# Patient Record
Sex: FEMALE | Race: WHITE | NOT HISPANIC OR LATINO | Employment: OTHER | URBAN - METROPOLITAN AREA
[De-identification: names, ages, dates, MRNs, and addresses within clinical notes are randomized per-mention and may not be internally consistent; named-entity substitution may affect disease eponyms.]

---

## 2017-04-10 ENCOUNTER — TELEPHONE (OUTPATIENT)
Dept: DERMATOLOGY | Facility: CLINIC | Age: 77
End: 2017-04-10

## 2017-04-10 ENCOUNTER — OFFICE VISIT (OUTPATIENT)
Dept: DERMATOLOGY | Facility: CLINIC | Age: 77
End: 2017-04-10
Payer: COMMERCIAL

## 2017-04-10 VITALS — HEART RATE: 67 BPM | OXYGEN SATURATION: 94 % | SYSTOLIC BLOOD PRESSURE: 141 MMHG | DIASTOLIC BLOOD PRESSURE: 63 MMHG

## 2017-04-10 DIAGNOSIS — L82.1 SK (SEBORRHEIC KERATOSIS): ICD-10-CM

## 2017-04-10 DIAGNOSIS — Z85.828 HISTORY OF SKIN CANCER: ICD-10-CM

## 2017-04-10 DIAGNOSIS — D18.00 ANGIOMA: ICD-10-CM

## 2017-04-10 DIAGNOSIS — T14.8XXA EXCORIATION: Primary | ICD-10-CM

## 2017-04-10 DIAGNOSIS — L81.4 LENTIGO: ICD-10-CM

## 2017-04-10 DIAGNOSIS — C44.329 SQUAMOUS CELL CARCINOMA OF FOREHEAD: ICD-10-CM

## 2017-04-10 PROCEDURE — 99213 OFFICE O/P EST LOW 20 MIN: CPT | Mod: 25 | Performed by: DERMATOLOGY

## 2017-04-10 PROCEDURE — 88331 PATH CONSLTJ SURG 1 BLK 1SPC: CPT | Performed by: DERMATOLOGY

## 2017-04-10 PROCEDURE — 11100 HC BIOPSY SKIN/SUBQ/MUC MEM, SINGLE LESION: CPT | Performed by: DERMATOLOGY

## 2017-04-10 PROCEDURE — 11101 HC BIOPSY SKIN/SUBQ/MUC MEM, EACH ADDTL LESION: CPT | Performed by: DERMATOLOGY

## 2017-04-10 NOTE — PATIENT INSTRUCTIONS
Wound Care Instructions     FOR SUPERFICIAL WOUNDS     [facility] [phone]    [facility] [phone]    Back & forehead                   AFTER 24 HOURS YOU SHOULD REMOVE THE BANDAGE AND BEGIN DAILY DRESSING CHANGES AS FOLLOWS:     1) Remove Dressing.     2) Clean and dry the area with tap water using a Q-tip or sterile gauze pad.     3) Apply Vaseline, Aquaphor, Polysporin ointment or Bacitracin ointment over entire wound.  Do NOT use Neosporin ointment.     4) Cover the wound with a band-aid, or a sterile non-stick gauze pad and micropore paper tape      REPEAT THESE INSTRUCTIONS AT LEAST ONCE A DAY UNTIL THE WOUND HAS COMPLETELY HEALED.    It is an old wives tale that a wound heals better when it is exposed to air and allowed to dry out. The wound will heal faster with a better cosmetic result if it is kept moist with ointment and covered with a bandage.    **Do not let the wound dry out.**      Supplies Needed:      *Cotton tipped applicators (Q-tips)    *Polysporin Ointment or Bacitracin Ointment (NOT NEOSPORIN)    *Band-aids or non-stick gauze pads and micropore paper tape.      PATIENT INFORMATION:    During the healing process you will notice a number of changes. All wounds develop a small halo of redness surrounding the wound.  This means healing is occurring. Severe itching with extensive redness usually indicates sensitivity to the ointment or bandage tape used to dress the wound.  You should call our office if this develops.      Swelling  and/or discoloration around your surgical site is common, particularly when performed around the eye.    All wounds normally drain.  The larger the wound the more drainage there will be.  After 7-10 days, you will notice the wound beginning to shrink and new skin will begin to grow.  The wound is healed when you can see skin has formed over the entire area.  A healed wound has a healthy, shiny look to the surface and is red to dark  pink in color to normalize.  Wounds may take approximately 4-6 weeks to heal.  Larger wounds may take 6-8 weeks.  After the wound is healed you may discontinue dressing changes.    You may experience a sensation of tightness as your wound heals. This is normal and will gradually subside.    Your healed wound may be sensitive to temperature changes. This sensitivity improves with time, but if you re having a lot of discomfort, try to avoid temperature extremes.    Patients frequently experience itching after their wound appears to have healed because of the continue healing under the skin.  Plain Vaseline will help relieve the itching.        POSSIBLE COMPLICATIONS    BLEEDIN. Leave the bandage in place.  2. Use tightly rolled up gauze or a cloth to apply direct pressure over the bandage for 30  minutes.  3. Reapply pressure for an additional 30 minutes if necessary  4. Use additional gauze and tape to maintain pressure once the bleeding has stopped.

## 2017-04-10 NOTE — TELEPHONE ENCOUNTER
Patient notified. Patient verbalized understanding. Scheduled for MOHS Surgery. Mohs Pre-op letter sent.   Ricarda Chu RN

## 2017-04-10 NOTE — PROGRESS NOTES
Precious Paris is a 76 year old year old female patient here today for /fu hx non-melanoma skin cancer.  She notes growth on left forehead treated with cryo but grew back.    Patient states this has been present for months.  Patient reports the following symptoms:  tender.  Patient reports the following previous treatments none.  Patient reports the following modifying factors none.  Associated symptoms: none.  Patient has no other skin complaints today.  Remainder of the HPI, Meds, PMH, Allergies, FH, and SH was reviewed in chart.      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     anosmia     Diverticulosis of colon (without mention of hemorrhage)      Other and unspecified hyperlipidemia      Pain in joint, lower leg      Plantar fascial fibromatosis      PURE HYPERCHOLESTEROLEM 9/9/2007     PURE HYPERCHOLESTEROLEM 9/9/2007     Squamous cell carcinoma (H)        Past Surgical History:   Procedure Laterality Date     HC COLONOSCOPY THRU STOMA, DIAGNOSTIC  04/05    diverticulosis and hemorroids, due 2015     JOINT REPLACEMTN, KNEE RT/LT  2010    right     JOINT REPLACEMTN, KNEE RT/LT  2011    left     LIGATE VEIN VARICOSE, PHLEBECTOMY MULTIPLE STAB, COMBINED  10/17/2013    Procedure: COMBINED LIGATE VEIN VARICOSE, PHLEBECTOMY MULTIPLE STAB;  Phlebectomy of Right Knee Varicose Veins;  Surgeon: Andrea Duffy MD;  Location: [facility] OR     OSTEOTOMY FOOT  8/19/2013    Procedure: OSTEOTOMY FOOT;  Left 2nd metatarsal osteotomy, 2nd Metatarsophalangeal joint & 2nd toe correction;  Surgeon: Jose Phillips DPM;  Location: [facility] OR     SURGICAL HISTORY OF -   1979    Stripping of Veins in Left Leg     SURGICAL HISTORY OF -   1970    Bilateral Tubal Ligation     SURGICAL HISTORY  OF - 2000    Carpal Tunnel Repair, right     SURGICAL HISTORY OF - 9-12-08    Rt let ablation        Family History   Problem Relation Age of Onset     Hypertension Mother      CEREBROVASCULAR DISEASE Mother      Hypertension Father      CEREBROVASCULAR DISEASE Father      Hypertension Brother      CANCER Brother      lymphoma     Hypertension Sister      GASTROINTESTINAL DISEASE Sister      Hypertension Brother      CANCER Brother      melanoma     Eye Disorder Brother      Hypertension Brother      Hypertension Brother        Social History     Social History     Marital status:      Spouse name: N/A     Number of children: N/A     Years of education: N/A     Occupational History     Not on file.     Social History Main Topics     Smoking status: Former Smoker     Smokeless tobacco: Never Used      Comment: quit 20 years ago     Alcohol use Yes      Comment: occ.     Drug use: No     Sexual activity: Yes     Partners: Male     Other Topics Concern     Parent/Sibling W/ Cabg, Mi Or Angioplasty Before 65f 55m? No     Social History Narrative       Outpatient Encounter Prescriptions as of 4/10/2017   Medication Sig Dispense Refill     simvastatin (ZOCOR) 20 MG tablet Take 1 tablet (20 mg) by mouth At Bedtime 90 tablet 3     losartan-hydrochlorothiazide (HYZAAR) 50-12.5 MG per tablet Take 1 tablet by mouth daily 90 tablet 3     triamcinolone (KENALOG) 0.1 % cream Apply to AA BID x 1-2 weeks then PRN only 80 g 1     fluocinonide (LIDEX) 0.05 % external solution Apply sparingly to affected area twice daily as needed.  Do not apply to face.(Due for recheck Itching appt [phone] to schedule) 60 mL 0     fish oil-omega-3 fatty acids (OMEGA 3) 1000 MG capsule Take 1 capsule (1 g) by mouth daily 30 capsule 1     ASPIRIN 81 MG OR TABS 1 TABLET EVERY OTHER DAY       IBUPROFEN 200 MG OR TABS AS NEEDED       CENTRUM SILVER OR 1 TABLET DAILY       CALCIUM OR 1 DAILY       VITAMIN C OR 1 DAILY       No  facility-administered encounter medications on file as of 4/10/2017.              Review Of Systems  Skin: As above  Eyes: negative  Ears/Nose/Throat: negative  Respiratory: No shortness of breath, dyspnea on exertion, cough, or hemoptysis  Cardiovascular: negative  Gastrointestinal: negative  Genitourinary: negative  Musculoskeletal: negative  Neurologic: negative  Psychiatric: negative  Hematologic/Lymphatic/Immunologic: negative  Endocrine: negative      O:   NAD, WDWN, Alert & Oriented, Mood & Affect wnl, Vitals stable   Here today alone   /63 (BP Location: Left arm, Patient Position: Chair, Cuff Size: Adult Large)  Pulse 67  SpO2 94%   General appearance normal   Vitals stable   Alert, oriented and in no acute distress      Following lymph nodes palpated: Occipital, Cervical, Supraclavicular no lad     Stuck on papules and brown macules on trunk and ext    Red papules on trunk    R back bleeding 9mm scaly papule    L medial forehead 9mm keratotic scaly papule         The remainder of the full exam was unremarkable; the following areas were examined:  conjunctiva/lids, oral mucosa, neck, peripheral vascular system, abdomen, lymph nodes, digits/nails, eccrine and apocrine glands, scalp/hair, face, neck, chest, abdomen, buttocks, back, RUE, LUE, RLE, LLE       Eyes: Conjunctivae/lids:Normal     ENT: Lips, buccal mucosa, tongue: normal    MSK:Normal    Cardiovascular: peripheral edema none    Pulm: Breathing Normal    Lymph Nodes: No Head and Neck Lymphadenopathy     Neuro/Psych: Orientation:Normal; Mood/Affect:Normal      MICRO:     R back:There is hyperkeratosis and focal parakeratosis and ulceration of the epidermis, overlying banal keratinocytes, .  The dermis shows a variable superficial perivascular inflammatory infiltrate.   L medial fh:There is a proliferation of irregular nests of abnormal squamous cells arising from the epidermis and invading the dermis. These are well differentiated. The dermis  shows a variable superficial perivascular inflammatory infiltrate.   A/P:  1. Hx of non-melanoma skin cancer, seborrheic keratosis, lentigo angioma  2. R/o basal cell carcinoma   TANGENTIAL BIOPSY IN HOUSE:  After consent, anesthesia with LEC and prep, tangential excision performed and dx above confirmed with frozen section histology.  No complications and routine wound care.  Patient told result   L medial FH: squamous cell carcinoma schedule excision  r back excoriated keratosis no treatment      BENIGN LESIONS DISCUSSED WITH PATIENT:  I discussed the specifics of tumor, prognosis, and genetics of benign lesions.  I explained that treatment of these lesions would be purely cosmetic and not medically neccessary.  I discussed with patient different removal options including excision, cautery and /or laser.      Nature and genetics of benign skin lesions dicussed with patient.  Signs and Symptoms of skin cancer discussed with patient.  Patient encouraged to perform monthly skin exams.  UV precautions reviewed with patient.  Skin care regimen reviewed with patient: Eliminate harsh soaps, i.e. Dial, zest, irsih spring; Mild soaps such as Cetaphil or Dove sensitive skin, avoid hot or cold showers, aggressive use of emollients including vanicream, cetaphil or cerave discussed with patient.    Risks of non-melanoma skin cancer discussed with patient   Return to clinic 6 months

## 2017-04-10 NOTE — TELEPHONE ENCOUNTER
----- Message from Danielito Anthony MD sent at 4/10/2017 12:07 PM CDT -----  L medial FH: squamous cell carcinoma schedule excision  r back excoriated keratosis no treatment

## 2017-04-10 NOTE — LETTER
[facility]  [address]  [facility] [address]  Phone: [phone]    April 10, 2017    Precious Paris  [address] [place] [address]              Dear Ms. Paris,    You are scheduled for Mohs Surgery on Tuesday April 25 th at 8:00 am.    Please check in at Dermatology Clinic.   (2nd Floor, last  Clinic on right up staircase or elevator -past OB/GYN clinic)    You don't need to arrive more than 5-10 minutes prior to your appointment time.     Be sure to eat a good breakfast and bathe and wash your hair prior to Surgery.    If you are taking any anti-coagulants that are prescribed by your Doctor (such as Coumadin/warfarin, Plavix, Aspirin, Ibuprofen), please continue taking them.     However, If you are taking anti-coagulants over the counter without  a Doctor's order for a Medical condition, please discontinue them 10 days prior to Surgery.      Please wear loose comfortable clothing as it could possibly be 4-6 hours until your surgery is completed depending upon how many layers of tissue need to be removed.     Wi-fi access is available.     Sincerely,      Danielito Anthony MD/ Ricarda Chu RN

## 2017-04-10 NOTE — MR AVS SNAPSHOT
After Visit Summary   4/10/2017    Precious Paris    MRN: [id number]           Patient Information     Date Of Birth          1940        Visit Information        Provider Department      4/10/2017 9:30 AM Danielito Anthony MD [facility]        Care Instructions          Wound Care Instructions     FOR SUPERFICIAL WOUNDS     [facility] [phone]    [facility] [phone]    Back & forehead                   AFTER 24 HOURS YOU SHOULD REMOVE THE BANDAGE AND BEGIN DAILY DRESSING CHANGES AS FOLLOWS:     1) Remove Dressing.     2) Clean and dry the area with tap water using a Q-tip or sterile gauze pad.     3) Apply Vaseline, Aquaphor, Polysporin ointment or Bacitracin ointment over entire wound.  Do NOT use Neosporin ointment.     4) Cover the wound with a band-aid, or a sterile non-stick gauze pad and micropore paper tape      REPEAT THESE INSTRUCTIONS AT LEAST ONCE A DAY UNTIL THE WOUND HAS COMPLETELY HEALED.    It is an old wives tale that a wound heals better when it is exposed to air and allowed to dry out. The wound will heal faster with a better cosmetic result if it is kept moist with ointment and covered with a bandage.    **Do not let the wound dry out.**      Supplies Needed:      *Cotton tipped applicators (Q-tips)    *Polysporin Ointment or Bacitracin Ointment (NOT NEOSPORIN)    *Band-aids or non-stick gauze pads and micropore paper tape.      PATIENT INFORMATION:    During the healing process you will notice a number of changes. All wounds develop a small halo of redness surrounding the wound.  This means healing is occurring. Severe itching with extensive redness usually indicates sensitivity to the ointment or bandage tape used to dress the wound.  You should call our office if this develops.      Swelling  and/or discoloration around your surgical site is common, particularly when performed around the eye.    All wounds normally  drain.  The larger the wound the more drainage there will be.  After 7-10 days, you will notice the wound beginning to shrink and new skin will begin to grow.  The wound is healed when you can see skin has formed over the entire area.  A healed wound has a healthy, shiny look to the surface and is red to dark pink in color to normalize.  Wounds may take approximately 4-6 weeks to heal.  Larger wounds may take 6-8 weeks.  After the wound is healed you may discontinue dressing changes.    You may experience a sensation of tightness as your wound heals. This is normal and will gradually subside.    Your healed wound may be sensitive to temperature changes. This sensitivity improves with time, but if you re having a lot of discomfort, try to avoid temperature extremes.    Patients frequently experience itching after their wound appears to have healed because of the continue healing under the skin.  Plain Vaseline will help relieve the itching.        POSSIBLE COMPLICATIONS    BLEEDIN. Leave the bandage in place.  2. Use tightly rolled up gauze or a cloth to apply direct pressure over the bandage for 30  minutes.  3. Reapply pressure for an additional 30 minutes if necessary  4. Use additional gauze and tape to maintain pressure once the bleeding has stopped.          Follow-ups after your visit        Who to contact     If you have questions or need follow up information about today's clinic visit or your schedule please contact [facility] directly at [phone].  Normal or non-critical lab and imaging results will be communicated to you by [employer], letter or phone within 4 business days after the clinic has received the results. If you do not hear from us within 7 days, please contact the clinic through VidaPakhart or phone. If you have a critical or abnormal lab result, we will notify you by phone as soon as possible.  Submit refill requests through [employer] or call your pharmacy and they will  "forward the refill request to us. Please allow 3 business days for your refill to be completed.          Additional Information About Your Visit        [employer] Information     [email or web address] lets you send messages to your doctor, view your test results, renew your prescriptions, schedule appointments and more. To sign up, go to [email or web address] . Click on \"Log in\" on the left side of the screen, which will take you to the Welcome page. Then click on \"Sign up Now\" on the right side of the page.     You will be asked to enter the access code listed below, as well as some personal information. Please follow the directions to create your username and password.     Your access code is: [email or web address]  Expires: 2017 10:05 AM     Your access code will  in 90 days. If you need help or a new code, please call your [facility] clinic or [phone].        Care EveryWhere ID     This is your Care EveryWhere ID. This could be used by other organizations to access your [facility] medical records  [id number]        Your Vitals Were     Pulse Pulse Oximetry                67 94%           Blood Pressure from Last 3 Encounters:   04/10/17 141/63   16 132/66   10/19/16 130/68    Weight from Last 3 Encounters:   16 88.9 kg (196 lb)   06/10/16 88.9 kg (196 lb)   16 88 kg (194 lb)              Today, you had the following     No orders found for display       Primary Care Provider Office Phone # Fax #    Ester Jorge [phone]       [facility] [address] [facility] [address]        Thank you!     Thank you for choosing [facility]  for your care. Our goal is always to provide you with excellent care. Hearing back from our patients is one way we can continue to improve our services. Please take a few minutes to complete the written survey that you may receive in the mail after your visit with us. Thank you!             Your Updated Medication List - Protect " others around you: Learn how to safely use, store and throw away your medicines at www.disposemymeds.org.          This list is accurate as of: 4/10/17 10:05 AM.  Always use your most recent med list.                   Brand Name Dispense Instructions for use    aspirin 81 MG tablet      1 TABLET EVERY OTHER DAY       CALCIUM PO      1 DAILY       CENTRUM SILVER PO      1 TABLET DAILY       fish oil-omega-3 fatty acids 1000 MG capsule     30 capsule    Take 1 capsule (1 g) by mouth daily       fluocinonide 0.05 % solution    LIDEX    60 mL    Apply sparingly to affected area twice daily as needed.  Do not apply to face.(Due for recheck Itching appt [phone] to schedule)       ibuprofen 200 MG tablet    ADVIL/MOTRIN     AS NEEDED       losartan-hydrochlorothiazide 50-12.5 MG per tablet    HYZAAR    90 tablet    Take 1 tablet by mouth daily       simvastatin 20 MG tablet    ZOCOR    90 tablet    Take 1 tablet (20 mg) by mouth At Bedtime       triamcinolone 0.1 % cream    KENALOG    80 g    Apply to AA BID x 1-2 weeks then PRN only       VITAMIN C PO      1 DAILY

## 2017-04-10 NOTE — NURSING NOTE
"Initial /63 (BP Location: Left arm, Patient Position: Chair, Cuff Size: Adult Large)  Pulse 67  SpO2 94% Estimated body mass index is 32.62 kg/(m^2) as calculated from the following:    Height as of 6/16/16: 1.651 m (5' 5\").    Weight as of 6/16/16: 88.9 kg (196 lb). .      "

## 2017-04-20 ENCOUNTER — TRANSFERRED RECORDS (OUTPATIENT)
Dept: HEALTH INFORMATION MANAGEMENT | Facility: CLINIC | Age: 77
End: 2017-04-20

## 2017-04-25 ENCOUNTER — OFFICE VISIT (OUTPATIENT)
Dept: DERMATOLOGY | Facility: CLINIC | Age: 77
End: 2017-04-25
Payer: COMMERCIAL

## 2017-04-25 VITALS — OXYGEN SATURATION: 93 % | HEART RATE: 72 BPM | DIASTOLIC BLOOD PRESSURE: 58 MMHG | SYSTOLIC BLOOD PRESSURE: 145 MMHG

## 2017-04-25 DIAGNOSIS — C44.329 SQUAMOUS CELL CARCINOMA OF FOREHEAD: Primary | ICD-10-CM

## 2017-04-25 PROCEDURE — 17311 MOHS 1 STAGE H/N/HF/G: CPT | Performed by: DERMATOLOGY

## 2017-04-25 PROCEDURE — 13132 CMPLX RPR F/C/C/M/N/AX/G/H/F: CPT | Performed by: DERMATOLOGY

## 2017-04-25 NOTE — PATIENT INSTRUCTIONS
Sutured Wound Care  forehead    [facility]: [phone]    [facility]: [phone]          ? No strenuous activity for 48 hours. Resume moderate activity in 48 hours. No heavy exercising until you are seen for follow up in one week.     ? Take Tylenol as needed for discomfort.                         ? Do not drink alcoholic beverages for 48 hours.     ? Keep the pressure bandage in place for 24 hours. If the bandage becomes blood tinged or loose, reinforce it with gauze and tape.        (Refer to the reverse side of this page for management of bleeding).    ? Remove pressure bandage in 24 hours     ? Leave the flat bandage in place until your follow up appointment.    ? Keep the bandage dry. Wash around it carefully.    ? If the tape becomes soiled or starts to come off, reinforce it with additional paper tape.    ? Do not smoke for 3 weeks; smoking is detrimental to wound healing.    ? It is normal to have swelling and bruising around the surgical site. The bruising will fade in approximately 10-14 days. Elevate the area to reduce swelling.    ? Numbness, itchiness and sensitivity to temperature changes can occur after surgery and may take up to 18 months to normalize.      POSSIBLE COMPLICATIONS    BLEEDIN. Leave the bandage in place.  2. Use tightly rolled up gauze or a cloth to apply direct pressure over the bandage for 20   minutes.  3. Reapply pressure for an additional 20 minutes if necessary  4. Call the office or go to the nearest emergency room if pressure fails to stop the bleeding.  5. Use additional gauze and tape to maintain pressure once the bleeding has stopped.        PAIN:    1. Post operative pain should slowly get better, never worse.  2. A severe increase in pain may indicate a problem. Call the office if this occurs.    In case of emergency phone:Dr Anthony [phone]

## 2017-04-25 NOTE — NURSING NOTE
"Initial /58  Pulse 72  SpO2 93% Estimated body mass index is 32.62 kg/(m^2) as calculated from the following:    Height as of 6/16/16: 1.651 m (5' 5\").    Weight as of 6/16/16: 88.9 kg (196 lb). .    Pratibha Wong LPN    "

## 2017-04-25 NOTE — MR AVS SNAPSHOT
After Visit Summary   2017    Precious Paris    MRN: [id number]           Patient Information     Date Of Birth          1940        Visit Information        Provider Department      2017 8:00 AM Danielito Anthony MD [facility]        Today's Diagnoses     Squamous cell carcinoma of forehead    -  1      Care Instructions      Sutured Wound Care  forehead    [facility]: [phone]    [facility]: [phone]          ? No strenuous activity for 48 hours. Resume moderate activity in 48 hours. No heavy exercising until you are seen for follow up in one week.     ? Take Tylenol as needed for discomfort.                         ? Do not drink alcoholic beverages for 48 hours.     ? Keep the pressure bandage in place for 24 hours. If the bandage becomes blood tinged or loose, reinforce it with gauze and tape.        (Refer to the reverse side of this page for management of bleeding).    ? Remove pressure bandage in 24 hours     ? Leave the flat bandage in place until your follow up appointment.    ? Keep the bandage dry. Wash around it carefully.    ? If the tape becomes soiled or starts to come off, reinforce it with additional paper tape.    ? Do not smoke for 3 weeks; smoking is detrimental to wound healing.    ? It is normal to have swelling and bruising around the surgical site. The bruising will fade in approximately 10-14 days. Elevate the area to reduce swelling.    ? Numbness, itchiness and sensitivity to temperature changes can occur after surgery and may take up to 18 months to normalize.      POSSIBLE COMPLICATIONS    BLEEDIN. Leave the bandage in place.  2. Use tightly rolled up gauze or a cloth to apply direct pressure over the bandage for 20   minutes.  3. Reapply pressure for an additional 20 minutes if necessary  4. Call the office or go to the nearest emergency room if pressure fails to stop the bleeding.  5. Use  "additional gauze and tape to maintain pressure once the bleeding has stopped.        PAIN:    1. Post operative pain should slowly get better, never worse.  2. A severe increase in pain may indicate a problem. Call the office if this occurs.    In case of emergency phone:Dr Anthony [phone]          Follow-ups after your visit        Who to contact     If you have questions or need follow up information about today's clinic visit or your schedule please contact [facility] directly at [phone].  Normal or non-critical lab and imaging results will be communicated to you by [employer], letter or phone within 4 business days after the clinic has received the results. If you do not hear from us within 7 days, please contact the clinic through [employer] or phone. If you have a critical or abnormal lab result, we will notify you by phone as soon as possible.  Submit refill requests through [employer] or call your pharmacy and they will forward the refill request to us. Please allow 3 business days for your refill to be completed.          Additional Information About Your Visit        Plyce Information     P[employer]ce lets you send messages to your doctor, view your test results, renew your prescriptions, schedule appointments and more. To sign up, go to [email or web address] . Click on \"Log in\" on the left side of the screen, which will take you to the Welcome page. Then click on \"Sign up Now\" on the right side of the page.     You will be asked to enter the access code listed below, as well as some personal information. Please follow the directions to create your username and password.     Your access code is: [email or web address]  Expires: 2017 10:05 AM     Your access code will  in 90 days. If you need help or a new code, please call your [facility] or [phone].        Care EveryWhere ID     This is your Care EveryWhere ID. This could be used by other organizations to access your [place] " medical records  [id number]        Your Vitals Were     Pulse Pulse Oximetry                72 93%           Blood Pressure from Last 3 Encounters:   04/25/17 145/58   04/10/17 141/63   12/16/16 132/66    Weight from Last 3 Encounters:   06/16/16 88.9 kg (196 lb)   06/10/16 88.9 kg (196 lb)   04/11/16 88 kg (194 lb)              We Performed the Following     MOHS HEAD/NCK/HND/FT/GEN 1ST STAGE UP T0 5 BLOCKS     REPAIR COMPLEX, WOUND HEAD/AXIL/GEN/HND/FT 2.6-7.5 CM        Primary Care Provider Office Phone # Fax #    Ester Jorge [phone]       [facility] [address] [facility] [address]        Thank you!     Thank you for choosing [facility]  for your care. Our goal is always to provide you with excellent care. Hearing back from our patients is one way we can continue to improve our services. Please take a few minutes to complete the written survey that you may receive in the mail after your visit with us. Thank you!             Your Updated Medication List - Protect others around you: Learn how to safely use, store and throw away your medicines at www.disposemymeds.org.          This list is accurate as of: 4/25/17 11:27 AM.  Always use your most recent med list.                   Brand Name Dispense Instructions for use    aspirin 81 MG tablet      1 TABLET EVERY OTHER DAY       CALCIUM PO      1 DAILY       CENTRUM SILVER PO      1 TABLET DAILY       fish oil-omega-3 fatty acids 1000 MG capsule     30 capsule    Take 1 capsule (1 g) by mouth daily       fluocinonide 0.05 % solution    LIDEX    60 mL    Apply sparingly to affected area twice daily as needed.  Do not apply to face.(Due for recheck Itching appt [phone] to schedule)       ibuprofen 200 MG tablet    ADVIL/MOTRIN     AS NEEDED       losartan-hydrochlorothiazide 50-12.5 MG per tablet    HYZAAR    90 tablet    Take 1 tablet by mouth daily       simvastatin 20 MG tablet    ZOCOR    90 tablet     Take 1 tablet (20 mg) by mouth At Bedtime       triamcinolone 0.1 % cream    KENALOG    80 g    Apply to AA BID x 1-2 weeks then PRN only       VITAMIN C PO      1 DAILY

## 2017-04-25 NOTE — PROGRESS NOTES
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  Patient has no other skin complaints today.  Remainder of the HPI, Meds, PMH, Allergies, FH, and SH was reviewed in chart.    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     anosmia     Diverticulosis of colon (without mention of hemorrhage)      Other and unspecified hyperlipidemia      Pain in joint, lower leg      Plantar fascial fibromatosis      PURE HYPERCHOLESTEROLEM 9/9/2007     PURE HYPERCHOLESTEROLEM 9/9/2007     Squamous cell carcinoma (H)        Past Surgical History:   Procedure Laterality Date     HC COLONOSCOPY THRU STOMA, DIAGNOSTIC  04/05    diverticulosis and hemorroids, due 2015     JOINT REPLACEMTN, KNEE RT/LT  2010    right     JOINT REPLACEMTN, KNEE RT/LT  2011    left     LIGATE VEIN VARICOSE, PHLEBECTOMY MULTIPLE STAB, COMBINED  10/17/2013    Procedure: COMBINED LIGATE VEIN VARICOSE, PHLEBECTOMY MULTIPLE STAB;  Phlebectomy of Right Knee Varicose Veins;  Surgeon: Andrea Duffy MD;  Location: [facility] OR     OSTEOTOMY FOOT  8/19/2013    Procedure: OSTEOTOMY FOOT;  Left 2nd metatarsal osteotomy, 2nd Metatarsophalangeal joint & 2nd toe correction;  Surgeon: Jose Phillips DPM;  Location: [facility] OR     SURGICAL HISTORY OF -   1979    Stripping of Veins in Left Leg     SURGICAL HISTORY OF -   1970    Bilateral Tubal Ligation     SURGICAL HISTORY OF -   2000    Carpal Tunnel Repair, right     SURGICAL HISTORY OF -   9-12-08    Rt let ablation        Family History   Problem Relation Age of Onset     Hypertension Mother      CEREBROVASCULAR DISEASE Mother       Hypertension Father      CEREBROVASCULAR DISEASE Father      Hypertension Brother      CANCER Brother      lymphoma     Hypertension Sister      GASTROINTESTINAL DISEASE Sister      Hypertension Brother      CANCER Brother      melanoma     Eye Disorder Brother      Hypertension Brother      Hypertension Brother        Social History     Social History     Marital status:      Spouse name: N/A     Number of children: N/A     Years of education: N/A     Occupational History     Not on file.     Social History Main Topics     Smoking status: Former Smoker     Smokeless tobacco: Never Used      Comment: quit 20 years ago     Alcohol use Yes      Comment: occ.     Drug use: No     Sexual activity: Yes     Partners: Male     Other Topics Concern     Parent/Sibling W/ Cabg, Mi Or Angioplasty Before 65f 55m? No     Social History Narrative       Outpatient Encounter Prescriptions as of 4/25/2017   Medication Sig Dispense Refill     simvastatin (ZOCOR) 20 MG tablet Take 1 tablet (20 mg) by mouth At Bedtime 90 tablet 3     losartan-hydrochlorothiazide (HYZAAR) 50-12.5 MG per tablet Take 1 tablet by mouth daily 90 tablet 3     triamcinolone (KENALOG) 0.1 % cream Apply to AA BID x 1-2 weeks then PRN only 80 g 1     fluocinonide (LIDEX) 0.05 % external solution Apply sparingly to affected area twice daily as needed.  Do not apply to face.(Due for recheck Itching appt [phone] to schedule) 60 mL 0     fish oil-omega-3 fatty acids (OMEGA 3) 1000 MG capsule Take 1 capsule (1 g) by mouth daily 30 capsule 1     ASPIRIN 81 MG OR TABS 1 TABLET EVERY OTHER DAY       IBUPROFEN 200 MG OR TABS AS NEEDED       CENTRUM SILVER OR 1 TABLET DAILY       CALCIUM OR 1 DAILY       VITAMIN C OR 1 DAILY       No facility-administered encounter medications on file as of 4/25/2017.              Review Of Systems  Skin: As above  Eyes: negative  Ears/Nose/Throat: negative  Respiratory: No shortness of breath, dyspnea on exertion, cough, or  hemoptysis  Cardiovascular: negative  Gastrointestinal: negative  Genitourinary: negative  Musculoskeletal: negative  Neurologic: negative  Psychiatric: negative  Hematologic/Lymphatic/Immunologic: negative  Endocrine: negative      O:   NAD, WDWN, Alert & Oriented, Mood & Affect wnl, Vitals stable   Here today alone   /58  Pulse 72  SpO2 93%   General appearance normal   Vitals stable   Alert, oriented and in no acute distress      Following lymph nodes palpated: Occipital, Cervical, Supraclavicular no lad   L mid forehead 9mm red scaly papule       Eyes: Conjunctivae/lids:Normal     ENT: Lips, buccal mucosa, tongue: normal    MSK:Normal    Cardiovascular: peripheral edema none    Pulm: Breathing Normal    Lymph Nodes: No Head and Neck Lymphadenopathy     Neuro/Psych: Orientation:Normal; Mood/Affect:Normal      A/P:  1. L mid forehead squamous cell carcinoma   MOHS:   Location    After PGACAC discussed with patient, decision for Mohs surgery was made. Indication for Mohs was Location. Patient confirmed the site with Dr. Anthony.  After anesthesia with LEC, the tumor was excised using standard Mohs technique in 1 stages(s).  CLEAR MARGINS OBTAINED and Final defect size was 1.4 cm.       REPAIR COMPLEX: Because of the tightness of the surrounding skin and Because of the size and full thickness nature of the defect, a complex closure was planned. After LEC anesthesia and prep, Burow's triangles were excised in the relaxed skin tension lines. The wound edges were widely undermined by dissection in the subcutaneous plane until adequate tissue mobility was obtained. Hemostasis was obtained. The wound edges were closed in a layered fashion using Vicryl and Fast Absorbing Plain Gut sutures. Postoperative length was 4.2 cm.   EBL minimal; complications none; wound care routine.  The patient was discharged in good condition and will return in one week for wound evaluation.    BENIGN LESIONS DISCUSSED WITH PATIENT:  I  discussed the specifics of tumor, prognosis, and genetics of benign lesions.  I explained that treatment of these lesions would be purely cosmetic and not medically neccessary.  I discussed with patient different removal options including excision, cautery and /or laser.      Nature and genetics of benign skin lesions dicussed with patient.  Signs and Symptoms of skin cancer discussed with patient.  Patient encouraged to perform monthly skin exams.  UV precautions reviewed with patient.  Skin care regimen reviewed with patient: Eliminate harsh soaps, i.e. Dial, zest, irsih spring; Mild soaps such as Cetaphil or Dove sensitive skin, avoid hot or cold showers, aggressive use of emollients including vanicream, cetaphil or cerave discussed with patient.    Risks of non-melanoma skin cancer discussed with patient   Return to clinic 6 months

## 2017-05-02 ENCOUNTER — ALLIED HEALTH/NURSE VISIT (OUTPATIENT)
Dept: DERMATOLOGY | Facility: CLINIC | Age: 77
End: 2017-05-02
Payer: COMMERCIAL

## 2017-05-02 DIAGNOSIS — Z48.01 ENCOUNTER FOR CHANGE OR REMOVAL OF SURGICAL WOUND DRESSING: Primary | ICD-10-CM

## 2017-05-02 PROCEDURE — 99207 ZZC NO CHARGE NURSE ONLY: CPT

## 2017-05-02 NOTE — PATIENT INSTRUCTIONS
WOUND CARE INSTRUCTIONS  for  ONE WEEK AFTER SURGERY          1) Leave flat bandage on your skin for one week after today s bandage change.  2) In one week when you remove the bandage, you may resume your regular skin care routine, including washing with mild soap and water, applying moisturizer, make-up and sunscreen.    3) If there are any open or bleeding areas at the incision/graft site you should begin to cover the area with a bandage daily as follows:    1) Clean and dry the area with plain tap water using a Q-tip or sterile gauze pad.  2) Apply Polysporin or Bacitracin ointment to the open area.  3) Cover the wound with a band-aid or a sterile non-stick gauze pad and micropore paper tape.             *Once the bandages are removed, the scar will be red and firm (especially in the lip/chin area). This is normal and will fade in time. It might take 6-12 months for this to happen.     *Massaging the area will help the scar soften and fade quicker. Begin to massage the area one month after the bandages have been removed. To massage apply pressure directly and firmly over the scar with the fingertips and move in a circular motion. Massage the area for a few minutes several times a day. Continue to massage the site for several months.    *Approximately 6-8 weeks after surgery it is not uncommon to see the formation of  tender pimple-like  bump along the scar. This is normal. As the scar continues to mature and the stitches underneath the skin begin to dissolve, this might occur. Do not pick or squeeze, this will resolve on it s own. Should one break open producing a small amount of drainage, apply Polysporin or Bacitracin ointment a few times a day until the wound is completely healed.    *Numbness in the surgical area is expected. It might take 12-18 months for the feeling to return to normal. During this time sensations of itchiness, tingling and occasional sharp pains might be noted. These feelings are normal  and will subside once the nerves have completely healed.         IN CASE OF EMERGENCY: Dr Anthony [phone]     If you were seen in [place] call: [phone]    If you were seen in [place] call: [phone]

## 2017-05-02 NOTE — MR AVS SNAPSHOT
After Visit Summary   5/2/2017    Precious Paris    MRN: [id number]           Patient Information     Date Of Birth          1940        Visit Information        Provider Department      5/2/2017 3:00 PM NurseDeb [facility]        Care Instructions    WOUND CARE INSTRUCTIONS  for  ONE WEEK AFTER SURGERY          1) Leave flat bandage on your skin for one week after today s bandage change.  2) In one week when you remove the bandage, you may resume your regular skin care routine, including washing with mild soap and water, applying moisturizer, make-up and sunscreen.    3) If there are any open or bleeding areas at the incision/graft site you should begin to cover the area with a bandage daily as follows:    1) Clean and dry the area with plain tap water using a Q-tip or sterile gauze pad.  2) Apply Polysporin or Bacitracin ointment to the open area.  3) Cover the wound with a band-aid or a sterile non-stick gauze pad and micropore paper tape.             *Once the bandages are removed, the scar will be red and firm (especially in the lip/chin area). This is normal and will fade in time. It might take 6-12 months for this to happen.     *Massaging the area will help the scar soften and fade quicker. Begin to massage the area one month after the bandages have been removed. To massage apply pressure directly and firmly over the scar with the fingertips and move in a circular motion. Massage the area for a few minutes several times a day. Continue to massage the site for several months.    *Approximately 6-8 weeks after surgery it is not uncommon to see the formation of  tender pimple-like  bump along the scar. This is normal. As the scar continues to mature and the stitches underneath the skin begin to dissolve, this might occur. Do not pick or squeeze, this will resolve on it s own. Should one break open producing a small amount of drainage, apply Polysporin or Bacitracin  "ointment a few times a day until the wound is completely healed.    *Numbness in the surgical area is expected. It might take 12-18 months for the feeling to return to normal. During this time sensations of itchiness, tingling and occasional sharp pains might be noted. These feelings are normal and will subside once the nerves have completely healed.         IN CASE OF EMERGENCY: Dr Anthony [phone]     If you were seen in [place] call: [phone]    If you were seen in [place] call: [phone]            Follow-ups after your visit        Who to contact     If you have questions or need follow up information about today's clinic visit or your schedule please contact [facility] directly at [phone].  Normal or non-critical lab and imaging results will be communicated to you by MyChart, letter or phone within 4 business days after the clinic has received the results. If you do not hear from us within 7 days, please contact the clinic through MyChart or phone. If you have a critical or abnormal lab result, we will notify you by phone as soon as possible.  Submit refill requests through [employer] or call your pharmacy and they will forward the refill request to us. Please allow 3 business days for your refill to be completed.          Additional Information About Your Visit        [employer] Information     [employer] lets you send messages to your doctor, view your test results, renew your prescriptions, schedule appointments and more. To sign up, go to [email or web address] . Click on \"Log in\" on the left side of the screen, which will take you to the Welcome page. Then click on \"Sign up Now\" on the right side of the page.     You will be asked to enter the access code listed below, as well as some personal information. Please follow the directions to create your username and password.     Your access code is: [email or web address]  Expires: 2017 10:05 AM     Your access code will  in 90 " days. If you need help or a new code, please call your [place] clinic or [phone].        Care EveryWhere ID     This is your Care EveryWhere ID. This could be used by other organizations to access your [place] medical records  [id number]         Blood Pressure from Last 3 Encounters:   04/25/17 145/58   04/10/17 141/63   12/16/16 132/66    Weight from Last 3 Encounters:   06/16/16 88.9 kg (196 lb)   06/10/16 88.9 kg (196 lb)   04/11/16 88 kg (194 lb)              Today, you had the following     No orders found for display       Primary Care Provider Office Phone # Fax #    Ester Jorge [phone]       [facility] [address] [facility] [address]        Thank you!     Thank you for choosing [facility]  for your care. Our goal is always to provide you with excellent care. Hearing back from our patients is one way we can continue to improve our services. Please take a few minutes to complete the written survey that you may receive in the mail after your visit with us. Thank you!             Your Updated Medication List - Protect others around you: Learn how to safely use, store and throw away your medicines at www.disposemymeds.org.          This list is accurate as of: 5/2/17  3:06 PM.  Always use your most recent med list.                   Brand Name Dispense Instructions for use    aspirin 81 MG tablet      1 TABLET EVERY OTHER DAY       CALCIUM PO      1 DAILY       CENTRUM SILVER PO      1 TABLET DAILY       fish oil-omega-3 fatty acids 1000 MG capsule     30 capsule    Take 1 capsule (1 g) by mouth daily       fluocinonide 0.05 % solution    LIDEX    60 mL    Apply sparingly to affected area twice daily as needed.  Do not apply to face.(Due for recheck Itching appt [phone] to schedule)       ibuprofen 200 MG tablet    ADVIL/MOTRIN     AS NEEDED       losartan-hydrochlorothiazide 50-12.5 MG per tablet    HYZAAR    90 tablet    Take 1 tablet by mouth  daily       simvastatin 20 MG tablet    ZOCOR    90 tablet    Take 1 tablet (20 mg) by mouth At Bedtime       triamcinolone 0.1 % cream    KENALOG    80 g    Apply to AA BID x 1-2 weeks then PRN only       VITAMIN C PO      1 DAILY

## 2017-05-02 NOTE — PROGRESS NOTES
Pt returned to clinic for post surgery 1 week follow up bandage change. Pt has no complaints, denies pain. Bandage removed forehead, area cleansed with normal saline. Site is healing and wound edges approximating well. Reapplied new steri strips and paper tape.    Advised to watch for signs/sx of infection; spreading redness, drainage, odor, fever. Call or report promptly to clinic. Pt given written instructions and informed to rtc as needed. Patient verbalized understanding.     Kandy Cramer, CMA

## 2017-06-28 ENCOUNTER — OFFICE VISIT (OUTPATIENT)
Dept: FAMILY MEDICINE | Facility: CLINIC | Age: 77
End: 2017-06-28
Payer: COMMERCIAL

## 2017-06-28 VITALS
TEMPERATURE: 98.3 F | DIASTOLIC BLOOD PRESSURE: 74 MMHG | BODY MASS INDEX: 33.15 KG/M2 | SYSTOLIC BLOOD PRESSURE: 129 MMHG | HEIGHT: 65 IN | WEIGHT: 199 LBS | HEART RATE: 75 BPM

## 2017-06-28 DIAGNOSIS — R61 NIGHT SWEATS: ICD-10-CM

## 2017-06-28 DIAGNOSIS — I10 BENIGN ESSENTIAL HYPERTENSION: ICD-10-CM

## 2017-06-28 DIAGNOSIS — Z00.00 MEDICARE ANNUAL WELLNESS VISIT, SUBSEQUENT: Primary | ICD-10-CM

## 2017-06-28 DIAGNOSIS — R73.09 ELEVATED GLUCOSE: ICD-10-CM

## 2017-06-28 DIAGNOSIS — E78.5 HYPERLIPIDEMIA LDL GOAL <130: ICD-10-CM

## 2017-06-28 LAB
ANION GAP SERPL CALCULATED.3IONS-SCNC: 7 MMOL/L (ref 3–14)
BASOPHILS # BLD AUTO: 0 10E9/L (ref 0–0.2)
BASOPHILS NFR BLD AUTO: 0.6 %
BUN SERPL-MCNC: 18 MG/DL (ref 7–30)
CALCIUM SERPL-MCNC: 9.7 MG/DL (ref 8.5–10.1)
CHLORIDE SERPL-SCNC: 106 MMOL/L (ref 94–109)
CHOLEST SERPL-MCNC: 190 MG/DL
CO2 SERPL-SCNC: 27 MMOL/L (ref 20–32)
CREAT SERPL-MCNC: 0.62 MG/DL (ref 0.52–1.04)
DIFFERENTIAL METHOD BLD: NORMAL
EOSINOPHIL # BLD AUTO: 0.2 10E9/L (ref 0–0.7)
EOSINOPHIL NFR BLD AUTO: 3.2 %
ERYTHROCYTE [DISTWIDTH] IN BLOOD BY AUTOMATED COUNT: 12.9 % (ref 10–15)
GFR SERPL CREATININE-BSD FRML MDRD: ABNORMAL ML/MIN/1.7M2
GLUCOSE SERPL-MCNC: 104 MG/DL (ref 70–99)
HBA1C MFR BLD: 5.7 % (ref 4.3–6)
HCT VFR BLD AUTO: 43.2 % (ref 35–47)
HDLC SERPL-MCNC: 75 MG/DL
HGB BLD-MCNC: 13.7 G/DL (ref 11.7–15.7)
LDLC SERPL CALC-MCNC: 102 MG/DL
LYMPHOCYTES # BLD AUTO: 2.1 10E9/L (ref 0.8–5.3)
LYMPHOCYTES NFR BLD AUTO: 28.8 %
MCH RBC QN AUTO: 30.6 PG (ref 26.5–33)
MCHC RBC AUTO-ENTMCNC: 31.7 G/DL (ref 31.5–36.5)
MCV RBC AUTO: 97 FL (ref 78–100)
MONOCYTES # BLD AUTO: 0.7 10E9/L (ref 0–1.3)
MONOCYTES NFR BLD AUTO: 9.6 %
NEUTROPHILS # BLD AUTO: 4.2 10E9/L (ref 1.6–8.3)
NEUTROPHILS NFR BLD AUTO: 57.8 %
NONHDLC SERPL-MCNC: 115 MG/DL
PLATELET # BLD AUTO: 248 10E9/L (ref 150–450)
POTASSIUM SERPL-SCNC: 4.1 MMOL/L (ref 3.4–5.3)
RBC # BLD AUTO: 4.47 10E12/L (ref 3.8–5.2)
SODIUM SERPL-SCNC: 140 MMOL/L (ref 133–144)
TRIGL SERPL-MCNC: 66 MG/DL
TSH SERPL DL<=0.005 MIU/L-ACNC: 1.39 MU/L (ref 0.4–4)
WBC # BLD AUTO: 7.2 10E9/L (ref 4–11)

## 2017-06-28 PROCEDURE — 85025 COMPLETE CBC W/AUTO DIFF WBC: CPT | Performed by: FAMILY MEDICINE

## 2017-06-28 PROCEDURE — 84443 ASSAY THYROID STIM HORMONE: CPT | Performed by: FAMILY MEDICINE

## 2017-06-28 PROCEDURE — 80048 BASIC METABOLIC PNL TOTAL CA: CPT | Performed by: FAMILY MEDICINE

## 2017-06-28 PROCEDURE — 99213 OFFICE O/P EST LOW 20 MIN: CPT | Mod: 25 | Performed by: FAMILY MEDICINE

## 2017-06-28 PROCEDURE — 80061 LIPID PANEL: CPT | Performed by: FAMILY MEDICINE

## 2017-06-28 PROCEDURE — 83036 HEMOGLOBIN GLYCOSYLATED A1C: CPT | Performed by: FAMILY MEDICINE

## 2017-06-28 PROCEDURE — G0438 PPPS, INITIAL VISIT: HCPCS | Performed by: FAMILY MEDICINE

## 2017-06-28 PROCEDURE — 36415 COLL VENOUS BLD VENIPUNCTURE: CPT | Performed by: FAMILY MEDICINE

## 2017-06-28 RX ORDER — SIMVASTATIN 20 MG
20 TABLET ORAL AT BEDTIME
Qty: 90 TABLET | Refills: 3 | Status: SHIPPED | OUTPATIENT
Start: 2017-06-28 | End: 2018-04-18

## 2017-06-28 RX ORDER — LOSARTAN POTASSIUM AND HYDROCHLOROTHIAZIDE 12.5; 5 MG/1; MG/1
1 TABLET ORAL DAILY
Qty: 90 TABLET | Refills: 3 | Status: SHIPPED | OUTPATIENT
Start: 2017-06-28 | End: 2018-04-18

## 2017-06-28 NOTE — PROGRESS NOTES
SUBJECTIVE:   Precious Paris is a 76 year old female who presents for Preventive Visit.      Are you in the first 12 months of your Medicare Part B coverage?  No    Healthy Habits:    Do you get at least three servings of calcium containing foods daily (dairy, green leafy vegetables, etc.)? yes    Amount of exercise or daily activities, outside of work: 2 day(s) per week    Problems taking medications regularly No    Medication side effects: No    Have you had an eye exam in the past two years? yes    Do you see a dentist twice per year? yes    Do you have sleep apnea, excessive snoring or daytime drowsiness?no    Pt is fasting   Excessive sweating at night; x6 months   FYI February skin cancer, removed from forehead;   -steroid injection in February in right hand; has caused a lot of ongoing issues       COGNITIVE SCREEN  1) Repeat 3 items (Banana, Sunrise, Chair)    2) Clock draw: ABNORMAL   3) 3 item recall: Recalls 1 object   Results: ABNORMAL clock, 1-2 items recalled: PROBABLE COGNITIVE IMPAIRMENT, **INFORM PROVIDER**    Mini-CogTM Copyright S Alivia. Licensed by the author for use in [facility] CallYourPrice; reprinted with permission ([email or web address]). All rights reserved.      ]did remember the three items at the end of our visit without prompting, was able to recall all three objects.        Hyperlipidemia Follow-Up      Rate your low fat/cholesterol diet?: good    Taking statin?  Yes, no muscle aches from statin    Other lipid medications/supplements?:  none    Hypertension Follow-up      Outpatient blood pressures are not being checked.    Low Salt Diet: no added salt      Reviewed and updated as needed this visit by clinical staff  Tobacco  Med Hx  Surg Hx  Fam Hx  Soc Hx        Reviewed and updated as needed this visit by Provider        Social History   Substance Use Topics     Smoking status: Former Smoker     Smokeless tobacco: Never Used      Comment: quit 20 years ago     Alcohol use Yes       "Comment: occ.       The patient does not drink >3 drinks per day nor >7 drinks per week.    Today's PHQ-2 Score:   PHQ-2 ( 1999 Pfizer) 6/28/2017 12/16/2016   Q1: Little interest or pleasure in doing things 0 0   Q2: Feeling down, depressed or hopeless 0 0   PHQ-2 Score 0 0       Do you feel safe in your environment - Yes    Do you have a Health Care Directive?: Yes: Patient states has Advance Directive and will bring in a copy to clinic.    Current providers sharing in care for this patient include:   Patient Care Team:  Ester Jorge MD as PCP - General (Family Practice)      Hearing impairment: No    Ability to successfully perform activities of daily living: Yes, no assistance needed     Fall risk:  Fallen 2 or more times in the past year?: No  Any fall with injury in the past year?: No    Home safety:  none identified      The following health maintenance items are reviewed in Epic and correct as of today:  Health Maintenance   Topic Date Due     INFLUENZA VACCINE (SYSTEM ASSIGNED)  09/01/2017     FALL RISK ASSESSMENT  12/16/2017     ADVANCE DIRECTIVE PLANNING Q5 YRS  10/07/2018     TETANUS IMMUNIZATION (SYSTEM ASSIGNED)  05/20/2020     LIPID SCREEN Q5 YR FEMALE (SYSTEM ASSIGNED)  12/16/2021     DEXA SCAN SCREENING (SYSTEM ASSIGNED)  Completed     PNEUMOCOCCAL  Completed     BP Readings from Last 3 Encounters:   06/28/17 129/74   04/25/17 145/58   04/10/17 141/63    Wt Readings from Last 3 Encounters:   06/28/17 199 lb (90.3 kg)   06/16/16 196 lb (88.9 kg)   06/10/16 196 lb (88.9 kg)                ROS:  Constitutional, HEENT, cardiovascular, pulmonary, gi and gu systems are negative, except as otherwise noted.    OBJECTIVE:   /74  Pulse 75  Temp 98.3  F (36.8  C) (Tympanic)  Ht 5' 5\" (1.651 m)  Wt 199 lb (90.3 kg)  BMI 33.12 kg/m2 Estimated body mass index is 33.12 kg/(m^2) as calculated from the following:    Height as of this encounter: 5' 5\" (1.651 m).    Weight as of this encounter: 199 lb " (90.3 kg).  EXAM:   GENERAL: healthy, alert and no distress  EYES: Eyes grossly normal to inspection, PERRL and conjunctivae and sclerae normal  HENT: ear canals and TM's normal, nose and mouth without ulcers or lesions  NECK: no adenopathy, no asymmetry, masses, or scars and thyroid normal to palpation  RESP: lungs clear to auscultation - no rales, rhonchi or wheezes  BREAST: normal without masses, tenderness or nipple discharge and no palpable axillary masses or adenopathy  CV: regular rate and rhythm, normal S1 S2, no S3 or S4, no murmur, click or rub, no peripheral edema and peripheral pulses strong  ABDOMEN: soft, nontender, no hepatosplenomegaly, no masses and bowel sounds normal  MS: no gross musculoskeletal defects noted, no edema  SKIN: no suspicious lesions or rashes  NEURO: Normal strength and tone, mentation intact and speech normal  PSYCH: mentation appears normal, affect normal/bright    ASSESSMENT / PLAN:   1. Medicare annual wellness visit, subsequent       2. Hyperlipidemia LDL goal <130     - simvastatin (ZOCOR) 20 MG tablet; Take 1 tablet (20 mg) by mouth At Bedtime  Dispense: 90 tablet; Refill: 3  - Lipid panel reflex to direct LDL    3. Benign essential hypertension  Well controlled  - losartan-hydrochlorothiazide (HYZAAR) 50-12.5 MG per tablet; Take 1 tablet by mouth daily  Dispense: 90 tablet; Refill: 3  - Basic metabolic panel    4. Elevated glucose     - Hemoglobin A1c    5. Night sweats   no localizing symptoms, may need to consider CT Chest/abd/pelvis  - CBC with platelets differential  - TSH with free T4 reflex    End of Life Planning:  Patient currently has an advanced directive: No.  I have verified the patient's ablity to prepare an advanced directive/make health care decisions.  Literature was provided to assist patient in preparing an advanced directive.    COUNSELING:  Reviewed preventive health counseling, as reflected in patient instructions       Regular exercise       Healthy  "diet/nutrition        Estimated body mass index is 33.12 kg/(m^2) as calculated from the following:    Height as of this encounter: 5' 5\" (1.651 m).    Weight as of this encounter: 199 lb (90.3 kg).  Weight management plan: Discussed healthy diet and exercise guidelines and patient will follow up in 12 months in clinic to re-evaluate.   reports that she has quit smoking. She has never used smokeless tobacco.      Appropriate preventive services were discussed with this patient, including applicable screening as appropriate for cardiovascular disease, diabetes, osteopenia/osteoporosis, and glaucoma.  As appropriate for age/gender, discussed screening for colorectal cancer, prostate cancer, breast cancer, and cervical cancer. Checklist reviewing preventive services available has been given to the patient.    Reviewed patients plan of care and provided an AVS. The Basic Care Plan (routine screening as documented in Health Maintenance) for Precious meets the Care Plan requirement. This Care Plan has been established and reviewed with the Patient.    Counseling Resources:  ATP IV Guidelines  Pooled Cohorts Equation Calculator  Breast Cancer Risk Calculator  FRAX Risk Assessment  ICSI Preventive Guidelines  Dietary Guidelines for Americans, 2010  USDA's MyPlate  ASA Prophylaxis  Lung CA Screening    Ester Jorge MD  [facility]  "

## 2017-06-28 NOTE — NURSING NOTE
"Chief Complaint   Patient presents with     Physical       Initial /74  Pulse 75  Temp 98.3  F (36.8  C) (Tympanic)  Ht 5' 5\" (1.651 m)  Wt 199 lb (90.3 kg)  BMI 33.12 kg/m2 Estimated body mass index is 33.12 kg/(m^2) as calculated from the following:    Height as of this encounter: 5' 5\" (1.651 m).    Weight as of this encounter: 199 lb (90.3 kg).  Medication Reconciliation: complete    "

## 2017-06-28 NOTE — LETTER
[facility]  [address]  [place] [address]  Phone: [phone]    June 29, 2017    Precious Paris  [address] [place] [address]          Dear Precious,    The results of your recent lab tests were within normal/acceptable limits. Enclosed is a copy of these results.  If you have any further questions or problems, please contact our office.  Results for orders placed or performed in visit on 06/28/17   Basic metabolic panel   Result Value Ref Range    Sodium 140 133 - 144 mmol/L    Potassium 4.1 3.4 - 5.3 mmol/L    Chloride 106 94 - 109 mmol/L    Carbon Dioxide 27 20 - 32 mmol/L    Anion Gap 7 3 - 14 mmol/L    Glucose 104 (H) 70 - 99 mg/dL    Urea Nitrogen 18 7 - 30 mg/dL    Creatinine 0.62 0.52 - 1.04 mg/dL    GFR Estimate >90  Non  GFR Calc   >60 mL/min/1.7m2    GFR Estimate If Black >90   GFR Calc   >60 mL/min/1.7m2    Calcium 9.7 8.5 - 10.1 mg/dL   Hemoglobin A1c   Result Value Ref Range    Hemoglobin A1C 5.7 4.3 - 6.0 %   Lipid panel reflex to direct LDL   Result Value Ref Range    Cholesterol 190 <200 mg/dL    Triglycerides 66 <150 mg/dL    HDL Cholesterol 75 >49 mg/dL    LDL Cholesterol Calculated 102 (H) <100 mg/dL    Non HDL Cholesterol 115 <130 mg/dL   CBC with platelets differential   Result Value Ref Range    WBC 7.2 4.0 - 11.0 10e9/L    RBC Count 4.47 3.8 - 5.2 10e12/L    Hemoglobin 13.7 11.7 - 15.7 g/dL    Hematocrit 43.2 35.0 - 47.0 %    MCV 97 78 - 100 fl    MCH 30.6 26.5 - 33.0 pg    MCHC 31.7 31.5 - 36.5 g/dL    RDW 12.9 10.0 - 15.0 %    Platelet Count 248 150 - 450 10e9/L    Diff Method Automated Method     % Neutrophils 57.8 %    % Lymphocytes 28.8 %    % Monocytes 9.6 %    % Eosinophils 3.2 %    % Basophils 0.6 %    Absolute Neutrophil 4.2 1.6 - 8.3 10e9/L    Absolute Lymphocytes 2.1 0.8 - 5.3 10e9/L    Absolute Monocytes 0.7 0.0 - 1.3 10e9/L    Absolute Eosinophils 0.2 0.0 - 0.7 10e9/L    Absolute Basophils 0.0 0.0 -  0.2 10e9/L   TSH with free T4 reflex   Result Value Ref Range    TSH 1.39 0.40 - 4.00 mU/L     Sincerely,      Ester Jorge MD

## 2017-06-28 NOTE — MR AVS SNAPSHOT
After Visit Summary   6/28/2017    Precious Paris    MRN: [id number]           Patient Information     Date Of Birth          1940        Visit Information        Provider Department      6/28/2017 8:40 AM Ester Jorge MD [facility]        Today's Diagnoses     Elevated glucose    -  1    Hyperlipidemia LDL goal <130        Benign essential hypertension        Medicare annual wellness visit, subsequent          Care Instructions      Preventive Health Recommendations    Female Ages 65 +    Yearly exam:     See your health care provider every year in order to  o Review health changes.   o Discuss preventive care.    o Review your medicines if your doctor has prescribed any.      You no longer need a yearly Pap test unless you've had an abnormal Pap test in the past 10 years. If you have vaginal symptoms, such as bleeding or discharge, be sure to talk with your provider about a Pap test.      Every 1 to 2 years, have a mammogram.  If you are over 69, talk with your health care provider about whether or not you want to continue having screening mammograms.      Every 10 years, have a colonoscopy. Or, have a yearly FIT test (stool test). These exams will check for colon cancer.       Have a cholesterol test every 5 years, or more often if your doctor advises it.       Have a diabetes test (fasting glucose) every three years. If you are at risk for diabetes, you should have this test more often.       At age 65, have a bone density scan (DEXA) to check for osteoporosis (brittle bone disease).    Shots:    Get a flu shot each year.    Get a tetanus shot every 10 years.    Talk to your doctor about your pneumonia vaccines. There are now two you should receive - Pneumovax (PPSV 23) and Prevnar (PCV 13).    Talk to your doctor about the shingles vaccine.    Talk to your doctor about the hepatitis B vaccine.    Nutrition:     Eat at least 5 servings of fruits and vegetables each  "day.      Eat whole-grain bread, whole-wheat pasta and brown rice instead of white grains and rice.      Talk to your provider about Calcium and Vitamin D.     Lifestyle    Exercise at least 150 minutes a week (30 minutes a day, 5 days a week). This will help you control your weight and prevent disease.      Limit alcohol to one drink per day.      No smoking.       Wear sunscreen to prevent skin cancer.       See your dentist twice a year for an exam and cleaning.      See your eye doctor every 1 to 2 years to screen for conditions such as glaucoma, macular degeneration and cataracts.          Follow-ups after your visit        Who to contact     If you have questions or need follow up information about today's clinic visit or your schedule please contact [facility] directly at [phone].  Normal or non-critical lab and imaging results will be communicated to you by MyChart, letter or phone within 4 business days after the clinic has received the results. If you do not hear from us within 7 days, please contact the clinic through [employer] or phone. If you have a critical or abnormal lab result, we will notify you by phone as soon as possible.  Submit refill requests through [employer] or call your pharmacy and they will forward the refill request to us. Please allow 3 business days for your refill to be completed.          Additional Information About Your Visit        PayLease Information     PayLease lets you send messages to your doctor, view your test results, renew your prescriptions, schedule appointments and more. To sign up, go to [email or web address] . Click on \"Log in\" on the left side of the screen, which will take you to the Welcome page. Then click on \"Sign up Now\" on the right side of the page.     You will be asked to enter the access code listed below, as well as some personal information. Please follow the directions to create your username and password.     Your access code is: " "[email or web address]  Expires: 2017 10:05 AM     Your access code will  in 90 days. If you need help or a new code, please call your [place] clinic or [phone].        Care EveryWhere ID     This is your Care EveryWhere ID. This could be used by other organizations to access your [place] medical records  [id number]        Your Vitals Were     Pulse Temperature Height BMI (Body Mass Index)          75 98.3  F (36.8  C) (Tympanic) 5' 5\" (1.651 m) 33.12 kg/m2         Blood Pressure from Last 3 Encounters:   17 129/74   17 145/58   04/10/17 141/63    Weight from Last 3 Encounters:   17 199 lb (90.3 kg)   16 196 lb (88.9 kg)   06/10/16 196 lb (88.9 kg)              We Performed the Following     Basic metabolic panel     Hemoglobin A1c     Lipid panel reflex to direct LDL          Where to get your medicines      These medications were sent to [place] [facility] - [address]  [address], [place] [address]    Hours:  [facility] Phone:  [phone]     losartan-hydrochlorothiazide 50-12.5 MG per tablet    simvastatin 20 MG tablet          Primary Care Provider Office Phone # Fax #    Ester Jorge [phone]       [facility] [address] [facility] [address]        Equal Access to Services     [facility]: Hadii aad ku hadasho Soomaali, waaxda luqadaha, qaybta kaalmada adeegyada, waxay peter velasquez. So [facility] [phone].    ATENCIÓN: Si habla español, tiene a brito disposición servicios gratuitos de asistencia lingüística. Llame al [phone].    We comply with applicable federal civil rights laws and [place] laws. We do not discriminate on the basis of race, color, national origin, age, disability sex, sexual orientation or gender identity.            Thank you!     Thank you for choosing [facility]  for your care. Our goal is always to provide you " with excellent care. Hearing back from our patients is one way we can continue to improve our services. Please take a few minutes to complete the written survey that you may receive in the mail after your visit with us. Thank you!             Your Updated Medication List - Protect others around you: Learn how to safely use, store and throw away your medicines at www.disposemymeds.org.          This list is accurate as of: 6/28/17  9:05 AM.  Always use your most recent med list.                   Brand Name Dispense Instructions for use Diagnosis    aspirin 81 MG tablet      1 TABLET EVERY OTHER DAY        CALCIUM PO      1 DAILY        CENTRUM SILVER PO      1 TABLET DAILY        fish oil-omega-3 fatty acids 1000 MG capsule     30 capsule    Take 1 capsule (1 g) by mouth daily        fluocinonide 0.05 % solution    LIDEX    60 mL    Apply sparingly to affected area twice daily as needed.  Do not apply to face.(Due for recheck Itching appt [phone] to schedule)    Itching       ibuprofen 200 MG tablet    ADVIL/MOTRIN     AS NEEDED        losartan-hydrochlorothiazide 50-12.5 MG per tablet    HYZAAR    90 tablet    Take 1 tablet by mouth daily    Benign essential hypertension       simvastatin 20 MG tablet    ZOCOR    90 tablet    Take 1 tablet (20 mg) by mouth At Bedtime    Hyperlipidemia LDL goal <130       triamcinolone 0.1 % cream    KENALOG    80 g    Apply to AA BID x 1-2 weeks then PRN only    Acute dermatitis       VITAMIN C PO      1 DAILY

## 2017-09-22 ENCOUNTER — ALLIED HEALTH/NURSE VISIT (OUTPATIENT)
Dept: FAMILY MEDICINE | Facility: CLINIC | Age: 77
End: 2017-09-22
Payer: COMMERCIAL

## 2017-09-22 DIAGNOSIS — Z23 NEED FOR PROPHYLACTIC VACCINATION AND INOCULATION AGAINST INFLUENZA: Primary | ICD-10-CM

## 2017-09-22 PROCEDURE — G0008 ADMIN INFLUENZA VIRUS VAC: HCPCS

## 2017-09-22 PROCEDURE — 90662 IIV NO PRSV INCREASED AG IM: CPT

## 2017-09-22 PROCEDURE — 99207 ZZC NO CHARGE NURSE ONLY: CPT

## 2017-09-22 NOTE — MR AVS SNAPSHOT
"              After Visit Summary   2017    Precious Paris    MRN: [id number]           Patient Information     Date Of Birth          1940        Visit Information        Provider Department      2017 9:40 AM [facility] FLU SHOT CLINIC [facility]        Today's Diagnoses     Need for prophylactic vaccination and inoculation against influenza    -  1       Follow-ups after your visit        Who to contact     If you have questions or need follow up information about today's clinic visit or your schedule please contact [facility] directly at [phone].  Normal or non-critical lab and imaging results will be communicated to you by [employer], letter or phone within 4 business days after the clinic has received the results. If you do not hear from us within 7 days, please contact the clinic through [employer] or phone. If you have a critical or abnormal lab result, we will notify you by phone as soon as possible.  Submit refill requests through [employer] or call your pharmacy and they will forward the refill request to us. Please allow 3 business days for your refill to be completed.          Additional Information About Your Visit        MyChart Information     [employer] lets you send messages to your doctor, view your test results, renew your prescriptions, schedule appointments and more. To sign up, go to [email or web address] . Click on \"Log in\" on the left side of the screen, which will take you to the Welcome page. Then click on \"Sign up Now\" on the right side of the page.     You will be asked to enter the access code listed below, as well as some personal information. Please follow the directions to create your username and password.     Your access code is: [email or web address]  Expires: 2017  9:59 AM     Your access code will  in 90 days. If you need help or a new code, please call your [facility] or [phone].        Care EveryWhere ID     This is your Care " EveryWhere ID. This could be used by other organizations to access your [place] medical records  [id number]         Blood Pressure from Last 3 Encounters:   06/28/17 129/74   04/25/17 145/58   04/10/17 141/63    Weight from Last 3 Encounters:   06/28/17 199 lb (90.3 kg)   06/16/16 196 lb (88.9 kg)   06/10/16 196 lb (88.9 kg)              We Performed the Following     ADMIN INFLUENZA (For MEDICARE Patients ONLY) []     FLU VACCINE, INCREASED ANTIGEN, PRESV FREE, AGE 65+ [[id number]]        Primary Care Provider Office Phone # Fax #    Ester Jorge [phone] [address] [facility] [address]        Equal Access to Services     [employer] : Hadii carlie roper hadasho Soomaali, waaxda luqadaha, qaybta kaalmada adeegyada, jarvis marrufo . So [facility] [phone].    ATENCIÓN: Si habla español, tiene a brito disposición servicios gratuitos de asistencia lingüística. Llame al [phone].    We comply with applicable federal civil rights laws and [place] laws. We do not discriminate on the basis of race, color, national origin, age, disability sex, sexual orientation or gender identity.            Thank you!     Thank you for choosing [facility]  for your care. Our goal is always to provide you with excellent care. Hearing back from our patients is one way we can continue to improve our services. Please take a few minutes to complete the written survey that you may receive in the mail after your visit with us. Thank you!             Your Updated Medication List - Protect others around you: Learn how to safely use, store and throw away your medicines at www.disposemymeds.org.          This list is accurate as of: 9/22/17  9:59 AM.  Always use your most recent med list.                   Brand Name Dispense Instructions for use Diagnosis    aspirin 81 MG tablet      1 TABLET EVERY OTHER DAY        CALCIUM PO      1 DAILY        CENTRUM SILVER PO      1  TABLET DAILY        fish oil-omega-3 fatty acids 1000 MG capsule     30 capsule    Take 1 capsule (1 g) by mouth daily        fluocinonide 0.05 % solution    LIDEX    60 mL    Apply sparingly to affected area twice daily as needed.  Do not apply to face.(Due for recheck Itching appt [phone] to schedule)    Itching       ibuprofen 200 MG tablet    ADVIL/MOTRIN     AS NEEDED        losartan-hydrochlorothiazide 50-12.5 MG per tablet    HYZAAR    90 tablet    Take 1 tablet by mouth daily    Benign essential hypertension       simvastatin 20 MG tablet    ZOCOR    90 tablet    Take 1 tablet (20 mg) by mouth At Bedtime    Hyperlipidemia LDL goal <130       triamcinolone 0.1 % cream    KENALOG    80 g    Apply to AA BID x 1-2 weeks then PRN only    Acute dermatitis       VITAMIN C PO      1 DAILY

## 2017-09-22 NOTE — PROGRESS NOTES
Injectable Influenza Immunization Documentation    1.  Is the person to be vaccinated sick today?   No    2. Does the person to be vaccinated have an allergy to a component   of the vaccine?   No    3. Has the person to be vaccinated ever had a serious reaction   to influenza vaccine in the past?   No    4. Has the person to be vaccinated ever had Guillain-Barré syndrome?   No    Form completed by ramona

## 2017-10-13 ENCOUNTER — HOSPITAL ENCOUNTER (OUTPATIENT)
Dept: MAMMOGRAPHY | Facility: CLINIC | Age: 77
Discharge: HOME | End: 2017-10-13
Attending: FAMILY MEDICINE | Admitting: FAMILY MEDICINE
Payer: MEDICARE

## 2017-10-13 DIAGNOSIS — Z12.31 VISIT FOR SCREENING MAMMOGRAM: ICD-10-CM

## 2017-10-13 PROCEDURE — G0202 SCR MAMMO BI INCL CAD: HCPCS

## 2017-11-27 ENCOUNTER — OFFICE VISIT (OUTPATIENT)
Dept: FAMILY MEDICINE | Facility: CLINIC | Age: 77
End: 2017-11-27
Payer: COMMERCIAL

## 2017-11-27 VITALS
WEIGHT: 201.4 LBS | BODY MASS INDEX: 33.55 KG/M2 | OXYGEN SATURATION: 92 % | TEMPERATURE: 97.7 F | HEART RATE: 66 BPM | DIASTOLIC BLOOD PRESSURE: 66 MMHG | HEIGHT: 65 IN | SYSTOLIC BLOOD PRESSURE: 125 MMHG | RESPIRATION RATE: 16 BRPM

## 2017-11-27 DIAGNOSIS — M19.042 PRIMARY OSTEOARTHRITIS OF BOTH HANDS: ICD-10-CM

## 2017-11-27 DIAGNOSIS — Z01.818 PREOP GENERAL PHYSICAL EXAM: Primary | ICD-10-CM

## 2017-11-27 DIAGNOSIS — I10 HYPERTENSION, GOAL BELOW 150/90: ICD-10-CM

## 2017-11-27 DIAGNOSIS — M19.041 PRIMARY OSTEOARTHRITIS OF BOTH HANDS: ICD-10-CM

## 2017-11-27 LAB
ANION GAP SERPL CALCULATED.3IONS-SCNC: 5 MMOL/L (ref 3–14)
BUN SERPL-MCNC: 16 MG/DL (ref 7–30)
CALCIUM SERPL-MCNC: 9.6 MG/DL (ref 8.5–10.1)
CHLORIDE SERPL-SCNC: 102 MMOL/L (ref 94–109)
CO2 SERPL-SCNC: 29 MMOL/L (ref 20–32)
CREAT SERPL-MCNC: 0.64 MG/DL (ref 0.52–1.04)
GFR SERPL CREATININE-BSD FRML MDRD: 90 ML/MIN/1.7M2
GLUCOSE SERPL-MCNC: 100 MG/DL (ref 70–99)
POTASSIUM SERPL-SCNC: 4.3 MMOL/L (ref 3.4–5.3)
SODIUM SERPL-SCNC: 136 MMOL/L (ref 133–144)

## 2017-11-27 PROCEDURE — 36415 COLL VENOUS BLD VENIPUNCTURE: CPT | Performed by: NURSE PRACTITIONER

## 2017-11-27 PROCEDURE — 99213 OFFICE O/P EST LOW 20 MIN: CPT | Performed by: NURSE PRACTITIONER

## 2017-11-27 PROCEDURE — 80048 BASIC METABOLIC PNL TOTAL CA: CPT | Performed by: NURSE PRACTITIONER

## 2017-11-27 RX ORDER — LANOLIN ALCOHOL/MO/W.PET/CERES
3 CREAM (GRAM) TOPICAL
COMMUNITY
Start: 2017-11-27 | End: 2020-03-12

## 2017-11-27 NOTE — PATIENT INSTRUCTIONS
Before Your Surgery      Call your surgeon if there is any change in your health. This includes signs of a cold or flu (such as a sore throat, runny nose, cough, rash or fever).    Do not smoke, drink alcohol or take over the counter medicine (unless your surgeon or primary care doctor tells you to) for the 24 hours before and after surgery.    If you take prescribed drugs: Follow your doctor s orders about which medicines to take and which to stop until after surgery.    Eating and drinking prior to surgery: follow the instructions from your surgeon    Take a shower or bath the night before surgery. Use the soap your surgeon gave you to gently clean your skin. If you do not have soap from your surgeon, use your regular soap. Do not shave or scrub the surgery site.  Wear clean pajamas and have clean sheets on your bed.     Stop taking ibuprofen 7 days prior to surgery.    Cut back alcohol intake daily prior to surgery.

## 2017-11-27 NOTE — LETTER
[facility]  [address]  [place] [address]  Phone: [phone]    11/28/17    Precious Paris  [address] [place] [address]    Daryl Lang,     Here is a copy of your pre-op labs for your records. Kidney function and electrolytes are normal. Please let us know if you have any questions.     Take care,         KONG Smith CNP/rkm

## 2017-11-27 NOTE — MR AVS SNAPSHOT
After Visit Summary   11/27/2017    Precious Paris    MRN: [id number]           Patient Information     Date Of Birth          1940        Visit Information        Provider Department      11/27/2017 9:40 AM Pratibha Ogden APRN CNP [facility]        Today's Diagnoses     Preop general physical exam    -  1    Hyperlipidemia LDL goal <130        Hypertension, goal below 150/90        Primary osteoarthritis of both hands          Care Instructions      Before Your Surgery      Call your surgeon if there is any change in your health. This includes signs of a cold or flu (such as a sore throat, runny nose, cough, rash or fever).    Do not smoke, drink alcohol or take over the counter medicine (unless your surgeon or primary care doctor tells you to) for the 24 hours before and after surgery.    If you take prescribed drugs: Follow your doctor s orders about which medicines to take and which to stop until after surgery.    Eating and drinking prior to surgery: follow the instructions from your surgeon    Take a shower or bath the night before surgery. Use the soap your surgeon gave you to gently clean your skin. If you do not have soap from your surgeon, use your regular soap. Do not shave or scrub the surgery site.  Wear clean pajamas and have clean sheets on your bed.     Stop taking ibuprofen 7 days prior to surgery.    Cut back alcohol intake daily prior to surgery.          Follow-ups after your visit        Your next 10 appointments already scheduled     Dec 07, 2017   Procedure with Mata Deleon MD   [facility] (--)    [address] [place] [id number] [phone]           The medical center is located at [address] [place] [address]. (between [address] in [place], [place] north of [place]).            Dec 13, 2017  2:00 PM CST   Return Visit with Danielito Anthony MD   [facility]  "[place])    [address]  [facility] [address]   [phone]              Who to contact     If you have questions or need follow up information about today's clinic visit or your schedule please contact [facility] directly at [phone].  Normal or non-critical lab and imaging results will be communicated to you by MyChart, letter or phone within 4 business days after the clinic has received the results. If you do not hear from us within 7 days, please contact the clinic through MyChart or phone. If you have a critical or abnormal lab result, we will notify you by phone as soon as possible.  Submit refill requests through [employer] or call your pharmacy and they will forward the refill request to us. Please allow 3 business days for your refill to be completed.          Additional Information About Your Visit        MyChart Information     [employer] lets you send messages to your doctor, view your test results, renew your prescriptions, schedule appointments and more. To sign up, go to [email or web address] . Click on \"Log in\" on the left side of the screen, which will take you to the Welcome page. Then click on \"Sign up Now\" on the right side of the page.     You will be asked to enter the access code listed below, as well as some personal information. Please follow the directions to create your username and password.     Your access code is: [email or web address]  Expires: 2017  8:59 AM     Your access code will  in 90 days. If you need help or a new code, please call your [facility] or [phone].        Care EveryWhere ID     This is your Care EveryWhere ID. This could be used by other organizations to access your [address] [place] medical records  [id number]        Your Vitals Were     Pulse Temperature Respirations Height Pulse Oximetry BMI (Body Mass Index)    66 97.7  F (36.5  C) (Tympanic) 16 5' 5\" (1.651 m) 92% 33.51 kg/m2       Blood Pressure from Last 3 Encounters: "   11/27/17 125/66   06/28/17 129/74   04/25/17 145/58    Weight from Last 3 Encounters:   11/27/17 201 lb 6.4 oz (91.4 kg)   06/28/17 199 lb (90.3 kg)   06/16/16 196 lb (88.9 kg)              We Performed the Following     Basic metabolic panel  (Ca, Cl, CO2, Creat, Gluc, K, Na, BUN)        Primary Care Provider Office Phone # Fax #    Ester Jorge [phone] [address] [facility] [address]        Equal Access to Services     [facility]: Hadii aad ku hadasho Soomaali, waaxda luqadaha, qaybta kaalmada adeegyada, jarvis hammer adedora marrufo . So [facility] [phone].    ATENCIÓN: Si habla español, tiene a brito disposición servicios gratuitos de asistencia lingüística. Llame al [phone].    We comply with applicable federal civil rights laws and [place] laws. We do not discriminate on the basis of race, color, national origin, age, disability, sex, sexual orientation, or gender identity.            Thank you!     Thank you for choosing [facility]  for your care. Our goal is always to provide you with excellent care. Hearing back from our patients is one way we can continue to improve our services. Please take a few minutes to complete the written survey that you may receive in the mail after your visit with us. Thank you!             Your Updated Medication List - Protect others around you: Learn how to safely use, store and throw away your medicines at www.disposemymeds.org.          This list is accurate as of: 11/27/17 10:21 AM.  Always use your most recent med list.                   Brand Name Dispense Instructions for use Diagnosis    aspirin 81 MG tablet      Take 1 tablet by mouth daily        CALCIUM PO      1 DAILY        CENTRUM SILVER PO      1 TABLET DAILY        fish oil-omega-3 fatty acids 1000 MG capsule     30 capsule    Take 1 capsule (1 g) by mouth daily        fluocinonide 0.05 % solution    LIDEX    60 mL    Apply sparingly to  affected area twice daily as needed.  Do not apply to face.(Due for recheck Itching appt [phone] to schedule)    Itching       ibuprofen 200 MG tablet    ADVIL/MOTRIN     Take 400 mg by mouth 2 times daily as needed for pain        losartan-hydrochlorothiazide 50-12.5 MG per tablet    HYZAAR    90 tablet    Take 1 tablet by mouth daily    Benign essential hypertension       melatonin 3 MG tablet      Take 1 tablet (3 mg) by mouth nightly as needed for sleep        simvastatin 20 MG tablet    ZOCOR    90 tablet    Take 1 tablet (20 mg) by mouth At Bedtime    Hyperlipidemia LDL goal <130       triamcinolone 0.1 % cream    KENALOG    80 g    Apply to AA BID x 1-2 weeks then PRN only    Acute dermatitis       VITAMIN C PO      1 DAILY

## 2017-11-27 NOTE — NURSING NOTE
"Chief Complaint   Patient presents with     Pre-Op Exam       Initial /66 (BP Location: Right arm, Patient Position: Sitting, Cuff Size: Adult Large)  Pulse 66  Temp 97.7  F (36.5  C) (Tympanic)  Resp 16  Ht 5' 5\" (1.651 m)  Wt 201 lb 6.4 oz (91.4 kg)  SpO2 92%  BMI 33.51 kg/m2 Estimated body mass index is 33.51 kg/(m^2) as calculated from the following:    Height as of this encounter: 5' 5\" (1.651 m).    Weight as of this encounter: 201 lb 6.4 oz (91.4 kg).  Medication Reconciliation: complete  "

## 2017-11-27 NOTE — PROGRESS NOTES
[facility]  [address] [facility] [address]  [phone]  Dept: [phone]    PRE-OP EVALUATION:  Today's date: 2017    Precious Paris (: 1940) presents for pre-operative evaluation assessment as requested by Dr. Deleon.  She requires evaluation and anesthesia risk assessment prior to undergoing surgery/procedure for treatment of cataracts .  Proposed procedure: L cataract removal with implant.    Date of Surgery/ Procedure: 2017  Time of Surgery/ Procedure: 730  Hospital/Surgical Facility: [facility]  Primary Physician: Ester Jorge  Type of Anesthesia Anticipated: combined MAC with Retrobulbar    Patient has a Health Care Directive or Living Will:  YES but doesn't believe it to be in our files.    1. NO - Do you have a history of heart attack, stroke, stent, bypass or surgery on an artery in the head, neck, heart or legs?  2. NO - Do you ever have any pain or discomfort in your chest?  3. NO - Do you have a history of  Heart Failure?  4. NO - Are you troubled by shortness of breath when: walking on the level, up a slight hill or at night?  5. NO - Do you currently have a cold, bronchitis or other respiratory infection?  6. NO - Do you have a cough, shortness of breath or wheezing?  7. NO - Do you sometimes get pains in the calves of your legs when you walk?  8. NO - Do you or anyone in your family have previous history of blood clots?  9. NO - Do you or does anyone in your family have a serious bleeding problem such as prolonged bleeding following surgeries or cuts?  10. NO - Have you ever had problems with anemia or been told to take iron pills?  11. NO - Have you had any abnormal blood loss such as black, tarry or bloody stools, or abnormal vaginal bleeding?  12. NO - Have you ever had a blood transfusion?  13. NO - Have you or any of your relatives ever had problems with anesthesia?  14. NO - Do you have sleep apnea, excessive snoring or daytime  drowsiness?  15. NO - Do you have any prosthetic heart valves?  16. NO - Do you have prosthetic joints?  17. NO - Is there any chance that you may be pregnant?        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      HYPERTENSION - Patient has longstanding history of mod HTN , currently denies any symptoms referable to elevated blood pressure. Specifically denies chest pain, palpitations, dyspnea, orthopnea, PND or peripheral edema. Blood pressure readings have been in normal range. Current medication regimen is as listed below. Patient denies any side effects of medication.                                                                                                                                                                                          .    MEDICAL HISTORY:                                                    Patient Active Problem List    Diagnosis Date Noted     Primary osteoarthritis of both hands 11/27/2017     Priority: Medium     Hypertension, goal below 150/90 11/12/2015     Priority: Medium     Advanced directives, counseling/discussion 08/14/2013     Priority: Medium     Patient has completed an Advance/Health Care Directive (HCD), to bring in copy to be scanned into Epic.    Leyla Corral  August 14, 2013         Hammer toe 08/14/2013     Priority: Medium     Hx of skin cancer, basal cell 05/31/2013     Priority: Medium     Healthcare maintenance 04/11/2012     Priority: Medium     DEXA 2009 WNL--next in 2014       HYPERLIPIDEMIA LDL GOAL <130 10/31/2010     Priority: Medium     Premature atrial contractions 06/17/2010     Priority: Medium     Bigeminal on auscultation--recommend echocardiogram and stress test  Echocardiogram--normal except borderline concentric left ventricular hypertrophy       OA (osteoarthritis) of knee 01/07/2010      Priority: Medium     Malabsorption of glucose 09/09/2008     Priority: Medium     (Problem list name updated by automated process. 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     anosmia     Diverticulosis of colon (without mention of hemorrhage)      Other and unspecified hyperlipidemia      Pain in joint, lower leg      Plantar fascial fibromatosis      PURE HYPERCHOLESTEROLEM 9/9/2007     PURE HYPERCHOLESTEROLEM 9/9/2007     Squamous cell carcinoma      Past Surgical History:   Procedure Laterality Date     BIOPSY       HC COLONOSCOPY THRU STOMA, DIAGNOSTIC  04/05    diverticulosis and hemorroids, due 2015     JOINT REPLACEMTN, KNEE RT/LT  2010    right     JOINT REPLACEMTN, KNEE RT/LT  2011    left     LIGATE VEIN VARICOSE, PHLEBECTOMY MULTIPLE STAB, COMBINED  10/17/2013    Procedure: COMBINED LIGATE VEIN VARICOSE, PHLEBECTOMY MULTIPLE STAB;  Phlebectomy of Right Knee Varicose Veins;  Surgeon: Andrea Duffy MD;  Location: [facility]     OSTEOTOMY FOOT  8/19/2013    Procedure: OSTEOTOMY FOOT;  Left 2nd metatarsal osteotomy, 2nd Metatarsophalangeal joint & 2nd toe correction;  Surgeon: Jose Phillips DPM;  Location: [facility]     SURGICAL HISTORY OF -   1979    Stripping of Veins in Left Leg     SURGICAL HISTORY OF -   1970    Bilateral Tubal Ligation     SURGICAL HISTORY OF -   2000    Carpal Tunnel Repair, right     SURGICAL HISTORY OF -   9-12-08    Rt let ablation     Current Outpatient Prescriptions   Medication Sig Dispense Refill     melatonin 3 MG tablet Take 1  tablet (3 mg) by mouth nightly as needed for sleep       diclofenac (VOLTAREN) 1 % GEL topical gel Apply 2 grams to hands four times daily as needed using enclosed dosing card. 100 g 1     simvastatin (ZOCOR) 20 MG tablet Take 1 tablet (20 mg) by mouth At Bedtime 90 tablet 3     losartan-hydrochlorothiazide (HYZAAR) 50-12.5 MG per tablet Take 1 tablet by mouth daily 90 tablet 3     triamcinolone (KENALOG) 0.1 % cream Apply to AA BID x 1-2 weeks then PRN only 80 g 1     fluocinonide (LIDEX) 0.05 % external solution Apply sparingly to affected area twice daily as needed.  Do not apply to face.(Due for recheck Itching appt [phone] to schedule) 60 mL 0     fish oil-omega-3 fatty acids (OMEGA 3) 1000 MG capsule Take 1 capsule (1 g) by mouth daily 30 capsule 1     ASPIRIN 81 MG OR TABS Take 1 tablet by mouth daily       IBUPROFEN 200 MG OR TABS Take 400 mg by mouth 2 times daily as needed for pain       CENTRUM SILVER OR 1 TABLET DAILY       CALCIUM OR 1 DAILY       VITAMIN C OR 1 DAILY       OTC products: herbals and vitamins - see med list, Aspirin and NSAIDS.    Allergies   Allergen Reactions     Conjugated Estrogens Anaphylaxis and Other (See Comments)     Tightness in throat     Medroxyprogesterone Anaphylaxis and Other (See Comments)     Tightness in throat     Ace Inhibitors Cough     Premarin      Tightness in throat     Provera [Medroxyprogesterone Acetate]      Tightness in throat      Latex Allergy: NO    Social History   Substance Use Topics     Smoking status: Former Smoker     Smokeless tobacco: Never Used      Comment: quit 20 years ago     Alcohol use Yes      Comment: occ.     History   Drug Use No       REVIEW OF SYSTEMS:                                                    C: NEGATIVE for fever, chills, change in weight  I: NEGATIVE for worrisome rashes, moles or lesions  E/M: NEGATIVE for ear, mouth and throat problems  R: NEGATIVE for significant cough or SOB  CV: NEGATIVE for chest pain,  "palpitations or peripheral edema  GI: NEGATIVE for nausea, abdominal pain, heartburn, or change in bowel habits  : NEGATIVE for frequency, dysuria, or hematuria  M: NEGATIVE for significant arthralgias or myalgia  N: NEGATIVE for weakness, dizziness or paresthesias  E: NEGATIVE for temperature intolerance, skin/hair changes  H: NEGATIVE for bleeding problems  P: NEGATIVE for changes in mood or affect    EXAM:                                                    /66 (BP Location: Right arm, Patient Position: Sitting, Cuff Size: Adult Large)  Pulse 66  Temp 97.7  F (36.5  C) (Tympanic)  Resp 16  Ht 5' 5\" (1.651 m)  Wt 201 lb 6.4 oz (91.4 kg)  SpO2 92%  BMI 33.51 kg/m2    GENERAL APPEARANCE: healthy, alert and no distress     EYES: EOMI, PERRL     HENT: ear canals and TM's normal and nose and mouth without ulcers or lesions     NECK: no adenopathy, no asymmetry, masses, or scars and thyroid normal to palpation     RESP: lungs clear to auscultation - no rales, rhonchi or wheezes     CV: regular rates and rhythm, normal S1 S2, no S3 or S4 and no murmur, click or rub     ABDOMEN:  soft, nontender, no HSM or masses and bowel sounds normal     MS: extremities normal- no gross deformities noted, no evidence of inflammation in joints, FROM in all extremities.     SKIN: no suspicious lesions or rashes     NEURO: Normal strength and tone, sensory exam grossly normal, mentation intact and speech normal     PSYCH: mentation appears normal. and affect normal/bright    DIAGNOSTICS:                                                      EKG: Not indicated due to non-vascular surgery and low risk of event.  Labs Drawn and in Process:   Unresulted Labs Ordered in the Past 30 Days of this Admission     Date and Time Order Name Status Description    11/27/2017 1011 BASIC METABOLIC PANEL In process           Recent Labs   Lab Test  06/28/17   0911  12/16/16   1038  09/16/11 09/13/11   1037   06/17/10   1343   HGB  13.7   --   "  --    --    --    --   13.9   PLT  248   --    --    --    --    --   286   INR   --    --    --   1.2*  2.00*   < >   --    NA  140  141   < >   --    --    < >   --    POTASSIUM  4.1  4.2   < >   --    --    < >   --    CR  0.62  0.65   < >   --    --    < >   --    A1C  5.7   --    --    --    --    --    --     < > = values in this interval not displayed.        IMPRESSION:                                                    Reason for surgery/procedure:  Left eye cataract removal with implant, hx of macular pucker with repair in left eye.      The proposed surgical procedure is considered LOW risk.    REVISED CARDIAC RISK INDEX  The patient has the following serious cardiovascular risks for perioperative complications such as (MI, PE, VFib and 3  AV Block):  No serious cardiac risks  INTERPRETATION: 1 risks: Class II (low risk - 0.9% complication rate)    The patient has the following additional risks for perioperative complications:  No identified additional risks      ICD-10-CM    1. Preop general physical exam Z01.818 Basic metabolic panel  (Ca, Cl, CO2, Creat, Gluc, K, Na, BUN)         2. Hypertension, goal below 150/90 I10 Basic metabolic panel  (Ca, Cl, CO2, Creat, Gluc, K, Na, BUN)   3. Primary osteoarthritis of both hands M19.041 diclofenac (VOLTAREN) 1 % GEL topical gel    M19.042        RECOMMENDATIONS:                                                        Cardiovascular Risk  Performs 4 METs exercise without symptoms (Light housework (dusting, washing dishes), Climb a flight of stairs, Walk on level ground at 15 minutes per mile (4 miles/hour) and Shoveling) .       --Patient is to take all scheduled medications on the day of surgery EXCEPT for modifications listed below.    HOLD Ibuprofen for 7 days prior to procedure.    APPROVAL GIVEN to proceed with proposed procedure, without further diagnostic evaluation       Signed Electronically by: KONG Smith CNP    Copy of this evaluation  report is provided to requesting physician.    [facility] Preop Guidelines

## 2017-11-29 ENCOUNTER — ANESTHESIA EVENT (OUTPATIENT)
Dept: SURGERY | Facility: CLINIC | Age: 77
End: 2017-11-29
Payer: MEDICARE

## 2017-11-29 ASSESSMENT — LIFESTYLE VARIABLES: TOBACCO_USE: 1

## 2017-11-29 NOTE — ANESTHESIA PREPROCEDURE EVALUATION
Anesthesia Evaluation     . Pt has had prior anesthetic. Type: General, MAC and Regional    No history of anesthetic complications     ROS/MED HX    Pulmonary: Comment: rhinitis    (+) tobacco use Past use     Neurologic:  - neg neurologic ROS     Cardiovascular:     (+) Dyslipidemia, hypertension----. : . . . :. . Previous cardiac testing Echodate:7/1/10results:Order     ECHO HEART XTHORACIC,COMPLETE, W/O DOPPLER (14201 (CPT )) (Order [id number])      Exam Information     Exam Date Exam Time Accession # Results     7/1/10  2:18 PM [id number]      [facility] Interactive Report and InfoRx     View the interactive report     Component Results     Component    XCELERA       Interpretation Summary  There is borderline concentric left ventricular hypertrophy. The visual   ejection fraction is estimated at 60-65%.  PatientHeight: 64 in  PatientWeight: 183 lbs  SystolicPressure: 130 mmHg  DiastolicPressure: 80 mmHg  HeartRate: 71 bpm  BSA 1.9 m^2        Left Ventricle  The left ventricle is normal in size.  There is borderline concentric left ventricular hypertrophy.  The left ventricular ejection fraction is normal.  The visual ejection fraction is estimated at 60-65%.  No regional wall motion abnormalities noted.     Right Ventricle  The right ventricle is normal in structure, function and size.     Atria  Normal left atrial size.  Right atrial size is normal.  There is no atrial shunt seen.     Mitral Valve  The mitral valve is normal in structure and function.  There is trace to mild mitral regurgitation.     Tricuspid Valve  The tricuspid valve is normal in structure and function.  There is trace to mild tricuspid regurgitation.  The right ventricular systolic pressure is approximated at 29 mmHg plus the   right atrial pressure.     Aortic Valve  There is mild trileaflet aortic sclerosis.  No aortic regurgitation is present.     Pulmonic Valve  The pulmonic valve is normal in structure and  function.     Vessels  Normal size aorta.  The IVC is normal in size and reactivity with respiration, suggesting normal   central venous pressure.     Pericardium  The pericardium appears normal.  There is no pleural effusion.     Rhythm  The rhythm was normal sinus.     Procedure  Complete Echo Adult.     MMode 2D Measurements & Calculations  IVSd: 1.1 cm  LVIDd: 4.4 cm  LVIDs: 2.6 cm  LVPWd: 1.1 cm  LVPWs: 0.00 cm  FS: 41 %  LV mass(C)d: 165 grams  Ao root diam: 2.5 cm  LA dimension: 3.7 cm  LA/Ao: 1.5   Doppler Measurements & Calculations  MV E point: 95 cm/sec  MV A point: 99 cm/sec  MV E/A: 0.96   MV dec time: 0.30 sec  TR Max P mmHg     Interpreting Physician:  Berlin Richardson MD electronically signed on 2010   16:30:50    date: results:ECG reviewed date:6/17/10 results:Sinus Rhythm - frequent PAC s   # PACs = 3.  WITHIN NORMAL LIMITS   date: results:        METS/Exercise Tolerance:  4 - Raking leaves, gardening   Hematologic:       Musculoskeletal:   (+) arthritis, , , other musculoskeletal- DJD, spinal stenosis, spondylosis--cervical, plantar fascial fibromatosis     GI/Hepatic:     (+) Other GI/Hepatic hx of diverticulosis    Renal:  - ROS Renal section negative     Endo:     (+) Obesity, Other Endocrine Disorder hx of glucose malabsorption.      Psychiatric:  - neg psychiatric ROS     Infectious Disease:  - neg infectious disease ROS     Other: Comment: Left cataract            Physical Exam  Normal systems: cardiovascular, pulmonary and dental    Airway   Mallampati: I  TM distance: >3 FB  Neck ROM: full    Dental     Cardiovascular       Pulmonary                     Anesthesia Plan      History & Physical Review  History and physical reviewed and following examination; no interval change.    ASA Status:  2 .    NPO Status:  > 8 hours    Plan for MAC Reason for MAC:  Deep or markedly invasive procedure (G8)         Postoperative Care      Consents  Anesthetic plan, risks, benefits and  alternatives discussed with:  Patient or representative and Patient..            .    Anesthesia Evaluation     . Pt has had prior anesthetic. Type: General, MAC and Regional    No history of anesthetic complications          ROS/MED HX    ENT/Pulmonary: Comment: rhinitis    (+)allergic rhinitis, tobacco use, Past use , . .    Neurologic:  - neg neurologic ROS     Cardiovascular:     (+) Dyslipidemia, hypertension----. : . . . :. . Previous cardiac testing Echodate:7/1/10results:Order     ECHO HEART XTHORACIC,COMPLETE, W/O DOPPLER (86470 (CPT )) (Order [id number])      Exam Information     Exam Date Exam Time Accession # Results     7/1/10  2:18 PM [id number]      [facility] Interactive Report and InfoRx     View the interactive report     Component Results     Component    XCELERA       Interpretation Summary  There is borderline concentric left ventricular hypertrophy. The visual   ejection fraction is estimated at 60-65%.  PatientHeight: 64 in  PatientWeight: 183 lbs  SystolicPressure: 130 mmHg  DiastolicPressure: 80 mmHg  HeartRate: 71 bpm  BSA 1.9 m^2        Left Ventricle  The left ventricle is normal in size.  There is borderline concentric left ventricular hypertrophy.  The left ventricular ejection fraction is normal.  The visual ejection fraction is estimated at 60-65%.  No regional wall motion abnormalities noted.     Right Ventricle  The right ventricle is normal in structure, function and size.     Atria  Normal left atrial size.  Right atrial size is normal.  There is no atrial shunt seen.     Mitral Valve  The mitral valve is normal in structure and function.  There is trace to mild mitral regurgitation.     Tricuspid Valve  The tricuspid valve is normal in structure and function.  There is trace to mild tricuspid regurgitation.  The right ventricular systolic pressure is approximated at 29 mmHg plus the   right atrial pressure.     Aortic Valve  There is mild trileaflet aortic sclerosis.  No  aortic regurgitation is present.     Pulmonic Valve  The pulmonic valve is normal in structure and function.     Vessels  Normal size aorta.  The IVC is normal in size and reactivity with respiration, suggesting normal   central venous pressure.     Pericardium  The pericardium appears normal.  There is no pleural effusion.     Rhythm  The rhythm was normal sinus.     Procedure  Complete Echo Adult.     MMode 2D Measurements & Calculations  IVSd: 1.1 cm  LVIDd: 4.4 cm  LVIDs: 2.6 cm  LVPWd: 1.1 cm  LVPWs: 0.00 cm  FS: 41 %  LV mass(C)d: 165 grams  Ao root diam: 2.5 cm  LA dimension: 3.7 cm  LA/Ao: 1.5   Doppler Measurements & Calculations  MV E point: 95 cm/sec  MV A point: 99 cm/sec  MV E/A: 0.96   MV dec time: 0.30 sec  TR Max P mmHg     Interpreting Physician:  Berlin Richardson MD electronically signed on 2010   16:30:50    date: results:ECG reviewed date:6/17/10 results:Sinus Rhythm - frequent PAC s   # PACs = 3.  WITHIN NORMAL LIMITS   date: results:          METS/Exercise Tolerance:  4 - Raking leaves, gardening   Hematologic:         Musculoskeletal:   (+) arthritis, , , other musculoskeletal- DJD, spinal stenosis, spondylosis--cervical, plantar fascial fibromatosis       GI/Hepatic:     (+) Other GI/Hepatic hx of diverticulosis      Renal/Genitourinary:  - ROS Renal section negative       Endo:     (+) Obesity, Other Endocrine Disorder hx of glucose malabsorption.      Psychiatric:  - neg psychiatric ROS       Infectious Disease:  - neg infectious disease ROS       Malignancy:   (+) Malignancy History of Skin          Other: Comment: Left cataract                   Anesthesia Plan      History & Physical Review  History and physical reviewed and following examination; no interval change.    ASA Status:  2 .    NPO Status:  > 8 hours    Plan for MAC Reason for MAC:  Deep or markedly invasive procedure (G8)         Postoperative Care      Consents  Anesthetic plan, risks, benefits and alternatives  discussed with:  Patient or representative and Patient..

## 2017-12-05 PROBLEM — H25.10 SENILE NUCLEAR CATARACT: Status: ACTIVE | Noted: 2017-12-05

## 2017-12-07 ENCOUNTER — ANESTHESIA (OUTPATIENT)
Dept: SURGERY | Facility: CLINIC | Age: 77
End: 2017-12-07
Payer: MEDICARE

## 2017-12-07 ENCOUNTER — HOSPITAL ENCOUNTER (OUTPATIENT)
Facility: CLINIC | Age: 77
Discharge: HOME | End: 2017-12-07
Attending: OPHTHALMOLOGY | Admitting: OPHTHALMOLOGY
Payer: MEDICARE

## 2017-12-07 ENCOUNTER — SURGERY (OUTPATIENT)
Age: 77
End: 2017-12-07

## 2017-12-07 VITALS
HEIGHT: 65 IN | HEART RATE: 73 BPM | WEIGHT: 195 LBS | DIASTOLIC BLOOD PRESSURE: 60 MMHG | RESPIRATION RATE: 16 BRPM | BODY MASS INDEX: 32.49 KG/M2 | TEMPERATURE: 98.4 F | SYSTOLIC BLOOD PRESSURE: 135 MMHG | OXYGEN SATURATION: 95 %

## 2017-12-07 PROCEDURE — A9270 NON-COVERED ITEM OR SERVICE: HCPCS | Mod: GY | Performed by: OPHTHALMOLOGY

## 2017-12-07 PROCEDURE — 25000128 H RX IP 250 OP 636: Performed by: NURSE ANESTHETIST, CERTIFIED REGISTERED

## 2017-12-07 PROCEDURE — 36000025 ZZH CATARACT SURGICAL PACKAGE: Performed by: OPHTHALMOLOGY

## 2017-12-07 PROCEDURE — 71000022 ZZH RECOVERY CATRACT PACKAGE: Performed by: OPHTHALMOLOGY

## 2017-12-07 PROCEDURE — 25000125 ZZHC RX 250: Performed by: OPHTHALMOLOGY

## 2017-12-07 PROCEDURE — 25000125 ZZHC RX 250: Performed by: NURSE ANESTHETIST, CERTIFIED REGISTERED

## 2017-12-07 PROCEDURE — 25000128 H RX IP 250 OP 636: Performed by: OPHTHALMOLOGY

## 2017-12-07 PROCEDURE — V2632 POST CHMBR INTRAOCULAR LENS: HCPCS | Performed by: OPHTHALMOLOGY

## 2017-12-07 PROCEDURE — 25000132 ZZH RX MED GY IP 250 OP 250 PS 637: Mod: GY | Performed by: OPHTHALMOLOGY

## 2017-12-07 PROCEDURE — 37000012 ZZH ANESTHESIA CATARACT PACKAGE: Performed by: OPHTHALMOLOGY

## 2017-12-07 DEVICE — IMPLANTABLE DEVICE: Type: IMPLANTABLE DEVICE | Site: EYE | Status: FUNCTIONAL

## 2017-12-07 RX ORDER — TIMOLOL MALEATE 5 MG/ML
SOLUTION/ DROPS OPHTHALMIC PRN
Status: DISCONTINUED | OUTPATIENT
Start: 2017-12-07 | End: 2017-12-07 | Stop reason: HOSPADM

## 2017-12-07 RX ORDER — PROPARACAINE HYDROCHLORIDE 5 MG/ML
SOLUTION/ DROPS OPHTHALMIC PRN
Status: DISCONTINUED | OUTPATIENT
Start: 2017-12-07 | End: 2017-12-07 | Stop reason: HOSPADM

## 2017-12-07 RX ORDER — NEOMYCIN SULFATE, POLYMYXIN B SULFATE, AND DEXAMETHASONE 3.5; 10000; 1 MG/G; [USP'U]/G; MG/G
OINTMENT OPHTHALMIC PRN
Status: DISCONTINUED | OUTPATIENT
Start: 2017-12-07 | End: 2017-12-07 | Stop reason: HOSPADM

## 2017-12-07 RX ORDER — IBUPROFEN 400 MG/1
400 TABLET, FILM COATED ORAL ONCE
Status: COMPLETED | OUTPATIENT
Start: 2017-12-07 | End: 2017-12-07

## 2017-12-07 RX ORDER — LIDOCAINE 40 MG/G
CREAM TOPICAL
Status: DISCONTINUED | OUTPATIENT
Start: 2017-12-07 | End: 2017-12-07 | Stop reason: HOSPADM

## 2017-12-07 RX ORDER — SODIUM CHLORIDE, SODIUM LACTATE, POTASSIUM CHLORIDE, CALCIUM CHLORIDE 600; 310; 30; 20 MG/100ML; MG/100ML; MG/100ML; MG/100ML
INJECTION, SOLUTION INTRAVENOUS CONTINUOUS
Status: DISCONTINUED | OUTPATIENT
Start: 2017-12-07 | End: 2017-12-07 | Stop reason: HOSPADM

## 2017-12-07 RX ORDER — FLURBIPROFEN SODIUM 0.3 MG/ML
1 SOLUTION/ DROPS OPHTHALMIC
Status: COMPLETED | OUTPATIENT
Start: 2017-12-07 | End: 2017-12-07

## 2017-12-07 RX ORDER — PHENYLEPHRINE HYDROCHLORIDE 100 MG/ML
SOLUTION/ DROPS OPHTHALMIC PRN
Status: DISCONTINUED | OUTPATIENT
Start: 2017-12-07 | End: 2017-12-07 | Stop reason: HOSPADM

## 2017-12-07 RX ORDER — PROPOFOL 10 MG/ML
INJECTION, EMULSION INTRAVENOUS PRN
Status: DISCONTINUED | OUTPATIENT
Start: 2017-12-07 | End: 2017-12-07

## 2017-12-07 RX ADMIN — PROPARACAINE HYDROCHLORIDE 2 DROP: 5 SOLUTION/ DROPS OPHTHALMIC at 07:53

## 2017-12-07 RX ADMIN — IBUPROFEN 400 MG: 400 TABLET ORAL at 06:21

## 2017-12-07 RX ADMIN — CYCLOPENTOLATE HYDROCHLORIDE AND PHENYLEPHRINE HYDROCHLORIDE 1 DROP: 2; 10 SOLUTION/ DROPS OPHTHALMIC at 06:34

## 2017-12-07 RX ADMIN — TIMOLOL MALEATE 1 DROP: 5 SOLUTION OPHTHALMIC at 08:08

## 2017-12-07 RX ADMIN — FLURBIPROFEN SODIUM 1 DROP: 0.3 SOLUTION/ DROPS OPHTHALMIC at 06:35

## 2017-12-07 RX ADMIN — FLURBIPROFEN SODIUM 1 DROP: 0.3 SOLUTION/ DROPS OPHTHALMIC at 06:28

## 2017-12-07 RX ADMIN — CYCLOPENTOLATE HYDROCHLORIDE AND PHENYLEPHRINE HYDROCHLORIDE 1 DROP: 2; 10 SOLUTION/ DROPS OPHTHALMIC at 06:22

## 2017-12-07 RX ADMIN — FLURBIPROFEN SODIUM 1 DROP: 0.3 SOLUTION/ DROPS OPHTHALMIC at 06:22

## 2017-12-07 RX ADMIN — PHENYLEPHRINE HYDROCHLORIDE 2 DROP: 100 SOLUTION/ DROPS OPHTHALMIC at 07:53

## 2017-12-07 RX ADMIN — SODIUM CHONDROITIN SULFATE / SODIUM HYALURONATE 0.5 ML: 0.55-0.5 INJECTION INTRAOCULAR at 08:08

## 2017-12-07 RX ADMIN — CYCLOPENTOLATE HYDROCHLORIDE AND PHENYLEPHRINE HYDROCHLORIDE 1 DROP: 2; 10 SOLUTION/ DROPS OPHTHALMIC at 06:28

## 2017-12-07 RX ADMIN — PROPOFOL 20 MG: 10 INJECTION, EMULSION INTRAVENOUS at 07:44

## 2017-12-07 RX ADMIN — MIDAZOLAM HYDROCHLORIDE 2 MG: 1 INJECTION, SOLUTION INTRAMUSCULAR; INTRAVENOUS at 07:40

## 2017-12-07 RX ADMIN — VANCOMYCIN HYDROCHLORIDE 200 ML: 500 INJECTION, POWDER, LYOPHILIZED, FOR SOLUTION INTRAVENOUS at 08:07

## 2017-12-07 RX ADMIN — LIDOCAINE HYDROCHLORIDE: 20 INJECTION, SOLUTION EPIDURAL; INFILTRATION; INTRACAUDAL; PERINEURAL at 07:51

## 2017-12-07 RX ADMIN — LIDOCAINE HYDROCHLORIDE 1 ML: 10 INJECTION, SOLUTION EPIDURAL; INFILTRATION; INTRACAUDAL; PERINEURAL at 06:23

## 2017-12-07 RX ADMIN — NEOMYCIN SULFATE, POLYMYXIN B SULFATE, AND DEXAMETHASONE 1 TUBE: 3.5; 10000; 1 OINTMENT OPHTHALMIC at 08:08

## 2017-12-07 RX ADMIN — SODIUM CHLORIDE, POTASSIUM CHLORIDE, SODIUM LACTATE AND CALCIUM CHLORIDE 10 ML: 600; 310; 30; 20 INJECTION, SOLUTION INTRAVENOUS at 06:22

## 2017-12-07 NOTE — OP NOTE
OPERATIVE NOTE:  PROCEDURE:  Phacoemulsification of left eye cataract with insertion of intraocular lens.  PREOPERATIVE DIAGNOSIS:  Senile nuclear cataract left eye.  POSTOPERATIVE DIAGNOSIS:  Same  ANESTHESIA:  Local with monitored anesthesia care.  DESCRIPTION OF PROCEDURE:  Precious Paris was brought to the operating room, and after satisfactory IV sedation a retrobulbar block was done using 5 cc of 2% Xylocaine with Wydase added.  Five minutes of ocular pressure was applied manually and the patient was prepped and draped for surgery of the eye in the usual ophthalmic fashion.  With the use of an operating microscope, a lid speculum was inserted and a side port incision was made with a juan carlos knife.  The chamber was filled with Viscoat and the juan carlos knife was used to make a clear corneal temporal incision of approximately 3 mm.  A pre-bent 25-gauge needle was used to make a circular tear anterior capsulotomy.  The nucleus was loosened with balanced salt injection and rotated.  The phacoemulsification tip was used to emulsify and aspirate the nucleus using a nuclear cracking technique. After the nuclear material was removed, the remaining cortical fragments and cortex were removed a 0.3-mm irrigation aspiration tip.  The posterior capsule was vacuumed and was clear.  Provisc was injected into the anterior chamber to slightly inflate the capsular bag.  The intraocular lens was injected into the capsular bag and rotated to be certain that it was in a secure position.  Provisc was then aspirated from the eye and the wound was hydrated, tested and found to be secure.  The chamber was pressurized to normal pressure.  A drop of 0.5% Timoptic was instilled as well as some Maxitrol ointment.  The lid speculum was removed.  A patch was applied.  The patient left the operating room in good condition.  EBL: None  SPECIMENS:  None  COMPLICATIONS:  None  MD Mata Morris 12/7/2017 8:11 AM

## 2017-12-07 NOTE — ANESTHESIA POSTPROCEDURE EVALUATION
Patient: Precious Paris    Procedure(s):  Left Cataract Removal with Implant - Wound Class: I-Clean    Diagnosis:cataract  Diagnosis Additional Information: No value filed.    Anesthesia Type:  MAC    Note:  Anesthesia Post Evaluation    Patient location during evaluation: Phase 2 and Bedside  Patient participation: Able to fully participate in evaluation  Level of consciousness: awake and alert  Pain management: adequate  Airway patency: patent  Cardiovascular status: acceptable  Respiratory status: acceptable  Hydration status: acceptable  PONV: none     Anesthetic complications: None          Last vitals:  Vitals:    12/07/17 0810 12/07/17 0815 12/07/17 0830   BP: 128/63 137/81 135/60   Pulse:      Resp: 16 16 16   Temp: 36.9  C (98.4  F)     SpO2: 94%  95%         Electronically Signed By: KONG Tracy CRNA  December 7, 2017  8:37 AM

## 2017-12-07 NOTE — ANESTHESIA CARE TRANSFER NOTE
Patient: Precious Paris    Procedure(s):  Left Cataract Removal with Implant - Wound Class: I-Clean    Diagnosis: cataract  Diagnosis Additional Information: No value filed.    Anesthesia Type:   MAC     Note:  Airway :Room Air  Patient transferred to:Phase II  Comments: Patient's VSS. Spontaneous respirations. Patient awake and oriented. IV patent. Report to RN.Handoff Report: Identifed the Patient, Identified the Reponsible Provider, Reviewed the pertinent medical history, Discussed the surgical course, Reviewed Intra-OP anesthesia mangement and issues during anesthesia, Set expectations for post-procedure period and Allowed opportunity for questions and acknowledgement of understanding      Vitals: (Last set prior to Anesthesia Care Transfer)    CRNA VITALS  12/7/2017 0739 - 12/7/2017 0809      12/7/2017             Pulse: 70    SpO2: 97 %                Electronically Signed By: KONG Tracy CRNA  December 7, 2017  8:09 AM

## 2017-12-07 NOTE — H&P (VIEW-ONLY)
[facility]  [address] [facility] [address]  [phone]  Dept: [phone]    PRE-OP EVALUATION:  Today's date: 2017    Precious Paris (: 1940) presents for pre-operative evaluation assessment as requested by Dr. Deleon.  She requires evaluation and anesthesia risk assessment prior to undergoing surgery/procedure for treatment of cataracts .  Proposed procedure: L cataract removal with implant.    Date of Surgery/ Procedure: 2017  Time of Surgery/ Procedure: 730  Hospital/Surgical Facility: [facility]  Primary Physician: Ester Jorge  Type of Anesthesia Anticipated: combined MAC with Retrobulbar    Patient has a Health Care Directive or Living Will:  YES but doesn't believe it to be in our files.    1. NO - Do you have a history of heart attack, stroke, stent, bypass or surgery on an artery in the head, neck, heart or legs?  2. NO - Do you ever have any pain or discomfort in your chest?  3. NO - Do you have a history of  Heart Failure?  4. NO - Are you troubled by shortness of breath when: walking on the level, up a slight hill or at night?  5. NO - Do you currently have a cold, bronchitis or other respiratory infection?  6. NO - Do you have a cough, shortness of breath or wheezing?  7. NO - Do you sometimes get pains in the calves of your legs when you walk?  8. NO - Do you or anyone in your family have previous history of blood clots?  9. NO - Do you or does anyone in your family have a serious bleeding problem such as prolonged bleeding following surgeries or cuts?  10. NO - Have you ever had problems with anemia or been told to take iron pills?  11. NO - Have you had any abnormal blood loss such as black, tarry or bloody stools, or abnormal vaginal bleeding?  12. NO - Have you ever had a blood transfusion?  13. NO - Have you or any of your relatives ever had problems with anesthesia?  14. NO - Do you have sleep apnea, excessive snoring or daytime  drowsiness?  15. NO - Do you have any prosthetic heart valves?  16. NO - Do you have prosthetic joints?  17. NO - Is there any chance that you may be pregnant?        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      HYPERTENSION - Patient has longstanding history of mod HTN , currently denies any symptoms referable to elevated blood pressure. Specifically denies chest pain, palpitations, dyspnea, orthopnea, PND or peripheral edema. Blood pressure readings have been in normal range. Current medication regimen is as listed below. Patient denies any side effects of medication.                                                                                                                                                                                          .    MEDICAL HISTORY:                                                    Patient Active Problem List    Diagnosis Date Noted     Primary osteoarthritis of both hands 11/27/2017     Priority: Medium     Hypertension, goal below 150/90 11/12/2015     Priority: Medium     Advanced directives, counseling/discussion 08/14/2013     Priority: Medium     Patient has completed an Advance/Health Care Directive (HCD), to bring in copy to be scanned into Epic.    Leyla Corral  August 14, 2013         Hammer toe 08/14/2013     Priority: Medium     Hx of skin cancer, basal cell 05/31/2013     Priority: Medium     Healthcare maintenance 04/11/2012     Priority: Medium     DEXA 2009 WNL--next in 2014       HYPERLIPIDEMIA LDL GOAL <130 10/31/2010     Priority: Medium     Premature atrial contractions 06/17/2010     Priority: Medium     Bigeminal on auscultation--recommend echocardiogram and stress test  Echocardiogram--normal except borderline concentric left ventricular hypertrophy       OA (osteoarthritis) of knee 01/07/2010      Priority: Medium     Malabsorption of glucose 09/09/2008     Priority: Medium     (Problem list name updated by automated process. 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     anosmia     Diverticulosis of colon (without mention of hemorrhage)      Other and unspecified hyperlipidemia      Pain in joint, lower leg      Plantar fascial fibromatosis      PURE HYPERCHOLESTEROLEM 9/9/2007     PURE HYPERCHOLESTEROLEM 9/9/2007     Squamous cell carcinoma      Past Surgical History:   Procedure Laterality Date     BIOPSY       HC COLONOSCOPY THRU STOMA, DIAGNOSTIC  04/05    diverticulosis and hemorroids, due 2015     JOINT REPLACEMTN, KNEE RT/LT  2010    right     JOINT REPLACEMTN, KNEE RT/LT  2011    left     LIGATE VEIN VARICOSE, PHLEBECTOMY MULTIPLE STAB, COMBINED  10/17/2013    Procedure: COMBINED LIGATE VEIN VARICOSE, PHLEBECTOMY MULTIPLE STAB;  Phlebectomy of Right Knee Varicose Veins;  Surgeon: Andrea Duffy MD;  Location: [facility]     OSTEOTOMY FOOT  8/19/2013    Procedure: OSTEOTOMY FOOT;  Left 2nd metatarsal osteotomy, 2nd Metatarsophalangeal joint & 2nd toe correction;  Surgeon: Jose Phillips DPM;  Location: [facility]     SURGICAL HISTORY OF -   1979    Stripping of Veins in Left Leg     SURGICAL HISTORY OF -   1970    Bilateral Tubal Ligation     SURGICAL HISTORY OF -   2000    Carpal Tunnel Repair, right     SURGICAL HISTORY OF -   9-12-08    Rt let ablation     Current Outpatient Prescriptions   Medication Sig Dispense Refill     melatonin 3 MG tablet Take 1  tablet (3 mg) by mouth nightly as needed for sleep       diclofenac (VOLTAREN) 1 % GEL topical gel Apply 2 grams to hands four times daily as needed using enclosed dosing card. 100 g 1     simvastatin (ZOCOR) 20 MG tablet Take 1 tablet (20 mg) by mouth At Bedtime 90 tablet 3     losartan-hydrochlorothiazide (HYZAAR) 50-12.5 MG per tablet Take 1 tablet by mouth daily 90 tablet 3     triamcinolone (KENALOG) 0.1 % cream Apply to AA BID x 1-2 weeks then PRN only 80 g 1     fluocinonide (LIDEX) 0.05 % external solution Apply sparingly to affected area twice daily as needed.  Do not apply to face.(Due for recheck Itching appt [phone] to schedule) 60 mL 0     fish oil-omega-3 fatty acids (OMEGA 3) 1000 MG capsule Take 1 capsule (1 g) by mouth daily 30 capsule 1     ASPIRIN 81 MG OR TABS Take 1 tablet by mouth daily       IBUPROFEN 200 MG OR TABS Take 400 mg by mouth 2 times daily as needed for pain       CENTRUM SILVER OR 1 TABLET DAILY       CALCIUM OR 1 DAILY       VITAMIN C OR 1 DAILY       OTC products: herbals and vitamins - see med list, Aspirin and NSAIDS.    Allergies   Allergen Reactions     Conjugated Estrogens Anaphylaxis and Other (See Comments)     Tightness in throat     Medroxyprogesterone Anaphylaxis and Other (See Comments)     Tightness in throat     Ace Inhibitors Cough     Premarin      Tightness in throat     Provera [Medroxyprogesterone Acetate]      Tightness in throat      Latex Allergy: NO    Social History   Substance Use Topics     Smoking status: Former Smoker     Smokeless tobacco: Never Used      Comment: quit 20 years ago     Alcohol use Yes      Comment: occ.     History   Drug Use No       REVIEW OF SYSTEMS:                                                    C: NEGATIVE for fever, chills, change in weight  I: NEGATIVE for worrisome rashes, moles or lesions  E/M: NEGATIVE for ear, mouth and throat problems  R: NEGATIVE for significant cough or SOB  CV: NEGATIVE for chest pain,  "palpitations or peripheral edema  GI: NEGATIVE for nausea, abdominal pain, heartburn, or change in bowel habits  : NEGATIVE for frequency, dysuria, or hematuria  M: NEGATIVE for significant arthralgias or myalgia  N: NEGATIVE for weakness, dizziness or paresthesias  E: NEGATIVE for temperature intolerance, skin/hair changes  H: NEGATIVE for bleeding problems  P: NEGATIVE for changes in mood or affect    EXAM:                                                    /66 (BP Location: Right arm, Patient Position: Sitting, Cuff Size: Adult Large)  Pulse 66  Temp 97.7  F (36.5  C) (Tympanic)  Resp 16  Ht 5' 5\" (1.651 m)  Wt 201 lb 6.4 oz (91.4 kg)  SpO2 92%  BMI 33.51 kg/m2    GENERAL APPEARANCE: healthy, alert and no distress     EYES: EOMI, PERRL     HENT: ear canals and TM's normal and nose and mouth without ulcers or lesions     NECK: no adenopathy, no asymmetry, masses, or scars and thyroid normal to palpation     RESP: lungs clear to auscultation - no rales, rhonchi or wheezes     CV: regular rates and rhythm, normal S1 S2, no S3 or S4 and no murmur, click or rub     ABDOMEN:  soft, nontender, no HSM or masses and bowel sounds normal     MS: extremities normal- no gross deformities noted, no evidence of inflammation in joints, FROM in all extremities.     SKIN: no suspicious lesions or rashes     NEURO: Normal strength and tone, sensory exam grossly normal, mentation intact and speech normal     PSYCH: mentation appears normal. and affect normal/bright    DIAGNOSTICS:                                                      EKG: Not indicated due to non-vascular surgery and low risk of event.  Labs Drawn and in Process:   Unresulted Labs Ordered in the Past 30 Days of this Admission     Date and Time Order Name Status Description    11/27/2017 1011 BASIC METABOLIC PANEL In process           Recent Labs   Lab Test  06/28/17   0911  12/16/16   1038  09/16/11 09/13/11   1037   06/17/10   1343   HGB  13.7   --   "  --    --    --    --   13.9   PLT  248   --    --    --    --    --   286   INR   --    --    --   1.2*  2.00*   < >   --    NA  140  141   < >   --    --    < >   --    POTASSIUM  4.1  4.2   < >   --    --    < >   --    CR  0.62  0.65   < >   --    --    < >   --    A1C  5.7   --    --    --    --    --    --     < > = values in this interval not displayed.        IMPRESSION:                                                    Reason for surgery/procedure:  Left eye cataract removal with implant, hx of macular pucker with repair in left eye.      The proposed surgical procedure is considered LOW risk.    REVISED CARDIAC RISK INDEX  The patient has the following serious cardiovascular risks for perioperative complications such as (MI, PE, VFib and 3  AV Block):  No serious cardiac risks  INTERPRETATION: 1 risks: Class II (low risk - 0.9% complication rate)    The patient has the following additional risks for perioperative complications:  No identified additional risks      ICD-10-CM    1. Preop general physical exam Z01.818 Basic metabolic panel  (Ca, Cl, CO2, Creat, Gluc, K, Na, BUN)         2. Hypertension, goal below 150/90 I10 Basic metabolic panel  (Ca, Cl, CO2, Creat, Gluc, K, Na, BUN)   3. Primary osteoarthritis of both hands M19.041 diclofenac (VOLTAREN) 1 % GEL topical gel    M19.042        RECOMMENDATIONS:                                                        Cardiovascular Risk  Performs 4 METs exercise without symptoms (Light housework (dusting, washing dishes), Climb a flight of stairs, Walk on level ground at 15 minutes per mile (4 miles/hour) and Shoveling) .       --Patient is to take all scheduled medications on the day of surgery EXCEPT for modifications listed below.    HOLD Ibuprofen for 7 days prior to procedure.    APPROVAL GIVEN to proceed with proposed procedure, without further diagnostic evaluation       Signed Electronically by: KONG Smith CNP    Copy of this evaluation  report is provided to requesting physician.    [facility] Preop Guidelines

## 2017-12-19 ENCOUNTER — OFFICE VISIT (OUTPATIENT)
Dept: DERMATOLOGY | Facility: CLINIC | Age: 77
End: 2017-12-19
Payer: COMMERCIAL

## 2017-12-19 VITALS — HEART RATE: 74 BPM | DIASTOLIC BLOOD PRESSURE: 76 MMHG | SYSTOLIC BLOOD PRESSURE: 148 MMHG | OXYGEN SATURATION: 94 %

## 2017-12-19 DIAGNOSIS — Z85.828 HISTORY OF NONMELANOMA SKIN CANCER: ICD-10-CM

## 2017-12-19 DIAGNOSIS — L81.4 LENTIGO: ICD-10-CM

## 2017-12-19 DIAGNOSIS — D48.5 NEOPLASM OF UNCERTAIN BEHAVIOR OF SKIN: Primary | ICD-10-CM

## 2017-12-19 DIAGNOSIS — L82.1 SEBORRHEIC KERATOSIS: ICD-10-CM

## 2017-12-19 DIAGNOSIS — D18.01 CHERRY ANGIOMA: ICD-10-CM

## 2017-12-19 PROCEDURE — 11100 HC BIOPSY SKIN/SUBQ/MUC MEM, SINGLE LESION: CPT | Performed by: PHYSICIAN ASSISTANT

## 2017-12-19 PROCEDURE — 99213 OFFICE O/P EST LOW 20 MIN: CPT | Mod: 25 | Performed by: PHYSICIAN ASSISTANT

## 2017-12-19 PROCEDURE — 11101 HC BIOPSY SKIN/SUBQ/MUC MEM, EACH ADDTL LESION: CPT | Performed by: PHYSICIAN ASSISTANT

## 2017-12-19 PROCEDURE — 88305 TISSUE EXAM BY PATHOLOGIST: CPT | Performed by: PHYSICIAN ASSISTANT

## 2017-12-19 NOTE — LETTER
12/19/2017         RE: Precious Paris  [address] [place] [address]        Dear Colleague,    Thank you for referring your patient, Precious Paris, to the [facility]. Please see a copy of my visit note below.    Precious Paris is a 77 year old year old female patient here today for skin check.  Patient reports that she has a few concerning areas on thigh and hands today. She reports that the spot on her thigh has been present for a few months. She denies any pain or bleeding. Remainder of the HPI, Meds, PMH, Allergies, FH, and SH was reviewed in chart.    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     anosmia     Diverticulosis of colon (without mention of hemorrhage)      Other and unspecified hyperlipidemia      Pain in joint, lower leg      Plantar fascial fibromatosis      PURE HYPERCHOLESTEROLEM 9/9/2007     PURE HYPERCHOLESTEROLEM 9/9/2007     Squamous cell carcinoma        Past Surgical History:   Procedure Laterality Date     BIOPSY       HC COLONOSCOPY THRU STOMA, DIAGNOSTIC  04/05    diverticulosis and hemorroids, due 2015     JOINT REPLACEMTN, KNEE RT/LT  2010    right     JOINT REPLACEMTN, KNEE RT/LT  2011    left     LIGATE VEIN VARICOSE, PHLEBECTOMY MULTIPLE STAB, COMBINED  10/17/2013    Procedure: COMBINED LIGATE VEIN VARICOSE, PHLEBECTOMY MULTIPLE STAB;  Phlebectomy of Right Knee Varicose Veins;  Surgeon: Andrea Duffy MD;  Location: [facility] OR     OSTEOTOMY FOOT  8/19/2013    Procedure: OSTEOTOMY FOOT;  Left 2nd metatarsal osteotomy, 2nd Metatarsophalangeal joint & 2nd toe correction;  Surgeon: Jose Phillips DPM;  Location: [facility] OR     PHACOEMULSIFICATION WITH STANDARD INTRAOCULAR  LENS IMPLANT Left 12/7/2017    Procedure: PHACOEMULSIFICATION WITH STANDARD INTRAOCULAR LENS IMPLANT;  Left Cataract Removal with Implant;  Surgeon: Mata Deleon MD;  Location: [facility] OR     SURGICAL HISTORY OF -   1979    Stripping of Veins in Left Leg     SURGICAL HISTORY OF -   1970    Bilateral Tubal Ligation     SURGICAL HISTORY OF -   2000    Carpal Tunnel Repair, right     SURGICAL HISTORY OF -   9-12-08    Rt let ablation        Family History   Problem Relation Age of Onset     Hypertension Mother      CEREBROVASCULAR DISEASE Mother      Hypertension Father      CEREBROVASCULAR DISEASE Father      Hypertension Brother      CANCER Brother      lymphoma     Hypertension Sister      GASTROINTESTINAL DISEASE Sister      Hypertension Brother      CANCER Brother      melanoma     Eye Disorder Brother      Hypertension Brother      Hypertension Brother        Social History     Social History     Marital status:      Spouse name: N/A     Number of children: N/A     Years of education: N/A     Occupational History     Not on file.     Social History Main Topics     Smoking status: Former Smoker     Smokeless tobacco: Never Used      Comment: quit 20 years ago     Alcohol use Yes      Comment: occ.     Drug use: No     Sexual activity: Yes     Partners: Male     Other Topics Concern     Parent/Sibling W/ Cabg, Mi Or Angioplasty Before 65f 55m? No     Social History Narrative       Outpatient Encounter Prescriptions as of 12/19/2017   Medication Sig Dispense Refill     melatonin 3 MG tablet Take 1 tablet (3 mg) by mouth nightly as needed for sleep       diclofenac (VOLTAREN) 1 % GEL topical gel Apply 2 grams to hands four times daily as needed using enclosed dosing card. 100 g 1     simvastatin (ZOCOR) 20 MG tablet Take 1 tablet (20 mg) by mouth At Bedtime 90 tablet 3     losartan-hydrochlorothiazide (HYZAAR) 50-12.5 MG per tablet Take 1 tablet by mouth daily 90 tablet 3     triamcinolone (KENALOG) 0.1 %  cream Apply to AA BID x 1-2 weeks then PRN only 80 g 1     fluocinonide (LIDEX) 0.05 % external solution Apply sparingly to affected area twice daily as needed.  Do not apply to face.(Due for recheck Itching appt [phone] to schedule) 60 mL 0     fish oil-omega-3 fatty acids (OMEGA 3) 1000 MG capsule Take 1 capsule (1 g) by mouth daily 30 capsule 1     ASPIRIN 81 MG OR TABS Take 1 tablet by mouth daily       IBUPROFEN 200 MG OR TABS Take 400 mg by mouth 2 times daily as needed for pain       CENTRUM SILVER OR 1 TABLET DAILY       CALCIUM OR 1 DAILY       VITAMIN C OR 1 DAILY       No facility-administered encounter medications on file as of 12/19/2017.              Review Of Systems  Skin: As above  Eyes: negative  Ears/Nose/Throat: negative  Respiratory: No shortness of breath, dyspnea on exertion, cough, or hemoptysis  Cardiovascular: negative  Gastrointestinal: negative  Genitourinary: negative  Musculoskeletal: negative  Neurologic: negative  Psychiatric: negative  Hematologic/Lymphatic/Immunologic: negative  Endocrine: negative      O:   NAD, WDWN, Alert & Oriented, Mood & Affect wnl, Vitals stable   Here today alone   /76  Pulse 74  SpO2 94%   General appearance normal   Vitals stable   Alert, oriented and in no acute distress      Brown stuck on papules, brown macules and red papules on torso and extremities   0.7 cm pink pearly papule on right medial thigh   0.7 cm pink scaly papule on right dorsal hand   0.5 cm pink scaly papule on left dorsal hand   Well healed scar on forehead         The remainder of the detailed exam was unremarkable; the following areas were examined:  scalp/hair, conjunctiva/lids, face, neck, lips/teeth, oral mucosa/gingiva, chest, back, abdomen, buttocks, digits/nails, RUE, LUE, RLE, LLE.      Eyes: Conjunctivae/lids:Normal     ENT: Lips    MSK:Normal    Cardiovascular: peripheral edema none    Pulm: Breathing Normal    Neuro/Psych: Orientation:Normal;  Mood/Affect:Normal  A/P:  1. R/O BCC on right medial thigh  TANGENTIAL BIOPSY SENT OUT:  After consent, anesthesia with LEC and prep, tangential excision performed and specimen sent out for permanent section histology.  No complications and routine wound care. Patient told to call our office in 1-2 weeks for result.      2. R/O SCC vs hypertrophic AK on right dorsal hand and left dorsal hand  TANGENTIAL BIOPSY SENT OUT:  After consent, anesthesia with LEC and prep, tangential excision performed and specimen sent out for permanent section histology.  No complications and routine wound care. Patient told to call our office in 1-2 weeks for result.      3. History of NMSC, lentigo, cherry angioma, seborrheic keratosis   BENIGN LESIONS DISCUSSED WITH PATIENT:  I discussed the specifics of tumor, prognosis, and genetics of benign lesions.  I explained that treatment of these lesions would be purely cosmetic and not medically neccessary.  I discussed with patient different removal options including excision, cautery and /or laser.      Nature and genetics of benign skin lesions dicussed with patient.  Signs and Symptoms of skin cancer discussed with patient.  ABCDEs of melanoma reviewed with patient.  Patient encouraged to perform monthly skin exams.  UV precautions reviewed with patient.  Risks of non-melanoma skin cancer discussed with patient   Return to clinic one year for FBSE or sooner pending biopsy results.       Again, thank you for allowing me to participate in the care of your patient.        Sincerely,        Renee Cardenas PA-C

## 2017-12-19 NOTE — NURSING NOTE
"Chief Complaint   Patient presents with     Derm Problem     skin check       Initial /76  Pulse 74  SpO2 94% Estimated body mass index is 32.45 kg/(m^2) as calculated from the following:    Height as of 12/7/17: 1.651 m (5' 5\").    Weight as of 12/7/17: 88.5 kg (195 lb).  BP completed using cuff size: evangelist Craft LPN    "

## 2017-12-19 NOTE — PATIENT INSTRUCTIONS
Wound Care Instructions     FOR SUPERFICIAL WOUNDS     [facility] [phone]    [facility] [phone]                       AFTER 24 HOURS YOU SHOULD REMOVE THE BANDAGE AND BEGIN DAILY DRESSING CHANGES AS FOLLOWS:     1) Remove Dressing.     2) Clean and dry the area with tap water using a Q-tip or sterile gauze pad.     3) Apply Vaseline, Aquaphor, Polysporin ointment or Bacitracin ointment over entire wound.  Do NOT use Neosporin ointment.     4) Cover the wound with a band-aid, or a sterile non-stick gauze pad and micropore paper tape      REPEAT THESE INSTRUCTIONS AT LEAST ONCE A DAY UNTIL THE WOUND HAS COMPLETELY HEALED.    It is an old wives tale that a wound heals better when it is exposed to air and allowed to dry out. The wound will heal faster with a better cosmetic result if it is kept moist with ointment and covered with a bandage.    **Do not let the wound dry out.**      Supplies Needed:      *Cotton tipped applicators (Q-tips)    *Polysporin Ointment or Bacitracin Ointment (NOT NEOSPORIN)    *Band-aids or non-stick gauze pads and micropore paper tape.      PATIENT INFORMATION:    During the healing process you will notice a number of changes. All wounds develop a small halo of redness surrounding the wound.  This means healing is occurring. Severe itching with extensive redness usually indicates sensitivity to the ointment or bandage tape used to dress the wound.  You should call our office if this develops.      Swelling  and/or discoloration around your surgical site is common, particularly when performed around the eye.    All wounds normally drain.  The larger the wound the more drainage there will be.  After 7-10 days, you will notice the wound beginning to shrink and new skin will begin to grow.  The wound is healed when you can see skin has formed over the entire area.  A healed wound has a healthy, shiny look to the surface and is red to dark pink in color  to normalize.  Wounds may take approximately 4-6 weeks to heal.  Larger wounds may take 6-8 weeks.  After the wound is healed you may discontinue dressing changes.    You may experience a sensation of tightness as your wound heals. This is normal and will gradually subside.    Your healed wound may be sensitive to temperature changes. This sensitivity improves with time, but if you re having a lot of discomfort, try to avoid temperature extremes.    Patients frequently experience itching after their wound appears to have healed because of the continue healing under the skin.  Plain Vaseline will help relieve the itching.        POSSIBLE COMPLICATIONS    BLEEDIN. Leave the bandage in place.  2. Use tightly rolled up gauze or a cloth to apply direct pressure over the bandage for 30  minutes.  3. Reapply pressure for an additional 30 minutes if necessary  4. Use additional gauze and tape to maintain pressure once the bleeding has stopped.

## 2017-12-19 NOTE — MR AVS SNAPSHOT
After Visit Summary   12/19/2017    Precious Paris    MRN: [id number]           Patient Information     Date Of Birth          1940        Visit Information        Provider Department      12/19/2017 2:40 PM Renee Rogers PA-C [facility]        Care Instructions          Wound Care Instructions     FOR SUPERFICIAL WOUNDS     [facility] [phone]    [facility] [phone]                       AFTER 24 HOURS YOU SHOULD REMOVE THE BANDAGE AND BEGIN DAILY DRESSING CHANGES AS FOLLOWS:     1) Remove Dressing.     2) Clean and dry the area with tap water using a Q-tip or sterile gauze pad.     3) Apply Vaseline, Aquaphor, Polysporin ointment or Bacitracin ointment over entire wound.  Do NOT use Neosporin ointment.     4) Cover the wound with a band-aid, or a sterile non-stick gauze pad and micropore paper tape      REPEAT THESE INSTRUCTIONS AT LEAST ONCE A DAY UNTIL THE WOUND HAS COMPLETELY HEALED.    It is an old wives tale that a wound heals better when it is exposed to air and allowed to dry out. The wound will heal faster with a better cosmetic result if it is kept moist with ointment and covered with a bandage.    **Do not let the wound dry out.**      Supplies Needed:      *Cotton tipped applicators (Q-tips)    *Polysporin Ointment or Bacitracin Ointment (NOT NEOSPORIN)    *Band-aids or non-stick gauze pads and micropore paper tape.      PATIENT INFORMATION:    During the healing process you will notice a number of changes. All wounds develop a small halo of redness surrounding the wound.  This means healing is occurring. Severe itching with extensive redness usually indicates sensitivity to the ointment or bandage tape used to dress the wound.  You should call our office if this develops.      Swelling  and/or discoloration around your surgical site is common, particularly when performed around the eye.    All wounds normally drain.  The  larger the wound the more drainage there will be.  After 7-10 days, you will notice the wound beginning to shrink and new skin will begin to grow.  The wound is healed when you can see skin has formed over the entire area.  A healed wound has a healthy, shiny look to the surface and is red to dark pink in color to normalize.  Wounds may take approximately 4-6 weeks to heal.  Larger wounds may take 6-8 weeks.  After the wound is healed you may discontinue dressing changes.    You may experience a sensation of tightness as your wound heals. This is normal and will gradually subside.    Your healed wound may be sensitive to temperature changes. This sensitivity improves with time, but if you re having a lot of discomfort, try to avoid temperature extremes.    Patients frequently experience itching after their wound appears to have healed because of the continue healing under the skin.  Plain Vaseline will help relieve the itching.        POSSIBLE COMPLICATIONS    BLEEDIN. Leave the bandage in place.  2. Use tightly rolled up gauze or a cloth to apply direct pressure over the bandage for 30  minutes.  3. Reapply pressure for an additional 30 minutes if necessary  4. Use additional gauze and tape to maintain pressure once the bleeding has stopped.            Follow-ups after your visit        Who to contact     If you have questions or need follow up information about today's clinic visit or your schedule please contact [facility] directly at [phone].  Normal or non-critical lab and imaging results will be communicated to you by Tunessencehart, letter or phone within 4 business days after the clinic has received the results. If you do not hear from us within 7 days, please contact the clinic through MyChart or phone. If you have a critical or abnormal lab result, we will notify you by phone as soon as possible.  Submit refill requests through [facility] or call your pharmacy and they will forward the  "refill request to us. Please allow 3 business days for your refill to be completed.          Additional Information About Your Visit        MyChart Information     [employer] lets you send messages to your doctor, view your test results, renew your prescriptions, schedule appointments and more. To sign up, go to [email or web address] . Click on \"Log in\" on the left side of the screen, which will take you to the Welcome page. Then click on \"Sign up Now\" on the right side of the page.     You will be asked to enter the access code listed below, as well as some personal information. Please follow the directions to create your username and password.     Your access code is: [email or web address]  Expires: 2017  8:59 AM     Your access code will  in 90 days. If you need help or a new code, please call your [place] clinic or [phone].        Care EveryWhere ID     This is your Care EveryWhere ID. This could be used by other organizations to access your [place] medical records  [id number]        Your Vitals Were     Pulse Pulse Oximetry                74 94%           Blood Pressure from Last 3 Encounters:   17 148/76   17 135/60   17 125/66    Weight from Last 3 Encounters:   17 88.5 kg (195 lb)   17 91.4 kg (201 lb 6.4 oz)   17 90.3 kg (199 lb)              Today, you had the following     No orders found for display       Primary Care Provider Office Phone # Fax #    Ester Jorge [phone] [address] [facility] [address]        Equal Access to Services     WILLY SAVAGE : Hadii carlie Mcneil, waandreada vilma, qaybta syedaljarvis ernandez. So [facility] [phone].    ATENCIÓN: Si habla español, tiene a brito disposición servicios gratuitos de asistencia lingüística. Llame al [phone].    We comply with applicable federal civil rights laws and [place] laws. We do not discriminate on the basis " of race, color, national origin, age, disability, sex, sexual orientation, or gender identity.            Thank you!     Thank you for choosing [facility]  for your care. Our goal is always to provide you with excellent care. Hearing back from our patients is one way we can continue to improve our services. Please take a few minutes to complete the written survey that you may receive in the mail after your visit with us. Thank you!             Your Updated Medication List - Protect others around you: Learn how to safely use, store and throw away your medicines at www.disposemymeds.org.          This list is accurate as of: 12/19/17  3:14 PM.  Always use your most recent med list.                   Brand Name Dispense Instructions for use Diagnosis    aspirin 81 MG tablet      Take 1 tablet by mouth daily        CALCIUM PO      1 DAILY        CENTRUM SILVER PO      1 TABLET DAILY        diclofenac 1 % Gel topical gel    VOLTAREN    100 g    Apply 2 grams to hands four times daily as needed using enclosed dosing card.    Primary osteoarthritis of both hands       fish oil-omega-3 fatty acids 1000 MG capsule     30 capsule    Take 1 capsule (1 g) by mouth daily        fluocinonide 0.05 % solution    LIDEX    60 mL    Apply sparingly to affected area twice daily as needed.  Do not apply to face.(Due for recheck Itching appt [phone] to schedule)    Itching       ibuprofen 200 MG tablet    ADVIL/MOTRIN     Take 400 mg by mouth 2 times daily as needed for pain        losartan-hydrochlorothiazide 50-12.5 MG per tablet    HYZAAR    90 tablet    Take 1 tablet by mouth daily    Benign essential hypertension       melatonin 3 MG tablet      Take 1 tablet (3 mg) by mouth nightly as needed for sleep        simvastatin 20 MG tablet    ZOCOR    90 tablet    Take 1 tablet (20 mg) by mouth At Bedtime    Hyperlipidemia LDL goal <130       triamcinolone 0.1 % cream    KENALOG    80 g    Apply to AA BID x 1-2 weeks  then PRN only    Acute dermatitis       VITAMIN C PO      1 DAILY

## 2017-12-21 LAB — COPATH REPORT: NORMAL

## 2017-12-24 NOTE — PROGRESS NOTES
Precious Paris is a 77 year old year old female patient here today for skin check.  Patient reports that she has a few concerning areas on thigh and hands today. She reports that the spot on her thigh has been present for a few months. She denies any pain or bleeding. Remainder of the HPI, Meds, PMH, Allergies, FH, and SH was reviewed in chart.    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     anosmia     Diverticulosis of colon (without mention of hemorrhage)      Other and unspecified hyperlipidemia      Pain in joint, lower leg      Plantar fascial fibromatosis      PURE HYPERCHOLESTEROLEM 9/9/2007     PURE HYPERCHOLESTEROLEM 9/9/2007     Squamous cell carcinoma        Past Surgical History:   Procedure Laterality Date     BIOPSY       HC COLONOSCOPY THRU STOMA, DIAGNOSTIC  04/05    diverticulosis and hemorroids, due 2015     JOINT REPLACEMTN, KNEE RT/LT  2010    right     JOINT REPLACEMTN, KNEE RT/LT  2011    left     LIGATE VEIN VARICOSE, PHLEBECTOMY MULTIPLE STAB, COMBINED  10/17/2013    Procedure: COMBINED LIGATE VEIN VARICOSE, PHLEBECTOMY MULTIPLE STAB;  Phlebectomy of Right Knee Varicose Veins;  Surgeon: Andrea Duffy MD;  Location: [facility]     OSTEOTOMY FOOT  8/19/2013    Procedure: OSTEOTOMY FOOT;  Left 2nd metatarsal osteotomy, 2nd Metatarsophalangeal joint & 2nd toe correction;  Surgeon: Jose Phillips DPM;  Location: [facility]     PHACOEMULSIFICATION WITH STANDARD INTRAOCULAR LENS IMPLANT Left 12/7/2017    Procedure: PHACOEMULSIFICATION WITH STANDARD INTRAOCULAR LENS IMPLANT;  Left Cataract Removal with Implant;  Surgeon: Mata Deleon MD;  Location: [facility]     SURGICAL HISTORY OF -   1979    Stripping of Veins in  Left Leg     SURGICAL HISTORY OF -   1970    Bilateral Tubal Ligation     SURGICAL HISTORY OF -   2000    Carpal Tunnel Repair, right     SURGICAL HISTORY OF -   9-12-08    Rt let ablation        Family History   Problem Relation Age of Onset     Hypertension Mother      CEREBROVASCULAR DISEASE Mother      Hypertension Father      CEREBROVASCULAR DISEASE Father      Hypertension Brother      CANCER Brother      lymphoma     Hypertension Sister      GASTROINTESTINAL DISEASE Sister      Hypertension Brother      CANCER Brother      melanoma     Eye Disorder Brother      Hypertension Brother      Hypertension Brother        Social History     Social History     Marital status:      Spouse name: N/A     Number of children: N/A     Years of education: N/A     Occupational History     Not on file.     Social History Main Topics     Smoking status: Former Smoker     Smokeless tobacco: Never Used      Comment: quit 20 years ago     Alcohol use Yes      Comment: occ.     Drug use: No     Sexual activity: Yes     Partners: Male     Other Topics Concern     Parent/Sibling W/ Cabg, Mi Or Angioplasty Before 65f 55m? No     Social History Narrative       Outpatient Encounter Prescriptions as of 12/19/2017   Medication Sig Dispense Refill     melatonin 3 MG tablet Take 1 tablet (3 mg) by mouth nightly as needed for sleep       diclofenac (VOLTAREN) 1 % GEL topical gel Apply 2 grams to hands four times daily as needed using enclosed dosing card. 100 g 1     simvastatin (ZOCOR) 20 MG tablet Take 1 tablet (20 mg) by mouth At Bedtime 90 tablet 3     losartan-hydrochlorothiazide (HYZAAR) 50-12.5 MG per tablet Take 1 tablet by mouth daily 90 tablet 3     triamcinolone (KENALOG) 0.1 % cream Apply to AA BID x 1-2 weeks then PRN only 80 g 1     fluocinonide (LIDEX) 0.05 % external solution Apply sparingly to affected area twice daily as needed.  Do not apply to face.(Due for recheck Itching appt [phone] to schedule) 60 mL 0      fish oil-omega-3 fatty acids (OMEGA 3) 1000 MG capsule Take 1 capsule (1 g) by mouth daily 30 capsule 1     ASPIRIN 81 MG OR TABS Take 1 tablet by mouth daily       IBUPROFEN 200 MG OR TABS Take 400 mg by mouth 2 times daily as needed for pain       CENTRUM SILVER OR 1 TABLET DAILY       CALCIUM OR 1 DAILY       VITAMIN C OR 1 DAILY       No facility-administered encounter medications on file as of 12/19/2017.              Review Of Systems  Skin: As above  Eyes: negative  Ears/Nose/Throat: negative  Respiratory: No shortness of breath, dyspnea on exertion, cough, or hemoptysis  Cardiovascular: negative  Gastrointestinal: negative  Genitourinary: negative  Musculoskeletal: negative  Neurologic: negative  Psychiatric: negative  Hematologic/Lymphatic/Immunologic: negative  Endocrine: negative      O:   NAD, WDWN, Alert & Oriented, Mood & Affect wnl, Vitals stable   Here today alone   /76  Pulse 74  SpO2 94%   General appearance normal   Vitals stable   Alert, oriented and in no acute distress      Brown stuck on papules, brown macules and red papules on torso and extremities   0.7 cm pink pearly papule on right medial thigh   0.7 cm pink scaly papule on right dorsal hand   0.5 cm pink scaly papule on left dorsal hand   Well healed scar on forehead         The remainder of the detailed exam was unremarkable; the following areas were examined:  scalp/hair, conjunctiva/lids, face, neck, lips/teeth, oral mucosa/gingiva, chest, back, abdomen, buttocks, digits/nails, RUE, LUE, RLE, LLE.      Eyes: Conjunctivae/lids:Normal     ENT: Lips    MSK:Normal    Cardiovascular: peripheral edema none    Pulm: Breathing Normal    Neuro/Psych: Orientation:Normal; Mood/Affect:Normal  A/P:  1. R/O BCC on right medial thigh  TANGENTIAL BIOPSY SENT OUT:  After consent, anesthesia with LEC and prep, tangential excision performed and specimen sent out for permanent section histology.  No complications and routine wound care. Patient  told to call our office in 1-2 weeks for result.      2. R/O SCC vs hypertrophic AK on right dorsal hand and left dorsal hand  TANGENTIAL BIOPSY SENT OUT:  After consent, anesthesia with LEC and prep, tangential excision performed and specimen sent out for permanent section histology.  No complications and routine wound care. Patient told to call our office in 1-2 weeks for result.      3. History of NMSC, lentigo, cherry angioma, seborrheic keratosis   BENIGN LESIONS DISCUSSED WITH PATIENT:  I discussed the specifics of tumor, prognosis, and genetics of benign lesions.  I explained that treatment of these lesions would be purely cosmetic and not medically neccessary.  I discussed with patient different removal options including excision, cautery and /or laser.      Nature and genetics of benign skin lesions dicussed with patient.  Signs and Symptoms of skin cancer discussed with patient.  ABCDEs of melanoma reviewed with patient.  Patient encouraged to perform monthly skin exams.  UV precautions reviewed with patient.  Risks of non-melanoma skin cancer discussed with patient   Return to clinic one year for FBSE or sooner pending biopsy results.

## 2017-12-27 ENCOUNTER — OFFICE VISIT (OUTPATIENT)
Dept: DERMATOLOGY | Facility: CLINIC | Age: 77
End: 2017-12-27
Payer: COMMERCIAL

## 2017-12-27 DIAGNOSIS — L57.0 ACTINIC KERATOSIS: Primary | ICD-10-CM

## 2017-12-27 PROCEDURE — 17000 DESTRUCT PREMALG LESION: CPT | Performed by: PHYSICIAN ASSISTANT

## 2017-12-27 PROCEDURE — 17003 DESTRUCT PREMALG LES 2-14: CPT | Performed by: PHYSICIAN ASSISTANT

## 2017-12-27 NOTE — LETTER
12/27/2017         RE: Precious Paris  [address] [place] [address]        Dear Colleague,    Thank you for referring your patient, Precious Paris, to the [facility]. Please see a copy of my visit note below.    Patient is here today to treat biopsy proven actinic keratoses on bilateral hands.   She had areas biopsied last week.   1. Actinic Keratoses on right and left dorsal hand x 2  LN2:  Treated with LN2 for 5s for 1-2 cycles. Warned risks of blistering, pain, pigment change, scarring, and incomplete resolution.  Advised patient to return if lesions do not completely resolve.  Wound care sheet given.    I confirmed with Dr. Anthony that basal cell carcinoma (morpheaform features) on right medial thigh can wait until patient returns from [place] in April. Dr. Anthony did confirm that this can wait until patient returns.     Again, thank you for allowing me to participate in the care of your patient.        Sincerely,        Renee Cardenas PA-C

## 2017-12-27 NOTE — MR AVS SNAPSHOT
After Visit Summary   12/27/2017    Precious Paris    MRN: [id number]           Patient Information     Date Of Birth          1940        Visit Information        Provider Department      12/27/2017 1:00 PM Renee Rogers PA-C [facility]        Care Instructions    WOUND CARE INSTRUCTIONS   FOR CRYOSURGERY   This area treated with liquid nitrogen will form a blister. You do not need to bandage the area until after the blister forms and breaks (which may be a few days). When the blister breaks, begin daily dressing changes as follows:   1) Clean and dry the area with tap water using clean Q-tip or sterile gauze pad.   2) Apply Polysporin ointment or Bacitracin ointment over entire wound. Do NOT use Neosporin ointment.   3) Cover the wound with a band-aid or sterile non-stick gauze pad and micropore paper tape.   REPEAT THESE INSTRUCTIONS AT LEAST ONCE A DAY UNTIL THE WOUND HAS COMPLETELY HEALED.   It is an old wives tale that a wound heals better when it is exposed to air and allowed to dry out. The wound will heal faster with a better cosmetic result if it is kept moist with ointment and covered with a bandage.   Do not let the wound dry out.   IMPORTANT INFORMATION ON REVERSE SIDE   Supplies Needed:   *Cotton tipped applicators (Q-tips)   *Polysporin ointment or Bacitracin ointment (NOT NEOSPORIN)   *Band-aids, or non stick gauze pads and micropore paper tape   PATIENT INFORMATION   During the healing process you will notice a number of changes. All wounds develop a small halo of redness surrounding the wound. This means healing is occurring. Severe itching with extensive redness usually indicates sensitivity to the ointment or bandage tape used to dress the wound. You should call our office if this develops.   Swelling and/or discoloration around your surgical site is common, particularly when performed around the eye.   All wounds normally drain. The larger the wound  "the more drainage there will be. After 7-10 days, you will notice the wound beginning to shrink and new skin will begin to grow. The wound is healed when you can see skin has formed over the entire area. A healed wound has a healthy, shiny look to the surface and is red to dark pink in color to normalize. Wounds may take approximately 4-6 weeks to heal. Larger wounds may take 6-8 weeks. After the wound is healed you may discontinue dressing changes.   You may experience a sensation of tightness as your wound heals. This is normal and will gradually subside.   Your healed wound may be sensitive to temperature changes. This sensitivity improves with time, but if you re having a lot of discomfort, try to avoid temperature extremes.   Patients frequently experience itching after their wound appears to have healed because of the continue healing under the skin. Plain Vaseline will help relieve the itching.                 Follow-ups after your visit        Who to contact     If you have questions or need follow up information about today's clinic visit or your schedule please contact [facility] directly at [phone].  Normal or non-critical lab and imaging results will be communicated to you by mySchoolNotebookhart, letter or phone within 4 business days after the clinic has received the results. If you do not hear from us within 7 days, please contact the clinic through [employer] or phone. If you have a critical or abnormal lab result, we will notify you by phone as soon as possible.  Submit refill requests through [employer] or call your pharmacy and they will forward the refill request to us. Please allow 3 business days for your refill to be completed.          Additional Information About Your Visit        TrueVault Information     TrueVault lets you send messages to your doctor, view your test results, renew your prescriptions, schedule appointments and more. To sign up, go to [email or web address] . Click on \"Log " "in\" on the left side of the screen, which will take you to the Welcome page. Then click on \"Sign up Now\" on the right side of the page.     You will be asked to enter the access code listed below, as well as some personal information. Please follow the directions to create your username and password.     Your access code is: [email or web address]  Expires: 3/27/2018  1:00 PM     Your access code will  in 90 days. If you need help or a new code, please call your [place] clinic or [phone].        Care EveryWhere ID     This is your Care EveryWhere ID. This could be used by other organizations to access your [place] medical records  [id number]         Blood Pressure from Last 3 Encounters:   17 148/76   17 135/60   17 125/66    Weight from Last 3 Encounters:   17 88.5 kg (195 lb)   17 91.4 kg (201 lb 6.4 oz)   17 90.3 kg (199 lb)              Today, you had the following     No orders found for display       Primary Care Provider Office Phone # Fax #    Ester Jorge [phone] [address] [facility] [address]        Equal Access to Services     EDWAR SAVAGE AH: Hadii carlie ku hadasho Soomaali, waaxda luqadaha, qaybta kaalmada adeegyada, jarvis green haypranay marrufo . So [facility] [phone].    ATENCIÓN: Si habla español, tiene a brito disposición servicios gratuitos de asistencia lingüística. Llame al [phone].    We comply with applicable federal civil rights laws and [place] laws. We do not discriminate on the basis of race, color, national origin, age, disability, sex, sexual orientation, or gender identity.            Thank you!     Thank you for choosing [facility]  for your care. Our goal is always to provide you with excellent care. Hearing back from our patients is one way we can continue to improve our services. Please take a few minutes to complete the written survey that you may receive in the mail after your " visit with us. Thank you!             Your Updated Medication List - Protect others around you: Learn how to safely use, store and throw away your medicines at www.disposemymeds.org.          This list is accurate as of: 12/27/17  1:00 PM.  Always use your most recent med list.                   Brand Name Dispense Instructions for use Diagnosis    aspirin 81 MG tablet      Take 1 tablet by mouth daily        CALCIUM PO      1 DAILY        CENTRUM SILVER PO      1 TABLET DAILY        diclofenac 1 % Gel topical gel    VOLTAREN    100 g    Apply 2 grams to hands four times daily as needed using enclosed dosing card.    Primary osteoarthritis of both hands       fish oil-omega-3 fatty acids 1000 MG capsule     30 capsule    Take 1 capsule (1 g) by mouth daily        fluocinonide 0.05 % solution    LIDEX    60 mL    Apply sparingly to affected area twice daily as needed.  Do not apply to face.(Due for recheck Itching appt [phone] to schedule)    Itching       ibuprofen 200 MG tablet    ADVIL/MOTRIN     Take 400 mg by mouth 2 times daily as needed for pain        losartan-hydrochlorothiazide 50-12.5 MG per tablet    HYZAAR    90 tablet    Take 1 tablet by mouth daily    Benign essential hypertension       melatonin 3 MG tablet      Take 1 tablet (3 mg) by mouth nightly as needed for sleep        simvastatin 20 MG tablet    ZOCOR    90 tablet    Take 1 tablet (20 mg) by mouth At Bedtime    Hyperlipidemia LDL goal <130       triamcinolone 0.1 % cream    KENALOG    80 g    Apply to AA BID x 1-2 weeks then PRN only    Acute dermatitis       VITAMIN C PO      1 DAILY

## 2017-12-31 NOTE — PROGRESS NOTES
Patient is here today to treat biopsy proven actinic keratoses on bilateral hands.   She had areas biopsied last week.   1. Actinic Keratoses on right and left dorsal hand x 2  LN2:  Treated with LN2 for 5s for 1-2 cycles. Warned risks of blistering, pain, pigment change, scarring, and incomplete resolution.  Advised patient to return if lesions do not completely resolve.  Wound care sheet given.    I confirmed with Dr. Anthony that basal cell carcinoma (morpheaform features) on right medial thigh can wait until patient returns from [place] in April. Dr. Anthony did confirm that this can wait until patient returns.

## 2018-04-18 ENCOUNTER — OFFICE VISIT (OUTPATIENT)
Dept: FAMILY MEDICINE | Facility: CLINIC | Age: 78
End: 2018-04-18
Payer: COMMERCIAL

## 2018-04-18 VITALS
WEIGHT: 196 LBS | BODY MASS INDEX: 32.65 KG/M2 | DIASTOLIC BLOOD PRESSURE: 72 MMHG | HEIGHT: 65 IN | RESPIRATION RATE: 18 BRPM | SYSTOLIC BLOOD PRESSURE: 132 MMHG | TEMPERATURE: 96.4 F | HEART RATE: 65 BPM

## 2018-04-18 DIAGNOSIS — R19.8 CHANGE IN BOWEL MOVEMENT: Primary | ICD-10-CM

## 2018-04-18 DIAGNOSIS — I10 BENIGN ESSENTIAL HYPERTENSION: ICD-10-CM

## 2018-04-18 DIAGNOSIS — E78.5 HYPERLIPIDEMIA LDL GOAL <130: ICD-10-CM

## 2018-04-18 DIAGNOSIS — L60.8 CHANGE IN NAIL APPEARANCE: ICD-10-CM

## 2018-04-18 LAB
ANION GAP SERPL CALCULATED.3IONS-SCNC: 4 MMOL/L (ref 3–14)
BUN SERPL-MCNC: 14 MG/DL (ref 7–30)
CALCIUM SERPL-MCNC: 9.2 MG/DL (ref 8.5–10.1)
CHLORIDE SERPL-SCNC: 104 MMOL/L (ref 94–109)
CHOLEST SERPL-MCNC: 187 MG/DL
CO2 SERPL-SCNC: 29 MMOL/L (ref 20–32)
CREAT SERPL-MCNC: 0.61 MG/DL (ref 0.52–1.04)
GFR SERPL CREATININE-BSD FRML MDRD: >90 ML/MIN/1.7M2
GLUCOSE SERPL-MCNC: 98 MG/DL (ref 70–99)
HDLC SERPL-MCNC: 62 MG/DL
LDLC SERPL CALC-MCNC: 113 MG/DL
NONHDLC SERPL-MCNC: 125 MG/DL
POTASSIUM SERPL-SCNC: 4 MMOL/L (ref 3.4–5.3)
SODIUM SERPL-SCNC: 137 MMOL/L (ref 133–144)
TRIGL SERPL-MCNC: 58 MG/DL
TSH SERPL DL<=0.005 MIU/L-ACNC: 2.08 MU/L (ref 0.4–4)

## 2018-04-18 PROCEDURE — 80061 LIPID PANEL: CPT | Performed by: FAMILY MEDICINE

## 2018-04-18 PROCEDURE — 36415 COLL VENOUS BLD VENIPUNCTURE: CPT | Performed by: FAMILY MEDICINE

## 2018-04-18 PROCEDURE — 80048 BASIC METABOLIC PNL TOTAL CA: CPT | Performed by: FAMILY MEDICINE

## 2018-04-18 PROCEDURE — 99214 OFFICE O/P EST MOD 30 MIN: CPT | Performed by: FAMILY MEDICINE

## 2018-04-18 PROCEDURE — 84443 ASSAY THYROID STIM HORMONE: CPT | Performed by: FAMILY MEDICINE

## 2018-04-18 RX ORDER — LOSARTAN POTASSIUM AND HYDROCHLOROTHIAZIDE 12.5; 5 MG/1; MG/1
1 TABLET ORAL DAILY
Qty: 90 TABLET | Refills: 3 | Status: SHIPPED | OUTPATIENT
Start: 2018-04-18 | End: 2019-04-03

## 2018-04-18 RX ORDER — SIMVASTATIN 20 MG
20 TABLET ORAL AT BEDTIME
Qty: 90 TABLET | Refills: 3 | Status: SHIPPED | OUTPATIENT
Start: 2018-04-18 | End: 2019-06-15

## 2018-04-18 ASSESSMENT — PAIN SCALES - GENERAL: PAINLEVEL: NO PAIN (0)

## 2018-04-18 NOTE — PROGRESS NOTES
"  SUBJECTIVE:   Precious Paris is a 77 year old female who presents to clinic today for the following health issues:  Chief Complaint   Patient presents with     Bowel Problems     Patient was in [place] for 3 months and while there she noticed changes in her bowel movements and more loose stools. Patient is requesting stool samples.      Hypertension     Lipids     Medication Question     How much melatonin should she be taking.      Diarrhea      Duration: over the winter    Description:       Consistency of stool: watery and runny       Blood in stool: no        Number of loose stools past 24 hours: 6    Intensity:  moderate    Accompanying signs and symptoms:       Fever: no        Nausea/vomitting: no        Abdominal pain: no        Weight loss: no     History (recent antibiotics or travel/ill contacts/med changes/testing done): had wintered in [place] in \"lodging\" but not camping.  They stay there every year.     Precipitating or alleviating factors: None    Therapies tried and outcome: PeptoBismol, immodium - would help some.      Did try a probiotic but has stopped.   Better for the past week since they have been home from [place].     Hyperlipidemia Follow-Up      Rate your low fat/cholesterol diet?: good    Taking statin?  Yes, possible muscle aches from statin    Other lipid medications/supplements?:  Fish oil/Omega 3, dose 1000 without side effects    Hypertension Follow-up      Outpatient blood pressures are not being checked.    Low Salt Diet: not monitoring salt      Amount of exercise or physical activity: 2-3 days/week for an average of 30-45 minutes    Problems taking medications regularly: No    Medication side effects: muscle aches    Diet: regular (no restrictions)        /72  Pulse 65  Temp 96.4  F (35.8  C) (Tympanic)  Resp 18  Ht 5' 5\" (1.651 m)  Wt 196 lb (88.9 kg)  Breastfeeding? No  BMI 32.62 kg/m2  EXAM: GENERAL APPEARANCE: Alert, no acute distress  RESP: lungs clear to " auscultation   CV: normal rate, regular rhythm, no murmur or gallop  ABDOMEN: soft, no organomegaly, masses or tenderness    ASSESSMENT/PLAN:      ICD-10-CM    1. Change in bowel movement R19.4 Enteric Bacteria and Virus Panel by JANETH Stool     Ova and Parasite Exam Routine     TSH with free T4 reflex   2. Hyperlipidemia LDL goal <130 E78.5 simvastatin (ZOCOR) 20 MG tablet     Lipid panel reflex to direct LDL Fasting   3. Benign essential hypertension I10 losartan-hydrochlorothiazide (HYZAAR) 50-12.5 MG per tablet     Basic metabolic panel   4. Change in nail appearance R29.898 TSH with free T4 reflex     Will r/o parasitic infection/enteric pathogens, but it's a good sign that stool frequency is improving since home the past week.  If this continues, consider colonoscopy as the next step.    Refilled chronic medications, labs pending.    Ester Jorge M.D.      Patient Instructions         Thank you for choosing [facility].  You may be receiving a survey in the mail from [employer] regarding your visit today.  Please take a few minutes to complete and return the survey to let us know how we are doing.      Our Clinic hours are:  Mondays    7:20 am - 7 pm  Tues -  Fri  7:20 am - 5 pm    Clinic Phone: [phone]    The clinic lab opens at 7:30 am Mon - Fri and appointments are required.    [facility]. [phone]  Monday-Thursday 8 am - 7pm  Tues/Wed/Fri 8 am - 5:30 pm

## 2018-04-18 NOTE — NURSING NOTE
"Chief Complaint   Patient presents with     Bowel Problems     Patient was in [place] for 3 months and while there she noticed changes in her bowel movements and more loose stools. Patient is requesting stool samples.      Hypertension     Lipids     Medication Question     How much melatonin should she be taking.        Initial /72  Pulse 65  Temp 96.4  F (35.8  C) (Tympanic)  Resp 18  Ht 5' 5\" (1.651 m)  Wt 196 lb (88.9 kg)  Breastfeeding? No  BMI 32.62 kg/m2 Estimated body mass index is 32.62 kg/(m^2) as calculated from the following:    Height as of this encounter: 5' 5\" (1.651 m).    Weight as of this encounter: 196 lb (88.9 kg).  Medication Reconciliation: complete    "

## 2018-04-18 NOTE — PATIENT INSTRUCTIONS
Thank you for choosing [facility].  You may be receiving a survey in the mail from [facility] regarding your visit today.  Please take a few minutes to complete and return the survey to let us know how we are doing.      Our Clinic hours are:  Mondays    7:20 am - 7 pm  Tues -  Fri  7:20 am - 5 pm    Clinic Phone: [phone]    The clinic lab opens at 7:30 am Mon - Fri and appointments are required.    [facility]. [phone]  Monday-Thursday 8 am - 7pm  Tues/Wed/Fri 8 am - 5:30 pm

## 2018-04-18 NOTE — LETTER
[facility]  [address]  [place] [address]  Phone: [phone]      4/19/2018     Precious Paris  [address] [place] [address]      Dear Precious:    Thank you for allowing me to participate in your care. Your recent test results were reviewed and listed below.    Your lab results are acceptable. If you have any other concerns, please call the clinic at [phone].  Results for orders placed or performed in visit on 04/18/18   Lipid panel reflex to direct LDL Fasting   Result Value Ref Range    Cholesterol 187 <200 mg/dL    Triglycerides 58 <150 mg/dL    HDL Cholesterol 62 >49 mg/dL    LDL Cholesterol Calculated 113 (H) <100 mg/dL    Non HDL Cholesterol 125 <130 mg/dL   Basic metabolic panel   Result Value Ref Range    Sodium 137 133 - 144 mmol/L    Potassium 4.0 3.4 - 5.3 mmol/L    Chloride 104 94 - 109 mmol/L    Carbon Dioxide 29 20 - 32 mmol/L    Anion Gap 4 3 - 14 mmol/L    Glucose 98 70 - 99 mg/dL    Urea Nitrogen 14 7 - 30 mg/dL    Creatinine 0.61 0.52 - 1.04 mg/dL    GFR Estimate >90 >60 mL/min/1.7m2    GFR Estimate If Black >90 >60 mL/min/1.7m2    Calcium 9.2 8.5 - 10.1 mg/dL   TSH with free T4 reflex   Result Value Ref Range    TSH 2.08 0.40 - 4.00 mU/L     Thank you for choosing [facility]. As a result, please continue with the treatment plan discussed in the office. Return as discussed or sooner if symptoms worsen or fail to improve. If you have any further questions or concerns, please do not hesitate to contact us.    Sincerely,  Dr. Ester Jorge/Sofi Evangelista MA

## 2018-04-18 NOTE — MR AVS SNAPSHOT
After Visit Summary   4/18/2018    Precious Paris    MRN: [id number]           Patient Information     Date Of Birth          1940        Visit Information        Provider Department      4/18/2018 8:40 AM Ester Jorge MD [facility]        Today's Diagnoses     Change in bowel movement    -  1    Hyperlipidemia LDL goal <130        Benign essential hypertension          Care Instructions          Thank you for choosing [facility].  You may be receiving a survey in the mail from [facility] regarding your visit today.  Please take a few minutes to complete and return the survey to let us know how we are doing.      Our Clinic hours are:  Mondays    7:20 am - 7 pm  Tues -  Fri  7:20 am - 5 pm    Clinic Phone: [phone]    The clinic lab opens at 7:30 am Mon - Fri and appointments are required.    [facility]  Ph. [phone]  Monday-Thursday 8 am - 7pm  Tues/Wed/Fri 8 am - 5:30 pm                 Follow-ups after your visit        Your next 10 appointments already scheduled     Apr 30, 2018  8:00 AM CDT   [facility]ito Anthony MD   [facility]    [address] [address]   [phone]              Future tests that were ordered for you today     Open Future Orders        Priority Expected Expires Ordered    Enteric Bacteria and Virus Panel by JANETH Stool Routine  4/18/2019 4/18/2018    Ova and Parasite Exam Routine Routine  4/18/2019 4/18/2018            Who to contact     If you have questions or need follow up information about today's clinic visit or your schedule please contact [facility] directly at [phone].  Normal or non-critical lab and imaging results will be communicated to you by MyChart, letter or phone within 4 business days after the clinic has received the results. If you do not hear from us within 7 days, please contact the clinic  "through [email or web address] or phone. If you have a critical or abnormal lab result, we will notify you by phone as soon as possible.  Submit refill requests through [email or web address] or call your pharmacy and they will forward the refill request to us. Please allow 3 business days for your refill to be completed.          Additional Information About Your Visit        [employer] Information     [email or web address] lets you send messages to your doctor, view your test results, renew your prescriptions, schedule appointments and more. To sign up, go to [email or web address] . Click on \"Log in\" on the left side of the screen, which will take you to the Welcome page. Then click on \"Sign up Now\" on the right side of the page.     You will be asked to enter the access code listed below, as well as some personal information. Please follow the directions to create your username and password.     Your access code is: [email or web address]  Expires: 2018  9:05 AM     Your access code will  in 90 days. If you need help or a new code, please call your [facility] clinic or [phone].        Care EveryWhere ID     This is your Care EveryWhere ID. This could be used by other organizations to access your [facility] medical records  [id number]        Your Vitals Were     Pulse Temperature Respirations Height Breastfeeding? BMI (Body Mass Index)    65 96.4  F (35.8  C) (Tympanic) 18 5' 5\" (1.651 m) No 32.62 kg/m2       Blood Pressure from Last 3 Encounters:   18 132/72   17 148/76   17 135/60    Weight from Last 3 Encounters:   18 196 lb (88.9 kg)   17 195 lb (88.5 kg)   17 201 lb 6.4 oz (91.4 kg)              We Performed the Following     Basic metabolic panel     Lipid panel reflex to direct LDL Fasting          Where to get your medicines      [facility] [address]  [address], [place] [address]    Hours:  ADELE CantorCeroraradha [facility]er Phone:  " [phone]     losartan-hydrochlorothiazide 50-12.5 MG per tablet    simvastatin 20 MG tablet          Primary Care Provider Office Phone # Fax #    Ester Jorge [phone] [address]  [facility] [address]        Equal Access to Services     EDWAR SAVAGE : Hadii aad ku hadasho Soomaali, waaxda luqadaha, qaybta kaalmada adeegyada, waxay latashain haykelbyellen la shikhacarolyn velasquez. So [facility] [phone].    ATENCIÓN: Si habla español, tiene a brito disposición servicios gratuitos de asistencia lingüística. Dayana al [phone].    We comply with applicable federal civil rights laws and [place] laws. We do not discriminate on the basis of race, color, national origin, age, disability, sex, sexual orientation, or gender identity.            Thank you!     Thank you for choosing [facility]  for your care. Our goal is always to provide you with excellent care. Hearing back from our patients is one way we can continue to improve our services. Please take a few minutes to complete the written survey that you may receive in the mail after your visit with us. Thank you!             Your Updated Medication List - Protect others around you: Learn how to safely use, store and throw away your medicines at www.disposemymeds.org.          This list is accurate as of 4/18/18  9:05 AM.  Always use your most recent med list.                   Brand Name Dispense Instructions for use Diagnosis    aspirin 81 MG tablet      Take 1 tablet by mouth daily        CALCIUM PO      1 DAILY        CENTRUM SILVER PO      1 TABLET DAILY        fish oil-omega-3 fatty acids 1000 MG capsule     30 capsule    Take 1 capsule (1 g) by mouth daily        ibuprofen 200 MG tablet    ADVIL/MOTRIN     Take 400 mg by mouth 2 times daily as needed for pain        losartan-hydrochlorothiazide 50-12.5 MG per tablet    HYZAAR    90 tablet    Take 1 tablet by mouth daily    Benign essential hypertension        melatonin 3 MG tablet      Take 1 tablet (3 mg) by mouth nightly as needed for sleep        simvastatin 20 MG tablet    ZOCOR    90 tablet    Take 1 tablet (20 mg) by mouth At Bedtime    Hyperlipidemia LDL goal <130       VITAMIN C PO      1 DAILY

## 2018-04-19 DIAGNOSIS — R19.8 CHANGE IN BOWEL MOVEMENT: ICD-10-CM

## 2018-04-19 PROCEDURE — 87209 SMEAR COMPLEX STAIN: CPT | Performed by: FAMILY MEDICINE

## 2018-04-19 PROCEDURE — 87506 IADNA-DNA/RNA PROBE TQ 6-11: CPT | Performed by: FAMILY MEDICINE

## 2018-04-19 PROCEDURE — 87177 OVA AND PARASITES SMEARS: CPT | Performed by: FAMILY MEDICINE

## 2018-04-20 LAB
C COLI+JEJUNI+LARI FUSA STL QL NAA+PROBE: NOT DETECTED
EC STX1 GENE STL QL NAA+PROBE: NOT DETECTED
EC STX2 GENE STL QL NAA+PROBE: NOT DETECTED
ENTERIC PATHOGEN COMMENT: NORMAL
NOROV GI+II ORF1-ORF2 JNC STL QL NAA+PR: NOT DETECTED
O+P STL MICRO: NORMAL
O+P STL MICRO: NORMAL
RVA NSP5 STL QL NAA+PROBE: NOT DETECTED
SALMONELLA SP RPOD STL QL NAA+PROBE: NOT DETECTED
SHIGELLA SP+EIEC IPAH STL QL NAA+PROBE: NOT DETECTED
SPECIMEN SOURCE: NORMAL
V CHOL+PARA RFBL+TRKH+TNAA STL QL NAA+PR: NOT DETECTED
Y ENTERO RECN STL QL NAA+PROBE: NOT DETECTED

## 2018-04-20 NOTE — PROGRESS NOTES
Please notify pt of normal/acceptable results.  No ova/parasites or typical bacteria/viruses that cause diarrhea.      Ester Jorge M.D.

## 2018-04-30 ENCOUNTER — OFFICE VISIT (OUTPATIENT)
Dept: DERMATOLOGY | Facility: CLINIC | Age: 78
End: 2018-04-30
Payer: COMMERCIAL

## 2018-04-30 VITALS — OXYGEN SATURATION: 94 % | DIASTOLIC BLOOD PRESSURE: 70 MMHG | SYSTOLIC BLOOD PRESSURE: 140 MMHG | HEART RATE: 72 BPM

## 2018-04-30 DIAGNOSIS — L81.4 LENTIGO: ICD-10-CM

## 2018-04-30 DIAGNOSIS — C44.712 BASAL CELL CARCINOMA OF RIGHT THIGH: ICD-10-CM

## 2018-04-30 DIAGNOSIS — L82.1 SK (SEBORRHEIC KERATOSIS): Primary | ICD-10-CM

## 2018-04-30 PROCEDURE — 99213 OFFICE O/P EST LOW 20 MIN: CPT | Performed by: DERMATOLOGY

## 2018-04-30 NOTE — MR AVS SNAPSHOT
"              After Visit Summary   2018    Precious Paris    MRN: [id number]           Patient Information     Date Of Birth          1940        Visit Information        Provider Department      2018 8:00 AM Danielito Anthony MD [facility]        Today's Diagnoses     SK (seborrheic keratosis)    -  1    Lentigo        Basal cell carcinoma of right thigh           Follow-ups after your visit        Who to contact     If you have questions or need follow up information about today's clinic visit or your schedule please contact [facility] directly at [phone].  Normal or non-critical lab and imaging results will be communicated to you by Over[employer].fmhart, letter or phone within 4 business days after the clinic has received the results. If you do not hear from us within 7 days, please contact the clinic through [employer] or phone. If you have a critical or abnormal lab result, we will notify you by phone as soon as possible.  Submit refill requests through [employer] or call your pharmacy and they will forward the refill request to us. Please allow 3 business days for your refill to be completed.          Additional Information About Your Visit        MyChart Information     [employer] lets you send messages to your doctor, view your test results, renew your prescriptions, schedule appointments and more. To sign up, go to [email or web address] . Click on \"Log in\" on the left side of the screen, which will take you to the Welcome page. Then click on \"Sign up Now\" on the right side of the page.     You will be asked to enter the access code listed below, as well as some personal information. Please follow the directions to create your username and password.     Your access code is: [email or web address]  Expires: 2018  9:05 AM     Your access code will  in 90 days. If you need help or a new code, please call your [facility] or [phone].        Care EveryWhere ID     " This is your Care EveryWhere ID. This could be used by other organizations to access your [facility] medical records  [id number]        Your Vitals Were     Pulse Pulse Oximetry                72 94%           Blood Pressure from Last 3 Encounters:   04/30/18 140/70   04/18/18 132/72   12/19/17 148/76    Weight from Last 3 Encounters:   04/18/18 88.9 kg (196 lb)   12/07/17 88.5 kg (195 lb)   11/27/17 91.4 kg (201 lb 6.4 oz)              Today, you had the following     No orders found for display       Primary Care Provider Office Phone # Fax #    Ester Jorge [phone] [address] [address]        Equal Access to Services     EDWAR SAVAGE : Hadii aad ku hadasho Soyojanaali, waaxda luqadaha, qaybta kaalmada adeegyada, jarvis marrufo . So [facility] [phone].    ATENCIÓN: Si habla español, tiene a brito disposición servicios gratuitos de asistencia lingüística. [facility] [phone].    We comply with applicable federal civil rights laws and [place] laws. We do not discriminate on the basis of race, color, national origin, age, disability, sex, sexual orientation, or gender identity.            Thank you!     Thank you for choosing [facility]  for your care. Our goal is always to provide you with excellent care. Hearing back from our patients is one way we can continue to improve our services. Please take a few minutes to complete the written survey that you may receive in the mail after your visit with us. Thank you!             Your Updated Medication List - Protect others around you: Learn how to safely use, store and throw away your medicines at www.disposemymeds.org.          This list is accurate as of 4/30/18  8:43 AM.  Always use your most recent med list.                   Brand Name Dispense Instructions for use Diagnosis    aspirin 81 MG tablet      Take 1 tablet by mouth daily        CALCIUM PO      1 DAILY        CENTRUM  SILVER PO      1 TABLET DAILY        fish oil-omega-3 fatty acids 1000 MG capsule     30 capsule    Take 1 capsule (1 g) by mouth daily        ibuprofen 200 MG tablet    ADVIL/MOTRIN     Take 400 mg by mouth 2 times daily as needed for pain        losartan-hydrochlorothiazide 50-12.5 MG per tablet    HYZAAR    90 tablet    Take 1 tablet by mouth daily    Benign essential hypertension       melatonin 3 MG tablet      Take 1 tablet (3 mg) by mouth nightly as needed for sleep        simvastatin 20 MG tablet    ZOCOR    90 tablet    Take 1 tablet (20 mg) by mouth At Bedtime    Hyperlipidemia LDL goal <130       VITAMIN C PO      1 DAILY

## 2018-04-30 NOTE — NURSING NOTE
"Initial /70 (BP Location: Left arm, Cuff Size: Adult Large)  Pulse 72  SpO2 94% Estimated body mass index is 32.62 kg/(m^2) as calculated from the following:    Height as of 4/18/18: 1.651 m (5' 5\").    Weight as of 4/18/18: 88.9 kg (196 lb). .      "

## 2018-04-30 NOTE — PROGRESS NOTES
Precious Paris is a 77 year old year old female patient here today for basal cell carcinoma onright thigh bx in Dec.  She does not want to treat today.  She notes spot on breast and back.   .  Patient states this has been present for a while.  Patient reports the following symptoms:  none .  Patient reports the following previous treatments none.  Patient reports the following modifying factors none.  Associated symptoms: none.  Patient has no other skin complaints today.  Remainder of the HPI, Meds, PMH, Allergies, FH, and SH was reviewed in chart.      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     anosmia     Diverticulosis of colon (without mention of hemorrhage)      Other and unspecified hyperlipidemia      Pain in joint, lower leg      Plantar fascial fibromatosis      PURE HYPERCHOLESTEROLEM 9/9/2007     PURE HYPERCHOLESTEROLEM 9/9/2007     Squamous cell carcinoma        Past Surgical History:   Procedure Laterality Date     BIOPSY       HC COLONOSCOPY THRU STOMA, DIAGNOSTIC  04/05    diverticulosis and hemorroids, due 2015     JOINT REPLACEMTN, KNEE RT/LT  2010    right     JOINT REPLACEMTN, KNEE RT/LT  2011    left     LIGATE VEIN VARICOSE, PHLEBECTOMY MULTIPLE STAB, COMBINED  10/17/2013    Procedure: COMBINED LIGATE VEIN VARICOSE, PHLEBECTOMY MULTIPLE STAB;  Phlebectomy of Right Knee Varicose Veins;  Surgeon: Andrea Duffy MD;  Location: [facility] OR     OSTEOTOMY FOOT  8/19/2013    Procedure: OSTEOTOMY FOOT;  Left 2nd metatarsal osteotomy, 2nd Metatarsophalangeal joint & 2nd toe correction;  Surgeon: Jose Phillips DPM;  Location: [facility] OR     PHACOEMULSIFICATION WITH STANDARD INTRAOCULAR LENS IMPLANT Left 12/7/2017    Procedure: PHACOEMULSIFICATION WITH  STANDARD INTRAOCULAR LENS IMPLANT;  Left Cataract Removal with Implant;  Surgeon: Mata Deleon MD;  Location: [facility] OR     SURGICAL HISTORY OF -   1979    Stripping of Veins in Left Leg     SURGICAL HISTORY OF -   1970    Bilateral Tubal Ligation     SURGICAL HISTORY OF -   2000    Carpal Tunnel Repair, right     SURGICAL HISTORY OF -   9-12-08    Rt let ablation        Family History   Problem Relation Age of Onset     Hypertension Mother      CEREBROVASCULAR DISEASE Mother      Hypertension Father      CEREBROVASCULAR DISEASE Father      Hypertension Brother      CANCER Brother      lymphoma     Hypertension Sister      GASTROINTESTINAL DISEASE Sister      Hypertension Brother      CANCER Brother      melanoma     Eye Disorder Brother      Hypertension Brother      Hypertension Brother        Social History     Social History     Marital status:      Spouse name: N/A     Number of children: N/A     Years of education: N/A     Occupational History     Not on file.     Social History Main Topics     Smoking status: Former Smoker     Smokeless tobacco: Never Used      Comment: quit 20 years ago     Alcohol use Yes      Comment: occ.     Drug use: No     Sexual activity: Yes     Partners: Male     Other Topics Concern     Parent/Sibling W/ Cabg, Mi Or Angioplasty Before 65f 55m? No     Social History Narrative       Outpatient Encounter Prescriptions as of 4/30/2018   Medication Sig Dispense Refill     ASPIRIN 81 MG OR TABS Take 1 tablet by mouth daily       CALCIUM OR 1 DAILY       CENTRUM SILVER OR 1 TABLET DAILY       fish oil-omega-3 fatty acids (OMEGA 3) 1000 MG capsule Take 1 capsule (1 g) by mouth daily 30 capsule 1     IBUPROFEN 200 MG OR TABS Take 400 mg by mouth 2 times daily as needed for pain       losartan-hydrochlorothiazide (HYZAAR) 50-12.5 MG per tablet Take 1 tablet by mouth daily 90 tablet 3     melatonin 3 MG tablet Take 1 tablet (3 mg) by mouth nightly as needed for sleep        simvastatin (ZOCOR) 20 MG tablet Take 1 tablet (20 mg) by mouth At Bedtime 90 tablet 3     VITAMIN C OR 1 DAILY       No facility-administered encounter medications on file as of 4/30/2018.              Review Of Systems  Skin: As above  Eyes: negative  Ears/Nose/Throat: negative  Respiratory: No shortness of breath, dyspnea on exertion, cough, or hemoptysis  Cardiovascular: negative  Gastrointestinal: negative  Genitourinary: negative  Musculoskeletal: negative  Neurologic: negative  Psychiatric: negative  Hematologic/Lymphatic/Immunologic: negative  Endocrine: negative      O:   NAD, WDWN, Alert & Oriented, Mood & Affect wnl, Vitals stable   Here today alone   /70 (BP Location: Left arm, Cuff Size: Adult Large)  Pulse 72  SpO2 94%   General appearance normal   Vitals stable   Alert, oriented and in no acute distress      Following lymph nodes palpated: Occipital, Cervical, Supraclavicular no lad   r thigh red macule   Stuck on papules and brown macules on trunk and ext    r shoulder and r inferior breast stuck on papules        The remainder of expanded problem focused exam was unremarkable; the following areas were examined:  scalp/hair, conjunctiva/lids, face, neck, lips, chest, digits/nails, RUE, LUE.      Eyes: Conjunctivae/lids:Normal     ENT: Lips, buccal mucosa, tongue: normal    MSK:Normal    Cardiovascular: peripheral edema none    Pulm: Breathing Normal    Lymph Nodes: No Head and Neck Lymphadenopathy     Neuro/Psych: Orientation:Normal; Mood/Affect:Normal      A/P:  1. Basal cell carcinoma thigh   Observation discussed with patient  2. Seborrheic keratosis, lentigo  BENIGN LESIONS DISCUSSED WITH PATIENT:  I discussed the specifics of tumor, prognosis, and genetics of benign lesions.  I explained that treatment of these lesions would be purely cosmetic and not medically neccessary.  I discussed with patient different removal options including excision, cautery and /or laser.      Nature and  genetics of benign skin lesions dicussed with patient.  Signs and Symptoms of skin cancer discussed with patient.  ABCDEs of melanoma reviewed with patient.  Patient encouraged to perform monthly skin exams.  UV precautions reviewed with patient.  Patient to follow up with Primary Care provider regarding elevated blood pressure.  Skin care regimen reviewed with patient: Eliminate harsh soaps, i.e. Dial, zest, irsih spring; Mild soaps such as Cetaphil or Dove sensitive skin, avoid hot or cold showers, aggressive use of emollients including vanicream, cetaphil or cerave discussed with patient.    Risks of non-melanoma skin cancer discussed with patient   Return to clinic 6 months

## 2018-04-30 NOTE — NURSING NOTE
Surgical Office Location :   [facility]  [address] [place] [address]

## 2018-04-30 NOTE — LETTER
4/30/2018         RE: Precious Paris  [address] [place] [address]        Dear Colleague,    Thank you for referring your patient, Precious Paris, to the [facility]. Please see a copy of my visit note below.    Precious Paris is a 77 year old year old female patient here today for basal cell carcinoma onright thigh bx in Dec.  She does not want to treat today.  She notes spot on breast and back.   .  Patient states this has been present for a while.  Patient reports the following symptoms:  none .  Patient reports the following previous treatments none.  Patient reports the following modifying factors none.  Associated symptoms: none.  Patient has no other skin complaints today.  Remainder of the HPI, Meds, PMH, Allergies, FH, and SH was reviewed in chart.      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     anosmia     Diverticulosis of colon (without mention of hemorrhage)      Other and unspecified hyperlipidemia      Pain in joint, lower leg      Plantar fascial fibromatosis      PURE HYPERCHOLESTEROLEM 9/9/2007     PURE HYPERCHOLESTEROLEM 9/9/2007     Squamous cell carcinoma        Past Surgical History:   Procedure Laterality Date     BIOPSY       HC COLONOSCOPY THRU STOMA, DIAGNOSTIC  04/05    diverticulosis and hemorroids, due 2015     JOINT REPLACEMTN, KNEE RT/LT  2010    right     JOINT REPLACEMTN, KNEE RT/LT  2011    left     LIGATE VEIN VARICOSE, PHLEBECTOMY MULTIPLE STAB, COMBINED  10/17/2013    Procedure: COMBINED LIGATE VEIN VARICOSE, PHLEBECTOMY MULTIPLE STAB;  Phlebectomy of Right Knee Varicose Veins;  Surgeon: Andrea Duffy MD;  Location: [facility]     OSTEOTOMY FOOT  8/19/2013    Procedure: OSTEOTOMY FOOT;  Left 2nd  metatarsal osteotomy, 2nd Metatarsophalangeal joint & 2nd toe correction;  Surgeon: Jose Phillips DPM;  Location: [facility]     PHACOEMULSIFICATION WITH STANDARD INTRAOCULAR LENS IMPLANT Left 12/7/2017    Procedure: PHACOEMULSIFICATION WITH STANDARD INTRAOCULAR LENS IMPLANT;  Left Cataract Removal with Implant;  Surgeon: Mata Deleon MD;  Location: [facility]     SURGICAL HISTORY OF -   1979    Stripping of Veins in Left Leg     SURGICAL HISTORY OF -   1970    Bilateral Tubal Ligation     SURGICAL HISTORY OF -   2000    Carpal Tunnel Repair, right     SURGICAL HISTORY OF -   9-12-08    Rt let ablation        Family History   Problem Relation Age of Onset     Hypertension Mother      CEREBROVASCULAR DISEASE Mother      Hypertension Father      CEREBROVASCULAR DISEASE Father      Hypertension Brother      CANCER Brother      lymphoma     Hypertension Sister      GASTROINTESTINAL DISEASE Sister      Hypertension Brother      CANCER Brother      melanoma     Eye Disorder Brother      Hypertension Brother      Hypertension Brother        Social History     Social History     Marital status:      Spouse name: N/A     Number of children: N/A     Years of education: N/A     Occupational History     Not on file.     Social History Main Topics     Smoking status: Former Smoker     Smokeless tobacco: Never Used      Comment: quit 20 years ago     Alcohol use Yes      Comment: occ.     Drug use: No     Sexual activity: Yes     Partners: Male     Other Topics Concern     Parent/Sibling W/ Cabg, Mi Or Angioplasty Before 65f 55m? No     Social History Narrative       Outpatient Encounter Prescriptions as of 4/30/2018   Medication Sig Dispense Refill     ASPIRIN 81 MG OR TABS Take 1 tablet by mouth daily       CALCIUM OR 1 DAILY       CENTRUM SILVER OR 1 TABLET DAILY       fish oil-omega-3 fatty acids (OMEGA 3) 1000 MG capsule Take 1 capsule (1 g) by mouth daily 30 capsule 1     IBUPROFEN 200 MG OR TABS Take 400 mg by  mouth 2 times daily as needed for pain       losartan-hydrochlorothiazide (HYZAAR) 50-12.5 MG per tablet Take 1 tablet by mouth daily 90 tablet 3     melatonin 3 MG tablet Take 1 tablet (3 mg) by mouth nightly as needed for sleep       simvastatin (ZOCOR) 20 MG tablet Take 1 tablet (20 mg) by mouth At Bedtime 90 tablet 3     VITAMIN C OR 1 DAILY       No facility-administered encounter medications on file as of 4/30/2018.              Review Of Systems  Skin: As above  Eyes: negative  Ears/Nose/Throat: negative  Respiratory: No shortness of breath, dyspnea on exertion, cough, or hemoptysis  Cardiovascular: negative  Gastrointestinal: negative  Genitourinary: negative  Musculoskeletal: negative  Neurologic: negative  Psychiatric: negative  Hematologic/Lymphatic/Immunologic: negative  Endocrine: negative      O:   NAD, WDWN, Alert & Oriented, Mood & Affect wnl, Vitals stable   Here today alone   /70 (BP Location: Left arm, Cuff Size: Adult Large)  Pulse 72  SpO2 94%   General appearance normal   Vitals stable   Alert, oriented and in no acute distress      Following lymph nodes palpated: Occipital, Cervical, Supraclavicular no lad   r thigh red macule   Stuck on papules and brown macules on trunk and ext    r shoulder and r inferior breast stuck on papules        The remainder of expanded problem focused exam was unremarkable; the following areas were examined:  scalp/hair, conjunctiva/lids, face, neck, lips, chest, digits/nails, RUE, LUE.      Eyes: Conjunctivae/lids:Normal     ENT: Lips, buccal mucosa, tongue: normal    MSK:Normal    Cardiovascular: peripheral edema none    Pulm: Breathing Normal    Lymph Nodes: No Head and Neck Lymphadenopathy     Neuro/Psych: Orientation:Normal; Mood/Affect:Normal      A/P:  1. Basal cell carcinoma thigh   Observation discussed with patient  2. Seborrheic keratosis, lentigo  BENIGN LESIONS DISCUSSED WITH PATIENT:  I discussed the specifics of tumor, prognosis, and genetics  of benign lesions.  I explained that treatment of these lesions would be purely cosmetic and not medically neccessary.  I discussed with patient different removal options including excision, cautery and /or laser.      Nature and genetics of benign skin lesions dicussed with patient.  Signs and Symptoms of skin cancer discussed with patient.  ABCDEs of melanoma reviewed with patient.  Patient encouraged to perform monthly skin exams.  UV precautions reviewed with patient.  Patient to follow up with Primary Care provider regarding elevated blood pressure.  Skin care regimen reviewed with patient: Eliminate harsh soaps, i.e. Dial, zest, irsih spring; Mild soaps such as Cetaphil or Dove sensitive skin, avoid hot or cold showers, aggressive use of emollients including vanicream, cetaphil or cerave discussed with patient.    Risks of non-melanoma skin cancer discussed with patient   Return to clinic 6 months      Again, thank you for allowing me to participate in the care of your patient.        Sincerely,        Danielito Anthony MD

## 2018-09-17 ENCOUNTER — TRANSFERRED RECORDS (OUTPATIENT)
Dept: HEALTH INFORMATION MANAGEMENT | Facility: CLINIC | Age: 78
End: 2018-09-17

## 2018-10-17 ENCOUNTER — OFFICE VISIT (OUTPATIENT)
Dept: FAMILY MEDICINE | Facility: CLINIC | Age: 78
End: 2018-10-17
Payer: COMMERCIAL

## 2018-10-17 ENCOUNTER — TELEPHONE (OUTPATIENT)
Dept: FAMILY MEDICINE | Facility: CLINIC | Age: 78
End: 2018-10-17

## 2018-10-17 VITALS
RESPIRATION RATE: 18 BRPM | HEIGHT: 65 IN | HEART RATE: 91 BPM | BODY MASS INDEX: 32.99 KG/M2 | WEIGHT: 198 LBS | OXYGEN SATURATION: 93 % | DIASTOLIC BLOOD PRESSURE: 72 MMHG | SYSTOLIC BLOOD PRESSURE: 118 MMHG | TEMPERATURE: 97.1 F

## 2018-10-17 DIAGNOSIS — Z00.00 MEDICARE ANNUAL WELLNESS VISIT, SUBSEQUENT: Primary | ICD-10-CM

## 2018-10-17 DIAGNOSIS — Z23 NEED FOR PROPHYLACTIC VACCINATION AND INOCULATION AGAINST INFLUENZA: ICD-10-CM

## 2018-10-17 DIAGNOSIS — M19.041 PRIMARY OSTEOARTHRITIS OF BOTH HANDS: ICD-10-CM

## 2018-10-17 DIAGNOSIS — M19.042 PRIMARY OSTEOARTHRITIS OF BOTH HANDS: ICD-10-CM

## 2018-10-17 PROCEDURE — 99397 PER PM REEVAL EST PAT 65+ YR: CPT | Mod: 25 | Performed by: FAMILY MEDICINE

## 2018-10-17 PROCEDURE — 90662 IIV NO PRSV INCREASED AG IM: CPT | Performed by: FAMILY MEDICINE

## 2018-10-17 PROCEDURE — G0008 ADMIN INFLUENZA VIRUS VAC: HCPCS | Performed by: FAMILY MEDICINE

## 2018-10-17 ASSESSMENT — PAIN SCALES - GENERAL: PAINLEVEL: NO PAIN (0)

## 2018-10-17 NOTE — MR AVS SNAPSHOT
After Visit Summary   10/17/2018    Precious Paris    MRN: [id number]           Patient Information     Date Of Birth          1940        Visit Information        Provider Department      10/17/2018 8:20 AM Ester Jorge MD [facility]        Today's Diagnoses     Medicare annual wellness visit, subsequent    -  1    Need for prophylactic vaccination and inoculation against influenza        Primary osteoarthritis of both hands          Care Instructions      Diclofenac gel for your hands- use as directed    Extra strength tylenol (acetaminophen) 1000 mg twice daily - schedule this for the arthritis pain    Make sure you're taking the ibuprofen with food.     Thank you for choosing [facility].  You may be receiving a survey in the mail from [employer] regarding your visit today.  Please take a few minutes to complete and return the survey to let us know how we are doing.      Our Clinic hours are:  Mondays    7:20 am - 7 pm  Tues - Fri  7:20 am - 5 pm    Clinic Phone: [phone]    The clinic lab opens at 7:30 am Mon - Fri and appointments are required.    [facility] [place]  Ph. [phone]  Monday  8 am - 7pm  Tues - Fri 8 am - 5:30 pm           Preventive Health Recommendations    Female Ages 65 +    Yearly exam:     See your health care provider every year in order to  o Review health changes.   o Discuss preventive care.    o Review your medicines if your doctor has prescribed any.      You no longer need a yearly Pap test unless you've had an abnormal Pap test in the past 10 years. If you have vaginal symptoms, such as bleeding or discharge, be sure to talk with your provider about a Pap test.      Every 1 to 2 years, have a mammogram.  If you are over 69, talk with your health care provider about whether or not you want to continue having screening mammograms.      Every 10 years, have a colonoscopy. Or, have a yearly FIT test (stool test).  These exams will check for colon cancer.       Have a cholesterol test every 5 years, or more often if your doctor advises it.       Have a diabetes test (fasting glucose) every three years. If you are at risk for diabetes, you should have this test more often.       At age 65, have a bone density scan (DEXA) to check for osteoporosis (brittle bone disease).    Shots:    Get a flu shot each year.    Get a tetanus shot every 10 years.    Talk to your doctor about your pneumonia vaccines. There are now two you should receive - Pneumovax (PPSV 23) and Prevnar (PCV 13).    Talk to your pharmacist about the shingles vaccine.    Talk to your doctor about the hepatitis B vaccine.    Nutrition:     Eat at least 5 servings of fruits and vegetables each day.      Eat whole-grain bread, whole-wheat pasta and brown rice instead of white grains and rice.      Get adequate Calcium and Vitamin D.     Lifestyle    Exercise at least 150 minutes a week (30 minutes a day, 5 days a week). This will help you control your weight and prevent disease.      Limit alcohol to one drink per day.      No smoking.       Wear sunscreen to prevent skin cancer.       See your dentist twice a year for an exam and cleaning.      See your eye doctor every 1 to 2 years to screen for conditions such as glaucoma, macular degeneration and cataracts.          Follow-ups after your visit        Follow-up notes from your care team     Return in about 6 months (around 4/17/2019) for BP Recheck.      Your next 10 appointments already scheduled     Oct 18, 2018 12:45 PM CDT   MA SCREENING BILATERAL W/ FIDEL with [facility]   [facility] ([facility])    [address] [place] [address]   [phone]           How do I prepare for my exam? (Food and drink instructions) No Food and Drink Restrictions.  How do I prepare for my exam? (Other instructions) Do not use any powder, lotion or deodorant under your arms or on your breast.  "If you do, we will ask you to remove it before your exam.  What should I wear: Wear comfortable, two-piece clothing.  How long does the exam take: Most scans will take 15 minutes.  What should I bring: Bring any previous mammograms from other facilities or have them mailed to the breast center.  Do I need a :  No  is needed.  What do I need to tell my doctor: If you have any allergies, tell your care team.  What should I do after the exam: No restrictions, You may resume normal activities.  What is this test: This test is an x-ray of the breast to look for breast disease. The breast is pressed between two plates to flatten and spread the tissue. An X-ray is taken of the breast from different angles.  Who should I call with questions: If you have any questions, please call the Imaging Department where you will have your exam. Directions, parking instructions, and other information is available on our website, [email or web address].  Other information about my exam Three-dimensional (3D) mammograms are available at [facility] locations in [facility], [place], and [place]. [facility] locations include [place] and the [facility] [place] in [place].  Benefits of 3D mammograms include: * Improved rate of cancer detection * Decreases your chance of having to go back for more tests, which means fewer: * \"False-positive\" results (This means that there is an abnormal area but it isn't cancer.) * Invasive testing procedures, such as a biopsy or surgery * Can provide clearer images of the breast if you have dense breast tissue.  *3D mammography is an optional exam that anyone can have with a 2D mammogram. It doesn't replace or take the place of a 2D mammogram. 2D mammograms remain an effective screening test for all women.  Not all insurance companies cover the cost of a 3D mammogram. Check with your insurance.              Who to contact     If you have " "questions or need follow up information about today's clinic visit or your schedule please contact [facility] directly at [phone].  Normal or non-critical lab and imaging results will be communicated to you by MyChart, letter or phone within 4 business days after the clinic has received the results. If you do not hear from us within 7 days, please contact the clinic through MyChart or phone. If you have a critical or abnormal lab result, we will notify you by phone as soon as possible.  Submit refill requests through [employer] or call your pharmacy and they will forward the refill request to us. Please allow 3 business days for your refill to be completed.          Additional Information About Your Visit        Care EveryWhere ID     This is your Care EveryWhere ID. This could be used by other organizations to access your [facility] medical records  [id number]        Your Vitals Were     Pulse Temperature Respirations Height Pulse Oximetry Breastfeeding?    91 97.1  F (36.2  C) (Tympanic) 18 5' 5\" (1.651 m) 93% No    BMI (Body Mass Index)                   32.95 kg/m2            Blood Pressure from Last 3 Encounters:   10/17/18 118/72   04/30/18 140/70   04/18/18 132/72    Weight from Last 3 Encounters:   10/17/18 198 lb (89.8 kg)   04/18/18 196 lb (88.9 kg)   12/07/17 195 lb (88.5 kg)              We Performed the Following     FLU VACCINE, INCREASED ANTIGEN, PRESV FREE, AGE 65+ [96115]     Vaccine Administration, Initial [08572]          Today's Medication Changes          These changes are accurate as of 10/17/18  8:40 AM.  If you have any questions, ask your nurse or doctor.               Start taking these medicines.        Dose/Directions    diclofenac 1 % Gel topical gel   Commonly known as:  VOLTAREN   Used for:  Primary osteoarthritis of both hands   Started by:  Ester Jorge MD        Apply 2 grams to hands four times daily using enclosed dosing card.   Quantity:  100 g   Refills: "  1            Where to get your medicines      These medications were sent to [facility] - - [place] - [id number]  [id number], [place] [address]    Hours:  [facility] Phone:  [phone]     diclofenac 1 % Gel topical gel                Primary Care Provider Office Phone # Fax #    Ester Jorge [phone] [address]  [facility] [address]        Equal Access to Services     [facility] : Hadii aad ku hadasho Soomaali, waaxda luqadaha, qaybta kaalmada adeegyada, waxay idiin hayaan adeeg kharash laguillermina . So [facility] [phone].    ATENCIÓN: Si habla español, tiene a brito disposición servicios gratuitos de asistencia lingüística. [facility] [phone].    We comply with applicable federal civil rights laws and [place] laws. We do not discriminate on the basis of race, color, national origin, age, disability, sex, sexual orientation, or gender identity.            Thank you!     Thank you for choosing [facility]  for your care. Our goal is always to provide you with excellent care. Hearing back from our patients is one way we can continue to improve our services. Please take a few minutes to complete the written survey that you may receive in the mail after your visit with us. Thank you!             Your Updated Medication List - Protect others around you: Learn how to safely use, store and throw away your medicines at www.disposemymeds.org.          This list is accurate as of 10/17/18  8:40 AM.  Always use your most recent med list.                   Brand Name Dispense Instructions for use Diagnosis    aspirin 81 MG tablet      Take 1 tablet by mouth daily        CALCIUM PO      1 DAILY        CENTRUM SILVER PO      1 TABLET DAILY        diclofenac 1 % Gel topical gel    VOLTAREN    100 g    Apply 2 grams to hands four times daily using enclosed dosing card.    Primary osteoarthritis of both  hands       fish oil-omega-3 fatty acids 1000 MG capsule     30 capsule    Take 1 capsule (1 g) by mouth daily        ibuprofen 200 MG tablet    ADVIL/MOTRIN     Take 400 mg by mouth 2 times daily as needed for pain        losartan-hydrochlorothiazide 50-12.5 MG per tablet    HYZAAR    90 tablet    Take 1 tablet by mouth daily    Benign essential hypertension       melatonin 3 MG tablet      Take 1 tablet (3 mg) by mouth nightly as needed for sleep        simvastatin 20 MG tablet    ZOCOR    90 tablet    Take 1 tablet (20 mg) by mouth At Bedtime    Hyperlipidemia LDL goal <130       VITAMIN C PO      1 DAILY

## 2018-10-17 NOTE — TELEPHONE ENCOUNTER
No alternative that I'm aware of.  She will need to pay out of pocket if she would like to try it.    Ester Jorge M.D.

## 2018-10-17 NOTE — PROGRESS NOTES
SUBJECTIVE:   Precious Paris is a 77 year old female who presents for Preventive Visit.      Are you in the first 12 months of your Medicare Part B coverage?  No    Healthy Habits:    Do you get at least three servings of calcium containing foods daily (dairy, green leafy vegetables, etc.)? yes    Amount of exercise or daily activities, outside of work: 3 day(s) per week    Problems taking medications regularly No    Medication side effects: No    Have you had an eye exam in the past two years? yes    Do you see a dentist twice per year? yes    Do you have sleep apnea, excessive snoring or daytime drowsiness?no      Ability to successfully perform activities of daily living: Yes, no assistance needed    Home safety:  none identified     Hearing impairment: No    Fall risk:  Fallen 2 or more times in the past year?: No  Any fall with injury in the past year?: No        COGNITIVE SCREEN  1) Repeat 3 items (Leader, Season, Table)    2) Clock draw: NORMAL  3) 3 item recall: Recalls 3 object   Results: NORMAL clock,  3 items recalled: COGNITIVE IMPAIRMENT LESS LIKELY    Mini-CogTM Copyright S Alivia. Licensed by the author for use in [facility]; reprinted with permission (jacinda@[facility]). All rights reserved.      Son  of Sudden Death Syndrome in May.      Reviewed and updated as needed this visit by clinical staff  Tobacco  Allergies  Meds  Med Hx  Surg Hx  Fam Hx  Soc Hx        Reviewed and updated as needed this visit by Provider        Social History   Substance Use Topics     Smoking status: Former Smoker     Smokeless tobacco: Never Used      Comment: quit 20 years ago     Alcohol use Yes      Comment: occ.       If you drink alcohol do you typically have >3 drinks per day or >7 drinks per week? No                        Today's PHQ-2 Score:   PHQ-2 (  [employer]) 10/17/2018 2018   Q1: Little interest or pleasure in doing things 0 0   Q2: Feeling down, depressed or hopeless 0 0  "  PHQ-2 Score 0 0       Do you feel safe in your environment - Yes    Do you have a Health Care Directive?: Yes: Advance Directive has been received and scanned.    Current providers sharing in care for this patient include:   Patient Care Team:  Ester Jorge MD as PCP - General (Family Practice)    The following health maintenance items are reviewed in Epic and correct as of today:  Health Maintenance   Topic Date Due     FALL RISK ASSESSMENT  06/28/2018     INFLUENZA VACCINE (1) 09/01/2018     ADVANCE DIRECTIVE PLANNING Q5 YRS  10/07/2018     PHQ-2 Q1 YR  04/18/2019     TETANUS IMMUNIZATION (SYSTEM ASSIGNED)  05/20/2020     LIPID SCREEN Q5 YR FEMALE (SYSTEM ASSIGNED)  04/18/2023     DEXA SCAN SCREENING (SYSTEM ASSIGNED)  Completed     PNEUMOCOCCAL  Completed     Labs reviewed in EPIC  BP Readings from Last 3 Encounters:   10/17/18 118/72   04/30/18 140/70   04/18/18 132/72    Wt Readings from Last 3 Encounters:   10/17/18 198 lb (89.8 kg)   04/18/18 196 lb (88.9 kg)   12/07/17 195 lb (88.5 kg)                    Pneumonia Vaccine: utd    ROS:  Constitutional, HEENT, cardiovascular, pulmonary, gi and gu systems are negative, except as otherwise noted.    Feels more achy than she used to.   Taking more ibuprofen - 4-6/day        OBJECTIVE:   /72  Pulse 91  Temp 97.1  F (36.2  C) (Tympanic)  Resp 18  Ht 5' 5\" (1.651 m)  Wt 198 lb (89.8 kg)  SpO2 93%  Breastfeeding? No  BMI 32.95 kg/m2 Estimated body mass index is 32.95 kg/(m^2) as calculated from the following:    Height as of this encounter: 5' 5\" (1.651 m).    Weight as of this encounter: 198 lb (89.8 kg).  EXAM:   GENERAL: healthy, alert and no distress  EYES: Eyes grossly normal to inspection, PERRL and conjunctivae and sclerae normal  HENT: ear canals and TM's normal, nose and mouth without ulcers or lesions  NECK: no adenopathy, no asymmetry, masses, or scars and thyroid normal to palpation  RESP: lungs clear to auscultation - no rales, " "rhonchi or wheezes  CV: regular rate and rhythm, normal S1 S2, no S3 or S4, no murmur, click or rub, no peripheral edema and peripheral pulses strong  ABDOMEN: soft, nontender, no hepatosplenomegaly, no masses and bowel sounds normal  MS: no gross musculoskeletal defects noted, no edema  SKIN: no suspicious lesions or rashes  NEURO: Normal strength and tone, mentation intact and speech normal  PSYCH: mentation appears normal, affect normal/bright    Diagnostic Test Results:  none     ASSESSMENT / PLAN:   1. Medicare annual wellness visit, subsequent       2. Need for prophylactic vaccination and inoculation against influenza     - FLU VACCINE, INCREASED ANTIGEN, PRESV FREE, AGE 65+ [35563]  - Vaccine Administration, Initial [77707]    3. Primary osteoarthritis of both hands     - diclofenac (VOLTAREN) 1 % GEL topical gel; Apply 2 grams to hands four times daily using enclosed dosing card.  Dispense: 100 g; Refill: 1    End of Life Planning:  Patient currently has an advanced directive: No.  I have verified the patient's ablity to prepare an advanced directive/make health care decisions.  Literature was provided to assist patient in preparing an advanced directive.    COUNSELING:  Reviewed preventive health counseling, as reflected in patient instructions       Regular exercise       Healthy diet/nutrition    BP Readings from Last 1 Encounters:   10/17/18 118/72     Estimated body mass index is 32.95 kg/(m^2) as calculated from the following:    Height as of this encounter: 5' 5\" (1.651 m).    Weight as of this encounter: 198 lb (89.8 kg).      Weight management plan: Discussed healthy diet and exercise guidelines and patient will follow up in 12 months in clinic to re-evaluate.     reports that she has quit smoking. She has never used smokeless tobacco.      Appropriate preventive services were discussed with this patient, including applicable screening as appropriate for cardiovascular disease, diabetes, " osteopenia/osteoporosis, and glaucoma.  As appropriate for age/gender, discussed screening for colorectal cancer, prostate cancer, breast cancer, and cervical cancer. Checklist reviewing preventive services available has been given to the patient.    Reviewed patients plan of care and provided an AVS. The Basic Care Plan (routine screening as documented in Health Maintenance) for Precious meets the Care Plan requirement. This Care Plan has been established and reviewed with the Patient.    Counseling Resources:  ATP IV Guidelines  Pooled Cohorts Equation Calculator  Breast Cancer Risk Calculator  FRAX Risk Assessment  ICSI Preventive Guidelines  Dietary Guidelines for Americans, 2010  [facility]'s MyPlate  ASA Prophylaxis  Lung CA Screening    Ester Jorge MD  [facility] [place]    Injectable Influenza Immunization Documentation    1.  Is the person to be vaccinated sick today?   No    2. Does the person to be vaccinated have an allergy to a component   of the vaccine?   No  Egg Allergy Algorithm Link    3. Has the person to be vaccinated ever had a serious reaction   to influenza vaccine in the past?   No    4. Has the person to be vaccinated ever had Guillain-Barré syndrome?   No    Form completed by Therese Mcdaniel MA

## 2018-10-17 NOTE — PATIENT INSTRUCTIONS
Diclofenac gel for your hands- use as directed    Extra strength tylenol (acetaminophen) 1000 mg twice daily - schedule this for the arthritis pain    Make sure you're taking the ibuprofen with food.     Thank you for choosing [facility].  You may be receiving a survey in the mail from Stephania Serrano regarding your visit today.  Please take a few minutes to complete and return the survey to let us know how we are doing.      Our Clinic hours are:  Mondays    7:20 am - 7 pm  Tues - Fri  7:20 am - 5 pm    Clinic Phone: [phone]    The clinic lab opens at 7:30 am Mon - Fri and appointments are required.    [facility]. [phone]  Monday  8 am - 7pm  Tues - Fri 8 am - 5:30 pm           Preventive Health Recommendations    Female Ages 65 +    Yearly exam:     See your health care provider every year in order to  o Review health changes.   o Discuss preventive care.    o Review your medicines if your doctor has prescribed any.      You no longer need a yearly Pap test unless you've had an abnormal Pap test in the past 10 years. If you have vaginal symptoms, such as bleeding or discharge, be sure to talk with your provider about a Pap test.      Every 1 to 2 years, have a mammogram.  If you are over 69, talk with your health care provider about whether or not you want to continue having screening mammograms.      Every 10 years, have a colonoscopy. Or, have a yearly FIT test (stool test). These exams will check for colon cancer.       Have a cholesterol test every 5 years, or more often if your doctor advises it.       Have a diabetes test (fasting glucose) every three years. If you are at risk for diabetes, you should have this test more often.       At age 65, have a bone density scan (DEXA) to check for osteoporosis (brittle bone disease).    Shots:    Get a flu shot each year.    Get a tetanus shot every 10 years.    Talk to your doctor about your pneumonia vaccines. There are now  two you should receive - Pneumovax (PPSV 23) and Prevnar (PCV 13).    Talk to your pharmacist about the shingles vaccine.    Talk to your doctor about the hepatitis B vaccine.    Nutrition:     Eat at least 5 servings of fruits and vegetables each day.      Eat whole-grain bread, whole-wheat pasta and brown rice instead of white grains and rice.      Get adequate Calcium and Vitamin D.     Lifestyle    Exercise at least 150 minutes a week (30 minutes a day, 5 days a week). This will help you control your weight and prevent disease.      Limit alcohol to one drink per day.      No smoking.       Wear sunscreen to prevent skin cancer.       See your dentist twice a year for an exam and cleaning.      See your eye doctor every 1 to 2 years to screen for conditions such as glaucoma, macular degeneration and cataracts.

## 2018-10-17 NOTE — TELEPHONE ENCOUNTER
Per fax received from [facility]   - Diclofenac is not covered by patient's Insurance Company  Dr. Jorge - Please choose:  1.  Change medication that is not covered to a different medication and send new prescription to patient's pharmacy?  2.  Patient will need to pay for the non-covered medication out-of-pocket?   3.  Try for Prior Authorization with Insurance Company to get medication covered?        Key# [id number]

## 2018-10-18 ENCOUNTER — HOSPITAL ENCOUNTER (OUTPATIENT)
Dept: MAMMOGRAPHY | Facility: CLINIC | Age: 78
Discharge: HOME | End: 2018-10-18
Attending: FAMILY MEDICINE | Admitting: FAMILY MEDICINE
Payer: MEDICARE

## 2018-10-18 DIAGNOSIS — Z12.31 VISIT FOR SCREENING MAMMOGRAM: ICD-10-CM

## 2018-10-18 PROCEDURE — 77063 BREAST TOMOSYNTHESIS BI: CPT

## 2019-04-03 ENCOUNTER — OFFICE VISIT (OUTPATIENT)
Dept: FAMILY MEDICINE | Facility: CLINIC | Age: 79
End: 2019-04-03
Payer: COMMERCIAL

## 2019-04-03 VITALS
HEIGHT: 65 IN | HEART RATE: 72 BPM | SYSTOLIC BLOOD PRESSURE: 137 MMHG | TEMPERATURE: 97 F | WEIGHT: 196 LBS | BODY MASS INDEX: 32.65 KG/M2 | OXYGEN SATURATION: 95 % | DIASTOLIC BLOOD PRESSURE: 66 MMHG

## 2019-04-03 DIAGNOSIS — R19.7 DIARRHEA, UNSPECIFIED TYPE: Primary | ICD-10-CM

## 2019-04-03 DIAGNOSIS — I10 BENIGN ESSENTIAL HYPERTENSION: ICD-10-CM

## 2019-04-03 PROCEDURE — 99213 OFFICE O/P EST LOW 20 MIN: CPT | Performed by: NURSE PRACTITIONER

## 2019-04-03 RX ORDER — LOSARTAN POTASSIUM AND HYDROCHLOROTHIAZIDE 12.5; 5 MG/1; MG/1
1 TABLET ORAL DAILY
Qty: 90 TABLET | Refills: 3 | Status: SHIPPED | OUTPATIENT
Start: 2019-04-03 | End: 2020-04-03

## 2019-04-03 ASSESSMENT — MIFFLIN-ST. JEOR: SCORE: 1374.93

## 2019-04-03 NOTE — PATIENT INSTRUCTIONS
1.  Lab today.  I will call you with results.  2.  Losartan/HCTZ prescription sent to [facility].  3.  Try to reduce dairy out of your diet for 1-2 weeks and re-add it back. If diarrhea stops and then restarts you are lactose intolerant and that may be causing diarrhea.  3.  Continue probiotic daily.  4.  Add Metamucil once or twice daily to bulk your stool.  5.  You also can Google FODMAP diet and restrict diet from foods that can cause bowel issues.  You can always add foods back one at a time to see if they are causing symptoms.

## 2019-04-03 NOTE — PROGRESS NOTES
SUBJECTIVE:   Precious Paris is a 78 year old female who presents to clinic today for the following health issues:    Chief Complaint   Patient presents with     Hypertension     patient was in [place] for 4 months when she got home she received a letter from insurance company that her Losartan had been recalled. NDC# [id number] is the number on her bottle and also a number on the recall list.  She would like to discuss other options.      She also states that she has been having diarrhea usually after eating.  No blood in the stool.  No abdominal pain.  Urgency and sometimes doesn't make it to the bathroom.    Problem list and histories reviewed & adjusted, as indicated.  Additional history: as documented    Patient Active Problem List   Diagnosis     Spinal stenosis in cervical region     Varicose veins of lower extremities with inflammation     Malabsorption of glucose     OA (osteoarthritis) of knee     Premature atrial contractions     HYPERLIPIDEMIA LDL GOAL <130     Healthcare maintenance     Hx of skin cancer, basal cell     Advanced directives, counseling/discussion     Hammer toe     Hypertension, goal below 150/90     Primary osteoarthritis of both hands     Senile nuclear cataract     Past Surgical History:   Procedure Laterality Date     BIOPSY       HC COLONOSCOPY THRU STOMA, DIAGNOSTIC  04/05    diverticulosis and hemorroids, due 2015     JOINT REPLACEMTN, KNEE RT/LT  2010    right     JOINT REPLACEMTN, KNEE RT/LT  2011    left     LIGATE VEIN VARICOSE, PHLEBECTOMY MULTIPLE STAB, COMBINED  10/17/2013    Procedure: COMBINED LIGATE VEIN VARICOSE, PHLEBECTOMY MULTIPLE STAB;  Phlebectomy of Right Knee Varicose Veins;  Surgeon: Andrea Duffy MD;  Location: [facility]     OSTEOTOMY FOOT  8/19/2013    Procedure: OSTEOTOMY FOOT;  Left 2nd metatarsal osteotomy, 2nd Metatarsophalangeal joint & 2nd toe correction;  Surgeon: Jose Phillips DPM;  Location: [facility]     PHACOEMULSIFICATION WITH  STANDARD INTRAOCULAR LENS IMPLANT Left 12/7/2017    Procedure: PHACOEMULSIFICATION WITH STANDARD INTRAOCULAR LENS IMPLANT;  Left Cataract Removal with Implant;  Surgeon: Mata Deleon MD;  Location: [facility] OR     SURGICAL HISTORY OF -   1979    Stripping of Veins in Left Leg     SURGICAL HISTORY OF -   1970    Bilateral Tubal Ligation     SURGICAL HISTORY OF -   2000    Carpal Tunnel Repair, right     SURGICAL HISTORY OF -   9-12-08    Rt let ablation       Social History     Tobacco Use     Smoking status: Former Smoker     Smokeless tobacco: Never Used     Tobacco comment: quit 20 years ago   Substance Use Topics     Alcohol use: Yes     Comment: occ.     Family History   Problem Relation Age of Onset     Hypertension Mother      Cerebrovascular Disease Mother      Hypertension Father      Cerebrovascular Disease Father      Hypertension Brother      Cancer Brother         lymphoma     Hypertension Sister      Gastrointestinal Disease Sister      Hypertension Brother      Cancer Brother         melanoma     Eye Disorder Brother      Hypertension Brother      Hypertension Brother      Heart Defect Son          Current Outpatient Medications   Medication Sig Dispense Refill     ASPIRIN 81 MG OR TABS Take 1 tablet by mouth daily       CALCIUM OR 1 DAILY       CENTRUM SILVER OR 1 TABLET DAILY       diclofenac (VOLTAREN) 1 % GEL topical gel Apply 2 grams to hands four times daily using enclosed dosing card. 100 g 1     fish oil-omega-3 fatty acids (OMEGA 3) 1000 MG capsule Take 1 capsule (1 g) by mouth daily 30 capsule 1     IBUPROFEN 200 MG OR TABS Take 400 mg by mouth 2 times daily as needed for pain       losartan-hydrochlorothiazide (HYZAAR) 50-12.5 MG tablet Take 1 tablet by mouth daily 90 tablet 3     melatonin 3 MG tablet Take 1 tablet (3 mg) by mouth nightly as needed for sleep       simvastatin (ZOCOR) 20 MG tablet Take 1 tablet (20 mg) by mouth At Bedtime 90 tablet 3     VITAMIN C OR 1 DAILY       Allergies  "  Allergen Reactions     Conjugated Estrogens Anaphylaxis and Other (See Comments)     Tightness in throat     Medroxyprogesterone Anaphylaxis and Other (See Comments)     Tightness in throat     Ace Inhibitors Cough     Premarin      Tightness in throat     Provera [Medroxyprogesterone Acetate]      Tightness in throat       Reviewed and updated as needed this visit by clinical staff  Tobacco  Allergies  Meds  Problems  Med Hx  Surg Hx  Fam Hx  Soc Hx        Reviewed and updated as needed this visit by Provider  Tobacco  Allergies  Meds  Problems  Med Hx  Surg Hx  Fam Hx         ROS:  CONSTITUTIONAL: NEGATIVE for fever, chills, change in weight  ENT/MOUTH: NEGATIVE for ear, mouth and throat problems  RESP: NEGATIVE for significant cough or SOB  CV: NEGATIVE for chest pain, palpitations or peripheral edema  GI: POSITIVE for diarrhea after eating  PSYCHIATRIC: NEGATIVE for changes in mood or affect  ROS otherwise negative    OBJECTIVE:     /66   Pulse 72   Temp 97  F (36.1  C) (Tympanic)   Ht 1.651 m (5' 5\")   Wt 88.9 kg (196 lb)   SpO2 95%   BMI 32.62 kg/m    Body mass index is 32.62 kg/m .  GENERAL: healthy, alert and no distress  RESP: lungs clear to auscultation - no rales, rhonchi or wheezes  CV: regular rate and rhythm, normal S1 S2, no S3 or S4, no murmur, click or rub, no peripheral edema and peripheral pulses strong  ABDOMEN: soft, nontender, no hepatosplenomegaly, no masses and bowel sounds normal  PSYCH: mentation appears normal, affect normal/bright    Diagnostic Test Results:  Last Comprehensive Metabolic Panel:  Sodium   Date Value Ref Range Status   04/04/2019 136 133 - 144 mmol/L Final     Potassium   Date Value Ref Range Status   04/04/2019 4.4 3.4 - 5.3 mmol/L Final     Chloride   Date Value Ref Range Status   04/04/2019 102 94 - 109 mmol/L Final     Carbon Dioxide   Date Value Ref Range Status   04/04/2019 31 20 - 32 mmol/L Final     Anion Gap   Date Value Ref Range Status "   04/04/2019 3 3 - 14 mmol/L Final     Glucose   Date Value Ref Range Status   04/04/2019 81 70 - 99 mg/dL Final     Urea Nitrogen   Date Value Ref Range Status   04/04/2019 14 7 - 30 mg/dL Final     Creatinine   Date Value Ref Range Status   04/04/2019 0.68 0.52 - 1.04 mg/dL Final     GFR Estimate   Date Value Ref Range Status   04/04/2019 84 >60 mL/min/[1.73_m2] Final     Comment:     Non  GFR Calc  Starting 12/18/2018, serum creatinine based estimated GFR (eGFR) will be   calculated using the Chronic Kidney Disease Epidemiology Collaboration   (CKD-EPI) equation.       Calcium   Date Value Ref Range Status   04/04/2019 9.3 8.5 - 10.1 mg/dL Final         ASSESSMENT/PLAN:     1. Benign essential hypertension  Reordered Hyzaar as per prior dosing.  Discussed with patient that they would replace with a different generic that is not affected by the recall at the pharmacy.  BMP today is normal.  Follow-up in 1 year for refills.  - losartan-hydrochlorothiazide (HYZAAR) 50-12.5 MG tablet; Take 1 tablet by mouth daily  Dispense: 90 tablet; Refill: 3  - **Basic metabolic panel FUTURE anytime; Future    2. Diarrhea, unspecified type  Discussed eliminating dairy for a week or two and then restarting.  If diarrhea disappears and then restarts most likely lactose intolerance.  She is taking a probiotic and should continue this use.  I also have recommended FODMAP diet for symptoms also.  She can also try fiber supplement daily up to twice daily.  If any persistent symptoms, recommend f/u in clinic.      See Patient Instructions    Mckenzie Corral NP  [facility]

## 2019-04-04 DIAGNOSIS — I10 BENIGN ESSENTIAL HYPERTENSION: ICD-10-CM

## 2019-04-04 LAB
ANION GAP SERPL CALCULATED.3IONS-SCNC: 3 MMOL/L (ref 3–14)
BUN SERPL-MCNC: 14 MG/DL (ref 7–30)
CALCIUM SERPL-MCNC: 9.3 MG/DL (ref 8.5–10.1)
CHLORIDE SERPL-SCNC: 102 MMOL/L (ref 94–109)
CO2 SERPL-SCNC: 31 MMOL/L (ref 20–32)
CREAT SERPL-MCNC: 0.68 MG/DL (ref 0.52–1.04)
GFR SERPL CREATININE-BSD FRML MDRD: 84 ML/MIN/{1.73_M2}
GLUCOSE SERPL-MCNC: 81 MG/DL (ref 70–99)
POTASSIUM SERPL-SCNC: 4.4 MMOL/L (ref 3.4–5.3)
SODIUM SERPL-SCNC: 136 MMOL/L (ref 133–144)

## 2019-04-04 PROCEDURE — 80048 BASIC METABOLIC PNL TOTAL CA: CPT | Performed by: NURSE PRACTITIONER

## 2019-04-04 PROCEDURE — 36415 COLL VENOUS BLD VENIPUNCTURE: CPT | Performed by: NURSE PRACTITIONER

## 2019-05-30 ENCOUNTER — TRANSFERRED RECORDS (OUTPATIENT)
Dept: HEALTH INFORMATION MANAGEMENT | Facility: CLINIC | Age: 79
End: 2019-05-30

## 2019-06-03 PROBLEM — H35.031 HYPERTENSIVE RETINOPATHY OF RIGHT EYE: Status: ACTIVE | Noted: 2019-06-03

## 2019-06-14 ENCOUNTER — HOSPITAL ENCOUNTER (OUTPATIENT)
Dept: OUTPATIENT PROCEDURES | Facility: CLINIC | Age: 79
Discharge: HOME | End: 2019-06-14
Attending: OPHTHALMOLOGY | Admitting: OPHTHALMOLOGY
Payer: COMMERCIAL

## 2019-06-14 PROCEDURE — 66821 AFTER CATARACT LASER SURGERY: CPT | Mod: LT | Performed by: OPHTHALMOLOGY

## 2019-06-15 DIAGNOSIS — E78.5 HYPERLIPIDEMIA LDL GOAL <130: ICD-10-CM

## 2019-06-17 RX ORDER — SIMVASTATIN 20 MG
TABLET ORAL
Qty: 30 TABLET | Refills: 0 | Status: SHIPPED | OUTPATIENT
Start: 2019-06-17 | End: 2019-06-20

## 2019-06-20 ENCOUNTER — OFFICE VISIT (OUTPATIENT)
Dept: FAMILY MEDICINE | Facility: CLINIC | Age: 79
End: 2019-06-20
Payer: COMMERCIAL

## 2019-06-20 VITALS
TEMPERATURE: 97.8 F | RESPIRATION RATE: 16 BRPM | SYSTOLIC BLOOD PRESSURE: 116 MMHG | WEIGHT: 199.4 LBS | BODY MASS INDEX: 33.22 KG/M2 | OXYGEN SATURATION: 96 % | DIASTOLIC BLOOD PRESSURE: 70 MMHG | HEART RATE: 72 BPM | HEIGHT: 65 IN

## 2019-06-20 DIAGNOSIS — G56.02 CARPAL TUNNEL SYNDROME OF LEFT WRIST: Primary | ICD-10-CM

## 2019-06-20 DIAGNOSIS — E78.5 HYPERLIPIDEMIA LDL GOAL <130: ICD-10-CM

## 2019-06-20 DIAGNOSIS — M19.90 ARTHRITIS: ICD-10-CM

## 2019-06-20 LAB
CHOLEST SERPL-MCNC: 189 MG/DL
HDLC SERPL-MCNC: 67 MG/DL
LDLC SERPL CALC-MCNC: 111 MG/DL
NONHDLC SERPL-MCNC: 122 MG/DL
TRIGL SERPL-MCNC: 57 MG/DL

## 2019-06-20 PROCEDURE — 36415 COLL VENOUS BLD VENIPUNCTURE: CPT | Performed by: FAMILY MEDICINE

## 2019-06-20 PROCEDURE — 99214 OFFICE O/P EST MOD 30 MIN: CPT | Performed by: FAMILY MEDICINE

## 2019-06-20 PROCEDURE — 80061 LIPID PANEL: CPT | Performed by: FAMILY MEDICINE

## 2019-06-20 RX ORDER — SIMVASTATIN 20 MG
20 TABLET ORAL AT BEDTIME
Qty: 90 TABLET | Refills: 3 | Status: SHIPPED | OUTPATIENT
Start: 2019-06-20 | End: 2020-07-06

## 2019-06-20 RX ORDER — CYANOCOBALAMIN (VITAMIN B-12) 500 MCG
LOZENGE ORAL DAILY
COMMUNITY

## 2019-06-20 ASSESSMENT — MIFFLIN-ST. JEOR: SCORE: 1390.35

## 2019-06-20 NOTE — PROGRESS NOTES
"Subjective     Precious Paris is a 78 year old female who presents to clinic today for the following health issues:    HPI   Chief Complaint   Patient presents with     Patient Request     pt. is here today in clinic with concerns about left arm and hand falling a sleep off and on X 1 month.      Lipids     recheck refill on medication pt. is fasting today for ? lab work     Patient Request     ? arthritis in fingers        Hyperlipidemia Follow-Up      Are you having any of the following symptoms? (Select all that apply)  Left-sided neck or arm pain off and on falling a sleep.    Are you regularly taking any medication or supplement to lower your cholesterol?   Yes- simvastatin    Are you having muscle aches or other side effects that you think could be caused by your cholesterol lowering medication?  No        Musculoskeletal problem/pain      Duration: 1 month    Description  Location: left hand/arm    Intensity:  moderate    Accompanying signs and symptoms: numbness and tingling    History  Previous similar problem: no  (did have CTS on the right)  Previous evaluation:  none    Precipitating or alleviating factors:  Trauma or overuse: no   Aggravating factors include: sleeping    Therapies tried and outcome: nothing    Tobacco  Allergies  Meds  Problems  Med Hx  Surg Hx  Fam Hx         Review of Systems   ROS COMP: Constitutional, HEENT, cardiovascular, pulmonary, gi and gu systems are negative, except as otherwise noted.    More problems with joint swelling in her hands, discomfort.      Objective    /70   Pulse 72   Temp 97.8  F (36.6  C) (Tympanic)   Resp 16   Ht 1.651 m (5' 5\")   Wt 90.4 kg (199 lb 6.4 oz)   SpO2 96%   BMI 33.18 kg/m    Body mass index is 33.18 kg/m .  Physical Exam   GENERAL: healthy, alert and no distress  MS: negative tinel/phalens on the left.    strength is equal bilaterally  Sensation and strength of bilateral upper extremities are normal.         "     Assessment & Plan     1. Hyperlipidemia LDL goal <130     - simvastatin (ZOCOR) 20 MG tablet; Take 1 tablet (20 mg) by mouth At Bedtime  Dispense: 90 tablet; Refill: 3  - Lipid panel reflex to direct LDL Fasting    2. Arthritis     - diclofenac (VOLTAREN) 1 % topical gel; Place 2 g onto the skin 4 times daily  Dispense: 100 g; Refill: 3    3.  Left arm intermittent numbness/tingling - possible CTS  Also has a history of mild cervical stenosis (on MRI in 2006).  If symptoms of the tingling doesn't resolve with wearing a wrist splint at night, would do EMG next to determine further where along the nerve the irritation is.              No follow-ups on file.    Ester Jorge MD  [facility]

## 2019-06-20 NOTE — PATIENT INSTRUCTIONS
Wrist splint for the hand numbness/tingling.     If you continue to have problems despite this, please call.  The next step would be an EMG - test to see where along the nerve the irritation is (arm/neck, etc).

## 2019-06-20 NOTE — LETTER
[facility]  [address]  [facility] [address]  Phone: [phone]      6/21/2019     Precious Paris  [address] [place] [address]      Dear Precious:    Thank you for allowing me to participate in your care. Your recent test results were reviewed and listed below.  normal/acceptable results.     Your results are provided below for your review  Results for orders placed or performed in visit on 06/20/19   Lipid panel reflex to direct LDL Fasting   Result Value Ref Range    Cholesterol 189 <200 mg/dL    Triglycerides 57 <150 mg/dL    HDL Cholesterol 67 >49 mg/dL    LDL Cholesterol Calculated 111 (H) <100 mg/dL    Non HDL Cholesterol 122 <130 mg/dL                 Thank you for choosing [facility]. As a result, please continue with the treatment plan discussed in the office. Return as discussed or sooner if symptoms worsen or fail to improve.     If you have any further questions or concerns, please do not hesitate to contact us.      Sincerely,        Dr. Ester Jorge

## 2019-06-27 ENCOUNTER — OFFICE VISIT (OUTPATIENT)
Dept: DERMATOLOGY | Facility: CLINIC | Age: 79
End: 2019-06-27
Payer: COMMERCIAL

## 2019-06-27 VITALS — DIASTOLIC BLOOD PRESSURE: 54 MMHG | HEART RATE: 74 BPM | OXYGEN SATURATION: 96 % | SYSTOLIC BLOOD PRESSURE: 129 MMHG

## 2019-06-27 DIAGNOSIS — Z85.828 HISTORY OF NONMELANOMA SKIN CANCER: ICD-10-CM

## 2019-06-27 DIAGNOSIS — D22.9 MULTIPLE BENIGN NEVI: ICD-10-CM

## 2019-06-27 DIAGNOSIS — D18.01 CHERRY ANGIOMA: ICD-10-CM

## 2019-06-27 DIAGNOSIS — L82.1 SEBORRHEIC KERATOSIS: ICD-10-CM

## 2019-06-27 DIAGNOSIS — D48.5 NEOPLASM OF UNCERTAIN BEHAVIOR OF SKIN: Primary | ICD-10-CM

## 2019-06-27 DIAGNOSIS — L57.0 ACTINIC KERATOSIS: ICD-10-CM

## 2019-06-27 DIAGNOSIS — L81.4 LENTIGO: ICD-10-CM

## 2019-06-27 PROCEDURE — 11102 TANGNTL BX SKIN SINGLE LES: CPT | Performed by: PHYSICIAN ASSISTANT

## 2019-06-27 PROCEDURE — 11103 TANGNTL BX SKIN EA SEP/ADDL: CPT | Performed by: PHYSICIAN ASSISTANT

## 2019-06-27 PROCEDURE — 88305 TISSUE EXAM BY PATHOLOGIST: CPT | Mod: TC | Performed by: PHYSICIAN ASSISTANT

## 2019-06-27 PROCEDURE — 17000 DESTRUCT PREMALG LESION: CPT | Mod: 59 | Performed by: PHYSICIAN ASSISTANT

## 2019-06-27 PROCEDURE — 99213 OFFICE O/P EST LOW 20 MIN: CPT | Mod: 25 | Performed by: PHYSICIAN ASSISTANT

## 2019-06-27 NOTE — PROGRESS NOTES
Precious Paris is a 78 year old year old female patient here today for skin check. She notes an itchy spot on neck, present for a few months. Denies any pain or bleeding. She decided against mohs on her right thigh, no signs of any recurrence after basal cell carcinoma. Patient has no other skin complaints today.  Remainder of the HPI, Meds, PMH, Allergies, FH, and SH was reviewed in chart.    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     anosmia     Diverticulosis of colon (without mention of hemorrhage)      Other and unspecified hyperlipidemia      Pain in joint, lower leg      Plantar fascial fibromatosis      PURE HYPERCHOLESTEROLEM 9/9/2007     PURE HYPERCHOLESTEROLEM 9/9/2007     Squamous cell carcinoma        Past Surgical History:   Procedure Laterality Date     BIOPSY       HC COLONOSCOPY THRU STOMA, DIAGNOSTIC  04/05    diverticulosis and hemorroids, due 2015     JOINT REPLACEMTN, KNEE RT/LT  2010    right     JOINT REPLACEMTN, KNEE RT/LT  2011    left     LIGATE VEIN VARICOSE, PHLEBECTOMY MULTIPLE STAB, COMBINED  10/17/2013    Procedure: COMBINED LIGATE VEIN VARICOSE, PHLEBECTOMY MULTIPLE STAB;  Phlebectomy of Right Knee Varicose Veins;  Surgeon: Andrea Duffy MD;  Location: [facility]     OSTEOTOMY FOOT  8/19/2013    Procedure: OSTEOTOMY FOOT;  Left 2nd metatarsal osteotomy, 2nd Metatarsophalangeal joint & 2nd toe correction;  Surgeon: Jose Phillips DPM;  Location: [facility]     PHACOEMULSIFICATION WITH STANDARD INTRAOCULAR LENS IMPLANT Left 12/7/2017    Procedure: PHACOEMULSIFICATION WITH STANDARD INTRAOCULAR LENS IMPLANT;  Left Cataract Removal with Implant;  Surgeon: Mata Deleon MD;  Location: [facility]     SURGICAL  HISTORY OF -   1979    Stripping of Veins in Left Leg     SURGICAL HISTORY OF -   1970    Bilateral Tubal Ligation     SURGICAL HISTORY OF -   2000    Carpal Tunnel Repair, right     SURGICAL HISTORY OF -   9-12-08    Rt let ablation        Family History   Problem Relation Age of Onset     Hypertension Mother      Cerebrovascular Disease Mother      Hypertension Father      Cerebrovascular Disease Father      Hypertension Brother      Cancer Brother         lymphoma     Hypertension Sister      Gastrointestinal Disease Sister      Hypertension Brother      Cancer Brother         melanoma     Eye Disorder Brother      Hypertension Brother      Hypertension Brother      Heart Defect Son        Social History     Socioeconomic History     Marital status:      Spouse name: Not on file     Number of children: Not on file     Years of education: Not on file     Highest education level: Not on file   Occupational History     Not on file   Social Needs     Financial resource strain: Not on file     Food insecurity:     Worry: Not on file     Inability: Not on file     Transportation needs:     Medical: Not on file     Non-medical: Not on file   Tobacco Use     Smoking status: Former Smoker     Smokeless tobacco: Never Used     Tobacco comment: quit 20 years ago   Substance and Sexual Activity     Alcohol use: Yes     Comment: occ.     Drug use: No     Sexual activity: Yes     Partners: Male   Lifestyle     Physical activity:     Days per week: Not on file     Minutes per session: Not on file     Stress: Not on file   Relationships     Social connections:     Talks on phone: Not on file     Gets together: Not on file     Attends Hinduism service: Not on file     Active member of club or organization: Not on file     Attends meetings of clubs or organizations: Not on file     Relationship status: Not on file     Intimate partner violence:     Fear of current or ex partner: Not on file     Emotionally abused: Not on  file     Physically abused: Not on file     Forced sexual activity: Not on file   Other Topics Concern     Parent/sibling w/ CABG, MI or angioplasty before 65F 55M? No   Social History Narrative     Not on file       Outpatient Encounter Medications as of 6/27/2019   Medication Sig Dispense Refill     ASPIRIN 81 MG OR TABS Take 1 tablet by mouth daily       CALCIUM OR 1 DAILY       CENTRUM SILVER OR 1 TABLET DAILY       diclofenac (VOLTAREN) 1 % topical gel Place 2 g onto the skin 4 times daily 100 g 3     fish oil-omega-3 fatty acids (OMEGA 3) 1000 MG capsule Take 1 capsule (1 g) by mouth daily 30 capsule 1     IBUPROFEN 200 MG OR TABS Take 400 mg by mouth 2 times daily as needed for pain       losartan-hydrochlorothiazide (HYZAAR) 50-12.5 MG tablet Take 1 tablet by mouth daily 90 tablet 3     melatonin 3 MG tablet Take 1 tablet (3 mg) by mouth nightly as needed for sleep       simvastatin (ZOCOR) 20 MG tablet Take 1 tablet (20 mg) by mouth At Bedtime 90 tablet 3     VITAMIN C OR 1 DAILY       VITAMIN E PO        diclofenac (VOLTAREN) 1 % GEL topical gel Apply 2 grams to hands four times daily using enclosed dosing card. (Patient not taking: Reported on 6/20/2019) 100 g 1     No facility-administered encounter medications on file as of 6/27/2019.              Review Of Systems  Skin: As above  Eyes: negative  Ears/Nose/Throat: negative  Respiratory: No shortness of breath, dyspnea on exertion, cough, or hemoptysis  Cardiovascular: negative  Gastrointestinal: negative  Genitourinary: negative  Musculoskeletal: negative  Neurologic: negative  Psychiatric: negative  Hematologic/Lymphatic/Immunologic: negative  Endocrine: negative      O:   NAD, WDWN, Alert & Oriented, Mood & Affect wnl, Vitals stable   Here today alone   /54 (BP Location: Left arm, Patient Position: Sitting, Cuff Size: Adult Large)   Pulse 74   SpO2 96%    General appearance normal   Vitals stable   Alert, oriented and in no acute  distress     0.5 cm pink scaly papule on left lateral neck  0.4 cm pink scaly papule on right dorsal hand  Pink gritty papule on left temple   Stuck on papules and brown macules on trunk and ext   Red papules on trunk  Brown papules and macules with regular pigment network and borders on torso and extremities      The remainder of the detailed exam was unremarkable; the following areas were examined:  scalp/hair, conjunctiva/lids, face, neck, lips/teeth, oral mucosa/gingiva, chest, back, abdomen, buttocks, digits/nails, RUE, LUE, RLE, LLE.      Eyes: Conjunctivae/lids:Normal     ENT: Lips: normal    MSK:Normal    Cardiovascular: peripheral edema none    Pulm: Breathing Normal    Neuro/Psych: Orientation:Normal; Mood/Affect:Normal    A/P:  1. R/O NMSC on left lateral neck and right dorsal hand  TANGENTIAL BIOPSY SENT OUT:  After consent, anesthesia with LEC and prep, tangential excision performed and specimen sent out for permanent section histology.  No complications and routine wound care. Patient told to call our office in 1-2 weeks for result.      2. Actinic keratosis on left temple x 1  LN2:  Treated with LN2 for 5s for 1-2 cycles. Warned risks of blistering, pain, pigment change, scarring, and incomplete resolution.  Advised patient to return if lesions do not completely resolve.  Wound care sheet given.  3. History of BCC on right thigh  Did not treat after biopsy, no evidence of recurrence seen on skin.   4. Seborrheic keratosis, lentigo, angioma, benign nevi, History of NMSC  BENIGN LESIONS DISCUSSED WITH PATIENT:  I discussed the specifics of tumor, prognosis, and genetics of benign lesions.  I explained that treatment of these lesions would be purely cosmetic and not medically neccessary.  I discussed with patient different removal options including excision, cautery and /or laser.      Nature and genetics of benign skin lesions dicussed with patient.  Signs and Symptoms of skin cancer discussed with  patient.  ABCDEs of melanoma reviewed with patient.  Patient encouraged to perform monthly skin exams.  UV precautions reviewed with patient.  Risks of non-melanoma skin cancer discussed with patient   Return to clinic pending biopsy results.

## 2019-06-27 NOTE — PATIENT INSTRUCTIONS
.      Wound Care Instructions     FOR SUPERFICIAL WOUNDS     [facility] [phone]    [facility] [phone]                       AFTER 24 HOURS YOU SHOULD REMOVE THE BANDAGE AND BEGIN DAILY DRESSING CHANGES AS FOLLOWS:     1) Remove Dressing.     2) Clean and dry the area with tap water using a Q-tip or sterile gauze pad.     3) Apply Vaseline, Aquaphor, Polysporin ointment or Bacitracin ointment over entire wound.  Do NOT use Neosporin ointment.     4) Cover the wound with a band-aid, or a sterile non-stick gauze pad and micropore paper tape      REPEAT THESE INSTRUCTIONS AT LEAST ONCE A DAY UNTIL THE WOUND HAS COMPLETELY HEALED.    It is an old wives tale that a wound heals better when it is exposed to air and allowed to dry out. The wound will heal faster with a better cosmetic result if it is kept moist with ointment and covered with a bandage.    **Do not let the wound dry out.**      Supplies Needed:      *Cotton tipped applicators (Q-tips)    *Polysporin Ointment or Bacitracin Ointment (NOT NEOSPORIN)    *Band-aids or non-stick gauze pads and micropore paper tape.      PATIENT INFORMATION:    During the healing process you will notice a number of changes. All wounds develop a small halo of redness surrounding the wound.  This means healing is occurring. Severe itching with extensive redness usually indicates sensitivity to the ointment or bandage tape used to dress the wound.  You should call our office if this develops.      Swelling  and/or discoloration around your surgical site is common, particularly when performed around the eye.    All wounds normally drain.  The larger the wound the more drainage there will be.  After 7-10 days, you will notice the wound beginning to shrink and new skin will begin to grow.  The wound is healed when you can see skin has formed over the entire area.  A healed wound has a healthy, shiny look to the surface and is red to dark pink in color  to normalize.  Wounds may take approximately 4-6 weeks to heal.  Larger wounds may take 6-8 weeks.  After the wound is healed you may discontinue dressing changes.    You may experience a sensation of tightness as your wound heals. This is normal and will gradually subside.    Your healed wound may be sensitive to temperature changes. This sensitivity improves with time, but if you re having a lot of discomfort, try to avoid temperature extremes.    Patients frequently experience itching after their wound appears to have healed because of the continue healing under the skin.  Plain Vaseline will help relieve the itching.        POSSIBLE COMPLICATIONS    BLEEDIN. Leave the bandage in place.  2. Use tightly rolled up gauze or a cloth to apply direct pressure over the bandage for 30  minutes.  3. Reapply pressure for an additional 30 minutes if necessary  4. Use additional gauze and tape to maintain pressure once the bleeding has stopped.    WOUND CARE INSTRUCTIONS   FOR CRYOSURGERY   This area treated with liquid nitrogen should form a blister (areas treated may or may not blister-skin may just turn dark and slough off). You do not need to bandage the area unless a blister forms and breaks (which may be a few days). When the blister breaks, begin daily dressing changes as follows:  1) Clean and dry the area with tap water using clean Q-tip or sterile gauze pad.   2) Apply Polysporin ointment or Bacitracin ointment over entire wound. Do NOT use Neosporin ointment.   3) Cover the wound with a band-aid or sterile non-stick gauze pad and micropore paper tape.   REPEAT THESE INSTRUCTIONS AT LEAST ONCE A DAY UNTIL THE WOUND HAS COMPLETELY HEALED.   It is an old wives tale that a wound heals better when it is exposed to air and allowed to dry out. The wound will heal faster with a better cosmetic result if it is kept moist with ointment and covered with a bandage.   Do not let the wound dry out.   IMPORTANT  INFORMATION ON REVERSE SIDE   Supplies Needed:   *Cotton tipped applicators (Q-tips)   *Polysporin ointment or Bacitracin ointment (NOT NEOSPORIN)   *Band-aids, or non stick gauze pads and micropore paper tape   PATIENT INFORMATION   During the healing process you will notice a number of changes. All wounds develop a small halo of redness surrounding the wound. This means healing is occurring. Severe itching with extensive redness usually indicates sensitivity to the ointment or bandage tape used to dress the wound. You should call our office if this develops.   Swelling and/or discoloration around your surgical site is common, particularly when performed around the eye.   All wounds normally drain. The larger the wound the more drainage there will be. After 7-10 days, you will notice the wound beginning to shrink and new skin will begin to grow. The wound is healed when you can see skin has formed over the entire area. A healed wound has a healthy, shiny look to the surface and is red to dark pink in color to normalize. Wounds may take approximately 4-6 weeks to heal. Larger wounds may take 6-8 weeks. After the wound is healed you may discontinue dressing changes.   You may experience a sensation of tightness as your wound heals. This is normal and will gradually subside.   Your healed wound may be sensitive to temperature changes. This sensitivity improves with time, but if you re having a lot of discomfort, try to avoid temperature extremes.   Patients frequently experience itching after their wound appears to have healed because of the continue healing under the skin. Plain Vaseline will help relieve the itching.     If the spot on your left shin doesn't heal up or go away in 1 month please return for a biopsy.    If the spot on your right thigh returns call and schedule a Mohs procedure with Dr. Anthony.

## 2019-06-27 NOTE — LETTER
6/27/2019         RE: Preciosu Paris  [address] [place] [address]        Dear Colleague,    Thank you for referring your patient, Precious Paris, to the [facility]. Please see a copy of my visit note below.    Precious Paris is a 78 year old year old female patient here today for skin check. She notes an itchy spot on neck, present for a few months. Denies any pain or bleeding. She decided against mohs on her right thigh, no signs of any recurrence after basal cell carcinoma. Patient has no other skin complaints today.  Remainder of the HPI, Meds, PMH, Allergies, FH, and SH was reviewed in chart.    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     anosmia     Diverticulosis of colon (without mention of hemorrhage)      Other and unspecified hyperlipidemia      Pain in joint, lower leg      Plantar fascial fibromatosis      PURE HYPERCHOLESTEROLEM 9/9/2007     PURE HYPERCHOLESTEROLEM 9/9/2007     Squamous cell carcinoma        Past Surgical History:   Procedure Laterality Date     BIOPSY       HC COLONOSCOPY THRU STOMA, DIAGNOSTIC  04/05    diverticulosis and hemorroids, due 2015     JOINT REPLACEMTN, KNEE RT/LT  2010    right     JOINT REPLACEMTN, KNEE RT/LT  2011    left     LIGATE VEIN VARICOSE, PHLEBECTOMY MULTIPLE STAB, COMBINED  10/17/2013    Procedure: COMBINED LIGATE VEIN VARICOSE, PHLEBECTOMY MULTIPLE STAB;  Phlebectomy of Right Knee Varicose Veins;  Surgeon: Andrea Duffy MD;  Location: [facility]     OSTEOTOMY FOOT  8/19/2013    Procedure: OSTEOTOMY FOOT;  Left 2nd metatarsal osteotomy, 2nd Metatarsophalangeal joint & 2nd toe correction;  Surgeon: Jsoe Phillips DPM;  Location: [facility]      PHACOEMULSIFICATION WITH STANDARD INTRAOCULAR LENS IMPLANT Left 12/7/2017    Procedure: PHACOEMULSIFICATION WITH STANDARD INTRAOCULAR LENS IMPLANT;  Left Cataract Removal with Implant;  Surgeon: Mata Deleon MD;  Location: [facility]     SURGICAL HISTORY OF -   1979    Stripping of Veins in Left Leg     SURGICAL HISTORY OF -   1970    Bilateral Tubal Ligation     SURGICAL HISTORY OF -   2000    Carpal Tunnel Repair, right     SURGICAL HISTORY OF - 9-12-08    Rt let ablation        Family History   Problem Relation Age of Onset     Hypertension Mother      Cerebrovascular Disease Mother      Hypertension Father      Cerebrovascular Disease Father      Hypertension Brother      Cancer Brother         lymphoma     Hypertension Sister      Gastrointestinal Disease Sister      Hypertension Brother      Cancer Brother         melanoma     Eye Disorder Brother      Hypertension Brother      Hypertension Brother      Heart Defect Son        Social History     Socioeconomic History     Marital status:      Spouse name: Not on file     Number of children: Not on file     Years of education: Not on file     Highest education level: Not on file   Occupational History     Not on file   Social Needs     Financial resource strain: Not on file     Food insecurity:     Worry: Not on file     Inability: Not on file     Transportation needs:     Medical: Not on file     Non-medical: Not on file   Tobacco Use     Smoking status: Former Smoker     Smokeless tobacco: Never Used     Tobacco comment: quit 20 years ago   Substance and Sexual Activity     Alcohol use: Yes     Comment: occ.     Drug use: No     Sexual activity: Yes     Partners: Male   Lifestyle     Physical activity:     Days per week: Not on file     Minutes per session: Not on file     Stress: Not on file   Relationships     Social connections:     Talks on phone: Not on file     Gets together: Not on file     Attends Rastafarian service: Not on file     Active  member of club or organization: Not on file     Attends meetings of clubs or organizations: Not on file     Relationship status: Not on file     Intimate partner violence:     Fear of current or ex partner: Not on file     Emotionally abused: Not on file     Physically abused: Not on file     Forced sexual activity: Not on file   Other Topics Concern     Parent/sibling w/ CABG, MI or angioplasty before 65F 55M? No   Social History Narrative     Not on file       Outpatient Encounter Medications as of 6/27/2019   Medication Sig Dispense Refill     ASPIRIN 81 MG OR TABS Take 1 tablet by mouth daily       CALCIUM OR 1 DAILY       CENTRUM SILVER OR 1 TABLET DAILY       diclofenac (VOLTAREN) 1 % topical gel Place 2 g onto the skin 4 times daily 100 g 3     fish oil-omega-3 fatty acids (OMEGA 3) 1000 MG capsule Take 1 capsule (1 g) by mouth daily 30 capsule 1     IBUPROFEN 200 MG OR TABS Take 400 mg by mouth 2 times daily as needed for pain       losartan-hydrochlorothiazide (HYZAAR) 50-12.5 MG tablet Take 1 tablet by mouth daily 90 tablet 3     melatonin 3 MG tablet Take 1 tablet (3 mg) by mouth nightly as needed for sleep       simvastatin (ZOCOR) 20 MG tablet Take 1 tablet (20 mg) by mouth At Bedtime 90 tablet 3     VITAMIN C OR 1 DAILY       VITAMIN E PO        diclofenac (VOLTAREN) 1 % GEL topical gel Apply 2 grams to hands four times daily using enclosed dosing card. (Patient not taking: Reported on 6/20/2019) 100 g 1     No facility-administered encounter medications on file as of 6/27/2019.              Review Of Systems  Skin: As above  Eyes: negative  Ears/Nose/Throat: negative  Respiratory: No shortness of breath, dyspnea on exertion, cough, or hemoptysis  Cardiovascular: negative  Gastrointestinal: negative  Genitourinary: negative  Musculoskeletal: negative  Neurologic: negative  Psychiatric: negative  Hematologic/Lymphatic/Immunologic: negative  Endocrine: negative      O:   NAD, WDWN, Alert & Oriented, Mood  & Affect wnl, Vitals stable   Here today alone   /54 (BP Location: Left arm, Patient Position: Sitting, Cuff Size: Adult Large)   Pulse 74   SpO2 96%    General appearance normal   Vitals stable   Alert, oriented and in no acute distress     0.5 cm pink scaly papule on left lateral neck  0.4 cm pink scaly papule on right dorsal hand  Pink gritty papule on left temple   Stuck on papules and brown macules on trunk and ext   Red papules on trunk  Brown papules and macules with regular pigment network and borders on torso and extremities      The remainder of the detailed exam was unremarkable; the following areas were examined:  scalp/hair, conjunctiva/lids, face, neck, lips/teeth, oral mucosa/gingiva, chest, back, abdomen, buttocks, digits/nails, RUE, LUE, RLE, LLE.      Eyes: Conjunctivae/lids:Normal     ENT: Lips: normal    MSK:Normal    Cardiovascular: peripheral edema none    Pulm: Breathing Normal    Neuro/Psych: Orientation:Normal; Mood/Affect:Normal    A/P:  1. R/O NMSC on left lateral neck and right dorsal hand  TANGENTIAL BIOPSY SENT OUT:  After consent, anesthesia with LEC and prep, tangential excision performed and specimen sent out for permanent section histology.  No complications and routine wound care. Patient told to call our office in 1-2 weeks for result.      2. Actinic keratosis on left temple x 1  LN2:  Treated with LN2 for 5s for 1-2 cycles. Warned risks of blistering, pain, pigment change, scarring, and incomplete resolution.  Advised patient to return if lesions do not completely resolve.  Wound care sheet given.  3. History of BCC on right thigh  Did not treat after biopsy, no evidence of recurrence seen on skin.   4. Seborrheic keratosis, lentigo, angioma, benign nevi, History of NMSC  BENIGN LESIONS DISCUSSED WITH PATIENT:  I discussed the specifics of tumor, prognosis, and genetics of benign lesions.  I explained that treatment of these lesions would be purely cosmetic and not  medically neccessary.  I discussed with patient different removal options including excision, cautery and /or laser.      Nature and genetics of benign skin lesions dicussed with patient.  Signs and Symptoms of skin cancer discussed with patient.  ABCDEs of melanoma reviewed with patient.  Patient encouraged to perform monthly skin exams.  UV precautions reviewed with patient.  Risks of non-melanoma skin cancer discussed with patient   Return to clinic pending biopsy results.       Again, thank you for allowing me to participate in the care of your patient.        Sincerely,        Renee Cardenas PA-C

## 2019-06-28 LAB — COPATH REPORT: NORMAL

## 2019-07-24 ENCOUNTER — OFFICE VISIT (OUTPATIENT)
Dept: DERMATOLOGY | Facility: CLINIC | Age: 79
End: 2019-07-24
Payer: COMMERCIAL

## 2019-07-24 DIAGNOSIS — L57.0 ACTINIC KERATOSIS: Primary | ICD-10-CM

## 2019-07-24 PROCEDURE — 17000 DESTRUCT PREMALG LESION: CPT | Performed by: PHYSICIAN ASSISTANT

## 2019-07-24 NOTE — LETTER
7/24/2019         RE: Precious Paris  [address] [place] [address]        Dear Colleague,    Thank you for referring your patient, Precious Paris, to the [facility]. Please see a copy of my visit note below.    Patient is here today for biopsy proven actinic keratosis on right dorsal hand.   Had biopsy about 1-2 weeks ago.   Here today for cryo.  Actinic keratosis x 1  LN2:  Treated with LN2 for 5s for 1-2 cycles. Warned risks of blistering, pain, pigment change, scarring, and incomplete resolution.  Advised patient to return if lesions do not completely resolve.  Wound care sheet given.      Again, thank you for allowing me to participate in the care of your patient.        Sincerely,        Renee Cardenas PA-C

## 2019-07-24 NOTE — PROGRESS NOTES
Patient is here today for biopsy proven actinic keratosis on right dorsal hand.   Had biopsy about 1-2 weeks ago.   Here today for cryo.  Actinic keratosis x 1  LN2:  Treated with LN2 for 5s for 1-2 cycles. Warned risks of blistering, pain, pigment change, scarring, and incomplete resolution.  Advised patient to return if lesions do not completely resolve.  Wound care sheet given.

## 2019-09-30 ENCOUNTER — OFFICE VISIT (OUTPATIENT)
Dept: FAMILY MEDICINE | Facility: CLINIC | Age: 79
End: 2019-09-30
Payer: COMMERCIAL

## 2019-09-30 ENCOUNTER — ANCILLARY PROCEDURE (OUTPATIENT)
Dept: GENERAL RADIOLOGY | Facility: CLINIC | Age: 79
End: 2019-09-30
Attending: NURSE PRACTITIONER
Payer: COMMERCIAL

## 2019-09-30 VITALS
HEIGHT: 65 IN | BODY MASS INDEX: 33.45 KG/M2 | SYSTOLIC BLOOD PRESSURE: 136 MMHG | WEIGHT: 200.8 LBS | OXYGEN SATURATION: 96 % | DIASTOLIC BLOOD PRESSURE: 82 MMHG | HEART RATE: 84 BPM | TEMPERATURE: 97.5 F | RESPIRATION RATE: 16 BRPM

## 2019-09-30 DIAGNOSIS — Z23 NEED FOR PROPHYLACTIC VACCINATION AND INOCULATION AGAINST INFLUENZA: ICD-10-CM
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
## 2019-09-30 DIAGNOSIS — R07.81 RIB PAIN ON LEFT SIDE: ICD-10-CM

## 2019-09-30 PROCEDURE — G0008 ADMIN INFLUENZA VIRUS VAC: HCPCS | Performed by: NURSE PRACTITIONER

## 2019-09-30 PROCEDURE — 99213 OFFICE O/P EST LOW 20 MIN: CPT | Mod: 25 | Performed by: NURSE PRACTITIONER

## 2019-09-30 PROCEDURE — 90662 IIV NO PRSV INCREASED AG IM: CPT | Performed by: NURSE PRACTITIONER

## 2019-09-30 PROCEDURE — 71101 X-RAY EXAM UNILAT RIBS/CHEST: CPT | Mod: LT

## 2019-09-30 ASSESSMENT — MIFFLIN-ST. JEOR: SCORE: 1396.7

## 2019-09-30 NOTE — PROGRESS NOTES
Subjective     Precious Paris is a 78 year old female who presents to clinic today for the following health issues:    HPI   Joint Pain    Onset: 2 weeks     Description:   Location: left side and rib pain   Character: Dull ache and sharp stabbing when she coughs or sneezes     Intensity: moderate    Progression of Symptoms: better    Accompanying Signs & Symptoms:  Other symptoms: radiation of pain to left breast    History:   Previous similar pain: no       Precipitating factors:   Trauma or overuse: YES- tripped on bedspread and fell onto left side, elbow went into her ribcage     Alleviating factors:  Improved by: rest/inactivity, acetaminophen and Ibuprofen    Therapies Tried and outcome: listed above       Patient Active Problem List   Diagnosis     Spinal stenosis in cervical region     Varicose veins of lower extremities with inflammation     Malabsorption of glucose     OA (osteoarthritis) of knee     Premature atrial contractions     HYPERLIPIDEMIA LDL GOAL <130     Healthcare maintenance     Hx of skin cancer, basal cell     Advanced directives, counseling/discussion     Hammer toe     Hypertension, goal below 150/90     Primary osteoarthritis of both hands     Senile nuclear cataract     Hypertensive retinopathy of right eye     Past Surgical History:   Procedure Laterality Date     BIOPSY       HC COLONOSCOPY THRU STOMA, DIAGNOSTIC  04/05    diverticulosis and hemorroids, due 2015     JOINT REPLACEMTN, KNEE RT/LT  2010    right     JOINT REPLACEMTN, KNEE RT/LT  2011    left     LIGATE VEIN VARICOSE, PHLEBECTOMY MULTIPLE STAB, COMBINED  10/17/2013    Procedure: COMBINED LIGATE VEIN VARICOSE, PHLEBECTOMY MULTIPLE STAB;  Phlebectomy of Right Knee Varicose Veins;  Surgeon: Andrea Duffy MD;  Location: [facility]     OSTEOTOMY FOOT  8/19/2013    Procedure: OSTEOTOMY FOOT;  Left 2nd metatarsal osteotomy, 2nd Metatarsophalangeal joint & 2nd toe correction;  Surgeon: Jose Phillips DPM;  Location:  "[facility]     PHACOEMULSIFICATION WITH STANDARD INTRAOCULAR LENS IMPLANT Left 12/7/2017    Procedure: PHACOEMULSIFICATION WITH STANDARD INTRAOCULAR LENS IMPLANT;  Left Cataract Removal with Implant;  Surgeon: Mata Deleon MD;  Location: [facility]     SURGICAL HISTORY OF -   1979    Stripping of Veins in Left Leg     SURGICAL HISTORY OF -   1970    Bilateral Tubal Ligation     SURGICAL HISTORY OF -   2000    Carpal Tunnel Repair, right     SURGICAL HISTORY OF -   9-12-08    Rt let ablation       Social History     Tobacco Use     Smoking status: Former Smoker     Smokeless tobacco: Never Used     Tobacco comment: quit 20 years ago   Substance Use Topics     Alcohol use: Yes     Comment: occ.     Family History   Problem Relation Age of Onset     Hypertension Mother      Cerebrovascular Disease Mother      Hypertension Father      Cerebrovascular Disease Father      Hypertension Brother      Cancer Brother         lymphoma     Hypertension Sister      Gastrointestinal Disease Sister      Hypertension Brother      Cancer Brother         melanoma     Eye Disorder Brother      Hypertension Brother      Hypertension Brother      Heart Defect Son              Reviewed and updated as needed this visit by Provider         Review of Systems   ROS COMP: Constitutional, HEENT, cardiovascular, pulmonary, gi and gu systems are negative, except as otherwise noted.      Objective    /82 (BP Location: Right arm, Patient Position: Sitting, Cuff Size: Adult Regular)   Pulse 84   Temp 97.5  F (36.4  C) (Tympanic)   Resp 16   Ht 1.651 m (5' 5\")   Wt 91.1 kg (200 lb 12.8 oz)   SpO2 96%   BMI 33.41 kg/m    Body mass index is 33.41 kg/m .  Physical Exam   GENERAL: alert, no distress and elderly  RESP: lungs clear to auscultation - no rales, rhonchi or wheezes  CV: regular rates and rhythm and normal S1 S2, no S3 or S4  MS: tenderness to palpation - localized to left lateral chest wall at bra line  SKIN: no suspicious lesions or " "rashes  NEURO: Normal strength and tone, mentation intact and speech normal    Diagnostic Test Results:  Labs reviewed in Epic  Xray - per my read, no rib deformities. Pending radiology review.        Assessment & Plan       ICD-10-CM    1. Fall, initial encounter W19.XXXA XR Ribs & Chest Left G/E 3 Views   2. Rib pain on left side R07.81 XR Ribs & Chest Left G/E 3 Views   3. Need for prophylactic vaccination and inoculation against influenza Z23 INFLUENZA (HIGH DOSE) 3 VALENT VACCINE [22644]     Vaccine Administration, Initial [91870]        BMI:   Estimated body mass index is 33.41 kg/m  as calculated from the following:    Height as of this encounter: 1.651 m (5' 5\").    Weight as of this encounter: 91.1 kg (200 lb 12.8 oz).         FUTURE APPOINTMENTS:       - Follow-up visit in 2-3 weeks for persistent pain, sooner PRN new or worsening symptoms. Counseled on deep breathing exercises daily and watch for signs of pneumonia. Recommend OTC lidocaine patches for pain.    Patient Instructions     Patient Education     Rib Contusion or Minor Fracture    A rib contusion is a bruise to one or more rib bones. It may cause pain, tenderness, swelling, and a purplish color to the skin. There may be a sharp pain with each breath. A rib contusion takes anywhere from a few days to a few weeks to heal. A minor rib fracture or break may cause the same symptoms as a rib contusion. The small crack may not be seen on a regular chest X-ray. Treatment for both problems is basically the same.  Home care    You may use over-the-counter pain medicine to control pain, unless another pain medicine was prescribed. If you have chronic liver or kidney disease or ever had a stomach ulcer or GI (gastrointestinal) bleeding, talk with your healthcare provider before using these medicines.    Rest. Don't lift anything heavy or do any activity that causes pain.    Apply an ice pack over the injured area for 15 to 20 minutes every 1 to 2 hours. You " should do this for the first 24 to 48 hours. To make an ice pack, put ice cubes in a plastic bag that seals at the top. Wrap the bag in a clean, thin towel or cloth. Never put ice or an ice pack directly on the skin. Continue with ice packs as needed for the relief of pain and swelling.    The first 3 to 4 weeks of healing will be the most painful. If your pain is not under control with the treatment given, call your healthcare provider. Sometimes a stronger pain medicine may be needed. A nerve block can be done in case of severe pain. It will numb the nerve between the ribs.  Follow-up care  Follow up with your healthcare provider, or as advised.  If X-rays were taken, you will be told of any new findings that may affect your care.  Call 911  Call 911 if you have:    Dizziness, weakness or fainting    Shortness of breath with or without chest discomfort    New or worsening pain  When to seek medical advice  Call your healthcare provider right away if any of these occur:    Fever of 100.4 F (38 C) or higher, or as directed by your healthcare provider    Chills    Stomach pain, vomiting  Date Last Reviewed: 6/1/2018 2000-2018 [employer]. [address] [place] [address], [place] [address]. All rights reserved. This information is not intended as a substitute for professional medical care. Always follow your healthcare professional's instructions.               No follow-ups on file.    KONG Smith [facility]

## 2019-09-30 NOTE — RESULT ENCOUNTER NOTE
Please send patient letter notifying of labs and provider message.    Negative radiology report. Please let us know if you have any questions.      Thanks,  KONG Pyle CNP

## 2019-09-30 NOTE — PATIENT INSTRUCTIONS
Patient Education     Rib Contusion or Minor Fracture    A rib contusion is a bruise to one or more rib bones. It may cause pain, tenderness, swelling, and a purplish color to the skin. There may be a sharp pain with each breath. A rib contusion takes anywhere from a few days to a few weeks to heal. A minor rib fracture or break may cause the same symptoms as a rib contusion. The small crack may not be seen on a regular chest X-ray. Treatment for both problems is basically the same.  Home care    You may use over-the-counter pain medicine to control pain, unless another pain medicine was prescribed. If you have chronic liver or kidney disease or ever had a stomach ulcer or GI (gastrointestinal) bleeding, talk with your healthcare provider before using these medicines.    Rest. Don't lift anything heavy or do any activity that causes pain.    Apply an ice pack over the injured area for 15 to 20 minutes every 1 to 2 hours. You should do this for the first 24 to 48 hours. To make an ice pack, put ice cubes in a plastic bag that seals at the top. Wrap the bag in a clean, thin towel or cloth. Never put ice or an ice pack directly on the skin. Continue with ice packs as needed for the relief of pain and swelling.    The first 3 to 4 weeks of healing will be the most painful. If your pain is not under control with the treatment given, call your healthcare provider. Sometimes a stronger pain medicine may be needed. A nerve block can be done in case of severe pain. It will numb the nerve between the ribs.  Follow-up care  Follow up with your healthcare provider, or as advised.  If X-rays were taken, you will be told of any new findings that may affect your care.  Call 911  Call 911 if you have:    Dizziness, weakness or fainting    Shortness of breath with or without chest discomfort    New or worsening pain  When to seek medical advice  Call your healthcare provider right away if any of these occur:    Fever of 100.4 F  (38 C) or higher, or as directed by your healthcare provider    Chills    Stomach pain, vomiting  Date Last Reviewed: 6/1/2018 2000-2018 [employer], [employer]. [address] [place] [address], [place] [address]. All rights reserved. This information is not intended as a substitute for professional medical care. Always follow your healthcare professional's instructions.

## 2019-09-30 NOTE — LETTER
[facility] [place]  [address]  [facility] [address]  Phone: [phone]      9/30/2019     Precious Paris  [address] [place] [address]      Dear Precious:    Thank you for allowing me to participate in your care. Your recent test results were reviewed and listed below.  Negative radiology report. Please let us know if you have any questions.     Your results are provided below for your review  Results for orders placed or performed in visit on 09/30/19   XR Ribs & Chest Left G/E 3 Views    Narrative    XR RIBS & CHEST LT 3VW 9/30/2019 11:08 AM     HISTORY: Fall, initial encounter; Rib pain on left side      Impression    IMPRESSION: No apparent left rib displaced fracture. The lungs appear  clear. No apparent pneumothorax or pleural effusion.    EMMA CURRY MD                 Thank you for choosing [facility]. As a result, please continue with the treatment plan discussed in the office. Return as discussed or sooner if symptoms worsen or fail to improve.     Sincerely,        KONG Pyle CNP

## 2019-10-26 ENCOUNTER — APPOINTMENT (OUTPATIENT)
Dept: ULTRASOUND IMAGING | Facility: CLINIC | Age: 79
End: 2019-10-26
Attending: PHYSICIAN ASSISTANT
Payer: COMMERCIAL

## 2019-10-26 ENCOUNTER — NURSE TRIAGE (OUTPATIENT)
Dept: NURSING | Facility: CLINIC | Age: 79
End: 2019-10-26

## 2019-10-26 ENCOUNTER — HOSPITAL ENCOUNTER (EMERGENCY)
Facility: CLINIC | Age: 79
Discharge: HOME | End: 2019-10-26
Attending: PHYSICIAN ASSISTANT | Admitting: PHYSICIAN ASSISTANT
Payer: COMMERCIAL

## 2019-10-26 VITALS
RESPIRATION RATE: 16 BRPM | WEIGHT: 195 LBS | HEIGHT: 66 IN | SYSTOLIC BLOOD PRESSURE: 144 MMHG | DIASTOLIC BLOOD PRESSURE: 79 MMHG | OXYGEN SATURATION: 95 % | TEMPERATURE: 97.7 F | BODY MASS INDEX: 31.34 KG/M2

## 2019-10-26 DIAGNOSIS — N61.0 CELLULITIS OF RIGHT BREAST: ICD-10-CM

## 2019-10-26 PROCEDURE — 99214 OFFICE O/P EST MOD 30 MIN: CPT | Mod: Z6 | Performed by: PHYSICIAN ASSISTANT

## 2019-10-26 PROCEDURE — 76642 ULTRASOUND BREAST LIMITED: CPT | Mod: RT

## 2019-10-26 PROCEDURE — G0463 HOSPITAL OUTPT CLINIC VISIT: HCPCS | Mod: 25 | Performed by: PHYSICIAN ASSISTANT

## 2019-10-26 RX ORDER — CEPHALEXIN 500 MG/1
500 CAPSULE ORAL 4 TIMES DAILY
Qty: 40 CAPSULE | Refills: 0 | Status: SHIPPED | OUTPATIENT
Start: 2019-10-26 | End: 2019-11-14

## 2019-10-26 ASSESSMENT — ENCOUNTER SYMPTOMS
CONSTITUTIONAL NEGATIVE: 1
FEVER: 0

## 2019-10-26 ASSESSMENT — MIFFLIN-ST. JEOR: SCORE: 1378.32

## 2019-10-26 NOTE — ED AVS SNAPSHOT
[facility] Emergency Department  [address] [facility] [address]  Phone:  [phone]  Fax:  [phone]                                    Precious Paris   MRN: [id number]    Department:  [facility] Emergency Department   Date of Visit:  10/26/2019           After Visit Summary Signature Page    I have received my discharge instructions, and my questions have been answered. I have discussed any challenges I see with this plan with the nurse or doctor.    ..........................................................................................................................................  Patient/Patient Representative Signature      ..........................................................................................................................................  Patient Representative Print Name and Relationship to Patient    ..................................................               ................................................  Date                                   Time    ..........................................................................................................................................  Reviewed by Signature/Title    ...................................................              ..............................................  Date                                               Time          22EPIC Rev 08/18

## 2019-10-26 NOTE — TELEPHONE ENCOUNTER
"S: Calling about lump in right breast.  B: Yesterday was raking, today has a lump on right breast.  The lump is red, warm, near axilla, and 2\" in diameter.    A: Per guidelines to see a provider within 24 hours.  R: Reviewed care guidelines and Precious will go to [facility].  Celia Posada RN, [place]      Reason for Disposition    [1Breast looks infected (spreading redness, feels hot or painful to touch) AND [2] no fever    Additional Information    Negative: [1] SEVERE breast pain AND [2] fever > 103 F (39.4 C)    Negative: Patient sounds very sick or weak to the triager    Protocols used: BREAST SYMPTOMS-A-AH      "

## 2019-10-26 NOTE — ED PROVIDER NOTES
History     Chief Complaint   Patient presents with     Breast Mass     reports she noted a red, sore lump on her breast yesterday     HPI  Precious Paris is a 78 year old female who presents with complaints of red, tender lump to right breast since yesterday.  Patient states she did a lot of yard work yesterday but does not recall ever being poked or injured by anything in the right breast.  Today she noticed a hard lump to the lateral aspect of her right breast with overlying redness of the area.  She states she has had normal mammograms over the years.  Denies fevers, chills, nausea, or vomiting.  No nipple discharge.      Allergies:  Allergies   Allergen Reactions     Conjugated Estrogens Anaphylaxis and Other (See Comments)     Tightness in throat     Medroxyprogesterone Anaphylaxis and Other (See Comments)     Tightness in throat     Ace Inhibitors Cough     Premarin      Tightness in throat     Provera [Medroxyprogesterone Acetate]      Tightness in throat       Problem List:    Patient Active Problem List    Diagnosis Date Noted     Hypertensive retinopathy of right eye 06/03/2019     Priority: Medium     Senile nuclear cataract 12/05/2017     Priority: Medium     Primary osteoarthritis of both hands 11/27/2017     Priority: Medium     Hypertension, goal below 150/90 11/12/2015     Priority: Medium     Advanced directives, counseling/discussion 08/14/2013     Priority: Medium     Patient has completed an Advance/Health Care Directive (HCD), to bring in copy to be scanned into Epic.    Leyla Corral  August 14, 2013         Hammer toe 08/14/2013     Priority: Medium     Hx of skin cancer, basal cell 05/31/2013     Priority: Medium     Healthcare maintenance 04/11/2012     Priority: Medium     DEXA 2009 WNL--next in 2014       HYPERLIPIDEMIA LDL GOAL <130 10/31/2010     Priority: Medium     Premature atrial contractions 06/17/2010     Priority: Medium     Bigeminal on auscultation--recommend  echocardiogram and stress test  Echocardiogram--normal except borderline concentric left ventricular hypertrophy       OA (osteoarthritis) of knee 01/07/2010     Priority: Medium     Malabsorption of glucose 09/09/2008     Priority: Medium     (Problem list name updated by automated process. 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      Diverticulosis of colon (without mention of hemorrhage)      Other and unspecified hyperlipidemia      Pain in joint, lower leg      Plantar fascial fibromatosis      PURE HYPERCHOLESTEROLEM 9/9/2007     PURE HYPERCHOLESTEROLEM 9/9/2007     Squamous cell carcinoma        Past Surgical History:    Past Surgical History:   Procedure Laterality Date     BIOPSY       HC COLONOSCOPY THRU STOMA, DIAGNOSTIC  04/05    diverticulosis and hemorroids, due 2015     JOINT REPLACEMTN, KNEE RT/LT  2010    right     JOINT REPLACEMTN, KNEE RT/LT  2011    left     LIGATE VEIN VARICOSE, PHLEBECTOMY MULTIPLE STAB, COMBINED  10/17/2013    Procedure: COMBINED LIGATE VEIN VARICOSE, PHLEBECTOMY MULTIPLE STAB;  Phlebectomy of Right Knee Varicose Veins;  Surgeon: Andrea Duffy MD;  Location: [facility]     OSTEOTOMY FOOT  8/19/2013    Procedure: OSTEOTOMY FOOT;  Left 2nd metatarsal osteotomy, 2nd Metatarsophalangeal joint & 2nd toe correction;  Surgeon: Jose Phillips DPM;  Location: [facility]     PHACOEMULSIFICATION WITH STANDARD INTRAOCULAR LENS IMPLANT Left 12/7/2017    Procedure: PHACOEMULSIFICATION WITH STANDARD INTRAOCULAR LENS IMPLANT;  Left  "Cataract Removal with Implant;  Surgeon: Mata Deleon MD;  Location: [facility]     SURGICAL HISTORY OF -   1979    Stripping of Veins in Left Leg     SURGICAL HISTORY OF -   1970    Bilateral Tubal Ligation     SURGICAL HISTORY OF -   2000    Carpal Tunnel Repair, right     SURGICAL HISTORY OF -   9-12-08    Rt let ablation       Family History:    Family History   Problem Relation Age of Onset     Hypertension Mother      Cerebrovascular Disease Mother      Hypertension Father      Cerebrovascular Disease Father      Hypertension Brother      Cancer Brother         lymphoma     Hypertension Sister      Gastrointestinal Disease Sister      Hypertension Brother      Cancer Brother         melanoma     Eye Disorder Brother      Hypertension Brother      Hypertension Brother      Heart Defect Son        Social History:  Marital Status:   [2]  Social History     Tobacco Use     Smoking status: Former Smoker     Smokeless tobacco: Never Used     Tobacco comment: quit 20 years ago   Substance Use Topics     Alcohol use: Yes     Comment: occ.     Drug use: No        Medications:    cephALEXin (KEFLEX) 500 MG capsule  ASPIRIN 81 MG OR TABS  CALCIUM OR  CENTRUM SILVER OR  diclofenac (VOLTAREN) 1 % GEL topical gel  diclofenac (VOLTAREN) 1 % topical gel  fish oil-omega-3 fatty acids (OMEGA 3) 1000 MG capsule  IBUPROFEN 200 MG OR TABS  losartan-hydrochlorothiazide (HYZAAR) 50-12.5 MG tablet  melatonin 3 MG tablet  simvastatin (ZOCOR) 20 MG tablet  VITAMIN C OR  VITAMIN D, CHOLECALCIFEROL, PO  VITAMIN E PO          Review of Systems   Constitutional: Negative.  Negative for fever.   Skin:        Redness, warmth, and lump to right breast   All other systems reviewed and are negative.      Physical Exam   BP: (!) 144/79  Heart Rate: 82  Temp: 97.7  F (36.5  C)  Resp: 16  Height: 166.4 cm (5' 5.5\")  Weight: 88.5 kg (195 lb)  SpO2: 95 %      Physical Exam  Constitutional:       General: She is not in acute distress.     " Appearance: She is well-developed. She is not ill-appearing, toxic-appearing or diaphoretic.   HENT:      Head: Normocephalic and atraumatic.   Eyes:      Conjunctiva/sclera: Conjunctivae normal.   Neck:      Musculoskeletal: Neck supple.   Cardiovascular:      Rate and Rhythm: Normal rate and regular rhythm.   Pulmonary:      Effort: Pulmonary effort is normal.      Breath sounds: Normal breath sounds and air entry.   Chest:      Breasts:         Right: Mass and skin change present. No bleeding, inverted nipple, nipple discharge or tenderness.        Comments: Area of erythema and warmth to to lateral right breast measuring approximately 6 cm x 10 cm.  There is a central area of firm induration/lump.  No fluctuance.  No drainage.  Musculoskeletal: Normal range of motion.   Skin:     General: Skin is warm and dry.      Findings: No rash.   Neurological:      Mental Status: She is alert.         ED Course        Procedures    No results found for this or any previous visit (from the past 24 hour(s)).    Medications - No data to display    Assessments & Plan (with Medical Decision Making)     Pt is a 78 year old female who presents with complaints of red, tender lump to right breast since yesterday.  Patient states she did a lot of yard work yesterday but does not recall ever being poked or injured by anything in the right breast.  Today she noticed a hard lump to the lateral aspect of her right breast with overlying redness of the area.  She states she has had normal mammograms over the years.  She denies fevers.  Pt is afebrile on arrival.  Exam as above.  Patient is noted to have an area of erythema and warmth to to lateral right breast measuring approximately 6 cm x 10 cm.  There is a central area of firm induration.  No fluctuance.  No drainage.  Ultrasound of the area was obtained and it appears that there are 2 hypoechoic areas that appear like fluid collections in the area of patient's exam findings.  Patient  does not have any superficial fluctuance and this induration is deeper and I therefore do not feel comfortable draining or needle aspirating this.  Discussed with ED physicians and will place an outpatient IR referral in hopes that patient may be able to have this area needle aspirated and culture sent for both cytology and cultures.  Will place patient on antibiotics in the meantime.  Patient is in agreement with this plan.  She is to return to the emergency department in the meantime for any worsening pain, redness, swelling, fevers, vomiting, etc.  Area of erythema was outlined with permanent marker for monitoring purposes.  Return precautions were reviewed at length.  Hand-outs were provided.    Patient was sent with Keflex and was instructed to follow-up as soon as possible for percutaneous needle drainage (IR referral was made) and for continued care and management.  She is to return to the ED for persistent and/or worsening symptoms.  Patient expressed understanding of the diagnosis and plan and was discharged home in good condition.    I have reviewed the nursing notes.    I have reviewed the findings, diagnosis, plan and need for follow up with the patient.    Discharge Medication List as of 10/26/2019  3:56 PM      START taking these medications    Details   cephALEXin (KEFLEX) 500 MG capsule Take 1 capsule (500 mg) by mouth 4 times daily for 10 days, Disp-40 capsule, R-0, E-Prescribe             Final diagnoses:   Cellulitis of right breast       10/26/2019   [facility] EMERGENCY DEPARTMENT      Disclaimer:  This note consists of symbols derived from keyboarding, dictation and/or voice recognition software.  As a result, there may be errors in the script that have gone undetected.  Please consider this when interpreting information found in this chart.     Nallely Burt PA-C  10/26/19 2049       Nallely Burt PA-C  10/26/19 2050       Nallely Burt PA-C  10/26/19 2050

## 2019-10-28 ENCOUNTER — TELEPHONE (OUTPATIENT)
Dept: OTHER | Facility: CLINIC | Age: 79
End: 2019-10-28

## 2019-10-28 DIAGNOSIS — N63.0 BREAST MASS: Primary | ICD-10-CM

## 2019-10-28 DIAGNOSIS — R92.8 OTHER ABNORMAL AND INCONCLUSIVE FINDINGS ON DIAGNOSTIC IMAGING OF BREAST: ICD-10-CM

## 2019-10-28 NOTE — TELEPHONE ENCOUNTER
Patient called.  Was seen in the ER and wants to schedule procedure.  I did a chart review and there was a IR referral placed.    I did explain to the patient that this is not an order for a procedure, it is for outpatient consults only.  That she will need to contact her primary physician and have an appropriate order placed.      Meggan Bernal RN  IR nurse clinician  [phone]

## 2019-10-28 NOTE — TELEPHONE ENCOUNTER
Talked with radiologist.  Patient needs diag mammogram and US with possibility of biopsy..  Appropriate order placed.  He will have diagnostics reach out to patient about scheduling.     Ester Jorge M.D.

## 2019-10-29 ENCOUNTER — TELEPHONE (OUTPATIENT)
Dept: FAMILY MEDICINE | Facility: CLINIC | Age: 79
End: 2019-10-29

## 2019-10-29 NOTE — TELEPHONE ENCOUNTER
Reason for Call:  Other breast problem    Detailed comments: Patient states she found a lump on her right breast.    Phone Number Patient can be reached at: Home number on file [phone] (home)    Best Time: any    Can we leave a detailed message on this number? YES    Call taken on 10/29/2019 at 9:12 AM by Marta Campbell

## 2019-10-29 NOTE — TELEPHONE ENCOUNTER
Pt was seen in the ER on on 10/26/19 for breast infection and breast lumps.  Breast US was done.  Pt was given Keflex 500 mg 1 tab 4x/day x 10 days.  Pt wanting to move up her Diagnostic Mammo and R Breast US complete which is scheduled on 11/20/19.  Pt will need to have these done and FNA to follow.  Waiting on call back from US to move up appt.  [facility]    Spoke with Diagnostics and pt is scheduled for Diagnostic Mammogram, US and FNA BX on 10/30/19 @ [facility].    Appt with  on 11/1/19 for ER f/u, f/u on infection/ABX and possible FNA results.    [facility]

## 2019-10-30 ENCOUNTER — HOSPITAL ENCOUNTER (OUTPATIENT)
Dept: ULTRASOUND IMAGING | Facility: CLINIC | Age: 79
End: 2019-10-30
Attending: FAMILY MEDICINE
Payer: COMMERCIAL

## 2019-10-30 ENCOUNTER — HOSPITAL ENCOUNTER (OUTPATIENT)
Dept: MAMMOGRAPHY | Facility: CLINIC | Age: 79
Discharge: HOME | End: 2019-10-30
Attending: FAMILY MEDICINE | Admitting: FAMILY MEDICINE
Payer: COMMERCIAL

## 2019-10-30 ENCOUNTER — HOSPITAL ENCOUNTER (OUTPATIENT)
Dept: MAMMOGRAPHY | Facility: CLINIC | Age: 79
End: 2019-10-30
Attending: FAMILY MEDICINE
Payer: COMMERCIAL

## 2019-10-30 VITALS — DIASTOLIC BLOOD PRESSURE: 85 MMHG | SYSTOLIC BLOOD PRESSURE: 127 MMHG

## 2019-10-30 DIAGNOSIS — R92.8 OTHER ABNORMAL AND INCONCLUSIVE FINDINGS ON DIAGNOSTIC IMAGING OF BREAST: ICD-10-CM

## 2019-10-30 DIAGNOSIS — N63.0 BREAST MASS: ICD-10-CM

## 2019-10-30 PROCEDURE — 88305 TISSUE EXAM BY PATHOLOGIST: CPT | Mod: 26 | Performed by: RADIOLOGY

## 2019-10-30 PROCEDURE — 25000125 ZZHC RX 250: Performed by: RADIOLOGY

## 2019-10-30 PROCEDURE — 40000986 MA POST PROCEDURE RIGHT

## 2019-10-30 PROCEDURE — 88360 TUMOR IMMUNOHISTOCHEM/MANUAL: CPT | Performed by: RADIOLOGY

## 2019-10-30 PROCEDURE — 88341 IMHCHEM/IMCYTCHM EA ADD ANTB: CPT | Mod: 26,XU | Performed by: RADIOLOGY

## 2019-10-30 PROCEDURE — 27210206 US BREAST BIOPSY CORE NEEDLE RIGHT

## 2019-10-30 PROCEDURE — 76642 ULTRASOUND BREAST LIMITED: CPT | Mod: RT

## 2019-10-30 PROCEDURE — 88341 IMHCHEM/IMCYTCHM EA ADD ANTB: CPT | Mod: XU | Performed by: RADIOLOGY

## 2019-10-30 PROCEDURE — G0279 TOMOSYNTHESIS, MAMMO: HCPCS

## 2019-10-30 PROCEDURE — 88360 TUMOR IMMUNOHISTOCHEM/MANUAL: CPT | Mod: 26 | Performed by: RADIOLOGY

## 2019-10-30 PROCEDURE — 88305 TISSUE EXAM BY PATHOLOGIST: CPT | Performed by: RADIOLOGY

## 2019-10-30 PROCEDURE — 88342 IMHCHEM/IMCYTCHM 1ST ANTB: CPT | Mod: 26,XU | Performed by: RADIOLOGY

## 2019-10-30 PROCEDURE — 88342 IMHCHEM/IMCYTCHM 1ST ANTB: CPT | Performed by: RADIOLOGY

## 2019-10-30 PROCEDURE — 77066 DX MAMMO INCL CAD BI: CPT

## 2019-10-30 RX ADMIN — LIDOCAINE HYDROCHLORIDE 7 ML: 10 INJECTION, SOLUTION EPIDURAL; INFILTRATION; INTRACAUDAL; PERINEURAL at 14:19

## 2019-10-30 NOTE — DISCHARGE INSTRUCTIONS
Breast Biopsy Care Instructions      Site Care:    You may have some discomfort in the breast and may see some bruising at the needle site.    You will be given an ice pack which should be kept in place over the dressing until bedtime tonight.Always keep a gauze pad between your skin and the ice pack.    Wearing your bra continuously for 24 hours post biopsy will give optimal support to the biopsy site.    Activity:       You may go back to your normal routine.     No heavy lifting for 24 hours.    Diet and Medications:       You may go back to your regular diet.     Tylenol is the best choice to relieve your discomfort, the first 24 hours. If you have no bleeding on the first day, Ibuprofen (such as Advil, or Motrin), may be taken on the second day after for pain.     Do not take aspirin for 72 hours following your biopsy.    Questions:     If you have any questions about your procedure performed in Ultrasound, you may contact us at [phone].If you have any questions about your procedure performed in Mammography, you may contact us at [phone].    Results:       The pathology report on your biopsy is usually available within 72 hours. The Radiologist or your personal physician will call you with the results.     You will receive information about any further follow up from your physician.    Call your doctor if you have:       More than slight bleeding or significant pain, or experience swelling of the breast.     If your physician is unavailable, please call the Nurse Advice Line at [phone], or [facility] Emergency Department at [phone].      Patient:______________________________  Time:_________  Date:_____________    Instructor:____________________________   Time:_________  Date:_____________

## 2019-10-30 NOTE — IP AVS SNAPSHOT
[facility] Ultrasound  [address] [place]  [place] [address]  Phone:  [phone]                                    After Visit Summary   10/30/2019    Precious Paris    MRN: [id number]           After Visit Summary Signature Page    I have received my discharge instructions, and my questions have been answered. I have discussed any challenges I see with this plan with the nurse or doctor.    ..........................................................................................................................................  Patient/Patient Representative Signature      ..........................................................................................................................................  Patient Representative Print Name and Relationship to Patient    ..................................................               ................................................  Date                                   Time    ..........................................................................................................................................  Reviewed by Signature/Title    ...................................................              ..............................................  Date                                               Time          22EPIC Rev 08/18

## 2019-11-04 ENCOUNTER — TELEPHONE (OUTPATIENT)
Dept: FAMILY MEDICINE | Facility: CLINIC | Age: 79
End: 2019-11-04

## 2019-11-05 LAB — COPATH REPORT: NORMAL

## 2019-11-06 ENCOUNTER — TELEPHONE (OUTPATIENT)
Dept: FAMILY MEDICINE | Facility: CLINIC | Age: 79
End: 2019-11-06

## 2019-11-06 NOTE — TELEPHONE ENCOUNTER
Reason for Call:  Other     Detailed comments: pt is calling for her biopsy results dated 10/30/19.  Please advise    Phone Number Patient can be reached at: Home number on file [phone] (home)    Best Time: any    Can we leave a detailed message on this number? YES    Call taken on 11/6/2019 at 10:20 AM by Lidia Graham

## 2019-11-06 NOTE — TELEPHONE ENCOUNTER
Dr. Jorge, would you like to see patient in the office to discuss or call her?    Bailee Barrera RN

## 2019-11-06 NOTE — TELEPHONE ENCOUNTER
Called patient with result.  I have been in contact with oncology team regarding referral/appointments.    Ester Jorge M.D.

## 2019-11-08 NOTE — TELEPHONE ENCOUNTER
RECORDS STATUS - ALL OTHER DIAGNOSIS      RECORDS RECEIVED FROM: Epic/CE   DATE RECEIVED: 11/13/2019    NOTES STATUS DETAILS   OFFICE NOTE from referring provider Complete [facility]   OFFICE NOTE from medical oncologist N/A    DISCHARGE SUMMARY from hospital N/A    DISCHARGE REPORT from the ER N/A    OPERATIVE REPORT N/A    MEDICATION LIST Complete [facility]   CLINICAL TRIAL TREATMENTS TO DATE N/A    LABS     PATHOLOGY REPORTS Complete [facility]   ANYTHING RELATED TO DIAGNOSIS Complete EPIC   GENONOMIC TESTING     TYPE:     IMAGING (NEED IMAGES & REPORT)     CT SCANS     MRI     MAMMO Complete PACS   ULTRASOUND Complete PACS   PET

## 2019-11-13 ENCOUNTER — ONCOLOGY VISIT (OUTPATIENT)
Dept: ONCOLOGY | Facility: CLINIC | Age: 79
End: 2019-11-13
Attending: INTERNAL MEDICINE
Payer: COMMERCIAL

## 2019-11-13 ENCOUNTER — PRE VISIT (OUTPATIENT)
Dept: ONCOLOGY | Facility: CLINIC | Age: 79
End: 2019-11-13

## 2019-11-13 VITALS
WEIGHT: 199.3 LBS | RESPIRATION RATE: 18 BRPM | DIASTOLIC BLOOD PRESSURE: 66 MMHG | HEART RATE: 97 BPM | SYSTOLIC BLOOD PRESSURE: 164 MMHG | TEMPERATURE: 98.3 F | OXYGEN SATURATION: 93 % | HEIGHT: 66 IN | BODY MASS INDEX: 32.03 KG/M2

## 2019-11-13 DIAGNOSIS — I10 HYPERTENSION, GOAL BELOW 150/90: ICD-10-CM

## 2019-11-13 DIAGNOSIS — E78.5 HYPERLIPIDEMIA LDL GOAL <130: ICD-10-CM

## 2019-11-13 DIAGNOSIS — H35.031 HYPERTENSIVE RETINOPATHY OF RIGHT EYE: Primary | ICD-10-CM

## 2019-11-13 DIAGNOSIS — C50.912 INFILTRATING DUCTAL CARCINOMA OF LEFT BREAST (H): ICD-10-CM

## 2019-11-13 PROCEDURE — 99205 OFFICE O/P NEW HI 60 MIN: CPT | Performed by: INTERNAL MEDICINE

## 2019-11-13 PROCEDURE — G0463 HOSPITAL OUTPT CLINIC VISIT: HCPCS

## 2019-11-13 ASSESSMENT — PAIN SCALES - GENERAL: PAINLEVEL: NO PAIN (0)

## 2019-11-13 ASSESSMENT — MIFFLIN-ST. JEOR: SCORE: 1392.83

## 2019-11-13 NOTE — LETTER
"    11/13/2019         RE: Precious Paris  [address] [place] [address]        Dear Colleague,    Thank you for referring your patient, Precious Paris, to the [facility]. Please see a copy of my visit note below.    Oncology Rooming Note    November 13, 2019 2:26 PM   Precious Paris is a 79 year old female who presents for:    Chief Complaint   Patient presents with     Oncology Clinic Visit     NEW, breast cancer.      Initial Vitals: BP (!) 164/66 (BP Location: Right arm, Patient Position: Sitting, Cuff Size: Adult Large)   Pulse 97   Temp 98.3  F (36.8  C) (Tympanic)   Resp 18   Ht 1.664 m (5' 5.5\")   Wt 90.4 kg (199 lb 4.8 oz)   SpO2 93%   Breastfeeding No   BMI 32.66 kg/m    Estimated body mass index is 32.66 kg/m  as calculated from the following:    Height as of this encounter: 1.664 m (5' 5.5\").    Weight as of this encounter: 90.4 kg (199 lb 4.8 oz). Body surface area is 2.04 meters squared.  No Pain (0) Comment: Data Unavailable   No LMP recorded. Patient is postmenopausal.  Allergies reviewed: Yes  Medications reviewed: Yes    Medications: Medication refills not needed today.  Pharmacy name entered into [facility]: [facility] [place] - [id number] [facility]    Clinical concerns: NEW, breast cancer. Occasionally will feel a ache in her right breast.     Jaimee Kelley, [facility]              DATE OF VISIT: Nov 13, 2019    REASON FOR REFERRAL: Management of breast cancer    CHIEF COMPLAINT:   Chief Complaint   Patient presents with     Oncology Clinic Visit     NEW, breast cancer.        HISTORY OF PRESENT ILLNESS:   Precious Paris is a 79-year-old female patient who presented with pain and redness in the right breast associated with a breast lump.  She was treated with antibiotic for 10 days redness have resolved.  She underwent a mammography demonstrated 2 adjacent masses which were suspicious.  Right breast ultrasound was done on October 26, " 2019 showing 2 hypoechoic structure seen at 9-10 o'clock position 7 cm from the nipple on the right 1 of these appears to have at least some component of echogenic hilum.  Survey of the right axilla reveals no adenopathy.  Subsequently the patient went on to have breast biopsy.  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  It appears that hormone receptors were negative.  Patient is here to discuss these findings.    REVIEW OF SYSTEMS:   Constitutional: Negative for fever, chills, and night sweats.  Skin: negative.  Eyes: negative.  Ears/Nose/Throat: negative.  Respiratory: No shortness of breath, dyspnea on exertion, cough, or hemoptysis.  Cardiovascular: negative.  Gastrointestinal: negative.  Genitourinary: negative.  Musculoskeletal: negative.  Neurologic: negative.  Psychiatric: negative.  Hematologic/Lymphatic/Immunologic: negative.  Endocrine: negative.    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     anosmia     Diverticulosis of colon (without mention of hemorrhage)      Other and unspecified hyperlipidemia      Pain in joint, lower leg      Plantar fascial fibromatosis      PURE HYPERCHOLESTEROLEM 9/9/2007     PURE HYPERCHOLESTEROLEM 9/9/2007     Squamous cell carcinoma        PAST SURGICAL HISTORY:   Past Surgical History:   Procedure Laterality Date     BIOPSY       HC COLONOSCOPY THRU STOMA, DIAGNOSTIC  04/05    diverticulosis and hemorroids, due 2015     JOINT REPLACEMTN, KNEE RT/LT  2010    right     JOINT REPLACEMTN, KNEE RT/LT  2011    left     LIGATE VEIN VARICOSE, PHLEBECTOMY MULTIPLE STAB, COMBINED  10/17/2013    Procedure: COMBINED LIGATE VEIN VARICOSE, PHLEBECTOMY  MULTIPLE STAB;  Phlebectomy of Right Knee Varicose Veins;  Surgeon: Andrea Duffy MD;  Location: [facility] OR     OSTEOTOMY FOOT  8/19/2013    Procedure: OSTEOTOMY FOOT;  Left 2nd metatarsal osteotomy, 2nd Metatarsophalangeal joint & 2nd toe correction;  Surgeon: Jose Phillips DPM;  Location: [facility] OR     PHACOEMULSIFICATION WITH STANDARD INTRAOCULAR LENS IMPLANT Left 12/7/2017    Procedure: PHACOEMULSIFICATION WITH STANDARD INTRAOCULAR LENS IMPLANT;  Left Cataract Removal with Implant;  Surgeon: Mata Deleon MD;  Location: [facility] OR     SURGICAL HISTORY OF -   1979    Stripping of Veins in Left Leg     SURGICAL HISTORY OF -   1970    Bilateral Tubal Ligation     SURGICAL HISTORY OF -   2000    Carpal Tunnel Repair, right     SURGICAL HISTORY OF -   9-12-08    Rt let ablation       ALLERGIES:   Allergies as of 11/13/2019 - Reviewed 11/13/2019   Allergen Reaction Noted     Conjugated estrogens Anaphylaxis and Other (See Comments) 10/28/2015     Medroxyprogesterone Anaphylaxis and Other (See Comments) 10/28/2015     Ace inhibitors Cough 04/11/2016     Premarin  04/12/2005     Provera [medroxyprogesterone acetate]  04/12/2005       MEDICATIONS:   Current Outpatient Medications   Medication Sig Dispense Refill     ASPIRIN 81 MG OR TABS Take 1 tablet by mouth daily       CALCIUM OR 1 DAILY       CENTRUM SILVER OR 1 TABLET DAILY       diclofenac (VOLTAREN) 1 % GEL topical gel Apply 2 grams to hands four times daily using enclosed dosing card. 100 g 1     fish oil-omega-3 fatty acids (OMEGA 3) 1000 MG capsule Take 1 capsule (1 g) by mouth daily 30 capsule 1     losartan-hydrochlorothiazide (HYZAAR) 50-12.5 MG tablet Take 1 tablet by mouth daily 90 tablet 3     melatonin 3 MG tablet Take 1 tablet (3 mg) by mouth nightly as needed for sleep       simvastatin (ZOCOR) 20 MG tablet Take 1 tablet (20 mg) by mouth At Bedtime 90 tablet 3     VITAMIN C OR 1 DAILY       VITAMIN D, CHOLECALCIFEROL, PO Take by mouth daily        VITAMIN E PO        Acetaminophen 325 MG CAPS Take 325-650 mg by mouth every 4 hours as needed (pain)       IBUPROFEN 200 MG OR TABS Take 400 mg by mouth 2 times daily as needed for pain          FAMILY HISTORY:   Family History   Problem Relation Age of Onset     Hypertension Mother      Cerebrovascular Disease Mother      Hypertension Father      Cerebrovascular Disease Father      Hypertension Brother      Cancer Brother         lymphoma     Hypertension Sister      Gastrointestinal Disease Sister      Hypertension Brother      Cancer Brother         melanoma     Eye Disorder Brother      Hypertension Brother      Hypertension Brother      Heart Defect Son           SOCIAL HISTORY:   Social History     Socioeconomic History     Marital status:      Spouse name: None     Number of children: None     Years of education: None     Highest education level: None   Occupational History     None   Social Needs     Financial resource strain: None     Food insecurity:     Worry: None     Inability: None     Transportation needs:     Medical: None     Non-medical: None   Tobacco Use     Smoking status: Former Smoker     Smokeless tobacco: Never Used     Tobacco comment: quit 20 years ago   Substance and Sexual Activity     Alcohol use: Yes     Comment: occ.     Drug use: No     Sexual activity: Yes     Partners: Male   Lifestyle     Physical activity:     Days per week: None     Minutes per session: None     Stress: None   Relationships     Social connections:     Talks on phone: None     Gets together: None     Attends Oriental orthodox service: None     Active member of club or organization: None     Attends meetings of clubs or organizations: None     Relationship status: None     Intimate partner violence:     Fear of current or ex partner: None     Emotionally abused: None     Physically abused: None     Forced sexual activity: None   Other Topics Concern     Parent/sibling w/ CABG, MI or angioplasty before 65F 55M?  "No   Social History Narrative     None       PHYSICAL EXAMINATION:   BP (!) 164/66 (BP Location: Right arm, Patient Position: Sitting, Cuff Size: Adult Large)   Pulse 97   Temp 98.3  F (36.8  C) (Tympanic)   Resp 18   Ht 1.664 m (5' 5.5\")   Wt 90.4 kg (199 lb 4.8 oz)   SpO2 93%   Breastfeeding No   BMI 32.66 kg/m     Wt Readings from Last 10 Encounters:   11/14/19 90.5 kg (199 lb 9.6 oz)   11/13/19 90.4 kg (199 lb 4.8 oz)   10/26/19 88.5 kg (195 lb)   09/30/19 91.1 kg (200 lb 12.8 oz)   06/20/19 90.4 kg (199 lb 6.4 oz)   04/03/19 88.9 kg (196 lb)   10/17/18 89.8 kg (198 lb)   04/18/18 88.9 kg (196 lb)   12/07/17 88.5 kg (195 lb)   11/27/17 91.4 kg (201 lb 6.4 oz)      ECOG performance status: 0  GENERAL APPEARANCE: Healthy, alert and in no acute distress.  HEENT: Sclerae anicteric. PERRLA. Oropharynx without ulcers, lesions, or thrush.  NECK: Supple. No asymmetry or masses.  LYMPHATICS: No palpable cervical, supraclavicular, axillary, or inguinal lymphadenopathy.  RESP: Lungs clear to auscultation bilaterally without rales, rhonchi or wheezes.  CARDIOVASCULAR: Regular rate and rhythm. Normal S1, S2; no S3 or S4. No murmur, gallop, or rub.  ABDOMEN: Soft, nontender. Bowel sounds normal. No palpable organomegaly or masses.  MUSCULOSKELETAL: Extremities without gross deformities noted. No edema of bilateral lower extremities.  SKIN: No suspicious lesions or rashes.  NEURO: Alert and oriented x 3. Cranial nerves II-XII grossly intact.  PSYCHIATRIC: Mentation and affect appear normal.    LABORATORY RESULTS:  Hospital Outpatient Visit on 10/30/2019   Component Date Value Ref Range Status     Copath Report 10/30/2019    Final                    Value:Patient Name: ANGELIKA HUDDLESTON  MR#: [id number]  Specimen #: [id number]  Collected: 10/30/2019  Received: 10/31/2019  Reported: 11/5/2019 23:49  Ordering Phy(s): RAVEN MUNOZ    * Amended *    For improved result formatting, select 'View Enhanced Report Format' " "under   Linked Documents section.    SPECIMEN(S):  Breast needle biopsy, right    FINAL DIAGNOSIS:  Breast needle biopsy, right:  - Invasive ductal carcinoma, markedly necrotic with insufficient tissue   for further evaluation (see  description, comment and images).    COMMENT:  TO VIEW DIGITAL IMAGES IN THIS REPORT:  +++ Digital images of the pathologic findings are included with this   report (10 images total)+++    To view the digital images associated with this report in Epic, select   \"View Enhanced Report Format\" under the  Linked Documents section of this report to view the PDF version of the   original [employer] generated report. For  the best view of the images, grab the edge of the opened report window and   stretc                          h it to expand to the full  width of your screen then scroll down to view images.    DIAGNOSTIC COMMENT:  This case was seen in intradepartmental consultation.    The vast majority of the tissue is necrotic precluding grading and calling   into question receptor status.  Grading and repeat estrogen and progesterone receptor studies as well as   HER-2/kerry are recommended to be  performed on the excised tissue.    Electronically signed out by:    ROBB Carrillo M.D.  Report originally signed out by: ROBB Carrillo M.D.  Previous sign out date: 11/5/2019 23:46    CLINICAL HISTORY:  78-year-old female with right breast mass with ultrasound report from   10/30/19 showing \"two corresponding  hypoechoic masses immediately adjacent to one another at the 9:00 position   of the right breast, 9 cm from the  nipple. The first measures 1.1 x 1.3 x 1.2 cm and the second measures 1.8   x 1.4 x 1.4 cm. Survey of the axilla  shows no evidence of adenopathy.    GROSS:  The specimen is received in form                          katelin with proper patient identification   labeled \"right breast needle core  biopsy\".  The specimen consists of three yellow-pink fibrofatty breast   tissue core " fragments measuring 4.9 x  0.1 x 0.1 cm in aggregate.  The specimen is entirely submitted in one   cassette. (Dictated by: Marvel Romero  10/31/2019 02:54 PM)    MICROSCOPIC:  The tissue core biopsy areas are markedly necrotic with very minimal   residual viable tumor cells.  The cells  which are present are positive for cytokeratin, E-cadherin (a marker of   ductal differentiation) with negative  staining for estrogen receptor and progesterone receptor.  Due to the   marked areas of necrosis, insufficient  tissue is present for grading the malignancy or ruling out other findings   such as carcinoma in situ or  vascular invasion.    IMMUNOHISTOCHEMICAL ANALYSIS FOR ESTROGEN AND PROGESTERONE RECEPTORS    RESULTS:    ESTROGEN RECEPTORS:    NEGATIVE                    0 % of tumor cell nuclei stain positive                                                     Intensity of staining:    PROGESTERONE RECEPTORS:    NEGATIVE                    0% of tumor cell nuclei stain positive.                           Intensity of staining:    Performed on paraffin block: Needle biopsy    External and internal controls stain appropriately.  Standard assay conditions are met.  Assay Conditions:  Fixative and processing: 10% neutral buffered formalin, paraffin embedded.  Cold ischemia time less than one hour. Time of specimen collection 14:22;   Time placed in formalin 14:22.  Total duration of formalin fixation greater than 6 hours and less than 72   hours.  Staining method used: Hodges predilute monoclonal antibodies, estrogen   receptor clone SP1 and progesterone  receptor clone 1E2, antigen heat retrieval in EDTA alkaline pH for 60   minutes, Hodges ultraview detection  system and automatic immunostainer.    Scoring system: The ASCO-CAP scoring guidelines are used. A positive test   is defined as positive nuclei  staining in greater than or equal                           to 1% of tumor cells. A negative test is   defined as  nuclear staining in less  than 1% of tumor cells. For positive tests, an estimation of intensity of   nuclear staining over the entire  tumor on tissue sections is also provided.    Reference: MARI Noble, CRISTIAN Apodaca, FRANCOIS Coe, et al. American Society of   Clinical Oncology/College of  American Pathologists guideline recommendations for immunohistochemical   testing of estrogen and  progesterone receptors in breast cancer, Arch Pathol Lab Med.   2010;134:907-922    (Dictated by: MOHSEN Carrillo MD 11/05/2019)    The technical component of this testing was completed at the [facility], with the professional component performed   at the [facility], [address] [place] [phone] ([phone])    CPT Codes:  A: 45154-QY7, 15926-NKT, 08952-VY, 01711-XP, 86152-XSZ    COLLECTION SITE:  Client: [facility]  Location: [facility])         IMAGING RESULTS:  Recent Results (from the past 744 hour(s))   US Breast Right Limited 1-3 Quadrants    Narrative    Right breast ultrasound    Comparisons: Mammogram dated 10/18/2018.    History: Tenderness, swelling, erythema and induration laterally in  the RIGHT breast, concern for abscess.    FINDINGS:   Focussed ultrasound of the upper outer quadrant of the  RIGHT breast was performed.     2 hypoechoic structures are seen at the patient's area of concern,  9:00-10:00 position 7 cm from the nipple on the RIGHT. One of these  appears to have at least some component of an echogenic hilum and may  represent an enlarged lymph node. The other demonstrates possible  internal vascularity and measures 1.2 x 1.4 x 1.2 cm.      Impression    IMPRESSION: BI-RADS CATEGORY: 0 - Need Additional Imaging Evaluation  and/or Prior Mammograms for Comparison    RECOMMENDED FOLLOW-UP: Diagnostic Mammogram and Ultrasound.    Patient  should return to the breast center for RIGHT breast diagnostic  mammography and likely repeat ultrasound.    The patient was given the results of the examination.    NICOLLE CARRASCO MD MA Diagnostic Bilateral w/Fidel    Narrative    US BREAST RIGHT LIMITED 1-3 QUADRANTS,  MA DIAGNOSTIC BILATERAL W/ FIDEL -  10/30/2019    HISTORY:  Right breast ultrasound performed 10/26/2019 showed two  hypoechoic masses in the lateral right breast. Further evaluation  recommended.    COMPARISON:  Right breast ultrasound, 10/26/2019. Screening mammogram,  10/18/2018.    BREAST DENSITY: Scattered fibroglandular densities.    FINDINGS:  Standard bilateral diagnostic mammogram, including  tomosynthesis. Marker was placed on the right upper outer quadrant to  denote the area of palpable concern; deep to the marker there are two  masses with ill-defined margins. No associated calcifications. No  other mammographic abnormality in either breast.    Further evaluation with targeted ultrasound shows two corresponding  hypoechoic masses immediately adjacent to one another at the 9:00  position of the right breast, 9 cm from the nipple. The first measures  1.1 x 1.3 x 1.2 cm and the second measures 1.8 x 1.4 x 1.4 cm. Survey  of the axilla shows no evidence of adenopathy.      Impression    IMPRESSION: BI-RADS CATEGORY: 4 - Suspicious.  Two hypoechoic masses in the right lateral breast corresponding to the  area of palpable concern. These are presumed to represent the same  process, therefore only the larger mass will be biopsied. Results and  recommendation for biopsy were discussed with the patient during her  appointment.    RECOMMENDED FOLLOW-UP: Biopsy.    RAVEN MUNOZ MD   US Breast Right    Narrative    US BREAST RIGHT LIMITED 1-3 QUADRANTS,  MA DIAGNOSTIC BILATERAL W/ FIDEL -  10/30/2019    HISTORY:  Right breast ultrasound performed 10/26/2019 showed two  hypoechoic masses in the lateral right breast. Further  evaluation  recommended.    COMPARISON:  Right breast ultrasound, 10/26/2019. Screening mammogram,  10/18/2018.    BREAST DENSITY: Scattered fibroglandular densities.    FINDINGS:  Standard bilateral diagnostic mammogram, including  tomosynthesis. Marker was placed on the right upper outer quadrant to  denote the area of palpable concern; deep to the marker there are two  masses with ill-defined margins. No associated calcifications. No  other mammographic abnormality in either breast.    Further evaluation with targeted ultrasound shows two corresponding  hypoechoic masses immediately adjacent to one another at the 9:00  position of the right breast, 9 cm from the nipple. The first measures  1.1 x 1.3 x 1.2 cm and the second measures 1.8 x 1.4 x 1.4 cm. Survey  of the axilla shows no evidence of adenopathy.      Impression    IMPRESSION: BI-RADS CATEGORY: 4 - Suspicious.  Two hypoechoic masses in the right lateral breast corresponding to the  area of palpable concern. These are presumed to represent the same  process, therefore only the larger mass will be biopsied. Results and  recommendation for biopsy were discussed with the patient during her  appointment.    RECOMMENDED FOLLOW-UP: Biopsy.    NERI MUNOZ MD   US Breast Biopsy Core Needle Right    Addendum: 11/6/2019    ANGELIKA KEISHA HUDDLESTON  [id number], [id number]    FINAL DIAGNOSIS: Invasive ductal carcinoma. Please see final path  report.    Result is concordant.  Surgical consultation recommended. Results were  communicated to Dr. Ester Jorge on 11/6/2019.      Neri Munoz MD   Date of Addendum: 11/6/2019    NERI MUNOZ MD      Narrative    ULTRASOUND-GUIDED RIGHT BREAST CORE BIOPSY;   CLIP PLACEMENT;   POSTPROCEDURE DIGITAL MAMMOGRAM RIGHT BREAST;  10/30/2019    INDICATION FOR PROCEDURE: Two hypoechoic nodules in the right upper  outer quadrant. The larger nodule is biopsied for histology.    PROCEDURE: Approximately 3 mL lidocaine without epinephrine  was  infiltrated for local anesthetic and a skin nick was made through  which a 13-gauge trocar was introduced via lateral to medial approach.   The needle tip was placed adjacent to the lesion. A series of 3  samples were obtained with a 14-gauge core-cutting needle. A coil  -shaped clip was then deployed to karlee the lesion.     Postbiopsy unilateral digital mammogram of the right breast showed the  clip to be appropriately placed.  The patient tolerated the procedure  without difficulty and there was no immediate complication. Less than  5cc blood loss.  No pain following biopsy.      Impression    IMPRESSION: Successful right breast ultrasound-guided core biopsy and  clip placement.  Final pathology is pending.      NERI MUNOZ MD   MA Post Procedure Right    Addendum: 11/6/2019    ANGELIKA HUDDLESTON  [id number], [id number]    FINAL DIAGNOSIS: Invasive ductal carcinoma. Please see final path  report.    Result is concordant.  Surgical consultation recommended. Results were  communicated to Dr. Ester Jorge on 11/6/2019.      Neri Munoz MD   Date of Addendum: 11/6/2019    NERI MUNOZ MD      [facility]    ULTRASOUND-GUIDED RIGHT BREAST CORE BIOPSY;   CLIP PLACEMENT;   POSTPROCEDURE DIGITAL MAMMOGRAM RIGHT BREAST;  10/30/2019    INDICATION FOR PROCEDURE: Two hypoechoic nodules in the right upper  outer quadrant. The larger nodule is biopsied for histology.    PROCEDURE: Approximately 3 mL lidocaine without epinephrine was  infiltrated for local anesthetic and a skin nick was made through  which a 13-gauge trocar was introduced via lateral to medial approach.   The needle tip was placed adjacent to the lesion. A series of 3  samples were obtained with a 14-gauge core-cutting needle. A coil  -shaped clip was then deployed to karlee the lesion.     Postbiopsy unilateral digital mammogram of the right breast showed the  clip to be appropriately placed.  The patient tolerated the procedure  without difficulty and there  "was no immediate complication. Less than  5cc blood loss.  No pain following biopsy.      Impression    IMPRESSION: Successful right breast ultrasound-guided core biopsy and  clip placement.  Final pathology is pending.      RAVEN MUNOZ MD   US Breast Biopsy Core Needle, 1St Lesion Right    Addendum: 11/19/2019    Pathology results:  Breast, RIGHT, 9-9:30, 9 cm from nipple,  ultrasound guided core biopsy:   -INVASIVE MAMMARY CARCINOMA OF NO SPECIAL TYPE (INVASIVE DUCTAL  CARCINOMA)?HIMA GRADE 3 with necrosis   -Ductal carcinoma in situ (DCIS), nuclear grade 3, solid type   -Invasive carcinoma is estrogen receptor negative and progesterone  receptor negative by immunohistochemistry   -HER2 FISH result will be reported separately by cytogenetics   -See comment     Pathology results are concordant with radiological findings.  Recommended Follow-up: Surgical and oncological management.    HELEN GILMORE MD      Narrative    Exam: Ultrasound-guided core needle biopsy right breast, postbiopsy  mammogram    HISTORY: Recent biopsy did not obtain adequate material for tumor  markers so rebiopsy is requested    Procedure, risks, alternatives, explained and discussed with the  patient. Signed consent obtained. Using ultrasound guidance, aseptic  technique, local anesthesia, 12-gauge vacuum-assisted handheld biopsy  needle through an 11-gauge cannula, both of the masses at 9:00-9:30  o'clock  were sampled. 2 cores were obtained from the more inferiorly  situated sampled mass that did not have a marker from prior biopsy and  one core was obtained from the more superiorly situated mass. A  BiomarC (shaped liked a \"barbell\") clip was placed. Pressure was held  the biopsy sites for 10 to 15 minutes following biopsy. Postbiopsy  mammogram confirms marker deployment in the more inferiorly situated  mass although the marker in the more superior mass was now posterior  to the mass. The area was bandaged. An icepack was applied. " "Discharge  instructions were given to the patient. There were no apparent  complications.    ÁLVARO TURNER MD   MA Post Procedure Right    Addendum: 11/19/2019    Pathology results:  Breast, RIGHT, 9-9:30, 9 cm from nipple,  ultrasound guided core biopsy:   -INVASIVE MAMMARY CARCINOMA OF NO SPECIAL TYPE (INVASIVE DUCTAL  CARCINOMA)?HIMA GRADE 3 with necrosis   -Ductal carcinoma in situ (DCIS), nuclear grade 3, solid type   -Invasive carcinoma is estrogen receptor negative and progesterone  receptor negative by immunohistochemistry   -HER2 FISH result will be reported separately by cytogenetics   -See comment     Pathology results are concordant with radiological findings.  Recommended Follow-up: Surgical and oncological management.    HELEN GILMORE MD      Narrative    Exam: Ultrasound-guided core needle biopsy right breast, postbiopsy  mammogram    HISTORY: Recent biopsy did not obtain adequate material for tumor  markers so rebiopsy is requested    Procedure, risks, alternatives, explained and discussed with the  patient. Signed consent obtained. Using ultrasound guidance, aseptic  technique, local anesthesia, 12-gauge vacuum-assisted handheld biopsy  needle through an 11-gauge cannula, both of the masses at 9:00-9:30  o'clock  were sampled. 2 cores were obtained from the more inferiorly  situated sampled mass that did not have a marker from prior biopsy and  one core was obtained from the more superiorly situated mass. A  BiomarC (shaped liked a \"barbell\") clip was placed. Pressure was held  the biopsy sites for 10 to 15 minutes following biopsy. Postbiopsy  mammogram confirms marker deployment in the more inferiorly situated  mass although the marker in the more superior mass was now posterior  to the mass. The area was bandaged. An icepack was applied. Discharge  instructions were given to the patient. There were no apparent  complications.    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  Hormone receptors and VT negative for both estrogen and progesterone receptors.  HER-2/kerry was not done.  I reviewed with the patient today the natural history of breast cancer.  We talked about staging, biology, management, follow-up and prognosis.  We talked about different modalities and treatment of breast cancer including surgical resection lumpectomy versus mastectomy.  We also talked about breast radiation therapy.  We talked about systemic therapy with either adjuvant chemotherapy or adjuvant endocrine therapy.  Patient will be meeting with surgery tomorrow to discuss surgical resection.  I will see the patient again following her surgery to discuss adjuvant therapy.    (I10) Hypertension, goal below 150/90  Blood pressure is currently well controlled.  Patient currently on losartan-hydrochlorothiazide 50-12.5 mg orally daily.    (E78.5) Hyperlipidemia LDL goal <130  Patient currently on simvastatin 20 mg orally daily.    The patient is ready to learn, no apparent learning barriers were identified, Diagnosis and treatment plans were explained to the patient. The patient expressed understanding of the content. The patient questions were answered to her satisfaction.    Juana Cruz MD    Chart documentation with Dragon Voice recognition Software. Although reviewed after completion, some words and grammatical errors may remain.      Again, thank you for allowing me to participate in the care of your patient.        Sincerely,        Juana Cruz MD

## 2019-11-13 NOTE — PROGRESS NOTES
"Oncology Rooming Note    November 13, 2019 2:26 PM   Precious Paris is a 79 year old female who presents for:    Chief Complaint   Patient presents with     Oncology Clinic Visit     NEW, breast cancer.      Initial Vitals: BP (!) 164/66 (BP Location: Right arm, Patient Position: Sitting, Cuff Size: Adult Large)   Pulse 97   Temp 98.3  F (36.8  C) (Tympanic)   Resp 18   Ht 1.664 m (5' 5.5\")   Wt 90.4 kg (199 lb 4.8 oz)   SpO2 93%   Breastfeeding No   BMI 32.66 kg/m   Estimated body mass index is 32.66 kg/m  as calculated from the following:    Height as of this encounter: 1.664 m (5' 5.5\").    Weight as of this encounter: 90.4 kg (199 lb 4.8 oz). Body surface area is 2.04 meters squared.  No Pain (0) Comment: Data Unavailable   No LMP recorded. Patient is postmenopausal.  Allergies reviewed: Yes  Medications reviewed: Yes    Medications: Medication refills not needed today.  Pharmacy name entered into [facility]: [place] [facility] - - [place] [place] - [id number]    Clinical concerns: NEW, breast cancer. Occasionally will feel a ache in her right breast.     Jaimee Kelley, AXEL            "

## 2019-11-14 ENCOUNTER — OFFICE VISIT (OUTPATIENT)
Dept: RADIATION THERAPY | Facility: OUTPATIENT CENTER | Age: 79
End: 2019-11-14
Payer: COMMERCIAL

## 2019-11-14 ENCOUNTER — TELEPHONE (OUTPATIENT)
Dept: ONCOLOGY | Facility: CLINIC | Age: 79
End: 2019-11-14

## 2019-11-14 VITALS
HEART RATE: 85 BPM | BODY MASS INDEX: 32.71 KG/M2 | WEIGHT: 199.6 LBS | RESPIRATION RATE: 16 BRPM | DIASTOLIC BLOOD PRESSURE: 87 MMHG | OXYGEN SATURATION: 96 % | SYSTOLIC BLOOD PRESSURE: 154 MMHG

## 2019-11-14 DIAGNOSIS — C50.911 MALIGNANT NEOPLASM OF RIGHT BREAST (H): Primary | ICD-10-CM

## 2019-11-14 ASSESSMENT — PAIN SCALES - GENERAL: PAINLEVEL: NO PAIN (0)

## 2019-11-14 NOTE — PROGRESS NOTES
HISTORY OF PRESENT ILLNESS:  Precious Paris is a 79-year-old woman who I was asked to see at the request of Dr. Cruz for evaluation of a new right breast cancer.  A few weeks ago the patient noticed pain and redness in her right breast associated with a breast lump.  She was given antibiotics for about 10 days and this resolved the redness.  She underwent a mammogram which demonstrated 2 adjacent masses which were suspicious.  Ultrasound confirmed these masses.  In looking at the total size of the area, it is about 3.5 cm in size.  There were no other suspicious masses and no lymphadenopathy.  She underwent a needle biopsy of one of these masses and it demonstrated a breast cancer.  There was a lot of necrotic material in the specimen and the pathologist noted that a reliable grading and ER/AK status could not be performed.  She is now here to talk about treatment options.  She has no previous history of breast problems.      PAST MEDICAL HISTORY:  Hypertension and hypercholesterolemia.  She has no history of cardiac disease, pulmonary disease, hepatic disease, renal disease or diabetes mellitus.      PAST SURGICAL HISTORY:  She has had no major surgeries other than knee replacement.      FAMILY HISTORY:  Negative for breast or ovarian cancer.      PHYSICAL EXAMINATION:  She is a well-appearing woman in no apparent distress.  Head and neck exam reveals no cervical, supraclavicular or infraclavicular lymphadenopathy.  Examination of her right breast demonstrates no redness.  She does have a palpable mass at the outer portion of the right breast.  She has no lymphadenopathy.  Left breast examination reveals no dominant masses, skin changes, nipple changes or axillary lymphadenopathy.      IMPRESSION:  T2N0M0 breast cancer.      PLAN:  I have told her that we do not have all the information necessary to make all of her treatment decisions.  We need to determine her ER status, AK status and HER2/kerry status.  There  are 2 ways we can approach this.  One is we could go right to surgery and then run receptor status on the surgical specimen or we could attempt to do a repeat needle biopsy and repeat the receptor studies.  If she were to have an ER-negative or HER2-positive breast cancer, then preoperative chemotherapy would be recommended.  I did talk to her about lumpectomy plus radiation versus mastectomy with or without reconstruction.  She did not voice a preference yet.  I did recommend a sentinel lymph node biopsy. She would like to proceed with a repeat needle biopsy as soon as possible and then we will help decide our treatment decisions based on that result.      TT:  60 minutes.  CT:  50 minutes.      cc:   Juana Cruz MD   [facility]   [address] [place]  [address]

## 2019-11-14 NOTE — LETTER
11/14/2019      RE: Precious Paris  [address] [place] [address]       HISTORY OF PRESENT ILLNESS:  Precious Paris is a 79-year-old woman who I was asked to see at the request of Dr. Cruz for evaluation of a new right breast cancer.  A few weeks ago the patient noticed pain and redness in her right breast associated with a breast lump.  She was given antibiotics for about 10 days and this resolved the redness.  She underwent a mammogram which demonstrated 2 adjacent masses which were suspicious.  Ultrasound confirmed these masses.  In looking at the total size of the area, it is about 3.5 cm in size.  There were no other suspicious masses and no lymphadenopathy.  She underwent a needle biopsy of one of these masses and it demonstrated a breast cancer.  There was a lot of necrotic material in the specimen and the pathologist noted that a reliable grading and ER/CO status could not be performed.  She is now here to talk about treatment options.  She has no previous history of breast problems.      PAST MEDICAL HISTORY:  Hypertension and hypercholesterolemia.  She has no history of cardiac disease, pulmonary disease, hepatic disease, renal disease or diabetes mellitus.      PAST SURGICAL HISTORY:  She has had no major surgeries other than knee replacement.      FAMILY HISTORY:  Negative for breast or ovarian cancer.      PHYSICAL EXAMINATION:  She is a well-appearing woman in no apparent distress.  Head and neck exam reveals no cervical, supraclavicular or infraclavicular lymphadenopathy.  Examination of her right breast demonstrates no redness.  She does have a palpable mass at the outer portion of the right breast.  She has no lymphadenopathy.  Left breast examination reveals no dominant masses, skin changes, nipple changes or axillary lymphadenopathy.      IMPRESSION:  T2N0M0 breast cancer.      PLAN:  I have told her that we do not have all the information necessary to make all of her treatment  decisions.  We need to determine her ER status, IL status and HER2/kerry status.  There are 2 ways we can approach this.  One is we could go right to surgery and then run receptor status on the surgical specimen or we could attempt to do a repeat needle biopsy and repeat the receptor studies.  If she were to have an ER-negative or HER2-positive breast cancer, then preoperative chemotherapy would be recommended.  I did talk to her about lumpectomy plus radiation versus mastectomy with or without reconstruction.  She did not voice a preference yet.  I did recommend a sentinel lymph node biopsy. She would like to proceed with a repeat needle biopsy as soon as possible and then we will help decide our treatment decisions based on that result.      TT:  60 minutes.  CT:  50 minutes.      cc:   Juana Cruz MD   [facility]   [address] [place]  [address]         Venancio Frausto MD

## 2019-11-14 NOTE — NURSING NOTE
"Oncology Rooming Note    November 14, 2019 10:20 AM   Precious Paris is a 79 year old female who presents for:    Chief Complaint   Patient presents with     Oncology Clinic Visit     New consult with Dr. Frausto- R IDC ER/CT neg.     Initial Vitals: BP (!) 154/87 (BP Location: Left arm, Patient Position: Sitting, Cuff Size: Adult Regular)   Pulse 85   Resp 16   Wt 90.5 kg (199 lb 9.6 oz)   SpO2 96%   BMI 32.71 kg/m   Estimated body mass index is 32.71 kg/m  as calculated from the following:    Height as of 11/13/19: 1.664 m (5' 5.5\").    Weight as of this encounter: 90.5 kg (199 lb 9.6 oz). Body surface area is 2.05 meters squared.  No Pain (0) Comment: Data Unavailable   No LMP recorded. Patient is postmenopausal.  Allergies reviewed: Yes  Medications reviewed: Yes    Medications: Medication refills not needed today.  Pharm[facility] into [facility] [place] [facility] [id number] [facility]    Clinical concerns: Right IDC ER/CT neg. Dr. Frausto was notified.      Lori Hoyt RN              "

## 2019-11-15 ENCOUNTER — ANCILLARY PROCEDURE (OUTPATIENT)
Dept: MAMMOGRAPHY | Facility: CLINIC | Age: 79
End: 2019-11-15
Attending: SURGERY
Payer: COMMERCIAL

## 2019-11-15 DIAGNOSIS — C50.911 MALIGNANT NEOPLASM OF RIGHT BREAST (H): ICD-10-CM

## 2019-11-15 DIAGNOSIS — N63.0 BREAST MASS: Primary | ICD-10-CM

## 2019-11-15 RX ORDER — LIDOCAINE HYDROCHLORIDE 10 MG/ML
10 INJECTION, SOLUTION EPIDURAL; INFILTRATION; INTRACAUDAL; PERINEURAL ONCE
Status: COMPLETED | OUTPATIENT
Start: 2019-11-15 | End: 2019-11-15

## 2019-11-15 RX ADMIN — LIDOCAINE HYDROCHLORIDE 10 ML: 10 INJECTION, SOLUTION EPIDURAL; INFILTRATION; INTRACAUDAL; PERINEURAL at 13:23

## 2019-11-18 LAB — COPATH REPORT: NORMAL

## 2019-11-19 ENCOUNTER — PATIENT OUTREACH (OUTPATIENT)
Dept: ONCOLOGY | Facility: CLINIC | Age: 79
End: 2019-11-19

## 2019-11-19 ENCOUNTER — TELEPHONE (OUTPATIENT)
Dept: ONCOLOGY | Facility: CLINIC | Age: 79
End: 2019-11-19

## 2019-11-19 DIAGNOSIS — C50.911 MALIGNANT NEOPLASM OF RIGHT BREAST (H): Primary | ICD-10-CM

## 2019-11-19 DIAGNOSIS — Z51.11 ENCOUNTER FOR ANTINEOPLASTIC CHEMOTHERAPY: ICD-10-CM

## 2019-11-19 PROBLEM — C50.912 INFILTRATING DUCTAL CARCINOMA OF LEFT BREAST (H): Status: ACTIVE | Noted: 2019-11-19

## 2019-11-19 LAB — COPATH REPORT: NORMAL

## 2019-11-19 NOTE — PROGRESS NOTES
Pt had a re biopsy of breast to give ER/WY Her2 results.     ER/[place] were negative  Her2 is pending.     Pt will need to f/u with oncology but requesting to transfer to Dr. Villa. Reviewed with Dr. Villa and plan for pt to have a Echo and MRI this week and to meet with Dr. Villa next week.     Pt has been called, scheduled and is in agreement with plan.      Patrica Perla RN on 11/19/2019 at 11:51 AM

## 2019-11-19 NOTE — TELEPHONE ENCOUNTER
Call to pt with pathology results from her rebiopsy procedure; I told pt her receptor status was ER and AK negative. I explained to pt that Dr Frausto would recommend she meet with oncologist to discuss chemotherapy to be done before surgery. She would like her care [place] and would like to see Dr Villa. Request for that appt was sent. Gave pt my contact # for future questions/concerns.

## 2019-11-19 NOTE — PROGRESS NOTES
DATE OF VISIT: Nov 13, 2019    REASON FOR REFERRAL: Management of breast cancer    CHIEF COMPLAINT:   Chief Complaint   Patient presents with     Oncology Clinic Visit     NEW, breast cancer.        HISTORY OF PRESENT ILLNESS:   Precious Paris is a 79-year-old female patient who presented with pain and redness in the right breast associated with a breast lump.  She was treated with antibiotic for 10 days redness have resolved.  She underwent a mammography demonstrated 2 adjacent masses which were suspicious.  Right breast ultrasound was done on October 26, 2019 showing 2 hypoechoic structure seen at 9-10 o'clock position 7 cm from the nipple on the right 1 of these appears to have at least some component of echogenic hilum.  Survey of the right axilla reveals no adenopathy.  Subsequently the patient went on to have breast biopsy.  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  It appears that hormone receptors were negative.  Patient is here to discuss these findings.    REVIEW OF SYSTEMS:   Constitutional: Negative for fever, chills, and night sweats.  Skin: negative.  Eyes: negative.  Ears/Nose/Throat: negative.  Respiratory: No shortness of breath, dyspnea on exertion, cough, or hemoptysis.  Cardiovascular: negative.  Gastrointestinal: negative.  Genitourinary: negative.  Musculoskeletal: negative.  Neurologic: negative.  Psychiatric: negative.  Hematologic/Lymphatic/Immunologic: negative.  Endocrine: negative.    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     anosmia     Diverticulosis of colon (without mention of hemorrhage)      Other and unspecified hyperlipidemia      Pain in joint,  lower leg      Plantar fascial fibromatosis      PURE HYPERCHOLESTEROLEM 9/9/2007     PURE HYPERCHOLESTEROLEM 9/9/2007     Squamous cell carcinoma        PAST SURGICAL HISTORY:   Past Surgical History:   Procedure Laterality Date     BIOPSY       HC COLONOSCOPY THRU STOMA, DIAGNOSTIC  04/05    diverticulosis and hemorroids, due 2015     JOINT REPLACEMTN, KNEE RT/LT  2010    right     JOINT REPLACEMTN, KNEE RT/LT  2011    left     LIGATE VEIN VARICOSE, PHLEBECTOMY MULTIPLE STAB, COMBINED  10/17/2013    Procedure: COMBINED LIGATE VEIN VARICOSE, PHLEBECTOMY MULTIPLE STAB;  Phlebectomy of Right Knee Varicose Veins;  Surgeon: Andrea Duffy MD;  Location: [facility]     OSTEOTOMY FOOT  8/19/2013    Procedure: OSTEOTOMY FOOT;  Left 2nd metatarsal osteotomy, 2nd Metatarsophalangeal joint & 2nd toe correction;  Surgeon: Jose Phillips DPM;  Location: [facility]     PHACOEMULSIFICATION WITH STANDARD INTRAOCULAR LENS IMPLANT Left 12/7/2017    Procedure: PHACOEMULSIFICATION WITH STANDARD INTRAOCULAR LENS IMPLANT;  Left Cataract Removal with Implant;  Surgeon: Mata Deleon MD;  Location: [facility]     SURGICAL HISTORY OF -   1979    Stripping of Veins in Left Leg     SURGICAL HISTORY OF -   1970    Bilateral Tubal Ligation     SURGICAL HISTORY OF -   2000    Carpal Tunnel Repair, right     SURGICAL HISTORY OF -   9-12-08    Rt let ablation       ALLERGIES:   Allergies as of 11/13/2019 - Reviewed 11/13/2019   Allergen Reaction Noted     Conjugated estrogens Anaphylaxis and Other (See Comments) 10/28/2015     Medroxyprogesterone Anaphylaxis and Other (See Comments) 10/28/2015     Ace inhibitors Cough 04/11/2016     Premarin  04/12/2005     Provera [medroxyprogesterone acetate]  04/12/2005       MEDICATIONS:   Current Outpatient Medications   Medication Sig Dispense Refill     ASPIRIN 81 MG OR TABS Take 1 tablet by mouth daily       CALCIUM OR 1 DAILY       CENTRUM SILVER OR 1 TABLET DAILY       diclofenac (VOLTAREN) 1 %  GEL topical gel Apply 2 grams to hands four times daily using enclosed dosing card. 100 g 1     fish oil-omega-3 fatty acids (OMEGA 3) 1000 MG capsule Take 1 capsule (1 g) by mouth daily 30 capsule 1     losartan-hydrochlorothiazide (HYZAAR) 50-12.5 MG tablet Take 1 tablet by mouth daily 90 tablet 3     melatonin 3 MG tablet Take 1 tablet (3 mg) by mouth nightly as needed for sleep       simvastatin (ZOCOR) 20 MG tablet Take 1 tablet (20 mg) by mouth At Bedtime 90 tablet 3     VITAMIN C OR 1 DAILY       VITAMIN D, CHOLECALCIFEROL, PO Take by mouth daily       VITAMIN E PO        Acetaminophen 325 MG CAPS Take 325-650 mg by mouth every 4 hours as needed (pain)       IBUPROFEN 200 MG OR TABS Take 400 mg by mouth 2 times daily as needed for pain          FAMILY HISTORY:   Family History   Problem Relation Age of Onset     Hypertension Mother      Cerebrovascular Disease Mother      Hypertension Father      Cerebrovascular Disease Father      Hypertension Brother      Cancer Brother         lymphoma     Hypertension Sister      Gastrointestinal Disease Sister      Hypertension Brother      Cancer Brother         melanoma     Eye Disorder Brother      Hypertension Brother      Hypertension Brother      Heart Defect Son           SOCIAL HISTORY:   Social History     Socioeconomic History     Marital status:      Spouse name: None     Number of children: None     Years of education: None     Highest education level: None   Occupational History     None   Social Needs     Financial resource strain: None     Food insecurity:     Worry: None     Inability: None     Transportation needs:     Medical: None     Non-medical: None   Tobacco Use     Smoking status: Former Smoker     Smokeless tobacco: Never Used     Tobacco comment: quit 20 years ago   Substance and Sexual Activity     Alcohol use: Yes     Comment: occ.     Drug use: No     Sexual activity: Yes     Partners: Male   Lifestyle     Physical activity:     Days  "per week: None     Minutes per session: None     Stress: None   Relationships     Social connections:     Talks on phone: None     Gets together: None     Attends [employer] service: None     Active member of club or organization: None     Attends meetings of clubs or organizations: None     Relationship status: None     Intimate partner violence:     Fear of current or ex partner: None     Emotionally abused: None     Physically abused: None     Forced sexual activity: None   Other Topics Concern     Parent/sibling w/ CABG, MI or angioplasty before 65F 55M? No   Social History Narrative     None       PHYSICAL EXAMINATION:   BP (!) 164/66 (BP Location: Right arm, Patient Position: Sitting, Cuff Size: Adult Large)   Pulse 97   Temp 98.3  F (36.8  C) (Tympanic)   Resp 18   Ht 1.664 m (5' 5.5\")   Wt 90.4 kg (199 lb 4.8 oz)   SpO2 93%   Breastfeeding No   BMI 32.66 kg/m    Wt Readings from Last 10 Encounters:   11/14/19 90.5 kg (199 lb 9.6 oz)   11/13/19 90.4 kg (199 lb 4.8 oz)   10/26/19 88.5 kg (195 lb)   09/30/19 91.1 kg (200 lb 12.8 oz)   06/20/19 90.4 kg (199 lb 6.4 oz)   04/03/19 88.9 kg (196 lb)   10/17/18 89.8 kg (198 lb)   04/18/18 88.9 kg (196 lb)   12/07/17 88.5 kg (195 lb)   11/27/17 91.4 kg (201 lb 6.4 oz)      ECOG performance status: 0  GENERAL APPEARANCE: Healthy, alert and in no acute distress.  HEENT: Sclerae anicteric. PERRLA. Oropharynx without ulcers, lesions, or thrush.  NECK: Supple. No asymmetry or masses.  LYMPHATICS: No palpable cervical, supraclavicular, axillary, or inguinal lymphadenopathy.  RESP: Lungs clear to auscultation bilaterally without rales, rhonchi or wheezes.  CARDIOVASCULAR: Regular rate and rhythm. Normal S1, S2; no S3 or S4. No murmur, gallop, or rub.  ABDOMEN: Soft, nontender. Bowel sounds normal. No palpable organomegaly or masses.  MUSCULOSKELETAL: Extremities without gross deformities noted. No edema of bilateral lower extremities.  SKIN: No suspicious lesions or " "rashes.  NEURO: Alert and oriented x 3. Cranial nerves II-XII grossly intact.  PSYCHIATRIC: Mentation and affect appear normal.    LABORATORY RESULTS:  Hospital Outpatient Visit on 10/30/2019   Component Date Value Ref Range Status     Copath Report 10/30/2019    Final                    Value:Patient Name: ANGELIKA HUDDLESTON  MR#: [id number]  Specimen #: [id number]  Collected: 10/30/2019  Received: 10/31/2019  Reported: 11/5/2019 23:49  Ordering Phy(s): RAVEN MUNOZ    * Amended *    For improved result formatting, select 'View Enhanced Report Format' under   Linked Documents section.    SPECIMEN(S):  Breast needle biopsy, right    FINAL DIAGNOSIS:  Breast needle biopsy, right:  - Invasive ductal carcinoma, markedly necrotic with insufficient tissue   for further evaluation (see  description, comment and images).    COMMENT:  TO VIEW DIGITAL IMAGES IN THIS REPORT:  +++ Digital images of the pathologic findings are included with this   report (10 images total)+++    To view the digital images associated with this report in Epic, select   \"View Enhanced Report Format\" under the  Linked Documents section of this report to view the PDF version of the   original [facility] generated report. For  the best view of the images, grab the edge of the opened report window and   stretc                          h it to expand to the full  width of your screen then scroll down to view images.    DIAGNOSTIC COMMENT:  This case was seen in intradepartmental consultation.    The vast majority of the tissue is necrotic precluding grading and calling   into question receptor status.  Grading and repeat estrogen and progesterone receptor studies as well as   HER-2/kerry are recommended to be  performed on the excised tissue.    Electronically signed out by:    ROBB Carrillo M.D.  Report originally signed out by: ROBB Carrillo M.D.  Previous sign out date: 11/5/2019 23:46    CLINICAL HISTORY:  78-year-old female with right breast " "mass with ultrasound report from   10/30/19 showing \"two corresponding  hypoechoic masses immediately adjacent to one another at the 9:00 position   of the right breast, 9 cm from the  nipple. The first measures 1.1 x 1.3 x 1.2 cm and the second measures 1.8   x 1.4 x 1.4 cm. Survey of the axilla  shows no evidence of adenopathy.    GROSS:  The specimen is received in form                          katelin with proper patient identification   labeled \"right breast needle core  biopsy\".  The specimen consists of three yellow-pink fibrofatty breast   tissue core fragments measuring 4.9 x  0.1 x 0.1 cm in aggregate.  The specimen is entirely submitted in one   cassette. (Dictated by: Marvel Romero  10/31/2019 02:54 PM)    MICROSCOPIC:  The tissue core biopsy areas are markedly necrotic with very minimal   residual viable tumor cells.  The cells  which are present are positive for cytokeratin, E-cadherin (a marker of   ductal differentiation) with negative  staining for estrogen receptor and progesterone receptor.  Due to the   marked areas of necrosis, insufficient  tissue is present for grading the malignancy or ruling out other findings   such as carcinoma in situ or  vascular invasion.    IMMUNOHISTOCHEMICAL ANALYSIS FOR ESTROGEN AND PROGESTERONE RECEPTORS    RESULTS:    ESTROGEN RECEPTORS:    NEGATIVE                    0 % of tumor cell nuclei stain positive                                                     Intensity of staining:    PROGESTERONE RECEPTORS:    NEGATIVE                    0% of tumor cell nuclei stain positive.                           Intensity of staining:    Performed on paraffin block: Needle biopsy    External and internal controls stain appropriately.  Standard assay conditions are met.  Assay Conditions:  Fixative and processing: 10% neutral buffered formalin, paraffin embedded.  Cold ischemia time less than one hour. Time of specimen collection 14:22;   Time placed in formalin 14:22.  Total " duration of formalin fixation greater than 6 hours and less than 72   hours.  Staining method used: [place] predilute monoclonal antibodies, estrogen   receptor clone SP1 and progesterone  receptor clone [id number], antigen heat retrieval in EDTA alkaline pH for 60   minutes, [place] ultraview detection  system and automatic immunostainer.    Scoring system: The ASCO-CAP scoring guidelines are used. A positive test   is defined as positive nuclei  staining in greater than or equal                           to 1% of tumor cells. A negative test is   defined as nuclear staining in less  than 1% of tumor cells. For positive tests, an estimation of intensity of   nuclear staining over the entire  tumor on tissue sections is also provided.    Reference: MARI Noble, CRISTIAN Apodaca, FRANCOIS Coe, et al. American Society of   Clinical Oncology/College of  American Pathologists guideline recommendations for immunohistochemical   testing of estrogen and  progesterone receptors in breast cancer, Arch Pathol Lab Med.   2010;134:907-922    (Dictated by: MOHSEN Carrillo MD 11/05/2019)    The technical component of this testing was completed at the [facility], with the professional component performed   at the [facility], [address] [place] [phone] ([phone])    CPT Codes:  A: 02352-TR8, 24653-BJD, 81982-WC, 32982-BB, 30595-YTG    COLLECTION SITE:  Client: [facility]  Location: [facility])         IMAGING RESULTS:  Recent Results (from the past 744 hour(s))   US Breast Right Limited 1-3 Quadrants    Narrative    Right breast ultrasound    Comparisons: Mammogram dated 10/18/2018.    History: Tenderness, swelling, erythema and induration laterally in  the RIGHT breast, concern for abscess.    FINDINGS:   Focussed ultrasound of the upper outer quadrant of the  RIGHT  breast was performed.     2 hypoechoic structures are seen at the patient's area of concern,  9:00-10:00 position 7 cm from the nipple on the RIGHT. One of these  appears to have at least some component of an echogenic hilum and may  represent an enlarged lymph node. The other demonstrates possible  internal vascularity and measures 1.2 x 1.4 x 1.2 cm.      Impression    IMPRESSION: BI-RADS CATEGORY: 0 - Need Additional Imaging Evaluation  and/or Prior Mammograms for Comparison    RECOMMENDED FOLLOW-UP: Diagnostic Mammogram and Ultrasound.    Patient should return to the breast center for RIGHT breast diagnostic  mammography and likely repeat ultrasound.    The patient was given the results of the examination.    NICOLLE CARRASCO MD MA Diagnostic Bilateral w/Fidel    Narrative    US BREAST RIGHT LIMITED 1-3 QUADRANTS,  MA DIAGNOSTIC BILATERAL W/ FIDEL -  10/30/2019    HISTORY:  Right breast ultrasound performed 10/26/2019 showed two  hypoechoic masses in the lateral right breast. Further evaluation  recommended.    COMPARISON:  Right breast ultrasound, 10/26/2019. Screening mammogram,  10/18/2018.    BREAST DENSITY: Scattered fibroglandular densities.    FINDINGS:  Standard bilateral diagnostic mammogram, including  tomosynthesis. Marker was placed on the right upper outer quadrant to  denote the area of palpable concern; deep to the marker there are two  masses with ill-defined margins. No associated calcifications. No  other mammographic abnormality in either breast.    Further evaluation with targeted ultrasound shows two corresponding  hypoechoic masses immediately adjacent to one another at the 9:00  position of the right breast, 9 cm from the nipple. The first measures  1.1 x 1.3 x 1.2 cm and the second measures 1.8 x 1.4 x 1.4 cm. Survey  of the axilla shows no evidence of adenopathy.      Impression    IMPRESSION: BI-RADS CATEGORY: 4 - Suspicious.  Two hypoechoic masses in the right lateral breast corresponding  to the  area of palpable concern. These are presumed to represent the same  process, therefore only the larger mass will be biopsied. Results and  recommendation for biopsy were discussed with the patient during her  appointment.    RECOMMENDED FOLLOW-UP: Biopsy.    RAVEN MUNOZ MD   US Breast Right    Narrative    US BREAST RIGHT LIMITED 1-3 QUADRANTS,  MA DIAGNOSTIC BILATERAL W/ FIDEL -  10/30/2019    HISTORY:  Right breast ultrasound performed 10/26/2019 showed two  hypoechoic masses in the lateral right breast. Further evaluation  recommended.    COMPARISON:  Right breast ultrasound, 10/26/2019. Screening mammogram,  10/18/2018.    BREAST DENSITY: Scattered fibroglandular densities.    FINDINGS:  Standard bilateral diagnostic mammogram, including  tomosynthesis. Marker was placed on the right upper outer quadrant to  denote the area of palpable concern; deep to the marker there are two  masses with ill-defined margins. No associated calcifications. No  other mammographic abnormality in either breast.    Further evaluation with targeted ultrasound shows two corresponding  hypoechoic masses immediately adjacent to one another at the 9:00  position of the right breast, 9 cm from the nipple. The first measures  1.1 x 1.3 x 1.2 cm and the second measures 1.8 x 1.4 x 1.4 cm. Survey  of the axilla shows no evidence of adenopathy.      Impression    IMPRESSION: BI-RADS CATEGORY: 4 - Suspicious.  Two hypoechoic masses in the right lateral breast corresponding to the  area of palpable concern. These are presumed to represent the same  process, therefore only the larger mass will be biopsied. Results and  recommendation for biopsy were discussed with the patient during her  appointment.    RECOMMENDED FOLLOW-UP: Biopsy.    RAVEN MUNOZ MD   US Breast Biopsy Core Needle Right    Addendum: 11/6/2019    ANGELIKA HUDDLESTON  [id number], [id number]    FINAL DIAGNOSIS: Invasive ductal carcinoma. Please see final  path  report.    Result is concordant.  Surgical consultation recommended. Results were  communicated to Dr. Ester Jorge on 11/6/2019.      Neri Munoz MD   Date of Addendum: 11/6/2019    NERI MUNOZ MD      Narrative    ULTRASOUND-GUIDED RIGHT BREAST CORE BIOPSY;   CLIP PLACEMENT;   POSTPROCEDURE DIGITAL MAMMOGRAM RIGHT BREAST;  10/30/2019    INDICATION FOR PROCEDURE: Two hypoechoic nodules in the right upper  outer quadrant. The larger nodule is biopsied for histology.    PROCEDURE: Approximately 3 mL lidocaine without epinephrine was  infiltrated for local anesthetic and a skin nick was made through  which a 13-gauge trocar was introduced via lateral to medial approach.   The needle tip was placed adjacent to the lesion. A series of 3  samples were obtained with a 14-gauge core-cutting needle. A coil  -shaped clip was then deployed to karlee the lesion.     Postbiopsy unilateral digital mammogram of the right breast showed the  clip to be appropriately placed.  The patient tolerated the procedure  without difficulty and there was no immediate complication. Less than  5cc blood loss.  No pain following biopsy.      Impression    IMPRESSION: Successful right breast ultrasound-guided core biopsy and  clip placement.  Final pathology is pending.      NERI MUNOZ MD   MA Post Procedure Right    Addendum: 11/6/2019    ANGELIKA HUDDLESTON  [id number], [id number]    FINAL DIAGNOSIS: Invasive ductal carcinoma. Please see final path  report.    Result is concordant.  Surgical consultation recommended. Results were  communicated to Dr. Ester Jorge on 11/6/2019.      Neri Munoz MD   Date of Addendum: 11/6/2019    NERI MUNOZ MD      Narrative    ULTRASOUND-GUIDED RIGHT BREAST CORE BIOPSY;   CLIP PLACEMENT;   POSTPROCEDURE DIGITAL MAMMOGRAM RIGHT BREAST;  10/30/2019    INDICATION FOR PROCEDURE: Two hypoechoic nodules in the right upper  outer quadrant. The larger nodule is biopsied for histology.    PROCEDURE:  Approximately 3 mL lidocaine without epinephrine was  infiltrated for local anesthetic and a skin nick was made through  which a 13-gauge trocar was introduced via lateral to medial approach.   The needle tip was placed adjacent to the lesion. A series of 3  samples were obtained with a 14-gauge core-cutting needle. A coil  -shaped clip was then deployed to karlee the lesion.     Postbiopsy unilateral digital mammogram of the right breast showed the  clip to be appropriately placed.  The patient tolerated the procedure  without difficulty and there was no immediate complication. Less than  5cc blood loss.  No pain following biopsy.      Impression    IMPRESSION: Successful right breast ultrasound-guided core biopsy and  clip placement.  Final pathology is pending.      RAVEN MUNOZ MD   US Breast Biopsy Core Needle, 1St Lesion Right    Addendum: 11/19/2019    Pathology results:  Breast, RIGHT, 9-9:30, 9 cm from nipple,  ultrasound guided core biopsy:   -INVASIVE MAMMARY CARCINOMA OF NO SPECIAL TYPE (INVASIVE DUCTAL  CARCINOMA)?HIMA GRADE 3 with necrosis   -Ductal carcinoma in situ (DCIS), nuclear grade 3, solid type   -Invasive carcinoma is estrogen receptor negative and progesterone  receptor negative by immunohistochemistry   -HER2 FISH result will be reported separately by cytogenetics   -See comment     Pathology results are concordant with radiological findings.  Recommended Follow-up: Surgical and oncological management.    HELEN GILMORE MD      Narrative    Exam: Ultrasound-guided core needle biopsy right breast, postbiopsy  mammogram    HISTORY: Recent biopsy did not obtain adequate material for tumor  markers so rebiopsy is requested    Procedure, risks, alternatives, explained and discussed with the  patient. Signed consent obtained. Using ultrasound guidance, aseptic  technique, local anesthesia, 12-gauge vacuum-assisted handheld biopsy  needle through an 11-gauge cannula, both of the masses at  "9:00-9:30  o'clock  were sampled. 2 cores were obtained from the more inferiorly  situated sampled mass that did not have a marker from prior biopsy and  one core was obtained from the more superiorly situated mass. A  BiomarC (shaped liked a \"barbell\") clip was placed. Pressure was held  the biopsy sites for 10 to 15 minutes following biopsy. Postbiopsy  mammogram confirms marker deployment in the more inferiorly situated  mass although the marker in the more superior mass was now posterior  to the mass. The area was bandaged. An icepack was applied. Discharge  instructions were given to the patient. There were no apparent  complications.    ÁLVARO TURNER MD MA Post Procedure Right    Addendum: 11/19/2019    Pathology results:  Breast, RIGHT, 9-9:30, 9 cm from nipple,  ultrasound guided core biopsy:   -INVASIVE MAMMARY CARCINOMA OF NO SPECIAL TYPE (INVASIVE DUCTAL  CARCINOMA)?HIMA GRADE 3 with necrosis   -Ductal carcinoma in situ (DCIS), nuclear grade 3, solid type   -Invasive carcinoma is estrogen receptor negative and progesterone  receptor negative by immunohistochemistry   -HER2 FISH result will be reported separately by cytogenetics   -See comment     Pathology results are concordant with radiological findings.  Recommended Follow-up: Surgical and oncological management.    HELEN GILMORE MD      Narrative    Exam: Ultrasound-guided core needle biopsy right breast, postbiopsy  mammogram    HISTORY: Recent biopsy did not obtain adequate material for tumor  markers so rebiopsy is requested    Procedure, risks, alternatives, explained and discussed with the  patient. Signed consent obtained. Using ultrasound guidance, aseptic  technique, local anesthesia, 12-gauge vacuum-assisted handheld biopsy  needle through an 11-gauge cannula, both of the masses at 9:00-9:30  o'clock  were sampled. 2 cores were obtained from the more inferiorly  situated sampled mass that did not have a marker from prior biopsy and  one " "core was obtained from the more superiorly situated mass. A  BiomarC (shaped liked a \"barbell\") clip was placed. Pressure was held  the biopsy sites for 10 to 15 minutes following biopsy. Postbiopsy  mammogram confirms marker deployment in the more inferiorly situated  mass although the marker in the more superior mass was now posterior  to the mass. The area was bandaged. An icepack was applied. Discharge  instructions were given to the patient. There were no apparent  complications.    ÁLVARO TURNER MD       ASSESSMENT AND PLAN:  79-year-old female patient with invasive ductal carcinoma of the right breast probably T2 N0 MX.  Hormone receptors and OK negative for both estrogen and progesterone receptors.  HER-2/kerry was not done.  I reviewed with the patient today the natural history of breast cancer.  We talked about staging, biology, management, follow-up and prognosis.  We talked about different modalities and treatment of breast cancer including surgical resection lumpectomy versus mastectomy.  We also talked about breast radiation therapy.  We talked about systemic therapy with either adjuvant chemotherapy or adjuvant endocrine therapy.  Patient will be meeting with surgery tomorrow to discuss surgical resection.  I will see the patient again following her surgery to discuss adjuvant therapy.    (I10) Hypertension, goal below 150/90  Blood pressure is currently well controlled.  Patient currently on losartan-hydrochlorothiazide 50-12.5 mg orally daily.    (E78.5) Hyperlipidemia LDL goal <130  Patient currently on simvastatin 20 mg orally daily.    The patient is ready to learn, no apparent learning barriers were identified, Diagnosis and treatment plans were explained to the patient. The patient expressed understanding of the content. The patient questions were answered to her satisfaction.    Juana Cruz MD    Chart documentation with Dragon Voice recognition Software. Although reviewed after completion, " some words and grammatical errors may remain.

## 2019-11-21 ENCOUNTER — TUMOR CONFERENCE (OUTPATIENT)
Dept: ONCOLOGY | Facility: CLINIC | Age: 79
End: 2019-11-21

## 2019-11-21 NOTE — TUMOR CONFERENCE
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  Will be reviewed with pt on Monday 11.25 at her f/up visit with Dr. Villa.     Patrica Perla RN on 11/21/2019 at 3:36 PM    Documentation / Disclaimer Cancer Tumor Board Note  Cancer tumor board recommendations do not override what is determined to be reasonable care and treatment, which is dependent on the circumstances of a patient's case; the patient's medical, social, and personal concerns; and the clinical judgment of the oncologist [physician].

## 2019-11-21 NOTE — PROGRESS NOTES
"MEDICAL ONCOLOGY FOLLOW UP VISIT      CC:  November 2019 at age 79, she presented with right breast pain with palpable lump,  diagnosed with triple negative breast cancer of left breast lateral central portion  Transferring her care from Dr. Cruz to me 11/25/2019      HISTORY OF ONCOLOGY ILLNESS:    At age 79, 10/2019 she presented with pain and redness, induration in the right breast associated with breast lump.  She was treated with antibiotic for 10 days redness have resolved.    US/MA 10/2019  found 2 hypoechoic structures are seen at the patient's area of concern, 9:00-10:00 position 7 cm from the nipple on the RIGHT. One of these appears to have at least some component of an echogenic hilum and may represent an enlarged lymph node. The first measures 1.1 x 1.3 x 1.2 cm and the second measures 1.8 x 1.4 x 1.4 cm.   Survey of the axilla shows no evidence of adenopathy.    First biopsy identified invasive ductal carcinoma with marked necrosis and insufficient tissue for further evaluation.  Second time biopsy to the right breast 9 o'clock position 9 cm from the nipple, found grade 3 invasive ductal cancer, triple negative, associated with grade 3 DCIS.    She saw Dr. Frausto along with tumor conference discussion, due to her relatively healthy status, aggressive nature of her breast cancer, and the desire of breast conservation, neoadjuvant systemic treatment is recommended.      INTERVAL HISTORY: N/A      PMH  retinopathy of the right eye, cataract  Hypertension, hyperlipidemia  Osteoarthritis, spinal stenosis, bilateral knees replacement, carpel tunnel surgery  Varicose vein of lower extremities ,s/p surgery      REVIEW OF SYSTEMS:   She is in her usual state of health she has no breast complaint at this point       PHYSICAL EXAMINATION:   VITAL SIGNS: Blood pressure (!) 160/79, pulse 85, temperature 98.2  F (36.8  C), temperature source Tympanic, resp. rate 20, height 1.645 m (5' 4.75\"), weight 89.2 kg (196 " lb 11.2 oz), SpO2 94 %, not currently breastfeeding.      GENERAL APPEARANCE:  looks like her stated age, very pleasant, not in acute distress.     ECO    ENT, MOUTH: Pupils are equally reactive to light.  Sclerae are anicteric.  Moist oral mucosa without lesion or ulcer.   Negative pharynx.  No oral thrush.   NECK:  Supple.  No jugular venous distention.  Thyroid is not palpable.   LYMPH NODES:  Superficial lymphadenopathy is not appreciable in the bilateral cervical, supraclavicular, axillary or inguinal areas.   CARDIOVASCULAR:  S1, S2 regular with no murmurs or gallops.  No carotid or abdominal bruits.   PULMONARY:  Lungs are clear to auscultation and percussion bilaterally.  There is no wheezing or rhonchi.   GASTROINTESTINAL:  Abdomen is soft, nontender.  No hepatosplenomegaly.  No signs of ascites.  No mass appreciable.   MUSCULOSKELETAL/EXTREMITIES:  No edema.  No cyanotic changes.  No signs of joint deformity.  No lymphedema.   NEUROLOGIC:  Cranial nerves II-XII are grossly intact.  Sensation intact.  Muscle strength and muscle tone symmetrical, 5/5 throughout.   BACK  No spinal or paraspinal tenderness.  No CVA tenderness.   SKIN:  No petechiae.  No rash.  No signs of cellulitis.   BREASTS/CHEST/AXILLA:  Bilateral breast exam revealed palpable upper quadrant of the right breast mobile around 2 cm lump, no skin changes, I do not appreciate axilla lymphadenopathy  PSYCHIATRIC: Oriented to time, person, and places. Normal mood and affect. Good memory. Proper insight and judgement.         CURRENT LAB DATA REVIEWED  Second time biopsy to the right breast 9 o'clock position 9 cm from the nipple, found grade 3 invasive ductal cancer, triple negative, associated with grade 3 DCIS.          CURRENT IMAGING REVIEWED  US/MA 10/2019  found 2 hypoechoic structures are seen at the patient's area of concern, 9:00-10:00 position 7 cm from the nipple on the RIGHT. One of these appears to have at least some component  of an echogenic hilum and may represent an enlarged lymph node. The first measures 1.1 x 1.3 x 1.2 cm and the second measures 1.8 x 1.4 x 1.4 cm.   Survey of the axilla shows no evidence of adenopathy.         OLD DATA REVIEWED TODAY WITH SUMMARY:   MA 10/2018 was negative.           ASSESSMENT AND PLAN:    Newly dx diagnosed right breast upper outer quadrant triple negative breast cancer 11/2019.  She had 2 lesions on mammogram, 1 of them likely represents an internal mammary chain lymph nodes.  Both biopsy were turned out to be invasive ductal cancer, first biopsy full of necrosis ER [place] HER-2 were not able to be done, second biopsy found to have triple negative breast cancer.  She potentially could have cT1cN 1 (due to image finding suggest 1 of the breast lesion may represent a lymph nodes).    Tumor conference discussion November 21,recommend neoadjuvant systemic chemotherapy for breast conservation, also due to her relatively fast growing presentation, triple negative disease nature, possible lymph nodes involvement.    She is 79 years old, with minimal comorbidities, has excellent performance status, would recommend baseline echocardiogram.    Would start out her with weekly Taxol, depends on her response may consider adding carboplatin this could be rejected by insurance company.  Splane the rationale to the patient and her family about this possible approach.  We will monitor her response to weekly Taxol and depend on further plan of action whether move on to surgery or further chemotherapy.    She does not have good IV access, she will need a port placement to support her prolonged weekly Taxol treatment.   He has multiple surgery before, had no bleeding complications, no adverse reaction to anesthesia, she carries which risk for port placement as general population.    Chemo teach is done, she wants to start us ASAP as she has plan to go down to [place].      All Qs are answered to PT's satisfaction.     Pt  made informed decision to proceed with the above recommendations.     Total times about 40 minutes, more than 25 minutes spent in counseling regarding her complicated breast cancer presentation, discussed with patient and her family about her breast cancer nature, tumor conference recommendation, chemo consent, chemo treatment regimen, side effects, follow-up plan.

## 2019-11-22 ENCOUNTER — DOCUMENTATION ONLY (OUTPATIENT)
Dept: OTHER | Facility: CLINIC | Age: 79
End: 2019-11-22

## 2019-11-25 ENCOUNTER — HOSPITAL ENCOUNTER (OUTPATIENT)
Dept: LAB | Facility: CLINIC | Age: 79
Discharge: HOME | End: 2019-11-25
Attending: INTERNAL MEDICINE | Admitting: INTERNAL MEDICINE
Payer: COMMERCIAL

## 2019-11-25 ENCOUNTER — ONCOLOGY VISIT (OUTPATIENT)
Dept: ONCOLOGY | Facility: CLINIC | Age: 79
End: 2019-11-25
Attending: INTERNAL MEDICINE
Payer: COMMERCIAL

## 2019-11-25 VITALS
OXYGEN SATURATION: 94 % | HEART RATE: 85 BPM | SYSTOLIC BLOOD PRESSURE: 160 MMHG | DIASTOLIC BLOOD PRESSURE: 79 MMHG | BODY MASS INDEX: 32.77 KG/M2 | HEIGHT: 65 IN | WEIGHT: 196.7 LBS | TEMPERATURE: 98.2 F | RESPIRATION RATE: 20 BRPM

## 2019-11-25 DIAGNOSIS — C50.911 INFILTRATING DUCTAL CARCINOMA OF RIGHT BREAST (H): Primary | ICD-10-CM

## 2019-11-25 DIAGNOSIS — C50.411 MALIGNANT NEOPLASM OF UPPER-OUTER QUADRANT OF RIGHT BREAST IN FEMALE, ESTROGEN RECEPTOR NEGATIVE (H): ICD-10-CM

## 2019-11-25 DIAGNOSIS — Z17.1 MALIGNANT NEOPLASM OF UPPER-OUTER QUADRANT OF RIGHT BREAST IN FEMALE, ESTROGEN RECEPTOR NEGATIVE (H): ICD-10-CM

## 2019-11-25 DIAGNOSIS — C50.912 INFILTRATING DUCTAL CARCINOMA OF LEFT BREAST (H): Primary | ICD-10-CM

## 2019-11-25 LAB
ALBUMIN SERPL-MCNC: 3.7 G/DL (ref 3.4–5)
ALP SERPL-CCNC: 81 U/L (ref 40–150)
ALT SERPL W P-5'-P-CCNC: 30 U/L (ref 0–50)
ANION GAP SERPL CALCULATED.3IONS-SCNC: 7 MMOL/L (ref 3–14)
AST SERPL W P-5'-P-CCNC: 22 U/L (ref 0–45)
BASOPHILS # BLD AUTO: 0.1 10E9/L (ref 0–0.2)
BASOPHILS NFR BLD AUTO: 0.7 %
BILIRUB SERPL-MCNC: 0.5 MG/DL (ref 0.2–1.3)
BUN SERPL-MCNC: 10 MG/DL (ref 7–30)
CALCIUM SERPL-MCNC: 9.5 MG/DL (ref 8.5–10.1)
CANCER AG27-29 SERPL-ACNC: 17 U/ML (ref 0–39)
CHLORIDE SERPL-SCNC: 108 MMOL/L (ref 94–109)
CO2 SERPL-SCNC: 24 MMOL/L (ref 20–32)
CREAT SERPL-MCNC: 0.5 MG/DL (ref 0.52–1.04)
DIFFERENTIAL METHOD BLD: NORMAL
EOSINOPHIL # BLD AUTO: 0.1 10E9/L (ref 0–0.7)
EOSINOPHIL NFR BLD AUTO: 1 %
ERYTHROCYTE [DISTWIDTH] IN BLOOD BY AUTOMATED COUNT: 12.2 % (ref 10–15)
GFR SERPL CREATININE-BSD FRML MDRD: >90 ML/MIN/{1.73_M2}
GLUCOSE SERPL-MCNC: 103 MG/DL (ref 70–99)
HCT VFR BLD AUTO: 42.5 % (ref 35–47)
HGB BLD-MCNC: 14 G/DL (ref 11.7–15.7)
IMM GRANULOCYTES # BLD: 0 10E9/L (ref 0–0.4)
IMM GRANULOCYTES NFR BLD: 0.4 %
LYMPHOCYTES # BLD AUTO: 1.7 10E9/L (ref 0.8–5.3)
LYMPHOCYTES NFR BLD AUTO: 21 %
MCH RBC QN AUTO: 31.1 PG (ref 26.5–33)
MCHC RBC AUTO-ENTMCNC: 32.9 G/DL (ref 31.5–36.5)
MCV RBC AUTO: 94 FL (ref 78–100)
MONOCYTES # BLD AUTO: 0.6 10E9/L (ref 0–1.3)
MONOCYTES NFR BLD AUTO: 7.1 %
NEUTROPHILS # BLD AUTO: 5.7 10E9/L (ref 1.6–8.3)
NEUTROPHILS NFR BLD AUTO: 69.8 %
NRBC # BLD AUTO: 0 10*3/UL
NRBC BLD AUTO-RTO: 0 /100
PLATELET # BLD AUTO: 327 10E9/L (ref 150–450)
POTASSIUM SERPL-SCNC: 3.9 MMOL/L (ref 3.4–5.3)
PROT SERPL-MCNC: 8.6 G/DL (ref 6.8–8.8)
RBC # BLD AUTO: 4.5 10E12/L (ref 3.8–5.2)
SODIUM SERPL-SCNC: 139 MMOL/L (ref 133–144)
WBC # BLD AUTO: 8.2 10E9/L (ref 4–11)

## 2019-11-25 PROCEDURE — 36415 COLL VENOUS BLD VENIPUNCTURE: CPT | Performed by: INTERNAL MEDICINE

## 2019-11-25 PROCEDURE — 86300 IMMUNOASSAY TUMOR CA 15-3: CPT | Performed by: INTERNAL MEDICINE

## 2019-11-25 PROCEDURE — 80053 COMPREHEN METABOLIC PANEL: CPT | Performed by: INTERNAL MEDICINE

## 2019-11-25 PROCEDURE — 99215 OFFICE O/P EST HI 40 MIN: CPT | Performed by: INTERNAL MEDICINE

## 2019-11-25 PROCEDURE — 85025 COMPLETE CBC W/AUTO DIFF WBC: CPT | Performed by: INTERNAL MEDICINE

## 2019-11-25 PROCEDURE — 36415 COLL VENOUS BLD VENIPUNCTURE: CPT

## 2019-11-25 PROCEDURE — G0463 HOSPITAL OUTPT CLINIC VISIT: HCPCS

## 2019-11-25 ASSESSMENT — MIFFLIN-ST. JEOR: SCORE: 1369.14

## 2019-11-25 ASSESSMENT — PAIN SCALES - GENERAL: PAINLEVEL: NO PAIN (0)

## 2019-11-25 NOTE — PROGRESS NOTES
"Oncology Rooming Note    November 25, 2019 11:24 AM   Precious Paris is a 79 year old female who presents for:    Chief Complaint   Patient presents with     Oncology Clinic Visit     Recheck Breast CA, Review pathology - Transfer from Dr Cruz     Initial Vitals: BP (!) 160/79 (BP Location: Right arm, Patient Position: Sitting, Cuff Size: Adult Regular)   Pulse 85   Temp 98.2  F (36.8  C) (Tympanic)   Resp 20   Ht 1.645 m (5' 4.75\")   Wt 89.2 kg (196 lb 11.2 oz)   SpO2 94%   BMI 32.99 kg/m   Estimated body mass index is 32.99 kg/m  as calculated from the following:    Height as of this encounter: 1.645 m (5' 4.75\").    Weight as of this encounter: 89.2 kg (196 lb 11.2 oz). Body surface area is 2.02 meters squared.  No Pain (0) Comment: Data Unavailable   No LMP recorded. Patient is postmenopausal.  Allergies reviewed: Yes  Medications reviewed: Yes    Medications: Medication refills not needed today.  Pharmacy name entered into [facility]: [facility] - - [place], [place] - [id number] [facility]    Clinical concerns Recheck Breast CA, Review pathology - Transfer from Dr rCuz.     Some right breast achiness.      Estrella Ren, AXEL              "

## 2019-11-25 NOTE — LETTER
11/25/2019         RE: Precious Paris  [address] [place] [address]        Dear Colleague,    Thank you for referring your patient, Precious Paris, to the [facility]. Please see a copy of my visit note below.    MEDICAL ONCOLOGY FOLLOW UP VISIT      CC:  November 2019 at age 79, she presented with right breast pain with palpable lump,  diagnosed with triple negative breast cancer of left breast lateral central portion  Transferring her care from Dr. Cruz to me 11/25/2019      HISTORY OF ONCOLOGY ILLNESS:    At age 79, 10/2019 she presented with pain and redness, induration in the right breast associated with breast lump.  She was treated with antibiotic for 10 days redness have resolved.    US/MA 10/2019  found 2 hypoechoic structures are seen at the patient's area of concern, 9:00-10:00 position 7 cm from the nipple on the RIGHT. One of these appears to have at least some component of an echogenic hilum and may represent an enlarged lymph node. The first measures 1.1 x 1.3 x 1.2 cm and the second measures 1.8 x 1.4 x 1.4 cm.   Survey of the axilla shows no evidence of adenopathy.    First biopsy identified invasive ductal carcinoma with marked necrosis and insufficient tissue for further evaluation.  Second time biopsy to the right breast 9 o'clock position 9 cm from the nipple, found grade 3 invasive ductal cancer, triple negative, associated with grade 3 DCIS.    She saw Dr. Frausto along with tumor conference discussion, due to her relatively healthy status, aggressive nature of her breast cancer, and the desire of breast conservation, neoadjuvant systemic treatment is recommended.      INTERVAL HISTORY: N/A      PMH  retinopathy of the right eye, cataract  Hypertension, hyperlipidemia  Osteoarthritis, spinal stenosis, bilateral knees replacement, carpel tunnel surgery  Varicose vein of lower extremities ,s/p surgery      REVIEW OF SYSTEMS:   She is in her usual state of health  "she has no breast complaint at this point       PHYSICAL EXAMINATION:   VITAL SIGNS: Blood pressure (!) 160/79, pulse 85, temperature 98.2  F (36.8  C), temperature source Tympanic, resp. rate 20, height 1.645 m (5' 4.75\"), weight 89.2 kg (196 lb 11.2 oz), SpO2 94 %, not currently breastfeeding.      GENERAL APPEARANCE:  looks like her stated age, very pleasant, not in acute distress.     ECO    ENT, MOUTH: Pupils are equally reactive to light.  Sclerae are anicteric.  Moist oral mucosa without lesion or ulcer.   Negative pharynx.  No oral thrush.   NECK:  Supple.  No jugular venous distention.  Thyroid is not palpable.   LYMPH NODES:  Superficial lymphadenopathy is not appreciable in the bilateral cervical, supraclavicular, axillary or inguinal areas.   CARDIOVASCULAR:  S1, S2 regular with no murmurs or gallops.  No carotid or abdominal bruits.   PULMONARY:  Lungs are clear to auscultation and percussion bilaterally.  There is no wheezing or rhonchi.   GASTROINTESTINAL:  Abdomen is soft, nontender.  No hepatosplenomegaly.  No signs of ascites.  No mass appreciable.   MUSCULOSKELETAL/EXTREMITIES:  No edema.  No cyanotic changes.  No signs of joint deformity.  No lymphedema.   NEUROLOGIC:  Cranial nerves II-XII are grossly intact.  Sensation intact.  Muscle strength and muscle tone symmetrical, 5/5 throughout.   BACK  No spinal or paraspinal tenderness.  No CVA tenderness.   SKIN:  No petechiae.  No rash.  No signs of cellulitis.   BREASTS/CHEST/AXILLA:  Bilateral breast exam revealed palpable upper quadrant of the right breast mobile around 2 cm lump, no skin changes, I do not appreciate axilla lymphadenopathy  PSYCHIATRIC: Oriented to time, person, and places. Normal mood and affect. Good memory. Proper insight and judgement.         CURRENT LAB DATA REVIEWED  Second time biopsy to the right breast 9 o'clock position 9 cm from the nipple, found grade 3 invasive ductal cancer, triple negative, associated with " grade 3 DCIS.          CURRENT IMAGING REVIEWED  US/MA 10/2019  found 2 hypoechoic structures are seen at the patient's area of concern, 9:00-10:00 position 7 cm from the nipple on the RIGHT. One of these appears to have at least some component of an echogenic hilum and may represent an enlarged lymph node. The first measures 1.1 x 1.3 x 1.2 cm and the second measures 1.8 x 1.4 x 1.4 cm.   Survey of the axilla shows no evidence of adenopathy.         OLD DATA REVIEWED TODAY WITH SUMMARY:   MA 10/2018 was negative.           ASSESSMENT AND PLAN:    Newly dx diagnosed right breast upper outer quadrant triple negative breast cancer 11/2019.  She had 2 lesions on mammogram, 1 of them likely represents an internal mammary chain lymph nodes.  Both biopsy were turned out to be invasive ductal cancer, first biopsy full of necrosis ER [place] HER-2 were not able to be done, second biopsy found to have triple negative breast cancer.  She potentially could have cT1cN 1 (due to image finding suggest 1 of the breast lesion may represent a lymph nodes).    Tumor conference discussion November 21,recommend neoadjuvant systemic chemotherapy for breast conservation, also due to her relatively fast growing presentation, triple negative disease nature, possible lymph nodes involvement.    She is 79 years old, with minimal comorbidities, has excellent performance status, would recommend baseline echocardiogram.    Would start out her with weekly Taxol, depends on her response may consider adding carboplatin this could be rejected by insurance company.  Splane the rationale to the patient and her family about this possible approach.  We will monitor her response to weekly Taxol and depend on further plan of action whether move on to surgery or further chemotherapy.    She does not have good IV access, she will need a port placement to support her prolonged weekly Taxol treatment.   He has multiple surgery before, had no bleeding  "complications, no adverse reaction to anesthesia, she carries which risk for port placement as general population.    Chemo teach is done, she wants to start us ASAP as she has plan to go down to [place].      All Qs are answered to PT's satisfaction.     Pt made informed decision to proceed with the above recommendations.     Total times about 40 minutes, more than 25 minutes spent in counseling regarding her complicated breast cancer presentation, discussed with patient and her family about her breast cancer nature, tumor conference recommendation, chemo consent, chemo treatment regimen, side effects, follow-up plan.    Oncology Rooming Note    November 25, 2019 11:24 AM   Precious Paris is a 79 year old female who presents for:    Chief Complaint   Patient presents with     Oncology Clinic Visit     Recheck Breast CA, Review pathology - Transfer from Dr Cruz     Initial Vitals: BP (!) 160/79 (BP Location: Right arm, Patient Position: Sitting, Cuff Size: Adult Regular)   Pulse 85   Temp 98.2  F (36.8  C) (Tympanic)   Resp 20   Ht 1.645 m (5' 4.75\")   Wt 89.2 kg (196 lb 11.2 oz)   SpO2 94%   BMI 32.99 kg/m    Estimated body mass index is 32.99 kg/m  as calculated from the following:    Height as of this encounter: 1.645 m (5' 4.75\").    Weight as of this encounter: 89.2 kg (196 lb 11.2 oz). Body surface area is 2.02 meters squared.  No Pain (0) Comment: Data Unavailable   No LMP recorded. Patient is postmenopausal.  Allergies reviewed: Yes  Medications reviewed: Yes    Medications: Medication refills not needed today.  Pharmacy [facility] into [facility] [place] [facility] [id number] [facility]    Clinical concerns Recheck Breast CA, Review pathology - Transfer from Dr Cruz.     Some right breast achiness.      Estrella Ren [facility]                Again, thank you for allowing me to participate in the care of your patient.        Sincerely,        Rachell Villa MD, MD    "

## 2019-11-25 NOTE — PATIENT INSTRUCTIONS
Dr. Villa would like you to have labs today, set up port placement and to keep the Echo appointment/ We will plan to cancel the Breast MRI at [facility]. We would like you to start Weekly Taxol prior to going to [place].     When you are in need of a refill, please call your pharmacy and they will send us a request.      Copy of appointments, and after visit summary (AVS) given to patient.      If you have any questions please call Patrica Perla RN, BSN Oncology Hematology  [facility] [phone]. For questions after business hours, or on holidays/weekends, please call our after hours Nurse Triage line [phone]. Thank you.         Labs today.  Cancel MRI, keep echo.   Port placement with Dr. Escobedo.   Chemo teach on wkly taxol. Order is in [facility], can try to start without port.   Will start wkly taxol first, then pt is going down to [place] ([facility]).

## 2019-11-26 ENCOUNTER — OFFICE VISIT (OUTPATIENT)
Dept: SURGERY | Facility: CLINIC | Age: 79
End: 2019-11-26
Payer: COMMERCIAL

## 2019-11-26 ENCOUNTER — PATIENT OUTREACH (OUTPATIENT)
Dept: ONCOLOGY | Facility: CLINIC | Age: 79
End: 2019-11-26

## 2019-11-26 VITALS
BODY MASS INDEX: 32.76 KG/M2 | DIASTOLIC BLOOD PRESSURE: 73 MMHG | TEMPERATURE: 97.9 F | HEART RATE: 86 BPM | SYSTOLIC BLOOD PRESSURE: 140 MMHG | HEIGHT: 65 IN | WEIGHT: 196.65 LBS

## 2019-11-26 DIAGNOSIS — C50.411 MALIGNANT NEOPLASM OF UPPER-OUTER QUADRANT OF RIGHT BREAST IN FEMALE, ESTROGEN RECEPTOR NEGATIVE (H): Primary | ICD-10-CM

## 2019-11-26 DIAGNOSIS — C50.911 INFILTRATING DUCTAL CARCINOMA OF RIGHT BREAST (H): Primary | ICD-10-CM

## 2019-11-26 DIAGNOSIS — Z17.1 MALIGNANT NEOPLASM OF UPPER-OUTER QUADRANT OF RIGHT BREAST IN FEMALE, ESTROGEN RECEPTOR NEGATIVE (H): Primary | ICD-10-CM

## 2019-11-26 PROCEDURE — 99203 OFFICE O/P NEW LOW 30 MIN: CPT | Performed by: SURGERY

## 2019-11-26 RX ORDER — LORAZEPAM 0.5 MG/1
0.5 TABLET ORAL EVERY 4 HOURS PRN
Qty: 30 TABLET | Refills: 3 | Status: SHIPPED | OUTPATIENT
Start: 2019-11-26 | End: 2020-04-02

## 2019-11-26 RX ORDER — PROCHLORPERAZINE MALEATE 10 MG
5 TABLET ORAL EVERY 6 HOURS PRN
Qty: 30 TABLET | Refills: 3 | Status: SHIPPED | OUTPATIENT
Start: 2019-11-26 | End: 2020-04-02

## 2019-11-26 ASSESSMENT — MIFFLIN-ST. JEOR: SCORE: 1368.91

## 2019-11-26 NOTE — PROGRESS NOTES
Surgical Consultation/History and Physical  [facility]    Precious is seen in consultation for right breast cancer, at the request of Ester Jorge MD.    Chief Complaint:  Right breast cancer    HPI:  Precious Paris presents in consultation today for evaluation of infusion port placement. She has a history of right breast cancer. The patient is right dominant, has never had central venous access, pneumothorax, lung surgery, history of dialysis or renal failure, and denies repetitive movements with the upper extremity (such as pitching, chopping wood, bowling, or hunting).      Patient Active Problem List   Diagnosis     Spinal stenosis in cervical region     Varicose veins of lower extremities with inflammation     Malabsorption of glucose     OA (osteoarthritis) of knee     Premature atrial contractions     HYPERLIPIDEMIA LDL GOAL <130     Healthcare maintenance     Hx of skin cancer, basal cell     Hammer toe     Hypertension, goal below 150/90     Primary osteoarthritis of both hands     Senile nuclear cataract     Hypertensive retinopathy of right eye     Infiltrating ductal carcinoma of left breast (H)     Infiltrating ductal carcinoma of right breast (H)     Malignant neoplasm of upper-outer quadrant of right breast in female, 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)       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     anosmia     Diverticulosis of colon (without mention of hemorrhage)      Other and unspecified hyperlipidemia      Pain in joint, lower leg      Plantar fascial fibromatosis      PURE HYPERCHOLESTEROLEM 9/9/2007     PURE HYPERCHOLESTEROLEM 9/9/2007     Squamous cell carcinoma        Past Surgical History:   Procedure Laterality Date     BIOPSY        HC COLONOSCOPY THRU STOMA, DIAGNOSTIC  04/05    diverticulosis and hemorroids, due 2015     JOINT REPLACEMTN, KNEE RT/LT  2010    right     JOINT REPLACEMTN, KNEE RT/LT  2011    left     LIGATE VEIN VARICOSE, PHLEBECTOMY MULTIPLE STAB, COMBINED  10/17/2013    Procedure: COMBINED LIGATE VEIN VARICOSE, PHLEBECTOMY MULTIPLE STAB;  Phlebectomy of Right Knee Varicose Veins;  Surgeon: Andrea Duffy MD;  Location: [facility]     OSTEOTOMY FOOT  8/19/2013    Procedure: OSTEOTOMY FOOT;  Left 2nd metatarsal osteotomy, 2nd Metatarsophalangeal joint & 2nd toe correction;  Surgeon: Jose Phillips DPM;  Location: [facility]     PHACOEMULSIFICATION WITH STANDARD INTRAOCULAR LENS IMPLANT Left 12/7/2017    Procedure: PHACOEMULSIFICATION WITH STANDARD INTRAOCULAR LENS IMPLANT;  Left Cataract Removal with Implant;  Surgeon: Mata Deleon MD;  Location: [facility]     SURGICAL HISTORY OF -   1979    Stripping of Veins in Left Leg     SURGICAL HISTORY OF -   1970    Bilateral Tubal Ligation     SURGICAL HISTORY OF -   2000    Carpal Tunnel Repair, right     SURGICAL HISTORY OF -   9-12-08    Rt let ablation       Family History   Problem Relation Age of Onset     Hypertension Mother      Cerebrovascular Disease Mother      Hypertension Father      Cerebrovascular Disease Father      Hypertension Brother      Cancer Brother         lymphoma     Hypertension Sister      Gastrointestinal Disease Sister      Hypertension Brother      Cancer Brother         melanoma     Eye Disorder Brother      Hypertension Brother      Hypertension Brother      Heart Defect Son        Social History     Tobacco Use     Smoking status: Former Smoker     Smokeless tobacco: Never Used     Tobacco comment: quit 20 years ago   Substance Use Topics     Alcohol use: Yes     Comment: occ.        History   Drug Use No       Current Outpatient Medications   Medication Sig Dispense Refill     Acetaminophen 325 MG CAPS Take 325-650 mg by mouth every 4 hours  "as needed (pain)       ASPIRIN 81 MG OR TABS Take 1 tablet by mouth daily       CALCIUM OR 1 DAILY       CENTRUM SILVER OR 1 TABLET DAILY       diclofenac (VOLTAREN) 1 % GEL topical gel Apply 2 grams to hands four times daily using enclosed dosing card. 100 g 1     fish oil-omega-3 fatty acids (OMEGA 3) 1000 MG capsule Take 1 capsule (1 g) by mouth daily 30 capsule 1     IBUPROFEN 200 MG OR TABS Take 400 mg by mouth 2 times daily as needed for pain       losartan-hydrochlorothiazide (HYZAAR) 50-12.5 MG tablet Take 1 tablet by mouth daily 90 tablet 3     melatonin 3 MG tablet Take 1 tablet (3 mg) by mouth nightly as needed for sleep       simvastatin (ZOCOR) 20 MG tablet Take 1 tablet (20 mg) by mouth At Bedtime 90 tablet 3     VITAMIN C OR 1 DAILY       VITAMIN D, CHOLECALCIFEROL, PO Take by mouth daily       VITAMIN E PO          Allergies   Allergen Reactions     Conjugated Estrogens Anaphylaxis and Other (See Comments)     Tightness in throat     Medroxyprogesterone Anaphylaxis and Other (See Comments)     Tightness in throat     Ace Inhibitors Cough     Premarin      Tightness in throat     Provera [Medroxyprogesterone Acetate]      Tightness in throat       Review of Systems:   10 point ROS otherwise negative    Physical Exam:  BP (!) 140/73 (BP Location: Left arm, Patient Position: Sitting, Cuff Size: Adult Large)   Pulse 86   Temp 97.9  F (36.6  C) (Tympanic)   Ht 1.645 m (5' 4.75\")   Wt 89.2 kg (196 lb 10.4 oz)   BMI 32.98 kg/m      Constitutional- No acute distress, well nourished, non-toxic  Eyes: Anicteric, no injection.  PERRL  ENT:  Normocephalic, atraumatic, Nose midline, moist mucus membranes  Neck - supple, no LAD  Respiratory- Clear to auscultation bilaterally, good inspiratory effort  Cardiovascular - Heart RRR, no lift's, thrills, murmurs, rubs, or gallop.  No peripheral edema.  No clubbing.  Abdomen - Soft, non-tender, +BS, no hepatosplenomegaly, no palpable masses  Rectal - good tone, no " masses, guaiac negative  Neuro - No focal neuro deficits, Alert and oriented x 3  Psych: Appropriate mood and affect  Musculoskeletal: Normal gait, symmetric strength.  FROM upper and lower extremities.  Skin: Warm, Dry    LABS:     Lab Results   Component Value Date    WBC 8.2 11/25/2019    HGB 14.0 11/25/2019    HCT 42.5 11/25/2019    MCV 94 11/25/2019     11/25/2019      Lab Results   Component Value Date    INR 1.2 (A) 09/16/2011    INR 2.00 (H) 09/13/2011    INR 2.0 (H) 07/23/2010          INFORMED CONSENT:     A discussion of the indications, risks, benefits and alternatives to surgery was held with the patient, and the risks discussed include beeding, infection, thrombosis, stenosis, port malfunction or malposition, air embolism, pneumothorax, hemothorax, or cardiac arrythmia . The patient understood the information given, questions addressed, and she wishes to proceed. She understands the need for maintenance of the infusion port at least once a month while in place.    ASSESSMENT AND PLAN:     Ms. Precious Paris is in need of an infusion port for breast cancer, and is being expedited to surgery to facilitate care. She  understood the information given, and wishes to proceed accordingly.- and is being accordingly scheduled.    Hair Broderick, DO on 11/26/2019 at 7:18 AM

## 2019-11-26 NOTE — LETTER
11/26/2019         RE: Precious Paris  [address] [place] [address]        Dear Colleague,    Thank you for referring your patient, Precious Paris, to the [facility]. Please see a copy of my visit note below.    Surgical Consultation/History and Physical  [facility]    Precious is seen in consultation for right breast cancer, at the request of Ester Jorge MD.    Chief Complaint:  Right breast cancer    HPI:  Precious Paris presents in consultation today for evaluation of infusion port placement. She has a history of right breast cancer. The patient is right dominant, has never had central venous access, pneumothorax, lung surgery, history of dialysis or renal failure, and denies repetitive movements with the upper extremity (such as pitching, chopping wood, bowling, or hunting).      Patient Active Problem List   Diagnosis     Spinal stenosis in cervical region     Varicose veins of lower extremities with inflammation     Malabsorption of glucose     OA (osteoarthritis) of knee     Premature atrial contractions     HYPERLIPIDEMIA LDL GOAL <130     Healthcare maintenance     Hx of skin cancer, basal cell     Hammer toe     Hypertension, goal below 150/90     Primary osteoarthritis of both hands     Senile nuclear cataract     Hypertensive retinopathy of right eye     Infiltrating ductal carcinoma of left breast (H)     Infiltrating ductal carcinoma of right breast (H)     Malignant neoplasm of upper-outer quadrant of right breast in female, 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)       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     anosmia     Diverticulosis of colon (without mention of hemorrhage)      Other and unspecified  hyperlipidemia      Pain in joint, lower leg      Plantar fascial fibromatosis      PURE HYPERCHOLESTEROLEM 9/9/2007     PURE HYPERCHOLESTEROLEM 9/9/2007     Squamous cell carcinoma        Past Surgical History:   Procedure Laterality Date     BIOPSY       HC COLONOSCOPY THRU STOMA, DIAGNOSTIC  04/05    diverticulosis and hemorroids, due 2015     JOINT REPLACEMTN, KNEE RT/LT  2010    right     JOINT REPLACEMTN, KNEE RT/LT  2011    left     LIGATE VEIN VARICOSE, PHLEBECTOMY MULTIPLE STAB, COMBINED  10/17/2013    Procedure: COMBINED LIGATE VEIN VARICOSE, PHLEBECTOMY MULTIPLE STAB;  Phlebectomy of Right Knee Varicose Veins;  Surgeon: Andrea Duffy MD;  Location: [facility]     OSTEOTOMY FOOT  8/19/2013    Procedure: OSTEOTOMY FOOT;  Left 2nd metatarsal osteotomy, 2nd Metatarsophalangeal joint & 2nd toe correction;  Surgeon: Jose Phillips DPM;  Location: [facility]     PHACOEMULSIFICATION WITH STANDARD INTRAOCULAR LENS IMPLANT Left 12/7/2017    Procedure: PHACOEMULSIFICATION WITH STANDARD INTRAOCULAR LENS IMPLANT;  Left Cataract Removal with Implant;  Surgeon: Mata Deleon MD;  Location: [facility]     SURGICAL HISTORY OF -   1979    Stripping of Veins in Left Leg     SURGICAL HISTORY OF -   1970    Bilateral Tubal Ligation     SURGICAL HISTORY OF -   2000    Carpal Tunnel Repair, right     SURGICAL HISTORY OF -   9-12-08    Rt let ablation       Family History   Problem Relation Age of Onset     Hypertension Mother      Cerebrovascular Disease Mother      Hypertension Father      Cerebrovascular Disease Father      Hypertension Brother      Cancer Brother         lymphoma     Hypertension Sister      Gastrointestinal Disease Sister      Hypertension Brother      Cancer Brother         melanoma     Eye Disorder Brother      Hypertension Brother      Hypertension Brother      Heart Defect Son        Social History     Tobacco Use     Smoking status: Former Smoker     Smokeless tobacco: Never Used     Tobacco  "comment: quit 20 years ago   Substance Use Topics     Alcohol use: Yes     Comment: occ.        History   Drug Use No       Current Outpatient Medications   Medication Sig Dispense Refill     Acetaminophen 325 MG CAPS Take 325-650 mg by mouth every 4 hours as needed (pain)       ASPIRIN 81 MG OR TABS Take 1 tablet by mouth daily       CALCIUM OR 1 DAILY       CENTRUM SILVER OR 1 TABLET DAILY       diclofenac (VOLTAREN) 1 % GEL topical gel Apply 2 grams to hands four times daily using enclosed dosing card. 100 g 1     fish oil-omega-3 fatty acids (OMEGA 3) 1000 MG capsule Take 1 capsule (1 g) by mouth daily 30 capsule 1     IBUPROFEN 200 MG OR TABS Take 400 mg by mouth 2 times daily as needed for pain       losartan-hydrochlorothiazide (HYZAAR) 50-12.5 MG tablet Take 1 tablet by mouth daily 90 tablet 3     melatonin 3 MG tablet Take 1 tablet (3 mg) by mouth nightly as needed for sleep       simvastatin (ZOCOR) 20 MG tablet Take 1 tablet (20 mg) by mouth At Bedtime 90 tablet 3     VITAMIN C OR 1 DAILY       VITAMIN D, CHOLECALCIFEROL, PO Take by mouth daily       VITAMIN E PO          Allergies   Allergen Reactions     Conjugated Estrogens Anaphylaxis and Other (See Comments)     Tightness in throat     Medroxyprogesterone Anaphylaxis and Other (See Comments)     Tightness in throat     Ace Inhibitors Cough     Premarin      Tightness in throat     Provera [Medroxyprogesterone Acetate]      Tightness in throat       Review of Systems:   10 point ROS otherwise negative    Physical Exam:  BP (!) 140/73 (BP Location: Left arm, Patient Position: Sitting, Cuff Size: Adult Large)   Pulse 86   Temp 97.9  F (36.6  C) (Tympanic)   Ht 1.645 m (5' 4.75\")   Wt 89.2 kg (196 lb 10.4 oz)   BMI 32.98 kg/m       Constitutional- No acute distress, well nourished, non-toxic  Eyes: Anicteric, no injection.  PERRL  ENT:  Normocephalic, atraumatic, Nose midline, moist mucus membranes  Neck - supple, no LAD  Respiratory- Clear to " auscultation bilaterally, good inspiratory effort  Cardiovascular - Heart RRR, no lift's, thrills, murmurs, rubs, or gallop.  No peripheral edema.  No clubbing.  Abdomen - Soft, non-tender, +BS, no hepatosplenomegaly, no palpable masses  Rectal - good tone, no masses, guaiac negative  Neuro - No focal neuro deficits, Alert and oriented x 3  Psych: Appropriate mood and affect  Musculoskeletal: Normal gait, symmetric strength.  FROM upper and lower extremities.  Skin: Warm, Dry    LABS:     Lab Results   Component Value Date    WBC 8.2 11/25/2019    HGB 14.0 11/25/2019    HCT 42.5 11/25/2019    MCV 94 11/25/2019     11/25/2019      Lab Results   Component Value Date    INR 1.2 (A) 09/16/2011    INR 2.00 (H) 09/13/2011    INR 2.0 (H) 07/23/2010          INFORMED CONSENT:     A discussion of the indications, risks, benefits and alternatives to surgery was held with the patient, and the risks discussed include beeding, infection, thrombosis, stenosis, port malfunction or malposition, air embolism, pneumothorax, hemothorax, or cardiac arrythmia . The patient understood the information given, questions addressed, and she wishes to proceed. She understands the need for maintenance of the infusion port at least once a month while in place.    ASSESSMENT AND PLAN:     Ms. Precious Paris is in need of an infusion port for breast cancer, and is being expedited to surgery to facilitate care. She  understood the information given, and wishes to proceed accordingly.- and is being accordingly scheduled.    Hair Broderick DO on 11/26/2019 at 7:18 AM        Again, thank you for allowing me to participate in the care of your patient.        Sincerely,        Hair Broderick DO

## 2019-11-26 NOTE — PROGRESS NOTES
"Chemotherapy Education    Patient is a 79 year old female here today for chemotherapy education, accompanied by spouse and daughter.  Pt has a cancer diagnosis of   Encounter Diagnosis   Name Primary?     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  Their Oncologist is Dr. Villa, and PCP is Ester Jorge.    Reviewed the following with the patient and their support person:    Treatment Goal: Curative    Regimen: Taxol     Duration: x 12 Cycles and rationale for strict adherence, specific supportive medication Compazine and Ativan and side effects (including long-term and short-term) and management; skin changes/hand-foot syndrome, anemia, neutropenia, thrombocytopenia, diarrhea/constipation, hair loss syndrome, memory changes/ \"chemobrain\", mouth sores, taste changes, neuropathy, fatigue, infertility, myelosuppression, and risk of extravasation or infiltration.    Infection prevention and monitoring of lab values, what lab tests and what changes of these values meant, along with the possibility of hydration or blood product transfusion, or the need to defer or hold treatment.      General Chemotherapy Information, including ways it is excreted from the body and cleaning and containment of vomitus or other bodily fluid, use of the bathroom, sexual health and intimacy, what to do if needing to miss a treatment, when to call a provider and the need for staff to wear protective equipment.    Oral Chemotherapy No  If Yes:  Add handling and waste.      No      Importance of Central line care (port) or IV site care.    Written Information: written information including the \"My Cancer Guidebook\" including \"Getting Ready for Chemotherapy: What to Expect, Before, During, and After your Treatment\" booklet, specific drug information guides printed from [employer], information on when to contact the provider, various programs offered at [facility], and our " business card with contact information given; Oncology Clinic, RN Case Manager, and the after-hours Nurse Advise Line.  No barriers to learning identified. Patient and family verbalized understanding of all written and verbal information. All questions answered to patients satisfaction.   General Orientation to the Medical Oncology department, Infusion Services department, Huc/scheduling, bathrooms and usual flow of the treatment day provided as well as introduction to the Infusion nurses.    Bottle Pump Infuser: N/A    Other Concerns: pt will be transferring to [place] for the winter and finding a Cancer center there to continue treatment.     Comments:    Pt instructed to call with further questions or concerns.  Patient states understanding and is in agreement with this plan.  Copy of appointments, and after visit summary (AVS) given to patient. Patient discharged 11.26.19.    Patrica Perla, RN, BSN, OCN, CBCN  Oncology/Hematology   Breast Cancer Navigator  [facility]  [phone]

## 2019-11-26 NOTE — NURSING NOTE
"Initial BP (!) 140/73 (BP Location: Left arm, Patient Position: Sitting, Cuff Size: Adult Large)   Pulse 86   Temp 97.9  F (36.6  C) (Tympanic)   Ht 1.645 m (5' 4.75\")   Wt 89.2 kg (196 lb 10.4 oz)   BMI 32.98 kg/m   Estimated body mass index is 32.98 kg/m  as calculated from the following:    Height as of this encounter: 1.645 m (5' 4.75\").    Weight as of this encounter: 89.2 kg (196 lb 10.4 oz). .    Ester Medina MA    "

## 2019-11-29 ENCOUNTER — ANESTHESIA EVENT (OUTPATIENT)
Dept: SURGERY | Facility: CLINIC | Age: 79
End: 2019-11-29
Payer: COMMERCIAL

## 2019-11-29 ENCOUNTER — APPOINTMENT (OUTPATIENT)
Dept: GENERAL RADIOLOGY | Facility: CLINIC | Age: 79
End: 2019-11-29
Attending: SURGERY
Payer: COMMERCIAL

## 2019-11-29 ENCOUNTER — HOSPITAL ENCOUNTER (OUTPATIENT)
Facility: CLINIC | Age: 79
Discharge: HOME | End: 2019-11-29
Attending: SURGERY | Admitting: SURGERY
Payer: COMMERCIAL

## 2019-11-29 ENCOUNTER — ANESTHESIA (OUTPATIENT)
Dept: SURGERY | Facility: CLINIC | Age: 79
End: 2019-11-29
Payer: COMMERCIAL

## 2019-11-29 VITALS
BODY MASS INDEX: 32.65 KG/M2 | HEIGHT: 65 IN | RESPIRATION RATE: 16 BRPM | SYSTOLIC BLOOD PRESSURE: 114 MMHG | WEIGHT: 196 LBS | HEART RATE: 71 BPM | OXYGEN SATURATION: 92 % | TEMPERATURE: 98 F | DIASTOLIC BLOOD PRESSURE: 69 MMHG

## 2019-11-29 DIAGNOSIS — C50.911 INFILTRATING DUCTAL CARCINOMA OF RIGHT BREAST (H): ICD-10-CM

## 2019-11-29 PROCEDURE — 37000009 ZZH ANESTHESIA TECHNICAL FEE, EACH ADDTL 15 MIN: Performed by: SURGERY

## 2019-11-29 PROCEDURE — 27110028 ZZH OR GENERAL SUPPLY NON-STERILE: Performed by: SURGERY

## 2019-11-29 PROCEDURE — 77001 FLUOROGUIDE FOR VEIN DEVICE: CPT | Mod: 26 | Performed by: SURGERY

## 2019-11-29 PROCEDURE — 27210794 ZZH OR GENERAL SUPPLY STERILE: Performed by: SURGERY

## 2019-11-29 PROCEDURE — 37000008 ZZH ANESTHESIA TECHNICAL FEE, 1ST 30 MIN: Performed by: SURGERY

## 2019-11-29 PROCEDURE — 25800030 ZZH RX IP 258 OP 636: Performed by: NURSE ANESTHETIST, CERTIFIED REGISTERED

## 2019-11-29 PROCEDURE — 71000027 ZZH RECOVERY PHASE 2 EACH 15 MINS: Performed by: SURGERY

## 2019-11-29 PROCEDURE — 25000128 H RX IP 250 OP 636: Performed by: SURGERY

## 2019-11-29 PROCEDURE — 40000277 XR SURGERY CARM FLUORO LESS THAN 5 MIN W STILLS: Mod: TC

## 2019-11-29 PROCEDURE — 40000986 XR CHEST PORT 1 VW

## 2019-11-29 PROCEDURE — 36000052 ZZH SURGERY LEVEL 2 EA 15 ADDTL MIN: Performed by: SURGERY

## 2019-11-29 PROCEDURE — 25000132 ZZH RX MED GY IP 250 OP 250 PS 637: Performed by: NURSE ANESTHETIST, CERTIFIED REGISTERED

## 2019-11-29 PROCEDURE — C1788 PORT, INDWELLING, IMP: HCPCS | Performed by: SURGERY

## 2019-11-29 PROCEDURE — 25000125 ZZHC RX 250: Performed by: SURGERY

## 2019-11-29 PROCEDURE — 36561 INSERT TUNNELED CV CATH: CPT | Performed by: SURGERY

## 2019-11-29 PROCEDURE — 25000128 H RX IP 250 OP 636: Performed by: NURSE ANESTHETIST, CERTIFIED REGISTERED

## 2019-11-29 PROCEDURE — 36000054 ZZH SURGERY LEVEL 2 W FLUORO 1ST 30 MIN: Performed by: SURGERY

## 2019-11-29 PROCEDURE — 25000125 ZZHC RX 250: Performed by: NURSE ANESTHETIST, CERTIFIED REGISTERED

## 2019-11-29 PROCEDURE — 40000306 ZZH STATISTIC PRE PROC ASSESS II: Performed by: SURGERY

## 2019-11-29 DEVICE — CATH PORT MRI POWERPORT 8FR SL 1808000
Type: IMPLANTABLE DEVICE | Site: CHEST  WALL | Status: NON-FUNCTIONAL
Removed: 2020-03-19

## 2019-11-29 RX ORDER — DEXAMETHASONE SODIUM PHOSPHATE 4 MG/ML
INJECTION, SOLUTION INTRA-ARTICULAR; INTRALESIONAL; INTRAMUSCULAR; INTRAVENOUS; SOFT TISSUE PRN
Status: DISCONTINUED | OUTPATIENT
Start: 2019-11-29 | End: 2019-11-29

## 2019-11-29 RX ORDER — CEFAZOLIN SODIUM 2 G/100ML
2 INJECTION, SOLUTION INTRAVENOUS
Status: COMPLETED | OUTPATIENT
Start: 2019-11-29 | End: 2019-11-29

## 2019-11-29 RX ORDER — BUPIVACAINE HYDROCHLORIDE 5 MG/ML
INJECTION, SOLUTION PERINEURAL PRN
Status: DISCONTINUED | OUTPATIENT
Start: 2019-11-29 | End: 2019-11-29 | Stop reason: HOSPADM

## 2019-11-29 RX ORDER — GABAPENTIN 300 MG/1
300 CAPSULE ORAL ONCE
Status: COMPLETED | OUTPATIENT
Start: 2019-11-29 | End: 2019-11-29

## 2019-11-29 RX ORDER — LIDOCAINE HYDROCHLORIDE 10 MG/ML
INJECTION, SOLUTION INFILTRATION; PERINEURAL PRN
Status: DISCONTINUED | OUTPATIENT
Start: 2019-11-29 | End: 2019-11-29

## 2019-11-29 RX ORDER — ACETAMINOPHEN 325 MG/1
975 TABLET ORAL ONCE
Status: COMPLETED | OUTPATIENT
Start: 2019-11-29 | End: 2019-11-29

## 2019-11-29 RX ORDER — SODIUM CHLORIDE, SODIUM LACTATE, POTASSIUM CHLORIDE, CALCIUM CHLORIDE 600; 310; 30; 20 MG/100ML; MG/100ML; MG/100ML; MG/100ML
INJECTION, SOLUTION INTRAVENOUS CONTINUOUS
Status: DISCONTINUED | OUTPATIENT
Start: 2019-11-29 | End: 2019-11-29 | Stop reason: HOSPADM

## 2019-11-29 RX ORDER — IBUPROFEN 600 MG/1
600 TABLET, FILM COATED ORAL
Status: DISCONTINUED | OUTPATIENT
Start: 2019-11-29 | End: 2019-11-29 | Stop reason: HOSPADM

## 2019-11-29 RX ORDER — PROPOFOL 10 MG/ML
INJECTION, EMULSION INTRAVENOUS CONTINUOUS PRN
Status: DISCONTINUED | OUTPATIENT
Start: 2019-11-29 | End: 2019-11-29

## 2019-11-29 RX ORDER — HEPARIN SODIUM 1000 [USP'U]/ML
INJECTION, SOLUTION INTRAVENOUS; SUBCUTANEOUS PRN
Status: DISCONTINUED | OUTPATIENT
Start: 2019-11-29 | End: 2019-11-29 | Stop reason: HOSPADM

## 2019-11-29 RX ORDER — ONDANSETRON 2 MG/ML
INJECTION INTRAMUSCULAR; INTRAVENOUS PRN
Status: DISCONTINUED | OUTPATIENT
Start: 2019-11-29 | End: 2019-11-29

## 2019-11-29 RX ORDER — FENTANYL CITRATE 50 UG/ML
INJECTION, SOLUTION INTRAMUSCULAR; INTRAVENOUS PRN
Status: DISCONTINUED | OUTPATIENT
Start: 2019-11-29 | End: 2019-11-29

## 2019-11-29 RX ORDER — LIDOCAINE 40 MG/G
CREAM TOPICAL
Status: DISCONTINUED | OUTPATIENT
Start: 2019-11-29 | End: 2019-11-29 | Stop reason: HOSPADM

## 2019-11-29 RX ORDER — LIDOCAINE HYDROCHLORIDE AND EPINEPHRINE 10; 10 MG/ML; UG/ML
INJECTION, SOLUTION INFILTRATION; PERINEURAL PRN
Status: DISCONTINUED | OUTPATIENT
Start: 2019-11-29 | End: 2019-11-29 | Stop reason: HOSPADM

## 2019-11-29 RX ORDER — CEFAZOLIN SODIUM 1 G/50ML
1 INJECTION, SOLUTION INTRAVENOUS SEE ADMIN INSTRUCTIONS
Status: DISCONTINUED | OUTPATIENT
Start: 2019-11-29 | End: 2019-11-29 | Stop reason: HOSPADM

## 2019-11-29 RX ADMIN — CEFAZOLIN SODIUM 2 G: 2 INJECTION, SOLUTION INTRAVENOUS at 07:33

## 2019-11-29 RX ADMIN — PROPOFOL 40 MCG/KG/MIN: 10 INJECTION, EMULSION INTRAVENOUS at 07:40

## 2019-11-29 RX ADMIN — LIDOCAINE HYDROCHLORIDE 100 MG: 10 INJECTION, SOLUTION INFILTRATION; PERINEURAL at 07:40

## 2019-11-29 RX ADMIN — FENTANYL CITRATE 50 MCG: 50 INJECTION, SOLUTION INTRAMUSCULAR; INTRAVENOUS at 07:41

## 2019-11-29 RX ADMIN — DEXAMETHASONE SODIUM PHOSPHATE 4 MG: 4 INJECTION, SOLUTION INTRA-ARTICULAR; INTRALESIONAL; INTRAMUSCULAR; INTRAVENOUS; SOFT TISSUE at 07:40

## 2019-11-29 RX ADMIN — GABAPENTIN 300 MG: 300 CAPSULE ORAL at 07:00

## 2019-11-29 RX ADMIN — ACETAMINOPHEN 975 MG: 325 TABLET, FILM COATED ORAL at 07:00

## 2019-11-29 RX ADMIN — ONDANSETRON 4 MG: 2 INJECTION INTRAMUSCULAR; INTRAVENOUS at 07:40

## 2019-11-29 RX ADMIN — MIDAZOLAM 1 MG: 1 INJECTION INTRAMUSCULAR; INTRAVENOUS at 07:33

## 2019-11-29 RX ADMIN — PHENYLEPHRINE HYDROCHLORIDE 100 MCG: 10 INJECTION INTRAVENOUS at 07:59

## 2019-11-29 RX ADMIN — MIDAZOLAM 1 MG: 1 INJECTION INTRAMUSCULAR; INTRAVENOUS at 07:49

## 2019-11-29 RX ADMIN — LIDOCAINE HYDROCHLORIDE 1 ML: 10 INJECTION, SOLUTION EPIDURAL; INFILTRATION; INTRACAUDAL; PERINEURAL at 07:11

## 2019-11-29 RX ADMIN — SODIUM CHLORIDE, POTASSIUM CHLORIDE, SODIUM LACTATE AND CALCIUM CHLORIDE 1000 ML: 600; 310; 30; 20 INJECTION, SOLUTION INTRAVENOUS at 07:10

## 2019-11-29 RX ADMIN — FENTANYL CITRATE 50 MCG: 50 INJECTION, SOLUTION INTRAMUSCULAR; INTRAVENOUS at 07:33

## 2019-11-29 ASSESSMENT — MIFFLIN-ST. JEOR: SCORE: 1365.96

## 2019-11-29 ASSESSMENT — LIFESTYLE VARIABLES: TOBACCO_USE: 1

## 2019-11-29 NOTE — ANESTHESIA PREPROCEDURE EVALUATION
Anesthesia Pre-Procedure Evaluation    Patient: Precious Paris   MRN: [id number] : 1940          Preoperative Diagnosis: Infiltrating ductal carcinoma of right breast (H) [C50.911]    Procedure(s):  INSERTION, VASCULAR ACCESS PORT    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     anosmia     Diverticulosis of colon (without mention of hemorrhage)      Other and unspecified hyperlipidemia      Pain in joint, lower leg      Plantar fascial fibromatosis      PURE HYPERCHOLESTEROLEM 2007     PURE HYPERCHOLESTEROLEM 2007     Squamous cell carcinoma      Past Surgical History:   Procedure Laterality Date     BIOPSY       HC COLONOSCOPY THRU STOMA, DIAGNOSTIC      diverticulosis and hemorroids, due      JOINT REPLACEMTN, KNEE RT/LT      right     JOINT REPLACEMTN, KNEE RT/LT      left     LIGATE VEIN VARICOSE, PHLEBECTOMY MULTIPLE STAB, COMBINED  10/17/2013    Procedure: COMBINED LIGATE VEIN VARICOSE, PHLEBECTOMY MULTIPLE STAB;  Phlebectomy of Right Knee Varicose Veins;  Surgeon: Andrea Duffy MD;  Location: [facility]     OSTEOTOMY FOOT  2013    Procedure: OSTEOTOMY FOOT;  Left 2nd metatarsal osteotomy, 2nd Metatarsophalangeal joint & 2nd toe correction;  Surgeon: Jose Phillips DPM;  Location: [facility]     PHACOEMULSIFICATION WITH STANDARD INTRAOCULAR LENS IMPLANT Left 2017    Procedure: PHACOEMULSIFICATION WITH STANDARD INTRAOCULAR LENS IMPLANT;  Left Cataract Removal with Implant;  Surgeon: Mata Deleon MD;  Location: [facility]     SURGICAL HISTORY OF -       Stripping of Veins in Left Leg     SURGICAL HISTORY OF -       Bilateral Tubal Ligation     SURGICAL HISTORY OF -       Carpal Tunnel Repair, right      SURGICAL HISTORY OF -   9-12-08    Rt let ablation       Anesthesia Evaluation     . Pt has had prior anesthetic. Type: General, Regional and MAC    No history of anesthetic complications          ROS/MED HX    ENT/Pulmonary:     (+)allergic rhinitis, tobacco use, Past use , . .    Neurologic:  - neg neurologic ROS     Cardiovascular:     (+) Dyslipidemia, hypertension----. : . . . :. . Previous cardiac testing Echodate:results:date: results: date: results: date: results:          METS/Exercise Tolerance:  >4 METS   Hematologic:  - neg hematologic  ROS       Musculoskeletal:   (+) arthritis,  -       GI/Hepatic:  - neg GI/hepatic ROS       Renal/Genitourinary:  - ROS Renal section negative       Endo:  - neg endo ROS       Psychiatric:  - neg psychiatric ROS       Infectious Disease:  - neg infectious disease ROS       Malignancy:   (+) Malignancy (right) History of Breast and Skin  Breast CA Active status post.         Other:                          Physical Exam  Normal systems: cardiovascular, pulmonary and dental    Airway   Mallampati: I  TM distance: >3 FB  Neck ROM: full    Dental     Cardiovascular       Pulmonary             Lab Results   Component Value Date    WBC 8.2 11/25/2019    HGB 14.0 11/25/2019    HCT 42.5 11/25/2019     11/25/2019    SED 16 06/14/2006     11/25/2019    POTASSIUM 3.9 11/25/2019    CHLORIDE 108 11/25/2019    CO2 24 11/25/2019    BUN 10 11/25/2019    CR 0.50 (L) 11/25/2019     (H) 11/25/2019    YADIRA 9.5 11/25/2019    ALBUMIN 3.7 11/25/2019    PROTTOTAL 8.6 11/25/2019    ALT 30 11/25/2019    AST 22 11/25/2019    ALKPHOS 81 11/25/2019    BILITOTAL 0.5 11/25/2019    INR 1.2 (A) 09/16/2011    TSH 2.08 04/18/2018    T4 1.14 07/18/2007       Preop Vitals  BP Readings from Last 3 Encounters:   11/29/19 (!) 154/76   11/26/19 (!) 140/73   11/25/19 (!) 160/79    Pulse Readings from Last 3 Encounters:   11/26/19 86   11/25/19 85   11/14/19 85      Resp Readings from Last 3  "Encounters:   11/29/19 18   11/25/19 20   11/14/19 16    SpO2 Readings from Last 3 Encounters:   11/29/19 94%   11/25/19 94%   11/14/19 96%      Temp Readings from Last 1 Encounters:   11/29/19 36.5  C (97.7  F)    Ht Readings from Last 1 Encounters:   11/29/19 1.645 m (5' 4.75\")      Wt Readings from Last 1 Encounters:   11/29/19 88.9 kg (196 lb)    Estimated body mass index is 32.87 kg/m  as calculated from the following:    Height as of this encounter: 1.645 m (5' 4.75\").    Weight as of this encounter: 88.9 kg (196 lb).       Anesthesia Plan      History & Physical Review  History and physical reviewed and following examination; no interval change.    ASA Status:  3 .    NPO Status:  > 8 hours    Plan for MAC Reason for MAC:  Deep or markedly invasive procedure (G8) and Procedure to face, neck, head or breast  PONV prophylaxis:  Ondansetron (or other 5HT-3) and Dexamethasone or Solumedrol       Postoperative Care  Postoperative pain management:  IV analgesics and Oral pain medications.      Consents  Anesthetic plan, risks, benefits and alternatives discussed with:  Patient and Spouse.  Use of blood products discussed: No .   .                 KONG Tracy CRNA  "

## 2019-11-29 NOTE — DISCHARGE INSTRUCTIONS
Same Day Surgery Discharge Instructions  Special Precautions After Surgery - Adult    1. It is not unusual to feel lightheaded or faint, up to 24 hours after surgery or while taking pain medication.  If you have these symptoms; sit for a few minutes before standing and have someone assist you when getting up.  2. You should rest and relax for the next 24 hours and must have someone stay with you for at least 24 hours after your discharge.  3. DO NOT DRIVE any vehicle or operate mechanical equipment for 24 hours following the end of your surgery.  DO NOT DRIVE while taking narcotic pain medications that have been prescribed by your physician.  If you had a limb operated on, you must be able to use it fully to drive.  4. DO NOT drink alcoholic beverages for 24 hours following surgery or while taking prescription pain medication.  5. Drink clear liquids (apple juice, ginger ale, broth, 7-Up, etc.).  Progress to your regular diet as you feel able.  6. Any questions call your physician and do not make important decisions for 24 hours.  7. Call for signs of infection such as fever, drainage or redness and swelling from incision(s)  8. Call for bleeding   9. To avoid nausea take pain pills with a small amount of food.  __________________________________________________________________________________________________________________________________  IMPORTANT NUMBERS:    [facility] [place] [place] Main Number:  [phone], [phone]  Pharmacy:  [phone]  Same Day Surgery:  [phone], Monday - Friday until 8:30 p.m.  Urgent Care:  [phone]  Emergency Room:  [phone]      [facility]:  [phone]                                                                             [facility]:  [phone] option 1  [facility]:  [phone]     OB Clinic:  [phone]   Surgery Specialty Clinic:  [phone]   Home Medical Equipment: [phone]  [facility]  Therapy:  [phone]    Discharge for Port Placement  Routine Postoperative Care  1) You may shower now  2)  Extra Strength Tylenol and over the counter Motrin (if you can take these medications) should be sufficient for your painis sufficient.   3) Eat a balanced diet and try to maintain good nutrition. Also drink plenty of fluids. Some of the pain medications can cause constipation, and drinking plenty of water will help prevent this.  4) If you do get constipated, you may need a stool softener or laxative. We or your pharmacist can help you with this.  5) You may resume light activity as you feel up to it. Avoid any heavy lifting (heavier than 10 pounds), or strenuous activity (including exercise) for one week.    Incision Sites  1) After 24 hours, you may shower.  The glue will follow-off on its own  2)            After 24-48 hours you may shower, cleaning wounds with soapy water. After showering, pat the wound dry with a clean towel. Do not apply any creams or ointments                   to the incision.   3)            You should call with any signs that the wound may be infected. Signs of infection include: the skin around the wound becoming redder, swollen or feeling hot; the                 wound has green or yellow drainage or smells bad, you have fever over 101oF or increasing pain.       Follow-up  You do not require routine follow-up for your procedure.  You must have your port accessed at least once a month to be flushed.  If there are any issues with your port or you are finished with use and would like it removed, please call the office to schedule an appointment.    Remember that healing from surgery, even if that surgery seems small, takes time. If you have any questions about your procedure, your follow-up instructions or the plan of care, do not hesitate to call.  For urgent calls weekends and holidays or prior to 8 am or after 4:30 pm weekdays, call the [facility]  ([phone]) will be forwarded to a nurse and if necessary Dr. Broderick or the on call general surgeon.                          Same Day Surgery Discharge Instructions  Special Precautions After Surgery - Adult    10. It is not unusual to feel lightheaded or faint, up to 24 hours after surgery or while taking pain medication.  If you have these symptoms; sit for a few minutes before standing and have someone assist you when getting up.  11. You should rest and relax for the next 24 hours and must have someone stay with you for at least 24 hours after your discharge.  12. DO NOT DRIVE any vehicle or operate mechanical equipment for 24 hours following the end of your surgery.  DO NOT DRIVE while taking narcotic pain medications that have been prescribed by your physician.  If you had a limb operated on, you must be able to use it fully to drive.  13. DO NOT drink alcoholic beverages for 24 hours following surgery or while taking prescription pain medication.  14. Drink clear liquids (apple juice, ginger ale, broth, 7-Up, etc.).  Progress to your regular diet as you feel able.  15. Any questions call your physician and do not make important decisions for 24 hours.    ACTIVITY  ? Resume activity as tolerated.     INCISIONAL CARE  ? May bathe / shower after 24 hours.     Call for an appointment to return to the clinic (no follow-up needed).    Medications:  ? Acetaminophen (Tylenol):  Next dose: as needed.  ? Ibuprofen (Motrin, Advil):  Next dose: as needed.  ? Follow the instructions on the bottle.     Additional discharge instructions: as tritten per MD  __________________________________________________________________________________________________________________________________  IMPORTANT NUMBERS:    [facility] [place] Main Number:  [phone], [phone]  Pharmacy:  [phone]  Same Day Surgery:  [phone], Monday - Friday until 8:30 p.m.  Urgent Care:  [phone]  Emergency Room:  [phone]      [facility]:  [phone]                                                                             [facility]:  [phone] option 1  [facility]:  [phone]     OB Clinic:  [phone]   Surgery Specialty Clinic:  [phone]   Home Medical Equipment: [phone]  [facility]:  [phone]

## 2019-11-29 NOTE — OP NOTE
Procedure Date: 11/29/19     PREOPERATIVE DIAGNOSIS: Right breast cancer  POSTOPERATIVE DIAGNOSIS: Same    PROCEDURE:    1. Placement of right Subclavian Infusion port (8 Macedonian Bard Power-Port)  2. Fluoroscopic guidance and confirmation of port placement.    SURGEON: Hair Broderick DO    ESTIMATED BLOOD LOSS: 3 mL    INDICATIONS:Ms. Precious Paris is a 79 year old  female who presents with a history of right breast cancer. The patient is in need of an infusion port for chemotherapy access, and a discussion was held with the patient regarding indications, risks, benefits, and alternatives (including, but not limited to, risks of bleeding, infection, pneumothorax, need for revision, thrombosis, stenosis, port malposition/malfunction, cardiac arrhythmia, and the rare risk of gas embolism). She understood the information given, questions were addressed, and she wishes to proceed.     DESCRIPTION OF PROCEDURE: The patient was brought to the operating room and placed supine on the operating room table. Sedative agents were administered by the anesthesia service. The bilateral chest and neck were then prepped and draped in the usual sterile fashion. The patient was placed in Trendelenburg position. Attempt was made on the left subclavian which was unsuccessful as such the right side was cannulated.  The right subclavian vein was percutaneously accessed after a single pass of the introducer needle. A guidewire was advanced under direct fluoroscopic guidance, and the trajectory was confirmed toward the superior vena cava/heart. Additional local anesthesia was used, and a cutdown was created below the entry point of the guidewire, and the incision was deepened to create an appropriate pocket to allow the port reservoir to comfortably reside. An 11 blade was used to make a skin nick and the introducer sheath was then passed over the guidewire without difficulty, under direct fluoroscopic guidance, and the  dilator and guidewire removed. The infusion port catheter was then passed until the tip the catheter was in the region of the cavo-atrial junction via fluoroscopy (to a depth of 14cm). The sheath was torn away in the standard fashion.  A hemostat was used to tunnel the catheter to the port pocket. The catheter was manicured to an appropriate length, and secured to the infusion port reservoir and the locking collar affixed. The port was easily flushed with a heparin solution, after assuring ease of aspiration. The infusion port itself was then secured to the tissue in the base of the pocket using interrupted 0-Ethibond suture. The final catheter position was again assessed under fluoroscopy and appeared satisfactory, with no abnormal trajectory or kinks noted, and with the tip of the catheter again noted at the cavo-atrial junction. The pocket was irrigated. Hemostasis was assured. An instrument count was conducted, and included laparotomy pads, needles and sponges, and was found to be correct. The subcutaneous tissue was closed with interrupted 3-0 Vicryl. Skin edges were re-approximated using 4-0 Monocryl, and the wound was cleansed and dried prior to application of sterile glue.    The patient tolerated the procedure well, was allowed to recover and was seated upright for a post-procedure chest x-ray. She was then transferred to the recovery room in stable condition.     Hair Broderick DO on 11/29/2019 at 8:28 AM

## 2019-11-29 NOTE — ANESTHESIA POSTPROCEDURE EVALUATION
Patient: Precious Paris    Procedure(s):  INSERTION OF VENOUS ACCESS PORT    Diagnosis:Infiltrating ductal carcinoma of right breast (H) [C50.911]  Diagnosis Additional Information: No value filed.    Anesthesia Type:  No value filed.    Note:  Anesthesia Post Evaluation    Patient location during evaluation: Phase 2  Patient participation: Able to fully participate in evaluation  Level of consciousness: awake  Pain management: adequate  Airway patency: patent  Cardiovascular status: stable  Respiratory status: room air  Hydration status: stable  PONV: none     Anesthetic complications: None          Last vitals:  Vitals:    11/29/19 0830 11/29/19 0845 11/29/19 0900   BP: 123/59 137/64 114/69   Pulse: 84  71   Resp: 16 16 16   Temp: 36.7  C (98  F)     SpO2: 96% 95% 92%         Electronically Signed By: KONG Weber CRNA  November 29, 2019  2:00 PM

## 2019-11-29 NOTE — ANESTHESIA CARE TRANSFER NOTE
Patient: Precious Paris    Procedure(s):  INSERTION OF VENOUS ACCESS PORT    Diagnosis: Infiltrating ductal carcinoma of right breast (H) [C50.911]  Diagnosis Additional Information: No value filed.    Anesthesia Type:   No value filed.     Note:  Airway :Room Air  Patient transferred to:Phase II  Comments: Patient's VSS. Spontaneous respirations. Patient awake and oriented. IV patent. Report to RN.Handoff Report: Identifed the Patient, Identified the Reponsible Provider, Reviewed the pertinent medical history, Discussed the surgical course, Reviewed Intra-OP anesthesia mangement and issues during anesthesia, Set expectations for post-procedure period and Allowed opportunity for questions and acknowledgement of understanding      Vitals: (Last set prior to Anesthesia Care Transfer)    CRNA VITALS  11/29/2019 0759 - 11/29/2019 0829      11/29/2019             Pulse:  82    SpO2:  99 %                Electronically Signed By: KONG Tracy CRNA  November 29, 2019  8:29 AM

## 2019-12-03 ENCOUNTER — HOSPITAL ENCOUNTER (OUTPATIENT)
Dept: CARDIOLOGY | Facility: CLINIC | Age: 79
Discharge: HOME | End: 2019-12-03
Attending: INTERNAL MEDICINE | Admitting: INTERNAL MEDICINE
Payer: COMMERCIAL

## 2019-12-03 DIAGNOSIS — Z51.11 ENCOUNTER FOR ANTINEOPLASTIC CHEMOTHERAPY: ICD-10-CM

## 2019-12-03 DIAGNOSIS — C50.911 MALIGNANT NEOPLASM OF RIGHT BREAST (H): ICD-10-CM

## 2019-12-03 PROCEDURE — 93306 TTE W/DOPPLER COMPLETE: CPT | Mod: 26 | Performed by: INTERNAL MEDICINE

## 2019-12-03 PROCEDURE — 93306 TTE W/DOPPLER COMPLETE: CPT

## 2019-12-05 ENCOUNTER — INFUSION THERAPY VISIT (OUTPATIENT)
Dept: INFUSION THERAPY | Facility: CLINIC | Age: 79
End: 2019-12-05
Attending: INTERNAL MEDICINE
Payer: COMMERCIAL

## 2019-12-05 VITALS
DIASTOLIC BLOOD PRESSURE: 74 MMHG | TEMPERATURE: 96.9 F | SYSTOLIC BLOOD PRESSURE: 150 MMHG | RESPIRATION RATE: 18 BRPM | HEART RATE: 81 BPM

## 2019-12-05 DIAGNOSIS — Z17.1 MALIGNANT NEOPLASM OF UPPER-OUTER QUADRANT OF RIGHT BREAST IN FEMALE, ESTROGEN RECEPTOR NEGATIVE (H): Primary | ICD-10-CM

## 2019-12-05 DIAGNOSIS — C50.411 MALIGNANT NEOPLASM OF UPPER-OUTER QUADRANT OF RIGHT BREAST IN FEMALE, ESTROGEN RECEPTOR NEGATIVE (H): Primary | ICD-10-CM

## 2019-12-05 PROCEDURE — 96367 TX/PROPH/DG ADDL SEQ IV INF: CPT

## 2019-12-05 PROCEDURE — 96375 TX/PRO/DX INJ NEW DRUG ADDON: CPT

## 2019-12-05 PROCEDURE — 25000128 H RX IP 250 OP 636: Performed by: INTERNAL MEDICINE

## 2019-12-05 PROCEDURE — 96413 CHEMO IV INFUSION 1 HR: CPT

## 2019-12-05 PROCEDURE — 25800030 ZZH RX IP 258 OP 636: Performed by: INTERNAL MEDICINE

## 2019-12-05 RX ORDER — EPINEPHRINE 1 MG/ML
0.3 INJECTION, SOLUTION, CONCENTRATE INTRAVENOUS EVERY 5 MIN PRN
Status: CANCELLED | OUTPATIENT
Start: 2019-12-05

## 2019-12-05 RX ORDER — DIPHENHYDRAMINE HCL 50 MG
50 CAPSULE ORAL ONCE
Status: CANCELLED
Start: 2019-12-05

## 2019-12-05 RX ORDER — HEPARIN SODIUM (PORCINE) LOCK FLUSH IV SOLN 100 UNIT/ML 100 UNIT/ML
5 SOLUTION INTRAVENOUS EVERY 8 HOURS
Status: DISCONTINUED | OUTPATIENT
Start: 2019-12-05 | End: 2019-12-05 | Stop reason: HOSPADM

## 2019-12-05 RX ORDER — MEPERIDINE HYDROCHLORIDE 25 MG/ML
25 INJECTION INTRAMUSCULAR; INTRAVENOUS; SUBCUTANEOUS EVERY 30 MIN PRN
Status: CANCELLED | OUTPATIENT
Start: 2019-12-05

## 2019-12-05 RX ORDER — ALBUTEROL SULFATE 90 UG/1
1-2 AEROSOL, METERED RESPIRATORY (INHALATION)
Status: CANCELLED
Start: 2019-12-05

## 2019-12-05 RX ORDER — EPINEPHRINE 0.3 MG/.3ML
0.3 INJECTION SUBCUTANEOUS EVERY 5 MIN PRN
Status: CANCELLED | OUTPATIENT
Start: 2019-12-05

## 2019-12-05 RX ORDER — ALBUTEROL SULFATE 0.83 MG/ML
2.5 SOLUTION RESPIRATORY (INHALATION)
Status: CANCELLED | OUTPATIENT
Start: 2019-12-05

## 2019-12-05 RX ORDER — LORAZEPAM 2 MG/ML
0.5 INJECTION INTRAMUSCULAR EVERY 4 HOURS PRN
Status: CANCELLED
Start: 2019-12-05

## 2019-12-05 RX ORDER — SODIUM CHLORIDE 9 MG/ML
1000 INJECTION, SOLUTION INTRAVENOUS CONTINUOUS PRN
Status: CANCELLED
Start: 2019-12-05

## 2019-12-05 RX ORDER — NALOXONE HYDROCHLORIDE 0.4 MG/ML
.1-.4 INJECTION, SOLUTION INTRAMUSCULAR; INTRAVENOUS; SUBCUTANEOUS
Status: CANCELLED | OUTPATIENT
Start: 2019-12-05

## 2019-12-05 RX ORDER — DIPHENHYDRAMINE HYDROCHLORIDE 50 MG/ML
50 INJECTION INTRAMUSCULAR; INTRAVENOUS
Status: CANCELLED
Start: 2019-12-05

## 2019-12-05 RX ORDER — METHYLPREDNISOLONE SODIUM SUCCINATE 125 MG/2ML
125 INJECTION, POWDER, LYOPHILIZED, FOR SOLUTION INTRAMUSCULAR; INTRAVENOUS
Status: CANCELLED
Start: 2019-12-05

## 2019-12-05 RX ADMIN — PACLITAXEL 162 MG: 6 INJECTION, SOLUTION INTRAVENOUS at 12:02

## 2019-12-05 RX ADMIN — DIPHENHYDRAMINE HYDROCHLORIDE 50 MG: 50 INJECTION, SOLUTION INTRAMUSCULAR; INTRAVENOUS at 11:27

## 2019-12-05 RX ADMIN — SODIUM CHLORIDE 250 ML: 9 INJECTION, SOLUTION INTRAVENOUS at 11:08

## 2019-12-05 RX ADMIN — FAMOTIDINE 20 MG: 20 INJECTION, SOLUTION INTRAVENOUS at 11:41

## 2019-12-05 RX ADMIN — Medication 5 ML: at 13:06

## 2019-12-05 RX ADMIN — DEXAMETHASONE SODIUM PHOSPHATE 20 MG: 10 INJECTION, SOLUTION INTRAMUSCULAR; INTRAVENOUS at 11:08

## 2019-12-05 ASSESSMENT — PAIN SCALES - GENERAL: PAINLEVEL: NO PAIN (0)

## 2019-12-05 NOTE — PROGRESS NOTES
Infusion Nursing Note:  Precious Paris presents today for Taxol C1D1.    Patient seen by provider today: No   present during visit today: Not Applicable.    Note: First time getting chemotherapy today. Oriented to infusion center. Chemotherapy teaching, side effects, and schedule reviewed with patient. General and individual chemotherapy side effects reviewed with patient including fatigue, immunosuppression, alopecia, nausea/vomiting, constipation/diarrhea. Pt instructed to call care coordinator, triage (or MD on call if after hours/weekends) with chills/temp >=100.5, questions/concerns. Pt stated understanding of plan. [facility] handout given to and reviewed with patient.    Intravenous Access:  Implanted Port.    Treatment Conditions:  Lab Results   Component Value Date    HGB 14.0 11/25/2019     Lab Results   Component Value Date    WBC 8.2 11/25/2019      Lab Results   Component Value Date    ANEU 5.7 11/25/2019     Lab Results   Component Value Date     11/25/2019      Lab Results   Component Value Date     11/25/2019                   Lab Results   Component Value Date    POTASSIUM 3.9 11/25/2019           No results found for: MAG         Lab Results   Component Value Date    CR 0.50 11/25/2019                   Lab Results   Component Value Date    YADIRA 9.5 11/25/2019                Lab Results   Component Value Date    BILITOTAL 0.5 11/25/2019           Lab Results   Component Value Date    ALBUMIN 3.7 11/25/2019                    Lab Results   Component Value Date    ALT 30 11/25/2019           Lab Results   Component Value Date    AST 22 11/25/2019   Results reviewed, labs MET treatment parameters, ok to proceed with treatment.    Post Infusion Assessment:  Patient tolerated infusion without incident.  Blood return noted pre and post infusion.  Site patent and intact, free from redness, edema or discomfort.  No evidence of extravasations.  Access discontinued per protocol.      Discharge Plan:   Discharge instructions reviewed with: Patient.  Patient and/or family verbalized understanding of discharge instructions and all questions answered.  AVS to patient via [employer].  Patient will return 12/12/2019 for next appointment.   Patient discharged in stable condition accompanied by: self and , [place].  Departure Mode: Ambulatory.    Therese Fountain RN

## 2019-12-08 NOTE — PROGRESS NOTES
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 Cruz to me 11/25/2019      HISTORY OF ONCOLOGY ILLNESS/CC:    At age 79, 10/2019 she presented with pain and redness, induration in the right breast associated with breast lump.  She was treated with antibiotic for 10 days redness resolved.    US/MA 10/2019  found 2 hypoechoic structures are seen at the patient's area of concern, 9:00-10:00 position 7 cm from the nipple on the RIGHT. One of these appears to have at least some component of an echogenic hilum and may represent an enlarged lymph node. The first measures 1.1 x 1.3 x 1.2 cm and the second measures 1.8 x 1.4 x 1.4 cm.   Survey of the axilla shows no evidence of adenopathy.    First biopsy identified invasive ductal carcinoma with marked necrosis and insufficient tissue for further evaluation.  Second time biopsy to the right breast 9 o'clock position 9 cm from the nipple, found grade 3 invasive ductal cancer, triple negative, associated with grade 3 DCIS.    She saw Dr. Frausto along with tumor conference discussion, due to her relatively healthy status, aggressive nature of her breast cancer, and the desire of breast conservation, neoadjuvant systemic treatment is recommended.      INTERVAL HISTORY: N/A  She made informed decision to proceed with wkly taxol, C1D1 12/5/2019.        PMH  retinopathy of the right eye, cataract  Hypertension, hyperlipidemia  Osteoarthritis, spinal stenosis, bilateral knees replacement, carpel tunnel surgery  Varicose vein of lower extremities ,s/p surgery      REVIEW OF SYSTEMS:   She tolerates Taxol well so far.        PHYSICAL EXAMINATION:   VITAL SIGNS: Blood pressure (!) 164/68, pulse 83, temperature 98.4  F (36.9  C), temperature source Tympanic, resp. rate 20, height 1.638 m (5'  "4.5\"), weight 89 kg (196 lb 1.6 oz), SpO2 94 %, not currently breastfeeding.    GENERAL APPEARANCE:  looks like her stated age, very pleasant, not in acute distress.     ECO    ENT, MOUTH: Pupils are equally reactive to light.  Sclerae are anicteric.  Moist oral mucosa without lesion or ulcer.   Negative pharynx.  No oral thrush.   NECK:  Supple.  No jugular venous distention.  Thyroid is not palpable.   LYMPH NODES:  Superficial lymphadenopathy is not appreciable in the bilateral cervical, supraclavicular, axillary or inguinal areas.   CARDIOVASCULAR:  S1, S2 regular with no murmurs or gallops.  No carotid or abdominal bruits.   PULMONARY:  Lungs are clear to auscultation and percussion bilaterally.  There is no wheezing or rhonchi.   GASTROINTESTINAL:  Abdomen is soft, nontender.  No hepatosplenomegaly.  No signs of ascites.  No mass appreciable.   MUSCULOSKELETAL/EXTREMITIES:  No edema.  No cyanotic changes.  No signs of joint deformity.  No lymphedema.   NEUROLOGIC:  Cranial nerves II-XII are grossly intact.  Sensation intact.  Muscle strength and muscle tone symmetrical, 5/5 throughout.   BACK  No spinal or paraspinal tenderness.  No CVA tenderness.   SKIN:  No petechiae.  No rash.  No signs of cellulitis.   BREASTS/CHEST/AXILLA:  Bilateral breast exam revealed palpable upper quadrant of the right breast mobile around 2 cm lump, no skin changes, I do not appreciate axilla lymphadenopathy  PSYCHIATRIC: Oriented to time, person, and places. Normal mood and affect. Good memory. Proper insight and judgement.       CURRENT LAB DATA REVIEWED  Cbc diff.   Second time biopsy to the right breast 9 o'clock position 9 cm from the nipple, found grade 3 invasive ductal cancer, triple negative, associated with grade 3 DCIS.        CURRENT IMAGING REVIEWED  US/MA 10/2019  found 2 hypoechoic structures are seen at the patient's area of concern, 9:00-10:00 position 7 cm from the nipple on the RIGHT. One of these appears to " have at least some component of an echogenic hilum and may represent an enlarged lymph node. The first measures 1.1 x 1.3 x 1.2 cm and the second measures 1.8 x 1.4 x 1.4 cm.   Survey of the axilla shows no evidence of adenopathy.       OLD DATA REVIEWED TODAY WITH SUMMARY:   MA 10/2018 was negative.           ASSESSMENT AND PLAN:    1. Newly dx diagnosed right breast upper outer quadrant triple negative breast cancer 11/2019.  She had 2 lesions on mammogram, 1 of them likely represents an internal mammary chain lymph nodes.  Both biopsy were turned out to be invasive ductal cancer, first biopsy full of necrosis ER OR HER-2 were not able to be done, second biopsy found to have triple negative breast cancer.  She potentially could have cT1cN 1 (due to image finding suggest 1 of the breast lesion may represent a lymph nodes).    Tumor conference discussion November 21,recommend neoadjuvant systemic chemotherapy for breast conservation, also due to her relatively fast growing presentation, triple negative disease nature, possible lymph nodes involvement.    She is 79 years old, with minimal comorbidities, has excellent performance status, with nl baseline echocardiogram.    Would start out her with weekly Taxol, depends on her response may consider adding carboplatin this could be rejected by insurance company.     Need to monitor her response to weekly Taxol.    She is going down to [place] this Sat, and will be back March 2020. Will coordinating care.     All Qs are answered to PT's satisfaction.     Total times about 25 minutes, more than 15 minutes spent in counseling regarding her neoadjuvant chemo plan, side effects follow-up plan.

## 2019-12-09 ENCOUNTER — PATIENT OUTREACH (OUTPATIENT)
Dept: ONCOLOGY | Facility: CLINIC | Age: 79
End: 2019-12-09

## 2019-12-09 DIAGNOSIS — Z17.1 MALIGNANT NEOPLASM OF UPPER-OUTER QUADRANT OF RIGHT BREAST IN FEMALE, ESTROGEN RECEPTOR NEGATIVE (H): Primary | ICD-10-CM

## 2019-12-09 DIAGNOSIS — C50.411 MALIGNANT NEOPLASM OF UPPER-OUTER QUADRANT OF RIGHT BREAST IN FEMALE, ESTROGEN RECEPTOR NEGATIVE (H): Primary | ICD-10-CM

## 2019-12-09 NOTE — PROGRESS NOTES
Status Check:    Pt is a 79 year olf female, recently in 12.05  for Weekly Taxol.  Call placed for status check.    Primary care provider is: Ester Jorge    Diagnosis:   Encounter Diagnosis   Name Primary?     Malignant neoplasm of upper-outer quadrant of right breast in female, 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) Yes        Oncology provider is:  Dr. Villa    How are you doing/feeling?: pretty goo just a little fatigue but no other side effects. .  Any further issues?: transferring care to [place] and getting treatment going down there ASAP.   Next Follow up/Recommendations: 12.12 as planned with week 2.    Patient instructed to call with any questions or concerns.  Patient states understanding and is in agreement with this plan.    Approximately 8 minutes spent on telephone with patient reviewing assessment and symptom(s) and providing symptom management.    Patrica Perla RN, BSN, OCN, CBCN  Oncology/Hematology   Breast Cancer Navigator  [facility]  [phone]

## 2019-12-11 ENCOUNTER — DOCUMENTATION ONLY (OUTPATIENT)
Dept: OTHER | Facility: CLINIC | Age: 79
End: 2019-12-11

## 2019-12-12 ENCOUNTER — ONCOLOGY VISIT (OUTPATIENT)
Dept: ONCOLOGY | Facility: CLINIC | Age: 79
End: 2019-12-12
Attending: INTERNAL MEDICINE
Payer: COMMERCIAL

## 2019-12-12 ENCOUNTER — HOSPITAL ENCOUNTER (OUTPATIENT)
Facility: CLINIC | Age: 79
Discharge: HOME | End: 2019-12-12
Attending: INTERNAL MEDICINE | Admitting: INTERNAL MEDICINE
Payer: COMMERCIAL

## 2019-12-12 ENCOUNTER — INFUSION THERAPY VISIT (OUTPATIENT)
Dept: INFUSION THERAPY | Facility: CLINIC | Age: 79
End: 2019-12-12
Attending: INTERNAL MEDICINE
Payer: COMMERCIAL

## 2019-12-12 VITALS — DIASTOLIC BLOOD PRESSURE: 62 MMHG | HEART RATE: 65 BPM | SYSTOLIC BLOOD PRESSURE: 127 MMHG

## 2019-12-12 VITALS
HEART RATE: 83 BPM | WEIGHT: 196.1 LBS | HEIGHT: 65 IN | BODY MASS INDEX: 32.67 KG/M2 | SYSTOLIC BLOOD PRESSURE: 164 MMHG | OXYGEN SATURATION: 94 % | RESPIRATION RATE: 20 BRPM | DIASTOLIC BLOOD PRESSURE: 68 MMHG | TEMPERATURE: 98.4 F

## 2019-12-12 DIAGNOSIS — Z17.1 MALIGNANT NEOPLASM OF UPPER-OUTER QUADRANT OF RIGHT BREAST IN FEMALE, ESTROGEN RECEPTOR NEGATIVE (H): Primary | ICD-10-CM

## 2019-12-12 DIAGNOSIS — C50.411 MALIGNANT NEOPLASM OF UPPER-OUTER QUADRANT OF RIGHT BREAST IN FEMALE, ESTROGEN RECEPTOR NEGATIVE (H): ICD-10-CM

## 2019-12-12 DIAGNOSIS — C50.411 MALIGNANT NEOPLASM OF UPPER-OUTER QUADRANT OF RIGHT BREAST IN FEMALE, ESTROGEN RECEPTOR NEGATIVE (H): Primary | ICD-10-CM

## 2019-12-12 DIAGNOSIS — Z17.1 MALIGNANT NEOPLASM OF UPPER-OUTER QUADRANT OF RIGHT BREAST IN FEMALE, ESTROGEN RECEPTOR NEGATIVE (H): ICD-10-CM

## 2019-12-12 LAB
BASOPHILS # BLD AUTO: 0 10E9/L (ref 0–0.2)
BASOPHILS NFR BLD AUTO: 0.5 %
DIFFERENTIAL METHOD BLD: NORMAL
EOSINOPHIL # BLD AUTO: 0.1 10E9/L (ref 0–0.7)
EOSINOPHIL NFR BLD AUTO: 2.5 %
ERYTHROCYTE [DISTWIDTH] IN BLOOD BY AUTOMATED COUNT: 12 % (ref 10–15)
HCT VFR BLD AUTO: 37.8 % (ref 35–47)
HGB BLD-MCNC: 12.4 G/DL (ref 11.7–15.7)
IMM GRANULOCYTES # BLD: 0 10E9/L (ref 0–0.4)
IMM GRANULOCYTES NFR BLD: 0.7 %
LYMPHOCYTES # BLD AUTO: 1.7 10E9/L (ref 0.8–5.3)
LYMPHOCYTES NFR BLD AUTO: 30.7 %
MCH RBC QN AUTO: 30.8 PG (ref 26.5–33)
MCHC RBC AUTO-ENTMCNC: 32.8 G/DL (ref 31.5–36.5)
MCV RBC AUTO: 94 FL (ref 78–100)
MONOCYTES # BLD AUTO: 0.4 10E9/L (ref 0–1.3)
MONOCYTES NFR BLD AUTO: 7.6 %
NEUTROPHILS # BLD AUTO: 3.3 10E9/L (ref 1.6–8.3)
NEUTROPHILS NFR BLD AUTO: 58 %
NRBC # BLD AUTO: 0 10*3/UL
NRBC BLD AUTO-RTO: 0 /100
PLATELET # BLD AUTO: 326 10E9/L (ref 150–450)
RBC # BLD AUTO: 4.02 10E12/L (ref 3.8–5.2)
WBC # BLD AUTO: 5.7 10E9/L (ref 4–11)

## 2019-12-12 PROCEDURE — G0463 HOSPITAL OUTPT CLINIC VISIT: HCPCS | Mod: 25

## 2019-12-12 PROCEDURE — 99214 OFFICE O/P EST MOD 30 MIN: CPT | Performed by: INTERNAL MEDICINE

## 2019-12-12 PROCEDURE — 85025 COMPLETE CBC W/AUTO DIFF WBC: CPT | Performed by: INTERNAL MEDICINE

## 2019-12-12 PROCEDURE — 25000128 H RX IP 250 OP 636: Performed by: INTERNAL MEDICINE

## 2019-12-12 PROCEDURE — 96367 TX/PROPH/DG ADDL SEQ IV INF: CPT

## 2019-12-12 PROCEDURE — 25800030 ZZH RX IP 258 OP 636: Performed by: INTERNAL MEDICINE

## 2019-12-12 PROCEDURE — 96413 CHEMO IV INFUSION 1 HR: CPT

## 2019-12-12 RX ORDER — HEPARIN SODIUM (PORCINE) LOCK FLUSH IV SOLN 100 UNIT/ML 100 UNIT/ML
5 SOLUTION INTRAVENOUS
Status: COMPLETED | OUTPATIENT
Start: 2019-12-12 | End: 2019-12-12

## 2019-12-12 RX ORDER — DIPHENHYDRAMINE HCL 50 MG
50 CAPSULE ORAL ONCE
Status: CANCELLED
Start: 2019-12-12

## 2019-12-12 RX ORDER — EPINEPHRINE 1 MG/ML
0.3 INJECTION, SOLUTION, CONCENTRATE INTRAVENOUS EVERY 5 MIN PRN
Status: CANCELLED | OUTPATIENT
Start: 2019-12-12

## 2019-12-12 RX ORDER — HEPARIN SODIUM (PORCINE) LOCK FLUSH IV SOLN 100 UNIT/ML 100 UNIT/ML
5 SOLUTION INTRAVENOUS
Status: CANCELLED
Start: 2019-12-12

## 2019-12-12 RX ORDER — LORAZEPAM 2 MG/ML
0.5 INJECTION INTRAMUSCULAR EVERY 4 HOURS PRN
Status: CANCELLED
Start: 2019-12-12

## 2019-12-12 RX ORDER — MEPERIDINE HYDROCHLORIDE 25 MG/ML
25 INJECTION INTRAMUSCULAR; INTRAVENOUS; SUBCUTANEOUS EVERY 30 MIN PRN
Status: CANCELLED | OUTPATIENT
Start: 2019-12-12

## 2019-12-12 RX ORDER — ALBUTEROL SULFATE 0.83 MG/ML
2.5 SOLUTION RESPIRATORY (INHALATION)
Status: CANCELLED | OUTPATIENT
Start: 2019-12-12

## 2019-12-12 RX ORDER — EPINEPHRINE 0.3 MG/.3ML
0.3 INJECTION SUBCUTANEOUS EVERY 5 MIN PRN
Status: CANCELLED | OUTPATIENT
Start: 2019-12-12

## 2019-12-12 RX ORDER — DIPHENHYDRAMINE HYDROCHLORIDE 50 MG/ML
50 INJECTION INTRAMUSCULAR; INTRAVENOUS
Status: CANCELLED
Start: 2019-12-12

## 2019-12-12 RX ORDER — METHYLPREDNISOLONE SODIUM SUCCINATE 125 MG/2ML
125 INJECTION, POWDER, LYOPHILIZED, FOR SOLUTION INTRAMUSCULAR; INTRAVENOUS
Status: CANCELLED
Start: 2019-12-12

## 2019-12-12 RX ORDER — NALOXONE HYDROCHLORIDE 0.4 MG/ML
.1-.4 INJECTION, SOLUTION INTRAMUSCULAR; INTRAVENOUS; SUBCUTANEOUS
Status: CANCELLED | OUTPATIENT
Start: 2019-12-12

## 2019-12-12 RX ORDER — SODIUM CHLORIDE 9 MG/ML
1000 INJECTION, SOLUTION INTRAVENOUS CONTINUOUS PRN
Status: CANCELLED
Start: 2019-12-12

## 2019-12-12 RX ORDER — ALBUTEROL SULFATE 90 UG/1
1-2 AEROSOL, METERED RESPIRATORY (INHALATION)
Status: CANCELLED
Start: 2019-12-12

## 2019-12-12 RX ADMIN — Medication 5 ML: at 12:15

## 2019-12-12 RX ADMIN — DIPHENHYDRAMINE HYDROCHLORIDE 50 MG: 50 INJECTION, SOLUTION INTRAMUSCULAR; INTRAVENOUS at 10:06

## 2019-12-12 RX ADMIN — DEXAMETHASONE SODIUM PHOSPHATE 20 MG: 10 INJECTION, SOLUTION INTRAMUSCULAR; INTRAVENOUS at 10:24

## 2019-12-12 RX ADMIN — FAMOTIDINE 20 MG: 20 INJECTION, SOLUTION INTRAVENOUS at 10:43

## 2019-12-12 RX ADMIN — PACLITAXEL 162 MG: 6 INJECTION, SOLUTION INTRAVENOUS at 11:01

## 2019-12-12 RX ADMIN — SODIUM CHLORIDE 250 ML: 9 INJECTION, SOLUTION INTRAVENOUS at 10:06

## 2019-12-12 ASSESSMENT — MIFFLIN-ST. JEOR: SCORE: 1362.44

## 2019-12-12 ASSESSMENT — PAIN SCALES - GENERAL: PAINLEVEL: NO PAIN (0)

## 2019-12-12 NOTE — PROGRESS NOTES
Infusion Nursing Note:  Precious Paris presents today for Taxol cycle 2 day 1.    Patient seen by provider today: Yes:    present during visit today: Not Applicable.    Note: Pt arrived with  today. Denies feeling ill or feverish. States the last treatment went well. Port accessed, labs drawn. Pt has appt with Dr. Villa this morning.     Intravenous Access:  Implanted Port.  Treatment Conditions:  Lab Results   Component Value Date    HGB 12.4 12/12/2019     Lab Results   Component Value Date    WBC 5.7 12/12/2019      Lab Results   Component Value Date    ANEU 3.3 12/12/2019     Lab Results   Component Value Date     12/12/2019      Results reviewed, labs MET treatment parameters, ok to proceed with treatment.      Post Infusion Assessment:  Patient tolerated infusion without incident.  Blood return noted pre and post infusion.  Site patent and intact, free from redness, edema or discomfort.  No evidence of extravasations.  Access discontinued per protocol.       Discharge Plan:   Patient and/or family verbalized understanding of discharge instructions and all questions answered.  Patient discharged in stable condition accompanied by: self.  Departure Mode: Ambulatory with . Pt is leaving Saturday for [place] and will be getting treatments there. Pt given handout today with medications that were given.     Mario Worley RN

## 2019-12-12 NOTE — LETTER
12/12/2019         RE: Precious Paris  [address] [place] [address]        Dear Colleague,    Thank you for referring your patient, Precious Paris, to the [facility]. Please see a copy of my visit note below.    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 Cruz to me 11/25/2019      HISTORY OF ONCOLOGY ILLNESS/CC:    At age 79, 10/2019 she presented with pain and redness, induration in the right breast associated with breast lump.  She was treated with antibiotic for 10 days redness resolved.    US/MA 10/2019  found 2 hypoechoic structures are seen at the patient's area of concern, 9:00-10:00 position 7 cm from the nipple on the RIGHT. One of these appears to have at least some component of an echogenic hilum and may represent an enlarged lymph node. The first measures 1.1 x 1.3 x 1.2 cm and the second measures 1.8 x 1.4 x 1.4 cm.   Survey of the axilla shows no evidence of adenopathy.    First biopsy identified invasive ductal carcinoma with marked necrosis and insufficient tissue for further evaluation.  Second time biopsy to the right breast 9 o'clock position 9 cm from the nipple, found grade 3 invasive ductal cancer, triple negative, associated with grade 3 DCIS.    She saw Dr. Frausto along with tumor conference discussion, due to her relatively healthy status, aggressive nature of her breast cancer, and the desire of breast conservation, neoadjuvant systemic treatment is recommended.      INTERVAL HISTORY: N/A  She made informed decision to proceed with wkly taxol, C1D1 12/5/2019.        PMH  retinopathy of the right eye, cataract  Hypertension, hyperlipidemia  Osteoarthritis, spinal stenosis, bilateral knees replacement, carpel tunnel surgery  Varicose vein of lower extremities ,s/p  "surgery      REVIEW OF SYSTEMS:   She tolerates Taxol well so far.        PHYSICAL EXAMINATION:   VITAL SIGNS: Blood pressure (!) 164/68, pulse 83, temperature 98.4  F (36.9  C), temperature source Tympanic, resp. rate 20, height 1.638 m (5' 4.5\"), weight 89 kg (196 lb 1.6 oz), SpO2 94 %, not currently breastfeeding.    GENERAL APPEARANCE:  looks like her stated age, very pleasant, not in acute distress.     ECO    ENT, MOUTH: Pupils are equally reactive to light.  Sclerae are anicteric.  Moist oral mucosa without lesion or ulcer.   Negative pharynx.  No oral thrush.   NECK:  Supple.  No jugular venous distention.  Thyroid is not palpable.   LYMPH NODES:  Superficial lymphadenopathy is not appreciable in the bilateral cervical, supraclavicular, axillary or inguinal areas.   CARDIOVASCULAR:  S1, S2 regular with no murmurs or gallops.  No carotid or abdominal bruits.   PULMONARY:  Lungs are clear to auscultation and percussion bilaterally.  There is no wheezing or rhonchi.   GASTROINTESTINAL:  Abdomen is soft, nontender.  No hepatosplenomegaly.  No signs of ascites.  No mass appreciable.   MUSCULOSKELETAL/EXTREMITIES:  No edema.  No cyanotic changes.  No signs of joint deformity.  No lymphedema.   NEUROLOGIC:  Cranial nerves II-XII are grossly intact.  Sensation intact.  Muscle strength and muscle tone symmetrical, 5/5 throughout.   BACK  No spinal or paraspinal tenderness.  No CVA tenderness.   SKIN:  No petechiae.  No rash.  No signs of cellulitis.   BREASTS/CHEST/AXILLA:  Bilateral breast exam revealed palpable upper quadrant of the right breast mobile around 2 cm lump, no skin changes, I do not appreciate axilla lymphadenopathy  PSYCHIATRIC: Oriented to time, person, and places. Normal mood and affect. Good memory. Proper insight and judgement.       CURRENT LAB DATA REVIEWED  Cbc diff.   Second time biopsy to the right breast 9 o'clock position 9 cm from the nipple, found grade 3 invasive ductal cancer, " triple negative, associated with grade 3 DCIS.        CURRENT IMAGING REVIEWED  US/MA 10/2019  found 2 hypoechoic structures are seen at the patient's area of concern, 9:00-10:00 position 7 cm from the nipple on the RIGHT. One of these appears to have at least some component of an echogenic hilum and may represent an enlarged lymph node. The first measures 1.1 x 1.3 x 1.2 cm and the second measures 1.8 x 1.4 x 1.4 cm.   Survey of the axilla shows no evidence of adenopathy.       OLD DATA REVIEWED TODAY WITH SUMMARY:   MA 10/2018 was negative.           ASSESSMENT AND PLAN:    1. Newly dx diagnosed right breast upper outer quadrant triple negative breast cancer 11/2019.  She had 2 lesions on mammogram, 1 of them likely represents an internal mammary chain lymph nodes.  Both biopsy were turned out to be invasive ductal cancer, first biopsy full of necrosis ER [place] HER-2 were not able to be done, second biopsy found to have triple negative breast cancer.  She potentially could have cT1cN 1 (due to image finding suggest 1 of the breast lesion may represent a lymph nodes).    Tumor conference discussion November 21,recommend neoadjuvant systemic chemotherapy for breast conservation, also due to her relatively fast growing presentation, triple negative disease nature, possible lymph nodes involvement.    She is 79 years old, with minimal comorbidities, has excellent performance status, with nl baseline echocardiogram.    Would start out her with weekly Taxol, depends on her response may consider adding carboplatin this could be rejected by insurance company.     Need to monitor her response to weekly Taxol.    She is going down to [place] this Sat, and will be back March 2020. Will coordinating care.     All Qs are answered to PT's satisfaction.     Total times about 25 minutes, more than 15 minutes spent in counseling regarding her neoadjuvant chemo plan, side effects follow-up plan.     Oncology Rooming Note    December 12,  "2019 8:52 AM   Precious Paris is a 79 year old female who presents for:    Chief Complaint   Patient presents with     Oncology Clinic Visit     Recheck Breast CA, Labs & Chemo today      Initial Vitals: BP (!) 164/68 (BP Location: Right arm, Patient Position: Sitting, Cuff Size: Adult Regular)   Pulse 83   Temp 98.4  F (36.9  C) (Tympanic)   Resp 20   Ht 1.638 m (5' 4.5\")   Wt 89 kg (196 lb 1.6 oz)   SpO2 94%   BMI 33.14 kg/m    Estimated body mass index is 33.14 kg/m  as calculated from the following:    Height as of this encounter: 1.638 m (5' 4.5\").    Weight as of this encounter: 89 kg (196 lb 1.6 oz). Body surface area is 2.01 meters squared.  No Pain (0) Comment: Data Unavailable   No LMP recorded. Patient is postmenopausal.  Allergies reviewed: Yes  Medications reviewed: Yes    Medications: Medication refills not needed today.  Pharmacy name entered into [facility]: [facility] - - [place] - [id number] [facility]    Clinical concerns: Recheck Breast CA, Labs & Chemo today.   Consult on December 18 th with [facility].   Dr. Gambino  [address] [place] [address] [phone]  Fax [phone]  Attn: Janine Ren [facility]              Again, thank you for allowing me to participate in the care of your patient.        Sincerely,        Rachell Villa MD, MD    "

## 2019-12-12 NOTE — PATIENT INSTRUCTIONS
Dr. Villa would like you to continue [facility] Taxol today and to continue treatment in [place].     We would like to see you back in once back from [place] for a follow up appointment.     When you are in need of a refill, please call your pharmacy and they will send us a request.      Copy of appointments, and after visit summary (AVS) given to patient.      If you have any questions please call Patrica Perla RN, BSN Oncology Hematology  [facility] [phone]. For questions after business hours, or on holidays/weekends, please call our after hours Nurse Triage line [phone]. Thank you.         Need contact info for her oncologist in [place] for coordinating care.   Print out C1 taxol chemo /premeds record to pt.   Taxol today. Then pt goes down to [place].  Follow-up after she is back.

## 2019-12-12 NOTE — PROGRESS NOTES
"Oncology Rooming Note    December 12, 2019 8:52 AM   Precious Paris is a 79 year old female who presents for:    Chief Complaint   Patient presents with     Oncology Clinic Visit     Recheck Breast CA, Labs & Chemo today      Initial Vitals: BP (!) 164/68 (BP Location: Right arm, Patient Position: Sitting, Cuff Size: Adult Regular)   Pulse 83   Temp 98.4  F (36.9  C) (Tympanic)   Resp 20   Ht 1.638 m (5' 4.5\")   Wt 89 kg (196 lb 1.6 oz)   SpO2 94%   BMI 33.14 kg/m   Estimated body mass index is 33.14 kg/m  as calculated from the following:    Height as of this encounter: 1.638 m (5' 4.5\").    Weight as of this encounter: 89 kg (196 lb 1.6 oz). Body surface area is 2.01 meters squared.  No Pain (0) Comment: Data Unavailable   No LMP recorded. Patient is postmenopausal.  Allergies reviewed: Yes  Medications reviewed: Yes    Medications: Medication refills not needed today.  Pharmacy name entered into [facility]: [facility] - - [place] - [id number] [facility]    Clinical concerns: Recheck Breast CA, Labs & Chemo today.   Consult on December 18 th with [facility].   Dr. Gambino  [address] [place] [address] [phone]  Fax [phone]  Attn: Janine Ren [facility]            "

## 2019-12-18 ENCOUNTER — TRANSFERRED RECORDS (OUTPATIENT)
Dept: HEALTH INFORMATION MANAGEMENT | Facility: CLINIC | Age: 79
End: 2019-12-18

## 2019-12-19 ENCOUNTER — MEDICAL CORRESPONDENCE (OUTPATIENT)
Dept: HEALTH INFORMATION MANAGEMENT | Facility: CLINIC | Age: 79
End: 2019-12-19

## 2019-12-27 ENCOUNTER — TRANSFERRED RECORDS (OUTPATIENT)
Dept: HEALTH INFORMATION MANAGEMENT | Facility: CLINIC | Age: 79
End: 2019-12-27

## 2019-12-27 ENCOUNTER — MEDICAL CORRESPONDENCE (OUTPATIENT)
Dept: HEALTH INFORMATION MANAGEMENT | Facility: CLINIC | Age: 79
End: 2019-12-27

## 2020-01-16 ENCOUNTER — TRANSFERRED RECORDS (OUTPATIENT)
Dept: HEALTH INFORMATION MANAGEMENT | Facility: CLINIC | Age: 80
End: 2020-01-16

## 2020-02-10 ENCOUNTER — HEALTH MAINTENANCE LETTER (OUTPATIENT)
Age: 80
End: 2020-02-10

## 2020-02-13 ENCOUNTER — TRANSFERRED RECORDS (OUTPATIENT)
Dept: HEALTH INFORMATION MANAGEMENT | Facility: CLINIC | Age: 80
End: 2020-02-13

## 2020-02-20 ENCOUNTER — TRANSFERRED RECORDS (OUTPATIENT)
Dept: HEALTH INFORMATION MANAGEMENT | Facility: CLINIC | Age: 80
End: 2020-02-20

## 2020-02-25 NOTE — PROGRESS NOTES
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 Cruz to me 11/25/2019      HISTORY OF ONCOLOGY ILLNESS/CC:    At age 79, 10/2019 she presented with pain and redness, induration in the right breast associated with breast lump.  She was treated with antibiotic for 10 days redness resolved.    US/MA 10/2019  found 2 hypoechoic structures are seen at the patient's area of concern, 9:00-10:00 position 7 cm from the nipple on the RIGHT. One of these appears to have at least some component of an echogenic hilum and may represent an enlarged lymph node. The first measures 1.1 x 1.3 x 1.2 cm and the second measures 1.8 x 1.4 x 1.4 cm.   Survey of the axilla shows no evidence of adenopathy.    First biopsy identified invasive ductal carcinoma with marked necrosis and insufficient tissue for further evaluation.  Second time biopsy to the right breast 9 o'clock position 9 cm from the nipple, found grade 3 invasive ductal cancer, triple negative, associated with grade 3 DCIS.    She saw Dr. Frausto along with tumor conference discussion, due to her relatively healthy status, aggressive nature of her breast cancer, and the desire of breast conservation, neoadjuvant systemic treatment was recommended.      INTERVAL HISTORY: N/A  She made informed decision to proceed with wkly taxol, C1D1 12/5/2019. Then went to [place] and finished wkly taxol. Last dose of taxol was Feb 20th 2020.   She had no tx delay.         PMH  retinopathy of the right eye, cataract  Hypertension, hyperlipidemia  Osteoarthritis, spinal stenosis, bilateral knees replacement, carpel tunnel surgery  Varicose vein of lower extremities ,s/p surgery      REVIEW OF SYSTEMS:   She tolerateed Taxol well so far.        PHYSICAL EXAMINATION:   VITAL SIGNS:Blood pressure (!) 156/67, pulse  "118, temperature 99  F (37.2  C), temperature source Tympanic, resp. rate 16, height 1.645 m (5' 4.75\"), weight 88 kg (193 lb 14.4 oz), SpO2 94 %, not currently breastfeeding.      GENERAL APPEARANCE:  looks like her stated age, very pleasant, not in acute distress.     ECO    ENT, MOUTH: Pupils are equally reactive to light.  Sclerae are anicteric.  Moist oral mucosa without lesion or ulcer.   Negative pharynx.  No oral thrush.   NECK:  Supple.  No jugular venous distention.  Thyroid is not palpable.   LYMPH NODES:  Superficial lymphadenopathy is not appreciable in the bilateral cervical, supraclavicular, axillary or inguinal areas.   CARDIOVASCULAR:  S1, S2 regular with no murmurs or gallops.  No carotid or abdominal bruits.   PULMONARY:  Lungs are clear to auscultation and percussion bilaterally.  There is no wheezing or rhonchi.   GASTROINTESTINAL:  Abdomen is soft, nontender.  No hepatosplenomegaly.  No signs of ascites.  No mass appreciable.   MUSCULOSKELETAL/EXTREMITIES:  No edema.  No cyanotic changes.  No signs of joint deformity.  No lymphedema.   NEUROLOGIC:  Cranial nerves II-XII are grossly intact.  Sensation intact.  Muscle strength and muscle tone symmetrical, 5/5 throughout.   BACK  No spinal or paraspinal tenderness.  No CVA tenderness.   SKIN:  No petechiae.  No rash.  No signs of cellulitis.   BREASTS/CHEST/AXILLA:  Bilateral breast exam revealed minimally palpable upper quadrant of the right breast mobile around less than 1cm lump, no skin changes, I do not appreciate axilla lymphadenopathy  PSYCHIATRIC: Oriented to time, person, and places. Normal mood and affect. Good memory. Proper insight and judgement.       CURRENT LAB DATA REVIEWED TODAY  Cbc is good from [place]  .     CURRENT IMAGING REVIEWED TODAY      OLD DATA REVIEWED TODAY WITH SUMMARY:   Second time biopsy to the right breast 9 o'clock position 9 cm from the nipple, found grade 3 invasive ductal cancer, triple negative, associated " with grade 3 DCIS.       US/MA 10/2019  found 2 hypoechoic structures are seen at the patient's area of concern, 9:00-10:00 position 7 cm from the nipple on the RIGHT. One of these appears to have at least some component of an echogenic hilum and may represent an enlarged lymph node. The first measures 1.1 x 1.3 x 1.2 cm and the second measures 1.8 x 1.4 x 1.4 cm.   Survey of the axilla shows no evidence of adenopathy.          ASSESSMENT AND PLAN:    1.  dx diagnosed right breast upper outer quadrant triple negative breast cancer 11/2019.  She had 2 lesions on mammogram, 1 of them likely represents an internal mammary chain lymph nodes.  Both biopsy were turned out to be invasive ductal cancer, first biopsy full of necrosis ER [place] HER-2 were not able to be done, second biopsy found to have triple negative breast cancer.  She potentially could have cT1cN 1 (due to image finding suggest 1 of the breast lesion may represent a lymph nodes).    Tumor conference discussion November 2019 ,recommendED neoadjuvant systemic chemotherapy for breast conservation, also due to her relatively fast growing presentation, triple negative disease nature, possible lymph nodes involvement.    She is 79 years old, with minimal comorbidities, has excellent performance status, with nl baseline echocardiogram.    She made informed decision to proceed with wkly taxol, C1D1 12/5/2019. Then went to [place] and finished wkly taxol.     Advice restaging dx right MA. Hopefully she can go to surgery.   She is at high risk for DD AC would like to avoid it if possible.     All Qs are answered to PT's satisfaction.     Total times about 25 minutes, more than 15 minutes spent in counseling regarding her neoadjuvant chemo plan,  follow-up plan.

## 2020-03-03 ENCOUNTER — HOSPITAL ENCOUNTER (OUTPATIENT)
Dept: ULTRASOUND IMAGING | Facility: CLINIC | Age: 80
End: 2020-03-03
Attending: INTERNAL MEDICINE
Payer: COMMERCIAL

## 2020-03-03 ENCOUNTER — HOSPITAL ENCOUNTER (OUTPATIENT)
Dept: MAMMOGRAPHY | Facility: CLINIC | Age: 80
Discharge: HOME | End: 2020-03-03
Attending: INTERNAL MEDICINE | Admitting: INTERNAL MEDICINE
Payer: COMMERCIAL

## 2020-03-03 ENCOUNTER — ONCOLOGY VISIT (OUTPATIENT)
Dept: ONCOLOGY | Facility: CLINIC | Age: 80
End: 2020-03-03
Attending: INTERNAL MEDICINE
Payer: COMMERCIAL

## 2020-03-03 VITALS
TEMPERATURE: 99 F | DIASTOLIC BLOOD PRESSURE: 67 MMHG | HEART RATE: 118 BPM | HEIGHT: 65 IN | BODY MASS INDEX: 32.3 KG/M2 | RESPIRATION RATE: 16 BRPM | WEIGHT: 193.9 LBS | OXYGEN SATURATION: 94 % | SYSTOLIC BLOOD PRESSURE: 156 MMHG

## 2020-03-03 DIAGNOSIS — C50.411 MALIGNANT NEOPLASM OF UPPER-OUTER QUADRANT OF RIGHT BREAST IN FEMALE, ESTROGEN RECEPTOR NEGATIVE (H): Primary | ICD-10-CM

## 2020-03-03 DIAGNOSIS — Z17.1 MALIGNANT NEOPLASM OF UPPER-OUTER QUADRANT OF RIGHT BREAST IN FEMALE, ESTROGEN RECEPTOR NEGATIVE (H): ICD-10-CM

## 2020-03-03 DIAGNOSIS — C50.411 MALIGNANT NEOPLASM OF UPPER-OUTER QUADRANT OF RIGHT BREAST IN FEMALE, ESTROGEN RECEPTOR NEGATIVE (H): ICD-10-CM

## 2020-03-03 DIAGNOSIS — Z17.1 MALIGNANT NEOPLASM OF UPPER-OUTER QUADRANT OF RIGHT BREAST IN FEMALE, ESTROGEN RECEPTOR NEGATIVE (H): Primary | ICD-10-CM

## 2020-03-03 PROCEDURE — G0463 HOSPITAL OUTPT CLINIC VISIT: HCPCS

## 2020-03-03 PROCEDURE — G0279 TOMOSYNTHESIS, MAMMO: HCPCS

## 2020-03-03 PROCEDURE — 99214 OFFICE O/P EST MOD 30 MIN: CPT | Performed by: INTERNAL MEDICINE

## 2020-03-03 PROCEDURE — 76642 ULTRASOUND BREAST LIMITED: CPT | Mod: RT

## 2020-03-03 ASSESSMENT — MIFFLIN-ST. JEOR: SCORE: 1356.43

## 2020-03-03 ASSESSMENT — PAIN SCALES - GENERAL: PAINLEVEL: NO PAIN (0)

## 2020-03-03 NOTE — PROGRESS NOTES
"Oncology Rooming Note    March 3, 2020 10:03 AM   Precious Paris is a 79 year old female who presents for:    Chief Complaint   Patient presents with     Oncology Clinic Visit     Initial Vitals: BP (!) 156/67 (BP Location: Right arm, Patient Position: Sitting, Cuff Size: Adult Large)   Pulse 118   Temp 99  F (37.2  C) (Tympanic)   Resp 16   Ht 1.645 m (5' 4.75\")   Wt 88 kg (193 lb 14.4 oz)   SpO2 94%   BMI 32.52 kg/m   Estimated body mass index is 32.52 kg/m  as calculated from the following:    Height as of this encounter: 1.645 m (5' 4.75\").    Weight as of this encounter: 88 kg (193 lb 14.4 oz). Body surface area is 2.01 meters squared.  No Pain (0) Comment: Data Unavailable   No LMP recorded. Patient is postmenopausal.  Allergies reviewed: Yes  Medications reviewed: Yes    Medications: Medication refills not needed today.  Pharmacy name entered into [facility]: [facility] - - [place] - [phone] [facility]    Clinical concerns:       Estrella Ren, [facility]              "

## 2020-03-03 NOTE — LETTER
3/3/2020         RE: Precious Paris  [address] [place] [address]        Dear Colleague,    Thank you for referring your patient, Precious Paris, to the [facility]. Please see a copy of my visit note below.    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 Cruz to me 11/25/2019      HISTORY OF ONCOLOGY ILLNESS/CC:    At age 79, 10/2019 she presented with pain and redness, induration in the right breast associated with breast lump.  She was treated with antibiotic for 10 days redness resolved.    US/MA 10/2019  found 2 hypoechoic structures are seen at the patient's area of concern, 9:00-10:00 position 7 cm from the nipple on the RIGHT. One of these appears to have at least some component of an echogenic hilum and may represent an enlarged lymph node. The first measures 1.1 x 1.3 x 1.2 cm and the second measures 1.8 x 1.4 x 1.4 cm.   Survey of the axilla shows no evidence of adenopathy.    First biopsy identified invasive ductal carcinoma with marked necrosis and insufficient tissue for further evaluation.  Second time biopsy to the right breast 9 o'clock position 9 cm from the nipple, found grade 3 invasive ductal cancer, triple negative, associated with grade 3 DCIS.    She saw Dr. Frausto along with tumor conference discussion, due to her relatively healthy status, aggressive nature of her breast cancer, and the desire of breast conservation, neoadjuvant systemic treatment was recommended.      INTERVAL HISTORY: N/A  She made informed decision to proceed with wkly taxol, C1D1 12/5/2019. Then went to [place] and finished wkly taxol. Last dose of taxol was Feb 20th 2020.   She had no tx delay.         PMH  retinopathy of the right eye, cataract  Hypertension, hyperlipidemia  Osteoarthritis, spinal  "stenosis, bilateral knees replacement, carpel tunnel surgery  Varicose vein of lower extremities ,s/p surgery      REVIEW OF SYSTEMS:   She tolerateed Taxol well so far.        PHYSICAL EXAMINATION:   VITAL SIGNS:Blood pressure (!) 156/67, pulse 118, temperature 99  F (37.2  C), temperature source Tympanic, resp. rate 16, height 1.645 m (5' 4.75\"), weight 88 kg (193 lb 14.4 oz), SpO2 94 %, not currently breastfeeding.      GENERAL APPEARANCE:  looks like her stated age, very pleasant, not in acute distress.     ECO    ENT, MOUTH: Pupils are equally reactive to light.  Sclerae are anicteric.  Moist oral mucosa without lesion or ulcer.   Negative pharynx.  No oral thrush.   NECK:  Supple.  No jugular venous distention.  Thyroid is not palpable.   LYMPH NODES:  Superficial lymphadenopathy is not appreciable in the bilateral cervical, supraclavicular, axillary or inguinal areas.   CARDIOVASCULAR:  S1, S2 regular with no murmurs or gallops.  No carotid or abdominal bruits.   PULMONARY:  Lungs are clear to auscultation and percussion bilaterally.  There is no wheezing or rhonchi.   GASTROINTESTINAL:  Abdomen is soft, nontender.  No hepatosplenomegaly.  No signs of ascites.  No mass appreciable.   MUSCULOSKELETAL/EXTREMITIES:  No edema.  No cyanotic changes.  No signs of joint deformity.  No lymphedema.   NEUROLOGIC:  Cranial nerves II-XII are grossly intact.  Sensation intact.  Muscle strength and muscle tone symmetrical, 5/5 throughout.   BACK  No spinal or paraspinal tenderness.  No CVA tenderness.   SKIN:  No petechiae.  No rash.  No signs of cellulitis.   BREASTS/CHEST/AXILLA:  Bilateral breast exam revealed minimally palpable upper quadrant of the right breast mobile around less than 1cm lump, no skin changes, I do not appreciate axilla lymphadenopathy  PSYCHIATRIC: Oriented to time, person, and places. Normal mood and affect. Good memory. Proper insight and judgement.       CURRENT LAB DATA REVIEWED TODAY  Cbc " is good from [place]  .     CURRENT IMAGING REVIEWED TODAY      OLD DATA REVIEWED TODAY WITH SUMMARY:   Second time biopsy to the right breast 9 o'clock position 9 cm from the nipple, found grade 3 invasive ductal cancer, triple negative, associated with grade 3 DCIS.       US/MA 10/2019  found 2 hypoechoic structures are seen at the patient's area of concern, 9:00-10:00 position 7 cm from the nipple on the RIGHT. One of these appears to have at least some component of an echogenic hilum and may represent an enlarged lymph node. The first measures 1.1 x 1.3 x 1.2 cm and the second measures 1.8 x 1.4 x 1.4 cm.   Survey of the axilla shows no evidence of adenopathy.          ASSESSMENT AND PLAN:    1.  dx diagnosed right breast upper outer quadrant triple negative breast cancer 11/2019.  She had 2 lesions on mammogram, 1 of them likely represents an internal mammary chain lymph nodes.  Both biopsy were turned out to be invasive ductal cancer, first biopsy full of necrosis ER [place] HER-2 were not able to be done, second biopsy found to have triple negative breast cancer.  She potentially could have cT1cN 1 (due to image finding suggest 1 of the breast lesion may represent a lymph nodes).    Tumor conference discussion November 2019 ,recommendED neoadjuvant systemic chemotherapy for breast conservation, also due to her relatively fast growing presentation, triple negative disease nature, possible lymph nodes involvement.    She is 79 years old, with minimal comorbidities, has excellent performance status, with nl baseline echocardiogram.    She made informed decision to proceed with wkly taxol, C1D1 12/5/2019. Then went to [place] and finished wkly taxol.     Advice restaging dx right MA. Hopefully she can go to surgery.   She is at high risk for DD AC would like to avoid it if possible.     All Qs are answered to PT's satisfaction.     Total times about 25 minutes, more than 15 minutes spent in counseling regarding her neoadjuvant  "chemo plan,  follow-up plan.     Oncology Rooming Note    March 3, 2020 10:03 AM   Precious Paris is a 79 year old female who presents for:    Chief Complaint   Patient presents with     Oncology Clinic Visit     Initial Vitals: BP (!) 156/67 (BP Location: Right arm, Patient Position: Sitting, Cuff Size: Adult Large)   Pulse 118   Temp 99  F (37.2  C) (Tympanic)   Resp 16   Ht 1.645 m (5' 4.75\")   Wt 88 kg (193 lb 14.4 oz)   SpO2 94%   BMI 32.52 kg/m    Estimated body mass index is 32.52 kg/m  as calculated from the following:    Height as of this encounter: 1.645 m (5' 4.75\").    Weight as of this encounter: 88 kg (193 lb 14.4 oz). Body surface area is 2.01 meters squared.  No Pain (0) Comment: Data Unavailable   No LMP recorded. Patient is postmenopausal.  Allergies reviewed: Yes  Medications reviewed: Yes    Medications: Medication refills not needed today.  Pharmacy name entered into [facility]: [facility] - - [place] - [id number] [facility]    Clinical concerns:       Estrella Ren, [facility]                Again, thank you for allowing me to participate in the care of your patient.        Sincerely,        Rachell Villa MD, MD    "

## 2020-03-03 NOTE — PATIENT INSTRUCTIONS
Dr. Villa would like you to have a Mammogram today and we will call you with results We also want to connect you with Dr. Frausto.        When you are in need of a refill, please call your pharmacy and they will send us a request.      Copy of appointments, and after visit summary (AVS) given to patient.      If you have any questions please call Patrica Perla RN, BSN Oncology Hematology  [facility] [phone]. For questions after business hours, or on holidays/weekends, please call our after hours Nurse Triage line [phone]. Thank you.         Dx right MA to be done.   Re connect her to Dr. Frausto.

## 2020-03-04 NOTE — RESULT ENCOUNTER NOTE
Called and reviewed results with the patient per Dr. Villa. Patient had no further questions or concerns at this time and also verbalized understanding. Direct line provided if any further questions/concerns arise.    Patrica Perla RN on 3/4/2020 at 3:54 PM

## 2020-03-12 ENCOUNTER — PREP FOR PROCEDURE (OUTPATIENT)
Dept: RADIATION THERAPY | Facility: OUTPATIENT CENTER | Age: 80
End: 2020-03-12

## 2020-03-12 ENCOUNTER — PREP FOR PROCEDURE (OUTPATIENT)
Dept: ONCOLOGY | Facility: CLINIC | Age: 80
End: 2020-03-12

## 2020-03-12 ENCOUNTER — OFFICE VISIT (OUTPATIENT)
Dept: RADIATION THERAPY | Facility: OUTPATIENT CENTER | Age: 80
End: 2020-03-12
Payer: COMMERCIAL

## 2020-03-12 VITALS
WEIGHT: 193 LBS | BODY MASS INDEX: 32.37 KG/M2 | HEART RATE: 111 BPM | SYSTOLIC BLOOD PRESSURE: 135 MMHG | RESPIRATION RATE: 16 BRPM | DIASTOLIC BLOOD PRESSURE: 78 MMHG | OXYGEN SATURATION: 95 % | TEMPERATURE: 97.3 F

## 2020-03-12 DIAGNOSIS — C50.911 MALIGNANT NEOPLASM OF RIGHT BREAST (H): ICD-10-CM

## 2020-03-12 DIAGNOSIS — C50.911 MALIGNANT NEOPLASM OF RIGHT BREAST (H): Primary | ICD-10-CM

## 2020-03-12 PROCEDURE — 40000803 ZZHCL STATISTIC DNA ISOL HIGH PURITY: Performed by: SURGERY

## 2020-03-12 RX ORDER — PHENOL 1.4 %
10 AEROSOL, SPRAY (ML) MUCOUS MEMBRANE AT BEDTIME
COMMUNITY
End: 2024-08-14

## 2020-03-12 ASSESSMENT — PAIN SCALES - GENERAL: PAINLEVEL: NO PAIN (0)

## 2020-03-12 NOTE — LETTER
3/12/2020      RE: Precious Paris  [address] [place] [address]       Precious Paris is here for a followup visit.  In November, I saw her for a right-sided multifocal breast cancer.  The total area measured about 3.5 cm.  There were 2 specific nodules.  Initially, the biopsy did not have enough material to do receptor status so we re-biopsied and she was found to have a triple-negative breast cancer.  She has done well with preoperative chemotherapy.  She received Taxol.  She finished that on 02/20.  She had a followup ultrasound last week which demonstrated the 2 lesions are still present but smaller.  She can still feel the 2 masses.      PHYSICAL EXAMINATION:  She does have 2 adjacent palpable masses at the right breast 10-o'clock position.  She still has no axillary lymphadenopathy.      IMPRESSION:  Stage II breast cancer.      PLAN:  I talked to her about the various treatment options and she would like to have a lumpectomy and a sentinel lymph node biopsy.  I think that is reasonable.  I do not think we will need a wire localization because these are easily palpable.  She has not had genetic testing yet.  We will send her off to the lab today for her genetic testing.  I will talk to Dr. Villa about whether or not we can remove her port at the same time.      TT:  30 minutes.  CT:  20 minutes.         Venancio Frausto MD

## 2020-03-12 NOTE — PROGRESS NOTES
Precious Paris is here for a followup visit.  In November, I saw her for a right-sided multifocal breast cancer.  The total area measured about 3.5 cm.  There were 2 specific nodules.  Initially, the biopsy did not have enough material to do receptor status so we re-biopsied and she was found to have a triple-negative breast cancer.  She has done well with preoperative chemotherapy.  She received Taxol.  She finished that on 02/20.  She had a followup ultrasound last week which demonstrated the 2 lesions are still present but smaller.  She can still feel the 2 masses.      PHYSICAL EXAMINATION:  She does have 2 adjacent palpable masses at the right breast 10-o'clock position.  She still has no axillary lymphadenopathy.      IMPRESSION:  Stage II breast cancer.      PLAN:  I talked to her about the various treatment options and she would like to have a lumpectomy and a sentinel lymph node biopsy.  I think that is reasonable.  I do not think we will need a wire localization because these are easily palpable.  She has not had genetic testing yet.  We will send her off to the lab today for her genetic testing.  I will talk to Dr. Villa about whether or not we can remove her port at the same time.      TT:  30 minutes.  CT:  20 minutes.

## 2020-03-12 NOTE — NURSING NOTE
"Oncology Rooming Note    March 12, 2020 9:16 AM   Precious Paris is a 79 year old female who presents for:    Chief Complaint   Patient presents with     Oncology Clinic Visit     Return appointment with Dr. Frausto     Initial Vitals: /78 (BP Location: Left arm, Patient Position: Sitting, Cuff Size: Adult Large)   Pulse 111   Temp 97.3  F (36.3  C) (Oral)   Resp 16   Wt 87.5 kg (193 lb)   SpO2 95%   BMI 32.37 kg/m   Estimated body mass index is 32.37 kg/m  as calculated from the following:    Height as of 3/3/20: 1.645 m (5' 4.75\").    Weight as of this encounter: 87.5 kg (193 lb). Body surface area is 2 meters squared.  No Pain (0) Comment: Data Unavailable   No LMP recorded. Patient is postmenopausal.  Allergies reviewed: Yes  Medications reviewed: Yes    Medications: Medication refills not needed today.  Pharmacy name entered into [facility]: [facility] - - [place] - [id number] [facility]    Clinical concerns: Return appointment to discuss surgery post neoadjuvant chemotherapy Dr. Frausto was notified.      Lori Hoyt RN              "

## 2020-03-13 PROBLEM — C50.911 MALIGNANT NEOPLASM OF RIGHT BREAST (H): Status: ACTIVE | Noted: 2020-03-13

## 2020-03-17 ENCOUNTER — OFFICE VISIT (OUTPATIENT)
Dept: FAMILY MEDICINE | Facility: CLINIC | Age: 80
End: 2020-03-17
Payer: COMMERCIAL

## 2020-03-17 VITALS
TEMPERATURE: 97.9 F | HEART RATE: 113 BPM | SYSTOLIC BLOOD PRESSURE: 130 MMHG | BODY MASS INDEX: 31.82 KG/M2 | HEIGHT: 65 IN | RESPIRATION RATE: 16 BRPM | WEIGHT: 191 LBS | OXYGEN SATURATION: 97 % | DIASTOLIC BLOOD PRESSURE: 86 MMHG

## 2020-03-17 DIAGNOSIS — Z17.1 MALIGNANT NEOPLASM OF UPPER-OUTER QUADRANT OF RIGHT BREAST IN FEMALE, ESTROGEN RECEPTOR NEGATIVE (H): ICD-10-CM

## 2020-03-17 DIAGNOSIS — I10 HYPERTENSION, GOAL BELOW 150/90: ICD-10-CM

## 2020-03-17 DIAGNOSIS — Z01.818 PREOP GENERAL PHYSICAL EXAM: Primary | ICD-10-CM

## 2020-03-17 DIAGNOSIS — E78.5 HYPERLIPIDEMIA LDL GOAL <130: ICD-10-CM

## 2020-03-17 DIAGNOSIS — C50.411 MALIGNANT NEOPLASM OF UPPER-OUTER QUADRANT OF RIGHT BREAST IN FEMALE, ESTROGEN RECEPTOR NEGATIVE (H): ICD-10-CM

## 2020-03-17 LAB
ANION GAP SERPL CALCULATED.3IONS-SCNC: 3 MMOL/L (ref 3–14)
BUN SERPL-MCNC: 11 MG/DL (ref 7–30)
CALCIUM SERPL-MCNC: 9.2 MG/DL (ref 8.5–10.1)
CHLORIDE SERPL-SCNC: 103 MMOL/L (ref 94–109)
CO2 SERPL-SCNC: 30 MMOL/L (ref 20–32)
COPATH REPORT: NORMAL
CREAT SERPL-MCNC: 0.55 MG/DL (ref 0.52–1.04)
ERYTHROCYTE [DISTWIDTH] IN BLOOD BY AUTOMATED COUNT: 14.6 % (ref 10–15)
GFR SERPL CREATININE-BSD FRML MDRD: 89 ML/MIN/{1.73_M2}
GLUCOSE SERPL-MCNC: 104 MG/DL (ref 70–99)
HCT VFR BLD AUTO: 38.5 % (ref 35–47)
HGB BLD-MCNC: 12.2 G/DL (ref 11.7–15.7)
MCH RBC QN AUTO: 31.1 PG (ref 26.5–33)
MCHC RBC AUTO-ENTMCNC: 31.7 G/DL (ref 31.5–36.5)
MCV RBC AUTO: 98 FL (ref 78–100)
PLATELET # BLD AUTO: 322 10E9/L (ref 150–450)
POTASSIUM SERPL-SCNC: 3.6 MMOL/L (ref 3.4–5.3)
RBC # BLD AUTO: 3.92 10E12/L (ref 3.8–5.2)
SODIUM SERPL-SCNC: 136 MMOL/L (ref 133–144)
WBC # BLD AUTO: 7.5 10E9/L (ref 4–11)

## 2020-03-17 PROCEDURE — 93000 ELECTROCARDIOGRAM COMPLETE: CPT | Performed by: NURSE PRACTITIONER

## 2020-03-17 PROCEDURE — 99215 OFFICE O/P EST HI 40 MIN: CPT | Performed by: NURSE PRACTITIONER

## 2020-03-17 PROCEDURE — 85027 COMPLETE CBC AUTOMATED: CPT | Performed by: NURSE PRACTITIONER

## 2020-03-17 PROCEDURE — 80048 BASIC METABOLIC PNL TOTAL CA: CPT | Performed by: NURSE PRACTITIONER

## 2020-03-17 PROCEDURE — 36415 COLL VENOUS BLD VENIPUNCTURE: CPT | Performed by: NURSE PRACTITIONER

## 2020-03-17 ASSESSMENT — MIFFLIN-ST. JEOR: SCORE: 1343.28

## 2020-03-17 NOTE — PROGRESS NOTES
[facility]  [address] [facility] [address]  [phone]  Dept: [phone]    PRE-OP EVALUATION:  Today's date: 3/17/2020    Precious Paris (: 1940) presents for pre-operative evaluation assessment as requested by Dr. Frausto.  She requires evaluation and anesthesia risk assessment prior to undergoing surgery/procedure for treatment of Malignant neoplasm of R breast .     Proposed Surgery/ Procedure: Lumpectomy, R breast, with sentinel lymph node biopsy  Date of Surgery/ Procedure: 3/19/20  Time of Surgery/ Procedure: 0900  Hospital/Surgical Facility: [facility]   Primary Physician: Ester Jorge  Type of Anesthesia Anticipated: General    Patient has a Health Care Directive or Living Will:  YES     1. NO - Do you have a history of heart attack, stroke, stent, bypass or surgery on an artery in the head, neck, heart or legs?  2. NO - Do you ever have any pain or discomfort in your chest?  3. NO - Do you have a history of  Heart Failure?  4. NO - Are you troubled by shortness of breath when: walking on the level, up a slight hill or at night?  5. NO - Do you currently have a cold, bronchitis or other respiratory infection?  6. NO - Do you have a cough, shortness of breath or wheezing?  7. NO - Do you sometimes get pains in the calves of your legs when you walk?  8. NO - Do you or anyone in your family have previous history of blood clots?  9. NO - Do you or does anyone in your family have a serious bleeding problem such as prolonged bleeding following surgeries or cuts?  10. NO - Have you ever had problems with anemia or been told to take iron pills?  11. NO - Have you had any abnormal blood loss such as black, tarry or bloody stools, or abnormal vaginal bleeding?  12. NO - Have you ever had a blood transfusion?  13. NO - Have you or any of your relatives ever had problems with anesthesia?  14. NO - Do you have sleep apnea, excessive snoring or daytime drowsiness?  15. NO -  Do you have any prosthetic heart valves?  16. YES - DO YOU HAVE PROSTHETIC JOINTS? Both knees have been replaced   17. NO - Is there any chance that you may be pregnant?      HPI:     HPI related to upcoming procedure: Cellulitis of right breast in 10/2019, diagnostic mammogram and ultrasound were subsequently performed showing 2 suspicious masses. Biopsy confirmed invasive ductal carcinoma.      HYPERLIPIDEMIA - Patient has a long history of significant Hyperlipidemia requiring medication for treatment with recent good control. Patient reports no problems or side effects with the medication.     HYPERTENSION - Patient has longstanding history of HTN , currently denies any symptoms referable to elevated blood pressure. Specifically denies chest pain, palpitations, dyspnea, orthopnea, PND or peripheral edema. Blood pressure readings have been in normal range. Current medication regimen is as listed below. Patient denies any side effects of medication.       MEDICAL HISTORY:     Patient Active Problem List    Diagnosis Date Noted     Malignant neoplasm of right breast (H) 03/13/2020     Priority: Medium     Added automatically from request for surgery [id number]       Infiltrating ductal carcinoma of right breast (H) 11/25/2019     Priority: Medium     Added automatically from request for surgery [id number]       Malignant neoplasm of upper-outer quadrant of right breast in female, 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) 11/25/2019     Priority: Medium     Infiltrating ductal carcinoma of left breast (H) 11/19/2019     Priority: Medium     Hypertensive retinopathy of right eye 06/03/2019     Priority: Medium     Senile nuclear cataract 12/05/2017     Priority: Medium     Primary osteoarthritis of both hands 11/27/2017     Priority: Medium     Hypertension, goal below 150/90 11/12/2015     Priority: Medium     Hammer toe 08/14/2013     Priority: Medium     Hx of skin cancer, basal cell 05/31/2013     Priority: Medium     Healthcare  maintenance 04/11/2012     Priority: Medium     DEXA 2009 WNL--next in 2014       HYPERLIPIDEMIA LDL GOAL <130 10/31/2010     Priority: Medium     Premature atrial contractions 06/17/2010     Priority: Medium     Bigeminal on auscultation--recommend echocardiogram and stress test  Echocardiogram--normal except borderline concentric left ventricular hypertrophy       OA (osteoarthritis) of knee 01/07/2010     Priority: Medium     Malabsorption of glucose 09/09/2008     Priority: Medium     (Problem list name updated by automated process. 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     anosmia     Diverticulosis of colon (without mention of hemorrhage)      Other and unspecified hyperlipidemia      Pain in joint, lower leg      Plantar fascial fibromatosis      PURE HYPERCHOLESTEROLEM 9/9/2007     PURE HYPERCHOLESTEROLEM 9/9/2007     Squamous cell carcinoma      Past Surgical History:   Procedure Laterality Date     BIOPSY       HC COLONOSCOPY THRU STOMA, DIAGNOSTIC  04/05    diverticulosis and hemorroids, due 2015     INSERT PORT VASCULAR ACCESS N/A 11/29/2019    Procedure: INSERTION OF VENOUS ACCESS PORT;  Surgeon: Hair Broderick DO;  Location: [facility]     JOINT REPLACEMTN, KNEE RT/LT  2010    right     JOINT REPLACEMTN, KNEE RT/LT  2011    left     LIGATE VEIN VARICOSE, PHLEBECTOMY MULTIPLE STAB, COMBINED  10/17/2013    Procedure: COMBINED LIGATE VEIN VARICOSE, PHLEBECTOMY MULTIPLE STAB;  Phlebectomy of Right Knee Varicose Veins;  Surgeon: Andrea Duffy MD;   Location: [facility]     OSTEOTOMY FOOT  8/19/2013    Procedure: OSTEOTOMY FOOT;  Left 2nd metatarsal osteotomy, 2nd Metatarsophalangeal joint & 2nd toe correction;  Surgeon: Jose Phillips DPM;  Location: [facility]     PHACOEMULSIFICATION WITH STANDARD INTRAOCULAR LENS IMPLANT Left 12/7/2017    Procedure: PHACOEMULSIFICATION WITH STANDARD INTRAOCULAR LENS IMPLANT;  Left Cataract Removal with Implant;  Surgeon: Mata Deleon MD;  Location: [facility]     SURGICAL HISTORY OF -   1979    Stripping of Veins in Left Leg     SURGICAL HISTORY OF -   1970    Bilateral Tubal Ligation     SURGICAL HISTORY OF -   2000    Carpal Tunnel Repair, right     SURGICAL HISTORY OF -   9-12-08    Rt let ablation     Current Outpatient Medications   Medication Sig Dispense Refill     Acetaminophen 325 MG CAPS Take 325-650 mg by mouth every 4 hours as needed (pain)       ASPIRIN 81 MG OR TABS Take 1 tablet by mouth daily       CALCIUM OR Take 1 tablet by mouth daily        CENTRUM SILVER OR 1 TABLET DAILY       fish oil-omega-3 fatty acids (OMEGA 3) 1000 MG capsule Take 1 capsule (1 g) by mouth daily 30 capsule 1     IBUPROFEN 200 MG OR TABS Take 400 mg by mouth 2 times daily as needed for pain       LORazepam (ATIVAN) 0.5 MG tablet Take 1 tablet (0.5 mg) by mouth every 4 hours as needed (Anxiety, Nausea/Vomiting or Sleep) 30 tablet 3     losartan-hydrochlorothiazide (HYZAAR) 50-12.5 MG tablet Take 1 tablet by mouth daily 90 tablet 3     Melatonin 10 MG TABS tablet Take 10 mg by mouth At Bedtime       simvastatin (ZOCOR) 20 MG tablet Take 1 tablet (20 mg) by mouth At Bedtime 90 tablet 3     vitamin B complex with vitamin C (VITAMIN  B COMPLEX) tablet Take 1 tablet by mouth daily       VITAMIN C OR 1 DAILY       VITAMIN D, CHOLECALCIFEROL, PO Take 25 Units by mouth daily        vitamin E 400 units TABS Take by mouth daily 2 tablets daily       prochlorperazine (COMPAZINE) 10 MG tablet Take 0.5 tablets (5 mg) by mouth every 6 hours as needed  "(Nausea/Vomiting) (Patient not taking: Reported on 12/12/2019) 30 tablet 3     OTC products: Aspirin    Allergies   Allergen Reactions     Conjugated Estrogens Anaphylaxis and Other (See Comments)     Tightness in throat     Medroxyprogesterone Anaphylaxis and Other (See Comments)     Tightness in throat     Ace Inhibitors Cough     Premarin      Tightness in throat     Provera [Medroxyprogesterone Acetate]      Tightness in throat      Latex Allergy: NO    Social History     Tobacco Use     Smoking status: Former Smoker     Smokeless tobacco: Never Used     Tobacco comment: quit 20 years ago   Substance Use Topics     Alcohol use: Yes     Comment: occ.     History   Drug Use No       REVIEW OF SYSTEMS:   CONSTITUTIONAL: NEGATIVE for fever, chills, change in weight  INTEGUMENTARY/SKIN: NEGATIVE for worrisome rashes, moles or lesions  EYES: NEGATIVE for vision changes or irritation  ENT/MOUTH: NEGATIVE for ear, mouth and throat problems  RESP: NEGATIVE for significant cough or SOB  BREAST: POSITIVE for right breast mass  CV: NEGATIVE for chest pain, palpitations or peripheral edema  GI: NEGATIVE for nausea, abdominal pain, heartburn, or change in bowel habits  : NEGATIVE for frequency, dysuria, or hematuria  MUSCULOSKELETAL: NEGATIVE for significant arthralgias or myalgia  NEURO: NEGATIVE for weakness, dizziness or paresthesias  ENDOCRINE: NEGATIVE for temperature intolerance, skin/hair changes  HEME: NEGATIVE for bleeding problems  PSYCHIATRIC: NEGATIVE for changes in mood or affect    EXAM:   /86   Pulse 113   Temp 97.9  F (36.6  C) (Tympanic)   Resp 16   Ht 1.645 m (5' 4.75\")   Wt 86.6 kg (191 lb)   SpO2 97%   BMI 32.03 kg/m      GENERAL APPEARANCE: healthy, alert and no distress     EYES: EOMI, PERRL     HENT: ear canals and TM's normal and nose and mouth without ulcers or lesions     NECK: no adenopathy, no asymmetry, masses, or scars and thyroid normal to palpation     RESP: lungs clear to " auscultation - no rales, rhonchi or wheezes     CV: regular rates and rhythm, normal S1 S2, no S3 or S4 and no murmur, click or rub     ABDOMEN:  soft, nontender, no HSM or masses and bowel sounds normal     MS: extremities normal- no gross deformities noted, no evidence of inflammation in joints, FROM in all extremities.     SKIN: no suspicious lesions or rashes     NEURO: Normal strength and tone, sensory exam grossly normal, mentation intact and speech normal     PSYCH: mentation appears normal. and affect normal/bright     LYMPHATICS: No cervical adenopathy    DIAGNOSTICS:     EKG: Normal Sinus Rhythm, normal axis, normal intervals, no acute ST/T changes c/w ischemia, no LVH by voltage criteria  Labs Drawn and in Process:   Unresulted Labs Ordered in the Past 30 Days of this Admission     Date and Time Order Name Status Description    3/17/2020 0907 BASIC METABOLIC PANEL In process     3/17/2020 0907 CBC WITH PLATELETS In process     3/12/2020 0947 HEREDITARY GENOMICS HOLD FOR PREAUTHORIZATION In process           Recent Labs   Lab Test 12/12/19  0840 11/25/19  1312 04/04/19  1150  06/28/17  0911   HGB 12.4 14.0  --   --  13.7    327  --   --  248   NA  --  139 136   < > 140   POTASSIUM  --  3.9 4.4   < > 4.1   CR  --  0.50* 0.68   < > 0.62   A1C  --   --   --   --  5.7    < > = values in this interval not displayed.        IMPRESSION:   Reason for surgery/procedure: Invasive carcinoma of right breast.   Diagnosis/reason for consult: The surgeon is requesting consultation regarding anesthesia and surgical risk for this patient with respect to current and past medical conditions.      The proposed surgical procedure is considered LOW risk.    REVISED CARDIAC RISK INDEX  The patient has the following serious cardiovascular risks for perioperative complications such as (MI, PE, VFib and 3  AV Block):  No serious cardiac risks  INTERPRETATION: 0 risks: Class I (very low risk - 0.4% complication rate)    The  patient has the following additional risks for perioperative complications:  No identified additional risks      ICD-10-CM    1. Preop general physical exam  Z01.818 EKG 12-lead complete w/read - Clinics     CBC with platelets     Basic metabolic panel  (Ca, Cl, CO2, Creat, Gluc, K, Na, BUN)   2. Malignant neoplasm of upper-outer quadrant of right breast in female, 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)  C50.411     Z17.1    3. Hypertension, goal below 150/90  I10    4. Hyperlipidemia LDL goal <130  E78.5        RECOMMENDATIONS:     --Patient is to take all scheduled medications on the day of surgery EXCEPT for modifications listed below.    Anticoagulant or Antiplatelet Medication Use  ASPIRIN: Discontinue ASA 7-10 days prior to procedure to reduce bleeding risk.  It should be resumed post-operatively.        ACE Inhibitor or Angiotensin Receptor Blocker (ARB) Use  Ace inhibitor or Angiotensin Receptor Blocker (ARB) and should HOLD this medication for the 24 hours prior to surgery.      APPROVAL GIVEN to proceed with proposed procedure, without further diagnostic evaluation       Signed Electronically by: KONG Smith CNP    Copy of this evaluation report is provided to requesting physician.    [facility] Preop Guidelines    Revised Cardiac Risk Index

## 2020-03-17 NOTE — RESULT ENCOUNTER NOTE
Khadijah Lang    Your pre-op lab results came back within normal limits/unremarkable. Please let us know if you have any questions.     Take care,    KONG Pyle CNP

## 2020-03-17 NOTE — PATIENT INSTRUCTIONS
HOLD Aspirin from now until 1 day after surgery.  HOLD Losartan-hydrochlorothiazide the morning of surgery.       Before Your Surgery      Call your surgeon if there is any change in your health. This includes signs of a cold or flu (such as a sore throat, runny nose, cough, rash or fever).    Do not smoke, drink alcohol or take over the counter medicine (unless your surgeon or primary care doctor tells you to) for the 24 hours before and after surgery.    If you take prescribed drugs: Follow your doctor s orders about which medicines to take and which to stop until after surgery.    Eating and drinking prior to surgery: follow the instructions from your surgeon    Take a shower or bath the night before surgery. Use the soap your surgeon gave you to gently clean your skin. If you do not have soap from your surgeon, use your regular soap. Do not shave or scrub the surgery site.  Wear clean pajamas and have clean sheets on your bed.

## 2020-03-18 ENCOUNTER — ANESTHESIA EVENT (OUTPATIENT)
Dept: SURGERY | Facility: CLINIC | Age: 80
End: 2020-03-18
Payer: COMMERCIAL

## 2020-03-19 ENCOUNTER — HOSPITAL ENCOUNTER (OUTPATIENT)
Dept: NUCLEAR MEDICINE | Facility: CLINIC | Age: 80
Setting detail: NUCLEAR MEDICINE
Discharge: HOME | End: 2020-03-19
Attending: SURGERY | Admitting: SURGERY
Payer: COMMERCIAL

## 2020-03-19 ENCOUNTER — HOSPITAL ENCOUNTER (OUTPATIENT)
Facility: CLINIC | Age: 80
Discharge: HOME | End: 2020-03-19
Attending: SURGERY | Admitting: SURGERY
Payer: COMMERCIAL

## 2020-03-19 ENCOUNTER — ANESTHESIA (OUTPATIENT)
Dept: SURGERY | Facility: CLINIC | Age: 80
End: 2020-03-19
Payer: COMMERCIAL

## 2020-03-19 VITALS
BODY MASS INDEX: 31.82 KG/M2 | TEMPERATURE: 97.6 F | HEIGHT: 65 IN | HEART RATE: 73 BPM | DIASTOLIC BLOOD PRESSURE: 77 MMHG | SYSTOLIC BLOOD PRESSURE: 147 MMHG | RESPIRATION RATE: 16 BRPM | WEIGHT: 191 LBS | OXYGEN SATURATION: 99 %

## 2020-03-19 DIAGNOSIS — C50.911 MALIGNANT NEOPLASM OF RIGHT BREAST (H): ICD-10-CM

## 2020-03-19 DIAGNOSIS — G89.18 POSTOPERATIVE PAIN: Primary | ICD-10-CM

## 2020-03-19 PROCEDURE — A9520 TC99 TILMANOCEPT DIAG 0.5MCI: HCPCS | Performed by: SURGERY

## 2020-03-19 PROCEDURE — 71000027 ZZH RECOVERY PHASE 2 EACH 15 MINS: Performed by: SURGERY

## 2020-03-19 PROCEDURE — 88341 IMHCHEM/IMCYTCHM EA ADD ANTB: CPT | Mod: 26 | Performed by: SURGERY

## 2020-03-19 PROCEDURE — 34300033 ZZH RX 343: Performed by: SURGERY

## 2020-03-19 PROCEDURE — 25800030 ZZH RX IP 258 OP 636: Performed by: NURSE ANESTHETIST, CERTIFIED REGISTERED

## 2020-03-19 PROCEDURE — 88341 IMHCHEM/IMCYTCHM EA ADD ANTB: CPT | Performed by: SURGERY

## 2020-03-19 PROCEDURE — 25000128 H RX IP 250 OP 636: Performed by: SURGERY

## 2020-03-19 PROCEDURE — 36000058 ZZH SURGERY LEVEL 3 EA 15 ADDTL MIN: Performed by: SURGERY

## 2020-03-19 PROCEDURE — 25000125 ZZHC RX 250: Performed by: NURSE ANESTHETIST, CERTIFIED REGISTERED

## 2020-03-19 PROCEDURE — 88342 IMHCHEM/IMCYTCHM 1ST ANTB: CPT | Mod: 26 | Performed by: SURGERY

## 2020-03-19 PROCEDURE — 27210794 ZZH OR GENERAL SUPPLY STERILE: Performed by: SURGERY

## 2020-03-19 PROCEDURE — 88307 TISSUE EXAM BY PATHOLOGIST: CPT | Performed by: SURGERY

## 2020-03-19 PROCEDURE — 38792 RA TRACER ID OF SENTINL NODE: CPT | Mod: TC

## 2020-03-19 PROCEDURE — 37000009 ZZH ANESTHESIA TECHNICAL FEE, EACH ADDTL 15 MIN: Performed by: SURGERY

## 2020-03-19 PROCEDURE — 25000125 ZZHC RX 250: Performed by: SURGERY

## 2020-03-19 PROCEDURE — 25000132 ZZH RX MED GY IP 250 OP 250 PS 637: Performed by: SURGERY

## 2020-03-19 PROCEDURE — 88342 IMHCHEM/IMCYTCHM 1ST ANTB: CPT | Performed by: SURGERY

## 2020-03-19 PROCEDURE — 27110028 ZZH OR GENERAL SUPPLY NON-STERILE: Performed by: SURGERY

## 2020-03-19 PROCEDURE — 88307 TISSUE EXAM BY PATHOLOGIST: CPT | Mod: 26,59 | Performed by: SURGERY

## 2020-03-19 PROCEDURE — 25000566 ZZH SEVOFLURANE, EA 15 MIN: Performed by: SURGERY

## 2020-03-19 PROCEDURE — 37000008 ZZH ANESTHESIA TECHNICAL FEE, 1ST 30 MIN: Performed by: SURGERY

## 2020-03-19 PROCEDURE — 71000012 ZZH RECOVERY PHASE 1 LEVEL 1 FIRST HR: Performed by: SURGERY

## 2020-03-19 PROCEDURE — 88305 TISSUE EXAM BY PATHOLOGIST: CPT | Mod: 26 | Performed by: SURGERY

## 2020-03-19 PROCEDURE — 36000056 ZZH SURGERY LEVEL 3 1ST 30 MIN: Performed by: SURGERY

## 2020-03-19 PROCEDURE — 40000305 ZZH STATISTIC PRE PROC ASSESS I: Performed by: SURGERY

## 2020-03-19 PROCEDURE — 25000128 H RX IP 250 OP 636: Performed by: NURSE ANESTHETIST, CERTIFIED REGISTERED

## 2020-03-19 PROCEDURE — 88305 TISSUE EXAM BY PATHOLOGIST: CPT | Performed by: SURGERY

## 2020-03-19 RX ORDER — OXYCODONE HYDROCHLORIDE 5 MG/1
5 TABLET ORAL EVERY 4 HOURS PRN
Status: DISCONTINUED | OUTPATIENT
Start: 2020-03-19 | End: 2020-03-19 | Stop reason: HOSPADM

## 2020-03-19 RX ORDER — ONDANSETRON 2 MG/ML
4 INJECTION INTRAMUSCULAR; INTRAVENOUS EVERY 30 MIN PRN
Status: DISCONTINUED | OUTPATIENT
Start: 2020-03-19 | End: 2020-03-19 | Stop reason: HOSPADM

## 2020-03-19 RX ORDER — DEXAMETHASONE SODIUM PHOSPHATE 4 MG/ML
INJECTION, SOLUTION INTRA-ARTICULAR; INTRALESIONAL; INTRAMUSCULAR; INTRAVENOUS; SOFT TISSUE PRN
Status: DISCONTINUED | OUTPATIENT
Start: 2020-03-19 | End: 2020-03-19

## 2020-03-19 RX ORDER — GLYCOPYRROLATE 0.2 MG/ML
INJECTION, SOLUTION INTRAMUSCULAR; INTRAVENOUS PRN
Status: DISCONTINUED | OUTPATIENT
Start: 2020-03-19 | End: 2020-03-19

## 2020-03-19 RX ORDER — LIDOCAINE 40 MG/G
CREAM TOPICAL
Status: DISCONTINUED | OUTPATIENT
Start: 2020-03-19 | End: 2020-03-19 | Stop reason: HOSPADM

## 2020-03-19 RX ORDER — NEOSTIGMINE METHYLSULFATE 1 MG/ML
VIAL (ML) INJECTION PRN
Status: DISCONTINUED | OUTPATIENT
Start: 2020-03-19 | End: 2020-03-19

## 2020-03-19 RX ORDER — MEPERIDINE HYDROCHLORIDE 25 MG/ML
12.5 INJECTION INTRAMUSCULAR; INTRAVENOUS; SUBCUTANEOUS
Status: DISCONTINUED | OUTPATIENT
Start: 2020-03-19 | End: 2020-03-19 | Stop reason: HOSPADM

## 2020-03-19 RX ORDER — FENTANYL CITRATE 50 UG/ML
25-50 INJECTION, SOLUTION INTRAMUSCULAR; INTRAVENOUS
Status: DISCONTINUED | OUTPATIENT
Start: 2020-03-19 | End: 2020-03-19 | Stop reason: HOSPADM

## 2020-03-19 RX ORDER — SODIUM CHLORIDE, SODIUM LACTATE, POTASSIUM CHLORIDE, CALCIUM CHLORIDE 600; 310; 30; 20 MG/100ML; MG/100ML; MG/100ML; MG/100ML
INJECTION, SOLUTION INTRAVENOUS CONTINUOUS
Status: DISCONTINUED | OUTPATIENT
Start: 2020-03-19 | End: 2020-03-19 | Stop reason: HOSPADM

## 2020-03-19 RX ORDER — CEFAZOLIN SODIUM 2 G/100ML
2 INJECTION, SOLUTION INTRAVENOUS
Status: COMPLETED | OUTPATIENT
Start: 2020-03-19 | End: 2020-03-19

## 2020-03-19 RX ORDER — PROPOFOL 10 MG/ML
INJECTION, EMULSION INTRAVENOUS PRN
Status: DISCONTINUED | OUTPATIENT
Start: 2020-03-19 | End: 2020-03-19

## 2020-03-19 RX ORDER — ONDANSETRON 2 MG/ML
INJECTION INTRAMUSCULAR; INTRAVENOUS PRN
Status: DISCONTINUED | OUTPATIENT
Start: 2020-03-19 | End: 2020-03-19

## 2020-03-19 RX ORDER — ONDANSETRON 4 MG/1
4 TABLET, ORALLY DISINTEGRATING ORAL EVERY 30 MIN PRN
Status: DISCONTINUED | OUTPATIENT
Start: 2020-03-19 | End: 2020-03-19 | Stop reason: HOSPADM

## 2020-03-19 RX ORDER — CEFAZOLIN SODIUM 1 G/50ML
1 INJECTION, SOLUTION INTRAVENOUS SEE ADMIN INSTRUCTIONS
Status: DISCONTINUED | OUTPATIENT
Start: 2020-03-19 | End: 2020-03-19 | Stop reason: HOSPADM

## 2020-03-19 RX ORDER — NALOXONE HYDROCHLORIDE 0.4 MG/ML
.1-.4 INJECTION, SOLUTION INTRAMUSCULAR; INTRAVENOUS; SUBCUTANEOUS
Status: DISCONTINUED | OUTPATIENT
Start: 2020-03-19 | End: 2020-03-19 | Stop reason: HOSPADM

## 2020-03-19 RX ORDER — FENTANYL CITRATE 50 UG/ML
INJECTION, SOLUTION INTRAMUSCULAR; INTRAVENOUS PRN
Status: DISCONTINUED | OUTPATIENT
Start: 2020-03-19 | End: 2020-03-19

## 2020-03-19 RX ORDER — OXYCODONE HYDROCHLORIDE 5 MG/1
5-10 TABLET ORAL EVERY 4 HOURS PRN
Qty: 6 TABLET | Refills: 0 | Status: SHIPPED | OUTPATIENT
Start: 2020-03-19 | End: 2020-04-02

## 2020-03-19 RX ORDER — BUPIVACAINE HYDROCHLORIDE AND EPINEPHRINE 5; 5 MG/ML; UG/ML
INJECTION, SOLUTION PERINEURAL PRN
Status: DISCONTINUED | OUTPATIENT
Start: 2020-03-19 | End: 2020-03-19 | Stop reason: HOSPADM

## 2020-03-19 RX ORDER — ALBUTEROL SULFATE 0.83 MG/ML
2.5 SOLUTION RESPIRATORY (INHALATION) EVERY 4 HOURS PRN
Status: DISCONTINUED | OUTPATIENT
Start: 2020-03-19 | End: 2020-03-19 | Stop reason: HOSPADM

## 2020-03-19 RX ORDER — LIDOCAINE HYDROCHLORIDE AND EPINEPHRINE 10; 10 MG/ML; UG/ML
INJECTION, SOLUTION INFILTRATION; PERINEURAL PRN
Status: DISCONTINUED | OUTPATIENT
Start: 2020-03-19 | End: 2020-03-19 | Stop reason: HOSPADM

## 2020-03-19 RX ADMIN — TILMANOCEPT 0.52 MILLICURIE: KIT at 08:00

## 2020-03-19 RX ADMIN — Medication 5 MG: at 10:31

## 2020-03-19 RX ADMIN — MIDAZOLAM 2 MG: 1 INJECTION INTRAMUSCULAR; INTRAVENOUS at 09:01

## 2020-03-19 RX ADMIN — PROPOFOL 150 MG: 10 INJECTION, EMULSION INTRAVENOUS at 09:05

## 2020-03-19 RX ADMIN — ROCURONIUM BROMIDE 20 MG: 10 INJECTION INTRAVENOUS at 09:05

## 2020-03-19 RX ADMIN — GLYCOPYRROLATE 0.8 MG: 0.2 INJECTION, SOLUTION INTRAMUSCULAR; INTRAVENOUS at 10:31

## 2020-03-19 RX ADMIN — SODIUM CHLORIDE, POTASSIUM CHLORIDE, SODIUM LACTATE AND CALCIUM CHLORIDE: 600; 310; 30; 20 INJECTION, SOLUTION INTRAVENOUS at 07:23

## 2020-03-19 RX ADMIN — PROPOFOL 50 MG: 10 INJECTION, EMULSION INTRAVENOUS at 09:46

## 2020-03-19 RX ADMIN — ROCURONIUM BROMIDE 10 MG: 10 INJECTION INTRAVENOUS at 09:46

## 2020-03-19 RX ADMIN — LIDOCAINE HYDROCHLORIDE 0.1 ML: 10 INJECTION, SOLUTION EPIDURAL; INFILTRATION; INTRACAUDAL; PERINEURAL at 07:23

## 2020-03-19 RX ADMIN — ONDANSETRON 4 MG: 2 INJECTION INTRAMUSCULAR; INTRAVENOUS at 09:36

## 2020-03-19 RX ADMIN — FENTANYL CITRATE 200 MCG: 50 INJECTION, SOLUTION INTRAMUSCULAR; INTRAVENOUS at 09:09

## 2020-03-19 RX ADMIN — OXYCODONE HYDROCHLORIDE 5 MG: 5 TABLET ORAL at 12:55

## 2020-03-19 RX ADMIN — FENTANYL CITRATE 50 MCG: 50 INJECTION, SOLUTION INTRAMUSCULAR; INTRAVENOUS at 09:01

## 2020-03-19 RX ADMIN — DEXAMETHASONE SODIUM PHOSPHATE 4 MG: 4 INJECTION, SOLUTION INTRA-ARTICULAR; INTRALESIONAL; INTRAMUSCULAR; INTRAVENOUS; SOFT TISSUE at 09:36

## 2020-03-19 RX ADMIN — HYDROMORPHONE HYDROCHLORIDE 0.5 MG: 1 INJECTION, SOLUTION INTRAMUSCULAR; INTRAVENOUS; SUBCUTANEOUS at 10:56

## 2020-03-19 RX ADMIN — LIDOCAINE HYDROCHLORIDE 50 MG: 10 INJECTION, SOLUTION EPIDURAL; INFILTRATION; INTRACAUDAL; PERINEURAL at 09:05

## 2020-03-19 RX ADMIN — HYDROMORPHONE HYDROCHLORIDE 0.5 MG: 1 INJECTION, SOLUTION INTRAMUSCULAR; INTRAVENOUS; SUBCUTANEOUS at 11:17

## 2020-03-19 RX ADMIN — CEFAZOLIN SODIUM 2 G: 2 INJECTION, SOLUTION INTRAVENOUS at 09:01

## 2020-03-19 ASSESSMENT — MIFFLIN-ST. JEOR: SCORE: 1343.28

## 2020-03-19 ASSESSMENT — LIFESTYLE VARIABLES: TOBACCO_USE: 1

## 2020-03-19 NOTE — DISCHARGE INSTRUCTIONS
"                    Same Day Surgery Discharge Instructions  Special Precautions After Surgery - Adult    1. It is not unusual to feel lightheaded or faint, up to 24 hours after surgery or while taking pain medication.  If you have these symptoms; sit for a few minutes before standing and have someone assist you when getting up.  2. You should rest and relax for the next 24 hours and must have someone stay with you for at least 24 hours after your discharge.  3. DO NOT DRIVE any vehicle or operate mechanical equipment for 24 hours following the end of your surgery.  DO NOT DRIVE while taking narcotic pain medications that have been prescribed by your physician.  If you had a limb operated on, you must be able to use it fully to drive.  4. DO NOT drink alcoholic beverages for 24 hours following surgery or while taking prescription pain medication.  5. Drink clear liquids (apple juice, ginger ale, broth, 7-Up, etc.).  Progress to your regular diet as you feel able.  6. Any questions call your physician and do not make important decisions for 24 hours.    ACTIVITY  ? As tolerated     INCISIONAL CARE  ? May shower tomorrow     Call for an appointment to return to the clinic in one to two weeks    Medications:  Norco as instructed on bottle  next dose  5pm if needed         Additional discharge instructions: { :[id number]::\"None\"}  __________________________________________________________________________________________________________________________________  IMPORTANT NUMBERS:    [facility] [place]:  [phone], [phone]  Pharmacy:  [phone]  Same Day Surgery:  [phone], Monday - Friday until 8:30 p.m.  Urgent Care:  [phone]  Emergency Room:  [phone]      [facility]:  [phone]                                                                             [facility]:  [phone] option 1  [facility]:  [phone]     [facility] Clinic:  [phone]   Surgery " Specialty Clinic:  [phone]   Home Medical Equipment: [phone]  [facility]:  [phone]

## 2020-03-19 NOTE — ANESTHESIA PREPROCEDURE EVALUATION
Anesthesia Pre-Procedure Evaluation    Patient: Precious Paris   MRN: [id number] : 1940          Preoperative Diagnosis: Malignant neoplasm of right breast (H) [C50.911]    Procedure(s):  LUMPECTOMY, BREAST, WITH SENTINEL LYMPH NODE BIOPSY, Right lumpectomy with right sentinel lymph node biopsy and damian cath removal    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     anosmia     Diverticulosis of colon (without mention of hemorrhage)      Other and unspecified hyperlipidemia      Pain in joint, lower leg      Plantar fascial fibromatosis      PURE HYPERCHOLESTEROLEM 2007     PURE HYPERCHOLESTEROLEM 2007     Squamous cell carcinoma      Past Surgical History:   Procedure Laterality Date     BIOPSY       HC COLONOSCOPY THRU STOMA, DIAGNOSTIC      diverticulosis and hemorroids, due      INSERT PORT VASCULAR ACCESS N/A 2019    Procedure: INSERTION OF VENOUS ACCESS PORT;  Surgeon: Hair Broderick DO;  Location: [facility]     JOINT REPLACEMTN, KNEE RT/LT      right     JOINT REPLACEMTN, KNEE RT/LT      left     LIGATE VEIN VARICOSE, PHLEBECTOMY MULTIPLE STAB, COMBINED  10/17/2013    Procedure: COMBINED LIGATE VEIN VARICOSE, PHLEBECTOMY MULTIPLE STAB;  Phlebectomy of Right Knee Varicose Veins;  Surgeon: Andrea Duffy MD;  Location: [facility]     OSTEOTOMY FOOT  2013    Procedure: OSTEOTOMY FOOT;  Left 2nd metatarsal osteotomy, 2nd Metatarsophalangeal joint & 2nd toe correction;  Surgeon: Jose Phillips DPM;  Location: [facility]     PHACOEMULSIFICATION WITH STANDARD INTRAOCULAR LENS IMPLANT Left 2017    Procedure: PHACOEMULSIFICATION WITH STANDARD INTRAOCULAR LENS IMPLANT;  Left Cataract Removal with Implant;  Surgeon:  Mata Deleon MD;  Location: [facility]     SURGICAL HISTORY OF -   1979    Stripping of Veins in Left Leg     SURGICAL HISTORY OF -   1970    Bilateral Tubal Ligation     SURGICAL HISTORY OF -   2000    Carpal Tunnel Repair, right     SURGICAL HISTORY OF -   9-12-08    Rt let ablation       Anesthesia Evaluation     . Pt has had prior anesthetic.            ROS/MED HX    ENT/Pulmonary:     (+)allergic rhinitis, tobacco use, Past use , . .    Neurologic:       Cardiovascular:     (+) Dyslipidemia, hypertension----. : . . . :. .       METS/Exercise Tolerance:     Hematologic:         Musculoskeletal:   (+) arthritis,  -       GI/Hepatic:         Renal/Genitourinary:         Endo:         Psychiatric:         Infectious Disease:         Malignancy:   (+) Malignancy History of Skin and Breast          Other:                          Physical Exam  Normal systems: cardiovascular, pulmonary and dental    Airway   Mallampati: II  TM distance: >3 FB  Neck ROM: full    Dental     Cardiovascular       Pulmonary             Lab Results   Component Value Date    WBC 7.5 03/17/2020    HGB 12.2 03/17/2020    HCT 38.5 03/17/2020     03/17/2020    SED 16 06/14/2006     03/17/2020    POTASSIUM 3.6 03/17/2020    CHLORIDE 103 03/17/2020    CO2 30 03/17/2020    BUN 11 03/17/2020    CR 0.55 03/17/2020     (H) 03/17/2020    YADIRA 9.2 03/17/2020    ALBUMIN 3.7 11/25/2019    PROTTOTAL 8.6 11/25/2019    ALT 30 11/25/2019    AST 22 11/25/2019    ALKPHOS 81 11/25/2019    BILITOTAL 0.5 11/25/2019    INR 1.2 (A) 09/16/2011    TSH 2.08 04/18/2018    T4 1.14 07/18/2007       Preop Vitals  BP Readings from Last 3 Encounters:   03/19/20 (!) 144/76   03/17/20 130/86   03/12/20 135/78    Pulse Readings from Last 3 Encounters:   03/19/20 120   03/17/20 113   03/12/20 111      Resp Readings from Last 3 Encounters:   03/19/20 20   03/17/20 16   03/12/20 16    SpO2 Readings from Last 3 Encounters:   03/19/20 96%   03/17/20 97%  "  03/12/20 95%      Temp Readings from Last 1 Encounters:   03/19/20 37  C (98.6  F) (Oral)    Ht Readings from Last 1 Encounters:   03/19/20 1.645 m (5' 4.75\")      Wt Readings from Last 1 Encounters:   03/19/20 86.6 kg (191 lb)    Estimated body mass index is 32.03 kg/m  as calculated from the following:    Height as of this encounter: 1.645 m (5' 4.75\").    Weight as of this encounter: 86.6 kg (191 lb).       Anesthesia Plan      History & Physical Review  History and physical reviewed and following examination; no interval change.    ASA Status:  2 .    NPO Status:  > 6 hours    Plan for General with Intravenous induction. Maintenance will be Balanced.    PONV prophylaxis:  Ondansetron (or other 5HT-3) and Dexamethasone or Solumedrol  Additional equipment: Videolaryngoscope        Postoperative Care      Consents  Anesthetic plan, risks, benefits and alternatives discussed with:  Patient..                 Sandee Montero CRNA, APRN CRNA  "

## 2020-03-19 NOTE — OP NOTE
Procedure Date: 03/19/2020      PREOPERATIVE DIAGNOSIS:  Right breast cancer.      POSTOPERATIVE DIAGNOSIS:  Right breast cancer.      PROCEDURE:  Lymphatic mapping, removal of right subclavian PowerPort, right lumpectomy and right axillary sentinel lymph node biopsy x 1.      ATTENDING SURGEON:  Venancio Frausto MD      ANESTHESIA:  General with endotracheal tube intubation.      INDICATIONS FOR SURGERY:  The patient is a 79-year-old woman who presented with a multifocal right breast cancer.  She received preoperative chemotherapy.  The 2 tumors were still palpable, so she now presents for surgical treatment.      PROCEDURE IN DETAIL:  After informed consent, the patient was brought to the operating room, given a general anesthetic and orotracheally intubated.  I injected technetium sulfur colloid and isosulfan blue in her right breast.  She was prepped and draped in the usual fashion.  We started with removing the Port-A-Cath first.  A local anesthetic was administered, and an incision was made through her previous infraclavicular incision.  The Bovie cautery was used to incise the subcutaneous tissues.  The catheter was removed from the vein with no back bleeding.  There were some sutures attaching the Port-A-Cath, and these were cut.  The Port-A-Cath was removed with all 3 parts present.  The wound was irrigated, and strict hemostasis was obtained.  Next, a local anesthetic was administered to the upper outer portion of the right breast over the palpable masses.  A curvilinear incision was made with a scalpel.  The Bovie cautery was used to incise the subcutaneous tissues.  Flaps were raised in all directions.  Dissection went down to the underlying fascia, which was excised.  Specimen was removed, oriented and sent to Pathology.  I did take a new inferior and a new superior margin, and these were oriented appropriately.  Surgical clips were placed in all 4 quadrants of the resection bed.  Next, I made an incision  in the right axilla after injecting a local anesthetic.  The Bovie cautery was used to incise the subcutaneous tissues.  Dissection proceeded through the clavipectoral fascia.  I did not find any blue dye.  There was a mildly radioactive lymph node.  [place] lymph node #1 was removed and sent to pathology.  There were no additional radioactive, blue or palpable lymph nodes.  All 3 incisions were closed with interrupted 3-0 Vicryl suture for the dermal layer, and a running 4-0 PDS subcuticular stitch for the skin.  Dermabond was placed, and the patient was taken to recovery room in stable condition.         TOMY GIBBS MD             D: 2020   T: 2020   MT:       Name:     ANGELIKA HUDDLESTON   MRN:      [id number]        Account:        [id number]   :      1940           Procedure Date: 2020      Document: [id number]

## 2020-03-19 NOTE — ANESTHESIA POSTPROCEDURE EVALUATION
Patient: Precious Paris    Procedure(s):  LUMPECTOMY, BREAST, WITH SENTINEL LYMPH NODE BIOPSY, Right Lumpectomy With Right [place] Lymph Node Biopsy And Luisa Cath Removal    Diagnosis:Malignant neoplasm of right breast (H) [C50.911]  Diagnosis Additional Information: No value filed.    Anesthesia Type:  General    Note:  Anesthesia Post Evaluation    Patient location during evaluation: Phase 2 and Bedside  Patient participation: Able to fully participate in evaluation  Level of consciousness: awake and alert  Pain management: adequate  Airway patency: patent  Cardiovascular status: acceptable  Respiratory status: acceptable  Hydration status: acceptable  PONV: none     Anesthetic complications: None          Last vitals:  Vitals:    03/19/20 1112 03/19/20 1131 03/19/20 1213   BP: (!) 151/96 (!) 149/80 (!) 147/77   Pulse:   73   Resp: 14 16 16   Temp:      SpO2: 98%  99%         Electronically Signed By: Bharath White CRNA, APRN CRNA  March 19, 2020  1:14 PM

## 2020-03-19 NOTE — ANESTHESIA CARE TRANSFER NOTE
Patient: Precious Paris    Procedure(s):  LUMPECTOMY, BREAST, WITH SENTINEL LYMPH NODE BIOPSY, Right Lumpectomy With Right [place] Lymph Node Biopsy And Luisa Cath Removal    Diagnosis: Malignant neoplasm of right breast (H) [C50.911]  Diagnosis Additional Information: No value filed.    Anesthesia Type:   General     Note:  Airway :Nasal Cannula  Patient transferred to:PACU  Handoff Report: Identifed the Patient, Identified the Reponsible Provider, Reviewed the pertinent medical history, Discussed the surgical course, Reviewed Intra-OP anesthesia mangement and issues during anesthesia, Set expectations for post-procedure period and Allowed opportunity for questions and acknowledgement of understanding      Vitals: (Last set prior to Anesthesia Care Transfer)    CRNA VITALS  3/19/2020 1012 - 3/19/2020 1048      3/19/2020             Pulse:  118    SpO2:  98 %    Resp Rate (observed):  (!) 5                Electronically Signed By: Sandee Montero CRNA, APRN CRNA  March 19, 2020  10:48 AM

## 2020-03-23 DIAGNOSIS — C50.911 MALIGNANT NEOPLASM OF RIGHT BREAST (H): Primary | ICD-10-CM

## 2020-03-23 PROCEDURE — 81408 MOPATH PROCEDURE LEVEL 9: CPT | Performed by: SURGERY

## 2020-03-23 PROCEDURE — 81479 UNLISTED MOLECULAR PATHOLOGY: CPT | Performed by: SURGERY

## 2020-03-23 PROCEDURE — 81323 PTEN GENE DUP/DELET VARIANT: CPT | Performed by: SURGERY

## 2020-03-23 PROCEDURE — 81321 PTEN GENE FULL SEQUENCE: CPT | Performed by: SURGERY

## 2020-03-23 PROCEDURE — 81405 MOPATH PROCEDURE LEVEL 6: CPT | Performed by: SURGERY

## 2020-03-23 PROCEDURE — 81162 BRCA1&2 GEN FULL SEQ DUP/DEL: CPT | Performed by: SURGERY

## 2020-03-23 PROCEDURE — 81307 PALB2 GENE FULL GENE SEQ: CPT | Performed by: SURGERY

## 2020-03-23 PROCEDURE — 81406 MOPATH PROCEDURE LEVEL 7: CPT | Performed by: SURGERY

## 2020-03-23 PROCEDURE — 81405 MOPATH PROCEDURE LEVEL 6: CPT | Mod: 59 | Performed by: SURGERY

## 2020-03-24 LAB — COPATH REPORT: NORMAL

## 2020-03-27 LAB — COPATH REPORT: NORMAL

## 2020-04-02 ENCOUNTER — VIRTUAL VISIT (OUTPATIENT)
Dept: RADIATION THERAPY | Facility: OUTPATIENT CENTER | Age: 80
End: 2020-04-02
Payer: COMMERCIAL

## 2020-04-02 DIAGNOSIS — C50.911 MALIGNANT NEOPLASM OF RIGHT FEMALE BREAST, UNSPECIFIED ESTROGEN RECEPTOR STATUS, UNSPECIFIED SITE OF BREAST (H): Primary | ICD-10-CM

## 2020-04-02 NOTE — PROGRESS NOTES
TELEPHONE NOTE       HISTORY OF PRESENT ILLNESS:  I had a phone call today with Precious Paris.  She was diagnosed with a triple-negative breast cancer, received neoadjuvant chemotherapy and then underwent a lumpectomy and sentinel lymph node biopsy 2 weeks ago.  Her final pathology report demonstrated 2 tumors in her breast.  She had a 19 mm and a 12 mm grade 3 tumor.  Her margins were all negative.  Her sentinel lymph node was negative.  Precious tells me today that she has been having no pain, no drainage, no trouble with her incisions, and she is recovering well and wants to start being more active.      IMPRESSION:  Postop check.      PLAN:  I am going to have her follow up with Dr. Villa.  I told her she does not need any more surgery.  I did tell her she does need radiation therapy, and we will get that scheduled out here as well.

## 2020-04-02 NOTE — NURSING NOTE
"Precious Paris is a 79 year old female who is being evaluated via a billable telephone visit.      The patient has been notified of following:     \"This telephone visit will be conducted via a call between you and your physician/provider. We have found that certain health care needs can be provided without the need for a physical exam.  This service lets us provide the care you need with a short phone conversation.  If a prescription is necessary we can send it directly to your pharmacy.  If lab work is needed we can place an order for that and you can then stop by our lab to have the test done at a later time.    If during the course of the call the physician/provider feels a telephone visit is not appropriate, you will not be charged for this service.\"     Patient has given verbal consent for Telephone visit?  Yes    Precious Paris complains of    Chief Complaint   Patient presents with     Surgical Followup     Telephone Post op appointment with Dr. Frausto       I have reviewed and updated the patient's Past Medical History, Social History, Family History and Medication List.    ALLERGIES  Conjugated estrogens; Medroxyprogesterone; Ace inhibitors; Premarin; and Provera [medroxyprogesterone acetate]    Phone call duration: 10 minutes    Lori Hoyt RN    "

## 2020-04-05 NOTE — PROGRESS NOTES
MEDICAL ONCOLOGY FOLLOW UP VISIT      Video Visit      CC:  November 2019 at age 79, she presented with right breast pain with palpable lump,  diagnosed with triple negative breast cancer of left breast lateral central portion  Transferred care from Dr. Cruz to me 11/25/2019      HISTORY OF ONCOLOGY ILLNESS/CC:    At age 79, 10/2019 she presented with pain and redness, induration in the right breast associated with breast lump.  She was treated with antibiotic for 10 days redness resolved.    US/MA 10/2019  found 2 hypoechoic structures are seen at the patient's area of concern, 9:00-10:00 position 7 cm from the nipple on the RIGHT. One of these appears to have at least some component of an echogenic hilum and may represent an enlarged lymph node. The first measures 1.1 x 1.3 x 1.2 cm and the second measures 1.8 x 1.4 x 1.4 cm.   Survey of the axilla shows no evidence of adenopathy.    First biopsy identified invasive ductal carcinoma with marked necrosis and insufficient tissue for further evaluation.  Second time biopsy to the right breast 9 o'clock position 9 cm from the nipple, found grade 3 invasive ductal cancer, triple negative, associated with grade 3 DCIS.    She saw Dr. Frausto along with tumor conference discussion, due to her relatively healthy status, aggressive nature of her breast cancer, and the desire of breast conservation, neoadjuvant systemic treatment was recommended.    She made informed decision to proceed with wkly taxol, C1D1 12/5/2019. Then went to [place] and finished wkly taxol. Last dose of taxol was Feb 20th 2020. She had no tx delay.       INTERVAL HISTORY:   She had favorable image response early March 2020.    Right lumpectomy 3/19/2020 found grade 3 IDC, T1c, 1.9 cm with ALI and DCIS,   and 1.2 cm. Superior margin is +. sLN is nsgative, [id number] disease.       PMH  retinopathy of the right eye, cataract  Hypertension, hyperlipidemia  Osteoarthritis, spinal stenosis, bilateral knees  replacement, carpel tunnel surgery  Varicose vein of lower extremities ,s/p surgery      REVIEW OF SYSTEMS:   GENERAL: pt is in usual state of health.  No B symptoms  NEURO:   No headache, double vision, or focal weakness.  No neuropathy.   SKIN:  No chronic skin rash or skin infection.   CARDIOVASCULAR:  + High blood pressure and  hyperlipidemia. NO FOLEY  PULMONARY:  No shortness of breath, no pleurisy, no cough, no hemoptysis.   GI:  No abdominal pain, nausea, vomiting, constipation.  No diarrhea.  No bright red blood per rectum.   :  No urgency, frequency.  No recurrent urinary tract infection.  No kidney problems.   RHEUMATOLOGY/MUSCULOSKELETAL SYSTEM: Osteoarthritis, spinal stenosis, bilateral knees. Varicose vein of lower extremities ,s/p surgery  replacement, carpel tunnel surgery  ENDOCRINE:  No history of diabetes or thyroid problem. No complaints of hot flashes.   HEMATOLOGY:  No history of bleeding or thrombosis episode.   Oncology: breast cancer recently lumpectom  IMMUNOLOGY:  No recurrent fever or chills episode.  No recurrent infectious episode.   BREASTS/GYN: Reports sometimes pruritic feeling on the lumpectomy scar and sentinel lymph node scar.  PSYCHIATRY:  No anxiety or depression.       PHYSICAL EXAMINATION DURING VIDEO VISIT  Pt looks like stated age, very pleasant, not in acute distress.     ECO  Neuro: Oriented to time, person, and places.  ENT, MOUTH: Pupils are equal.  Sclerae are anicteric.  Moist oral mucosa. No oral thrush.   NECK:  No jugular venous distention.   Right breast visualized well-healed lumpectomy scar and sentinel lymph node scar with minimal erythematous changes without edema.  Respiratory: talk nl, no sob during conversation.  MUSCULOSKELETAL/EXTREMITIES:  No edema.  No cyanotic changes.  No signs of joint deformity. Normal range of motion  SKIN:  No petechiae.  No rash.  No signs of cellulitis.   PSYCHIATRIC: Oriented to time, person, and places. Normal mood and  affect. Good memory. Proper insight and judgement.           CURRENT LAB DATA REVIEWED TODAY WITH PATIENT DURING THE VIDEO VISIT    Right lumpectomy 3/19/2020 found grade 3 IDC, T1c, 1.9 cm with ALI and DCIS,   and 1.2 cm. Superior margin is +. sLN is nsgative, [id number] disease.       OLD DATA REVIEWED TODAY WITH SUMMARY WITH PATIENT DURING THE VIDEO VISIT    Second time biopsy to the right breast 9 o'clock position 9 cm from the nipple, found grade 3 invasive ductal cancer, triple negative, associated with grade 3 DCIS.       US/MA 10/2019  found 2 hypoechoic structures are seen at the patient's area of concern, 9:00-10:00 position 7 cm from the nipple on the RIGHT. One of these appears to have at least some component of an echogenic hilum and may represent an enlarged lymph node. The first measures 1.1 x 1.3 x 1.2 cm and the second measures 1.8 x 1.4 x 1.4 cm.   Survey of the axilla shows no evidence of adenopathy.          ASSESSMENT AND PLAN DISCUSSED WITH PATIENT TODAY DURING THE VIDEO VISIT:    1.  dx diagnosed right breast upper outer quadrant triple negative breast cancer 11/2019.  She had 2 lesions on mammogram, 1 of them likely represents an internal mammary chain lymph nodes.  Both biopsy were turned out to be invasive ductal cancer, first biopsy full of necrosis ER SD HER-2 were not able to be done, second biopsy found to have triple negative breast cancer.    Tumor conference discussion November 2019 ,recommendED neoadjuvant systemic chemotherapy for breast conservation, also due to her relatively fast growing presentation, triple negative disease nature, possible lymph nodes involvement.    She made informed decision to proceed with wkly taxol, C1D1 12/5/2019. Then went to [place] and finished 12 wkly taxol.     She had favorable image response early March 2020.    Right lumpectomy 3/19/2020 found grade 3 IDC, T1c, 1.9 cm with ALI and DCIS,   and 1.2 cm. Superior margin is +. sLN is nsgative, [id number]  disease.     Due to her advanced age Adriamycin is not recommended in her situation.    Recommend RT consult.     Since clinical trial is not open due to Covid, would recommend her to consider adjuvant Xeloda post RT.     All Qs are answered to PT's satisfaction.     Total times about 25 minutes, more than 20 minutes spent in counseling regarding her neoadjuvant chemo plan,  follow-up plan.

## 2020-04-07 ENCOUNTER — VIRTUAL VISIT (OUTPATIENT)
Dept: ONCOLOGY | Facility: CLINIC | Age: 80
End: 2020-04-07
Attending: INTERNAL MEDICINE
Payer: COMMERCIAL

## 2020-04-07 VITALS — BODY MASS INDEX: 31.82 KG/M2 | WEIGHT: 191 LBS | HEIGHT: 65 IN

## 2020-04-07 DIAGNOSIS — Z17.1 MALIGNANT NEOPLASM OF UPPER-OUTER QUADRANT OF RIGHT BREAST IN FEMALE, ESTROGEN RECEPTOR NEGATIVE (H): Primary | ICD-10-CM

## 2020-04-07 DIAGNOSIS — C50.411 MALIGNANT NEOPLASM OF UPPER-OUTER QUADRANT OF RIGHT BREAST IN FEMALE, ESTROGEN RECEPTOR NEGATIVE (H): Primary | ICD-10-CM

## 2020-04-07 PROCEDURE — 99214 OFFICE O/P EST MOD 30 MIN: CPT | Mod: GT | Performed by: INTERNAL MEDICINE

## 2020-04-07 ASSESSMENT — PAIN SCALES - GENERAL: PAINLEVEL: NO PAIN (0)

## 2020-04-07 ASSESSMENT — MIFFLIN-ST. JEOR: SCORE: 1343.28

## 2020-04-07 NOTE — PROGRESS NOTES
"Precious Paris is a 79 year old female who is being evaluated via a billable video visit.      The patient has been notified of following:     \"This video visit will be conducted via a call between you and your physician/provider. We have found that certain health care needs can be provided without the need for an in-person physical exam.  This service lets us provide the care you need with a video conversation.  If a prescription is necessary we can send it directly to your pharmacy.  If lab work is needed we can place an order for that and you can then stop by our lab to have the test done at a later time.    If during the course of the call the physician/provider feels a video visit is not appropriate, you will not be charged for this service.\"     Patient has given verbal consent for Video visit? Yes    Patient would like the video invitation sent by: Send to e-mail at: [email or web address]    Video Start Time: 8:30 AM    Precious Paris complains of    Chief Complaint   Patient presents with     Oncology Clinic Visit     Follow up breast cancer. Post op.        I have reviewed and updated the patient's Past Medical History, Social History, Family History and Medication List.    ALLERGIES  Conjugated estrogens; Medroxyprogesterone; Ace inhibitors; Premarin; and Provera [medroxyprogesterone acetate]    Additional provider notes: Follow up breast cancer. Post op.     Video-Visit Details    Type of service:  Video Visit    Video End Time (time video stopped): 9:05am    Originating Location (pt. Location): Home    Distant Location (provider location):  [facility]     Mode of Communication:  Video Conference via Kia Kelley CMA    "

## 2020-04-09 ENCOUNTER — TELEPHONE (OUTPATIENT)
Dept: RADIATION THERAPY | Facility: OUTPATIENT CENTER | Age: 80
End: 2020-04-09

## 2020-04-09 NOTE — TELEPHONE ENCOUNTER
Returning patient phone call today, Precious reports some swelling near her right breast incision site. She states that she does not have a fever, pain, and there is only faint redness to the swollen site. Precious states that she has been raking and doing yard work outside the past few days and noticed the swelling afterwards. We discussed watching the site, resting that arm and not overdoing chores. An appointment with Dr. Frausto was set up for next Thursday. Patient is to call and cancel the appointment if swelling resolves by next week. Patient instructed to call back if she has a fever, increased redness or pain. Patient verbalizes understanding, denies any further questions at this time.

## 2020-04-14 NOTE — PROGRESS NOTES
Department of Radiation Oncology  Radiation Therapy Center  [facility]  [address], [address]  [place] [address]  [phone]       Consultation Note    Name: Precious Paris MRN: [id number]   : 1940   Date of Service: 2020  Referring: Dr. Villa     Reason for consultation: Invasive ductal carcinoma of the right breast status post neoadjuvant chemotherapy with Taxol x12C and lumpectomy with sentinel lymph node evaluation.  Final pathology demonstrated IDC, grade 3, triple negative, multifocal, positive LVSI, negative margin, 0 out of 1 sentinel lymph node, yp T1cN0. Evaluate role for radiation therapy.    History of Present Illness   Ms. Paris is a 79 year old female with a diagnosis of right breast cancer.     The patient initially presented with a right breast mass prompting further evaluation.  She underwent evaluation with ultrasound and mammography in 2019 which demonstrated 2 hypoechoic masses in the area of concern.  On 10/30/2019 the patient underwent biopsy of a suspicious mass in the right breast, pathology demonstrated IDC.  On 11/15/2019 the patient underwent biopsy of the right breast mass at the 9 o'clock position, 9 cm from the nipple.  Final pathology demonstrated IDC, grade 3, ER negative, PA negative, HER-2 negative.  The patient was started on neoadjuvant systemic treatment with weekly Taxol, x12 cycles.  Due to advanced age Adriamycin was not recommended.  Patient subsequently underwent lumpectomy of the right breast on 3/19/2020 by Dr. Frausto.  Final pathology demonstrated IDC, grade 3, triple negative, multifocal (tumor #1-1.9 cm; tumor #2-1.2 cm, positive LVSI, negative margin, 0 out of 1 sentinel lymph node, yp T1cN0.  Patient was seen by Dr. Martinez medical oncology who discussed consideration of adjuvant Xeloda after completion of radiation therapy. She presents to discuss potential role of radiation therapy as a part of treatment  strategy.    Patient is overall doing well.  She denies any significant breast pain. Started on keflex today by Dr. Frausto for ? infection vs. scarring.  No issues range of motion.  No lymphedema. Overall tolerated Taxol well.  no prior radiation.  No pacemaker.  No history of connective tissue disorder.      Past Medical History:   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     anosmia     Diverticulosis of colon (without mention of hemorrhage)      Other and unspecified hyperlipidemia      Pain in joint, lower leg      Plantar fascial fibromatosis      PURE HYPERCHOLESTEROLEM 9/9/2007     PURE HYPERCHOLESTEROLEM 9/9/2007     Squamous cell carcinoma        Past Surgical History:   Past Surgical History:   Procedure Laterality Date     BIOPSY       HC COLONOSCOPY THRU STOMA, DIAGNOSTIC  04/05    diverticulosis and hemorroids, due 2015     INSERT PORT VASCULAR ACCESS N/A 11/29/2019    Procedure: INSERTION OF VENOUS ACCESS PORT;  Surgeon: Hair Broderick, ;  Location: [facility]     JOINT REPLACEMTN, KNEE RT/LT  2010    right     JOINT REPLACEMTN, KNEE RT/LT  2011    left     LIGATE VEIN VARICOSE, PHLEBECTOMY MULTIPLE STAB, COMBINED  10/17/2013    Procedure: COMBINED LIGATE VEIN VARICOSE, PHLEBECTOMY MULTIPLE STAB;  Phlebectomy of Right Knee Varicose Veins;  Surgeon: Andrea Duffy MD;  Location: [facility] OR     LUMPECTOMY BREAST WITH SENTINEL NODE, COMBINED Right 3/19/2020    Procedure: LUMPECTOMY, BREAST, WITH SENTINEL LYMPH NODE BIOPSY, Right Lumpectomy With Right [place] Lymph Node Biopsy And Luisa Cath Removal;  Surgeon: Venancio Frausto MD;  Location: [facility] OR     OSTEOTOMY FOOT  8/19/2013    Procedure: OSTEOTOMY FOOT;  Left 2nd metatarsal osteotomy, 2nd  Metatarsophalangeal joint & 2nd toe correction;  Surgeon: Jose Phillips DPM;  Location: [facility]     PHACOEMULSIFICATION WITH STANDARD INTRAOCULAR LENS IMPLANT Left 12/7/2017    Procedure: PHACOEMULSIFICATION WITH STANDARD INTRAOCULAR LENS IMPLANT;  Left Cataract Removal with Implant;  Surgeon: Mata Deleon MD;  Location: [facility]     SURGICAL HISTORY OF -   1979    Stripping of Veins in Left Leg     SURGICAL HISTORY OF -   1970    Bilateral Tubal Ligation     SURGICAL HISTORY OF -   2000    Carpal Tunnel Repair, right     SURGICAL HISTORY OF -   9-12-08    Rt let ablation       Chemotherapy History:  Per HPI    Radiation History:  None    Pregnant: Not Applicable  Implanted Cardiac Devices: No    Medications:  Current Outpatient Medications   Medication     Acetaminophen 325 MG CAPS     ASPIRIN 81 MG OR TABS     CALCIUM OR     CENTRUM SILVER OR     fish oil-omega-3 fatty acids (OMEGA 3) 1000 MG capsule     IBUPROFEN 200 MG OR TABS     losartan-hydrochlorothiazide (HYZAAR) 50-12.5 MG tablet     Melatonin 10 MG TABS tablet     simvastatin (ZOCOR) 20 MG tablet     vitamin B complex with vitamin C (VITAMIN  B COMPLEX) tablet     VITAMIN C OR     VITAMIN D, CHOLECALCIFEROL, PO     vitamin E 400 units TABS     No current facility-administered medications for this visit.          Allergies:     Allergies   Allergen Reactions     Conjugated Estrogens Anaphylaxis and Other (See Comments)     Tightness in throat     Medroxyprogesterone Anaphylaxis and Other (See Comments)     Tightness in throat     Ace Inhibitors Cough     Premarin      Tightness in throat     Provera [Medroxyprogesterone Acetate]      Tightness in throat         Family History:  Family History   Problem Relation Age of Onset     Hypertension Mother      Cerebrovascular Disease Mother      Hypertension Father      Cerebrovascular Disease Father      Hypertension Brother      Cancer Brother         lymphoma     Hypertension Sister       Gastrointestinal Disease Sister      Hypertension Brother      Cancer Brother         melanoma     Eye Disorder Brother      Hypertension Brother      Hypertension Brother      Heart Defect Son        Review of Systems   A 10-point review of systems was performed. Pertinent findings are noted in the HPI.      PHYSICAL EXAMINATION:  No respiratory distress.   No issues with ROM  No extremity lymphedema.     Imaging/Path/Labs   Imaging:   Per HPI    Path:   10/30/20  FINAL DIAGNOSIS:   Breast needle biopsy, right:   - Invasive ductal carcinoma, markedly necrotic with insufficient tissue   for further evaluation (see   description, comment and images).    11/15/20  SPECIMEN(S):   Right breast core needle biopsy     FINAL DIAGNOSIS:   Breast, RIGHT, 9-9:30, 9 cm from nipple, ultrasound guided core biopsy:   -INVASIVE MAMMARY CARCINOMA OF NO SPECIAL TYPE (INVASIVE DUCTAL CARCINOMA)    HIMA GRADE 3 with necrosis   -Ductal carcinoma in situ (DCIS), nuclear grade 3, solid type   -Invasive carcinoma is estrogen receptor negative and progesterone   receptor negative by immunohistochemistry   -HER2 FISH result will be reported separately by cytogenetics   -See comment     COMMENT:   Invasive carcinoma involves three tissue cores, and is 8 mm in greatest   dimension in a single core.  The   Hima grade is 3 (tubule formation 3 + nuclear pleomorphism 3 +   mitotic activity 3 = [place] score 9).   There are up to 27 mitotic figures in 10 high power fields (field diameter    0.5 mm).  Although there are   several areas of necrosis in the invasive carcinoma, the majority of the   tumor in this core biopsy material   appears viable.       3/19/20  SPECIMEN(S):   A: Breast lumpectomy, right   B: Superior margin, right breast   C: Inferior margin, right breast   D: [place] lymph node, right axillary     FINAL DIAGNOSIS:   A.  Right breast, lumpectomy with designated inked margins:   Specimen, Procedure, Side:     Tumor  Site:  Upper outer quadrant, two tumors 9:00 position, 9 cm from   nipple (by imaging studies)     Specimen Integrity:  Intact     Specimen size:  See gross description     Histologic Type:  Both tumors:  Invasive mammary carcinoma of no special   type (invasive ductal carcinoma)     Size of invasive ductal carcinoma:  Tumor #1:  19 x 15 x 9 mm.  Tumor #2:   12 x 9 x 8 mm.  Distance between two   tumors is 13 mm.     Kelby grade:  Both tumors:  grade 3 (out of 3)     Kelby score:  Both tumors:  score 9 (tubules-3; nuclear   pleomorphism-3; mitoses-3)     Angiolymphatic invasion:  Present in tumor #1.  (blocks A2 and A11).     Tumor #2 - not identified.     Tumor focality:  Two tumors, 13 mm apart.     Extent of tumor:  No skin or skeletal muscle attached.     Associated in situ carcinoma:  Tumor #1:  intermediate grade ductal   carcinoma in situ, solid pattern.  Tumor   #2 - Not identified.     Size of ductal carcinoma in situ:  Present in 2 adjacent blocks (A6 and   A7) out of 15 blocks examined.     Margins:  Tumor #1   - Invasive ductal carcinoma:  Involved SUPERIOR margin (block A7 and   possibly A6, limited by tissue crush   artifact); 0.1 mm from nearest anterior margin; 3 mm from nearest inferior    margin; >10 from posterior, medial   and lateral margins.     Ductal carcinoma in situ:  Uninvolved; ductal carcinoma in situ 2.5 mm   from nearest inferior margin and 1.5 mm   from nearest anterior margin.     Margins:  Tumor #2   - Invasive ductal carcinoma:  Uninvolved;, 4 mm from nearest inferior and   posterior margins, 8 mm from nearest   superior margin and >10 mm from anterior, medial and lateral margins.     Ductal carcinoma in situ:  Not applicable     Lymph node status:  Right axillary sentinel lymph node, [id number]   negative for  metastatic breast carcinoma.     Pathologic staging (pTNM):  Based on tumor #1:  mpT1c (criterion: tumor   >10 mm and d20 mm in greatest   dimension)   spN0   pM  not applicable.     Estrogen and progesterone receptors:  Both reported as negative by   immunohistochemistry on previous biopsy of   right breast, [id number].     HER2 FISH:  Previous reported as negative on right breast biopsy,   [id number].     Additional findings:   - Evidence of previous biopsy sites and metallic clip in tumor #1.   - Focal medial calcifications in parenchymal arteries.   - Focal fibrocystic change.   -   B.  Superior margins of right breast, stitch at margin site:   - Benign breast tissue, predominantly adipose tissue with a few benign   breast ducts.     C.  Inferior margins of right breast, stitch at margin site:   - Benign breast tissue.     D.  Right axillary sentinel lymph node, three pieces of tissue, excision   - Fragments of benign lymph node tissue, negative for metastatic tumor.     Report Name: Breast Invasive Carcinoma - Resection        Status: Submitted Checklist Inst: 1      Last Updated By: Gia Yepez M.D., 03/24/2020 15:46:07   Part(s) Involved:   A: Breast lumpectomy, right     Synoptic Report:     CLINICAL     Clinical History:         - Palpable mass         Two masses identified by mammogram and ultrasound     SPECIMEN     Procedure:         - Excision (less than total mastectomy)     Specimen Laterality:         - Right     TUMOR     Histologic Type:         - Invasive carcinoma of no special type (invasive ductal carcinoma,   not         otherwise specified)     Histologic Grade (Kelby Histologic Score):         - [place] Score       Glandular (Acinar) / Tubular Differentiation:           - Score 3       Nuclear Pleomorphism:           - Score 3       Mitotic Rate:           - Score 3       Overall Grade:           - Grade 3 (scores of 8 or 9)     Tumor Size: 19 mm     Ductal Carcinoma In Situ (DCIS):         - Present         - Negative for extensive intraductal component (EIC)     Treatment Effect:         - No known presurgical therapy     MARGINS      Invasive Carcinoma Margins:         - Positive for invasive carcinoma       Positive Margin(s):           - Superior     DCIS Margins:         - Uninvolved by DCIS       Distance from Closest Margin (Millimeters):           2 mm       Closest Margin:           - Anterior     LYMPH NODES     Regional Lymph Nodes:         - Uninvolved by tumor cells       Number of Lymph Nodes Examined: 1       Number of Hale Nodes Examined: 1     PATHOLOGIC STAGE CLASSIFICATION (PTNM, AJCC 8TH EDITION)     TNM Descriptors:         - m (multiple foci of invasive carcinoma)     Primary Tumor (pT):         - pT1c     Regional Lymph Nodes (pN)       Modifier:           - (sn): Only sentinel node(s) evaluated.       Category (pN):           - pN0     ADDITIONAL FINDINGS     Additional Pathologic Findings: Evidence of previous biopsy site and   metallic clip, medial calcification         of parenchymal arteries, fibrocystic change     SPECIAL STUDIES     Estrogen Receptor (ER):         - Negative     Progesterone Receptor (PgR):         - Negative     HER2 (by immunohistochemistry):         - Cannot be determined (indeterminate)     Testing Performed on Case Number: [id number]         Assessment    Ms. Paris is a 79 year old female with a diagnosis of invasive ductal carcinoma of the right breast status post neoadjuvant chemotherapy with Taxol x12C and lumpectomy with sentinel lymph node evaluation.  Final pathology demonstrated IDC, grade 3, triple negative, multifocal, positive LVSI, negative margin, 0 out of 1 sentinel lymph node, [id number].  She presents today to discuss potential role of radiation therapy ss a part of her treatment strategy.    Plan     We recommend adjuvant radiation therapy to the whole breast, followed by a boost to the lumpectomy bed.  We discussed with the patient the rationale for adjuvant radiation therapy in the setting of lumpectomy as a critical component of breast conservation therapy.  The most recent  update of the EBCTCG metaanalysis for early stage breast cancer showed that for patients with pathologically node negative disease, radiotherapy reduces the 5 year risk of any recurrence from 31% to 15% (Lancet, 2011).  This translated into a 3.3% absolute survival benefit at 15 years for all-comers.        We recommend hypofractionated radiation therapy based on results from the [place] hypofractionation trial (Ryann et al, [place], 2010) and the recent update of the [place] START trial (Margarette et al, Lancet Oncol, 2013), which both show equivalent local control, survival and cosmesis with these shorter treatment schedules as compared to conventional radiation fractionation. After our discussion, the patient is interested in proceeding with hypofractionated treatment to the whole breast. We will plan to treat to 4005 cGy in 15 fractions followed by a 1000 cGy in 5 fraction boost to the lumpectomy bed, as a lumpectomy bed boost has been shown to improve local recurrence risk from 16% to 12% at 20 years (Jenny et al, Lancet, 2015).     We also discussed the logistics of treatment planning and delivery in detail with the patient. We furthermore discussed side effects, including short term risks of fatigue and skin reaction, and long term risks of pneumonitis, lung fibrosis, soft tissue fibrosis, skin changes, breast contour changes, rib fractures, cardiac damage, secondary cancers, lymphedema, and thyroid dysfunction.  We described the use of 3D-conformal radiotherapy to minimize dose to the normal tissues, while adequately covering the target tissues, and the ability of this technique to decrease potential for toxicity.      The risks and benefits of radiation therapy were discussed in detail with the patient. We discussed consideration of delaying adjuvant RT in the context of active COVID-19 pandemic.  However we discussed that given her high risk features including triple negative histology, grade 3 disease, and  residual disease after neoadjuvant chemotherapy delaying care could potentially compromise oncologic outcome. This much be weighed against coming in for daily radiation treatment that would potentially increase risk of daily exposure to COVID-19.  The patient has expressed an understanding of these risks and agreed to the proposed treatment. We will plan to schedule the patient for CT simulation. Consent will be obtained on day of CT simulation.         Rocky Valdivia MD  Department of Radiation Oncology  [facility]

## 2020-04-16 ENCOUNTER — OFFICE VISIT (OUTPATIENT)
Dept: RADIATION THERAPY | Facility: OUTPATIENT CENTER | Age: 80
End: 2020-04-16
Payer: COMMERCIAL

## 2020-04-16 VITALS
TEMPERATURE: 98.2 F | DIASTOLIC BLOOD PRESSURE: 87 MMHG | BODY MASS INDEX: 32.94 KG/M2 | RESPIRATION RATE: 16 BRPM | SYSTOLIC BLOOD PRESSURE: 145 MMHG | WEIGHT: 196.4 LBS | OXYGEN SATURATION: 97 % | HEART RATE: 93 BPM

## 2020-04-16 DIAGNOSIS — Z17.1 MALIGNANT NEOPLASM OF UPPER-OUTER QUADRANT OF RIGHT BREAST IN FEMALE, ESTROGEN RECEPTOR NEGATIVE (H): Primary | ICD-10-CM

## 2020-04-16 DIAGNOSIS — L03.90 CELLULITIS, UNSPECIFIED CELLULITIS SITE: Primary | ICD-10-CM

## 2020-04-16 DIAGNOSIS — C50.411 MALIGNANT NEOPLASM OF UPPER-OUTER QUADRANT OF RIGHT BREAST IN FEMALE, ESTROGEN RECEPTOR NEGATIVE (H): Primary | ICD-10-CM

## 2020-04-16 RX ORDER — CEPHALEXIN 500 MG/1
500 CAPSULE ORAL 4 TIMES DAILY
Qty: 28 CAPSULE | Refills: 0 | Status: SHIPPED | OUTPATIENT
Start: 2020-04-16 | End: 2020-05-13

## 2020-04-16 ASSESSMENT — PAIN SCALES - GENERAL: PAINLEVEL: NO PAIN (0)

## 2020-04-16 NOTE — PROGRESS NOTES
Precious is here for a postop visit after lumpectomy/SLN.  She recently noticed a lump near her incision and some breast redness.  PE:Firm lump medial to breast incision with some mild breast cellulitis.  Plan: Attempted to aspirate fluid-none found.  Start Keflex x 10 days.

## 2020-04-16 NOTE — NURSING NOTE
"Oncology Rooming Note    April 16, 2020 10:55 AM   Precious Paris is a 79 year old female who presents for:    Chief Complaint   Patient presents with     Surgical Followup     Post op incision check     Initial Vitals: BP (!) 145/87 (BP Location: Left arm, Patient Position: Sitting, Cuff Size: Adult Regular)   Pulse 93   Temp 98.2  F (36.8  C) (Oral)   Resp 16   Wt 89.1 kg (196 lb 6.4 oz)   SpO2 97%   BMI 32.94 kg/m   Estimated body mass index is 32.94 kg/m  as calculated from the following:    Height as of 4/7/20: 1.645 m (5' 4.75\").    Weight as of this encounter: 89.1 kg (196 lb 6.4 oz). Body surface area is 2.02 meters squared.  No Pain (0) Comment: occasional \"twinge\" feeling   No LMP recorded. Patient is postmenopausal.  Allergies reviewed: Yes  Medications reviewed: Yes    Medications: Medication refills not needed today.  Pharm[facility] entered into [facility]: [facility] [address]    Clinical concerns: Post op check, swelling near incision site, redness to R breast Dr. Frausto was notified.      Lori Hoyt RN              "

## 2020-04-16 NOTE — LETTER
4/16/2020      RE: Precious Paris  [address] [place] [address]       Precious is here for a postop visit after lumpectomy/SLN.  She recently noticed a lump near her incision and some breast redness.  PE:Firm lump medial to breast incision with some mild breast cellulitis.  Plan: Attempted to aspirate fluid-none found.  Start Keflex x 10 days.      Venancio Frausto MD

## 2020-04-16 NOTE — NURSING NOTE
REASON FOR APPOINTMENT   Right breast cancer, s/p neoadjuvant chemo followed by lumpectomy. Here to discuss RT    PERSONAL HISTORY OF CANCER   Previous Cancer ? no   Prior Radiation ? no   Prior Chemotherapy ? no   Prior Hormonal Therapy ? no     REFERRALS NEEDED  Not at this time    PACEMAKER/IMPLANTED CARDIAC DEVICE : No    PAIN  no    PSYCHOSOCIAL  Marital Status:   Patient lives in [place] with .  Working status: retired  Do you feel safe in your home? Yes    REVIEW OF SYSTEMS  Skin: incision of breast healing, right breast pink  Eyes: negative  Ears/Nose/Throat: negative  Respiratory: No shortness of breath, dyspnea on exertion, cough, or hemoptysis  Cardiovascular: negative  Gastrointestinal: negative  Genitourinary: negative  Musculoskeletal: negative  Neurologic: negative  Psychiatric: negative  Hematologic/Lymphatic/Immunologic: hot flashes  Endocrine: negative    WOMEN ONLY  Any chance you may be pregnant: No    Radiation Oncology Patient Teaching    Person involved with teaching: Patient  Patient asked Questions: Yes  Patient was cooperative: Yes  Patient was receptive (willing to accept information given): Yes    Education Assessment  Comprehension ability: High  Knowledge level: Medium  Factors affecting teaching: None    Education Materials Given  Caring for Your Skin During Radiation ...  Eating Hints  Diet and Nutrition Information    Educational Topics Discussed  Disease process, Side effects, Wound care, Activity, Nutrition and Community resources    Response To Teaching  Verbalizes understanding

## 2020-04-16 NOTE — LETTER
2020      RE: Precious Paris  [address] [place] [address]          Department of Radiation Oncology  Radiation Therapy Center  [facility]  [address], [address]  [place] [address] [phone]       Consultation Note    Name: Precious Paris MRN: [id number]   : 1940   Date of Service: 2020  Referring: Dr. Villa     Reason for consultation: Invasive ductal carcinoma of the right breast status post neoadjuvant chemotherapy with Taxol x12C and lumpectomy with sentinel lymph node evaluation.  Final pathology demonstrated IDC, grade 3, triple negative, multifocal, positive LVSI, negative margin, 0 out of 1 sentinel lymph node, yp T1cN0. Evaluate role for radiation therapy.    History of Present Illness   Ms. Paris is a 79 year old female with a diagnosis of right breast cancer.     The patient initially presented with a right breast mass prompting further evaluation.  She underwent evaluation with ultrasound and mammography in 2019 which demonstrated 2 hypoechoic masses in the area of concern.  On 10/30/2019 the patient underwent biopsy of a suspicious mass in the right breast, pathology demonstrated IDC.  On 11/15/2019 the patient underwent biopsy of the right breast mass at the 9 o'clock position, 9 cm from the nipple.  Final pathology demonstrated IDC, grade 3, ER negative, AR negative, HER-2 negative.  The patient was started on neoadjuvant systemic treatment with weekly Taxol, x12 cycles.  Due to advanced age Adriamycin was not recommended.  Patient subsequently underwent lumpectomy of the right breast on 3/19/2020 by Dr. Frausto.  Final pathology demonstrated IDC, grade 3, triple negative, multifocal (tumor #1-1.9 cm; tumor #2-1.2 cm, positive LVSI, negative margin, 0 out of 1 sentinel lymph node, yp T1cN0.  Patient was seen by Dr. Martinez medical oncology who discussed consideration of adjuvant Xeloda after completion of radiation  therapy. She presents to discuss potential role of radiation therapy as a part of treatment strategy.    Patient is overall doing well.  She denies any significant breast pain. Started on keflex today by Dr. Frausto for ? infection vs. scarring.  No issues range of motion.  No lymphedema. Overall tolerated Taxol well.  no prior radiation.  No pacemaker.  No history of connective tissue disorder.      Past Medical History:   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     anosmia     Diverticulosis of colon (without mention of hemorrhage)      Other and unspecified hyperlipidemia      Pain in joint, lower leg      Plantar fascial fibromatosis      PURE HYPERCHOLESTEROLEM 9/9/2007     PURE HYPERCHOLESTEROLEM 9/9/2007     Squamous cell carcinoma        Past Surgical History:   Past Surgical History:   Procedure Laterality Date     BIOPSY       HC COLONOSCOPY THRU STOMA, DIAGNOSTIC  04/05    diverticulosis and hemorroids, due 2015     INSERT PORT VASCULAR ACCESS N/A 11/29/2019    Procedure: INSERTION OF VENOUS ACCESS PORT;  Surgeon: Hair Broderick DO;  Location: [facility] OR     JOINT REPLACEMTN, KNEE RT/LT  2010    right     JOINT REPLACEMTN, KNEE RT/LT  2011    left     LIGATE VEIN VARICOSE, PHLEBECTOMY MULTIPLE STAB, COMBINED  10/17/2013    Procedure: COMBINED LIGATE VEIN VARICOSE, PHLEBECTOMY MULTIPLE STAB;  Phlebectomy of Right Knee Varicose Veins;  Surgeon: Andrea Duffy MD;  Location: [facility] OR     LUMPECTOMY BREAST WITH SENTINEL NODE, COMBINED Right 3/19/2020    Procedure: LUMPECTOMY, BREAST, WITH SENTINEL LYMPH NODE BIOPSY, Right Lumpectomy With Right [place] Lymph Node Biopsy And Luisa Cath Removal;  Surgeon: Venancio Frausto MD;  Location: [facility] OR     OSTEOTOMY FOOT   8/19/2013    Procedure: OSTEOTOMY FOOT;  Left 2nd metatarsal osteotomy, 2nd Metatarsophalangeal joint & 2nd toe correction;  Surgeon: Jose Phillips DPM;  Location: [facility]     PHACOEMULSIFICATION WITH STANDARD INTRAOCULAR LENS IMPLANT Left 12/7/2017    Procedure: PHACOEMULSIFICATION WITH STANDARD INTRAOCULAR LENS IMPLANT;  Left Cataract Removal with Implant;  Surgeon: Mata Deleon MD;  Location: [facility] OR     SURGICAL HISTORY OF -   1979    Stripping of Veins in Left Leg     SURGICAL HISTORY OF -   1970    Bilateral Tubal Ligation     SURGICAL HISTORY OF -   2000    Carpal Tunnel Repair, right     SURGICAL HISTORY OF -   9-12-08    Rt let ablation       Chemotherapy History:  Per HPI    Radiation History:  None    Pregnant: Not Applicable  Implanted Cardiac Devices: No    Medications:  Current Outpatient Medications   Medication     Acetaminophen 325 MG CAPS     ASPIRIN 81 MG OR TABS     CALCIUM OR     CENTRUM SILVER OR     fish oil-omega-3 fatty acids (OMEGA 3) 1000 MG capsule     IBUPROFEN 200 MG OR TABS     losartan-hydrochlorothiazide (HYZAAR) 50-12.5 MG tablet     Melatonin 10 MG TABS tablet     simvastatin (ZOCOR) 20 MG tablet     vitamin B complex with vitamin C (VITAMIN  B COMPLEX) tablet     VITAMIN C OR     VITAMIN D, CHOLECALCIFEROL, PO     vitamin E 400 units TABS     No current facility-administered medications for this visit.          Allergies:     Allergies   Allergen Reactions     Conjugated Estrogens Anaphylaxis and Other (See Comments)     Tightness in throat     Medroxyprogesterone Anaphylaxis and Other (See Comments)     Tightness in throat     Ace Inhibitors Cough     Premarin      Tightness in throat     Provera [Medroxyprogesterone Acetate]      Tightness in throat         Family History:  Family History   Problem Relation Age of Onset     Hypertension Mother      Cerebrovascular Disease Mother      Hypertension Father      Cerebrovascular Disease Father      Hypertension Brother       Cancer Brother         lymphoma     Hypertension Sister      Gastrointestinal Disease Sister      Hypertension Brother      Cancer Brother         melanoma     Eye Disorder Brother      Hypertension Brother      Hypertension Brother      Heart Defect Son        Review of Systems   A 10-point review of systems was performed. Pertinent findings are noted in the HPI.      PHYSICAL EXAMINATION:  No respiratory distress.   No issues with ROM  No extremity lymphedema.     Imaging/Path/Labs   Imaging:   Per HPI    Path:   10/30/20  FINAL DIAGNOSIS:   Breast needle biopsy, right:   - Invasive ductal carcinoma, markedly necrotic with insufficient tissue   for further evaluation (see   description, comment and images).    11/15/20  SPECIMEN(S):   Right breast core needle biopsy     FINAL DIAGNOSIS:   Breast, RIGHT, 9-9:30, 9 cm from nipple, ultrasound guided core biopsy:   -INVASIVE MAMMARY CARCINOMA OF NO SPECIAL TYPE (INVASIVE DUCTAL CARCINOMA)    HIMA GRADE 3 with necrosis   -Ductal carcinoma in situ (DCIS), nuclear grade 3, solid type   -Invasive carcinoma is estrogen receptor negative and progesterone   receptor negative by immunohistochemistry   -HER2 FISH result will be reported separately by cytogenetics   -See comment     COMMENT:   Invasive carcinoma involves three tissue cores, and is 8 mm in greatest   dimension in a single core.  The   Hima grade is 3 (tubule formation 3 + nuclear pleomorphism 3 +   mitotic activity 3 = [place] score 9).   There are up to 27 mitotic figures in 10 high power fields (field diameter    0.5 mm).  Although there are   several areas of necrosis in the invasive carcinoma, the majority of the   tumor in this core biopsy material   appears viable.       3/19/20  SPECIMEN(S):   A: Breast lumpectomy, right   B: Superior margin, right breast   C: Inferior margin, right breast   D: [place] lymph node, right axillary     FINAL DIAGNOSIS:   A.  Right breast, lumpectomy with  designated inked margins:   Specimen, Procedure, Side:     Tumor Site:  Upper outer quadrant, two tumors 9:00 position, 9 cm from   nipple (by imaging studies)     Specimen Integrity:  Intact     Specimen size:  See gross description     Histologic Type:  Both tumors:  Invasive mammary carcinoma of no special   type (invasive ductal carcinoma)     Size of invasive ductal carcinoma:  Tumor #1:  19 x 15 x 9 mm.  Tumor #2:   12 x 9 x 8 mm.  Distance between two   tumors is 13 mm.     [place] grade:  Both tumors:  grade 3 (out of 3)     Kelby score:  Both tumors:  score 9 (tubules-3; nuclear   pleomorphism-3; mitoses-3)     Angiolymphatic invasion:  Present in tumor #1.  (blocks A2 and A11).     Tumor #2 - not identified.     Tumor focality:  Two tumors, 13 mm apart.     Extent of tumor:  No skin or skeletal muscle attached.     Associated in situ carcinoma:  Tumor #1:  intermediate grade ductal   carcinoma in situ, solid pattern.  Tumor   #2 - Not identified.     Size of ductal carcinoma in situ:  Present in 2 adjacent blocks (A6 and   A7) out of 15 blocks examined.     Margins:  Tumor #1   - Invasive ductal carcinoma:  Involved SUPERIOR margin (block A7 and   possibly A6, limited by tissue crush   artifact); 0.1 mm from nearest anterior margin; 3 mm from nearest inferior    margin; >10 from posterior, medial   and lateral margins.     Ductal carcinoma in situ:  Uninvolved; ductal carcinoma in situ 2.5 mm   from nearest inferior margin and 1.5 mm   from nearest anterior margin.     Margins:  Tumor #2   - Invasive ductal carcinoma:  Uninvolved;, 4 mm from nearest inferior and   posterior margins, 8 mm from nearest   superior margin and >10 mm from anterior, medial and lateral margins.     Ductal carcinoma in situ:  Not applicable     Lymph node status:  Right axillary sentinel lymph node, [id number]   negative for  metastatic breast carcinoma.     Pathologic staging (pTNM):  Based on tumor #1:  mpT1c (criterion:  tumor   >10 mm and d20 mm in greatest   dimension)   spN0   pM not applicable.     Estrogen and progesterone receptors:  Both reported as negative by   immunohistochemistry on previous biopsy of   right breast, [id number].     HER2 FISH:  Previous reported as negative on right breast biopsy,   [id number].     Additional findings:   - Evidence of previous biopsy sites and metallic clip in tumor #1.   - Focal medial calcifications in parenchymal arteries.   - Focal fibrocystic change.   -   B.  Superior margins of right breast, stitch at margin site:   - Benign breast tissue, predominantly adipose tissue with a few benign   breast ducts.     C.  Inferior margins of right breast, stitch at margin site:   - Benign breast tissue.     D.  Right axillary sentinel lymph node, three pieces of tissue, excision   - Fragments of benign lymph node tissue, negative for metastatic tumor.     Report Name: Breast Invasive Carcinoma - Resection        Status: Submitted Checklist Inst: 1      Last Updated By: Gia Yepez M.D., 03/24/2020 15:46:07   Part(s) Involved:   A: Breast lumpectomy, right     Synoptic Report:     CLINICAL     Clinical History:         - Palpable mass         Two masses identified by mammogram and ultrasound     SPECIMEN     Procedure:         - Excision (less than total mastectomy)     Specimen Laterality:         - Right     TUMOR     Histologic Type:         - Invasive carcinoma of no special type (invasive ductal carcinoma,   not         otherwise specified)     Histologic Grade (Kelby Histologic Score):         - Kelby Score       Glandular (Acinar) / Tubular Differentiation:           - Score 3       Nuclear Pleomorphism:           - Score 3       Mitotic Rate:           - Score 3       Overall Grade:           - Grade 3 (scores of 8 or 9)     Tumor Size: 19 mm     Ductal Carcinoma In Situ (DCIS):         - Present         - Negative for extensive intraductal component (EIC)     Treatment  Effect:         - No known presurgical therapy     MARGINS     Invasive Carcinoma Margins:         - Positive for invasive carcinoma       Positive Margin(s):           - Superior     DCIS Margins:         - Uninvolved by DCIS       Distance from Closest Margin (Millimeters):           2 mm       Closest Margin:           - Anterior     LYMPH NODES     Regional Lymph Nodes:         - Uninvolved by tumor cells       Number of Lymph Nodes Examined: 1       Number of [place] Nodes Examined: 1     PATHOLOGIC STAGE CLASSIFICATION (PTNM, AJCC 8TH EDITION)     TNM Descriptors:         - m (multiple foci of invasive carcinoma)     Primary Tumor (pT):         - pT1c     Regional Lymph Nodes (pN)       Modifier:           - (sn): Only sentinel node(s) evaluated.       Category (pN):           - pN0     ADDITIONAL FINDINGS     Additional Pathologic Findings: Evidence of previous biopsy site and   metallic clip, medial calcification         of parenchymal arteries, fibrocystic change     SPECIAL STUDIES     Estrogen Receptor (ER):         - Negative     Progesterone Receptor (PgR):         - Negative     HER2 (by immunohistochemistry):         - Cannot be determined (indeterminate)     Testing Performed on Case Number: [id number]         Assessment    Ms. Paris is a 79 year old female with a diagnosis of invasive ductal carcinoma of the right breast status post neoadjuvant chemotherapy with Taxol x12C and lumpectomy with sentinel lymph node evaluation.  Final pathology demonstrated IDC, grade 3, triple negative, multifocal, positive LVSI, negative margin, 0 out of 1 sentinel lymph node, [id number].  She presents today to discuss potential role of radiation therapy ss a part of her treatment strategy.    Plan     We recommend adjuvant radiation therapy to the whole breast, followed by a boost to the lumpectomy bed.  We discussed with the patient the rationale for adjuvant radiation therapy in the setting of lumpectomy as a  critical component of breast conservation therapy.  The most recent update of the EBCTCG metaanalysis for early stage breast cancer showed that for patients with pathologically node negative disease, radiotherapy reduces the 5 year risk of any recurrence from 31% to 15% (Lancet, 2011).  This translated into a 3.3% absolute survival benefit at 15 years for all-comers.        We recommend hypofractionated radiation therapy based on results from the [place] hypofractionation trial (Ryann et al, [place], 2010) and the recent update of the [place] START trial (Margarette et al, Lancet Oncol, 2013), which both show equivalent local control, survival and cosmesis with these shorter treatment schedules as compared to conventional radiation fractionation. After our discussion, the patient is interested in proceeding with hypofractionated treatment to the whole breast. We will plan to treat to 4005 cGy in 15 fractions followed by a 1000 cGy in 5 fraction boost to the lumpectomy bed, as a lumpectomy bed boost has been shown to improve local recurrence risk from 16% to 12% at 20 years (Jenny et al, Lancet, 2015).     We also discussed the logistics of treatment planning and delivery in detail with the patient. We furthermore discussed side effects, including short term risks of fatigue and skin reaction, and long term risks of pneumonitis, lung fibrosis, soft tissue fibrosis, skin changes, breast contour changes, rib fractures, cardiac damage, secondary cancers, lymphedema, and thyroid dysfunction.  We described the use of 3D-conformal radiotherapy to minimize dose to the normal tissues, while adequately covering the target tissues, and the ability of this technique to decrease potential for toxicity.      The risks and benefits of radiation therapy were discussed in detail with the patient. We discussed consideration of delaying adjuvant RT in the context of active COVID-19 pandemic.  However we discussed that given her high risk  features including triple negative histology, grade 3 disease, and residual disease after neoadjuvant chemotherapy delaying care could potentially compromise oncologic outcome. This much be weighed against coming in for daily radiation treatment that would potentially increase risk of daily exposure to COVID-19.  The patient has expressed an understanding of these risks and agreed to the proposed treatment. We will plan to schedule the patient for CT simulation. Consent will be obtained on day of CT simulation.         Rocky Valdivia MD  Department of Radiation Oncology  [facility]

## 2020-04-23 ENCOUNTER — OFFICE VISIT (OUTPATIENT)
Dept: RADIATION THERAPY | Facility: OUTPATIENT CENTER | Age: 80
End: 2020-04-23
Payer: COMMERCIAL

## 2020-04-23 DIAGNOSIS — C50.912 INFILTRATING DUCTAL CARCINOMA OF LEFT BREAST (H): Primary | ICD-10-CM

## 2020-04-23 NOTE — PROGRESS NOTES
Patient underwent CT simulation.     Rocky Valdivia M.D.  Department of Radiation Oncology  [facility]

## 2020-04-23 NOTE — LETTER
4/23/2020      RE: Precious Paris  [address]  [place] [address]       Patient underwent CT simulation.     Rocky Valdivia M.D.  Department of Radiation Oncology  [facility]       Rocky Valdivia MD

## 2020-05-05 ENCOUNTER — APPOINTMENT (OUTPATIENT)
Dept: RADIATION THERAPY | Facility: OUTPATIENT CENTER | Age: 80
End: 2020-05-05
Payer: COMMERCIAL

## 2020-05-06 ENCOUNTER — APPOINTMENT (OUTPATIENT)
Dept: RADIATION THERAPY | Facility: OUTPATIENT CENTER | Age: 80
End: 2020-05-06
Payer: COMMERCIAL

## 2020-05-06 ENCOUNTER — OFFICE VISIT (OUTPATIENT)
Dept: RADIATION THERAPY | Facility: OUTPATIENT CENTER | Age: 80
End: 2020-05-06
Payer: COMMERCIAL

## 2020-05-06 VITALS
BODY MASS INDEX: 32.94 KG/M2 | OXYGEN SATURATION: 97 % | WEIGHT: 196.4 LBS | DIASTOLIC BLOOD PRESSURE: 77 MMHG | RESPIRATION RATE: 18 BRPM | HEART RATE: 76 BPM | SYSTOLIC BLOOD PRESSURE: 138 MMHG

## 2020-05-06 DIAGNOSIS — C50.911 INFILTRATING DUCTAL CARCINOMA OF RIGHT BREAST (H): Primary | ICD-10-CM

## 2020-05-06 NOTE — PROGRESS NOTES
[facility]  SPECIALIZING IN BREAKTHROUGHS  Radiation Oncology    On Treatment Visit Note      Precious Paris      Date: 2020   MRN: [id number]   : 1940  Diagnosis: R breast IDC, triple negative      Reason for Visit:  On Radiation Treatment Visit     Treatment Summary to Date  Treatment Site: R breast Current Dose: 534/5005 cGy Fractions:       Chemotherapy  Chemo concurrent with radx?: No    Subjective:   Doing well. No breast pain. Energy adequate Applying skin creams.     Nursing ROS:   Nutrition Alteration  Diet Type: Patient's Preference  Skin  Skin Reaction: 0 - No changes        Cardiovascular  Respiratory effort: 1 - Normal - without distress           Pain Assessment  Pain Note: had some intermittent breast pain last evening - did not take/need any medication. reviewed what to expect from radiation and pain, skin changes      Objective:   /77   Pulse 76   Resp 18   Wt 89.1 kg (196 lb 6.4 oz)   SpO2 97%   BMI 32.94 kg/m    Skin without erythema or desquamation.    Labs:  CBC RESULTS:   Recent Labs   Lab Test 20  0926   WBC 7.5   RBC 3.92   HGB 12.2   HCT 38.5   MCV 98   MCH 31.1   MCHC 31.7   RDW 14.6        ELECTROLYTES:  Recent Labs   Lab Test 20  0926      POTASSIUM 3.6   CHLORIDE 103   YADIRA 9.2   CO2 30   BUN 11   CR 0.55   *       Assessment:  Ms. Paris is a 79 year old female with a diagnosis of invasive ductal carcinoma of the right breast status post neoadjuvant chemotherapy with Taxol x12C and lumpectomy with sentinel lymph node evaluation.  Final pathology demonstrated IDC, grade 3, triple negative, multifocal, positive LVSI, negative margin, 0 out of 1 sentinel lymph node, [id number].  She is undergoing adjuvant RT.     Tolerating radiation therapy well.  All questions and concerns addressed.    Plan:   1. Continue current therapy.    2. Continue skin care.   3. Ibuprofen PRN breast pain. Neosporin with pain  relief PRN pain.   4. Hydrocortisone PRN pruritus.      Mosaiq chart and setup information reviewed  Ports checked    Medication Review  Med list reviewed with patient?: Yes    Educational Topic Discussed  Education Instructions: reviewed skin care, fatigue and what to do       Rocky Valdivia MD

## 2020-05-07 ENCOUNTER — APPOINTMENT (OUTPATIENT)
Dept: RADIATION THERAPY | Facility: OUTPATIENT CENTER | Age: 80
End: 2020-05-07
Payer: COMMERCIAL

## 2020-05-08 ENCOUNTER — APPOINTMENT (OUTPATIENT)
Dept: RADIATION THERAPY | Facility: OUTPATIENT CENTER | Age: 80
End: 2020-05-08
Payer: COMMERCIAL

## 2020-05-11 ENCOUNTER — APPOINTMENT (OUTPATIENT)
Dept: RADIATION THERAPY | Facility: OUTPATIENT CENTER | Age: 80
End: 2020-05-11
Payer: COMMERCIAL

## 2020-05-12 ENCOUNTER — APPOINTMENT (OUTPATIENT)
Dept: RADIATION THERAPY | Facility: OUTPATIENT CENTER | Age: 80
End: 2020-05-12
Payer: COMMERCIAL

## 2020-05-13 ENCOUNTER — APPOINTMENT (OUTPATIENT)
Dept: RADIATION THERAPY | Facility: OUTPATIENT CENTER | Age: 80
End: 2020-05-13
Payer: COMMERCIAL

## 2020-05-13 ENCOUNTER — OFFICE VISIT (OUTPATIENT)
Dept: RADIATION THERAPY | Facility: OUTPATIENT CENTER | Age: 80
End: 2020-05-13
Payer: COMMERCIAL

## 2020-05-13 VITALS
DIASTOLIC BLOOD PRESSURE: 60 MMHG | OXYGEN SATURATION: 96 % | RESPIRATION RATE: 18 BRPM | WEIGHT: 196.4 LBS | SYSTOLIC BLOOD PRESSURE: 131 MMHG | HEART RATE: 73 BPM | BODY MASS INDEX: 32.94 KG/M2

## 2020-05-13 DIAGNOSIS — C50.911 INFILTRATING DUCTAL CARCINOMA OF RIGHT BREAST (H): Primary | ICD-10-CM

## 2020-05-13 ASSESSMENT — PAIN SCALES - GENERAL: PAINLEVEL: MILD PAIN (2)

## 2020-05-13 NOTE — LETTER
2020      RE: Precious Paris  [address] [facility] [address]       [facility]  SPECIALIZING IN BREAKTHROUGHS  Radiation Oncology    On Treatment Visit Note      Precious Paris      Date: 2020   MRN: [id number]   : 1940  Diagnosis: R breast IDC, triple negative      Reason for Visit:  On Radiation Treatment Visit     Treatment Summary to Date  Treatment Site: R breast Current Dose: 1869/5005 cGy Fractions:       Chemotherapy  Chemo concurrent with radx?: No    Subjective:   Doing well. No breast pain. Energy adequate Applying skin creams.     Nursing ROS:   Nutrition Alteration  Diet Type: Patient's Preference  Skin  Skin Reaction: 0 - No changes        Cardiovascular  Respiratory effort: 1 - Normal - without distress           Pain Assessment  Pain Note: had some intermittent breast pain last evening - did not take/need any medication. reviewed what to expect from radiation and pain, skin changes      Objective:   /60   Pulse 73   Resp 18   Wt 89.1 kg (196 lb 6.4 oz)   SpO2 96%   BMI 32.94 kg/m    Skin without erythema or desquamation.    Labs:  CBC RESULTS:   Recent Labs   Lab Test 20  0926   WBC 7.5   RBC 3.92   HGB 12.2   HCT 38.5   MCV 98   MCH 31.1   MCHC 31.7   RDW 14.6        ELECTROLYTES:  Recent Labs   Lab Test 20  0926      POTASSIUM 3.6   CHLORIDE 103   YADIRA 9.2   CO2 30   BUN 11   CR 0.55   *       Assessment:  Ms. Paris is a 79 year old female with a diagnosis of invasive ductal carcinoma of the right breast status post neoadjuvant chemotherapy with Taxol x12C and lumpectomy with sentinel lymph node evaluation.  Final pathology demonstrated IDC, grade 3, triple negative, multifocal, positive LVSI, negative margin, 0 out of 1 sentinel lymph node, [id number].  She is undergoing adjuvant RT.     Tolerating radiation therapy well.  All questions and concerns addressed.    Plan:   1. Continue current  therapy.    2. Continue skin care.   3. Ibuprofen PRN breast pain. Neosporin with pain relief PRN pain.   4. Hydrocortisone PRN pruritus.      Mosaiq chart and setup information reviewed  Ports checked    Medication Review  Med list reviewed with patient?: Yes    Educational Topic Discussed  Education Instructions: reviewed skin care, fatigue and what to do       Rocky Valdivia MD

## 2020-05-13 NOTE — PROGRESS NOTES
[facility]IANS  SPECIALIZING IN BREAKTHROUGHS  Radiation Oncology    On Treatment Visit Note      Precious Paris      Date: 2020   MRN: [id number]   : 1940  Diagnosis: R breast IDC, triple negative      Reason for Visit:  On Radiation Treatment Visit     Treatment Summary to Date  Treatment Site: R breast Current Dose: 1869/5005 cGy Fractions:       Chemotherapy  Chemo concurrent with radx?: No    Subjective:   Doing well. No breast pain. Energy adequate Applying skin creams.     Nursing ROS:   Nutrition Alteration  Diet Type: Patient's Preference  Skin  Skin Reaction: 0 - No changes        Cardiovascular  Respiratory effort: 1 - Normal - without distress           Pain Assessment  Pain Note: had some intermittent breast pain last evening - did not take/need any medication. reviewed what to expect from radiation and pain, skin changes      Objective:   /60   Pulse 73   Resp 18   Wt 89.1 kg (196 lb 6.4 oz)   SpO2 96%   BMI 32.94 kg/m    Skin without erythema or desquamation.    Labs:  CBC RESULTS:   Recent Labs   Lab Test 20  0926   WBC 7.5   RBC 3.92   HGB 12.2   HCT 38.5   MCV 98   MCH 31.1   MCHC 31.7   RDW 14.6        ELECTROLYTES:  Recent Labs   Lab Test 20  0926      POTASSIUM 3.6   CHLORIDE 103   YADIRA 9.2   CO2 30   BUN 11   CR 0.55   *       Assessment:  Ms. Paris is a 79 year old female with a diagnosis of invasive ductal carcinoma of the right breast status post neoadjuvant chemotherapy with Taxol x12C and lumpectomy with sentinel lymph node evaluation.  Final pathology demonstrated IDC, grade 3, triple negative, multifocal, positive LVSI, negative margin, 0 out of 1 sentinel lymph node, [id number].  She is undergoing adjuvant RT.     Tolerating radiation therapy well.  All questions and concerns addressed.    Plan:   1. Continue current therapy.    2. Continue skin care.   3. Ibuprofen PRN breast pain. Neosporin with pain  relief PRN pain.   4. Hydrocortisone PRN pruritus.      Mosaiq chart and setup information reviewed  Ports checked    Medication Review  Med list reviewed with patient?: Yes    Educational Topic Discussed  Education Instructions: reviewed skin care, fatigue and what to do       Rocky Valdivia MD

## 2020-05-14 ENCOUNTER — APPOINTMENT (OUTPATIENT)
Dept: RADIATION THERAPY | Facility: OUTPATIENT CENTER | Age: 80
End: 2020-05-14
Payer: COMMERCIAL

## 2020-05-15 ENCOUNTER — APPOINTMENT (OUTPATIENT)
Dept: RADIATION THERAPY | Facility: OUTPATIENT CENTER | Age: 80
End: 2020-05-15
Payer: COMMERCIAL

## 2020-05-18 ENCOUNTER — APPOINTMENT (OUTPATIENT)
Dept: RADIATION THERAPY | Facility: OUTPATIENT CENTER | Age: 80
End: 2020-05-18
Payer: COMMERCIAL

## 2020-05-19 ENCOUNTER — APPOINTMENT (OUTPATIENT)
Dept: RADIATION THERAPY | Facility: OUTPATIENT CENTER | Age: 80
End: 2020-05-19
Payer: COMMERCIAL

## 2020-05-20 ENCOUNTER — APPOINTMENT (OUTPATIENT)
Dept: RADIATION THERAPY | Facility: OUTPATIENT CENTER | Age: 80
End: 2020-05-20
Payer: COMMERCIAL

## 2020-05-20 ENCOUNTER — OFFICE VISIT (OUTPATIENT)
Dept: RADIATION THERAPY | Facility: OUTPATIENT CENTER | Age: 80
End: 2020-05-20
Payer: COMMERCIAL

## 2020-05-20 VITALS
HEART RATE: 70 BPM | WEIGHT: 196.4 LBS | DIASTOLIC BLOOD PRESSURE: 60 MMHG | SYSTOLIC BLOOD PRESSURE: 137 MMHG | BODY MASS INDEX: 32.94 KG/M2

## 2020-05-20 DIAGNOSIS — C50.911 INFILTRATING DUCTAL CARCINOMA OF RIGHT BREAST (H): Primary | ICD-10-CM

## 2020-05-20 NOTE — LETTER
2020         RE: Precious Paris  [address] [place] [address]        Dear Colleague,    Thank you for referring your patient, Precious Paris, to the [facility]. Please see a copy of my visit note below.    [facility] PHYSICIANS  SPECIALIZING IN BREAKTHROUGHS  Radiation Oncology    On Treatment Visit Note      Precious Paris      Date: 2020   MRN: [id number]   : 1940  Diagnosis: R breast IDC, triple negative      Reason for Visit:  On Radiation Treatment Visit     Treatment Summary to Date  Treatment Site: R breast Current Dose: 3204/5005 cGy Fractions:       Chemotherapy  Chemo concurrent with radx?: No    Subjective:   Doing well. Very mild breast pain. Energy adequate Applying skin creams.     Nursing ROS:   Nutrition Alteration  Diet Type: Patient's Preference  Skin  Skin Reaction: 0 - No changes        Cardiovascular  Respiratory effort: 1 - Normal - without distress           Pain Assessment  Pain Note: had some intermittent breast pain last evening - did not take/need any medication. reviewed what to expect from radiation and pain, skin changes      Objective:   /60   Pulse 70   Wt 89.1 kg (196 lb 6.4 oz)   BMI 32.94 kg/m    Skin with mild erythema without desquamation.    Labs:  CBC RESULTS:   Recent Labs   Lab Test 20  0926   WBC 7.5   RBC 3.92   HGB 12.2   HCT 38.5   MCV 98   MCH 31.1   MCHC 31.7   RDW 14.6        ELECTROLYTES:  Recent Labs   Lab Test 20  0926      POTASSIUM 3.6   CHLORIDE 103   YAIDRA 9.2   CO2 30   BUN 11   CR 0.55   *       Assessment:  Ms. Paris is a 79 year old female with a diagnosis of invasive ductal carcinoma of the right breast status post neoadjuvant chemotherapy with Taxol x12C and lumpectomy with sentinel lymph node evaluation.  Final pathology demonstrated IDC, grade 3, triple negative, multifocal, positive LVSI, negative margin, 0 out of 1 sentinel lymph node,  [id number].  She is undergoing adjuvant RT.     Tolerating radiation therapy well.  All questions and concerns addressed.    Plan:   1. Continue current therapy.    2. Continue skin care.   3. Ibuprofen PRN breast pain. Neosporin with pain relief PRN pain.   4. Hydrocortisone PRN pruritus.      Mosaiq chart and setup information reviewed  Ports checked    Medication Review  Med list reviewed with patient?: Yes    Educational Topic Discussed  Education Instructions: reviewed skin care, fatigue and what to do       Rocky Valdivia MD

## 2020-05-20 NOTE — PROGRESS NOTES
[facility] PHYSICIANS  SPECIALIZING IN BREAKTHROUGHS  Radiation Oncology    On Treatment Visit Note      Precious Paris      Date: 2020   MRN: [id number]   : 1940  Diagnosis: R breast IDC, triple negative      Reason for Visit:  On Radiation Treatment Visit     Treatment Summary to Date  Treatment Site: R breast Current Dose: 3204/5005 cGy Fractions:       Chemotherapy  Chemo concurrent with radx?: No    Subjective:   Doing well. Very mild breast pain. Energy adequate Applying skin creams.     Nursing ROS:   Nutrition Alteration  Diet Type: Patient's Preference  Skin  Skin Reaction: 0 - No changes        Cardiovascular  Respiratory effort: 1 - Normal - without distress           Pain Assessment  Pain Note: had some intermittent breast pain last evening - did not take/need any medication. reviewed what to expect from radiation and pain, skin changes      Objective:   /60   Pulse 70   Wt 89.1 kg (196 lb 6.4 oz)   BMI 32.94 kg/m    Skin with mild erythema without desquamation.    Labs:  CBC RESULTS:   Recent Labs   Lab Test 20  0926   WBC 7.5   RBC 3.92   HGB 12.2   HCT 38.5   MCV 98   MCH 31.1   MCHC 31.7   RDW 14.6        ELECTROLYTES:  Recent Labs   Lab Test 20  0926      POTASSIUM 3.6   CHLORIDE 103   YADIRA 9.2   CO2 30   BUN 11   CR 0.55   *       Assessment:  Ms. Paris is a 79 year old female with a diagnosis of invasive ductal carcinoma of the right breast status post neoadjuvant chemotherapy with Taxol x12C and lumpectomy with sentinel lymph node evaluation.  Final pathology demonstrated IDC, grade 3, triple negative, multifocal, positive LVSI, negative margin, 0 out of 1 sentinel lymph node, [id number].  She is undergoing adjuvant RT.     Tolerating radiation therapy well.  All questions and concerns addressed.    Plan:   1. Continue current therapy.    2. Continue skin care.   3. Ibuprofen PRN breast pain. Neosporin with pain relief PRN  pain.   4. Hydrocortisone PRN pruritus.      Mosaiq chart and setup information reviewed  Ports checked    Medication Review  Med list reviewed with patient?: Yes    Educational Topic Discussed  Education Instructions: reviewed skin care, fatigue and what to do       Rocky Valdivia MD

## 2020-05-21 ENCOUNTER — APPOINTMENT (OUTPATIENT)
Dept: RADIATION THERAPY | Facility: OUTPATIENT CENTER | Age: 80
End: 2020-05-21
Payer: COMMERCIAL

## 2020-05-21 LAB — COPATH REPORT: NORMAL

## 2020-05-22 ENCOUNTER — APPOINTMENT (OUTPATIENT)
Dept: RADIATION THERAPY | Facility: OUTPATIENT CENTER | Age: 80
End: 2020-05-22
Payer: COMMERCIAL

## 2020-05-26 ENCOUNTER — APPOINTMENT (OUTPATIENT)
Dept: RADIATION THERAPY | Facility: OUTPATIENT CENTER | Age: 80
End: 2020-05-26
Payer: COMMERCIAL

## 2020-05-27 ENCOUNTER — OFFICE VISIT (OUTPATIENT)
Dept: RADIATION THERAPY | Facility: OUTPATIENT CENTER | Age: 80
End: 2020-05-27
Payer: COMMERCIAL

## 2020-05-27 ENCOUNTER — APPOINTMENT (OUTPATIENT)
Dept: RADIATION THERAPY | Facility: OUTPATIENT CENTER | Age: 80
End: 2020-05-27
Payer: COMMERCIAL

## 2020-05-27 VITALS
SYSTOLIC BLOOD PRESSURE: 133 MMHG | OXYGEN SATURATION: 96 % | BODY MASS INDEX: 32.8 KG/M2 | DIASTOLIC BLOOD PRESSURE: 60 MMHG | HEART RATE: 74 BPM | RESPIRATION RATE: 18 BRPM | WEIGHT: 195.6 LBS

## 2020-05-27 DIAGNOSIS — C50.911 INFILTRATING DUCTAL CARCINOMA OF RIGHT BREAST (H): Primary | ICD-10-CM

## 2020-05-27 ASSESSMENT — PAIN SCALES - GENERAL: PAINLEVEL: NO PAIN (0)

## 2020-05-27 NOTE — LETTER
2020         RE: Precious Paris  [address] [place] [address]        Dear Colleague,    Thank you for referring your patient, Precious Paris, to the [facility]. Please see a copy of my visit note below.    [facility] PHYSICIANS  SPECIALIZING IN BREAKTHROUGHS  Radiation Oncology    On Treatment Visit Note      Precious Paris      Date: 2020   MRN: [id number]   : 1940  Diagnosis: R breast IDC, triple negative      Reason for Visit:  On Radiation Treatment Visit     Treatment Summary to Date  Treatment Site: R breast Current Dose: 4205/5005 cGy Fractions:       Chemotherapy  Chemo concurrent with radx?: No    Subjective:   Doing well. Very mild breast pain. Energy adequate Applying skin creams.     Nursing ROS:   Nutrition Alteration  Diet Type: Patient's Preference  Skin  Skin Reaction: 0 - No changes        Cardiovascular  Respiratory effort: 1 - Normal - without distress           Pain Assessment  Pain Note: had some intermittent breast pain last evening - did not take/need any medication. reviewed what to expect from radiation and pain, skin changes      Objective:   There were no vitals taken for this visit.  Skin with mild erythema without desquamation.    Labs:  CBC RESULTS:   Recent Labs   Lab Test 20  0926   WBC 7.5   RBC 3.92   HGB 12.2   HCT 38.5   MCV 98   MCH 31.1   MCHC 31.7   RDW 14.6        ELECTROLYTES:  Recent Labs   Lab Test 20  0926      POTASSIUM 3.6   CHLORIDE 103   YADIRA 9.2   CO2 30   BUN 11   CR 0.55   *       Assessment:  Ms. Paris is a 79 year old female with a diagnosis of invasive ductal carcinoma of the right breast status post neoadjuvant chemotherapy with Taxol x12C and lumpectomy with sentinel lymph node evaluation.  Final pathology demonstrated IDC, grade 3, triple negative, multifocal, positive LVSI, negative margin, 0 out of 1 sentinel lymph node, [id number].  She is undergoing  adjuvant RT.     Tolerating radiation therapy well.  All questions and concerns addressed.    Plan:   1. Continue current therapy.  RTC in 1 month with NP.  2. Continue skin care.   3. Ibuprofen PRN breast pain. Neosporin with pain relief PRN pain.   4. Hydrocortisone PRN pruritus.      Mosaiq chart and setup information reviewed  Ports checked    Medication Review  Med list reviewed with patient?: Yes    Educational Topic Discussed  Education Instructions: reviewed skin care, fatigue and what to do       Rocky Valdivia MD        Again, thank you for allowing me to participate in the care of your patient.        Sincerely,        Rocky Valdivia MD

## 2020-05-27 NOTE — LETTER
Date:May 31, 2020      Provider requested that no letter be sent. Do not send.       [facility] Information

## 2020-05-27 NOTE — PROGRESS NOTES
[facility] PHYSICIANS  SPECIALIZING IN BREAKTHROUGHS  Radiation Oncology    On Treatment Visit Note      Precious Paris      Date: 2020   MRN: [id number]   : 1940  Diagnosis: R breast IDC, triple negative      Reason for Visit:  On Radiation Treatment Visit     Treatment Summary to Date  Treatment Site: R breast Current Dose: 4205/5005 cGy Fractions:       Chemotherapy  Chemo concurrent with radx?: No    Subjective:   Doing well. Very mild breast pain. Energy adequate Applying skin creams.     Nursing ROS:   Nutrition Alteration  Diet Type: Patient's Preference  Skin  Skin Reaction: 0 - No changes        Cardiovascular  Respiratory effort: 1 - Normal - without distress           Pain Assessment  Pain Note: had some intermittent breast pain last evening - did not take/need any medication. reviewed what to expect from radiation and pain, skin changes      Objective:   There were no vitals taken for this visit.  Skin with mild erythema without desquamation.    Labs:  CBC RESULTS:   Recent Labs   Lab Test 20  0926   WBC 7.5   RBC 3.92   HGB 12.2   HCT 38.5   MCV 98   MCH 31.1   MCHC 31.7   RDW 14.6        ELECTROLYTES:  Recent Labs   Lab Test 20  0926      POTASSIUM 3.6   CHLORIDE 103   YADIRA 9.2   CO2 30   BUN 11   CR 0.55   *       Assessment:  Ms. aPris is a 79 year old female with a diagnosis of invasive ductal carcinoma of the right breast status post neoadjuvant chemotherapy with Taxol x12C and lumpectomy with sentinel lymph node evaluation.  Final pathology demonstrated IDC, grade 3, triple negative, multifocal, positive LVSI, negative margin, 0 out of 1 sentinel lymph node, [id number].  She is undergoing adjuvant RT.     Tolerating radiation therapy well.  All questions and concerns addressed.    Plan:   1. Continue current therapy.  RTC in 1 month with NP.  2. Continue skin care.   3. Ibuprofen PRN breast pain. Neosporin with pain relief PRN pain.    4. Hydrocortisone PRN pruritus.      Mosaiq chart and setup information reviewed  Ports checked    Medication Review  Med list reviewed with patient?: Yes    Educational Topic Discussed  Education Instructions: reviewed skin care, fatigue and what to do       Rocky Valdivia MD

## 2020-05-28 ENCOUNTER — APPOINTMENT (OUTPATIENT)
Dept: RADIATION THERAPY | Facility: OUTPATIENT CENTER | Age: 80
End: 2020-05-28
Payer: COMMERCIAL

## 2020-05-29 ENCOUNTER — APPOINTMENT (OUTPATIENT)
Dept: RADIATION THERAPY | Facility: OUTPATIENT CENTER | Age: 80
End: 2020-05-29
Payer: COMMERCIAL

## 2020-06-01 ENCOUNTER — APPOINTMENT (OUTPATIENT)
Dept: RADIATION THERAPY | Facility: OUTPATIENT CENTER | Age: 80
End: 2020-06-01
Payer: COMMERCIAL

## 2020-06-02 ENCOUNTER — VIRTUAL VISIT (OUTPATIENT)
Dept: ONCOLOGY | Facility: CLINIC | Age: 80
End: 2020-06-02
Attending: INTERNAL MEDICINE
Payer: COMMERCIAL

## 2020-06-02 ENCOUNTER — APPOINTMENT (OUTPATIENT)
Dept: RADIATION THERAPY | Facility: OUTPATIENT CENTER | Age: 80
End: 2020-06-02
Payer: COMMERCIAL

## 2020-06-02 DIAGNOSIS — C50.411 MALIGNANT NEOPLASM OF UPPER-OUTER QUADRANT OF RIGHT BREAST IN FEMALE, ESTROGEN RECEPTOR NEGATIVE (H): Primary | ICD-10-CM

## 2020-06-02 DIAGNOSIS — Z17.1 MALIGNANT NEOPLASM OF UPPER-OUTER QUADRANT OF RIGHT BREAST IN FEMALE, ESTROGEN RECEPTOR NEGATIVE (H): Primary | ICD-10-CM

## 2020-06-02 PROCEDURE — 99214 OFFICE O/P EST MOD 30 MIN: CPT | Mod: 95 | Performed by: INTERNAL MEDICINE

## 2020-06-02 NOTE — LETTER
"    6/2/2020         RE: Precious Paris  [address] [place] [address]        Dear Colleague,    Thank you for referring your patient, Precious Paris, to the [facility]. Please see a copy of my visit note below.    Precious Paris is a 79 year old female who is being evaluated via a billable video visit.        The patient has been notified of following:     \"This video visit will be conducted via a call between you and your physician/provider.  If a prescription is necessary we can send it directly to your pharmacy.  If lab work is needed we can place an order for that and you can then stop by our lab to have the test done at a later time.    Video visits maybe billed at different rates depending on your insurance coverage.  Please reach out to your insurance provider with any questions.    If during the course of the call the physician/provider feels a video visit is not appropriate, you will not be charged for this service.\"    Patient has given verbal consent for Video visit? Yes    How would you like to obtain your AVS? MyChart    Patient would like the video invitation sent by: Text to cell phone: [phone]    Will anyone else be joining your video visit? Lluvia Ren CMA          Type of Visit: Video Visit  Patient has given verbal consent for Video visit.     Video Start Time: 1:30 pm  Video End Time: 1:55 pm    Originating Location (pt. Location): Home     Distant Location (provider location):  [facility]      Platform used for Video Visit: [facility]        MEDICAL ONCOLOGY FOLLOW UP VISIT      CC:  November 2019 at age 79, she presented with right breast pain with palpable lump,  diagnosed with triple negative breast cancer of left breast lateral central portion  Transferred care from Dr. Cruz to me 11/25/2019      HISTORY OF ONCOLOGY ILLNESS/CC:    At age 79, 10/2019 she presented with pain and redness, induration in the right breast associated with " breast lump.  She was treated with antibiotic for 10 days redness resolved.    US/MA 10/2019  found 2 hypoechoic structures are seen at the patient's area of concern, 9:00-10:00 position 7 cm from the nipple on the RIGHT. One of these appears to have at least some component of an echogenic hilum and may represent an enlarged lymph node. The first measures 1.1 x 1.3 x 1.2 cm and the second measures 1.8 x 1.4 x 1.4 cm.   Survey of the axilla shows no evidence of adenopathy.    First biopsy identified invasive ductal carcinoma with marked necrosis and insufficient tissue for further evaluation.  Second time biopsy to the right breast 9 o'clock position 9 cm from the nipple, found grade 3 invasive ductal cancer, triple negative, associated with grade 3 DCIS.    She saw Dr. Frausto along with tumor conference discussion, due to her relatively healthy status, aggressive nature of her breast cancer, and the desire of breast conservation, neoadjuvant systemic treatment was recommended.    She made informed decision to proceed with wkly taxol, C1D1 12/5/2019. Then went to [place] and finished wkly taxol. Last dose of taxol was Feb 20th 2020. She had no tx delay.     She had favorable image response early March 2020.    Right lumpectomy 3/19/2020 found grade 3 IDC, T1c, 1.9 and 1.2 cm cm with ALI and DCIS, . Superior margin is +. sLN is nsgative, [id number] disease.       INTERVAL HISTORY:   She finished RT 5/2020.         PMH  retinopathy of the right eye, cataract  Hypertension, hyperlipidemia  Osteoarthritis, spinal stenosis, bilateral knees replacement, carpel tunnel surgery  Varicose vein of lower extremities ,s/p surgery      REVIEW OF SYSTEMS:   GENERAL: pt is in usual state of health.  No B symptoms  NEURO:   No headache, double vision, or focal weakness.  No neuropathy.   SKIN:  No chronic skin rash or skin infection.   CARDIOVASCULAR:  + High blood pressure and  hyperlipidemia. NO FOLEY  PULMONARY:  No shortness of breath, no  pleurisy, no cough, no hemoptysis.   GI:  No abdominal pain, nausea, vomiting, constipation.  No diarrhea.  No bright red blood per rectum.   :  No urgency, frequency.  No recurrent urinary tract infection.  No kidney problems.   RHEUMATOLOGY/MUSCULOSKELETAL SYSTEM: Osteoarthritis, spinal stenosis, bilateral knees. Varicose vein of lower extremities ,s/p surgery  replacement, carpel tunnel surgery  ENDOCRINE:  No history of diabetes or thyroid problem. No complaints of hot flashes.   HEMATOLOGY:  No history of bleeding or thrombosis episode.   Oncology: breast cancer recently lumpectom  IMMUNOLOGY:  No recurrent fever or chills episode.  No recurrent infectious episode.   BREASTS/GYN: She reports tolerated RT fine with minimal erythematous changes on her right breast.    PSYCHIATRY:  No anxiety or depression.         PHYSICAL EXAMINATION ATTAINABLE DURING VIDEO VISIT:  CONSTITUTIONAL - Pt looks like stated age, pleasant, not in acute distress. Not obese.  NEURO: Oriented to time, person, and places. No tremor. Normal gait.   ENT, MOUTH: Pupils are equal.  Sclerae are anicteric.  Moist oral mucosa. No oral thrush.   NECK:  No jugular venous distention.  No thyroid enlargement.   RESPIRATORY: talk nl, no sob during conversation, no cough.   MUSCULOSKELETAL/EXTREMITIES:  No edema.  No joint deformity. Normal range of motion.  SKIN:  No petechiae.  No rash.  No signs of cellulitis.   PSYCHIATRIC: Normal mood and affect. Good memory. Proper insight and judgement.   THE REST OF A COMPREHENSIVE PHYSICIAL EXAM IS DEFERRED DUE TO COVID-19 PUBLIC HEALTH EMERGENCY RELATED VIDEO VISIT RESCTRICTION.        CURRENT LAB DATA REVIEWED TODAY WITH PATIENT DURING THE VIDEO VISIT    Right lumpectomy 3/19/2020 found grade 3 IDC, T1c, 1.9 cm and 1.2 cm, with ALI and DCIS. Superior margin is +. sLN is negative, [id number] disease.       OLD DATA REVIEWED TODAY WITH SUMMARY WITH PATIENT DURING THE VIDEO VISIT    Second time biopsy to the  right breast 9 o'clock position 9 cm from the nipple, found grade 3 invasive ductal cancer, triple negative, associated with grade 3 DCIS.       US/MA 10/2019  found 2 hypoechoic structures are seen at the patient's area of concern, 9:00-10:00 position 7 cm from the nipple on the RIGHT. One of these appears to have at least some component of an echogenic hilum and may represent an enlarged lymph node. The first measures 1.1 x 1.3 x 1.2 cm and the second measures 1.8 x 1.4 x 1.4 cm.   Survey of the axilla shows no evidence of adenopathy.          ASSESSMENT AND PLAN DISCUSSED WITH PATIENT TODAY DURING THE VIDEO VISIT:    1.  dx diagnosed right breast upper outer quadrant triple negative breast cancer 11/2019, triple negative breast cancer.    She made informed decision to proceed with wkly taxol, C1D1 12/5/2019. Then went to [place] and finished 12 wkly taxol.     She had favorable image response early March 2020.    Right lumpectomy 3/19/2020 found grade 3 IDC, T1c, 1.9 cm with ALI and DCIS,   and 1.2 cm. Superior margin is +. sLN is nsgative, [id number] disease.     Due to her advanced age Adriamycin is not recommended in her situation.    S/p RT 5/2020.     Since clinical trial is not open due to Covid, would recommend her to consider adjuvant Xeloda post RT.   We discussed the pros and cons of doing adjuvant Xeloda.   Cale in detail the side effect of oral Xeloda therapy.    Does not seem to be very excited about this adjuvant treatment recommendations.  She wants more information more time to digest information.    Well's meal out Xeloda education material to her.    Also discussed the ER follow-up plan for triple negative breast cancer patients.  She is due MA bilateral in Oct.     All Qs are answered to PT's satisfaction.     Virtual VISIT time is 25  minutes, spent in dicussion and review patient's cancer and treatment history, labs and images results,  further treatment recommendations, follow up plan.          Again, thank you for allowing me to participate in the care of your patient.        Sincerely,        Rachell Villa MD, MD

## 2020-06-02 NOTE — LETTER
"    6/2/2020         RE: Precious Paris  [address] [place] [address]        Dear Colleague,    Thank you for referring your patient, Precious Paris, to the [facility]. Please see a copy of my visit note below.    Precious Paris is a 79 year old female who is being evaluated via a billable video visit.        The patient has been notified of following:     \"This video visit will be conducted via a call between you and your physician/provider.  If a prescription is necessary we can send it directly to your pharmacy.  If lab work is needed we can place an order for that and you can then stop by our lab to have the test done at a later time.    Video visits maybe billed at different rates depending on your insurance coverage.  Please reach out to your insurance provider with any questions.    If during the course of the call the physician/provider feels a video visit is not appropriate, you will not be charged for this service.\"    Patient has given verbal consent for Video visit? Yes    How would you like to obtain your AVS? MyChart    Patient would like the video invitation sent by: Text to cell phone: [phone]    Will anyone else be joining your video visit? Lluvia Ren CMA          Type of Visit: Video Visit  Patient has given verbal consent for Video visit.     Video Start Time: 1:30 pm  Video End Time: 1:55 pm    Originating Location (pt. 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 Cruz to me 11/25/2019      HISTORY OF ONCOLOGY ILLNESS/CC:    At age 79, 10/2019 she presented with pain and redness, induration in the right breast associated with " breast lump.  She was treated with antibiotic for 10 days redness resolved.    US/MA 10/2019  found 2 hypoechoic structures are seen at the patient's area of concern, 9:00-10:00 position 7 cm from the nipple on the RIGHT. One of these appears to have at least some component of an echogenic hilum and may represent an enlarged lymph node. The first measures 1.1 x 1.3 x 1.2 cm and the second measures 1.8 x 1.4 x 1.4 cm.   Survey of the axilla shows no evidence of adenopathy.    First biopsy identified invasive ductal carcinoma with marked necrosis and insufficient tissue for further evaluation.  Second time biopsy to the right breast 9 o'clock position 9 cm from the nipple, found grade 3 invasive ductal cancer, triple negative, associated with grade 3 DCIS.    She saw Dr. Frausto along with tumor conference discussion, due to her relatively healthy status, aggressive nature of her breast cancer, and the desire of breast conservation, neoadjuvant systemic treatment was recommended.    She made informed decision to proceed with wkly taxol, C1D1 12/5/2019. Then went to [place] and finished wkly taxol. Last dose of taxol was Feb 20th 2020. She had no tx delay.     She had favorable image response early March 2020.    Right lumpectomy 3/19/2020 found grade 3 IDC, T1c, 1.9 and 1.2 cm cm with ALI and DCIS, . Superior margin is +. sLN is nsgative, [id number] disease.       INTERVAL HISTORY:   She finished RT 5/2020.         PMH  retinopathy of the right eye, cataract  Hypertension, hyperlipidemia  Osteoarthritis, spinal stenosis, bilateral knees replacement, carpel tunnel surgery  Varicose vein of lower extremities ,s/p surgery      REVIEW OF SYSTEMS:   GENERAL: pt is in usual state of health.  No B symptoms  NEURO:   No headache, double vision, or focal weakness.  No neuropathy.   SKIN:  No chronic skin rash or skin infection.   CARDIOVASCULAR:  + High blood pressure and  hyperlipidemia. NO FOLEY  PULMONARY:  No shortness of breath, no  pleurisy, no cough, no hemoptysis.   GI:  No abdominal pain, nausea, vomiting, constipation.  No diarrhea.  No bright red blood per rectum.   :  No urgency, frequency.  No recurrent urinary tract infection.  No kidney problems.   RHEUMATOLOGY/MUSCULOSKELETAL SYSTEM: Osteoarthritis, spinal stenosis, bilateral knees. Varicose vein of lower extremities ,s/p surgery  replacement, carpel tunnel surgery  ENDOCRINE:  No history of diabetes or thyroid problem. No complaints of hot flashes.   HEMATOLOGY:  No history of bleeding or thrombosis episode.   Oncology: breast cancer recently lumpectom  IMMUNOLOGY:  No recurrent fever or chills episode.  No recurrent infectious episode.   BREASTS/GYN: She reports tolerated RT fine with minimal erythematous changes on her right breast.    PSYCHIATRY:  No anxiety or depression.         PHYSICAL EXAMINATION ATTAINABLE DURING VIDEO VISIT:  CONSTITUTIONAL - Pt looks like stated age, pleasant, not in acute distress. Not obese.  NEURO: Oriented to time, person, and places. No tremor. Normal gait.   ENT, MOUTH: Pupils are equal.  Sclerae are anicteric.  Moist oral mucosa. No oral thrush.   NECK:  No jugular venous distention.  No thyroid enlargement.   RESPIRATORY: talk nl, no sob during conversation, no cough.   MUSCULOSKELETAL/EXTREMITIES:  No edema.  No joint deformity. Normal range of motion.  SKIN:  No petechiae.  No rash.  No signs of cellulitis.   PSYCHIATRIC: Normal mood and affect. Good memory. Proper insight and judgement.   THE REST OF A COMPREHENSIVE PHYSICIAL EXAM IS DEFERRED DUE TO COVID-19 PUBLIC HEALTH EMERGENCY RELATED VIDEO VISIT RESCTRICTION.        CURRENT LAB DATA REVIEWED TODAY WITH PATIENT DURING THE VIDEO VISIT    Right lumpectomy 3/19/2020 found grade 3 IDC, T1c, 1.9 cm and 1.2 cm, with ALI and DCIS. Superior margin is +. sLN is negative, [id number] disease.       OLD DATA REVIEWED TODAY WITH SUMMARY WITH PATIENT DURING THE VIDEO VISIT    Second time biopsy to the  right breast 9 o'clock position 9 cm from the nipple, found grade 3 invasive ductal cancer, triple negative, associated with grade 3 DCIS.       US/MA 10/2019  found 2 hypoechoic structures are seen at the patient's area of concern, 9:00-10:00 position 7 cm from the nipple on the RIGHT. One of these appears to have at least some component of an echogenic hilum and may represent an enlarged lymph node. The first measures 1.1 x 1.3 x 1.2 cm and the second measures 1.8 x 1.4 x 1.4 cm.   Survey of the axilla shows no evidence of adenopathy.          ASSESSMENT AND PLAN DISCUSSED WITH PATIENT TODAY DURING THE VIDEO VISIT:    1.  dx diagnosed right breast upper outer quadrant triple negative breast cancer 11/2019, triple negative breast cancer.    She made informed decision to proceed with wkly taxol, C1D1 12/5/2019. Then went to [place] and finished 12 wkly taxol.     She had favorable image response early March 2020.    Right lumpectomy 3/19/2020 found grade 3 IDC, T1c, 1.9 cm with ALI and DCIS,   and 1.2 cm. Superior margin is +. sLN is nsgative, [id number] disease.     Due to her advanced age Adriamycin is not recommended in her situation.    S/p RT 5/2020.     Since clinical trial is not open due to Covid, would recommend her to consider adjuvant Xeloda post RT.   We discussed the pros and cons of doing adjuvant Xeloda.   Cale in detail the side effect of oral Xeloda therapy.    Does not seem to be very excited about this adjuvant treatment recommendations.  She wants more information more time to digest information.    Well's meal out Xeloda education material to her.    Also discussed the ER follow-up plan for triple negative breast cancer patients.  She is due MA bilateral in Oct.     All Qs are answered to PT's satisfaction.     Virtual VISIT time is 25  minutes, spent in dicussion and review patient's cancer and treatment history, labs and images results,  further treatment recommendations, follow up plan.          Again, thank you for allowing me to participate in the care of your patient.        Sincerely,        Rachell Villa MD, MD

## 2020-06-02 NOTE — PROGRESS NOTES
Type of Visit: Video Visit  Patient has given verbal consent for Video visit.     Video Start Time: 1:30 pm  Video End Time: 1:55 pm    Originating Location (pt. 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 Cruz to me 11/25/2019      HISTORY OF ONCOLOGY ILLNESS/CC:    At age 79, 10/2019 she presented with pain and redness, induration in the right breast associated with breast lump.  She was treated with antibiotic for 10 days redness resolved.    US/MA 10/2019  found 2 hypoechoic structures are seen at the patient's area of concern, 9:00-10:00 position 7 cm from the nipple on the RIGHT. One of these appears to have at least some component of an echogenic hilum and may represent an enlarged lymph node. The first measures 1.1 x 1.3 x 1.2 cm and the second measures 1.8 x 1.4 x 1.4 cm.   Survey of the axilla shows no evidence of adenopathy.    First biopsy identified invasive ductal carcinoma with marked necrosis and insufficient tissue for further evaluation.  Second time biopsy to the right breast 9 o'clock position 9 cm from the nipple, found grade 3 invasive ductal cancer, triple negative, associated with grade 3 DCIS.    She saw Dr. Frausto along with tumor conference discussion, due to her relatively healthy status, aggressive nature of her breast cancer, and the desire of breast conservation, neoadjuvant systemic treatment was recommended.    She made informed decision to proceed with wkly taxol, C1D1 12/5/2019. Then went to [place] and finished wkly taxol. Last dose of taxol was Feb 20th 2020. She had no tx delay.     She had favorable image response early March 2020.    Right lumpectomy 3/19/2020  found grade 3 IDC, T1c, 1.9 and 1.2 cm cm with ALI and DCIS, . Superior margin is +. sLN is nsgative, [id number] disease.       INTERVAL HISTORY:   She finished RT 5/2020.         PMH  retinopathy of the right eye, cataract  Hypertension, hyperlipidemia  Osteoarthritis, spinal stenosis, bilateral knees replacement, carpel tunnel surgery  Varicose vein of lower extremities ,s/p surgery      REVIEW OF SYSTEMS:   GENERAL: pt is in usual state of health.  No B symptoms  NEURO:   No headache, double vision, or focal weakness.  No neuropathy.   SKIN:  No chronic skin rash or skin infection.   CARDIOVASCULAR:  + High blood pressure and  hyperlipidemia. NO FOLEY  PULMONARY:  No shortness of breath, no pleurisy, no cough, no hemoptysis.   GI:  No abdominal pain, nausea, vomiting, constipation.  No diarrhea.  No bright red blood per rectum.   :  No urgency, frequency.  No recurrent urinary tract infection.  No kidney problems.   RHEUMATOLOGY/MUSCULOSKELETAL SYSTEM: Osteoarthritis, spinal stenosis, bilateral knees. Varicose vein of lower extremities ,s/p surgery  replacement, carpel tunnel surgery  ENDOCRINE:  No history of diabetes or thyroid problem. No complaints of hot flashes.   HEMATOLOGY:  No history of bleeding or thrombosis episode.   Oncology: breast cancer recently lumpectom  IMMUNOLOGY:  No recurrent fever or chills episode.  No recurrent infectious episode.   BREASTS/GYN: She reports tolerated RT fine with minimal erythematous changes on her right breast.    PSYCHIATRY:  No anxiety or depression.         PHYSICAL EXAMINATION ATTAINABLE DURING VIDEO VISIT:  CONSTITUTIONAL - Pt looks like stated age, pleasant, not in acute distress. Not obese.  NEURO: Oriented to time, person, and places. No tremor. Normal gait.   ENT, MOUTH: Pupils are equal.  Sclerae are anicteric.  Moist oral mucosa. No oral thrush.   NECK:  No jugular venous distention.  No thyroid enlargement.   RESPIRATORY: talk nl, no sob during conversation, no  cough.   MUSCULOSKELETAL/EXTREMITIES:  No edema.  No joint deformity. Normal range of motion.  SKIN:  No petechiae.  No rash.  No signs of cellulitis.   PSYCHIATRIC: Normal mood and affect. Good memory. Proper insight and judgement.   THE REST OF A COMPREHENSIVE PHYSICIAL EXAM IS DEFERRED DUE TO COVID-19 PUBLIC HEALTH EMERGENCY RELATED VIDEO VISIT RESCTRICTION.        CURRENT LAB DATA REVIEWED TODAY WITH PATIENT DURING THE VIDEO VISIT    Right lumpectomy 3/19/2020 found grade 3 IDC, T1c, 1.9 cm and 1.2 cm, with ALI and DCIS. Superior margin is +. sLN is negative, [id number] disease.       OLD DATA REVIEWED TODAY WITH SUMMARY WITH PATIENT DURING THE VIDEO VISIT    Second time biopsy to the right breast 9 o'clock position 9 cm from the nipple, found grade 3 invasive ductal cancer, triple negative, associated with grade 3 DCIS.       US/MA 10/2019  found 2 hypoechoic structures are seen at the patient's area of concern, 9:00-10:00 position 7 cm from the nipple on the RIGHT. One of these appears to have at least some component of an echogenic hilum and may represent an enlarged lymph node. The first measures 1.1 x 1.3 x 1.2 cm and the second measures 1.8 x 1.4 x 1.4 cm.   Survey of the axilla shows no evidence of adenopathy.          ASSESSMENT AND PLAN DISCUSSED WITH PATIENT TODAY DURING THE VIDEO VISIT:    1.  dx diagnosed right breast upper outer quadrant triple negative breast cancer 11/2019, triple negative breast cancer.    She made informed decision to proceed with wkly taxol, C1D1 12/5/2019. Then went to [place] and finished 12 wkly taxol.     She had favorable image response early March 2020.    Right lumpectomy 3/19/2020 found grade 3 IDC, T1c, 1.9 cm with ALI and DCIS,   and 1.2 cm. Superior margin is +. sLN is nsgative, [id number] disease.     Due to her advanced age Adriamycin is not recommended in her situation.    S/p RT 5/2020.     Since clinical trial is not open due to Covid, would recommend her to consider  adjuvant Xeloda post RT.   We discussed the pros and cons of doing adjuvant Xeloda.   Cale in detail the side effect of oral Xeloda therapy.    Does not seem to be very excited about this adjuvant treatment recommendations.  She wants more information more time to digest information.    Well's meal out Xeloda education material to her.    Also discussed the ER follow-up plan for triple negative breast cancer patients.  She is due MA bilateral in Oct.     All Qs are answered to PT's satisfaction.     Virtual VISIT time is 25  minutes, spent in dicussion and review patient's cancer and treatment history, labs and images results,  further treatment recommendations, follow up plan.

## 2020-06-02 NOTE — PROGRESS NOTES
"Precious Paris is a 79 year old female who is being evaluated via a billable video visit.        The patient has been notified of following:     \"This video visit will be conducted via a call between you and your physician/provider.  If a prescription is necessary we can send it directly to your pharmacy.  If lab work is needed we can place an order for that and you can then stop by our lab to have the test done at a later time.    Video visits maybe billed at different rates depending on your insurance coverage.  Please reach out to your insurance provider with any questions.    If during the course of the call the physician/provider feels a video visit is not appropriate, you will not be charged for this service.\"    Patient has given verbal consent for Video visit? Yes    How would you like to obtain your AVS? Loree    Patient would like the video invitation sent by: Text to cell phone: [phone]    Will anyone else be joining your video visit? Lluvia Ren CMA        "

## 2020-06-09 ENCOUNTER — DOCUMENTATION ONLY (OUTPATIENT)
Dept: RADIATION THERAPY | Facility: OUTPATIENT CENTER | Age: 80
End: 2020-06-09

## 2020-06-09 NOTE — PROGRESS NOTES
Department of Radiation Oncology  Radiation Therapy Center  [facility] [place] [address]  [phone]       Radiotherapy Treatment Summary          Treatment dates:  20-20    PATIENT: Precious Paris  MEDICAL RECORD NO: [id number]   : 1940    DIAGNOSIS: invasive ductal carcinoma of the right breast status post neoadjuvant chemotherapy with Taxol x12C and lumpectomy with sentinel lymph node evaluation  PATHOLOGY:  Final pathology demonstrated IDC, grade 3, triple negative, multifocal, positive LVSI, negative margin, 0 out of 1 sentinel lymph node                           STAGE: [id number]   INTENT OF RADIOTHERAPY: adjuvant RT.   CONCURRENT SYSTEMIC THERAPY: No    ONCOLOGIC HISTORY:   Ms. Paris is a 79 year old female with a diagnosis of right breast cancer.      The patient initially presented with a right breast mass prompting further evaluation.  She underwent evaluation with ultrasound and mammography in 2019 which demonstrated 2 hypoechoic masses in the area of concern.  On 10/30/2019 the patient underwent biopsy of a suspicious mass in the right breast, pathology demonstrated IDC.  On 11/15/2019 the patient underwent biopsy of the right breast mass at the 9 o'clock position, 9 cm from the nipple.  Final pathology demonstrated IDC, grade 3, ER negative, [place] negative, HER-2 negative.  The patient was started on neoadjuvant systemic treatment with weekly Taxol, x12 cycles.  Due to advanced age Adriamycin was not recommended.  Patient subsequently underwent lumpectomy of the right breast on 3/19/2020 by Dr. Frausto.  Final pathology demonstrated IDC, grade 3, triple negative, multifocal (tumor #1-1.9 cm; tumor #2-1.2 cm, positive LVSI, negative margin, 0 out of 1 sentinel lymph node, yp T1cN0.  Patient was seen by Dr. Villa of medical oncology who discussed consideration of adjuvant Xeloda after completion of radiation therapy. She subsequently presented in  radiation oncology department to discuss potential role of radiation therapy as a part of treatment strategy.      SITE OF TREATMENT: R breast    DATES  OF TREATMENT: 5/05/20-6/02/20    TOTAL DOSE OF TREATMENT: 5005 cGy    DOSE PER FRACTION OF TREATMENT: 267 cGy x 15 fractions to R breast + 200 cGy x 5 fractions R breast boost       COMMENT/TOXICITY:   Very mild breast pain. Energy adequate Applying skin creams.  Skin with mild erythema without desquamation.              PAIN MANAGEMENT:   Ibuprofen PRN breast pain. Neosporin with pain relief PRN pain                       FOLLOW UP PLAN:  1. RTC in 1 month with NP.  2. Continue skin care.   3. Hydrocortisone PRN pruritus.    Radiation Oncologist: Rocky Valdivia M.D.  Department of Radiation Oncology  [facility]

## 2020-06-21 NOTE — PROGRESS NOTES
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 Cruz to me 11/25/2019      HISTORY OF ONCOLOGY ILLNESS/CC:    At age 79, 10/2019 she presented with pain and redness, induration in the right breast associated with breast lump.  She was treated with antibiotic for 10 days redness resolved.    US/MA 10/2019  found 2 hypoechoic structures are seen at the patient's area of concern, 9:00-10:00 position 7 cm from the nipple on the RIGHT. One of these appears to have at least some component of an echogenic hilum and may represent an enlarged lymph node. The first measures 1.1 x 1.3 x 1.2 cm and the second measures 1.8 x 1.4 x 1.4 cm.   Survey of the axilla shows no evidence of adenopathy.    First biopsy identified invasive ductal carcinoma with marked necrosis and insufficient tissue for further evaluation.  Second time biopsy to the right breast 9 o'clock position 9 cm from the nipple, found grade 3 invasive ductal cancer, triple negative, associated with grade 3 DCIS.    She saw Dr. Frausto along with tumor conference discussion, due to her relatively healthy status, aggressive nature of her breast cancer, and the desire of breast conservation, neoadjuvant systemic treatment was recommended.    She made informed decision to proceed with wkly taxol, C1D1 12/5/2019. Then went to [place] and finished wkly taxol. Last dose of taxol was Feb 20th 2020. She had no tx delay.     She had favorable image response early March 2020.    Right lumpectomy 3/19/2020 found grade 3 IDC, T1c, 1.9 and 1.2 cm cm with ALI and DCIS, . Superior margin is +. sLN is nsgative, [id number] disease.       INTERVAL HISTORY:   She finished RT 5/2020.         PMH  retinopathy of the right eye, cataract  Hypertension, hyperlipidemia  Osteoarthritis, spinal stenosis,  "bilateral knees replacement, carpel tunnel surgery  Varicose vein of lower extremities ,s/p surgery      REVIEW OF SYSTEMS:   GENERAL: pt is in usual state of health.  No B symptoms  NEURO:   No headache, double vision, or focal weakness.  No neuropathy.   SKIN:  No chronic skin rash or skin infection.   CARDIOVASCULAR:  + High blood pressure and  hyperlipidemia. NO FOLEY  PULMONARY:  No shortness of breath, no pleurisy, no cough, no hemoptysis.   GI:  No abdominal pain, nausea, vomiting, constipation.  No diarrhea.  No bright red blood per rectum.   :  No urgency, frequency.  No recurrent urinary tract infection.  No kidney problems.   RHEUMATOLOGY/MUSCULOSKELETAL SYSTEM: Osteoarthritis, spinal stenosis, bilateral knees. Varicose vein of lower extremities ,s/p surgery  replacement, carpel tunnel surgery  ENDOCRINE:  No history of diabetes or thyroid problem. No complaints of hot flashes.   HEMATOLOGY:  No history of bleeding or thrombosis episode.   Oncology: breast cancer recently lumpectom  IMMUNOLOGY:  No recurrent fever or chills episode.  No recurrent infectious episode.   BREASTS/GYN: She reports tolerated RT fine with minimal erythematous changes on her right breast.    PSYCHIATRY:  No anxiety or depression.           PHYSICAL EXAMINATION:   VITAL SIGNS:  Blood pressure (!) 163/68, pulse 72, temperature 96.7  F (35.9  C), temperature source Tympanic, resp. rate 18, height 1.645 m (5' 4.76\"), weight 89.3 kg (196 lb 12.8 oz), SpO2 97 %, not currently breastfeeding.      GENERAL APPEARANCE:  looks like her stated age, very pleasant, not in acute distress.     ECO    ENT, MOUTH: Pupils are equally reactive to light.  Sclerae are anicteric.  Moist oral mucosa without lesion or ulcer.   Negative pharynx.  No oral thrush.   NECK:  Supple.  No jugular venous distention.  Thyroid is not palpable.   LYMPH NODES:  Superficial lymphadenopathy is not appreciable in the bilateral cervical, supraclavicular, axillary or " inguinal areas.   CARDIOVASCULAR:  S1, S2 regular with no murmurs or gallops.  No carotid or abdominal bruits.   PULMONARY:  Lungs are clear to auscultation and percussion bilaterally.  There is no wheezing or rhonchi.   GASTROINTESTINAL:  Abdomen is soft, nontender.  No hepatosplenomegaly.  No signs of ascites.  No mass appreciable.   MUSCULOSKELETAL/EXTREMITIES:  No edema.  No cyanotic changes.  No signs of joint deformity.  No lymphedema.   NEUROLOGIC:  Cranial nerves II-XII are grossly intact.  Sensation intact.  Muscle strength and muscle tone symmetrical, 5/5 throughout.   BACK  No spinal or paraspinal tenderness.  No CVA tenderness.   SKIN:  No petechiae.  No rash.  No signs of cellulitis.   BREASTS/CHEST/AXILLA:  Bilateral breast exam revealed no mass or skin changes or nipple discharges.No palpable axilla mass or LN.      PSYCHIATRIC: Oriented to time, person, and places. Normal mood and affect. Good memory. Proper insight and judgement.          CURRENT LAB DATA REVIEWED TODAY WITH PATIENT DURING THE VISIT  BRCA actionable panel is negative.     Right lumpectomy 3/19/2020 found grade 3 IDC, T1c, 1.9 cm and 1.2 cm, with ALI and DCIS. Superior margin is +. sLN is negative, [id number] disease.       OLD DATA REVIEWED TODAY WITH SUMMARY WITH PATIENT DURING THE VISIT    Second time biopsy to the right breast 9 o'clock position 9 cm from the nipple, found grade 3 invasive ductal cancer, triple negative, associated with grade 3 DCIS.       US/MA 10/2019  found 2 hypoechoic structures are seen at the patient's area of concern, 9:00-10:00 position 7 cm from the nipple on the RIGHT. One of these appears to have at least some component of an echogenic hilum and may represent an enlarged lymph node. The first measures 1.1 x 1.3 x 1.2 cm and the second measures 1.8 x 1.4 x 1.4 cm.   Survey of the axilla shows no evidence of adenopathy.          ASSESSMENT AND PLAN DISCUSSED WITH PATIENT TODAY DURING THE VISIT:    1.  dx  diagnosed right breast upper outer quadrant triple negative breast cancer 11/2019, triple negative breast cancer.    She made informed decision to proceed with wkly taxol, C1D1 12/5/2019. Then went to [place] and finished 12 wkly taxol.     She had favorable image response early March 2020.    Right lumpectomy 3/19/2020 found grade 3 IDC, T1c, 1.9 cm with ALI and DCIS,   and 1.2 cm. Superior margin is +. sLN is nsgative, [id number] disease.     Due to her advanced age Adriamycin was not recommended in her situation.    S/p RT 5/2020.     Since clinical trial is not open due to Covid, would recommend her to consider adjuvant Xeloda post RT.   We discussed the pros and cons of doing adjuvant Xeloda.     We discussed CREAT-X data: post neoadjuvant Her2 -, if not achieved CR, 8 cycle of Xeloda 2500mg/m2/day D1-14, q 21 days + standard of care (anti hormone therapy or none) improves 5 yr DFS 67%--74%, and 5 yr OS 84% to 89%    Also discussed the ASCO 2020 new maintenance therapy for TNBC with metronomic capecitabine 650mg/m2 daily straight for one year. the 5-year DFS rate with capecitabine was significantly better than with observation (83% vs 73%, respectively; hazard ratio [HR], 0.63; 95% CI, 0.42-0.96; P = .027).  5-year DDFS rate was significantly better with capecitabine versus observation (85% vs 76%, respectively; HR, 0.56; 95% CI, 0.37-0.90; P = .016).    She has multiple Qs regarding Xeloda use.   All answered to her satisfaction.    Also discussed the ER follow-up plan for triple negative breast cancer patients.  She is due MA bilateral in Oct.     All Qs are answered to PT's satisfaction.      VISIT time is 40 minutes, more than 25 minutes spent in dicussion and review patient's cancer and treatment history, labs and images results,  further treatment recommendations, follow up plan.

## 2020-06-23 ENCOUNTER — ONCOLOGY VISIT (OUTPATIENT)
Dept: ONCOLOGY | Facility: CLINIC | Age: 80
End: 2020-06-23
Attending: INTERNAL MEDICINE
Payer: COMMERCIAL

## 2020-06-23 VITALS
TEMPERATURE: 96.7 F | SYSTOLIC BLOOD PRESSURE: 163 MMHG | RESPIRATION RATE: 18 BRPM | HEIGHT: 65 IN | DIASTOLIC BLOOD PRESSURE: 68 MMHG | WEIGHT: 196.8 LBS | OXYGEN SATURATION: 97 % | HEART RATE: 72 BPM | BODY MASS INDEX: 32.79 KG/M2

## 2020-06-23 DIAGNOSIS — Z17.1 MALIGNANT NEOPLASM OF UPPER-OUTER QUADRANT OF RIGHT BREAST IN FEMALE, ESTROGEN RECEPTOR NEGATIVE (H): Primary | ICD-10-CM

## 2020-06-23 DIAGNOSIS — C50.411 MALIGNANT NEOPLASM OF UPPER-OUTER QUADRANT OF RIGHT BREAST IN FEMALE, ESTROGEN RECEPTOR NEGATIVE (H): Primary | ICD-10-CM

## 2020-06-23 PROCEDURE — 99215 OFFICE O/P EST HI 40 MIN: CPT | Performed by: INTERNAL MEDICINE

## 2020-06-23 PROCEDURE — G0463 HOSPITAL OUTPT CLINIC VISIT: HCPCS

## 2020-06-23 ASSESSMENT — MIFFLIN-ST. JEOR: SCORE: 1369.81

## 2020-06-23 ASSESSMENT — PAIN SCALES - GENERAL: PAINLEVEL: NO PAIN (0)

## 2020-06-23 NOTE — LETTER
6/23/2020         RE: Precious Paris  [address] [place] [address]        Dear Colleague,    Thank you for referring your patient, Precious Paris, to the [facility]. Please see a copy of my visit note below.      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 Cruz to me 11/25/2019      HISTORY OF ONCOLOGY ILLNESS/CC:    At age 79, 10/2019 she presented with pain and redness, induration in the right breast associated with breast lump.  She was treated with antibiotic for 10 days redness resolved.    US/MA 10/2019  found 2 hypoechoic structures are seen at the patient's area of concern, 9:00-10:00 position 7 cm from the nipple on the RIGHT. One of these appears to have at least some component of an echogenic hilum and may represent an enlarged lymph node. The first measures 1.1 x 1.3 x 1.2 cm and the second measures 1.8 x 1.4 x 1.4 cm.   Survey of the axilla shows no evidence of adenopathy.    First biopsy identified invasive ductal carcinoma with marked necrosis and insufficient tissue for further evaluation.  Second time biopsy to the right breast 9 o'clock position 9 cm from the nipple, found grade 3 invasive ductal cancer, triple negative, associated with grade 3 DCIS.    She saw Dr. Frausto along with tumor conference discussion, due to her relatively healthy status, aggressive nature of her breast cancer, and the desire of breast conservation, neoadjuvant systemic treatment was recommended.    She made informed decision to proceed with wkly taxol, C1D1 12/5/2019. Then went to [place] and finished wkly taxol. Last dose of taxol was Feb 20th 2020. She had no tx delay.     She had favorable image response early March 2020.    Right lumpectomy 3/19/2020 found grade 3 IDC, T1c, 1.9 and 1.2 cm cm with  "ALI and DCIS, . Superior margin is +. sLN is nsgative, [id number] disease.       INTERVAL HISTORY:   She finished RT 2020.         PMH  retinopathy of the right eye, cataract  Hypertension, hyperlipidemia  Osteoarthritis, spinal stenosis, bilateral knees replacement, carpel tunnel surgery  Varicose vein of lower extremities ,s/p surgery      REVIEW OF SYSTEMS:   GENERAL: pt is in usual state of health.  No B symptoms  NEURO:   No headache, double vision, or focal weakness.  No neuropathy.   SKIN:  No chronic skin rash or skin infection.   CARDIOVASCULAR:  + High blood pressure and  hyperlipidemia. NO FOLEY  PULMONARY:  No shortness of breath, no pleurisy, no cough, no hemoptysis.   GI:  No abdominal pain, nausea, vomiting, constipation.  No diarrhea.  No bright red blood per rectum.   :  No urgency, frequency.  No recurrent urinary tract infection.  No kidney problems.   RHEUMATOLOGY/MUSCULOSKELETAL SYSTEM: Osteoarthritis, spinal stenosis, bilateral knees. Varicose vein of lower extremities ,s/p surgery  replacement, carpel tunnel surgery  ENDOCRINE:  No history of diabetes or thyroid problem. No complaints of hot flashes.   HEMATOLOGY:  No history of bleeding or thrombosis episode.   Oncology: breast cancer recently lumpectom  IMMUNOLOGY:  No recurrent fever or chills episode.  No recurrent infectious episode.   BREASTS/GYN: She reports tolerated RT fine with minimal erythematous changes on her right breast.    PSYCHIATRY:  No anxiety or depression.           PHYSICAL EXAMINATION:   VITAL SIGNS:  Blood pressure (!) 163/68, pulse 72, temperature 96.7  F (35.9  C), temperature source Tympanic, resp. rate 18, height 1.645 m (5' 4.76\"), weight 89.3 kg (196 lb 12.8 oz), SpO2 97 %, not currently breastfeeding.      GENERAL APPEARANCE:  looks like her stated age, very pleasant, not in acute distress.     ECO    ENT, MOUTH: Pupils are equally reactive to light.  Sclerae are anicteric.  Moist oral mucosa without lesion " or ulcer.   Negative pharynx.  No oral thrush.   NECK:  Supple.  No jugular venous distention.  Thyroid is not palpable.   LYMPH NODES:  Superficial lymphadenopathy is not appreciable in the bilateral cervical, supraclavicular, axillary or inguinal areas.   CARDIOVASCULAR:  S1, S2 regular with no murmurs or gallops.  No carotid or abdominal bruits.   PULMONARY:  Lungs are clear to auscultation and percussion bilaterally.  There is no wheezing or rhonchi.   GASTROINTESTINAL:  Abdomen is soft, nontender.  No hepatosplenomegaly.  No signs of ascites.  No mass appreciable.   MUSCULOSKELETAL/EXTREMITIES:  No edema.  No cyanotic changes.  No signs of joint deformity.  No lymphedema.   NEUROLOGIC:  Cranial nerves II-XII are grossly intact.  Sensation intact.  Muscle strength and muscle tone symmetrical, 5/5 throughout.   BACK  No spinal or paraspinal tenderness.  No CVA tenderness.   SKIN:  No petechiae.  No rash.  No signs of cellulitis.   BREASTS/CHEST/AXILLA:  Bilateral breast exam revealed no mass or skin changes or nipple discharges.No palpable axilla mass or LN.      PSYCHIATRIC: Oriented to time, person, and places. Normal mood and affect. Good memory. Proper insight and judgement.          CURRENT LAB DATA REVIEWED TODAY WITH PATIENT DURING THE VISIT  BRCA actionable panel is negative.     Right lumpectomy 3/19/2020 found grade 3 IDC, T1c, 1.9 cm and 1.2 cm, with ALI and DCIS. Superior margin is +. sLN is negative, [id number] disease.       OLD DATA REVIEWED TODAY WITH SUMMARY WITH PATIENT DURING THE VISIT    Second time biopsy to the right breast 9 o'clock position 9 cm from the nipple, found grade 3 invasive ductal cancer, triple negative, associated with grade 3 DCIS.       US/MA 10/2019  found 2 hypoechoic structures are seen at the patient's area of concern, 9:00-10:00 position 7 cm from the nipple on the RIGHT. One of these appears to have at least some component of an echogenic hilum and may represent an  enlarged lymph node. The first measures 1.1 x 1.3 x 1.2 cm and the second measures 1.8 x 1.4 x 1.4 cm.   Survey of the axilla shows no evidence of adenopathy.          ASSESSMENT AND PLAN DISCUSSED WITH PATIENT TODAY DURING THE VISIT:    1.  dx diagnosed right breast upper outer quadrant triple negative breast cancer 11/2019, triple negative breast cancer.    She made informed decision to proceed with wkly taxol, C1D1 12/5/2019. Then went to [place] and finished 12 wkly taxol.     She had favorable image response early March 2020.    Right lumpectomy 3/19/2020 found grade 3 IDC, T1c, 1.9 cm with ALI and DCIS,   and 1.2 cm. Superior margin is +. sLN is nsgative, [id number] disease.     Due to her advanced age Adriamycin was not recommended in her situation.    S/p RT 5/2020.     Since clinical trial is not open due to Covid, would recommend her to consider adjuvant Xeloda post RT.   We discussed the pros and cons of doing adjuvant Xeloda.     We discussed CREAT-X data: post neoadjuvant Her2 -, if not achieved CR, 8 cycle of Xeloda 2500mg/m2/day D1-14, q 21 days + standard of care (anti hormone therapy or none) improves 5 yr DFS 67%--74%, and 5 yr OS 84% to 89%    Also discussed the ASCO 2020 new maintenance therapy for TNBC with metronomic capecitabine 650mg/m2 daily straight for one year. the 5-year DFS rate with capecitabine was significantly better than with observation (83% vs 73%, respectively; hazard ratio [HR], 0.63; 95% CI, 0.42-0.96; P = .027).  5-year DDFS rate was significantly better with capecitabine versus observation (85% vs 76%, respectively; HR, 0.56; 95% CI, 0.37-0.90; P = .016).    She has multiple Qs regarding Xeloda use.   All answered to her satisfaction.    Also discussed the ER follow-up plan for triple negative breast cancer patients.  She is due MA bilateral in Oct.     All Qs are answered to PT's satisfaction.      VISIT time is 40 minutes, more than 25 minutes spent in dicussion and review  "patient's cancer and treatment history, labs and images results,  further treatment recommendations, follow up plan.         Oncology Rooming Note    June 23, 2020 8:55 AM   Precious Paris is a 79 year old female who presents for:    Chief Complaint   Patient presents with     Oncology Clinic Visit     Recheck Malignant neoplasm of upper-outer quadrant of right breast in female, estrogen receptor negative, talk about Xeloda     Initial Vitals: BP (!) 163/68 (BP Location: Left arm, Patient Position: Sitting, Cuff Size: Adult Regular)   Pulse 72   Temp 96.7  F (35.9  C) (Tympanic)   Resp 18   Ht 1.645 m (5' 4.76\")   Wt 89.3 kg (196 lb 12.8 oz)   SpO2 97%   BMI 32.99 kg/m   Estimated body mass index is 32.99 kg/m  as calculated from the following:    Height as of this encounter: 1.645 m (5' 4.76\").    Weight as of this encounter: 89.3 kg (196 lb 12.8 oz). Body surface area is 2.02 meters squared.  No Pain (0) Comment: Data Unavailable   No LMP recorded. Patient is postmenopausal.  Allergies reviewed: Yes  Medications reviewed: Yes    Medications: Medication refills not needed today.  Pharmacy name entered into [facility]: [facility] - [facility] [id number] [facility]    Clinical concerns: Recheck Malignant neoplasm of upper-outer quadrant of right breast in female, estrogen receptor negative, talk about Xeloda.   Would like more info on Xeloda.   What are my chances w/o Xeloda.       Estrella Ren [facility]                Again, thank you for allowing me to participate in the care of your patient.        Sincerely,        Rachell Villa MD, MD    "

## 2020-06-23 NOTE — PROGRESS NOTES
"Oncology Rooming Note    June 23, 2020 8:55 AM   Precious Paris is a 79 year old female who presents for:    Chief Complaint   Patient presents with     Oncology Clinic Visit     Recheck Malignant neoplasm of upper-outer quadrant of right breast in female, estrogen receptor negative, talk about Xeloda     Initial Vitals: BP (!) 163/68 (BP Location: Left arm, Patient Position: Sitting, Cuff Size: Adult Regular)   Pulse 72   Temp 96.7  F (35.9  C) (Tympanic)   Resp 18   Ht 1.645 m (5' 4.76\")   Wt 89.3 kg (196 lb 12.8 oz)   SpO2 97%   BMI 32.99 kg/m   Estimated body mass index is 32.99 kg/m  as calculated from the following:    Height as of this encounter: 1.645 m (5' 4.76\").    Weight as of this encounter: 89.3 kg (196 lb 12.8 oz). Body surface area is 2.02 meters squared.  No Pain (0) Comment: Data Unavailable   No LMP recorded. Patient is postmenopausal.  Allergies reviewed: Yes  Medications reviewed: Yes    Medications: Medication refills not needed today.  Pharmacy name entered into [facility]: [facility] [id number] [facility]    Clinical concerns: Recheck Malignant neoplasm of upper-outer quadrant of right breast in female, estrogen receptor negative, talk about Xeloda.   Would like more info on Xeloda.   What are my chances w/o Xeloda.       Estrella Ren, [facility]              "

## 2020-06-24 ENCOUNTER — OFFICE VISIT (OUTPATIENT)
Dept: DERMATOLOGY | Facility: CLINIC | Age: 80
End: 2020-06-24
Payer: COMMERCIAL

## 2020-06-24 VITALS — SYSTOLIC BLOOD PRESSURE: 144 MMHG | HEART RATE: 82 BPM | DIASTOLIC BLOOD PRESSURE: 81 MMHG | OXYGEN SATURATION: 97 %

## 2020-06-24 DIAGNOSIS — D22.9 MULTIPLE BENIGN NEVI: ICD-10-CM

## 2020-06-24 DIAGNOSIS — L82.1 SEBORRHEIC KERATOSIS: ICD-10-CM

## 2020-06-24 DIAGNOSIS — D48.5 NEOPLASM OF UNCERTAIN BEHAVIOR OF SKIN: Primary | ICD-10-CM

## 2020-06-24 DIAGNOSIS — D18.01 CHERRY ANGIOMA: ICD-10-CM

## 2020-06-24 DIAGNOSIS — L57.0 ACTINIC KERATOSIS: ICD-10-CM

## 2020-06-24 DIAGNOSIS — Z85.828 HISTORY OF NONMELANOMA SKIN CANCER: ICD-10-CM

## 2020-06-24 DIAGNOSIS — L81.4 LENTIGO: ICD-10-CM

## 2020-06-24 PROCEDURE — 11103 TANGNTL BX SKIN EA SEP/ADDL: CPT | Performed by: PHYSICIAN ASSISTANT

## 2020-06-24 PROCEDURE — 99213 OFFICE O/P EST LOW 20 MIN: CPT | Mod: 25 | Performed by: PHYSICIAN ASSISTANT

## 2020-06-24 PROCEDURE — 88305 TISSUE EXAM BY PATHOLOGIST: CPT | Mod: TC | Performed by: PHYSICIAN ASSISTANT

## 2020-06-24 PROCEDURE — 17000 DESTRUCT PREMALG LESION: CPT | Mod: 59 | Performed by: PHYSICIAN ASSISTANT

## 2020-06-24 PROCEDURE — 11102 TANGNTL BX SKIN SINGLE LES: CPT | Performed by: PHYSICIAN ASSISTANT

## 2020-06-24 PROCEDURE — 17003 DESTRUCT PREMALG LES 2-14: CPT | Mod: 59 | Performed by: PHYSICIAN ASSISTANT

## 2020-06-24 NOTE — PATIENT INSTRUCTIONS
Wound Care Instructions     FOR SUPERFICIAL WOUNDS     [facility] [phone]    [facility] [phone]      Left arm & nose                 AFTER 24 HOURS YOU SHOULD REMOVE THE BANDAGE AND BEGIN DAILY DRESSING CHANGES AS FOLLOWS:     1) Remove Dressing.     2) Clean and dry the area with tap water using a Q-tip or sterile gauze pad.     3) Apply Vaseline, Aquaphor, Polysporin ointment or Bacitracin ointment over entire wound.  Do NOT use Neosporin ointment.     4) Cover the wound with a band-aid, or a sterile non-stick gauze pad and micropore paper tape      REPEAT THESE INSTRUCTIONS AT LEAST ONCE A DAY UNTIL THE WOUND HAS COMPLETELY HEALED.    It is an old wives tale that a wound heals better when it is exposed to air and allowed to dry out. The wound will heal faster with a better cosmetic result if it is kept moist with ointment and covered with a bandage.    **Do not let the wound dry out.**      Supplies Needed:      *Cotton tipped applicators (Q-tips)    *Polysporin Ointment or Bacitracin Ointment (NOT NEOSPORIN)    *Band-aids or non-stick gauze pads and micropore paper tape.      PATIENT INFORMATION:    During the healing process you will notice a number of changes. All wounds develop a small halo of redness surrounding the wound.  This means healing is occurring. Severe itching with extensive redness usually indicates sensitivity to the ointment or bandage tape used to dress the wound.  You should call our office if this develops.      Swelling  and/or discoloration around your surgical site is common, particularly when performed around the eye.    All wounds normally drain.  The larger the wound the more drainage there will be.  After 7-10 days, you will notice the wound beginning to shrink and new skin will begin to grow.  The wound is healed when you can see skin has formed over the entire area.  A healed wound has a healthy, shiny look to the surface and is red to dark  pink in color to normalize.  Wounds may take approximately 4-6 weeks to heal.  Larger wounds may take 6-8 weeks.  After the wound is healed you may discontinue dressing changes.    You may experience a sensation of tightness as your wound heals. This is normal and will gradually subside.    Your healed wound may be sensitive to temperature changes. This sensitivity improves with time, but if you re having a lot of discomfort, try to avoid temperature extremes.    Patients frequently experience itching after their wound appears to have healed because of the continue healing under the skin.  Plain Vaseline will help relieve the itching.        POSSIBLE COMPLICATIONS    BLEEDIN. Leave the bandage in place.  2. Use tightly rolled up gauze or a cloth to apply direct pressure over the bandage for 30  minutes.  3. Reapply pressure for an additional 30 minutes if necessary  4. Use additional gauze and tape to maintain pressure once the bleeding has stopped.

## 2020-06-24 NOTE — LETTER
6/24/2020         RE: Precious Paris  [address] [place] [address]        Dear Colleague,    Thank you for referring your patient, Precious Paris, to the [facility]. Please see a copy of my visit note below.    Precious Paris is a 79 year old year old female patient here today for spot on left arm. Present for a few months, sensitive to pressure. She notes it is not healing. Patient has no other skin complaints today.  Remainder of the HPI, Meds, PMH, Allergies, FH, and SH was reviewed in chart.    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     anosmia     Diverticulosis of colon (without mention of hemorrhage)      Other and unspecified hyperlipidemia      Pain in joint, lower leg      Plantar fascial fibromatosis      PURE HYPERCHOLESTEROLEM 9/9/2007     PURE HYPERCHOLESTEROLEM 9/9/2007     Squamous cell carcinoma        Past Surgical History:   Procedure Laterality Date     BIOPSY       HC COLONOSCOPY THRU STOMA, DIAGNOSTIC  04/05    diverticulosis and hemorroids, due 2015     INSERT PORT VASCULAR ACCESS N/A 11/29/2019    Procedure: INSERTION OF VENOUS ACCESS PORT;  Surgeon: Hair Broderick DO;  Location: [facility]     JOINT REPLACEMTN, KNEE RT/LT  2010    right     JOINT REPLACEMTN, KNEE RT/LT  2011    left     LIGATE VEIN VARICOSE, PHLEBECTOMY MULTIPLE STAB, COMBINED  10/17/2013    Procedure: COMBINED LIGATE VEIN VARICOSE, PHLEBECTOMY MULTIPLE STAB;  Phlebectomy of Right Knee Varicose Veins;  Surgeon: Andrea Duffy MD;  Location: [facility]     LUMPECTOMY BREAST WITH SENTINEL NODE, COMBINED Right 3/19/2020    Procedure: LUMPECTOMY, BREAST, WITH SENTINEL LYMPH NODE BIOPSY, Right Lumpectomy With  Right [place] Lymph Node Biopsy And Luisa Cath Removal;  Surgeon: Venancio Frausto MD;  Location: [facility]     OSTEOTOMY FOOT  8/19/2013    Procedure: OSTEOTOMY FOOT;  Left 2nd metatarsal osteotomy, 2nd Metatarsophalangeal joint & 2nd toe correction;  Surgeon: Jose Phillips DPM;  Location: [facility]     PHACOEMULSIFICATION WITH STANDARD INTRAOCULAR LENS IMPLANT Left 12/7/2017    Procedure: PHACOEMULSIFICATION WITH STANDARD INTRAOCULAR LENS IMPLANT;  Left Cataract Removal with Implant;  Surgeon: Mata Deleon MD;  Location: [facility]     SURGICAL HISTORY OF -   1979    Stripping of Veins in Left Leg     SURGICAL HISTORY OF -   1970    Bilateral Tubal Ligation     SURGICAL HISTORY OF -   2000    Carpal Tunnel Repair, right     SURGICAL HISTORY OF -   9-12-08    Rt let ablation        Family History   Problem Relation Age of Onset     Hypertension Mother      Cerebrovascular Disease Mother      Hypertension Father      Cerebrovascular Disease Father      Hypertension Brother      Cancer Brother         lymphoma     Hypertension Sister      Gastrointestinal Disease Sister      Hypertension Brother      Cancer Brother         melanoma     Eye Disorder Brother      Hypertension Brother      Hypertension Brother      Heart Defect Son        Social History     Socioeconomic History     Marital status:      Spouse name: Not on file     Number of children: Not on file     Years of education: Not on file     Highest education level: Not on file   Occupational History     Not on file   Social Needs     Financial resource strain: Not on file     Food insecurity     Worry: Not on file     Inability: Not on file     Transportation needs     Medical: Not on file     Non-medical: Not on file   Tobacco Use     Smoking status: Former Smoker     Smokeless tobacco: Never Used     Tobacco comment: quit 20 years ago   Substance and Sexual Activity     Alcohol use: Yes     Comment: occ.     Drug use: No     Sexual activity: Yes      Partners: Male   Lifestyle     Physical activity     Days per week: Not on file     Minutes per session: Not on file     Stress: Not on file   Relationships     Social connections     Talks on phone: Not on file     Gets together: Not on file     Attends Sikhism service: Not on file     Active member of club or organization: Not on file     Attends meetings of clubs or organizations: Not on file     Relationship status: Not on file     Intimate partner violence     Fear of current or ex partner: Not on file     Emotionally abused: Not on file     Physically abused: Not on file     Forced sexual activity: Not on file   Other Topics Concern     Parent/sibling w/ CABG, MI or angioplasty before 65F 55M? No   Social History Narrative     Not on file       Outpatient Encounter Medications as of 6/24/2020   Medication Sig Dispense Refill     ASPIRIN 81 MG OR TABS Take 1 tablet by mouth daily       CALCIUM OR Take 1 tablet by mouth daily        CENTRUM SILVER OR 1 TABLET DAILY       fish oil-omega-3 fatty acids (OMEGA 3) 1000 MG capsule Take 1 capsule (1 g) by mouth daily 30 capsule 1     IBUPROFEN 200 MG OR TABS Take 400 mg by mouth 2 times daily as needed for pain       losartan-hydrochlorothiazide (HYZAAR) 50-12.5 MG tablet TAKE 1 TABLET BY MOUTH EVERY MORNING 90 tablet 0     Melatonin 10 MG TABS tablet Take 10 mg by mouth At Bedtime       simvastatin (ZOCOR) 20 MG tablet Take 1 tablet (20 mg) by mouth At Bedtime 90 tablet 3     vitamin B complex with vitamin C (VITAMIN  B COMPLEX) tablet Take 1 tablet by mouth daily       VITAMIN C OR 1 DAILY       VITAMIN D, CHOLECALCIFEROL, PO Take 25 Units by mouth daily        vitamin E 400 units TABS Take by mouth daily 2 tablets daily       No facility-administered encounter medications on file as of 6/24/2020.              Review Of Systems  Skin: As above  Eyes: negative  Ears/Nose/Throat: negative  Respiratory: No shortness of breath, dyspnea on exertion, cough, or  hemoptysis  Cardiovascular: negative  Gastrointestinal: negative  Genitourinary: negative  Musculoskeletal: negative  Neurologic: negative  Psychiatric: negative  Hematologic/Lymphatic/Immunologic: negative  Endocrine: negative      O:   NAD, WDWN, Alert & Oriented, Mood & Affect wnl, Vitals stable   Here today alone   BP (!) 144/81   Pulse 82   SpO2 97%    General appearance normal   Vitals stable   Alert, oriented and in no acute distress     1.2 cm pink scaly plaque on left forearm   0.6 cm pink scaly papule on left nasal bridge   Stuck on papules and brown macules on trunk and ext   Red papules on trunk  Brown papules and macules with regular pigment network and borders on torso and extremities      The remainder of skin exam is normal       Eyes: Conjunctivae/lids:Normal     ENT: Lips: normal    MSK:Normal    Cardiovascular: peripheral edema none    Pulm: Breathing Normal    Neuro/Psych: Orientation:Alert and Orientedx3 ; Mood/Affect:normal   A/P:  1. R/O SCC on left forearm   TANGENTIAL BIOPSY SENT OUT:  After consent, anesthesia with LEC and prep, tangential excision performed and specimen sent out for permanent section histology.  No complications and routine wound care. Patient told to call our office in 1-2 weeks for result.      2. R/O ak vs nmsc on left nasal bridge   TANGENTIAL BIOPSY SENT OUT:  After consent, anesthesia with LEC and prep, tangential excision performed and specimen sent out for permanent section histology.  No complications and routine wound care. Patient told to call our office in 1-2 weeks for result.      3. Actinic keratosis on right ear, left cheek,  LN2:  Treated with LN2 for 5s for 1-2 cycles. Warned risks of blistering, pain, pigment change, scarring, and incomplete resolution.  Advised patient to return if lesions do not completely resolve.  Wound care sheet given.  4. Inflamed seborrheic keratosis on right posterior shoulder x 2  LN2:  Treated with LN2 for 5s for 1-2 cycles.  Warned risks of blistering, pain, pigment change, scarring, and incomplete resolution.  Advised patient to return if lesions do not completely resolve.  Wound care sheet given.  4. Seborrheic keratosis, lentigo, angioma, benign nevi, History of NMSC  BENIGN LESIONS DISCUSSED WITH PATIENT:  I discussed the specifics of tumor, prognosis, and genetics of benign lesions.  I explained that treatment of these lesions would be purely cosmetic and not medically neccessary.  I discussed with patient different removal options including excision, cautery and /or laser.      Nature and genetics of benign skin lesions dicussed with patient.  Signs and Symptoms of skin cancer discussed with patient.  ABCDEs of melanoma reviewed with patient.  Patient encouraged to perform monthly skin exams.  UV precautions reviewed with patient.  Risks of non-melanoma skin cancer discussed with patient   Return to clinic pending biopsy results.     Again, thank you for allowing me to participate in the care of your patient.        Sincerely,        Renee Cardenas PA-C

## 2020-06-24 NOTE — NURSING NOTE
Chief Complaint   Patient presents with     Skin Check       Vitals:    06/24/20 0957 06/24/20 0958   BP: (!) 144/81 (!) 144/81   BP Location: Left arm    Patient Position: Sitting    Cuff Size: Adult Regular    Pulse: 82    SpO2: 97%      Wt Readings from Last 1 Encounters:   06/23/20 89.3 kg (196 lb 12.8 oz)       Lian Gordon LPN.................6/24/2020

## 2020-06-24 NOTE — PROGRESS NOTES
Precious Paris is a 79 year old year old female patient here today for spot on left arm. Present for a few months, sensitive to pressure. She notes it is not healing. Patient has no other skin complaints today.  Remainder of the HPI, Meds, PMH, Allergies, FH, and SH was reviewed in chart.    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     anosmia     Diverticulosis of colon (without mention of hemorrhage)      Other and unspecified hyperlipidemia      Pain in joint, lower leg      Plantar fascial fibromatosis      PURE HYPERCHOLESTEROLEM 9/9/2007     PURE HYPERCHOLESTEROLEM 9/9/2007     Squamous cell carcinoma        Past Surgical History:   Procedure Laterality Date     BIOPSY       HC COLONOSCOPY THRU STOMA, DIAGNOSTIC  04/05    diverticulosis and hemorroids, due 2015     INSERT PORT VASCULAR ACCESS N/A 11/29/2019    Procedure: INSERTION OF VENOUS ACCESS PORT;  Surgeon: Hair Broderick DO;  Location: [facility]     JOINT REPLACEMTN, KNEE RT/LT  2010    right     JOINT REPLACEMTN, KNEE RT/LT  2011    left     LIGATE VEIN VARICOSE, PHLEBECTOMY MULTIPLE STAB, COMBINED  10/17/2013    Procedure: COMBINED LIGATE VEIN VARICOSE, PHLEBECTOMY MULTIPLE STAB;  Phlebectomy of Right Knee Varicose Veins;  Surgeon: Andrea Duffy MD;  Location: [facility]     LUMPECTOMY BREAST WITH SENTINEL NODE, COMBINED Right 3/19/2020    Procedure: LUMPECTOMY, BREAST, WITH SENTINEL LYMPH NODE BIOPSY, Right Lumpectomy With Right [place] Lymph Node Biopsy And Luisa Cath Removal;  Surgeon: Venancio Frausto MD;  Location: [facility]     OSTEOTOMY FOOT  8/19/2013    Procedure: OSTEOTOMY FOOT;  Left 2nd metatarsal osteotomy, 2nd Metatarsophalangeal joint & 2nd toe correction;   Surgeon: Jose Phillips DPM;  Location: [facility]     PHACOEMULSIFICATION WITH STANDARD INTRAOCULAR LENS IMPLANT Left 12/7/2017    Procedure: PHACOEMULSIFICATION WITH STANDARD INTRAOCULAR LENS IMPLANT;  Left Cataract Removal with Implant;  Surgeon: Mata Deleon MD;  Location: [facility]     SURGICAL HISTORY OF -   1979    Stripping of Veins in Left Leg     SURGICAL HISTORY OF -   1970    Bilateral Tubal Ligation     SURGICAL HISTORY OF -   2000    Carpal Tunnel Repair, right     SURGICAL HISTORY OF - 9-12-08    Rt let ablation        Family History   Problem Relation Age of Onset     Hypertension Mother      Cerebrovascular Disease Mother      Hypertension Father      Cerebrovascular Disease Father      Hypertension Brother      Cancer Brother         lymphoma     Hypertension Sister      Gastrointestinal Disease Sister      Hypertension Brother      Cancer Brother         melanoma     Eye Disorder Brother      Hypertension Brother      Hypertension Brother      Heart Defect Son        Social History     Socioeconomic History     Marital status:      Spouse name: Not on file     Number of children: Not on file     Years of education: Not on file     Highest education level: Not on file   Occupational History     Not on file   Social Needs     Financial resource strain: Not on file     Food insecurity     Worry: Not on file     Inability: Not on file     Transportation needs     Medical: Not on file     Non-medical: Not on file   Tobacco Use     Smoking status: Former Smoker     Smokeless tobacco: Never Used     Tobacco comment: quit 20 years ago   Substance and Sexual Activity     Alcohol use: Yes     Comment: occ.     Drug use: No     Sexual activity: Yes     Partners: Male   Lifestyle     Physical activity     Days per week: Not on file     Minutes per session: Not on file     Stress: Not on file   Relationships     Social connections     Talks on phone: Not on file     Gets together: Not on file      Attends Hindu service: Not on file     Active member of club or organization: Not on file     Attends meetings of clubs or organizations: Not on file     Relationship status: Not on file     Intimate partner violence     Fear of current or ex partner: Not on file     Emotionally abused: Not on file     Physically abused: Not on file     Forced sexual activity: Not on file   Other Topics Concern     Parent/sibling w/ CABG, MI or angioplasty before 65F 55M? No   Social History Narrative     Not on file       Outpatient Encounter Medications as of 6/24/2020   Medication Sig Dispense Refill     ASPIRIN 81 MG OR TABS Take 1 tablet by mouth daily       CALCIUM OR Take 1 tablet by mouth daily        CENTRUM SILVER OR 1 TABLET DAILY       fish oil-omega-3 fatty acids (OMEGA 3) 1000 MG capsule Take 1 capsule (1 g) by mouth daily 30 capsule 1     IBUPROFEN 200 MG OR TABS Take 400 mg by mouth 2 times daily as needed for pain       losartan-hydrochlorothiazide (HYZAAR) 50-12.5 MG tablet TAKE 1 TABLET BY MOUTH EVERY MORNING 90 tablet 0     Melatonin 10 MG TABS tablet Take 10 mg by mouth At Bedtime       simvastatin (ZOCOR) 20 MG tablet Take 1 tablet (20 mg) by mouth At Bedtime 90 tablet 3     vitamin B complex with vitamin C (VITAMIN  B COMPLEX) tablet Take 1 tablet by mouth daily       VITAMIN C OR 1 DAILY       VITAMIN D, CHOLECALCIFEROL, PO Take 25 Units by mouth daily        vitamin E 400 units TABS Take by mouth daily 2 tablets daily       No facility-administered encounter medications on file as of 6/24/2020.              Review Of Systems  Skin: As above  Eyes: negative  Ears/Nose/Throat: negative  Respiratory: No shortness of breath, dyspnea on exertion, cough, or hemoptysis  Cardiovascular: negative  Gastrointestinal: negative  Genitourinary: negative  Musculoskeletal: negative  Neurologic: negative  Psychiatric: negative  Hematologic/Lymphatic/Immunologic: negative  Endocrine: negative      O:   NAD, WDWN, Alert &  Oriented, Mood & Affect wnl, Vitals stable   Here today alone   BP (!) 144/81   Pulse 82   SpO2 97%    General appearance normal   Vitals stable   Alert, oriented and in no acute distress     1.2 cm pink scaly plaque on left forearm   0.6 cm pink scaly papule on left nasal bridge   Stuck on papules and brown macules on trunk and ext   Red papules on trunk  Brown papules and macules with regular pigment network and borders on torso and extremities      The remainder of skin exam is normal       Eyes: Conjunctivae/lids:Normal     ENT: Lips: normal    MSK:Normal    Cardiovascular: peripheral edema none    Pulm: Breathing Normal    Neuro/Psych: Orientation:Alert and Orientedx3 ; Mood/Affect:normal   A/P:  1. R/O SCC on left forearm   TANGENTIAL BIOPSY SENT OUT:  After consent, anesthesia with LEC and prep, tangential excision performed and specimen sent out for permanent section histology.  No complications and routine wound care. Patient told to call our office in 1-2 weeks for result.      2. R/O ak vs nmsc on left nasal bridge   TANGENTIAL BIOPSY SENT OUT:  After consent, anesthesia with LEC and prep, tangential excision performed and specimen sent out for permanent section histology.  No complications and routine wound care. Patient told to call our office in 1-2 weeks for result.      3. Actinic keratosis on right ear, left cheek,  LN2:  Treated with LN2 for 5s for 1-2 cycles. Warned risks of blistering, pain, pigment change, scarring, and incomplete resolution.  Advised patient to return if lesions do not completely resolve.  Wound care sheet given.  4. Inflamed seborrheic keratosis on right posterior shoulder x 2  LN2:  Treated with LN2 for 5s for 1-2 cycles. Warned risks of blistering, pain, pigment change, scarring, and incomplete resolution.  Advised patient to return if lesions do not completely resolve.  Wound care sheet given.  4. Seborrheic keratosis, lentigo, angioma, benign nevi, History of  NMSC  BENIGN LESIONS DISCUSSED WITH PATIENT:  I discussed the specifics of tumor, prognosis, and genetics of benign lesions.  I explained that treatment of these lesions would be purely cosmetic and not medically neccessary.  I discussed with patient different removal options including excision, cautery and /or laser.      Nature and genetics of benign skin lesions dicussed with patient.  Signs and Symptoms of skin cancer discussed with patient.  ABCDEs of melanoma reviewed with patient.  Patient encouraged to perform monthly skin exams.  UV precautions reviewed with patient.  Risks of non-melanoma skin cancer discussed with patient   Return to clinic pending biopsy results.

## 2020-06-25 ENCOUNTER — PATIENT OUTREACH (OUTPATIENT)
Dept: ONCOLOGY | Facility: CLINIC | Age: 80
End: 2020-06-25

## 2020-06-25 DIAGNOSIS — Z17.1 MALIGNANT NEOPLASM OF UPPER-OUTER QUADRANT OF RIGHT BREAST IN FEMALE, ESTROGEN RECEPTOR NEGATIVE (H): Primary | ICD-10-CM

## 2020-06-25 DIAGNOSIS — C50.411 MALIGNANT NEOPLASM OF UPPER-OUTER QUADRANT OF RIGHT BREAST IN FEMALE, ESTROGEN RECEPTOR NEGATIVE (H): Primary | ICD-10-CM

## 2020-06-25 LAB — COPATH REPORT: NORMAL

## 2020-06-25 NOTE — PROGRESS NOTES
Pt called to report she has decided to move forward with the [employer]el[employer].    Will update Dr. Villa to get the plan entered and follow up plan.    Patrica Perla RN on 6/25/2020 at 11:00 AM

## 2020-06-30 ENCOUNTER — TELEPHONE (OUTPATIENT)
Dept: FAMILY MEDICINE | Facility: CLINIC | Age: 80
End: 2020-06-30

## 2020-06-30 DIAGNOSIS — C50.411 MALIGNANT NEOPLASM OF UPPER-OUTER QUADRANT OF RIGHT BREAST IN FEMALE, ESTROGEN RECEPTOR NEGATIVE (H): Primary | ICD-10-CM

## 2020-06-30 DIAGNOSIS — I10 BENIGN ESSENTIAL HYPERTENSION: ICD-10-CM

## 2020-06-30 DIAGNOSIS — Z17.1 MALIGNANT NEOPLASM OF UPPER-OUTER QUADRANT OF RIGHT BREAST IN FEMALE, ESTROGEN RECEPTOR NEGATIVE (H): Primary | ICD-10-CM

## 2020-06-30 RX ORDER — CAPECITABINE 500 MG/1
TABLET, FILM COATED ORAL
Qty: 84 TABLET | Refills: 0 | Status: SHIPPED | OUTPATIENT
Start: 2020-06-30 | End: 2020-08-20

## 2020-06-30 NOTE — PROGRESS NOTES
Department of Radiation Oncology  Radiation Therapy Center  [facility] [place] [address]  [phone]         Radiation Oncology Follow-up Visit  2020      Precious Paris  MRN: [id number]   : 1940     DIAGNOSIS: invasive ductal carcinoma of the right breast status post neoadjuvant chemotherapy with Taxol x12C and lumpectomy with sentinel lymph node evaluation  PATHOLOGY:  Final pathology demonstrated IDC, grade 3, triple negative, multifocal, positive LVSI, negative margin, 0 out of 1 sentinel lymph node                           STAGE: [id number]   INTENT OF RADIOTHERAPY: adjuvant RT.   CONCURRENT SYSTEMIC THERAPY: No     ONCOLOGIC HISTORY:   Ms. Paris is a 79 year old female with a diagnosis of right breast cancer.      The patient initially presented with a right breast mass prompting further evaluation.  She underwent evaluation with ultrasound and mammography in 2019 which demonstrated 2 hypoechoic masses in the area of concern.  On 10/30/2019 the patient underwent biopsy of a suspicious mass in the right breast, pathology demonstrated IDC.  On 11/15/2019 the patient underwent biopsy of the right breast mass at the 9 o'clock position, 9 cm from the nipple.  Final pathology demonstrated IDC, grade 3, ER negative, TN negative, HER-2 negative.  The patient was started on neoadjuvant systemic treatment with weekly Taxol, x12 cycles.  Due to advanced age Adriamycin was not recommended.  Patient subsequently underwent lumpectomy of the right breast on 3/19/2020 by Dr. Frausto.  Final pathology demonstrated IDC, grade 3, triple negative, multifocal (tumor #1-1.9 cm; tumor #2-1.2 cm, positive LVSI, negative margin, 0 out of 1 sentinel lymph node, yp T1cN0.  Patient was seen by Dr. Villa of medical oncology who discussed consideration of adjuvant Xeloda after completion of radiation therapy. She subsequently presented in radiation oncology department to discuss  "potential role of radiation therapy as a part of treatment strategy.       SITE OF TREATMENT: R breast     DATES  OF TREATMENT: 5/05/20-6/02/20     TOTAL DOSE OF TREATMENT: 5005 cGy     DOSE PER FRACTION OF TREATMENT: 267 cGy x 15 fractions to R breast + 200 cGy x 5 fractions R breast boost       COMMENT/TOXICITY:   Very mild breast pain. Energy adequate Applying skin creams.  Skin with mild erythema without desquamation.                                                                                INTERVAL SINCE COMPLETION OF RADIATION THERAPY:   1 month    SUBJECTIVE:   Precious Paris is a 79 year old female who is scheduled today for routine 1 month follow up after completing radiation therapy.  She has seen healing of her skin reaction.  Residual hyperpigmentation. She denies having any desquamation after completion of RT. She reports one area near healed incision that is \"ithchy and dry\" and improving. She continues to moisturize.  Denies any cough or shortness of breath related to possible risk of interstitial pneumonitis.  Reports good ROM of right arm and shoulder. Denies fatigue. No lymphedema. No pain.     ROS otherwise negative on a 12-system review.        Current Outpatient Medications   Medication     ASPIRIN 81 MG OR TABS     CALCIUM OR     capecitabine (XELODA) 500 MG tablet     CENTRUM SILVER OR     fish oil-omega-3 fatty acids (OMEGA 3) 1000 MG capsule     IBUPROFEN 200 MG OR TABS     losartan-hydrochlorothiazide (HYZAAR) 50-12.5 MG tablet     Melatonin 10 MG TABS tablet     simvastatin (ZOCOR) 20 MG tablet     vitamin B complex with vitamin C (VITAMIN  B COMPLEX) tablet     VITAMIN C OR     VITAMIN D, CHOLECALCIFEROL, PO     vitamin E 400 units TABS     No current facility-administered medications for this visit.           Allergies   Allergen Reactions     Conjugated Estrogens Anaphylaxis and Other (See Comments)     Tightness in throat     Medroxyprogesterone Anaphylaxis and Other (See " Comments)     Tightness in throat     Ace Inhibitors Cough     Premarin      Tightness in throat     Provera [Medroxyprogesterone Acetate]      Tightness in throat       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     anosmia     Diverticulosis of colon (without mention of hemorrhage)      Other and unspecified hyperlipidemia      Pain in joint, lower leg      Plantar fascial fibromatosis      PURE HYPERCHOLESTEROLEM 9/9/2007     PURE HYPERCHOLESTEROLEM 9/9/2007     Squamous cell carcinoma          PHYSICAL EXAM:  There were no vitals taken for this visit.  No PE - telephone visit    LABS AND IMAGING:  none    IMPRESSION:   Ms. Paris is a 79 year old female with a  invasive ductal carcinoma of the right breast status post neoadjuvant chemotherapy with Taxol x12C and lumpectomy with sentinel lymph node evaluation. Final pathology demonstrated IDC, grade 3, triple negative, multifocal, positive LVSI, negative margin, 0 out of 1 sentinel lymph node, [id number]. Completed adjuvant radiation therapy 5005 cGy to right breast 5/05/20-6/02/20.    Adjuvant Xeloda (managed by Dr. Villa)     Seen by medical oncology (Dr. Villa) on 6/23/20 with negative breast exam.     PLAN:   Acute toxicities from RT improving. Has mild residual hyperpigmentation which will continue to improve with time. Discussed long term care with continued moisturizing of the treated breast, stretching and range of motion exercises, sun screen, and the rare possibility of interstitial pneumonitis and rib fracture. She should watch for any new skin lesions in the area of the radiation and if present have them evaluated.  Discussed possibility of scar formation from radiation and treatment with gentle massage. She will follow  up here 5 months with Dr. Valdivia (patient going to [place] for winter and leaving mid- December).    Patient will follow up with medical oncology for continued care and imaging (Dr. Villa).  According to NCCN guidelines, follow-up of breast cancer should include history and physical examination with emphasis on breast examination 1 to 4 times per year as clinically appropriate for 5 years, then annually.  Last screening bilateral mammogram was 10/2019. Emphasis is also on continuing with annual mammography (to have mammogram 10/20 with medical oncology)    Lymphedema.She remains at low risk for lymphedema.  She denies lymphedema at this time. Education completed. Will continue to monitor and refer to lymphedema management as needed.     Cancer screening.  should undergo routine screening for age group.     Healthy lifestyle.  She should continue to refrain from tobacco.  She will continue to see her primary care provider for general health maintenance.      Due to the concerns around COVID-19 and adhering to social distancing we conduct this visit over the telephone.   Phone call duration: 15 minutes      Otilia Gallegos [facility]  Radiation Therapy Center  [facility]  [address], [address]  [place] [address]

## 2020-06-30 NOTE — TELEPHONE ENCOUNTER
"Requested Prescriptions   Pending Prescriptions Disp Refills     losartan-hydrochlorothiazide (HYZAAR) 50-12.5 MG tablet 90 tablet 0     Sig: Take 1 tablet by mouth every morning       Angiotensin-II Receptors Failed - 6/30/2020  1:35 PM        Failed - Last blood pressure under 140/90 in past 12 months     BP Readings from Last 3 Encounters:   06/24/20 (!) 144/81   06/23/20 (!) 163/68   05/27/20 133/60                 Passed - Recent (12 mo) or future (30 days) visit within the authorizing provider's specialty     Patient has had an office visit with the authorizing provider or a provider within the authorizing providers department within the previous 12 mos or has a future within next 30 days. See \"Patient Info\" tab in inbasket, or \"Choose Columns\" in Meds & Orders section of the refill encounter.              Passed - Medication is active on med list        Passed - Patient is age 18 or older        Passed - No active pregnancy on record        Passed - Normal serum creatinine on file in past 12 months     Recent Labs   Lab Test 03/17/20  0926   CR 0.55       Ok to refill medication if creatinine is low          Passed - Normal serum potassium on file in past 12 months     Recent Labs   Lab Test 03/17/20  0926   POTASSIUM 3.6                    Passed - No positive pregnancy test in past 12 months       Diuretics (Including Combos) Protocol Failed - 6/30/2020  1:35 PM        Failed - Blood pressure under 140/90 in past 12 months     BP Readings from Last 3 Encounters:   06/24/20 (!) 144/81   06/23/20 (!) 163/68 05/27/20 133/60                 Passed - Recent (12 mo) or future (30 days) visit within the authorizing provider's specialty     Patient has had an office visit with the authorizing provider or a provider within the authorizing providers department within the previous 12 mos or has a future within next 30 days. See \"Patient Info\" tab in inbasket, or \"Choose Columns\" in Meds & Orders section of the " refill encounter.              Passed - Medication is active on med list        Passed - Patient is age 18 or older        Passed - No active pregancy on record        Passed - Normal serum creatinine on file in past 12 months     Recent Labs   Lab Test 03/17/20 0926   CR 0.55              Passed - Normal serum potassium on file in past 12 months     Recent Labs   Lab Test 03/17/20 0926   POTASSIUM 3.6                    Passed - Normal serum sodium on file in past 12 months     Recent Labs   Lab Test 03/17/20 0926                 Passed - No positive pregnancy test in past 12 months           Last Written Prescription Date:  4/3/2020  Last Fill Quantity: 90,  # refills: 0   Last office visit: 3/17/2020 with prescribing provider:  Alin   Future Office Visit:

## 2020-07-01 RX ORDER — LOSARTAN POTASSIUM AND HYDROCHLOROTHIAZIDE 12.5; 5 MG/1; MG/1
1 TABLET ORAL EVERY MORNING
Qty: 90 TABLET | Refills: 0 | Status: CANCELLED | OUTPATIENT
Start: 2020-07-01

## 2020-07-01 NOTE — TELEPHONE ENCOUNTER
Routing refill request to provider for review/approval because:  Elevated B/P noted @ Onc and Derm appt.  Advise.  [email or web address]

## 2020-07-01 NOTE — TELEPHONE ENCOUNTER
BP Readings from Last 6 Encounters:   06/24/20 (!) 144/81   06/23/20 (!) 163/68   05/27/20 133/60   05/20/20 137/60   05/13/20 131/60   05/06/20 138/77     Has been a year since I have seen her.      Okay to refill x 1 month, notify patient that she is due to be seen.    Ester Jorge M.D.

## 2020-07-01 NOTE — TELEPHONE ENCOUNTER
Scheduled appt for next week. Pt has enough meds to cover. Will address refills and labs @ her appt.  Kpavelrn

## 2020-07-03 ENCOUNTER — VIRTUAL VISIT (OUTPATIENT)
Dept: RADIATION THERAPY | Facility: OUTPATIENT CENTER | Age: 80
End: 2020-07-03
Payer: COMMERCIAL

## 2020-07-03 DIAGNOSIS — C50.911 INFILTRATING DUCTAL CARCINOMA OF RIGHT BREAST (H): Primary | ICD-10-CM

## 2020-07-03 NOTE — LETTER
7/3/2020         RE: Precious Paris  [address] [place] [address]        Dear Colleague,    Thank you for referring your patient, Precious Paris, to the RADIATION THERAPY CENTER. Please see a copy of my visit note below.       Department of Radiation Oncology  Radiation Therapy Center  [facility]  [place] [address]  [phone]         Radiation Oncology Follow-up Visit  2020      Precious Paris  MRN: [id number]   : 1940     DIAGNOSIS: invasive ductal carcinoma of the right breast status post neoadjuvant chemotherapy with Taxol x12C and lumpectomy with sentinel lymph node evaluation  PATHOLOGY:  Final pathology demonstrated IDC, grade 3, triple negative, multifocal, positive LVSI, negative margin, 0 out of 1 sentinel lymph node                           STAGE: [id number]   INTENT OF RADIOTHERAPY: adjuvant RT.   CONCURRENT SYSTEMIC THERAPY: No     ONCOLOGIC HISTORY:   Ms. Paris is a 79 year old female with a diagnosis of right breast cancer.      The patient initially presented with a right breast mass prompting further evaluation.  She underwent evaluation with ultrasound and mammography in 2019 which demonstrated 2 hypoechoic masses in the area of concern.  On 10/30/2019 the patient underwent biopsy of a suspicious mass in the right breast, pathology demonstrated IDC.  On 11/15/2019 the patient underwent biopsy of the right breast mass at the 9 o'clock position, 9 cm from the nipple.  Final pathology demonstrated IDC, grade 3, ER negative, OK negative, HER-2 negative.  The patient was started on neoadjuvant systemic treatment with weekly Taxol, x12 cycles.  Due to advanced age Adriamycin was not recommended.  Patient subsequently underwent lumpectomy of the right breast on 3/19/2020 by Dr. Frausto.  Final pathology demonstrated IDC, grade 3, triple negative, multifocal (tumor #1-1.9 cm; tumor #2-1.2 cm, positive LVSI, negative margin, 0 out  "of 1 sentinel lymph node, yp T1cN0.  Patient was seen by Dr. Villa of medical oncology who discussed consideration of adjuvant Xeloda after completion of radiation therapy. She subsequently presented in radiation oncology department to discuss potential role of radiation therapy as a part of treatment strategy.       SITE OF TREATMENT: R breast     DATES  OF TREATMENT: 5/05/20-6/02/20     TOTAL DOSE OF TREATMENT: 5005 cGy     DOSE PER FRACTION OF TREATMENT: 267 cGy x 15 fractions to R breast + 200 cGy x 5 fractions R breast boost       COMMENT/TOXICITY:   Very mild breast pain. Energy adequate Applying skin creams.  Skin with mild erythema without desquamation.                                                                                INTERVAL SINCE COMPLETION OF RADIATION THERAPY:   1 month    SUBJECTIVE:   Precious Paris is a 79 year old female who is scheduled today for routine 1 month follow up after completing radiation therapy.  She has seen healing of her skin reaction.  Residual hyperpigmentation. She denies having any desquamation after completion of RT. She reports one area near healed incision that is \"ithchy and dry\" and improving. She continues to moisturize.  Denies any cough or shortness of breath related to possible risk of interstitial pneumonitis.  Reports good ROM of right arm and shoulder. Denies fatigue. No lymphedema. No pain.     ROS otherwise negative on a 12-system review.        Current Outpatient Medications   Medication     ASPIRIN 81 MG OR TABS     CALCIUM OR     capecitabine (XELODA) 500 MG tablet     CENTRUM SILVER OR     fish oil-omega-3 fatty acids (OMEGA 3) 1000 MG capsule     IBUPROFEN 200 MG OR TABS     losartan-hydrochlorothiazide (HYZAAR) 50-12.5 MG tablet     Melatonin 10 MG TABS tablet     simvastatin (ZOCOR) 20 MG tablet     vitamin B complex with vitamin C (VITAMIN  B COMPLEX) tablet     VITAMIN C OR     VITAMIN D, CHOLECALCIFEROL, PO     vitamin E 400 units TABS     No " current facility-administered medications for this visit.           Allergies   Allergen Reactions     Conjugated Estrogens Anaphylaxis and Other (See Comments)     Tightness in throat     Medroxyprogesterone Anaphylaxis and Other (See Comments)     Tightness in throat     Ace Inhibitors Cough     Premarin      Tightness in throat     Provera [Medroxyprogesterone Acetate]      Tightness in throat       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     anosmia     Diverticulosis of colon (without mention of hemorrhage)      Other and unspecified hyperlipidemia      Pain in joint, lower leg      Plantar fascial fibromatosis      PURE HYPERCHOLESTEROLEM 9/9/2007     PURE HYPERCHOLESTEROLEM 9/9/2007     Squamous cell carcinoma          PHYSICAL EXAM:  There were no vitals taken for this visit.  No PE - telephone visit    LABS AND IMAGING:  none    IMPRESSION:   Ms. Paris is a 79 year old female with a  invasive ductal carcinoma of the right breast status post neoadjuvant chemotherapy with Taxol x12C and lumpectomy with sentinel lymph node evaluation. Final pathology demonstrated IDC, grade 3, triple negative, multifocal, positive LVSI, negative margin, 0 out of 1 sentinel lymph node, [id number]. Completed adjuvant radiation therapy 5005 cGy to right breast 5/05/20-6/02/20.    Adjuvant Xeloda (managed by Dr. Villa)     Seen by medical oncology (Dr. Villa) on 6/23/20 with negative breast exam.     PLAN:   Acute toxicities from RT improving. Has mild residual hyperpigmentation which will continue to improve with time. Discussed long term care with continued moisturizing of the treated breast, stretching and range of motion exercises, sun screen, and the rare possibility of interstitial  pneumonitis and rib fracture. She should watch for any new skin lesions in the area of the radiation and if present have them evaluated.  Discussed possibility of scar formation from radiation and treatment with gentle massage. She will follow up here 5 months with Dr. Valdivia (patient going to [place] for winter and leaving mid- December).    Patient will follow up with medical oncology for continued care and imaging (Dr. Villa).  According to NCCN guidelines, follow-up of breast cancer should include history and physical examination with emphasis on breast examination 1 to 4 times per year as clinically appropriate for 5 years, then annually.  Last screening bilateral mammogram was 10/2019. Emphasis is also on continuing with annual mammography (to have mammogram 10/20 with medical oncology)    Lymphedema.She remains at low risk for lymphedema.  She denies lymphedema at this time. Education completed. Will continue to monitor and refer to lymphedema management as needed.     Cancer screening.  should undergo routine screening for age group.     Healthy lifestyle.  She should continue to refrain from tobacco.  She will continue to see her primary care provider for general health maintenance.      Due to the concerns around COVID-19 and adhering to social distancing we conduct this visit over the telephone.   Phone call duration: 15 minutes      Otilia Gallegos [facility]  Radiation Therapy Center  [facility]  [address], [address]  [place] [address]

## 2020-07-03 NOTE — Clinical Note
5 months followup with Dr Valdivia (alternate with NP)  Patient going to [place] for winter mid December

## 2020-07-03 NOTE — NURSING NOTE
"Precious Paris is a 79 year old female who is being evaluated via a billable telephone visit.      The patient has been notified of following:     \"This telephone visit will be conducted via a call between you and your physician/provider. We have found that certain health care needs can be provided without the need for a physical exam.  This service lets us provide the care you need with a short phone conversation.  If a prescription is necessary we can send it directly to your pharmacy.  If lab work is needed we can place an order for that and you can then stop by our lab to have the test done at a later time.    Telephone visits are billed at different rates depending on your insurance coverage. During this emergency period, for some insurers they may be billed the same as an in-person visit.  Please reach out to your insurance provider with any questions.    If during the course of the call the physician/provider feels a telephone visit is not appropriate, you will not be charged for this service.\"    Patient has given verbal consent for Telephone visit?  Yes    What phone number would you like to be contacted at? cell    How would you like to obtain your AVS? Joseyt    Phone call duration: 4 minutes Rn time    Zeinab Jolley RN    FOLLOW-UP VISIT    Patient Name: Precious Paris      : 1940     Age: 79 year old        ______________________________________________________________________________     Chief Complaint   Patient presents with     Radiation Therapy     phone follow up     There were no vitals taken for this visit.     Date Radiation Completed: 20    Pain  Denies    Meds  Current Med List Reviewed: Yes  Medication Note:     Skin: Warm  Dry  Intact    Range of Motion: no problems    Respiratory: No shortness of breath, dyspnea on exertion, cough, or hemoptysis    Hormone Therapy: No triple negative    Lymphedema Follow up: No    Energy Level: normal      Appointments: "     DATE  Oncologist: Dr. Villa   6/23/20   Surgeon:    Primary:      Other Notes:

## 2020-07-06 ENCOUNTER — OFFICE VISIT (OUTPATIENT)
Dept: FAMILY MEDICINE | Facility: CLINIC | Age: 80
End: 2020-07-06
Payer: COMMERCIAL

## 2020-07-06 VITALS
OXYGEN SATURATION: 95 % | HEIGHT: 65 IN | RESPIRATION RATE: 16 BRPM | SYSTOLIC BLOOD PRESSURE: 130 MMHG | TEMPERATURE: 97 F | HEART RATE: 76 BPM | WEIGHT: 198 LBS | BODY MASS INDEX: 32.99 KG/M2 | DIASTOLIC BLOOD PRESSURE: 60 MMHG

## 2020-07-06 DIAGNOSIS — E78.5 HYPERLIPIDEMIA LDL GOAL <130: ICD-10-CM

## 2020-07-06 DIAGNOSIS — Z23 NEED FOR VACCINATION: ICD-10-CM

## 2020-07-06 DIAGNOSIS — C50.911 MALIGNANT NEOPLASM OF RIGHT FEMALE BREAST, UNSPECIFIED ESTROGEN RECEPTOR STATUS, UNSPECIFIED SITE OF BREAST (H): ICD-10-CM

## 2020-07-06 DIAGNOSIS — Z00.00 ENCOUNTER FOR MEDICARE ANNUAL WELLNESS EXAM: Primary | ICD-10-CM

## 2020-07-06 DIAGNOSIS — I10 BENIGN ESSENTIAL HYPERTENSION: ICD-10-CM

## 2020-07-06 LAB
ANION GAP SERPL CALCULATED.3IONS-SCNC: 4 MMOL/L (ref 3–14)
BUN SERPL-MCNC: 14 MG/DL (ref 7–30)
CALCIUM SERPL-MCNC: 9 MG/DL (ref 8.5–10.1)
CHLORIDE SERPL-SCNC: 105 MMOL/L (ref 94–109)
CHOLEST SERPL-MCNC: 185 MG/DL
CO2 SERPL-SCNC: 28 MMOL/L (ref 20–32)
CREAT SERPL-MCNC: 0.67 MG/DL (ref 0.52–1.04)
GFR SERPL CREATININE-BSD FRML MDRD: 84 ML/MIN/{1.73_M2}
GLUCOSE SERPL-MCNC: 105 MG/DL (ref 70–99)
HDLC SERPL-MCNC: 79 MG/DL
LDLC SERPL CALC-MCNC: 96 MG/DL
NONHDLC SERPL-MCNC: 106 MG/DL
POTASSIUM SERPL-SCNC: 4.4 MMOL/L (ref 3.4–5.3)
SODIUM SERPL-SCNC: 137 MMOL/L (ref 133–144)
TRIGL SERPL-MCNC: 49 MG/DL

## 2020-07-06 PROCEDURE — 90471 IMMUNIZATION ADMIN: CPT | Performed by: FAMILY MEDICINE

## 2020-07-06 PROCEDURE — 99397 PER PM REEVAL EST PAT 65+ YR: CPT | Mod: 25 | Performed by: FAMILY MEDICINE

## 2020-07-06 PROCEDURE — 80061 LIPID PANEL: CPT | Performed by: FAMILY MEDICINE

## 2020-07-06 PROCEDURE — 99213 OFFICE O/P EST LOW 20 MIN: CPT | Mod: 25 | Performed by: FAMILY MEDICINE

## 2020-07-06 PROCEDURE — 36415 COLL VENOUS BLD VENIPUNCTURE: CPT | Performed by: FAMILY MEDICINE

## 2020-07-06 PROCEDURE — 80048 BASIC METABOLIC PNL TOTAL CA: CPT | Performed by: FAMILY MEDICINE

## 2020-07-06 PROCEDURE — 90715 TDAP VACCINE 7 YRS/> IM: CPT | Performed by: FAMILY MEDICINE

## 2020-07-06 RX ORDER — SIMVASTATIN 20 MG
20 TABLET ORAL AT BEDTIME
Qty: 90 TABLET | Refills: 3 | Status: SHIPPED | OUTPATIENT
Start: 2020-07-06 | End: 2021-08-16

## 2020-07-06 RX ORDER — LOSARTAN POTASSIUM AND HYDROCHLOROTHIAZIDE 12.5; 5 MG/1; MG/1
1 TABLET ORAL EVERY MORNING
Qty: 90 TABLET | Refills: 3 | Status: SHIPPED | OUTPATIENT
Start: 2020-07-06 | End: 2021-07-08

## 2020-07-06 ASSESSMENT — PAIN SCALES - GENERAL: PAINLEVEL: NO PAIN (0)

## 2020-07-06 ASSESSMENT — MIFFLIN-ST. JEOR: SCORE: 1375.03

## 2020-07-06 NOTE — PROGRESS NOTES
Called pt today and she received the Xeloda, she will start today or tomorrow.     Follow up appts arranged for 07.28 with Labs prior.     Patrica Perla RN on 7/6/2020 at 12:28 PM

## 2020-07-06 NOTE — PATIENT INSTRUCTIONS
"The new Shingrix is supposed to be more effective at preventing shingles.  Even if you had Zostavax, this is recommended. I encourage people to check with their pharmacy (or ours) and have them run it through to check the cost.  It's often better covered through your pharmacy benefit.  It is a two part vaccine series - one now and one in 2-6 months.  Many people will feel a bit \"flu like\" with fever and body aches for a few days after the injection.  Taking ibuprofen can help with this.    Check with Dr. Villa on wether or not you can have this if you start the Xeloda.       Tetanus update today.    Lab today - will send results on [place].        Our Clinic hours are:  Mondays    7:20 am - 7 pm  Tues -  Fri  7:20 am - 5 pm    Clinic Phone: [phone]    The clinic lab opens at 7:30 am Mon - Fri and appointments are required.    [facility]  Ph. [phone]  Monday  8 am - 7pm  Tues - Fri 8 am - 5:30 pm         "

## 2020-07-06 NOTE — PROGRESS NOTES
SUBJECTIVE:   Precious Paris is a 79 year old female who presents for Preventive Visit.      Are you in the first 12 months of your Medicare coverage?  No    HPI  Do you feel safe in your environment? Yes    Have you ever done Advance Care Planning? (For example, a Health Directive, POLST, or a discussion with a medical provider or your loved ones about your wishes): Yes, advance care planning is on file.      Fall risk  Fallen 2 or more times in the past year?: No  Any fall with injury in the past year?: No    Cognitive Screening   1) Repeat 3 items (Leader, Season, Table)    2) Clock draw: NORMAL  3) 3 item recall: Recalls 2 objects   Results: 3 items recalled: COGNITIVE IMPAIRMENT LESS LIKELY    Mini-CogTM Copyright S Alivia. Licensed by the author for use in [place] [employer]; reprinted with permission (jacinda@[facility]). All rights reserved.      Do you have sleep apnea, excessive snoring or daytime drowsiness?: no    Reviewed and updated as needed this visit by clinical staff  Tobacco  Meds  Med Hx  Surg Hx  Fam Hx  Soc Hx        Reviewed and updated as needed this visit by Provider        Social History     Tobacco Use     Smoking status: Former Smoker     Smokeless tobacco: Never Used     Tobacco comment: quit 20 years ago   Substance Use Topics     Alcohol use: Yes     Comment: occ.     If you drink alcohol do you typically have >3 drinks per day or >7 drinks per week? No    Alcohol Use 6/28/2017   Prescreen: >3 drinks/day or >7 drinks/week? The patient does not drink >3 drinks per day nor >7 drinks per week.   No flowsheet data found.        Hyperlipidemia Follow-Up      Are you regularly taking any medication or supplement to lower your cholesterol?   Yes- Simvastatin    Are you having muscle aches or other side effects that you think could be caused by your cholesterol lowering medication?  No    Hypertension Follow-up      Do you check your blood pressure regularly outside of the clinic? No  "    Are you following a low salt diet? No    Are your blood pressures ever more than 140 on the top number (systolic) OR more   than 90 on the bottom number (diastolic), for example 140/90? No       A year ago patient was diagnosed with triple negative breast cancer, infiltrating ductal carcinoma.  She did chemo, surgery and radiation.  Dr. Villa has now recommended xeloda and she's on the fence about starting this.       Current providers sharing in care for this patient include:   Patient Care Team:  Ester Jorge MD as PCP - General (Family Practice)  Patrica Perla, RN as Specialty Care Coordinator (Oncology)  Pratibha Ogden APRN CNP as Assigned PCP  Rachell Villa MD as MD (Internal Medicine-Hematology & Oncology)  Rocky Valdivia MD as MD (Radiation Oncology)  Otilia Gallegos APRN CNP as Nurse Practitioner (Nurse Practitioner)    The following health maintenance items are reviewed in Epic and correct as of today:  Health Maintenance   Topic Date Due     ZOSTER IMMUNIZATION (2 of 3) 06/06/2012     MEDICARE ANNUAL WELLNESS VISIT  10/17/2019     DTAP/TDAP/TD IMMUNIZATION (3 - Td) 05/20/2020     INFLUENZA VACCINE (1) 09/01/2020     FALL RISK ASSESSMENT  03/12/2021     LIPID  06/20/2024     ADVANCE CARE PLANNING  12/11/2024     DEXA  Completed     PHQ-2  Completed     PNEUMOCOCCAL IMMUNIZATION 65+ LOW/MEDIUM RISK  Completed     IPV IMMUNIZATION  Aged Out     MENINGITIS IMMUNIZATION  Aged Out     Labs reviewed in EPIC      Review of Systems  Constitutional, HEENT, cardiovascular, pulmonary, gi and gu systems are negative, except as otherwise noted.    OBJECTIVE:   /60   Pulse 76   Temp 97  F (36.1  C) (Tympanic)   Resp 16   Ht 1.645 m (5' 4.75\")   Wt 89.8 kg (198 lb)   SpO2 95%   Breastfeeding No   BMI 33.20 kg/m   Estimated body mass index is 33.2 kg/m  as calculated from the following:    Height as of this encounter: 1.645 m (5' 4.75\").    Weight as of this encounter: 89.8 kg (198 lb).  Physical " "Exam  GENERAL: healthy, alert and no distress  NECK: no adenopathy, no asymmetry, masses, or scars and thyroid normal to palpation  RESP: lungs clear to auscultation - no rales, rhonchi or wheezes  CV: regular rate and rhythm, normal S1 S2, no S3 or S4, no murmur, click or rub, no peripheral edema and peripheral pulses strong  ABDOMEN: soft, nontender, no hepatosplenomegaly, no masses and bowel sounds normal  MS: no gross musculoskeletal defects noted, no edema  PSYCH: mentation appears normal, affect normal/bright  LYMPH: no cervical, supraclavicular, axillary, or inguinal adenopathy    Diagnostic Test Results:  Labs reviewed in Epic    ASSESSMENT / PLAN:   1. Encounter for Medicare annual wellness exam       2. Hyperlipidemia LDL goal <130     - Lipid panel reflex to direct LDL Fasting  - simvastatin (ZOCOR) 20 MG tablet; Take 1 tablet (20 mg) by mouth At Bedtime  Dispense: 90 tablet; Refill: 3    3. Benign essential hypertension  Well controlled  - losartan-hydrochlorothiazide (HYZAAR) 50-12.5 MG tablet; Take 1 tablet by mouth every morning  Dispense: 90 tablet; Refill: 3  - Basic metabolic panel    4. Malignant neoplasm of right female breast, unspecified estrogen receptor status, unspecified site of breast (H)  Continue follow up care with Dr. Villa    5. Hypertension, goal below 150/90         COUNSELING:  Reviewed preventive health counseling, as reflected in patient instructions       Regular exercise       Healthy diet/nutrition    Estimated body mass index is 33.2 kg/m  as calculated from the following:    Height as of this encounter: 1.645 m (5' 4.75\").    Weight as of this encounter: 89.8 kg (198 lb).    Weight management plan: Discussed healthy diet and exercise guidelines     reports that she has quit smoking. She has never used smokeless tobacco.      Appropriate preventive services were discussed with this patient, including applicable screening as appropriate for cardiovascular disease, diabetes, " osteopenia/osteoporosis, and glaucoma.  As appropriate for age/gender, discussed screening for colorectal cancer, prostate cancer, breast cancer, and cervical cancer. Checklist reviewing preventive services available has been given to the patient.    Reviewed patients plan of care and provided an AVS. The Basic Care Plan (routine screening as documented in Health Maintenance) for Precious meets the Care Plan requirement. This Care Plan has been established and reviewed with the Patient.    Counseling Resources:  ATP IV Guidelines  Pooled Cohorts Equation Calculator  Breast Cancer Risk Calculator  FRAX Risk Assessment  ICSI Preventive Guidelines  Dietary Guidelines for Americans, 2010  USDA's MyPlate  ASA Prophylaxis  Lung CA Screening    Ester Jorge MD  [facility]    Identified Health Risks:

## 2020-07-06 NOTE — RESULT ENCOUNTER NOTE
Precious,    All of the labs were normal or acceptable.    Please contact my office if you have questions.    Ester Jorge M.D.

## 2020-07-13 ENCOUNTER — PATIENT OUTREACH (OUTPATIENT)
Dept: ONCOLOGY | Facility: CLINIC | Age: 80
End: 2020-07-13

## 2020-07-13 NOTE — PROGRESS NOTES
Called pt to check on how she is doing after starting on the Xeloda.     Pt reports she did well but did notice bilateral wrist pain starting on the AM of day 3. She said Ibuprofen has worked for relief.     Will review with Dr. Villa and get back to pt.     Patrica Perla RN on 7/13/2020 at 2:37 PM

## 2020-07-15 ENCOUNTER — OFFICE VISIT (OUTPATIENT)
Dept: DERMATOLOGY | Facility: CLINIC | Age: 80
End: 2020-07-15
Payer: COMMERCIAL

## 2020-07-15 VITALS — OXYGEN SATURATION: 97 % | SYSTOLIC BLOOD PRESSURE: 131 MMHG | DIASTOLIC BLOOD PRESSURE: 68 MMHG | HEART RATE: 71 BPM

## 2020-07-15 DIAGNOSIS — L57.0 AK (ACTINIC KERATOSIS): Primary | ICD-10-CM

## 2020-07-15 DIAGNOSIS — C44.629 SQUAMOUS CELL CANCER OF SKIN OF LEFT FOREARM: ICD-10-CM

## 2020-07-15 PROCEDURE — 17313 MOHS 1 STAGE T/A/L: CPT | Performed by: DERMATOLOGY

## 2020-07-15 PROCEDURE — 17000 DESTRUCT PREMALG LESION: CPT | Mod: 51 | Performed by: DERMATOLOGY

## 2020-07-15 NOTE — PROGRESS NOTES
Precious Paris is a 79 year old year old female patient here today for evaluation and managment of actinic keratosis and squamous cell carcinoma. Patient has no other skin complaints today.  Remainder of the HPI, Meds, PMH, Allergies, FH, and SH was reviewed in chart.      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     anosmia     Diverticulosis of colon (without mention of hemorrhage)      Malignant neoplasm of right breast (H) 3/13/2020    Added automatically from request for surgery [id number]     Other and unspecified hyperlipidemia      Pain in joint, lower leg      Plantar fascial fibromatosis      PURE HYPERCHOLESTEROLEM 9/9/2007     PURE HYPERCHOLESTEROLEM 9/9/2007     Squamous cell carcinoma        Past Surgical History:   Procedure Laterality Date     BIOPSY       HC COLONOSCOPY THRU STOMA, DIAGNOSTIC  04/05    diverticulosis and hemorroids, due 2015     INSERT PORT VASCULAR ACCESS N/A 11/29/2019    Procedure: INSERTION OF VENOUS ACCESS PORT;  Surgeon: Hair Broderick DO;  Location: [facility] OR     JOINT REPLACEMTN, KNEE RT/LT  2010    right     JOINT REPLACEMTN, KNEE RT/LT  2011    left     LIGATE VEIN VARICOSE, PHLEBECTOMY MULTIPLE STAB, COMBINED  10/17/2013    Procedure: COMBINED LIGATE VEIN VARICOSE, PHLEBECTOMY MULTIPLE STAB;  Phlebectomy of Right Knee Varicose Veins;  Surgeon: Andrea Duffy MD;  Location: [facility] OR     LUMPECTOMY BREAST WITH SENTINEL NODE, COMBINED Right 3/19/2020    Procedure: LUMPECTOMY, BREAST, WITH SENTINEL LYMPH NODE BIOPSY, Right Lumpectomy With Right Adolphus Lymph Node Biopsy And Luisa Cath Removal;  Surgeon: Venancio Frausto MD;  Location: [facility] OR     OSTEOTOMY FOOT  8/19/2013    Procedure: OSTEOTOMY FOOT;  Left 2nd metatarsal  osteotomy, 2nd Metatarsophalangeal joint & 2nd toe correction;  Surgeon: Jose Phillips DPM;  Location: [facility]     PHACOEMULSIFICATION WITH STANDARD INTRAOCULAR LENS IMPLANT Left 12/7/2017    Procedure: PHACOEMULSIFICATION WITH STANDARD INTRAOCULAR LENS IMPLANT;  Left Cataract Removal with Implant;  Surgeon: Mata Deleon MD;  Location: [facility]     SURGICAL HISTORY OF -   1979    Stripping of Veins in Left Leg     SURGICAL HISTORY OF -   1970    Bilateral Tubal Ligation     SURGICAL HISTORY OF -   2000    Carpal Tunnel Repair, right     SURGICAL HISTORY OF -   9-12-08    Rt let ablation        Family History   Problem Relation Age of Onset     Hypertension Mother      Cerebrovascular Disease Mother      Hypertension Father      Cerebrovascular Disease Father      Hypertension Brother      Cancer Brother         lymphoma     Hypertension Sister      Gastrointestinal Disease Sister      Hypertension Brother      Cancer Brother         melanoma     Eye Disorder Brother      Hypertension Brother      Hypertension Brother      Heart Defect Son        Social History     Socioeconomic History     Marital status:      Spouse name: Not on file     Number of children: Not on file     Years of education: Not on file     Highest education level: Not on file   Occupational History     Not on file   Social Needs     Financial resource strain: Not on file     Food insecurity     Worry: Not on file     Inability: Not on file     Transportation needs     Medical: Not on file     Non-medical: Not on file   Tobacco Use     Smoking status: Former Smoker     Smokeless tobacco: Never Used     Tobacco comment: quit 20 years ago   Substance and Sexual Activity     Alcohol use: Yes     Comment: occ.     Drug use: No     Sexual activity: Yes     Partners: Male   Lifestyle     Physical activity     Days per week: Not on file     Minutes per session: Not on file     Stress: Not on file   Relationships     Social connections      Talks on phone: Not on file     Gets together: Not on file     Attends Alevism service: Not on file     Active member of club or organization: Not on file     Attends meetings of clubs or organizations: Not on file     Relationship status: Not on file     Intimate partner violence     Fear of current or ex partner: Not on file     Emotionally abused: Not on file     Physically abused: Not on file     Forced sexual activity: Not on file   Other Topics Concern     Parent/sibling w/ CABG, MI or angioplasty before 65F 55M? No   Social History Narrative     Not on file       Outpatient Encounter Medications as of 7/15/2020   Medication Sig Dispense Refill     ASPIRIN 81 MG OR TABS Take 1 tablet by mouth daily       CALCIUM OR Take 1 tablet by mouth daily        capecitabine (XELODA) 500 MG tablet Start out 1500 mg po bid 7 days on, 7 days off 84 tablet 0     CENTRUM SILVER OR 1 TABLET DAILY       fish oil-omega-3 fatty acids (OMEGA 3) 1000 MG capsule Take 1 capsule (1 g) by mouth daily 30 capsule 1     IBUPROFEN 200 MG OR TABS Take 400 mg by mouth 2 times daily as needed for pain       losartan-hydrochlorothiazide (HYZAAR) 50-12.5 MG tablet Take 1 tablet by mouth every morning 90 tablet 3     Melatonin 10 MG TABS tablet Take 10 mg by mouth At Bedtime       simvastatin (ZOCOR) 20 MG tablet Take 1 tablet (20 mg) by mouth At Bedtime 90 tablet 3     vitamin B complex with vitamin C (VITAMIN  B COMPLEX) tablet Take 1 tablet by mouth daily       VITAMIN C OR 1 DAILY       VITAMIN D, CHOLECALCIFEROL, PO Take 25 Units by mouth daily        vitamin E 400 units TABS Take by mouth daily 2 tablets daily       No facility-administered encounter medications on file as of 7/15/2020.              Review Of Systems  Skin: As above  Eyes: negative  Ears/Nose/Throat: negative  Respiratory: No shortness of breath, dyspnea on exertion, cough, or hemoptysis  Cardiovascular: negative  Gastrointestinal: negative  Genitourinary:  negative  Musculoskeletal: negative  Neurologic: negative  Psychiatric: negative  Hematologic/Lymphatic/Immunologic: negative  Endocrine: negative      O:   NAD, WDWN, Alert & Oriented, Mood & Affect wnl, Vitals stable   Here today alone   /68   Pulse 71   SpO2 97%    General appearance normal   Vitals stable   Alert, oriented and in no acute distress      Following lymph nodes palpated: antecubital no lad   Nasal bridge gritty scaly papule    L forearm 1.2cm red plaque      Eyes: Conjunctivae/lids:Normal     ENT: Lips, buccal mucosa, tongue: normal    MSK:Normal    Cardiovascular: peripheral edema none    Pulm: Breathing Normal    Neuro/Psych: Orientation:Alert and Orientedx3 ; Mood/Affect:normal       A/P:  1. Nasal bridge actinic keratosis   LN2:  Treated with LN2 for 5s for 1-2 cycles. Warned risks of blistering, pain, pigment change, scarring, and incomplete resolution.  Advised patient to return if lesions do not completely resolve.  Wound care sheet given.  2. L forearm squamous cell carcinoma   MOHS:   Size    The rationale for Mohs surgery was discussed with the patient and consent was obtained.  The risks and benefits as well as alternatives to therapy were discussed, in detail.  Specifically, the risks of infection, scarring, bleeding, prolonged wound healing, incomplete removal, allergy to anesthesia, nerve injury and recurrence were addressed.  Indication for Mohs was Size. Prior to the procedure, the treatment site was clearly identified and, if available, confirmed with previous photos and confirmed by the patient   All components of the Universal Protocol/PAUSE rule were completed.  The Mohs surgeon operated in two distinct and integrated capacities as the surgeon and pathologist.      The area was prepped with Betasept.  A rim of normal appearing skin was marked circumferentially around the lesion.  The area was infiltrated with local anesthesia.  The tumor was first debulked to remove all  clinically apparent tumor.  An incision following the standard Mohs approach was done and the specimen was oriented,mapped and placed in 1 block(s).  Each specimen was then chromacoded and processed in the Mohs laboratory using standard Mohs technique and submitted for frozen section histology.  Frozen section analysis showed no residual tumor but CLEAR MARGINS.      The tumor was excised using standard Mohs technique in 1 stages(s).  CLEAR MARGINS OBTAINED and Final defect size was 1.7 x 1.5 cm.     We discussed the options for wound management in full with the patient including risks/benefits/ possible outcomes.        REPAIR SECOND INTENT: We discussed the options for wound management in full with the patient including risks/benefits/possible outcomes. Decision made to allow the wound to heal by second intention. EBL minimal; complications none; wound care routine.  The patient was discharged in good condition and will return in one month or prn for wound evaluation.    BENIGN LESIONS DISCUSSED WITH PATIENT:  I discussed the specifics of tumor, prognosis, and genetics of benign lesions.  I explained that treatment of these lesions would be purely cosmetic and not medically neccessary.  I discussed with patient different removal options including excision, cautery and /or laser.      Nature and genetics of benign skin lesions dicussed with patient.  Signs and Symptoms of skin cancer discussed with patient.  Patient encouraged to perform monthly skin exams.  UV precautions reviewed with patient.  Skin care regimen reviewed with patient: Eliminate harsh soaps, i.e. Dial, zest, irsih spring; Mild soaps such as Cetaphil or Dove sensitive skin, avoid hot or cold showers, aggressive use of emollients including vanicream, cetaphil or cerave discussed with patient.    Risks of non-melanoma skin cancer discussed with patient   Return to clinic 6 months

## 2020-07-15 NOTE — PATIENT INSTRUCTIONS
Open Wound Care     ? No strenuous activity for 48 hours    ? Take Tylenol as needed for discomfort.                                                .         ? Do not drink alcoholic beverages for 48 hours.    ? Keep the pressure bandage in place for 24 hours. If the bandage becomes blood tinged or loose, reinforce it with gauze and tape.        (Refer to the reverse side of this page for management of bleeding).    ? Remove bandage in 24 hours and begin wound care as follows:     1. Clean area with tap water using a Q tip or gauze pad, (shower / bathe normally)  2. Dry wound with Q tip or gauze pad  3. Apply Aquaphor, Vaseline, Polysporin or Bacitracin Ointment with a Q tip    Do NOT use Neosporin Ointment *  4. Cover the wound with a band-aid or nonstick gauze pad and paper tape.  5. Repeat wound care once a day until wound is completely healed.    It is an old wives tale that a wound heals better when it is exposed to air and allowed to dry out. The wound will heal faster with a better cosmetic result if it is kept moist with ointment and covered with a bandage.  Do not let the wound dry out.      Supplies Needed:                Qtips or gauze pads                Polysporin or Bacitracin Ointment                Bandaids or nonstick gauze pads and paper tape    Wound care kits and brown paper tape are available for purchase at   the pharmacy.    BLEEDIN. Use tightly rolled up gauze or cloth to apply direct pressure over the bandage for 20   minutes.  2. Reapply pressure for an additional 20 minutes if necessary  3. Call the office or go to the nearest emergency room if pressure fails to stop the bleeding.  4. Use additional gauze and tape to maintain pressure once the bleeding has stopped.  5. Begin wound care 24 hours after surgery as directed.                  WOUND HEALING    1. One week after surgery a pink / red halo will form around the outside of the wound.   This is new skin.  2. The center of the  wound will appear yellowish white and produce some drainage.  3. The pink halo will slowly migrate in toward the center of the wound until the wound is covered with new shiny pink skin.  4. There will be no more drainage when the wound is completely healed.  5. It will take six months to one year for the redness to fade.  6. The scar may be itchy, tight and sensitive to extreme temperatures for a year after the surgery.  7. Massaging the area several times a day for several minutes after the wound is completely healed will help the scar soften and normalize faster. Begin massage only after healing is complete.      In case of emergency call: Dr Anthony: [phone]     [facility]: [phone]    [facility]: [phone]

## 2020-07-15 NOTE — LETTER
7/15/2020         RE: Precious Paris  [address] [place] [address]        Dear Colleague,    Thank you for referring your patient, Precious Paris, to the [facility]. Please see a copy of my visit note below.    Surgical Office Location :   [facility]  [address] [place] [address]      Precious Paris is a 79 year old year old female patient here today for evaluation and managment of actinic keratosis and squamous cell carcinoma. Patient has no other skin complaints today.  Remainder of the HPI, Meds, PMH, Allergies, FH, and SH was reviewed in chart.      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     anosmia     Diverticulosis of colon (without mention of hemorrhage)      Malignant neoplasm of right breast (H) 3/13/2020    Added automatically from request for surgery [id number]     Other and unspecified hyperlipidemia      Pain in joint, lower leg      Plantar fascial fibromatosis      PURE HYPERCHOLESTEROLEM 9/9/2007     PURE HYPERCHOLESTEROLEM 9/9/2007     Squamous cell carcinoma        Past Surgical History:   Procedure Laterality Date     BIOPSY       HC COLONOSCOPY THRU STOMA, DIAGNOSTIC  04/05    diverticulosis and hemorroids, due 2015     INSERT PORT VASCULAR ACCESS N/A 11/29/2019    Procedure: INSERTION OF VENOUS ACCESS PORT;  Surgeon: Hair Broderick DO;  Location: [facility] OR     JOINT REPLACEMTN, KNEE RT/LT  2010    right     JOINT REPLACEMTN, KNEE RT/LT  2011    left     LIGATE VEIN VARICOSE, PHLEBECTOMY MULTIPLE STAB, COMBINED  10/17/2013    Procedure: COMBINED LIGATE VEIN VARICOSE, PHLEBECTOMY MULTIPLE STAB;  Phlebectomy of Right Knee Varicose Veins;  Surgeon: Andrea Duffy MD;  Location: [facility]  OR     LUMPECTOMY BREAST WITH SENTINEL NODE, COMBINED Right 3/19/2020    Procedure: LUMPECTOMY, BREAST, WITH SENTINEL LYMPH NODE BIOPSY, Right Lumpectomy With Right Seney Lymph Node Biopsy And Luisa Cath Removal;  Surgeon: Venancio Frausto MD;  Location: [facility] OR     OSTEOTOMY FOOT  8/19/2013    Procedure: OSTEOTOMY FOOT;  Left 2nd metatarsal osteotomy, 2nd Metatarsophalangeal joint & 2nd toe correction;  Surgeon: Jose Phillips DPM;  Location: [facility] OR     PHACOEMULSIFICATION WITH STANDARD INTRAOCULAR LENS IMPLANT Left 12/7/2017    Procedure: PHACOEMULSIFICATION WITH STANDARD INTRAOCULAR LENS IMPLANT;  Left Cataract Removal with Implant;  Surgeon: Mata Deleon MD;  Location: [facility] OR     SURGICAL HISTORY OF -   1979    Stripping of Veins in Left Leg     SURGICAL HISTORY OF -   1970    Bilateral Tubal Ligation     SURGICAL HISTORY OF -   2000    Carpal Tunnel Repair, right     SURGICAL HISTORY OF -   9-12-08    Rt let ablation        Family History   Problem Relation Age of Onset     Hypertension Mother      Cerebrovascular Disease Mother      Hypertension Father      Cerebrovascular Disease Father      Hypertension Brother      Cancer Brother         lymphoma     Hypertension Sister      Gastrointestinal Disease Sister      Hypertension Brother      Cancer Brother         melanoma     Eye Disorder Brother      Hypertension Brother      Hypertension Brother      Heart Defect Son        Social History     Socioeconomic History     Marital status:      Spouse name: Not on file     Number of children: Not on file     Years of education: Not on file     Highest education level: Not on file   Occupational History     Not on file   Social Needs     Financial resource strain: Not on file     Food insecurity     Worry: Not on file     Inability: Not on file     Transportation needs     Medical: Not on file     Non-medical: Not on file   Tobacco Use     Smoking status: Former Smoker     Smokeless tobacco: Never  Used     Tobacco comment: quit 20 years ago   Substance and Sexual Activity     Alcohol use: Yes     Comment: occ.     Drug use: No     Sexual activity: Yes     Partners: Male   Lifestyle     Physical activity     Days per week: Not on file     Minutes per session: Not on file     Stress: Not on file   Relationships     Social connections     Talks on phone: Not on file     Gets together: Not on file     Attends [employer]ism service: Not on file     Active member of club or organization: Not on file     Attends meetings of clubs or organizations: Not on file     Relationship status: Not on file     Intimate partner violence     Fear of current or ex partner: Not on file     Emotionally abused: Not on file     Physically abused: Not on file     Forced sexual activity: Not on file   Other Topics Concern     Parent/sibling w/ CABG, MI or angioplasty before 65F 55M? No   Social History Narrative     Not on file       Outpatient Encounter Medications as of 7/15/2020   Medication Sig Dispense Refill     ASPIRIN 81 MG OR TABS Take 1 tablet by mouth daily       CALCIUM OR Take 1 tablet by mouth daily        capecitabine (XELODA) 500 MG tablet Start out 1500 mg po bid 7 days on, 7 days off 84 tablet 0     CENTRUM SILVER OR 1 TABLET DAILY       fish oil-omega-3 fatty acids (OMEGA 3) 1000 MG capsule Take 1 capsule (1 g) by mouth daily 30 capsule 1     IBUPROFEN 200 MG OR TABS Take 400 mg by mouth 2 times daily as needed for pain       losartan-hydrochlorothiazide (HYZAAR) 50-12.5 MG tablet Take 1 tablet by mouth every morning 90 tablet 3     Melatonin 10 MG TABS tablet Take 10 mg by mouth At Bedtime       simvastatin (ZOCOR) 20 MG tablet Take 1 tablet (20 mg) by mouth At Bedtime 90 tablet 3     vitamin B complex with vitamin C (VITAMIN  B COMPLEX) tablet Take 1 tablet by mouth daily       VITAMIN C OR 1 DAILY       VITAMIN D, CHOLECALCIFEROL, PO Take 25 Units by mouth daily        vitamin E 400 units TABS Take by mouth daily 2  tablets daily       No facility-administered encounter medications on file as of 7/15/2020.              Review Of Systems  Skin: As above  Eyes: negative  Ears/Nose/Throat: negative  Respiratory: No shortness of breath, dyspnea on exertion, cough, or hemoptysis  Cardiovascular: negative  Gastrointestinal: negative  Genitourinary: negative  Musculoskeletal: negative  Neurologic: negative  Psychiatric: negative  Hematologic/Lymphatic/Immunologic: negative  Endocrine: negative      O:   NAD, WDWN, Alert & Oriented, Mood & Affect wnl, Vitals stable   Here today alone   /68   Pulse 71   SpO2 97%    General appearance normal   Vitals stable   Alert, oriented and in no acute distress      Following lymph nodes palpated: antecubital no lad   Nasal bridge gritty scaly papule    L forearm 1.2cm red plaque      Eyes: Conjunctivae/lids:Normal     ENT: Lips, buccal mucosa, tongue: normal    MSK:Normal    Cardiovascular: peripheral edema none    Pulm: Breathing Normal    Neuro/Psych: Orientation:Alert and Orientedx3 ; Mood/Affect:normal       A/P:  1. Nasal bridge actinic keratosis   LN2:  Treated with LN2 for 5s for 1-2 cycles. Warned risks of blistering, pain, pigment change, scarring, and incomplete resolution.  Advised patient to return if lesions do not completely resolve.  Wound care sheet given.  2. L forearm squamous cell carcinoma   MOHS:   Size    The rationale for Mohs surgery was discussed with the patient and consent was obtained.  The risks and benefits as well as alternatives to therapy were discussed, in detail.  Specifically, the risks of infection, scarring, bleeding, prolonged wound healing, incomplete removal, allergy to anesthesia, nerve injury and recurrence were addressed.  Indication for Mohs was Size. Prior to the procedure, the treatment site was clearly identified and, if available, confirmed with previous photos and confirmed by the patient   All components of the Universal Protocol/PAUSE rule  were completed.  The Mohs surgeon operated in two distinct and integrated capacities as the surgeon and pathologist.      The area was prepped with Betasept.  A rim of normal appearing skin was marked circumferentially around the lesion.  The area was infiltrated with local anesthesia.  The tumor was first debulked to remove all clinically apparent tumor.  An incision following the standard Mohs approach was done and the specimen was oriented,mapped and placed in 1 block(s).  Each specimen was then chromacoded and processed in the Mohs laboratory using standard Mohs technique and submitted for frozen section histology.  Frozen section analysis showed no residual tumor but CLEAR MARGINS.      The tumor was excised using standard Mohs technique in 1 stages(s).  CLEAR MARGINS OBTAINED and Final defect size was 1.7 x 1.5 cm.     We discussed the options for wound management in full with the patient including risks/benefits/ possible outcomes.        REPAIR SECOND INTENT: We discussed the options for wound management in full with the patient including risks/benefits/possible outcomes. Decision made to allow the wound to heal by second intention. EBL minimal; complications none; wound care routine.  The patient was discharged in good condition and will return in one month or prn for wound evaluation.    BENIGN LESIONS DISCUSSED WITH PATIENT:  I discussed the specifics of tumor, prognosis, and genetics of benign lesions.  I explained that treatment of these lesions would be purely cosmetic and not medically neccessary.  I discussed with patient different removal options including excision, cautery and /or laser.      Nature and genetics of benign skin lesions dicussed with patient.  Signs and Symptoms of skin cancer discussed with patient.  Patient encouraged to perform monthly skin exams.  UV precautions reviewed with patient.  Skin care regimen reviewed with patient: Eliminate harsh soaps, i.e. Dial, zest, irsih spring;  Mild soaps such as Cetaphil or Dove sensitive skin, avoid hot or cold showers, aggressive use of emollients including vanicream, cetaphil or cerave discussed with patient.    Risks of non-melanoma skin cancer discussed with patient   Return to clinic 6 months      Again, thank you for allowing me to participate in the care of your patient.        Sincerely,        Danielito Anthony MD

## 2020-07-16 ENCOUNTER — TELEPHONE (OUTPATIENT)
Dept: DERMATOLOGY | Facility: CLINIC | Age: 80
End: 2020-07-16

## 2020-07-16 NOTE — TELEPHONE ENCOUNTER
Pt states provider told her she would be contacted last night with results of biopsy from OV on 07/15, and she has not been contacted.     Please contact pt @:  [phone] (ok to leave message)    Denise Behrendt  Specialty CSS

## 2020-07-16 NOTE — TELEPHONE ENCOUNTER
Message left for patient to call back regarding results.  Lian Gordon LPN.................7/16/2020

## 2020-07-16 NOTE — TELEPHONE ENCOUNTER
Spoke with patient and informed her that Dr. SHAWNA Anthony had tried to contact her last night but was unable to get a hold of her but he did completely excise the skin cancer from her left arm. She was asked to please call with further questions or concerns.    Lian Gordon LPN.................7/16/2020

## 2020-07-16 NOTE — TELEPHONE ENCOUNTER
I called and left a message last night    Please all patient and let her know everything was removed.

## 2020-07-22 ENCOUNTER — PATIENT OUTREACH (OUTPATIENT)
Dept: ONCOLOGY | Facility: CLINIC | Age: 80
End: 2020-07-22

## 2020-07-22 NOTE — PROGRESS NOTES
"Pt called to report she woke up with a rash on the right side of her neck today and it has gotten worse throughout the day. Red, itchy, raised and swollen. Pt has tried topical hydrocortisone cream but did not help much. She is wondering if she should take her Xeloda tonight. She started this on 07.06 and this is her 2nd \"on week of it\".     Will review with Dr. Villa and get back to pt.     Patrica Perla RN on 7/22/2020 at 3:30 PM    "

## 2020-07-22 NOTE — PROGRESS NOTES
Updated pt with plan and she verbalized understanding.     Patrica Perla RN on 7/22/2020 at 3:50 PM

## 2020-07-22 NOTE — PROGRESS NOTES
Doubt it is related to xeloda.   Maybe a bug bite .  Try benadryl oral. See NP with PMD.   Would say continue xeloda

## 2020-07-28 ENCOUNTER — HOSPITAL ENCOUNTER (OUTPATIENT)
Dept: LAB | Facility: CLINIC | Age: 80
Discharge: HOME | End: 2020-07-28
Attending: INTERNAL MEDICINE | Admitting: INTERNAL MEDICINE
Payer: COMMERCIAL

## 2020-07-28 ENCOUNTER — ONCOLOGY VISIT (OUTPATIENT)
Dept: ONCOLOGY | Facility: CLINIC | Age: 80
End: 2020-07-28
Attending: INTERNAL MEDICINE
Payer: COMMERCIAL

## 2020-07-28 VITALS
HEART RATE: 67 BPM | DIASTOLIC BLOOD PRESSURE: 60 MMHG | HEIGHT: 64 IN | RESPIRATION RATE: 18 BRPM | SYSTOLIC BLOOD PRESSURE: 149 MMHG | WEIGHT: 197.3 LBS | TEMPERATURE: 96.9 F | OXYGEN SATURATION: 95 % | BODY MASS INDEX: 33.68 KG/M2

## 2020-07-28 DIAGNOSIS — C50.411 MALIGNANT NEOPLASM OF UPPER-OUTER QUADRANT OF RIGHT BREAST IN FEMALE, ESTROGEN RECEPTOR NEGATIVE (H): ICD-10-CM

## 2020-07-28 DIAGNOSIS — C50.411 MALIGNANT NEOPLASM OF UPPER-OUTER QUADRANT OF RIGHT BREAST IN FEMALE, ESTROGEN RECEPTOR NEGATIVE (H): Primary | ICD-10-CM

## 2020-07-28 DIAGNOSIS — Z17.1 MALIGNANT NEOPLASM OF UPPER-OUTER QUADRANT OF RIGHT BREAST IN FEMALE, ESTROGEN RECEPTOR NEGATIVE (H): ICD-10-CM

## 2020-07-28 DIAGNOSIS — Z17.1 MALIGNANT NEOPLASM OF UPPER-OUTER QUADRANT OF RIGHT BREAST IN FEMALE, ESTROGEN RECEPTOR NEGATIVE (H): Primary | ICD-10-CM

## 2020-07-28 LAB
ALBUMIN SERPL-MCNC: 3.7 G/DL (ref 3.4–5)
ALP SERPL-CCNC: 84 U/L (ref 40–150)
ALT SERPL W P-5'-P-CCNC: 27 U/L (ref 0–50)
ANION GAP SERPL CALCULATED.3IONS-SCNC: 7 MMOL/L (ref 3–14)
AST SERPL W P-5'-P-CCNC: 22 U/L (ref 0–45)
BASOPHILS # BLD AUTO: 0 10E9/L (ref 0–0.2)
BASOPHILS NFR BLD AUTO: 0.6 %
BILIRUB SERPL-MCNC: 0.7 MG/DL (ref 0.2–1.3)
BUN SERPL-MCNC: 16 MG/DL (ref 7–30)
CALCIUM SERPL-MCNC: 9.4 MG/DL (ref 8.5–10.1)
CHLORIDE SERPL-SCNC: 106 MMOL/L (ref 94–109)
CO2 SERPL-SCNC: 27 MMOL/L (ref 20–32)
CREAT SERPL-MCNC: 0.58 MG/DL (ref 0.52–1.04)
DIFFERENTIAL METHOD BLD: NORMAL
EOSINOPHIL # BLD AUTO: 0.2 10E9/L (ref 0–0.7)
EOSINOPHIL NFR BLD AUTO: 2.9 %
ERYTHROCYTE [DISTWIDTH] IN BLOOD BY AUTOMATED COUNT: 14.5 % (ref 10–15)
GFR SERPL CREATININE-BSD FRML MDRD: 88 ML/MIN/{1.73_M2}
GLUCOSE SERPL-MCNC: 107 MG/DL (ref 70–99)
HCT VFR BLD AUTO: 37.6 % (ref 35–47)
HGB BLD-MCNC: 12.5 G/DL (ref 11.7–15.7)
IMM GRANULOCYTES # BLD: 0 10E9/L (ref 0–0.4)
IMM GRANULOCYTES NFR BLD: 0.3 %
LYMPHOCYTES # BLD AUTO: 1.4 10E9/L (ref 0.8–5.3)
LYMPHOCYTES NFR BLD AUTO: 19.7 %
MCH RBC QN AUTO: 31.5 PG (ref 26.5–33)
MCHC RBC AUTO-ENTMCNC: 33.2 G/DL (ref 31.5–36.5)
MCV RBC AUTO: 95 FL (ref 78–100)
MONOCYTES # BLD AUTO: 0.7 10E9/L (ref 0–1.3)
MONOCYTES NFR BLD AUTO: 10.4 %
NEUTROPHILS # BLD AUTO: 4.5 10E9/L (ref 1.6–8.3)
NEUTROPHILS NFR BLD AUTO: 66.1 %
NRBC # BLD AUTO: 0 10*3/UL
NRBC BLD AUTO-RTO: 0 /100
PLATELET # BLD AUTO: 288 10E9/L (ref 150–450)
POTASSIUM SERPL-SCNC: 3.8 MMOL/L (ref 3.4–5.3)
PROT SERPL-MCNC: 7.8 G/DL (ref 6.8–8.8)
RBC # BLD AUTO: 3.97 10E12/L (ref 3.8–5.2)
SODIUM SERPL-SCNC: 140 MMOL/L (ref 133–144)
WBC # BLD AUTO: 6.8 10E9/L (ref 4–11)

## 2020-07-28 PROCEDURE — G0463 HOSPITAL OUTPT CLINIC VISIT: HCPCS

## 2020-07-28 PROCEDURE — 85025 COMPLETE CBC W/AUTO DIFF WBC: CPT | Performed by: INTERNAL MEDICINE

## 2020-07-28 PROCEDURE — 80053 COMPREHEN METABOLIC PANEL: CPT | Performed by: INTERNAL MEDICINE

## 2020-07-28 PROCEDURE — 99214 OFFICE O/P EST MOD 30 MIN: CPT | Performed by: INTERNAL MEDICINE

## 2020-07-28 PROCEDURE — 36415 COLL VENOUS BLD VENIPUNCTURE: CPT | Performed by: INTERNAL MEDICINE

## 2020-07-28 ASSESSMENT — PAIN SCALES - GENERAL: PAINLEVEL: NO PAIN (0)

## 2020-07-28 ASSESSMENT — MIFFLIN-ST. JEOR: SCORE: 1359.95

## 2020-07-28 NOTE — LETTER
7/28/2020         RE: Precious Paris  [address] [place] [address]        Dear Colleague,    Thank you for referring your patient, Precious Paris, to the [facility]. Please see a copy of my visit note below.      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 Cruz to me 11/25/2019  S/p neoadjuvant taxol then lumpectomy 3/2020      HISTORY OF ONCOLOGY ILLNESS/CC:    At age 79, 10/2019 she presented with pain and redness, induration in the right breast associated with breast lump.  She was treated with antibiotic for 10 days redness resolved.    US/MA 10/2019  found 2 hypoechoic structures are seen at the patient's area of concern, 9:00-10:00 position 7 cm from the nipple on the RIGHT. One of these appears to have at least some component of an echogenic hilum and may represent an enlarged lymph node. The first measures 1.1 x 1.3 x 1.2 cm and the second measures 1.8 x 1.4 x 1.4 cm.   Survey of the axilla shows no evidence of adenopathy.    First biopsy identified invasive ductal carcinoma with marked necrosis and insufficient tissue for further evaluation.  Second time biopsy to the right breast 9 o'clock position 9 cm from the nipple, found grade 3 invasive ductal cancer, triple negative, associated with grade 3 DCIS.    She saw Dr. Frausto along with tumor conference discussion, due to her relatively healthy status, aggressive nature of her breast cancer, and the desire of breast conservation, neoadjuvant systemic treatment was recommended.    She made informed decision to proceed with wkly taxol, C1D1 12/5/2019. Then went to [place] and finished wkly taxol. Last dose of taxol was Feb 20th 2020. She had no tx delay.     She had favorable image response early March 2020.    Right lumpectomy 3/19/2020  "found grade 3 IDC, T1c, 1.9 and 1.2 cm cm with ALI and DCIS, . Superior margin is +. sLN is nsgative, [id number] disease.     BRCA actionable panel is negative.       INTERVAL HISTORY:   She finished RT 2020.   Made informed informed decision to proceed with adjuvant xeloda in late 2020.   Broke out into rash on upper neck, arms (sun exposed area) late July.      PMH  retinopathy of the right eye, cataract  Hypertension, hyperlipidemia  Osteoarthritis, spinal stenosis, bilateral knees replacement, carpel tunnel surgery  Varicose vein of lower extremities ,s/p surgery      REVIEW OF SYSTEMS:   She called  for rash on the right side of her neck. Started out with one, then become more diffuse.          PHYSICAL EXAMINATION:   VITAL SIGNS:  Blood pressure (!) 149/60, pulse 67, temperature 96.9  F (36.1  C), temperature source Tympanic, resp. rate 18, height 1.626 m (5' 4\"), weight 89.5 kg (197 lb 4.8 oz), SpO2 95 %, not currently breastfeeding.      GENERAL APPEARANCE:  looks like her stated age, very pleasant, not in acute distress.     ECO    ENT, MOUTH: Pupils are equally reactive to light.  Sclerae are anicteric.  Moist oral mucosa without lesion or ulcer.   Negative pharynx.  No oral thrush.   NECK:  Supple.  No jugular venous distention.  Thyroid is not palpable.   LYMPH NODES:  Superficial lymphadenopathy is not appreciable in the bilateral cervical, supraclavicular, axillary or inguinal areas.   CARDIOVASCULAR:  S1, S2 regular with no murmurs or gallops.  No carotid or abdominal bruits.   PULMONARY:  Lungs are clear to auscultation and percussion bilaterally.  There is no wheezing or rhonchi.   GASTROINTESTINAL:  Abdomen is soft, nontender.  No hepatosplenomegaly.  No signs of ascites.  No mass appreciable.   MUSCULOSKELETAL/EXTREMITIES:  No edema.  No cyanotic changes.  No signs of joint deformity.  No lymphedema.   NEUROLOGIC:  Cranial nerves II-XII are grossly intact.  Sensation intact.  Muscle " strength and muscle tone symmetrical, 5/5 throughout.   BACK  No spinal or paraspinal tenderness.  No CVA tenderness.   SKIN: Confluent erythematous macules on back of the neck upper trunk arms.  No petechiae.   No signs of cellulitis.   BREASTS/CHEST/AXILLA:  Bilateral breast exam revealed no mass or skin changes or nipple discharges.No palpable axilla mass or LN.      PSYCHIATRIC: Oriented to time, person, and places. Normal mood and affect. Good memory. Proper insight and judgement.          CURRENT LAB DATA REVIEWED TODAY WITH PATIENT DURING THE VISIT  Cbc diff/CMP today are fine    BRCA actionable panel is negative.     Right lumpectomy 3/19/2020 found grade 3 IDC, T1c, 1.9 cm and 1.2 cm, with ALI and DCIS. Superior margin is +. sLN is negative, [id number] disease.       OLD DATA REVIEWED TODAY WITH SUMMARY WITH PATIENT DURING THE VISIT    Second time biopsy to the right breast 9 o'clock position 9 cm from the nipple, found grade 3 invasive ductal cancer, triple negative, associated with grade 3 DCIS.       US/MA 10/2019  found 2 hypoechoic structures are seen at the patient's area of concern, 9:00-10:00 position 7 cm from the nipple on the RIGHT. One of these appears to have at least some component of an echogenic hilum and may represent an enlarged lymph node. The first measures 1.1 x 1.3 x 1.2 cm and the second measures 1.8 x 1.4 x 1.4 cm.   Survey of the axilla shows no evidence of adenopathy.          ASSESSMENT AND PLAN DISCUSSED WITH PATIENT TODAY DURING THE VISIT:    1.  dx diagnosed right breast upper outer quadrant triple negative breast cancer 11/2019, triple negative breast cancer.    She made informed decision to proceed with wkly taxol, C1D1 12/5/2019. Then went to [place] and finished 12 wkly taxol.     She had favorable image response early March 2020.    Right lumpectomy 3/19/2020 found grade 3 IDC, T1c, 1.9 cm with ALI and DCIS,   and 1.2 cm. Superior margin is +. sLN is nsgative, [id number] disease.      Due to her advanced age Adriamycin was not recommended in her situation.    S/p RT 5/2020.     Since clinical trial is not open due to Covid, would recommend her to consider adjuvant Xeloda post RT.   We discussed the pros and cons of doing adjuvant Xeloda.     She made informed decision to start in June 2020.     Broke out into rash on upper neck, arms (sun exposed area) late July.  Advice her to hold xeloda till rash resolves.     Then we have decision to make: stop Xeloda or do metronomic Xeloda.   We discussed the ASCO 2020 new maintenance therapy for TNBC with metronomic capecitabine 650mg/m2 daily straight for one year.  5-year DFS rate with capecitabine was significantly better than with observation (83% vs 73%, respectively; hazard ratio [HR], 0.63; 95% CI, 0.42-0.96; P = .027).  5-year DDFS rate was significantly better with capecitabine versus observation (85% vs 76%, respectively; HR, 0.56; 95% CI, 0.37-0.90; P = .016).    Also discussed the ER follow-up plan for triple negative breast cancer patients.  She is due MA bilateral in Oct.     All Qs are answered to PT's satisfaction.       Total time is 25 minutes, more than 15 minutes spent in dicussion and review patient's cancer and treatment history, labs and images results, further treatment recommendations, follow up plan.         Again, thank you for allowing me to participate in the care of your patient.        Sincerely,        Rachell Villa MD, MD

## 2020-07-28 NOTE — PROGRESS NOTES
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 Cruz to me 11/25/2019  S/p neoadjuvant taxol then lumpectomy 3/2020      HISTORY OF ONCOLOGY ILLNESS/CC:    At age 79, 10/2019 she presented with pain and redness, induration in the right breast associated with breast lump.  She was treated with antibiotic for 10 days redness resolved.    US/MA 10/2019  found 2 hypoechoic structures are seen at the patient's area of concern, 9:00-10:00 position 7 cm from the nipple on the RIGHT. One of these appears to have at least some component of an echogenic hilum and may represent an enlarged lymph node. The first measures 1.1 x 1.3 x 1.2 cm and the second measures 1.8 x 1.4 x 1.4 cm.   Survey of the axilla shows no evidence of adenopathy.    First biopsy identified invasive ductal carcinoma with marked necrosis and insufficient tissue for further evaluation.  Second time biopsy to the right breast 9 o'clock position 9 cm from the nipple, found grade 3 invasive ductal cancer, triple negative, associated with grade 3 DCIS.    She saw Dr. Frausto along with tumor conference discussion, due to her relatively healthy status, aggressive nature of her breast cancer, and the desire of breast conservation, neoadjuvant systemic treatment was recommended.    She made informed decision to proceed with wkly taxol, C1D1 12/5/2019. Then went to [place] and finished wkly taxol. Last dose of taxol was Feb 20th 2020. She had no tx delay.     She had favorable image response early March 2020.    Right lumpectomy 3/19/2020 found grade 3 IDC, T1c, 1.9 and 1.2 cm cm with ALI and DCIS, . Superior margin is +. sLN is nsgative, [id number] disease.     BRCA actionable panel is negative.       INTERVAL HISTORY:   She finished RT 5/2020.   Made informed informed decision to  "proceed with adjuvant xeloda in late 2020.   Broke out into rash on upper neck, arms (sun exposed area) late July.      PMH  retinopathy of the right eye, cataract  Hypertension, hyperlipidemia  Osteoarthritis, spinal stenosis, bilateral knees replacement, carpel tunnel surgery  Varicose vein of lower extremities ,s/p surgery      REVIEW OF SYSTEMS:   She called  for rash on the right side of her neck. Started out with one, then become more diffuse.          PHYSICAL EXAMINATION:   VITAL SIGNS:  Blood pressure (!) 149/60, pulse 67, temperature 96.9  F (36.1  C), temperature source Tympanic, resp. rate 18, height 1.626 m (5' 4\"), weight 89.5 kg (197 lb 4.8 oz), SpO2 95 %, not currently breastfeeding.      GENERAL APPEARANCE:  looks like her stated age, very pleasant, not in acute distress.     ECO    ENT, MOUTH: Pupils are equally reactive to light.  Sclerae are anicteric.  Moist oral mucosa without lesion or ulcer.   Negative pharynx.  No oral thrush.   NECK:  Supple.  No jugular venous distention.  Thyroid is not palpable.   LYMPH NODES:  Superficial lymphadenopathy is not appreciable in the bilateral cervical, supraclavicular, axillary or inguinal areas.   CARDIOVASCULAR:  S1, S2 regular with no murmurs or gallops.  No carotid or abdominal bruits.   PULMONARY:  Lungs are clear to auscultation and percussion bilaterally.  There is no wheezing or rhonchi.   GASTROINTESTINAL:  Abdomen is soft, nontender.  No hepatosplenomegaly.  No signs of ascites.  No mass appreciable.   MUSCULOSKELETAL/EXTREMITIES:  No edema.  No cyanotic changes.  No signs of joint deformity.  No lymphedema.   NEUROLOGIC:  Cranial nerves II-XII are grossly intact.  Sensation intact.  Muscle strength and muscle tone symmetrical, 5/5 throughout.   BACK  No spinal or paraspinal tenderness.  No CVA tenderness.   SKIN: Confluent erythematous macules on back of the neck upper trunk arms.  No petechiae.   No signs of cellulitis. "   BREASTS/CHEST/AXILLA:  Bilateral breast exam revealed no mass or skin changes or nipple discharges.No palpable axilla mass or LN.      PSYCHIATRIC: Oriented to time, person, and places. Normal mood and affect. Good memory. Proper insight and judgement.          CURRENT LAB DATA REVIEWED TODAY WITH PATIENT DURING THE VISIT  Cbc diff/CMP today are fine    BRCA actionable panel is negative.     Right lumpectomy 3/19/2020 found grade 3 IDC, T1c, 1.9 cm and 1.2 cm, with ALI and DCIS. Superior margin is +. sLN is negative, [id number] disease.       OLD DATA REVIEWED TODAY WITH SUMMARY WITH PATIENT DURING THE VISIT    Second time biopsy to the right breast 9 o'clock position 9 cm from the nipple, found grade 3 invasive ductal cancer, triple negative, associated with grade 3 DCIS.       US/MA 10/2019  found 2 hypoechoic structures are seen at the patient's area of concern, 9:00-10:00 position 7 cm from the nipple on the RIGHT. One of these appears to have at least some component of an echogenic hilum and may represent an enlarged lymph node. The first measures 1.1 x 1.3 x 1.2 cm and the second measures 1.8 x 1.4 x 1.4 cm.   Survey of the axilla shows no evidence of adenopathy.          ASSESSMENT AND PLAN DISCUSSED WITH PATIENT TODAY DURING THE VISIT:    1.  dx diagnosed right breast upper outer quadrant triple negative breast cancer 11/2019, triple negative breast cancer.    She made informed decision to proceed with wkly taxol, C1D1 12/5/2019. Then went to [place] and finished 12 wkly taxol.     She had favorable image response early March 2020.    Right lumpectomy 3/19/2020 found grade 3 IDC, T1c, 1.9 cm with ALI and DCIS,   and 1.2 cm. Superior margin is +. sLN is nsgative, [id number] disease.     Due to her advanced age Adriamycin was not recommended in her situation.    S/p RT 5/2020.     Since clinical trial is not open due to Covid, would recommend her to consider adjuvant Xeloda post RT.   We discussed the pros and cons of  doing adjuvant Xeloda.     She made informed decision to start in June 2020.     Broke out into rash on upper neck, arms (sun exposed area) late July.  Advice her to hold xeloda till rash resolves.     Then we have decision to make: stop Xeloda or do metronomic Xeloda.   We discussed the ASCO 2020 new maintenance therapy for TNBC with metronomic capecitabine 650mg/m2 daily straight for one year.  5-year DFS rate with capecitabine was significantly better than with observation (83% vs 73%, respectively; hazard ratio [HR], 0.63; 95% CI, 0.42-0.96; P = .027).  5-year DDFS rate was significantly better with capecitabine versus observation (85% vs 76%, respectively; HR, 0.56; 95% CI, 0.37-0.90; P = .016).    Also discussed the ER follow-up plan for triple negative breast cancer patients.  She is due MA bilateral in Oct.     All Qs are answered to PT's satisfaction.       Total time is 25 minutes, more than 15 minutes spent in dicussion and review patient's cancer and treatment history, labs and images results, further treatment recommendations, follow up plan.

## 2020-07-28 NOTE — PROCEDURES
"Oncology Rooming Note    July 28, 2020 10:08 AM   Precious Paris is a 79 year old female who presents for:    Chief Complaint   Patient presents with     Oncology Clinic Visit     Malignant neoplasm of upper-outer quadrant of right breast      Initial Vitals: BP (!) 149/60 (BP Location: Right arm, Patient Position: Sitting, Cuff Size: Adult Regular)   Pulse 67   Temp 96.9  F (36.1  C) (Tympanic)   Resp 18   Ht 1.626 m (5' 4\")   Wt 89.5 kg (197 lb 4.8 oz)   SpO2 95%   BMI 33.87 kg/m   Estimated body mass index is 33.87 kg/m  as calculated from the following:    Height as of this encounter: 1.626 m (5' 4\").    Weight as of this encounter: 89.5 kg (197 lb 4.8 oz). Body surface area is 2.01 meters squared.  No Pain (0) Comment: Data Unavailable   No LMP recorded. Patient is postmenopausal.  Allergies reviewed: Yes  Medications reviewed: Yes    Medications: Medication refills not needed today.  Pharmacy name entered into [employer]:    [facility] - [place], [place] - [address] [facility] - [place], [place] - [address]    Clinical concerns:  Malignant neoplasm of upper-outer quadrant of right breast     Patient is worried she is having side effects of her Xeloda. Red, itchy, throat swollen started about a week ago. Patient also wondering about healing time on her skin.     Brittany Guillory LPN              "

## 2020-08-15 NOTE — PROGRESS NOTES
Type of Visit: Video Visit  Patient has given verbal consent for Video visit.     Video Start Time: 8 am  Video End Time: 8:20 am    Originating Location (pt. 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 Cruz to me 11/25/2019  S/p neoadjuvant taxol then lumpectomy 3/2020      HISTORY OF ONCOLOGY ILLNESS/CC:    At age 79, 10/2019 she presented with pain and redness, induration in the right breast associated with breast lump.  She was treated with antibiotic for 10 days redness resolved.    US/MA 10/2019  found 2 hypoechoic structures are seen at the patient's area of concern, 9:00-10:00 position 7 cm from the nipple on the RIGHT. One of these appears to have at least some component of an echogenic hilum and may represent an enlarged lymph node. The first measures 1.1 x 1.3 x 1.2 cm and the second measures 1.8 x 1.4 x 1.4 cm.   Survey of the axilla shows no evidence of adenopathy.    First biopsy identified invasive ductal carcinoma with marked necrosis and insufficient tissue for further evaluation.  Second time biopsy to the right breast 9 o'clock position 9 cm from the nipple, found grade 3 invasive ductal cancer, triple negative, associated with grade 3 DCIS.    She saw Dr. Frausto along with tumor conference discussion, due to her relatively healthy status, aggressive nature of her breast cancer, and the desire of breast conservation, neoadjuvant systemic treatment was recommended.    She made informed decision to proceed with wkly taxol, C1D1 12/5/2019. Then went to [place] and finished wkly taxol. Last dose of taxol was Feb 20th 2020. She had no tx delay.     She had favorable image response early  March 2020.    Right lumpectomy 3/19/2020 found grade 3 IDC, T1c, 1.9 and 1.2 cm cm with ALI and DCIS, . Superior margin is +. sLN is nsgative, [id number] disease.     BRCA actionable panel is negative.       INTERVAL HISTORY:   She finished RT 5/2020.   Made informed informed decision to proceed with adjuvant xeloda in late June 2020.   Broke out into rash on upper neck, arms (sun exposed area) late July 2020. Xeloda was put on hold.   She then made informed decision not pursue more adjuvant Xeloda.     PMH  retinopathy of the right eye, cataract  Hypertension, hyperlipidemia  Osteoarthritis, spinal stenosis, bilateral knees replacement, carpel tunnel surgery  Varicose vein of lower extremities ,s/p surgery      REVIEW OF SYSTEMS:   She called 7/22/2020 for rash on the right side of her neck. Started out with one, then become more diffuse.  Now most of rash is gone.  She also reports soar hands and wrists. On and off right breast pain, and has insomnia from it.       PHYSICAL EXAMINATION ATTAINABLE DURING VIDEO VISIT:  CONSTITUTIONAL - Pt looks like stated age, pleasant, not in acute distress. Not obese.  NEURO: Oriented to time, person, and places. No tremor. Normal gait.   ENT, MOUTH: Pupils are equal.  Sclerae are anicteric.  Moist oral mucosa. No oral thrush.   NECK:  No jugular venous distention.  No thyroid enlargement.   RESPIRATORY: talk nl, no sob during conversation, no cough.   MUSCULOSKELETAL/EXTREMITIES:  No edema.  No joint deformity. Normal range of motion.  SKIN:  No petechiae.  No rash.  No signs of cellulitis.   PSYCHIATRIC: Normal mood and affect. Good memory. Proper insight and judgement.   THE REST OF A COMPREHENSIVE PHYSICIAL EXAM IS DEFERRED DUE TO COVID-19 PUBLIC HEALTH EMERGENCY RELATED VIDEO VISIT RESCTRICTION.       CURRENT LAB DATA REVIEWED TODAY WITH PATIENT DURING THE VISIT  Cbc diff/CMP are fine    BRCA actionable panel is negative.     Right lumpectomy 3/19/2020 found grade 3 IDC, T1c,  1.9 cm and 1.2 cm, with ALI and DCIS. Superior margin is +. sLN is negative, [id number] disease.       OLD DATA REVIEWED TODAY WITH SUMMARY WITH PATIENT DURING THE VISIT    Second time biopsy to the right breast 9 o'clock position 9 cm from the nipple, found grade 3 invasive ductal cancer, triple negative, associated with grade 3 DCIS.       US/MA 10/2019  found 2 hypoechoic structures are seen at the patient's area of concern, 9:00-10:00 position 7 cm from the nipple on the RIGHT. One of these appears to have at least some component of an echogenic hilum and may represent an enlarged lymph node. The first measures 1.1 x 1.3 x 1.2 cm and the second measures 1.8 x 1.4 x 1.4 cm.   Survey of the axilla shows no evidence of adenopathy.          ASSESSMENT AND PLAN DISCUSSED WITH PATIENT TODAY DURING THE VISIT:    1.  dx diagnosed right breast upper outer quadrant triple negative breast cancer 11/2019, triple negative breast cancer.    She made informed decision to proceed with wkly taxol, C1D1 12/5/2019. Then went to [place] and finished 12 wkly taxol.     She had favorable image response early March 2020.    Right lumpectomy 3/19/2020 found grade 3 IDC, T1c, 1.9 cm with ALI and DCIS,   and 1.2 cm. Superior margin is +. sLN is nsgative, [id number] disease.     Due to her advanced age Adriamycin was not recommended in her situation.    S/p RT 5/2020.     She made informed decision to start oral Xeloda in June 2020.     She broke out into rash on upper neck, arms (sun exposed area) late July. Adviced her to hold xeloda till rash resolves.   Now the rash is much better.     Advice her to consider metronomic Xeloda.   We discussed the ASCO 2020 new maintenance therapy for TNBC with metronomic capecitabine 650mg/m2 bid straight for one year.  5-year DFS rate with capecitabine was significantly better than with observation (83% vs 73%, respectively; hazard ratio [HR], 0.63; 95% CI, 0.42-0.96; P = .027).  5-year DDFS rate was  significantly better with capecitabine versus observation (85% vs 76%, respectively; HR, 0.56; 95% CI, 0.37-0.90; P = .016).    She made informed decision not to any more adjuvant Xeloda after discussion.   We then discussed the follow-up plan for triple negative breast cancer patients.  She is due MA bilateral in Oct. And follow-up in 3 months with labs.       2. New symmetrical arthritic pain on hands and wrists.   Advice OCT glucosamine to start.   She is to see rhematologist.       3. On and off right breast pain on the surgical site with associated insomnia.   Advice primrose oil topical, and melatonin for insomnia.     All Qs are answered to PT's satisfaction.

## 2020-08-19 NOTE — PROGRESS NOTES
"Precious Paris is a 79 year old female who is being evaluated via a billable video visit.      The patient has been notified of following:     \"This video visit will be conducted via a call between you and your physician/provider. We have found that certain health care needs can be provided without the need for an in-person physical exam.  This service lets us provide the care you need with a video conversation.  If a prescription is necessary we can send it directly to your pharmacy.  If lab work is needed we can place an order for that and you can then stop by our lab to have the test done at a later time.    Video visits are billed at different rates depending on your insurance coverage.  Please reach out to your insurance provider with any questions.    If during the course of the call the physician/provider feels a video visit is not appropriate, you will not be charged for this service.\"    Patient has given verbal consent for Video visit? Yes  How would you like to obtain your AVS? MyChart  If you are dropped from the video visit, the video invite should be resent to: Send to e-mail at: [email or web address]  Will anyone else be joining your video visit? No        Video-Visit Details    Type of service:  Video Visit    Originating Location (pt. Location): Home    Distant Location (provider location):  [facility]rella Ren CMA      "

## 2020-08-20 ENCOUNTER — VIRTUAL VISIT (OUTPATIENT)
Dept: ONCOLOGY | Facility: CLINIC | Age: 80
End: 2020-08-20
Attending: INTERNAL MEDICINE
Payer: COMMERCIAL

## 2020-08-20 DIAGNOSIS — C50.411 MALIGNANT NEOPLASM OF UPPER-OUTER QUADRANT OF RIGHT BREAST IN FEMALE, ESTROGEN RECEPTOR NEGATIVE (H): Primary | ICD-10-CM

## 2020-08-20 DIAGNOSIS — G47.00 INSOMNIA, UNSPECIFIED TYPE: ICD-10-CM

## 2020-08-20 DIAGNOSIS — Z12.31 SCREENING MAMMOGRAM, ENCOUNTER FOR: ICD-10-CM

## 2020-08-20 DIAGNOSIS — M25.542 ARTHRALGIA OF BOTH HANDS: ICD-10-CM

## 2020-08-20 DIAGNOSIS — N64.4 BREAST PAIN: ICD-10-CM

## 2020-08-20 DIAGNOSIS — Z17.1 MALIGNANT NEOPLASM OF UPPER-OUTER QUADRANT OF RIGHT BREAST IN FEMALE, ESTROGEN RECEPTOR NEGATIVE (H): Primary | ICD-10-CM

## 2020-08-20 DIAGNOSIS — M25.541 ARTHRALGIA OF BOTH HANDS: ICD-10-CM

## 2020-08-20 PROCEDURE — 40001009 ZZH VIDEO/TELEPHONE VISIT; NO CHARGE

## 2020-08-20 PROCEDURE — 99214 OFFICE O/P EST MOD 30 MIN: CPT | Mod: 95 | Performed by: INTERNAL MEDICINE

## 2020-08-20 NOTE — LETTER
8/20/2020         RE: Precious Paris  [address] [place] [address]        Dear Colleague,    Thank you for referring your patient, Precious Paris, to the [facility] CANCER [facility]. Please see a copy of my visit note below.    Type of Visit: Video Visit  Patient has given verbal consent for Video visit.     Video Start Time: 8 am  Video End Time: 8:20 am    Originating Location (pt. 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 Cruz to me 11/25/2019  S/p neoadjuvant taxol then lumpectomy 3/2020      HISTORY OF ONCOLOGY ILLNESS/CC:    At age 79, 10/2019 she presented with pain and redness, induration in the right breast associated with breast lump.  She was treated with antibiotic for 10 days redness resolved.    US/MA 10/2019  found 2 hypoechoic structures are seen at the patient's area of concern, 9:00-10:00 position 7 cm from the nipple on the RIGHT. One of these appears to have at least some component of an echogenic hilum and may represent an enlarged lymph node. The first measures 1.1 x 1.3 x 1.2 cm and the second measures 1.8 x 1.4 x 1.4 cm.   Survey of the axilla shows no evidence of adenopathy.    First biopsy identified invasive ductal carcinoma with marked necrosis and insufficient tissue for further evaluation.  Second time biopsy to the right breast 9 o'clock position 9 cm from the nipple, found grade 3 invasive ductal cancer, triple negative, associated with grade 3 DCIS.    She saw Dr. Frausto along with tumor conference discussion, due to her relatively healthy status, aggressive nature of her breast cancer, and the desire of breast conservation, neoadjuvant systemic  treatment was recommended.    She made informed decision to proceed with wkly taxol, C1D1 12/5/2019. Then went to [place] and finished wkly taxol. Last dose of taxol was Feb 20th 2020. She had no tx delay.     She had favorable image response early March 2020.    Right lumpectomy 3/19/2020 found grade 3 IDC, T1c, 1.9 and 1.2 cm cm with ALI and DCIS, . Superior margin is +. sLN is nsgative, [id number] disease.     BRCA actionable panel is negative.       INTERVAL HISTORY:   She finished RT 5/2020.   Made informed informed decision to proceed with adjuvant xeloda in late June 2020.   Broke out into rash on upper neck, arms (sun exposed area) late July 2020. Xeloda was put on hold.   She then made informed decision not pursue more adjuvant Xeloda.     PMH  retinopathy of the right eye, cataract  Hypertension, hyperlipidemia  Osteoarthritis, spinal stenosis, bilateral knees replacement, carpel tunnel surgery  Varicose vein of lower extremities ,s/p surgery      REVIEW OF SYSTEMS:   She called 7/22/2020 for rash on the right side of her neck. Started out with one, then become more diffuse.  Now most of rash is gone.  She also reports soar hands and wrists. On and off right breast pain, and has insomnia from it.       PHYSICAL EXAMINATION ATTAINABLE DURING VIDEO VISIT:  CONSTITUTIONAL - Pt looks like stated age, pleasant, not in acute distress. Not obese.  NEURO: Oriented to time, person, and places. No tremor. Normal gait.   ENT, MOUTH: Pupils are equal.  Sclerae are anicteric.  Moist oral mucosa. No oral thrush.   NECK:  No jugular venous distention.  No thyroid enlargement.   RESPIRATORY: talk nl, no sob during conversation, no cough.   MUSCULOSKELETAL/EXTREMITIES:  No edema.  No joint deformity. Normal range of motion.  SKIN:  No petechiae.  No rash.  No signs of cellulitis.   PSYCHIATRIC: Normal mood and affect. Good memory. Proper insight and judgement.   THE REST OF A COMPREHENSIVE PHYSICIAL EXAM IS DEFERRED DUE TO  COVID-19 PUBLIC HEALTH EMERGENCY RELATED VIDEO VISIT RESCTRICTION.       CURRENT LAB DATA REVIEWED TODAY WITH PATIENT DURING THE VISIT  Cbc diff/CMP are fine    BRCA actionable panel is negative.     Right lumpectomy 3/19/2020 found grade 3 IDC, T1c, 1.9 cm and 1.2 cm, with ALI and DCIS. Superior margin is +. sLN is negative, [id number] disease.       OLD DATA REVIEWED TODAY WITH SUMMARY WITH PATIENT DURING THE VISIT    Second time biopsy to the right breast 9 o'clock position 9 cm from the nipple, found grade 3 invasive ductal cancer, triple negative, associated with grade 3 DCIS.       US/MA 10/2019  found 2 hypoechoic structures are seen at the patient's area of concern, 9:00-10:00 position 7 cm from the nipple on the RIGHT. One of these appears to have at least some component of an echogenic hilum and may represent an enlarged lymph node. The first measures 1.1 x 1.3 x 1.2 cm and the second measures 1.8 x 1.4 x 1.4 cm.   Survey of the axilla shows no evidence of adenopathy.          ASSESSMENT AND PLAN DISCUSSED WITH PATIENT TODAY DURING THE VISIT:    1.  dx diagnosed right breast upper outer quadrant triple negative breast cancer 11/2019, triple negative breast cancer.    She made informed decision to proceed with wkly taxol, C1D1 12/5/2019. Then went to [place] and finished 12 wkly taxol.     She had favorable image response early March 2020.    Right lumpectomy 3/19/2020 found grade 3 IDC, T1c, 1.9 cm with ALI and DCIS,   and 1.2 cm. Superior margin is +. sLN is nsgative, [id number] disease.     Due to her advanced age Adriamycin was not recommended in her situation.    S/p RT 5/2020.     She made informed decision to start oral Xeloda in June 2020.     She broke out into rash on upper neck, arms (sun exposed area) late July. Adviced her to hold xeloda till rash resolves.   Now the rash is much better.     Advice her to consider metronomic Xeloda.   We discussed the ASCO 2020 new maintenance therapy for TNBC with  "metronomic capecitabine 650mg/m2 bid straight for one year.  5-year DFS rate with capecitabine was significantly better than with observation (83% vs 73%, respectively; hazard ratio [HR], 0.63; 95% CI, 0.42-0.96; P = .027).  5-year DDFS rate was significantly better with capecitabine versus observation (85% vs 76%, respectively; HR, 0.56; 95% CI, 0.37-0.90; P = .016).    She made informed decision not to any more adjuvant Xeloda after discussion.   We then discussed the follow-up plan for triple negative breast cancer patients.  She is due MA bilateral in Oct. And follow-up in 3 months with labs.       2. New symmetrical arthritic pain on hands and wrists.   Advice OCT glucosamine to start.   She is to see rhematologist.       3. On and off right breast pain on the surgical site with associated insomnia.   Advice primrose oil topical, and melatonin for insomnia.     All Qs are answered to PT's satisfaction.               Precious Paris is a 79 year old female who is being evaluated via a billable video visit.      The patient has been notified of following:     \"This video visit will be conducted via a call between you and your physician/provider. We have found that certain health care needs can be provided without the need for an in-person physical exam.  This service lets us provide the care you need with a video conversation.  If a prescription is necessary we can send it directly to your pharmacy.  If lab work is needed we can place an order for that and you can then stop by our lab to have the test done at a later time.    Video visits are billed at different rates depending on your insurance coverage.  Please reach out to your insurance provider with any questions.    If during the course of the call the physician/provider feels a video visit is not appropriate, you will not be charged for this service.\"    Patient has given verbal consent for Video visit? Yes  How would you like to obtain your AVS? " Loree  If you are dropped from the video visit, the video invite should be resent to: Send to e-mail at: [email or web address]  Will anyone else be joining your video visit? No        Video-Visit Details    Type of service:  Video Visit    Originating Location (pt. Location): Home    Distant Location (provider location):  [facility] Estrella Ren CMA        Again, thank you for allowing me to participate in the care of your patient.        Sincerely,        Rachell Villa MD, MD

## 2020-08-20 NOTE — LETTER
8/20/2020         RE: Precious Paris  [address] [place] [address]        Dear Colleague,    Thank you for referring your patient, Precious Paris, to the [facility] CANCER [facility]. Please see a copy of my visit note below.    Type of Visit: Video Visit  Patient has given verbal consent for Video visit.     Video Start Time: 8 am  Video End Time: 8:20 am    Originating Location (pt. Location): Home     Distant Location (provider location):  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 Cruz to me 11/25/2019  S/p neoadjuvant taxol then lumpectomy 3/2020      HISTORY OF ONCOLOGY ILLNESS/CC:    At age 79, 10/2019 she presented with pain and redness, induration in the right breast associated with breast lump.  She was treated with antibiotic for 10 days redness resolved.    US/MA 10/2019  found 2 hypoechoic structures are seen at the patient's area of concern, 9:00-10:00 position 7 cm from the nipple on the RIGHT. One of these appears to have at least some component of an echogenic hilum and may represent an enlarged lymph node. The first measures 1.1 x 1.3 x 1.2 cm and the second measures 1.8 x 1.4 x 1.4 cm.   Survey of the axilla shows no evidence of adenopathy.    First biopsy identified invasive ductal carcinoma with marked necrosis and insufficient tissue for further evaluation.  Second time biopsy to the right breast 9 o'clock position 9 cm from the nipple, found grade 3 invasive ductal cancer, triple negative, associated with grade 3 DCIS.    She saw Dr. Frausto along with tumor conference discussion, due to her relatively healthy status, aggressive nature of her breast cancer, and the desire of breast conservation, neoadjuvant systemic  treatment was recommended.    She made informed decision to proceed with wkly taxol, C1D1 12/5/2019. Then went to [place] and finished wkly taxol. Last dose of taxol was Feb 20th 2020. She had no tx delay.     She had favorable image response early March 2020.    Right lumpectomy 3/19/2020 found grade 3 IDC, T1c, 1.9 and 1.2 cm cm with ALI and DCIS, . Superior margin is +. sLN is nsgative, [id number] disease.     BRCA actionable panel is negative.       INTERVAL HISTORY:   She finished RT 5/2020.   Made informed informed decision to proceed with adjuvant xeloda in late June 2020.   Broke out into rash on upper neck, arms (sun exposed area) late July 2020. Xeloda was put on hold.   She then made informed decision not pursue more adjuvant Xeloda.     PMH  retinopathy of the right eye, cataract  Hypertension, hyperlipidemia  Osteoarthritis, spinal stenosis, bilateral knees replacement, carpel tunnel surgery  Varicose vein of lower extremities ,s/p surgery      REVIEW OF SYSTEMS:   She called 7/22/2020 for rash on the right side of her neck. Started out with one, then become more diffuse.  Now most of rash is gone.  She also reports soar hands and wrists. On and off right breast pain, and has insomnia from it.       PHYSICAL EXAMINATION ATTAINABLE DURING VIDEO VISIT:  CONSTITUTIONAL - Pt looks like stated age, pleasant, not in acute distress. Not obese.  NEURO: Oriented to time, person, and places. No tremor. Normal gait.   ENT, MOUTH: Pupils are equal.  Sclerae are anicteric.  Moist oral mucosa. No oral thrush.   NECK:  No jugular venous distention.  No thyroid enlargement.   RESPIRATORY: talk nl, no sob during conversation, no cough.   MUSCULOSKELETAL/EXTREMITIES:  No edema.  No joint deformity. Normal range of motion.  SKIN:  No petechiae.  No rash.  No signs of cellulitis.   PSYCHIATRIC: Normal mood and affect. Good memory. Proper insight and judgement.   THE REST OF A COMPREHENSIVE PHYSICIAL EXAM IS DEFERRED DUE TO  COVID-19 PUBLIC HEALTH EMERGENCY RELATED VIDEO VISIT RESCTRICTION.       CURRENT LAB DATA REVIEWED TODAY WITH PATIENT DURING THE VISIT  Cbc diff/CMP are fine    BRCA actionable panel is negative.     Right lumpectomy 3/19/2020 found grade 3 IDC, T1c, 1.9 cm and 1.2 cm, with ALI and DCIS. Superior margin is +. sLN is negative, [id number] disease.       OLD DATA REVIEWED TODAY WITH SUMMARY WITH PATIENT DURING THE VISIT    Second time biopsy to the right breast 9 o'clock position 9 cm from the nipple, found grade 3 invasive ductal cancer, triple negative, associated with grade 3 DCIS.       US/MA 10/2019  found 2 hypoechoic structures are seen at the patient's area of concern, 9:00-10:00 position 7 cm from the nipple on the RIGHT. One of these appears to have at least some component of an echogenic hilum and may represent an enlarged lymph node. The first measures 1.1 x 1.3 x 1.2 cm and the second measures 1.8 x 1.4 x 1.4 cm.   Survey of the axilla shows no evidence of adenopathy.          ASSESSMENT AND PLAN DISCUSSED WITH PATIENT TODAY DURING THE VISIT:    1.  dx diagnosed right breast upper outer quadrant triple negative breast cancer 11/2019, triple negative breast cancer.    She made informed decision to proceed with wkly taxol, C1D1 12/5/2019. Then went to [place] and finished 12 wkly taxol.     She had favorable image response early March 2020.    Right lumpectomy 3/19/2020 found grade 3 IDC, T1c, 1.9 cm with ALI and DCIS,   and 1.2 cm. Superior margin is +. sLN is nsgative, [id number] disease.     Due to her advanced age Adriamycin was not recommended in her situation.    S/p RT 5/2020.     She made informed decision to start oral Xeloda in June 2020.     She broke out into rash on upper neck, arms (sun exposed area) late July. Adviced her to hold xeloda till rash resolves.   Now the rash is much better.     Advice her to consider metronomic Xeloda.   We discussed the ASCO 2020 new maintenance therapy for TNBC with  "metronomic capecitabine 650mg/m2 bid straight for one year.  5-year DFS rate with capecitabine was significantly better than with observation (83% vs 73%, respectively; hazard ratio [HR], 0.63; 95% CI, 0.42-0.96; P = .027).  5-year DDFS rate was significantly better with capecitabine versus observation (85% vs 76%, respectively; HR, 0.56; 95% CI, 0.37-0.90; P = .016).    She made informed decision not to any more adjuvant Xeloda after discussion.   We then discussed the follow-up plan for triple negative breast cancer patients.  She is due MA bilateral in Oct. And follow-up in 3 months with labs.       2. New symmetrical arthritic pain on hands and wrists.   Advice OCT glucosamine to start.   She is to see rhematologist.       3. On and off right breast pain on the surgical site with associated insomnia.   Advice primrose oil topical, and melatonin for insomnia.     All Qs are answered to PT's satisfaction.               Precious Paris is a 79 year old female who is being evaluated via a billable video visit.      The patient has been notified of following:     \"This video visit will be conducted via a call between you and your physician/provider. We have found that certain health care needs can be provided without the need for an in-person physical exam.  This service lets us provide the care you need with a video conversation.  If a prescription is necessary we can send it directly to your pharmacy.  If lab work is needed we can place an order for that and you can then stop by our lab to have the test done at a later time.    Video visits are billed at different rates depending on your insurance coverage.  Please reach out to your insurance provider with any questions.    If during the course of the call the physician/provider feels a video visit is not appropriate, you will not be charged for this service.\"    Patient has given verbal consent for Video visit? Yes  How would you like to obtain your AVS? " Loree  If you are dropped from the video visit, the video invite should be resent to: Send to e-mail at: [email or web address]  Will anyone else be joining your video visit? No        Video-Visit Details    Type of service:  Video Visit    Originating Location (pt. Location): Home    Distant Location (provider location):  [facility] Estrella Ren CMA        Again, thank you for allowing me to participate in the care of your patient.        Sincerely,        Rachell Villa MD, MD

## 2020-08-29 PROBLEM — C50.911 INFILTRATING DUCTAL CARCINOMA OF RIGHT BREAST (H): Status: ACTIVE | Noted: 2019-10-30

## 2020-10-06 ENCOUNTER — ALLIED HEALTH/NURSE VISIT (OUTPATIENT)
Dept: FAMILY MEDICINE | Facility: CLINIC | Age: 80
End: 2020-10-06
Payer: COMMERCIAL

## 2020-10-06 DIAGNOSIS — Z23 NEED FOR PROPHYLACTIC VACCINATION AND INOCULATION AGAINST INFLUENZA: Primary | ICD-10-CM

## 2020-10-06 PROCEDURE — G0008 ADMIN INFLUENZA VIRUS VAC: HCPCS

## 2020-10-06 PROCEDURE — 90662 IIV NO PRSV INCREASED AG IM: CPT

## 2020-10-19 ENCOUNTER — HOSPITAL ENCOUNTER (OUTPATIENT)
Dept: MAMMOGRAPHY | Facility: CLINIC | Age: 80
Discharge: HOME | End: 2020-10-19
Attending: INTERNAL MEDICINE | Admitting: INTERNAL MEDICINE
Payer: COMMERCIAL

## 2020-10-19 DIAGNOSIS — Z12.31 SCREENING MAMMOGRAM, ENCOUNTER FOR: ICD-10-CM

## 2020-10-19 PROCEDURE — 77063 BREAST TOMOSYNTHESIS BI: CPT

## 2020-11-03 DIAGNOSIS — Z17.1 MALIGNANT NEOPLASM OF UPPER-OUTER QUADRANT OF RIGHT BREAST IN FEMALE, ESTROGEN RECEPTOR NEGATIVE (H): ICD-10-CM

## 2020-11-03 DIAGNOSIS — C50.411 MALIGNANT NEOPLASM OF UPPER-OUTER QUADRANT OF RIGHT BREAST IN FEMALE, ESTROGEN RECEPTOR NEGATIVE (H): ICD-10-CM

## 2020-11-03 LAB
ALBUMIN SERPL-MCNC: 3.5 G/DL (ref 3.4–5)
ALP SERPL-CCNC: 83 U/L (ref 40–150)
ALT SERPL W P-5'-P-CCNC: 25 U/L (ref 0–50)
ANION GAP SERPL CALCULATED.3IONS-SCNC: 5 MMOL/L (ref 3–14)
AST SERPL W P-5'-P-CCNC: 19 U/L (ref 0–45)
BASOPHILS # BLD AUTO: 0 10E9/L (ref 0–0.2)
BASOPHILS NFR BLD AUTO: 0.4 %
BILIRUB SERPL-MCNC: 0.4 MG/DL (ref 0.2–1.3)
BUN SERPL-MCNC: 15 MG/DL (ref 7–30)
CALCIUM SERPL-MCNC: 9 MG/DL (ref 8.5–10.1)
CANCER AG27-29 SERPL-ACNC: 11 U/ML (ref 0–39)
CHLORIDE SERPL-SCNC: 103 MMOL/L (ref 94–109)
CO2 SERPL-SCNC: 28 MMOL/L (ref 20–32)
CREAT SERPL-MCNC: 0.69 MG/DL (ref 0.52–1.04)
DIFFERENTIAL METHOD BLD: NORMAL
EOSINOPHIL # BLD AUTO: 0.2 10E9/L (ref 0–0.7)
EOSINOPHIL NFR BLD AUTO: 2.6 %
ERYTHROCYTE [DISTWIDTH] IN BLOOD BY AUTOMATED COUNT: 12.9 % (ref 10–15)
GFR SERPL CREATININE-BSD FRML MDRD: 83 ML/MIN/{1.73_M2}
GLUCOSE SERPL-MCNC: 87 MG/DL (ref 70–99)
HCT VFR BLD AUTO: 37.3 % (ref 35–47)
HGB BLD-MCNC: 12.3 G/DL (ref 11.7–15.7)
LYMPHOCYTES # BLD AUTO: 1.8 10E9/L (ref 0.8–5.3)
LYMPHOCYTES NFR BLD AUTO: 25.6 %
MCH RBC QN AUTO: 31.3 PG (ref 26.5–33)
MCHC RBC AUTO-ENTMCNC: 33 G/DL (ref 31.5–36.5)
MCV RBC AUTO: 95 FL (ref 78–100)
MONOCYTES # BLD AUTO: 0.8 10E9/L (ref 0–1.3)
MONOCYTES NFR BLD AUTO: 11.1 %
NEUTROPHILS # BLD AUTO: 4.2 10E9/L (ref 1.6–8.3)
NEUTROPHILS NFR BLD AUTO: 60.3 %
PLATELET # BLD AUTO: 299 10E9/L (ref 150–450)
POTASSIUM SERPL-SCNC: 3.8 MMOL/L (ref 3.4–5.3)
PROT SERPL-MCNC: 7.4 G/DL (ref 6.8–8.8)
RBC # BLD AUTO: 3.93 10E12/L (ref 3.8–5.2)
SODIUM SERPL-SCNC: 136 MMOL/L (ref 133–144)
WBC # BLD AUTO: 7 10E9/L (ref 4–11)

## 2020-11-03 PROCEDURE — 36415 COLL VENOUS BLD VENIPUNCTURE: CPT | Performed by: INTERNAL MEDICINE

## 2020-11-03 PROCEDURE — 86300 IMMUNOASSAY TUMOR CA 15-3: CPT | Performed by: INTERNAL MEDICINE

## 2020-11-03 PROCEDURE — 80053 COMPREHEN METABOLIC PANEL: CPT | Performed by: INTERNAL MEDICINE

## 2020-11-03 PROCEDURE — 85025 COMPLETE CBC W/AUTO DIFF WBC: CPT | Performed by: INTERNAL MEDICINE

## 2020-11-10 ENCOUNTER — ONCOLOGY VISIT (OUTPATIENT)
Dept: ONCOLOGY | Facility: CLINIC | Age: 80
End: 2020-11-10
Attending: INTERNAL MEDICINE
Payer: COMMERCIAL

## 2020-11-10 VITALS
BODY MASS INDEX: 32.89 KG/M2 | TEMPERATURE: 98.7 F | HEART RATE: 74 BPM | OXYGEN SATURATION: 95 % | SYSTOLIC BLOOD PRESSURE: 136 MMHG | RESPIRATION RATE: 18 BRPM | HEIGHT: 65 IN | DIASTOLIC BLOOD PRESSURE: 69 MMHG | WEIGHT: 197.4 LBS

## 2020-11-10 DIAGNOSIS — M25.542 ARTHRALGIA OF BOTH HANDS: ICD-10-CM

## 2020-11-10 DIAGNOSIS — Z17.1 MALIGNANT NEOPLASM OF UPPER-OUTER QUADRANT OF RIGHT BREAST IN FEMALE, ESTROGEN RECEPTOR NEGATIVE (H): Primary | ICD-10-CM

## 2020-11-10 DIAGNOSIS — M25.541 ARTHRALGIA OF BOTH HANDS: ICD-10-CM

## 2020-11-10 DIAGNOSIS — N64.4 BREAST PAIN: ICD-10-CM

## 2020-11-10 DIAGNOSIS — C50.411 MALIGNANT NEOPLASM OF UPPER-OUTER QUADRANT OF RIGHT BREAST IN FEMALE, ESTROGEN RECEPTOR NEGATIVE (H): Primary | ICD-10-CM

## 2020-11-10 PROCEDURE — G0463 HOSPITAL OUTPT CLINIC VISIT: HCPCS

## 2020-11-10 PROCEDURE — 99214 OFFICE O/P EST MOD 30 MIN: CPT | Performed by: INTERNAL MEDICINE

## 2020-11-10 ASSESSMENT — MIFFLIN-ST. JEOR: SCORE: 1363.34

## 2020-11-10 ASSESSMENT — PAIN SCALES - GENERAL: PAINLEVEL: NO PAIN (0)

## 2020-11-10 NOTE — PROGRESS NOTES
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 Cruz to me 11/25/2019  S/p neoadjuvant taxol then lumpectomy 3/2020      HISTORY OF ONCOLOGY ILLNESS/CC:    At age 79, 10/2019 she presented with pain and redness, induration in the right breast associated with breast lump.  She was treated with antibiotic for 10 days redness resolved.    US/MA 10/2019  found 2 hypoechoic structures are seen at the patient's area of concern, 9:00-10:00 position 7 cm from the nipple on the RIGHT. One of these appears to have at least some component of an echogenic hilum and may represent an enlarged lymph node. The first measures 1.1 x 1.3 x 1.2 cm and the second measures 1.8 x 1.4 x 1.4 cm.   Survey of the axilla shows no evidence of adenopathy.    First biopsy identified invasive ductal carcinoma with marked necrosis and insufficient tissue for further evaluation.  Second time biopsy to the right breast 9 o'clock position 9 cm from the nipple, found grade 3 invasive ductal cancer, triple negative, associated with grade 3 DCIS.    She saw Dr. Frausto along with tumor conference discussion, due to her relatively healthy status, aggressive nature of her breast cancer, and the desire of breast conservation, neoadjuvant systemic treatment was recommended.    She made informed decision to proceed with wkly taxol, C1D1 12/5/2019. Then went to [place] and finished wkly taxol. Last dose of taxol was Feb 20th 2020. She had no tx delay.     She had favorable image response early March 2020.    Right lumpectomy 3/19/2020 found grade 3 IDC, T1c, 1.9 and 1.2 cm cm with ALI and DCIS, . Superior margin is +. sLN is nsgative, [id number] disease.     BRCA actionable panel is negative.     She finished RT 5/2020.   Made informed informed decision to proceed with adjuvant  "xeloda in late 2020.   Broke out into rash on upper neck, arms (sun exposed area) late 2020. Xeloda was put on hold.   She then made informed decision not pursue more adjuvant Xeloda.      INTERVAL HISTORY:   Since the patient's last visit with us, there is no hospital stay/surgery/new diagnosis made.      PMH  retinopathy of the right eye, cataract  Hypertension, hyperlipidemia  Osteoarthritis, spinal stenosis, bilateral knees replacement, carpel tunnel surgery  Varicose vein of lower extremities ,s/p surgery      REVIEW OF SYSTEMS:   She also reports soar hands and wrists.   On and off right breast pain.      PHYSICAL EXAMINATION:   VITAL SIGNS: Blood pressure 136/69, pulse 74, temperature 98.7  F (37.1  C), temperature source Oral, resp. rate 18, height 1.638 m (5' 4.5\"), weight 89.5 kg (197 lb 6.4 oz), SpO2 95 %, not currently breastfeeding.        GENERAL APPEARANCE:  looks like her stated age, very pleasant, not in acute distress.      ECO     ENT, MOUTH: Pupils are equally reactive to light.  Sclerae are anicteric.  Moist oral mucosa without lesion or ulcer.   Negative pharynx.  No oral thrush.   NECK:  Supple.  No jugular venous distention.  Thyroid is not palpable.   LYMPH NODES:  Superficial lymphadenopathy is not appreciable in the bilateral cervical, supraclavicular, axillary or inguinal areas.   CARDIOVASCULAR:  S1, S2 regular with no murmurs or gallops.  No carotid or abdominal bruits.   PULMONARY:  Lungs are clear to auscultation and percussion bilaterally.  There is no wheezing or rhonchi.   GASTROINTESTINAL:  Abdomen is soft, nontender.  No hepatosplenomegaly.  No signs of ascites.  No mass appreciable.   MUSCULOSKELETAL/EXTREMITIES:  No edema.  No cyanotic changes.  No signs of joint deformity.  No lymphedema.   NEUROLOGIC:  Cranial nerves II-XII are grossly intact.  Sensation intact.  Muscle strength and muscle tone symmetrical, 5/5 throughout.   BACK  No spinal or paraspinal " tenderness.  No CVA tenderness.   SKIN:  No petechiae.  No rash.  No signs of cellulitis.   BREASTS/CHEST/AXILLA:  Bilateral breast exam revealed no mass or skin changes or nipple discharges.No palpable axilla mass or LN.      PSYCHIATRIC: Oriented to time, person, and places. Normal mood and affect. Good memory. Proper insight and judgement.         CURRENT LAB DATA REVIEWED TODAY:  Cbc diff/CMP/marker are fine    BRCA actionable panel is negative.     Right lumpectomy 3/19/2020 found grade 3 IDC, T1c, 1.9 cm and 1.2 cm, with ALI and DCIS. Superior margin is +. sLN is negative, [id number] disease.       CURRENT IMAGE DATA REVIEWED TODAY:  MA 10/2020 - negative      OLD DATA REVIEWED TODAY WITH SUMMARY:  Second time biopsy to the right breast 9 o'clock position 9 cm from the nipple, found grade 3 invasive ductal cancer, triple negative, associated with grade 3 DCIS.       US/MA 10/2019  found 2 hypoechoic structures are seen at the patient's area of concern, 9:00-10:00 position 7 cm from the nipple on the RIGHT. One of these appears to have at least some component of an echogenic hilum and may represent an enlarged lymph node. The first measures 1.1 x 1.3 x 1.2 cm and the second measures 1.8 x 1.4 x 1.4 cm.   Survey of the axilla shows no evidence of adenopathy.          ASSESSMENT AND PLAN:    1.  dx diagnosed right breast upper outer quadrant triple negative breast cancer 11/2019, triple negative breast cancer.    She made informed decision to proceed with wkly taxol, C1D1 12/5/2019. Then went to [place] and finished 12 wkly taxol.     She had favorable image response early March 2020.    Right lumpectomy 3/19/2020 found grade 3 IDC, T1c, 1.9 cm with ALI and DCIS,   and 1.2 cm. Superior margin is +. sLN is nsgative, [id number] disease.     Due to her advanced age Adriamycin was not recommended in her situation.    S/p RT 5/2020.     She made informed decision to start oral Xeloda in June 2020.     She broke out into rash on  upper neck, arms (sun exposed area) late July.  She made informed decision not to any more adjuvant Xeloda after discussion.     She is on follow-up in 3 months with labs.       2. symmetrical arthritic pain on hands and wrists.   Advice OCT glucosamine to start.       3. On and off right breast pain on the surgical site with associated insomnia.   Advice primrose oil topical.

## 2020-11-10 NOTE — LETTER
"    11/10/2020         RE: Precious Paris  [address] [place] [address]        Dear Colleague,    Thank you for referring your patient, Precious Paris, to the [facility]. Please see a copy of my visit note below.    Oncology Rooming Note    November 10, 2020 11:01 AM   Precious Paris is a 80 year old female who presents for:    Chief Complaint   Patient presents with     Oncology Clinic Visit     3 month return Malignant neoplasm of upper-outer quadrant of right breast in female, estrogen receptor negative      Initial Vitals: /69 (BP Location: Left arm, Patient Position: Sitting, Cuff Size: Adult Regular)   Pulse 74   Temp 98.7  F (37.1  C) (Oral)   Resp 18   Ht 1.638 m (5' 4.5\")   Wt 89.5 kg (197 lb 6.4 oz)   SpO2 95%   BMI 33.36 kg/m   Estimated body mass index is 33.36 kg/m  as calculated from the following:    Height as of this encounter: 1.638 m (5' 4.5\").    Weight as of this encounter: 89.5 kg (197 lb 6.4 oz). Body surface area is 2.02 meters squared.  No Pain (0) Comment: Data Unavailable   No LMP recorded. Patient is postmenopausal.  Allergies reviewed: Yes  Medications reviewed: Yes    Medications: Medication refills not needed today.  Pharmacy name entered into [facility]: [facility] - [place], [place] - [id number] [facility] [facility]    Clinical concerns:3 month return Malignant neoplasm of upper-outer quadrant of right breast in female, estrogen receptor negative.     Questions about getting a Shingles booster shot.   Finger tips are sore. Knuclkes are burning and red on left hand.   Left breast is still sore to the touch in the am.       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 Cruz to me 11/25/2019  S/p " neoadjuvant taxol then lumpectomy 3/2020      HISTORY OF ONCOLOGY ILLNESS/CC:    At age 79, 10/2019 she presented with pain and redness, induration in the right breast associated with breast lump.  She was treated with antibiotic for 10 days redness resolved.    US/MA 10/2019  found 2 hypoechoic structures are seen at the patient's area of concern, 9:00-10:00 position 7 cm from the nipple on the RIGHT. One of these appears to have at least some component of an echogenic hilum and may represent an enlarged lymph node. The first measures 1.1 x 1.3 x 1.2 cm and the second measures 1.8 x 1.4 x 1.4 cm.   Survey of the axilla shows no evidence of adenopathy.    First biopsy identified invasive ductal carcinoma with marked necrosis and insufficient tissue for further evaluation.  Second time biopsy to the right breast 9 o'clock position 9 cm from the nipple, found grade 3 invasive ductal cancer, triple negative, associated with grade 3 DCIS.    She saw Dr. Frausto along with tumor conference discussion, due to her relatively healthy status, aggressive nature of her breast cancer, and the desire of breast conservation, neoadjuvant systemic treatment was recommended.    She made informed decision to proceed with wkly taxol, C1D1 12/5/2019. Then went to [place] and finished wkly taxol. Last dose of taxol was Feb 20th 2020. She had no tx delay.     She had favorable image response early March 2020.    Right lumpectomy 3/19/2020 found grade 3 IDC, T1c, 1.9 and 1.2 cm cm with ALI and DCIS, . Superior margin is +. sLN is nsgative, [id number] disease.     BRCA actionable panel is negative.     She finished RT 5/2020.   Made informed informed decision to proceed with adjuvant xeloda in late June 2020.   Broke out into rash on upper neck, arms (sun exposed area) late July 2020. Xeloda was put on hold.   She then made informed decision not pursue more adjuvant Xeloda.      INTERVAL HISTORY:   Since the patient's last visit with us, there is  "no hospital stay/surgery/new diagnosis made.      PMH  retinopathy of the right eye, cataract  Hypertension, hyperlipidemia  Osteoarthritis, spinal stenosis, bilateral knees replacement, carpel tunnel surgery  Varicose vein of lower extremities ,s/p surgery      REVIEW OF SYSTEMS:   She also reports soar hands and wrists.   On and off right breast pain.      PHYSICAL EXAMINATION:   VITAL SIGNS: Blood pressure 136/69, pulse 74, temperature 98.7  F (37.1  C), temperature source Oral, resp. rate 18, height 1.638 m (5' 4.5\"), weight 89.5 kg (197 lb 6.4 oz), SpO2 95 %, not currently breastfeeding.        GENERAL APPEARANCE:  looks like her stated age, very pleasant, not in acute distress.      ECO     ENT, MOUTH: Pupils are equally reactive to light.  Sclerae are anicteric.  Moist oral mucosa without lesion or ulcer.   Negative pharynx.  No oral thrush.   NECK:  Supple.  No jugular venous distention.  Thyroid is not palpable.   LYMPH NODES:  Superficial lymphadenopathy is not appreciable in the bilateral cervical, supraclavicular, axillary or inguinal areas.   CARDIOVASCULAR:  S1, S2 regular with no murmurs or gallops.  No carotid or abdominal bruits.   PULMONARY:  Lungs are clear to auscultation and percussion bilaterally.  There is no wheezing or rhonchi.   GASTROINTESTINAL:  Abdomen is soft, nontender.  No hepatosplenomegaly.  No signs of ascites.  No mass appreciable.   MUSCULOSKELETAL/EXTREMITIES:  No edema.  No cyanotic changes.  No signs of joint deformity.  No lymphedema.   NEUROLOGIC:  Cranial nerves II-XII are grossly intact.  Sensation intact.  Muscle strength and muscle tone symmetrical, 5/5 throughout.   BACK  No spinal or paraspinal tenderness.  No CVA tenderness.   SKIN:  No petechiae.  No rash.  No signs of cellulitis.   BREASTS/CHEST/AXILLA:  Bilateral breast exam revealed no mass or skin changes or nipple discharges.No palpable axilla mass or LN.      PSYCHIATRIC: Oriented to time, person, and " places. Normal mood and affect. Good memory. Proper insight and judgement.         CURRENT LAB DATA REVIEWED TODAY:  Cbc diff/CMP/marker are fine    BRCA actionable panel is negative.     Right lumpectomy 3/19/2020 found grade 3 IDC, T1c, 1.9 cm and 1.2 cm, with ALI and DCIS. Superior margin is +. sLN is negative, [id number] disease.       CURRENT IMAGE DATA REVIEWED TODAY:  MA 10/2020 - negative      OLD DATA REVIEWED TODAY WITH SUMMARY:  Second time biopsy to the right breast 9 o'clock position 9 cm from the nipple, found grade 3 invasive ductal cancer, triple negative, associated with grade 3 DCIS.       US/MA 10/2019  found 2 hypoechoic structures are seen at the patient's area of concern, 9:00-10:00 position 7 cm from the nipple on the RIGHT. One of these appears to have at least some component of an echogenic hilum and may represent an enlarged lymph node. The first measures 1.1 x 1.3 x 1.2 cm and the second measures 1.8 x 1.4 x 1.4 cm.   Survey of the axilla shows no evidence of adenopathy.          ASSESSMENT AND PLAN:    1.  dx diagnosed right breast upper outer quadrant triple negative breast cancer 11/2019, triple negative breast cancer.    She made informed decision to proceed with wkly taxol, C1D1 12/5/2019. Then went to [place] and finished 12 wkly taxol.     She had favorable image response early March 2020.    Right lumpectomy 3/19/2020 found grade 3 IDC, T1c, 1.9 cm with ALI and DCIS,   and 1.2 cm. Superior margin is +. sLN is nsgative, [id number] disease.     Due to her advanced age Adriamycin was not recommended in her situation.    S/p RT 5/2020.     She made informed decision to start oral Xeloda in June 2020.     She broke out into rash on upper neck, arms (sun exposed area) late July.  She made informed decision not to any more adjuvant Xeloda after discussion.     She is on follow-up in 3 months with labs.       2. symmetrical arthritic pain on hands and wrists.   Advice OCT glucosamine to start.        3. On and off right breast pain on the surgical site with associated insomnia.   Advice primrose oil topical.                    Again, thank you for allowing me to participate in the care of your patient.        Sincerely,        aRchell Villa MD, MD

## 2020-11-10 NOTE — PROGRESS NOTES
"Oncology Rooming Note    November 10, 2020 11:01 AM   Precious Paris is a 80 year old female who presents for:    Chief Complaint   Patient presents with     Oncology Clinic Visit     3 month return Malignant neoplasm of upper-outer quadrant of right breast in female, estrogen receptor negative      Initial Vitals: /69 (BP Location: Left arm, Patient Position: Sitting, Cuff Size: Adult Regular)   Pulse 74   Temp 98.7  F (37.1  C) (Oral)   Resp 18   Ht 1.638 m (5' 4.5\")   Wt 89.5 kg (197 lb 6.4 oz)   SpO2 95%   BMI 33.36 kg/m   Estimated body mass index is 33.36 kg/m  as calculated from the following:    Height as of this encounter: 1.638 m (5' 4.5\").    Weight as of this encounter: 89.5 kg (197 lb 6.4 oz). Body surface area is 2.02 meters squared.  No Pain (0) Comment: Data Unavailable   No LMP recorded. Patient is postmenopausal.  Allergies reviewed: Yes  Medications reviewed: Yes    Medications: Medication refills not needed today.  Pharmacy name entered into [employer]: [facility] - [facility] [address] [facility]    Clinical concerns:3 month return Malignant neoplasm of upper-outer quadrant of right breast in female, estrogen receptor negative.     Questions about getting a Shingles booster shot.   Finger tips are sore. Knuclkes are burning and red on left hand.   Left breast is still sore to the touch in the am.       Estrella Ren CMA              "

## 2020-12-10 ENCOUNTER — OFFICE VISIT (OUTPATIENT)
Dept: RADIATION THERAPY | Facility: OUTPATIENT CENTER | Age: 80
End: 2020-12-10
Payer: COMMERCIAL

## 2020-12-10 VITALS
HEART RATE: 72 BPM | DIASTOLIC BLOOD PRESSURE: 80 MMHG | RESPIRATION RATE: 16 BRPM | SYSTOLIC BLOOD PRESSURE: 143 MMHG | BODY MASS INDEX: 33.6 KG/M2 | WEIGHT: 198.8 LBS

## 2020-12-10 DIAGNOSIS — C50.911 INFILTRATING DUCTAL CARCINOMA OF RIGHT BREAST (H): Primary | ICD-10-CM

## 2020-12-10 NOTE — PROGRESS NOTES
Department of Radiation Oncology  Radiation Therapy Center  [facility] [place] [address]  [phone]         Radiation Oncology Follow-up Visit  12/10/2020      Precious Paris  MRN: [id number]   : 1940     DIAGNOSIS: invasive ductal carcinoma of the right breast status post neoadjuvant chemotherapy with Taxol x12C and lumpectomy with sentinel lymph node evaluation  PATHOLOGY:  Final pathology demonstrated IDC, grade 3, triple negative, multifocal, positive LVSI, negative margin, 0 out of 1 sentinel lymph node                           STAGE: [id number]   INTENT OF RADIOTHERAPY: adjuvant RT.   CONCURRENT SYSTEMIC THERAPY: No     ONCOLOGIC HISTORY:   Ms. Paris is a 80 year old female with a diagnosis of right breast cancer.      The patient initially presented with a right breast mass prompting further evaluation.  She underwent evaluation with ultrasound and mammography in 2019 which demonstrated 2 hypoechoic masses in the area of concern.  On 10/30/2019 the patient underwent biopsy of a suspicious mass in the right breast, pathology demonstrated IDC.  On 11/15/2019 the patient underwent biopsy of the right breast mass at the 9 o'clock position, 9 cm from the nipple.  Final pathology demonstrated IDC, grade 3, ER negative, NE negative, HER-2 negative.  The patient was started on neoadjuvant systemic treatment with weekly Taxol, x12 cycles.  Due to advanced age Adriamycin was not recommended.  Patient subsequently underwent lumpectomy of the right breast on 3/19/2020 by Dr. Frausto.  Final pathology demonstrated IDC, grade 3, triple negative, multifocal (tumor #1-1.9 cm; tumor #2-1.2 cm, positive LVSI, negative margin, 0 out of 1 sentinel lymph node, yp T1cN0.  Patient was seen by Dr. Villa of medical oncology who discussed consideration of adjuvant Xeloda after completion of radiation therapy. She subsequently presented in radiation oncology department to discuss  potential role of radiation therapy as a part of treatment strategy.       SITE OF TREATMENT: R breast     DATES  OF TREATMENT: 5/05/20-6/02/20     TOTAL DOSE OF TREATMENT: 5005 cGy     DOSE PER FRACTION OF TREATMENT: 267 cGy x 15 fractions to R breast + 200 cGy x 5 fractions R breast boost       COMMENT/TOXICITY:   Very mild breast pain. Energy adequate Applying skin creams.  Skin with mild erythema without desquamation.                                                                                INTERVAL SINCE COMPLETION OF RADIATION THERAPY:   6 months    SUBJECTIVE:   Precious Paris is a 80 year old female who is scheduled today for routine follow up after completing radiation therapy.      Mammogram on 10/19/20 was [facility] CARLOS.     Was initially started on adjuvant xeloda, but discontinued due to side effects.     She is otherwise doing fairly well. Mild breast discomfort at times.   Denies any cough or shortness of breath related to possible risk of interstitial pneumonitis.  Reports good ROM of right arm and shoulder. Denies fatigue. No lymphedema.     ROS otherwise negative on a 12-system review.            PHYSICAL EXAM:  BP (!) 143/80 (BP Location: Left arm, Cuff Size: Adult Large)   Pulse 72   Resp 16   Wt 90.2 kg (198 lb 12.8 oz)   BMI 33.60 kg/m    Bilateral breasts without palpable masses. Right breast with mild residual hyperpigmentation and mild scar tissue formation near incision site.   No extremity lymphedema or issues with ROM.    LABS AND IMAGING:  10/19/20  Mammogram    IMPRESSION: BI-RADS CATEGORY: 1 -  Negative     RECOMMENDED FOLLOW-UP: Annual Mammography.     Exam results letter mailed to patient.    IMPRESSION:   Ms. Paris is a 80 year old female with a  invasive ductal carcinoma of the right breast status post neoadjuvant chemotherapy with Taxol x12C and lumpectomy with sentinel lymph node evaluation. Final pathology demonstrated IDC, grade 3, triple negative, multifocal, positive  LVSI, negative margin, 0 out of 1 sentinel lymph node, [id number]. Completed adjuvant radiation therapy 5005 cGy to right breast 5/05/20-6/02/20.    Plan:  1. Clinically and radiographically JOSE CARLOS.   2. Recommend gentle massage to break up scar tissue formation.   3. Continue follow up with medical oncology team.   4. RTC in 12 months.    Rocky Valdivia M.D.  Department of Radiation Oncology  [facility]

## 2020-12-10 NOTE — LETTER
12/10/2020         RE: Precious Paris  [address] [place] [address]        Dear Colleague,    Thank you for referring your patient, Precious Paris, to the RADIATION THERAPY CENTER. Please see a copy of my visit note below.       Department of Radiation Oncology  Radiation Therapy Center  [facility]  [place] [address]  [phone]         Radiation Oncology Follow-up Visit  12/10/2020      Precious Paris  MRN: [id number]   : 1940     DIAGNOSIS: invasive ductal carcinoma of the right breast status post neoadjuvant chemotherapy with Taxol x12C and lumpectomy with sentinel lymph node evaluation  PATHOLOGY:  Final pathology demonstrated IDC, grade 3, triple negative, multifocal, positive LVSI, negative margin, 0 out of 1 sentinel lymph node                           STAGE: [id number]   INTENT OF RADIOTHERAPY: adjuvant RT.   CONCURRENT SYSTEMIC THERAPY: No     ONCOLOGIC HISTORY:   Ms. Paris is a 80 year old female with a diagnosis of right breast cancer.      The patient initially presented with a right breast mass prompting further evaluation.  She underwent evaluation with ultrasound and mammography in 2019 which demonstrated 2 hypoechoic masses in the area of concern.  On 10/30/2019 the patient underwent biopsy of a suspicious mass in the right breast, pathology demonstrated IDC.  On 11/15/2019 the patient underwent biopsy of the right breast mass at the 9 o'clock position, 9 cm from the nipple.  Final pathology demonstrated IDC, grade 3, ER negative, VA negative, HER-2 negative.  The patient was started on neoadjuvant systemic treatment with weekly Taxol, x12 cycles.  Due to advanced age Adriamycin was not recommended.  Patient subsequently underwent lumpectomy of the right breast on 3/19/2020 by Dr. Frausto.  Final pathology demonstrated IDC, grade 3, triple negative, multifocal (tumor #1-1.9 cm; tumor #2-1.2 cm, positive LVSI, negative margin, 0  out of 1 sentinel lymph node, yp T1cN0.  Patient was seen by Dr. Villa of medical oncology who discussed consideration of adjuvant Xeloda after completion of radiation therapy. She subsequently presented in radiation oncology department to discuss potential role of radiation therapy as a part of treatment strategy.       SITE OF TREATMENT: R breast     DATES  OF TREATMENT: 5/05/20-6/02/20     TOTAL DOSE OF TREATMENT: 5005 cGy     DOSE PER FRACTION OF TREATMENT: 267 cGy x 15 fractions to R breast + 200 cGy x 5 fractions R breast boost       COMMENT/TOXICITY:   Very mild breast pain. Energy adequate Applying skin creams.  Skin with mild erythema without desquamation.                                                                                INTERVAL SINCE COMPLETION OF RADIATION THERAPY:   6 months    SUBJECTIVE:   Precious Paris is a 80 year old female who is scheduled today for routine follow up after completing radiation therapy.      Mammogram on 10/19/20 was JOSE CARLOS.     Was initially started on adjuvant xeloda, but discontinued due to side effects.     She is otherwise doing fairly well. Mild breast discomfort at times.   Denies any cough or shortness of breath related to possible risk of interstitial pneumonitis.  Reports good ROM of right arm and shoulder. Denies fatigue. No lymphedema.     ROS otherwise negative on a 12-system review.            PHYSICAL EXAM:  BP (!) 143/80 (BP Location: Left arm, Cuff Size: Adult Large)   Pulse 72   Resp 16   Wt 90.2 kg (198 lb 12.8 oz)   BMI 33.60 kg/m    Bilateral breasts without palpable masses. Right breast with mild residual hyperpigmentation and mild scar tissue formation near incision site.   No extremity lymphedema or issues with ROM.    LABS AND IMAGING:  10/19/20  Mammogram    IMPRESSION: BI-RADS CATEGORY: 1 -  Negative     RECOMMENDED FOLLOW-UP: Annual Mammography.     Exam results letter mailed to patient.    IMPRESSION:   Ms. Paris is a 80 year old  female with a  invasive ductal carcinoma of the right breast status post neoadjuvant chemotherapy with Taxol x12C and lumpectomy with sentinel lymph node evaluation. Final pathology demonstrated IDC, grade 3, triple negative, multifocal, positive LVSI, negative margin, 0 out of 1 sentinel lymph node, [id number]. Completed adjuvant radiation therapy 5005 cGy to right breast 5/05/20-6/02/20.    Plan:  1. Clinically and radiographically JOSE CARLOS.   2. Recommend gentle massage to break up scar tissue formation.   3. Continue follow up with medical oncology team.   4. RTC in 12 months.    Rocky Valdivia M.D.  Department of Radiation Oncology  [facility]

## 2021-02-05 PROBLEM — Z17.1 MALIGNANT NEOPLASM OF UPPER-OUTER QUADRANT OF RIGHT BREAST IN FEMALE, ESTROGEN RECEPTOR NEGATIVE (H): Status: ACTIVE | Noted: 2019-10-30

## 2021-02-05 PROBLEM — C50.411 MALIGNANT NEOPLASM OF UPPER-OUTER QUADRANT OF RIGHT BREAST IN FEMALE, ESTROGEN RECEPTOR NEGATIVE (H): Status: ACTIVE | Noted: 2019-10-30

## 2021-03-30 ENCOUNTER — IMMUNIZATION (OUTPATIENT)
Dept: FAMILY MEDICINE | Facility: CLINIC | Age: 81
End: 2021-03-30
Payer: COMMERCIAL

## 2021-03-30 PROCEDURE — 91301 PR COVID VAC MODERNA 100 MCG/0.5 ML IM: CPT

## 2021-03-30 PROCEDURE — 0011A PR COVID VAC MODERNA 100 MCG/0.5 ML IM: CPT

## 2021-04-09 DIAGNOSIS — Z17.1 MALIGNANT NEOPLASM OF UPPER-OUTER QUADRANT OF RIGHT BREAST IN FEMALE, ESTROGEN RECEPTOR NEGATIVE (H): ICD-10-CM

## 2021-04-09 DIAGNOSIS — E78.5 HYPERLIPIDEMIA LDL GOAL <130: ICD-10-CM

## 2021-04-09 DIAGNOSIS — C50.411 MALIGNANT NEOPLASM OF UPPER-OUTER QUADRANT OF RIGHT BREAST IN FEMALE, ESTROGEN RECEPTOR NEGATIVE (H): ICD-10-CM

## 2021-04-09 DIAGNOSIS — E78.5 HYPERLIPIDEMIA LDL GOAL <130: Primary | ICD-10-CM

## 2021-04-09 LAB
ALBUMIN SERPL-MCNC: 3.6 G/DL (ref 3.4–5)
ALP SERPL-CCNC: 79 U/L (ref 40–150)
ALT SERPL W P-5'-P-CCNC: 29 U/L (ref 0–50)
ANION GAP SERPL CALCULATED.3IONS-SCNC: 2 MMOL/L (ref 3–14)
AST SERPL W P-5'-P-CCNC: 23 U/L (ref 0–45)
BASOPHILS # BLD AUTO: 0 10E9/L (ref 0–0.2)
BASOPHILS NFR BLD AUTO: 0.5 %
BILIRUB SERPL-MCNC: 0.4 MG/DL (ref 0.2–1.3)
BUN SERPL-MCNC: 16 MG/DL (ref 7–30)
CALCIUM SERPL-MCNC: 9.1 MG/DL (ref 8.5–10.1)
CANCER AG27-29 SERPL-ACNC: 14 U/ML (ref 0–39)
CHLORIDE SERPL-SCNC: 104 MMOL/L (ref 94–109)
CHOLEST SERPL-MCNC: 188 MG/DL
CO2 SERPL-SCNC: 30 MMOL/L (ref 20–32)
CREAT SERPL-MCNC: 0.67 MG/DL (ref 0.52–1.04)
DIFFERENTIAL METHOD BLD: NORMAL
EOSINOPHIL # BLD AUTO: 0.3 10E9/L (ref 0–0.7)
EOSINOPHIL NFR BLD AUTO: 4 %
ERYTHROCYTE [DISTWIDTH] IN BLOOD BY AUTOMATED COUNT: 13 % (ref 10–15)
GFR SERPL CREATININE-BSD FRML MDRD: 83 ML/MIN/{1.73_M2}
GLUCOSE SERPL-MCNC: 104 MG/DL (ref 70–99)
HCT VFR BLD AUTO: 38.9 % (ref 35–47)
HDLC SERPL-MCNC: 77 MG/DL
HGB BLD-MCNC: 12.9 G/DL (ref 11.7–15.7)
LDLC SERPL CALC-MCNC: 94 MG/DL
LYMPHOCYTES # BLD AUTO: 1.9 10E9/L (ref 0.8–5.3)
LYMPHOCYTES NFR BLD AUTO: 29 %
MCH RBC QN AUTO: 31.3 PG (ref 26.5–33)
MCHC RBC AUTO-ENTMCNC: 33.2 G/DL (ref 31.5–36.5)
MCV RBC AUTO: 94 FL (ref 78–100)
MONOCYTES # BLD AUTO: 0.7 10E9/L (ref 0–1.3)
MONOCYTES NFR BLD AUTO: 10 %
NEUTROPHILS # BLD AUTO: 3.7 10E9/L (ref 1.6–8.3)
NEUTROPHILS NFR BLD AUTO: 56.5 %
NONHDLC SERPL-MCNC: 111 MG/DL
PLATELET # BLD AUTO: 322 10E9/L (ref 150–450)
POTASSIUM SERPL-SCNC: 4 MMOL/L (ref 3.4–5.3)
PROT SERPL-MCNC: 8 G/DL (ref 6.8–8.8)
RBC # BLD AUTO: 4.12 10E12/L (ref 3.8–5.2)
SODIUM SERPL-SCNC: 136 MMOL/L (ref 133–144)
TRIGL SERPL-MCNC: 83 MG/DL
WBC # BLD AUTO: 6.5 10E9/L (ref 4–11)

## 2021-04-09 PROCEDURE — 80061 LIPID PANEL: CPT | Performed by: FAMILY MEDICINE

## 2021-04-09 PROCEDURE — 36415 COLL VENOUS BLD VENIPUNCTURE: CPT | Performed by: INTERNAL MEDICINE

## 2021-04-09 PROCEDURE — 85025 COMPLETE CBC W/AUTO DIFF WBC: CPT | Performed by: INTERNAL MEDICINE

## 2021-04-09 PROCEDURE — 80053 COMPREHEN METABOLIC PANEL: CPT | Performed by: INTERNAL MEDICINE

## 2021-04-09 PROCEDURE — 86300 IMMUNOASSAY TUMOR CA 15-3: CPT | Performed by: INTERNAL MEDICINE

## 2021-04-12 NOTE — PROGRESS NOTES
Hematology/Oncology Video Visit  April 12, 2021  Oncology care team: Previously Dr. Villa    Due to the concerns around COVID-19 and adhering to social distancing we conducted this visit via video visit.     Reason for visit: Follow-up breast cancer    Oncology history/HPI: Precious Paris is a 80 year old female with a history of right sided triple negative breast cancer diagnosed in November 2019 at 79 years. She is s/p neoadjuvant taxol then lumpectomy, pT1cN0. BRCA panel was negative. She completed RT 5/2020. She was briefly on adjuvant Xeloda June-July 2020 but discontinued due to rash.     Interval history: Precious is doing overall well. She has noticed some heaviness and tightness in her right axilla with reduced  strength. No lumps/bumps. She is interested in PT.     ROS: 12 pt ROS otherwise negative    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     anosmia     Diverticulosis of colon (without mention of hemorrhage)      Malignant neoplasm of right breast (H) 3/13/2020    Added automatically from request for surgery [id number]     Other and unspecified hyperlipidemia      Pain in joint, lower leg      Plantar fascial fibromatosis      PURE HYPERCHOLESTEROLEM 9/9/2007     PURE HYPERCHOLESTEROLEM 9/9/2007     Squamous cell carcinoma          Current Outpatient Medications:      ASPIRIN 81 MG OR TABS, Take 1 tablet by mouth daily, Disp: , Rfl:      CALCIUM OR, Take 1 tablet by mouth daily , Disp: , Rfl:      CENTRUM SILVER OR, 1 TABLET DAILY, Disp: , Rfl:      fish oil-omega-3 fatty acids (OMEGA 3) 1000 MG capsule, Take 1 capsule (1 g) by mouth daily, Disp: 30 capsule, Rfl: 1     IBUPROFEN 200 MG OR TABS, Take 400 mg by mouth 2 times daily as needed for pain, Disp: , Rfl:       losartan-hydrochlorothiazide (HYZAAR) 50-12.5 MG tablet, Take 1 tablet by mouth every morning, Disp: 90 tablet, Rfl: 3     Melatonin 10 MG TABS tablet, Take 10 mg by mouth At Bedtime, Disp: , Rfl:      simvastatin (ZOCOR) 20 MG tablet, Take 1 tablet (20 mg) by mouth At Bedtime, Disp: 90 tablet, Rfl: 3     vitamin B complex with vitamin C (VITAMIN  B COMPLEX) tablet, Take 1 tablet by mouth daily, Disp: , Rfl:      VITAMIN C OR, 1 DAILY, Disp: , Rfl:      VITAMIN D, CHOLECALCIFEROL, PO, Take 25 Units by mouth daily , Disp: , Rfl:      vitamin E 400 units TABS, Take by mouth daily 2 tablets daily, Disp: , Rfl:         Allergies   Allergen Reactions     Conjugated Estrogens Anaphylaxis and Other (See Comments)     Tightness in throat     Medroxyprogesterone Anaphylaxis and Other (See Comments)     Tightness in throat     Ace Inhibitors Cough     Premarin      Tightness in throat     Provera [Medroxyprogesterone Acetate]      Tightness in throat        Video physical exam  There were no vitals taken for this visit.   Wt Readings from Last 4 Encounters:   12/10/20 90.2 kg (198 lb 12.8 oz)   11/10/20 89.5 kg (197 lb 6.4 oz)   07/28/20 89.5 kg (197 lb 4.8 oz)   07/06/20 89.8 kg (198 lb)      General: Patient appears well in no acute distress. Normal body habitus.  Skin: No visualized rash or lesions on visualized skin  Eyes: EOMI, no erythema, sclera icterus or discharge noted  Resp: Appears to be breathing comfortably without accessory muscle usage, speaking in full sentences, no cough  MSK: Appears to have normal range of motion based on visualized movements  Neurologic: No apparent tremors, facial movements symmetric  Psych: affect versatile, alert and oriented    The rest of a comprehensive physical examination is deferred due to PHE (public health emergency) video restrictions    Labs:  Results for ANGELIKA HUDDLESTON (MRN [id number]) as of 4/12/2021 15:55   Ref. Range 4/9/2021 09:16   Sodium Latest Ref Range: 133 - 144  mmol/L 136   Potassium Latest Ref Range: 3.4 - 5.3 mmol/L 4.0   Chloride Latest Ref Range: 94 - 109 mmol/L 104   Carbon Dioxide Latest Ref Range: 20 - 32 mmol/L 30   Urea Nitrogen Latest Ref Range: 7 - 30 mg/dL 16   Creatinine Latest Ref Range: 0.52 - 1.04 mg/dL 0.67   GFR Estimate Latest Ref Range: >60 mL/min/1.73_m2 83   GFR Estimate If Black Latest Ref Range: >60 mL/min/1.73_m2 >90   Calcium Latest Ref Range: 8.5 - 10.1 mg/dL 9.1   Anion Gap Latest Ref Range: 3 - 14 mmol/L 2 (L)   Albumin Latest Ref Range: 3.4 - 5.0 g/dL 3.6   Protein Total Latest Ref Range: 6.8 - 8.8 g/dL 8.0   Bilirubin Total Latest Ref Range: 0.2 - 1.3 mg/dL 0.4   Alkaline Phosphatase Latest Ref Range: 40 - 150 U/L 79   ALT Latest Ref Range: 0 - 50 U/L 29   AST Latest Ref Range: 0 - 45 U/L 23   CA 27-29 Latest Ref Range: 0 - 39 U/mL 14   Glucose Latest Ref Range: 70 - 99 mg/dL 104 (H)   WBC Latest Ref Range: 4.0 - 11.0 10e9/L 6.5   Hemoglobin Latest Ref Range: 11.7 - 15.7 g/dL 12.9   Hematocrit Latest Ref Range: 35.0 - 47.0 % 38.9   Platelet Count Latest Ref Range: 150 - 450 10e9/L 322   RBC Count Latest Ref Range: 3.8 - 5.2 10e12/L 4.12   MCV Latest Ref Range: 78 - 100 fl 94   MCH Latest Ref Range: 26.5 - 33.0 pg 31.3   MCHC Latest Ref Range: 31.5 - 36.5 g/dL 33.2   RDW Latest Ref Range: 10.0 - 15.0 % 13.0   Diff Method Unknown Automated Method   % Neutrophils Latest Units: % 56.5   % Lymphocytes Latest Units: % 29.0   % Monocytes Latest Units: % 10.0   % Eosinophils Latest Units: % 4.0   % Basophils Latest Units: % 0.5   Absolute Neutrophil Latest Ref Range: 1.6 - 8.3 10e9/L 3.7   Absolute Lymphocytes Latest Ref Range: 0.8 - 5.3 10e9/L 1.9   Absolute Monocytes Latest Ref Range: 0.0 - 1.3 10e9/L 0.7   Absolute Eosinophils Latest Ref Range: 0.0 - 0.7 10e9/L 0.3   Absolute Basophils Latest Ref Range: 0.0 - 0.2 10e9/L 0.0       Assessment and plan:    1. Triple negative breast cancer on right s/p neoadjuvant chemo, lumpectomy and radiation  therapy and a very short course of adjuvant Xeloda. End of treatment was 5/2020. She is in need of a new oncologist.   -visits every 3-4 months for the first 3 years then every 6 months for the following 2 years then she can be released to her PCP and survivorship. This all depends of her performance status/QOL  -monitor for symptoms of recurrence including new lumps, bone pain, chest pain, dyspnea, abdominal pain, or persistent headaches.  -Annual mammography due October 2021  -Blood tests not indicated from oncology standpoint    2. Right axillary heaviness and tightness c/w lymphedema  -Referral for lymphedema therapy    15 minutes were spend in the patient visit and 15 minutes spend in chart review, entering orders, and charting. Total time: 30 min    Priyanka Schroeder PA-C

## 2021-04-13 ENCOUNTER — VIRTUAL VISIT (OUTPATIENT)
Dept: ONCOLOGY | Facility: CLINIC | Age: 81
End: 2021-04-13
Attending: PHYSICIAN ASSISTANT
Payer: COMMERCIAL

## 2021-04-13 DIAGNOSIS — Z17.1 MALIGNANT NEOPLASM OF UPPER-OUTER QUADRANT OF RIGHT BREAST IN FEMALE, ESTROGEN RECEPTOR NEGATIVE (H): ICD-10-CM

## 2021-04-13 DIAGNOSIS — C50.411 MALIGNANT NEOPLASM OF UPPER-OUTER QUADRANT OF RIGHT BREAST IN FEMALE, ESTROGEN RECEPTOR NEGATIVE (H): ICD-10-CM

## 2021-04-13 DIAGNOSIS — I89.0 LYMPHEDEMA: Primary | ICD-10-CM

## 2021-04-13 PROCEDURE — 99214 OFFICE O/P EST MOD 30 MIN: CPT | Mod: 95 | Performed by: PHYSICIAN ASSISTANT

## 2021-04-13 NOTE — PROGRESS NOTES
"Video Visit - Oncology Rooming Note -     April 13, 2021 10:32 AM   Precious Paris is a 80 year old female who presents for:    Chief Complaint   Patient presents with     Oncology Clinic Visit     4 mo breast cancer follow up     Initial Vitals: There were no vitals taken for this visit. Estimated body mass index is 33.6 kg/m  as calculated from the following:    Height as of 11/10/20: 1.638 m (5' 4.5\").    Weight as of 12/10/20: 90.2 kg (198 lb 12.8 oz). There is no height or weight on file to calculate BSA.  Data Unavailable Comment: Data Unavailable   No LMP recorded. Patient is postmenopausal.  Allergies reviewed: Yes  Medications reviewed: Yes    Medications: Medication refills not needed today.  [facility]arm[facility] [address] [facility]    Clinical concerns: 4 mo follow up breast cancer. Pt report her hands are sore, hard to open jars or bottles.        Patrica Perla RN              "

## 2021-04-13 NOTE — PATIENT INSTRUCTIONS
Lymphedema therapy referral  Follow-up 3 months (ideally in person)- she will be fully vaccinated  No labs needed

## 2021-04-27 ENCOUNTER — IMMUNIZATION (OUTPATIENT)
Dept: FAMILY MEDICINE | Facility: CLINIC | Age: 81
End: 2021-04-27
Attending: FAMILY MEDICINE
Payer: COMMERCIAL

## 2021-04-27 PROCEDURE — 91301 PR COVID VAC MODERNA 100 MCG/0.5 ML IM: CPT

## 2021-04-27 PROCEDURE — 0012A PR COVID VAC MODERNA 100 MCG/0.5 ML IM: CPT

## 2021-05-12 ENCOUNTER — ALLIED HEALTH/NURSE VISIT (OUTPATIENT)
Dept: FAMILY MEDICINE | Facility: CLINIC | Age: 81
End: 2021-05-12
Payer: COMMERCIAL

## 2021-05-12 DIAGNOSIS — Z23 NEED FOR VACCINATION: Primary | ICD-10-CM

## 2021-05-12 PROCEDURE — 90750 HZV VACC RECOMBINANT IM: CPT

## 2021-05-12 PROCEDURE — 99207 PR NO CHARGE NURSE ONLY: CPT

## 2021-05-12 PROCEDURE — 90471 IMMUNIZATION ADMIN: CPT

## 2021-05-12 NOTE — NURSING NOTE
Prior to immunization administration, verified patients identity using patient s name and date of birth. Please see Immunization Activity for additional information.     Screening Questionnaire for Adult Immunization    Are you sick today?   No   Do you have allergies to medications, food, a vaccine component or latex?   No   Have you ever had a serious reaction after receiving a vaccination?   No   Do you have a long-term health problem with heart, lung, kidney, or metabolic disease (e.g., diabetes), asthma, a blood disorder, no spleen, complement component deficiency, a cochlear implant, or a spinal fluid leak?  Are you on long-term aspirin therapy?   No   Do you have cancer, leukemia, HIV/AIDS, or any other immune system problem?   No   Do you have a parent, brother, or sister with an immune system problem?   No   In the past 3 months, have you taken medications that affect  your immune system, such as prednisone, other steroids, or anticancer drugs; drugs for the treatment of rheumatoid arthritis, Crohn s disease, or psoriasis; or have you had radiation treatments?   No   Have you had a seizure, or a brain or other nervous system problem?   No   During the past year, have you received a transfusion of blood or blood    products, or been given immune (gamma) globulin or antiviral drug?   No   For women: Are you pregnant or is there a chance you could become       pregnant during the next month?   No   Have you received any vaccinations in the past 4 weeks?   No     Immunization questionnaire answers were all negative.        Per orders of Dr. Jorge, injection of Shingrix given by Marcy Curry CMA. Patient instructed to remain in clinic for 15 minutes afterwards, and to report any adverse reaction to me immediately.       Screening performed by Marcy Curry CMA on 5/12/2021 at 10:11 AM.

## 2021-07-26 ENCOUNTER — ONCOLOGY VISIT (OUTPATIENT)
Dept: ONCOLOGY | Facility: CLINIC | Age: 81
End: 2021-07-26
Attending: NURSE PRACTITIONER
Payer: COMMERCIAL

## 2021-07-26 ENCOUNTER — APPOINTMENT (OUTPATIENT)
Dept: LAB | Facility: CLINIC | Age: 81
End: 2021-07-26
Payer: COMMERCIAL

## 2021-07-26 VITALS
DIASTOLIC BLOOD PRESSURE: 52 MMHG | OXYGEN SATURATION: 100 % | RESPIRATION RATE: 18 BRPM | HEIGHT: 65 IN | WEIGHT: 201.3 LBS | TEMPERATURE: 98.3 F | HEART RATE: 84 BPM | BODY MASS INDEX: 33.54 KG/M2 | SYSTOLIC BLOOD PRESSURE: 132 MMHG

## 2021-07-26 DIAGNOSIS — Z12.31 ENCOUNTER FOR SCREENING MAMMOGRAM FOR MALIGNANT NEOPLASM OF BREAST: ICD-10-CM

## 2021-07-26 DIAGNOSIS — Z17.1 MALIGNANT NEOPLASM OF UPPER-OUTER QUADRANT OF RIGHT BREAST IN FEMALE, ESTROGEN RECEPTOR NEGATIVE (H): Primary | ICD-10-CM

## 2021-07-26 DIAGNOSIS — I89.0 LYMPHEDEMA: ICD-10-CM

## 2021-07-26 DIAGNOSIS — N95.1 MENOPAUSAL SYNDROME (HOT FLASHES): ICD-10-CM

## 2021-07-26 DIAGNOSIS — C50.411 MALIGNANT NEOPLASM OF UPPER-OUTER QUADRANT OF RIGHT BREAST IN FEMALE, ESTROGEN RECEPTOR NEGATIVE (H): Primary | ICD-10-CM

## 2021-07-26 DIAGNOSIS — R68.89 HEAT INTOLERANCE: ICD-10-CM

## 2021-07-26 LAB — TSH SERPL DL<=0.005 MIU/L-ACNC: 1.62 MU/L (ref 0.4–4)

## 2021-07-26 PROCEDURE — 99214 OFFICE O/P EST MOD 30 MIN: CPT | Performed by: NURSE PRACTITIONER

## 2021-07-26 PROCEDURE — 36415 COLL VENOUS BLD VENIPUNCTURE: CPT | Performed by: NURSE PRACTITIONER

## 2021-07-26 PROCEDURE — 84443 ASSAY THYROID STIM HORMONE: CPT | Performed by: NURSE PRACTITIONER

## 2021-07-26 PROCEDURE — G0463 HOSPITAL OUTPT CLINIC VISIT: HCPCS

## 2021-07-26 RX ORDER — MULTIVITAMIN WITH IRON
1 TABLET ORAL DAILY
COMMUNITY
End: 2021-09-27

## 2021-07-26 ASSESSMENT — MIFFLIN-ST. JEOR: SCORE: 1381.03

## 2021-07-26 ASSESSMENT — PAIN SCALES - GENERAL: PAINLEVEL: SEVERE PAIN (7)

## 2021-07-26 NOTE — PATIENT INSTRUCTIONS
TSH today    Lymphedema Referral. Pt inquiring if appts in [facility]     Mammogram October    3-4 mths: clinic exam. NP or new MD (former [facility] pt). Early-mid November (pt goes to [place] in December for Winter)

## 2021-07-26 NOTE — PROGRESS NOTES
"    Oncology Rooming Note    July 26, 2021 1:44 PM   Precious Paris is a 80 year old female who presents for:    Chief Complaint   Patient presents with     Oncology Clinic Visit     3 month follow up breast cancer.      Initial Vitals: /52 (BP Location: Right arm, Patient Position: Sitting, Cuff Size: Adult Large)   Pulse 84   Temp 98.3  F (36.8  C) (Oral)   Resp 18   Ht 1.638 m (5' 4.5\")   Wt 91.3 kg (201 lb 4.8 oz)   SpO2 100%   Breastfeeding No   BMI 34.02 kg/m   Estimated body mass index is 34.02 kg/m  as calculated from the following:    Height as of this encounter: 1.638 m (5' 4.5\").    Weight as of this encounter: 91.3 kg (201 lb 4.8 oz). Body surface area is 2.04 meters squared.  Severe Pain (7) Comment: Data Unavailable   No LMP recorded. Patient is postmenopausal.  Allergies reviewed: Yes  Medications reviewed: Yes    Medications: Medication refills not needed today.  Pharmacy name entered into [facility]: [place] [facility] - [address]    Clinical concerns: 3 month follow up breast cancer. Right breast discomfort when applying pressure, 7/10.      Jaimee Kelley CMA            .                      [facility] Hematology and Oncology Progress Note    Patient: Precious Paris  MRN: [id number]        Reason for Visit    1. Right triple negative breast cancer    _____________________________________________________________________________    History of Present Illness/ Interval History    Ms. Precious Paris  is a 80 year old treated for right triple negative breast cancer 1.5 yrs ago. Returns for routine 3-month visit.    Precious is doing overall well. She has noticed heaviness and discomfort in her right breast/axilla since her surgery/radiation. No lumps/bumps. She never made her Lymphedema appt as previously recommended..   Feels warm a lot, no drenching night sweats. Loose stools, long standing. Appetite gain.   Leg cramps " overnight, taking mag supplement with improvement. Fair fluid intake.     ECOG PS: 1      Oncology History/Treatment  Diagnosis/Stage:   11/2019: Right breast cancer, triple negative (uL3e-D6)    BRCA negative    Treatment:  2019: Neoadjuvant taxol    Lumpectomy    5/2020: Adjuvant RT    6 - 7/2020: Adjuvant Xeloda. Stopped for rash      Physical Exam    GENERAL: Alert and oriented to time place and person. Seated comfortably. In no distress.  HEAD: Atraumatic and normocephalic. No alopecia.  EYES: DELISA, EOMI. No erythema. No icterus.  LYMPH NODES: No palpable supraclavicular, cervical, axillary lymphadenopathy.  BREASTS: Right breast firm and slightly warm to touch, no discrete masses/lumps. No redness. Left breast without abnormality.  CHEST: clear to auscultation bilaterally. Resonant to percussion throughout bilaterally. Symmetrical breath movements bilaterally.  CVS: S1 and S2 are heard. Regular rate and rhythm. No murmur or gallop or rub heard.  ABDOMEN: Soft. Not tender. Not distended. No palpable hepatomegaly or splenomegaly. No other mass palpable. Bowel sounds present.  EXTREMITIES: Warm. No peripheral edema.  SKIN: no rash, or bruising or purpura.   NEURO: No gross deficit noted. Non-antalgic gait.      Lab Results    None    Imaging    None    Assessment/Plan  1. Triple negative right breast cancer  Clinically, doing well. No findings to suggest cancer recurrence. Her right breast symptoms appear consistent with lymphedema.    Plan:  -visits every 3-4 months for the first 3 years then every 6 months for the following 2 years (5 years total). Return in November before she goes to [place] for the Winter.  -Annual mammography due October 2021  -Blood tests not indicated from oncology standpoint    2.   Right axilla/breast lymphedema  Referral to Lymphedema therapy    3.    Heat intolerance  Not on endocrine therapy.     Plan:  -Check TSH. If normal, she can follow-up with her PCP if needed.    Billing  Total  time 35 minutes, to include face to face visit, review of EMR, ordering, documentation and coordination of care    Signed by: Lazara Ashton NP

## 2021-07-26 NOTE — LETTER
"    7/26/2021         RE: Precious Paris  [address] [place] [address]        Dear Colleague,    Thank you for referring your patient, Precious Paris, to the [facility]. Please see a copy of my visit note below.        Oncology Rooming Note    July 26, 2021 1:44 PM   Precious Paris is a 80 year old female who presents for:    Chief Complaint   Patient presents with     Oncology Clinic Visit     3 month follow up breast cancer.      Initial Vitals: /52 (BP Location: Right arm, Patient Position: Sitting, Cuff Size: Adult Large)   Pulse 84   Temp 98.3  F (36.8  C) (Oral)   Resp 18   Ht 1.638 m (5' 4.5\")   Wt 91.3 kg (201 lb 4.8 oz)   SpO2 100%   Breastfeeding No   BMI 34.02 kg/m   Estimated body mass index is 34.02 kg/m  as calculated from the following:    Height as of this encounter: 1.638 m (5' 4.5\").    Weight as of this encounter: 91.3 kg (201 lb 4.8 oz). Body surface area is 2.04 meters squared.  Severe Pain (7) Comment: Data Unavailable   No LMP recorded. Patient is postmenopausal.  Allergies reviewed: Yes  Medications reviewed: Yes    Medications: Medication refills not needed today.  Pharmacy name entered into [facility]: [facility] [place] - [address] [facility]    Clinical concerns: 3 month follow up breast cancer. Right breast discomfort when applying pressure, 7/10.      Jaimee Kelley CMA            .                      [facility] Hematology and Oncology Progress Note    Patient: Precious Paris  MRN: [id number]        Reason for Visit    1. Right triple negative breast cancer    _____________________________________________________________________________    History of Present Illness/ Interval History    Ms. Precious Paris  is a 80 year old treated for right triple negative breast cancer 1.5 yrs ago. Returns for routine 3-month visit.    Precious is doing overall well. She has noticed " heaviness and discomfort in her right breast/axilla since her surgery/radiation. No lumps/bumps. She never made her Lymphedema appt as previously recommended..   Feels warm a lot, no drenching night sweats. Loose stools, long standing. Appetite gain.   Leg cramps overnight, taking mag supplement with improvement. Fair fluid intake.     ECOG PS: 1      Oncology History/Treatment  Diagnosis/Stage:   11/2019: Right breast cancer, triple negative (dC3z-P0)    BRCA negative    Treatment:  2019: Neoadjuvant taxol    Lumpectomy    5/2020: Adjuvant RT    6 - 7/2020: Adjuvant Xeloda. Stopped for rash      Physical Exam    GENERAL: Alert and oriented to time place and person. Seated comfortably. In no distress.  HEAD: Atraumatic and normocephalic. No alopecia.  EYES: DELISA, EOMI. No erythema. No icterus.  LYMPH NODES: No palpable supraclavicular, cervical, axillary lymphadenopathy.  BREASTS: Right breast firm and slightly warm to touch, no discrete masses/lumps. No redness. Left breast without abnormality.  CHEST: clear to auscultation bilaterally. Resonant to percussion throughout bilaterally. Symmetrical breath movements bilaterally.  CVS: S1 and S2 are heard. Regular rate and rhythm. No murmur or gallop or rub heard.  ABDOMEN: Soft. Not tender. Not distended. No palpable hepatomegaly or splenomegaly. No other mass palpable. Bowel sounds present.  EXTREMITIES: Warm. No peripheral edema.  SKIN: no rash, or bruising or purpura.   NEURO: No gross deficit noted. Non-antalgic gait.      Lab Results    None    Imaging    None    Assessment/Plan  1. Triple negative right breast cancer  Clinically, doing well. No findings to suggest cancer recurrence. Her right breast symptoms appear consistent with lymphedema.    Plan:  -visits every 3-4 months for the first 3 years then every 6 months for the following 2 years (5 years total). Return in November before she goes to [place] for the Winter.  -Annual mammography due October 2021  -Blood  tests not indicated from oncology standpoint    2.   Right axilla/breast lymphedema  Referral to Lymphedema therapy    3.    Heat intolerance  Not on endocrine therapy.     Plan:  -Check TSH. If normal, she can follow-up with her PCP if needed.    Billing  Total time 35 minutes, to include face to face visit, review of EMR, ordering, documentation and coordination of care    Signed by: Lazara Ashton NP        Again, thank you for allowing me to participate in the care of your patient.        Sincerely,        Lazara Ashton NP

## 2021-08-11 ENCOUNTER — HOSPITAL ENCOUNTER (OUTPATIENT)
Dept: PHYSICAL THERAPY | Facility: CLINIC | Age: 81
Setting detail: THERAPIES SERIES
End: 2021-08-11
Attending: NURSE PRACTITIONER
Payer: COMMERCIAL

## 2021-08-11 DIAGNOSIS — I89.0 LYMPHEDEMA: ICD-10-CM

## 2021-08-11 PROCEDURE — 97161 PT EVAL LOW COMPLEX 20 MIN: CPT | Mod: GP | Performed by: PHYSICAL THERAPIST

## 2021-08-11 PROCEDURE — 97140 MANUAL THERAPY 1/> REGIONS: CPT | Mod: GP | Performed by: PHYSICAL THERAPIST

## 2021-08-11 NOTE — PROGRESS NOTES
[facility]        OUTPATIENT PHYSICAL THERAPY EDEMA EVALUATION  PLAN OF TREATMENT FOR OUTPATIENT REHABILITATION  (COMPLETE FOR INITIAL CLAIMS ONLY)  Patient's Last Name, First Name, Precious Prajapati                           Provider s Name:   [facility] Medical Record No.  [id number]     Start of Care Date:  08/11/21   Onset Date:      Type:  PT   Medical Diagnosis:  Lymphedema   Therapy Diagnosis:  R UE and UQ secondary lymphedema Visits from SOC:  1                                     __________________________________________________________________________________   Plan of Treatment/Functional Goals:    Manual lymph drainage, Gradient compression bandaging, Fit for compression garment, Exercises, Precautions to prevent infection / exacerbation, Education, Manual therapy, Scar mobilization, Soft tissue mobilization, Skin care / precautions, Myofascial release, Home management program development        GOALS  1. Goal description: pt to have around the clock tolerance to RUE Spandigrip/GCB for edema reduction response       Target date: 09/01/21  2. Goal description: once appropriate, pt to be independent with donning, doffing and care of compression stocking/garment for longterm edema management for maintenance       Target date: 10/20/21  3. Goal description: pt to be independent with longterm RUE lymphedema management via HEP, elevation, compression garment wear and MLD (when/if appropriate)       Target date: 10/20/21  4. Goal description: Pt will reports tolerance to wearing a bra with Komprex 2 to maintain and reduce lymphedema for home managment.       Target date: 09/22/21              Treatment Frequency: 2x/week   Treatment duration: 10 weeks    Lynne Yuen, PT                                    I CERTIFY THE NEED FOR THESE SERVICES  FURNISHED UNDER        THIS PLAN OF TREATMENT AND WHILE UNDER MY CARE     (Physician co-signature of this document indicates review and certification of the therapy plan).                   Certification date from: 08/11/21       Certification date to: 10/20/21           Referring physician: Dr. Ashton   Initial Assessment  See Epic Evaluation- Start of care: 08/11/21

## 2021-08-11 NOTE — PROGRESS NOTES
Lymphedema Evaluation  08/11/21 0800   Quick Adds   Quick Adds Certification   Rehab Discipline   Discipline PT   Type of Visit   Type of visit Initial Edema Evaluation   General Information   Start of care 08/11/21   Referring physician Dr. Ashton   Orders Evaluate and treat as indicated   Order date 07/26/21   Medical diagnosis Lymphedema   Onset of illness / date of surgery 03/19/20   Affected body parts Other  (R breast)   Edema etiology Cancer with lymph node dissection;Radiation;Chemo;Surgery   Location - Cancer with lymph node dissection R breast: R lumpectomy and R axillary sentinel node removal x 1 on 3/19/2020   Location - Radiation R breast and axilla   Radiation comments completed on 5/27/2020   Chemotherapy comments Chemo completed prior to surgery   Pertinent history of current problem (PT: include personal factors and/or comorbidities that impact the POC; OT: include additional occupational profile info) Pt reports that she has noticed her R breast has been getting tight and noticed that started a few months after surgery and radiation.  Reports also noticing tightness in her R hand and is wondering if that is arthritis.  Reports that when her breast is tight and firm her nipple will turn white.   Surgical / medical history reviewed Yes   Edema special tests Other  (denies DVTs)   Prior treatment Other  (reports taking ibuprofen for discomfort)   Patient role / employment history Retired   Living environment House / [facility]   Living environment comments 1 [facility]   Assistive device comments none   General observations PMHx: B TKAs   Fall Risk Screen   Fall screen completed by PT   Have you fallen 2 or more times in the past year? No   Have you fallen and had an injury in the past year? No   Is patient a fall risk? No   Abuse Screen (yes response referral indicated)   Feels Unsafe at Home or Work/School no   Feels Threatened by Someone no   Does Anyone Try to Keep You From Having Contact with Others  Please call pharmacy; okay to give lidocaine gel 5% apply to back qd prn, 100ml, 2RF.   "or Doing Things Outside Your Home? no   Physical Signs of Abuse Present no   System Outcome Measures   Outcome Measures Lymphedema   Lymphedema Life Impact Scale (score range 0-72). A higher score indicates greater impairment. 2   Subjective Report   Patient report of symptoms \"hard, full, heavy\"   Patient / Family Goals   Patient / family goals statement unable to wear seatbelt and any pressure on breast is painful   Pain   Patient currently in pain No   Cognitive Status   Orientation Orientation to person, place and time   Level of consciousness Alert   Follows commands and answers questions 100% of the time   Personal safety and judgement Intact   Memory Intact   Edema Exam / Assessment   Skin condition Pitting;Intact   Skin condition comments fibrosis to lateral, inferior, and medial R breast, orange peel texture; pocket of edema to lateral R chest   Pitting 2+   Pitting location lateral and inferior R breast   Scar Yes   Location R lateral breast   Mobility mobile   Radial pulse Symmetrical   Stemmer sign Negative   Ulceration Yes   Girth Measurements   Girth Measurements Refer to separate girth measurement flowsheet   Volume UE   Right UE (mL) 2156   Left UE (mL) 1876   UE volume comparison RUE volume greater than LUE volume   % difference 12.9%   Range of Motion   ROM No deficits were identified   Strength   Strength No deficits were identified   Posture   Posture Normal   Palpation   Palpation TTP to lateral and inferior R breast   Transfers   Transfers independent   Gait / Locomotion   Gait / Locomotion indpendent   Sensory   Sensory perception Light touch   Light touch Intact   Planned Edema Interventions   Planned edema interventions Manual lymph drainage;Gradient compression bandaging;Fit for compression garment;Exercises;Precautions to prevent infection / exacerbation;Education;Manual therapy;Scar mobilization;Soft tissue mobilization;Skin care / precautions;Myofascial release;Home management program " development   Clinical Impression   Criteria for skilled therapeutic intervention met Yes   Therapy diagnosis R UE and UQ secondary lymphedema   Influenced by the following impairments / conditions Edema;Stage 2   Clinical Presentation Stable/Uncomplicated   Clinical Presentation Rationale PMHx: R lumpectomy, radiation, chemo, and sentenil LN removal   Clinical Decision Making (Complexity) Low complexity   Treatment Frequency 2x/week   Treatment duration 10 weeks   Patient / family and/or staff in agreement with plan of care Yes   Risks and benefits of therapy have been explained Yes   Goals   Edema Eval Goals 1;2;3   Goal 1   Goal identifier stg 1   Goal description pt to have around the clock tolerance to RUE Spandigrip/GCB for edema reduction response   Target date 09/01/21   Goal 2   Goal identifier ltg 1   Goal description once appropriate, pt to be independent with donning, doffing and care of compression stocking/garment for longterm edema management for maintenance   Target date 10/20/21   Goal 3   Goal identifier ltg 2   Goal description pt to be independent with longterm RUE lymphedema management via HEP, elevation, compression garment wear and MLD (when/if appropriate)   Target date 10/20/21   Goal 4   Goal identifier stg 2   Goal description Pt will reports tolerance to wearing a bra with Komprex 2 to maintain and reduce lymphedema for home managment.   Target date 09/22/21   Total Evaluation Time   PT Eval, Low Complexity Minutes (90657) 30   Certification   Certification date from 08/11/21   Certification date to 10/20/21   Medical Diagnosis Lymphedema   Certification I certify the need for these services furnished under this plan of treatment and while under my care.  (Physician co-signature of this document indicates review and certification of the therapy plan).      Lynne Yuen, PT, DPT, CLT  Physical Therapist & Certified Lymphedema Therapist  [facility]

## 2021-08-13 ENCOUNTER — HOSPITAL ENCOUNTER (OUTPATIENT)
Dept: PHYSICAL THERAPY | Facility: CLINIC | Age: 81
Setting detail: THERAPIES SERIES
End: 2021-08-13
Attending: NURSE PRACTITIONER
Payer: COMMERCIAL

## 2021-08-13 PROCEDURE — 97140 MANUAL THERAPY 1/> REGIONS: CPT | Mod: GP | Performed by: PHYSICAL THERAPIST

## 2021-08-14 DIAGNOSIS — E78.5 HYPERLIPIDEMIA LDL GOAL <130: ICD-10-CM

## 2021-08-16 RX ORDER — SIMVASTATIN 20 MG
20 TABLET ORAL AT BEDTIME
Qty: 90 TABLET | Refills: 0 | Status: SHIPPED | OUTPATIENT
Start: 2021-08-16 | End: 2021-09-27

## 2021-08-19 ENCOUNTER — HOSPITAL ENCOUNTER (OUTPATIENT)
Dept: PHYSICAL THERAPY | Facility: CLINIC | Age: 81
Setting detail: THERAPIES SERIES
End: 2021-08-19
Attending: NURSE PRACTITIONER
Payer: COMMERCIAL

## 2021-08-19 PROCEDURE — 97140 MANUAL THERAPY 1/> REGIONS: CPT | Mod: GP | Performed by: PHYSICAL THERAPIST

## 2021-08-24 ENCOUNTER — HOSPITAL ENCOUNTER (OUTPATIENT)
Dept: PHYSICAL THERAPY | Facility: CLINIC | Age: 81
Setting detail: THERAPIES SERIES
End: 2021-08-24
Attending: NURSE PRACTITIONER
Payer: COMMERCIAL

## 2021-08-24 PROCEDURE — 97140 MANUAL THERAPY 1/> REGIONS: CPT | Mod: GP | Performed by: PHYSICAL THERAPIST

## 2021-08-26 ENCOUNTER — HOSPITAL ENCOUNTER (OUTPATIENT)
Dept: PHYSICAL THERAPY | Facility: CLINIC | Age: 81
Setting detail: THERAPIES SERIES
End: 2021-08-26
Attending: NURSE PRACTITIONER
Payer: COMMERCIAL

## 2021-08-26 PROCEDURE — 97140 MANUAL THERAPY 1/> REGIONS: CPT | Mod: GP | Performed by: PHYSICAL THERAPIST

## 2021-08-31 ENCOUNTER — HOSPITAL ENCOUNTER (OUTPATIENT)
Dept: PHYSICAL THERAPY | Facility: CLINIC | Age: 81
Setting detail: THERAPIES SERIES
End: 2021-08-31
Attending: NURSE PRACTITIONER
Payer: COMMERCIAL

## 2021-08-31 PROCEDURE — 97140 MANUAL THERAPY 1/> REGIONS: CPT | Mod: GP | Performed by: REHABILITATION PRACTITIONER

## 2021-09-02 ENCOUNTER — HOSPITAL ENCOUNTER (OUTPATIENT)
Dept: PHYSICAL THERAPY | Facility: CLINIC | Age: 81
Setting detail: THERAPIES SERIES
End: 2021-09-02
Attending: NURSE PRACTITIONER
Payer: COMMERCIAL

## 2021-09-02 PROCEDURE — 97140 MANUAL THERAPY 1/> REGIONS: CPT | Mod: GP | Performed by: PHYSICAL THERAPIST

## 2021-09-07 ENCOUNTER — HOSPITAL ENCOUNTER (OUTPATIENT)
Dept: PHYSICAL THERAPY | Facility: CLINIC | Age: 81
Setting detail: THERAPIES SERIES
End: 2021-09-07
Attending: NURSE PRACTITIONER
Payer: COMMERCIAL

## 2021-09-07 PROCEDURE — 97140 MANUAL THERAPY 1/> REGIONS: CPT | Mod: GP | Performed by: PHYSICAL THERAPIST

## 2021-09-07 NOTE — PROGRESS NOTES
Outpatient Physical Therapy Progress Note     Patient: Precious Paris  : 1940    Beginning/End Dates of Reporting Period:  21 to 21    Referring Provider: Dr. Lazara Ashton    Therapy Diagnosis: R UE and UQ secondary lymphedema     Client Self Report: feeling looser. trying to do her exercises, had a feeling in middle of the night where chest felt off, not SOB but tired    Objective Measurements:  R UE volume down 2.3% on 21- last measured    Outcome Measures (most recent score):  LLIS will be re-taken at discharge    Goals:  Goal Identifier stg 1   Goal Description pt to have around the clock tolerance to RUE Spandigrip/GCB for edema reduction response   Target Date 21   Date Met      Progress (detail required for progress note): pt not currently wanting UE compression     Goal Identifier ltg 1   Goal Description once appropriate, pt to be independent with donning, doffing and care of compression stocking/garment for longterm edema management for maintenance   Target Date 10/20/21   Date Met      Progress (detail required for progress note): NA     Goal Identifier ltg 2   Goal Description pt to be independent with longterm RUE lymphedema management via HEP, elevation, compression garment wear and MLD (when/if appropriate)   Target Date 10/20/21   Date Met      Progress (detail required for progress note): doing self MLD and stretches     Goal Identifier stg 2   Goal Description Pt will reports tolerance to wearing a bra with Komprex 2 to maintain and reduce lymphedema for home managment.   Target Date 21   Date Met  21   Progress (detail required for progress note): wearing but needs tighter tank top or bra to give appropriate compression       Plan:  Continue therapy per current plan of care.    Discharge:  No

## 2021-09-09 ENCOUNTER — HOSPITAL ENCOUNTER (OUTPATIENT)
Dept: PHYSICAL THERAPY | Facility: CLINIC | Age: 81
Setting detail: THERAPIES SERIES
End: 2021-09-09
Attending: NURSE PRACTITIONER
Payer: COMMERCIAL

## 2021-09-09 PROCEDURE — 97140 MANUAL THERAPY 1/> REGIONS: CPT | Mod: GP | Performed by: REHABILITATION PRACTITIONER

## 2021-09-12 ENCOUNTER — HEALTH MAINTENANCE LETTER (OUTPATIENT)
Age: 81
End: 2021-09-12

## 2021-09-14 ENCOUNTER — HOSPITAL ENCOUNTER (OUTPATIENT)
Dept: PHYSICAL THERAPY | Facility: CLINIC | Age: 81
Setting detail: THERAPIES SERIES
End: 2021-09-14
Attending: NURSE PRACTITIONER
Payer: COMMERCIAL

## 2021-09-14 PROCEDURE — 97140 MANUAL THERAPY 1/> REGIONS: CPT | Mod: GP | Performed by: REHABILITATION PRACTITIONER

## 2021-09-17 ENCOUNTER — HOSPITAL ENCOUNTER (OUTPATIENT)
Dept: PHYSICAL THERAPY | Facility: CLINIC | Age: 81
Setting detail: THERAPIES SERIES
End: 2021-09-17
Attending: NURSE PRACTITIONER
Payer: COMMERCIAL

## 2021-09-17 PROCEDURE — 97140 MANUAL THERAPY 1/> REGIONS: CPT | Mod: GP | Performed by: PHYSICAL THERAPIST

## 2021-09-21 ENCOUNTER — HOSPITAL ENCOUNTER (OUTPATIENT)
Dept: PHYSICAL THERAPY | Facility: CLINIC | Age: 81
Setting detail: THERAPIES SERIES
End: 2021-09-21
Attending: NURSE PRACTITIONER
Payer: COMMERCIAL

## 2021-09-21 PROCEDURE — 97140 MANUAL THERAPY 1/> REGIONS: CPT | Mod: GP | Performed by: REHABILITATION PRACTITIONER

## 2021-09-23 ENCOUNTER — HOSPITAL ENCOUNTER (OUTPATIENT)
Dept: PHYSICAL THERAPY | Facility: CLINIC | Age: 81
Setting detail: THERAPIES SERIES
End: 2021-09-23
Attending: NURSE PRACTITIONER
Payer: COMMERCIAL

## 2021-09-23 PROCEDURE — 97140 MANUAL THERAPY 1/> REGIONS: CPT | Mod: GP | Performed by: PHYSICAL THERAPIST

## 2021-09-27 ENCOUNTER — OFFICE VISIT (OUTPATIENT)
Dept: FAMILY MEDICINE | Facility: CLINIC | Age: 81
End: 2021-09-27
Payer: COMMERCIAL

## 2021-09-27 VITALS
HEIGHT: 65 IN | DIASTOLIC BLOOD PRESSURE: 70 MMHG | RESPIRATION RATE: 16 BRPM | BODY MASS INDEX: 33.32 KG/M2 | OXYGEN SATURATION: 94 % | HEART RATE: 80 BPM | TEMPERATURE: 98.4 F | WEIGHT: 200 LBS | SYSTOLIC BLOOD PRESSURE: 134 MMHG

## 2021-09-27 DIAGNOSIS — I10 BENIGN ESSENTIAL HYPERTENSION: Primary | ICD-10-CM

## 2021-09-27 DIAGNOSIS — R19.7 ACUTE DIARRHEA: ICD-10-CM

## 2021-09-27 DIAGNOSIS — E78.5 HYPERLIPIDEMIA LDL GOAL <130: ICD-10-CM

## 2021-09-27 DIAGNOSIS — Z23 NEED FOR PROPHYLACTIC VACCINATION AND INOCULATION AGAINST INFLUENZA: ICD-10-CM

## 2021-09-27 PROCEDURE — G0008 ADMIN INFLUENZA VIRUS VAC: HCPCS | Performed by: FAMILY MEDICINE

## 2021-09-27 PROCEDURE — 99214 OFFICE O/P EST MOD 30 MIN: CPT | Mod: 25 | Performed by: FAMILY MEDICINE

## 2021-09-27 PROCEDURE — 90662 IIV NO PRSV INCREASED AG IM: CPT | Performed by: FAMILY MEDICINE

## 2021-09-27 RX ORDER — MULTIVITAMIN WITH IRON
1 TABLET ORAL DAILY
COMMUNITY
End: 2022-11-09

## 2021-09-27 RX ORDER — LOSARTAN POTASSIUM AND HYDROCHLOROTHIAZIDE 12.5; 5 MG/1; MG/1
1 TABLET ORAL EVERY MORNING
Qty: 90 TABLET | Refills: 3 | Status: SHIPPED | OUTPATIENT
Start: 2021-09-27 | End: 2022-06-30

## 2021-09-27 RX ORDER — SIMVASTATIN 20 MG
20 TABLET ORAL AT BEDTIME
Qty: 90 TABLET | Refills: 3 | Status: SHIPPED | OUTPATIENT
Start: 2021-09-27 | End: 2022-06-30

## 2021-09-27 ASSESSMENT — PAIN SCALES - GENERAL: PAINLEVEL: NO PAIN (0)

## 2021-09-27 ASSESSMENT — MIFFLIN-ST. JEOR: SCORE: 1375.13

## 2021-09-27 NOTE — PATIENT INSTRUCTIONS
Our Clinic hours are:  Mondays    7:20 am - 7 pm  Tues -  Fri  7:20 am - 5 pm    Clinic Phone: [phone]    The clinic lab opens at 7:30 am Mon - Fri and appointments are required.    [facility]. [phone]  Monday  8 am - 7pm  Tues - Fri 8 am - 5:30 pm

## 2021-09-27 NOTE — PROGRESS NOTES
"    Assessment & Plan     Benign essential hypertension  Well controlled  - losartan-hydrochlorothiazide (HYZAAR) 50-12.5 MG tablet; Take 1 tablet by mouth every morning    Hyperlipidemia LDL goal <130     - simvastatin (ZOCOR) 20 MG tablet; Take 1 tablet (20 mg) by mouth At Bedtime    Need for prophylactic vaccination and inoculation against influenza     - INFLUENZA, QUAD, HIGH DOSE, PF, 65YR + (FLUZONE HD)    Acute diarrhea   r/o c diff, r/o enteric pathogens  Can use immodium prn  Avoid dairy temporarily  Avoid magnesium right now until back to normal    - Enteric Bacteria and Virus Panel by JANETH Stool; Future  - Ova and Parasite Exam Routine; Future  - Clostridium difficile Toxin B PCR; Future             BMI:   Estimated body mass index is 33.8 kg/m  as calculated from the following:    Height as of this encounter: 1.638 m (5' 4.5\").    Weight as of this encounter: 90.7 kg (200 lb).           No follow-ups on file.    Ester Jorge MD  [facility]   Precious is a 80 year old who presents for the following health issues     HPI     Diarrhea  Onset/Duration: on and off for some time now  Description:       Consistency of stool: explosive       Blood in stool: no       Number of loose stools past 24 hours: 5  Progression of Symptoms: improving from imodium   Accompanying signs and symptoms:       Fever: no       Nausea/Vomiting: no       Abdominal pain: no       Weight loss: no       Episodes of constipation: no  History   Ill contacts: no  Recent use of antibiotics: no  Recent travels: no  Recent medication-new or changes(Rx or OTC): no  Precipitating or alleviating factors: None  Therapies tried and outcome: Imodium AD    Hyperlipidemia Follow-Up      Are you regularly taking any medication or supplement to lower your cholesterol?   Yes- simvastatin    Are you having muscle aches or other side effects that you think could be caused by your cholesterol lowering medication?  " "No    Hypertension Follow-up      Do you check your blood pressure regularly outside of the clinic? No     Are you following a low salt diet? No    Are your blood pressures ever more than 140 on the top number (systolic) OR more   than 90 on the bottom number (diastolic), for example 140/90? No      How many servings of fruits and vegetables do you eat daily?  2-3    On average, how many sweetened beverages do you drink each day (Examples: soda, juice, sweet tea, etc.  Do NOT count diet or artificially sweetened beverages)?   1 glass of juice a day    How many days per week do you exercise enough to make your heart beat faster? 3 or less    How many minutes a day do you exercise enough to make your heart beat faster? 30 - 60    How many days per week do you miss taking your medication? 0      Review of Systems   Constitutional, HEENT, cardiovascular, pulmonary, gi and gu systems are negative, except as otherwise noted.      Objective    /70   Pulse 80   Temp 98.4  F (36.9  C) (Tympanic)   Resp 16   Ht 1.638 m (5' 4.5\")   Wt 90.7 kg (200 lb)   SpO2 94%   Breastfeeding No   BMI 33.80 kg/m    Body mass index is 33.8 kg/m .  Physical Exam   GENERAL: healthy, alert and no distress  NECK: no adenopathy, no asymmetry, masses, or scars and thyroid normal to palpation  RESP: lungs clear to auscultation - no rales, rhonchi or wheezes  CV: regular rate and rhythm, normal S1 S2, no S3 or S4, no murmur, click or rub, no peripheral edema and peripheral pulses strong  ABDOMEN: soft, nontender, no hepatosplenomegaly, no masses and bowel sounds normal  MS: no gross musculoskeletal defects noted, no edema                "

## 2021-09-28 ENCOUNTER — LAB (OUTPATIENT)
Dept: LAB | Facility: CLINIC | Age: 81
End: 2021-09-28
Payer: COMMERCIAL

## 2021-09-28 ENCOUNTER — HOSPITAL ENCOUNTER (OUTPATIENT)
Dept: PHYSICAL THERAPY | Facility: CLINIC | Age: 81
Setting detail: THERAPIES SERIES
End: 2021-09-28
Attending: NURSE PRACTITIONER
Payer: COMMERCIAL

## 2021-09-28 DIAGNOSIS — R19.7 ACUTE DIARRHEA: ICD-10-CM

## 2021-09-28 LAB — C DIFF TOX B STL QL: NEGATIVE

## 2021-09-28 PROCEDURE — 87493 C DIFF AMPLIFIED PROBE: CPT | Mod: 59

## 2021-09-28 PROCEDURE — 87506 IADNA-DNA/RNA PROBE TQ 6-11: CPT

## 2021-09-28 PROCEDURE — 97140 MANUAL THERAPY 1/> REGIONS: CPT | Mod: GP | Performed by: REHABILITATION PRACTITIONER

## 2021-09-28 PROCEDURE — 87177 OVA AND PARASITES SMEARS: CPT

## 2021-09-28 PROCEDURE — 87209 SMEAR COMPLEX STAIN: CPT

## 2021-09-29 ENCOUNTER — MYC MEDICAL ADVICE (OUTPATIENT)
Dept: FAMILY MEDICINE | Facility: CLINIC | Age: 81
End: 2021-09-29

## 2021-09-29 LAB
C COLI+JEJUNI+LARI FUSA STL QL NAA+PROBE: NOT DETECTED
EC STX1 GENE STL QL NAA+PROBE: NOT DETECTED
EC STX2 GENE STL QL NAA+PROBE: NOT DETECTED
NOROV GI+II ORF1-ORF2 JNC STL QL NAA+PR: NOT DETECTED
O+P STL MICRO: NEGATIVE
RVA NSP5 STL QL NAA+PROBE: NOT DETECTED
SALMONELLA SP RPOD STL QL NAA+PROBE: NOT DETECTED
SHIGELLA SP+EIEC IPAH STL QL NAA+PROBE: NOT DETECTED
V CHOL+PARA RFBL+TRKH+TNAA STL QL NAA+PR: NOT DETECTED
Y ENTERO RECN STL QL NAA+PROBE: NOT DETECTED

## 2021-09-29 NOTE — TELEPHONE ENCOUNTER
Pt was seen 9/27/21.  Stool specs were done/resulted 9/28/21.  See note below.  Advise.  Fabiano

## 2021-10-01 ENCOUNTER — HOSPITAL ENCOUNTER (OUTPATIENT)
Dept: PHYSICAL THERAPY | Facility: CLINIC | Age: 81
Setting detail: THERAPIES SERIES
End: 2021-10-01
Attending: NURSE PRACTITIONER
Payer: COMMERCIAL

## 2021-10-01 PROCEDURE — 97140 MANUAL THERAPY 1/> REGIONS: CPT | Mod: GP | Performed by: REHABILITATION PRACTITIONER

## 2021-10-04 ENCOUNTER — HOSPITAL ENCOUNTER (OUTPATIENT)
Dept: PHYSICAL THERAPY | Facility: CLINIC | Age: 81
Setting detail: THERAPIES SERIES
End: 2021-10-04
Attending: NURSE PRACTITIONER
Payer: COMMERCIAL

## 2021-10-04 PROCEDURE — 97140 MANUAL THERAPY 1/> REGIONS: CPT | Mod: GP | Performed by: REHABILITATION PRACTITIONER

## 2021-10-07 ENCOUNTER — HOSPITAL ENCOUNTER (OUTPATIENT)
Dept: PHYSICAL THERAPY | Facility: CLINIC | Age: 81
Setting detail: THERAPIES SERIES
End: 2021-10-07
Attending: NURSE PRACTITIONER
Payer: COMMERCIAL

## 2021-10-07 PROCEDURE — 97140 MANUAL THERAPY 1/> REGIONS: CPT | Mod: GP | Performed by: REHABILITATION PRACTITIONER

## 2021-10-11 ENCOUNTER — HOSPITAL ENCOUNTER (OUTPATIENT)
Dept: PHYSICAL THERAPY | Facility: CLINIC | Age: 81
Setting detail: THERAPIES SERIES
End: 2021-10-11
Attending: NURSE PRACTITIONER
Payer: COMMERCIAL

## 2021-10-11 PROCEDURE — 97140 MANUAL THERAPY 1/> REGIONS: CPT | Mod: GP | Performed by: REHABILITATION PRACTITIONER

## 2021-10-11 NOTE — PROGRESS NOTES
Outpatient Physical Therapy Progress Note     Patient: Precious Paris  : 1940    Beginning/End Dates of Reporting Period:  21 to 10/11/2021     Referring Provider: Dr. Lazara Ashton     Therapy Diagnosis: R UE and UQ secondary lymphedema     Client Self Report: checked on when my bra should arrive and it looks like not until next month    Objective Measurements:   NA this visit  Outcome Measures (most recent score):   will be taken at discharge    Goals:  Goal Identifier stg 1   Goal Description pt to have around the clock tolerance to RUE Spandigrip/GCB for edema reduction response   Target Date 21   Date Met  21   Progress (detail required for progress note): pt not currently wanting UE compression- goal discontinued     Goal Identifier ltg 1   Goal Description once appropriate, pt to be independent with donning, doffing and care of compression stocking/garment for longterm edema management for maintenance   Target Date 11/10/21   Date Met      Progress (detail required for progress note): waiting for compression bra that she ordered     Goal Identifier ltg 2   Goal Description pt to be independent with longterm RUE lymphedema management via HEP, elevation, compression garment wear and MLD (when/if appropriate)   Target Date 11/10/21   Date Met      Progress (detail required for progress note): doing self MLD and stretches     Goal Identifier stg 2   Goal Description Pt will reports tolerance to wearing a bra with Komprex 2 to maintain and reduce lymphedema for home managment.   Target Date 21   Date Met  21   Progress (detail required for progress note): wearing but needs tighter tank top or bra to give appropriate compression     Plan:  Continue therapy per current plan of care. 2x a wk for MLD and manual therapy, pt waiting for compression bra to arrive    Discharge:  No

## 2021-10-14 ENCOUNTER — HOSPITAL ENCOUNTER (OUTPATIENT)
Dept: PHYSICAL THERAPY | Facility: CLINIC | Age: 81
Setting detail: THERAPIES SERIES
End: 2021-10-14
Attending: NURSE PRACTITIONER
Payer: COMMERCIAL

## 2021-10-14 PROCEDURE — 97140 MANUAL THERAPY 1/> REGIONS: CPT | Mod: GP | Performed by: REHABILITATION PRACTITIONER

## 2021-10-18 ENCOUNTER — HOSPITAL ENCOUNTER (OUTPATIENT)
Dept: PHYSICAL THERAPY | Facility: CLINIC | Age: 81
Setting detail: THERAPIES SERIES
End: 2021-10-18
Attending: NURSE PRACTITIONER
Payer: COMMERCIAL

## 2021-10-18 PROCEDURE — 97140 MANUAL THERAPY 1/> REGIONS: CPT | Mod: GP | Performed by: REHABILITATION PRACTITIONER

## 2021-10-19 NOTE — PROGRESS NOTES
OUTPATIENT PHYSICAL THERAPY  PLAN OF TREATMENT FOR OUTPATIENT REHABILITATION AND PROGRESS NOTE           Patient's Last Name, First Name, Precious Prajapati Date of Birth  1940   Provider's Name  [facility] Medical Record No.  [id number]    Onset Date  3/19/21 Start of Care Date  8/11/21   Type:     _X_PT   ___OT   ___SLP Medical Diagnosis  lymphedema   PT Diagnosis  R UE and UQ secondary lymphedema Plan of Treatment  Frequency/Duration: 2x a wk x 12 weeks  Certification date from 10/19/21 to 1/17/22     Goals:  Goal Identifier stg 1   Goal Description pt to have around the clock tolerance to RUE Spandigrip/GCB for edema reduction response   Target Date 09/01/21   Date Met  09/23/21   Progress (detail required for progress note): pt not currently wanting UE compression- goal discontinued     Goal Identifier ltg 1   Goal Description once appropriate, pt to be independent with donning, doffing and care of compression stocking/garment for longterm edema management for maintenance   Target Date 11/10/21   Date Met      Progress (detail required for progress note): waiting for compression bra that she ordered     Goal Identifier ltg 2   Goal Description pt to be independent with longterm RUE lymphedema management via HEP, elevation, compression garment wear and MLD (when/if appropriate)   Target Date 11/10/21   Date Met      Progress (detail required for progress note): doing self MLD and stretches     Goal Identifier stg 2   Goal Description Pt will reports tolerance to wearing a bra with Komprex 2 to maintain and reduce lymphedema for home managment.   Target Date 09/22/21   Date Met  08/26/21   Progress (detail required for progress note): wearing but needs tighter tank top or bra to give appropriate compression     Pt is awaiting a lymphedema pump trial, is showing progress during sessions with manual therapy of MLD and is wearing compression at home, is also  awaiting a new compression bra she ordered, pump trial will be this week         I CERTIFY THE NEED FOR THESE SERVICES FURNISHED UNDER        THIS PLAN OF TREATMENT AND WHILE UNDER MY CARE     (Physician co-signature of this document indicates review and certification of the therapy plan).                Referring Provider: Dr. Lazara Montero, PT DPT CLT

## 2021-10-21 ENCOUNTER — HOSPITAL ENCOUNTER (OUTPATIENT)
Dept: MAMMOGRAPHY | Facility: CLINIC | Age: 81
Discharge: HOME | End: 2021-10-21
Attending: NURSE PRACTITIONER | Admitting: NURSE PRACTITIONER
Payer: COMMERCIAL

## 2021-10-21 DIAGNOSIS — Z17.1 MALIGNANT NEOPLASM OF UPPER-OUTER QUADRANT OF RIGHT BREAST IN FEMALE, ESTROGEN RECEPTOR NEGATIVE (H): ICD-10-CM

## 2021-10-21 DIAGNOSIS — Z12.31 ENCOUNTER FOR SCREENING MAMMOGRAM FOR MALIGNANT NEOPLASM OF BREAST: ICD-10-CM

## 2021-10-21 DIAGNOSIS — C50.411 MALIGNANT NEOPLASM OF UPPER-OUTER QUADRANT OF RIGHT BREAST IN FEMALE, ESTROGEN RECEPTOR NEGATIVE (H): ICD-10-CM

## 2021-10-21 PROCEDURE — 77063 BREAST TOMOSYNTHESIS BI: CPT

## 2021-10-26 ENCOUNTER — OFFICE VISIT (OUTPATIENT)
Dept: DERMATOLOGY | Facility: CLINIC | Age: 81
End: 2021-10-26
Payer: COMMERCIAL

## 2021-10-26 ENCOUNTER — TELEPHONE (OUTPATIENT)
Dept: DERMATOLOGY | Facility: CLINIC | Age: 81
End: 2021-10-26

## 2021-10-26 VITALS — DIASTOLIC BLOOD PRESSURE: 74 MMHG | HEART RATE: 89 BPM | SYSTOLIC BLOOD PRESSURE: 139 MMHG | OXYGEN SATURATION: 95 %

## 2021-10-26 DIAGNOSIS — D04.30 SQUAMOUS CELL CARCINOMA IN SITU OF SKIN OF FACE: Primary | ICD-10-CM

## 2021-10-26 DIAGNOSIS — D48.5 NEOPLASM OF UNCERTAIN BEHAVIOR OF SKIN: Primary | ICD-10-CM

## 2021-10-26 DIAGNOSIS — L57.0 AK (ACTINIC KERATOSIS): ICD-10-CM

## 2021-10-26 PROCEDURE — 11102 TANGNTL BX SKIN SINGLE LES: CPT | Performed by: PHYSICIAN ASSISTANT

## 2021-10-26 PROCEDURE — 17000 DESTRUCT PREMALG LESION: CPT | Mod: 59 | Performed by: PHYSICIAN ASSISTANT

## 2021-10-26 PROCEDURE — 88331 PATH CONSLTJ SURG 1 BLK 1SPC: CPT | Performed by: DERMATOLOGY

## 2021-10-26 PROCEDURE — 99213 OFFICE O/P EST LOW 20 MIN: CPT | Mod: 25 | Performed by: PHYSICIAN ASSISTANT

## 2021-10-26 NOTE — LETTER
10/26/2021         RE: Precious Paris  [address] [place] [address]        Dear Colleague,    Thank you for referring your patient, Precious Paris, to the [facility]. Please see a copy of my visit note below.    robb guido:There is hyperkeratosis & parakeratosis of the epidermis, with full thickness epidermal involvement by atypical keratinocytes with rare pale vacuolated cells.  Unremarkable dermis.     ROBB guido squamous cell carcinoma in situ schedule excision       Again, thank you for allowing me to participate in the care of your patient.        Sincerely,        Danielito Anthony MD

## 2021-10-26 NOTE — LETTER
[facility]  [address] [address]  Phone: [phone]    October 26, 2021    Precious Paris                                                                                                         [address] [place] [address]            Dear Ms. Paris,    You are scheduled for Mohs Surgery on:         Please check in at Dermatology Clinic.   (2nd Floor, last  Clinic on right up staircase or elevator -past OB/GYN clinic)    You don't need to arrive more than 5-10 minutes prior to your appointment time.     Be sure to eat a good breakfast and bathe and wash your hair prior to Surgery.    If you are taking any anti-coagulants that are prescribed by your Doctor (such as Coumadin/warfarin, Plavix, Aspirin, Ibuprofen), please continue taking them.     However, If you are taking anti-coagulants over the counter without  a Doctor's order for a Medical condition, please discontinue them 10 days prior to Surgery.      Please wear loose comfortable clothing as it could possibly be 4-6 hours until your surgery is completed depending upon how many layers of tissue need to be removed.     Wi-fi access is available.     Thank you,      Danielito Anthony MD/ Ricarda Chu RN

## 2021-10-26 NOTE — LETTER
10/26/2021         RE: Precious Paris  [address] [place] [address]        Dear Colleague,    Thank you for referring your patient, Precious Paris, to the [facility]. Please see a copy of my visit note below.    Precious Paris is an extremely pleasant 80 year old year old female patient here today for spot by brow. Present for a few months, not healing. No pain, only bleed once with washing face. Patient has no other skin complaints today.  Remainder of the HPI, Meds, PMH, Allergies, FH, and SH was reviewed in chart.    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     anosmia     Diverticulosis of colon (without mention of hemorrhage)      Malignant neoplasm of right breast (H) 3/13/2020    Added automatically from request for surgery [id number]     Other and unspecified hyperlipidemia      Pain in joint, lower leg      Plantar fascial fibromatosis      PURE HYPERCHOLESTEROLEM 9/9/2007     PURE HYPERCHOLESTEROLEM 9/9/2007     Squamous cell carcinoma        Past Surgical History:   Procedure Laterality Date     BIOPSY       BIOPSY BREAST       INSERT PORT VASCULAR ACCESS N/A 11/29/2019    Procedure: INSERTION OF VENOUS ACCESS PORT;  Surgeon: Hair Broderick DO;  Location: [facility]     JOINT REPLACEMTN, KNEE RT/LT  2010    right     JOINT REPLACEMTN, KNEE RT/LT  2011    left     LIGATE VEIN VARICOSE, PHLEBECTOMY MULTIPLE STAB, COMBINED  10/17/2013    Procedure: COMBINED LIGATE VEIN VARICOSE, PHLEBECTOMY MULTIPLE STAB;  Phlebectomy of Right Knee Varicose Veins;  Surgeon: Andrea Duffy MD;  Location: [facility]     LUMPECTOMY BREAST       LUMPECTOMY BREAST WITH SENTINEL NODE,  COMBINED Right 3/19/2020    Procedure: LUMPECTOMY, BREAST, WITH SENTINEL LYMPH NODE BIOPSY, Right Lumpectomy With Right [place] Lymph Node Biopsy And Luisa Cath Removal;  Surgeon: Venancio Frausto MD;  Location: [facility]     OSTEOTOMY FOOT  8/19/2013    Procedure: OSTEOTOMY FOOT;  Left 2nd metatarsal osteotomy, 2nd Metatarsophalangeal joint & 2nd toe correction;  Surgeon: Jose Phillips DPM;  Location: [facility]     PHACOEMULSIFICATION WITH STANDARD INTRAOCULAR LENS IMPLANT Left 12/7/2017    Procedure: PHACOEMULSIFICATION WITH STANDARD INTRAOCULAR LENS IMPLANT;  Left Cataract Removal with Implant;  Surgeon: Mata Deleon MD;  Location: [facility]     SURGICAL HISTORY OF -   1979    Stripping of Veins in Left Leg     SURGICAL HISTORY OF -   1970    Bilateral Tubal Ligation     SURGICAL HISTORY OF -   2000    Carpal Tunnel Repair, right     SURGICAL HISTORY OF -   9-12-08    Rt let ablation     ZZHC COLONOSCOPY THRU STOMA, DIAGNOSTIC  04/05    diverticulosis and hemorroids, due 2015        Family History   Problem Relation Age of Onset     Hypertension Mother      Cerebrovascular Disease Mother      Hypertension Father      Cerebrovascular Disease Father      Hypertension Brother      Cancer Brother         lymphoma     Hypertension Sister      Gastrointestinal Disease Sister      Hypertension Brother      Cancer Brother         melanoma     Eye Disorder Brother      Hypertension Brother      Hypertension Brother      Heart Defect Son        Social History     Socioeconomic History     Marital status:      Spouse name: Not on file     Number of children: Not on file     Years of education: Not on file     Highest education level: Not on file   Occupational History     Not on file   Tobacco Use     Smoking status: Former Smoker     Smokeless tobacco: Never Used     Tobacco comment: quit 20 years ago   Vaping Use     Vaping Use: Never used   Substance and Sexual Activity     Alcohol use: Yes     Comment: occ.      Drug use: No     Sexual activity: Yes     Partners: Male   Other Topics Concern     Parent/sibling w/ CABG, MI or angioplasty before 65F 55M? No   Social History Narrative     Not on file     Social Determinants of Health     Financial Resource Strain:      Difficulty of Paying Living Expenses:    Food Insecurity:      Worried About Running Out of Food in the Last Year:      Ran Out of Food in the Last Year:    Transportation Needs:      Lack of Transportation (Medical):      Lack of Transportation (Non-Medical):    Physical Activity:      Days of Exercise per Week:      Minutes of Exercise per Session:    Stress:      Feeling of Stress :    Social Connections:      Frequency of Communication with Friends and Family:      Frequency of Social Gatherings with Friends and Family:      Attends [employer] Services:      Active Member of Clubs or Organizations:      Attends Club or Organization Meetings:      Marital Status:    Intimate Partner Violence:      Fear of Current or Ex-Partner:      Emotionally Abused:      Physically Abused:      Sexually Abused:        Outpatient Encounter Medications as of 10/26/2021   Medication Sig Dispense Refill     ASPIRIN 81 MG OR TABS Take 1 tablet by mouth daily       CALCIUM OR Take 1 tablet by mouth daily        fish oil-omega-3 fatty acids (OMEGA 3) 1000 MG capsule Take 1 capsule (1 g) by mouth daily 30 capsule 1     IBUPROFEN 200 MG OR TABS Take 400 mg by mouth 2 times daily as needed for pain       losartan-hydrochlorothiazide (HYZAAR) 50-12.5 MG tablet Take 1 tablet by mouth every morning 90 tablet 3     magnesium 250 MG tablet Take 1 tablet by mouth daily       Melatonin 10 MG TABS tablet Take 10 mg by mouth At Bedtime       simvastatin (ZOCOR) 20 MG tablet Take 1 tablet (20 mg) by mouth At Bedtime 90 tablet 3     vitamin B complex with vitamin C (VITAMIN  B COMPLEX) tablet Take 1 tablet by mouth daily       VITAMIN C OR 1 DAILY       VITAMIN D, CHOLECALCIFEROL, PO Take 25  Units by mouth daily        vitamin E 400 units TABS Take by mouth daily 2 tablets daily       [DISCONTINUED] capecitabine (XELODA) 500 MG tablet Start out 1500 mg po bid 7 days on, 7 days off (Patient not taking: Reported on 8/19/2020) 84 tablet 0     No facility-administered encounter medications on file as of 10/26/2021.             O:   NAD, WDWN, Alert & Oriented, Mood & Affect wnl, Vitals stable   Here today alone   /74 (BP Location: Left arm, Patient Position: Sitting, Cuff Size: Adult Large)   Pulse 89   SpO2 95%    General appearance normal   Vitals stable   Alert, oriented and in no acute distress       Gritty papule on left eyebrow   0.5 cm pink scabbed papule on right superior brow      Eyes: Conjunctivae/lids:Normal     ENT: Lips normal    MSK:Normal    Pulm: Breathing Normal    Neuro/Psych: Orientation:Alert and Orientedx3 ; Mood/Affect:normal       A/P:  1. R/O  AK vs NMSC on right superior brow   TANGENTIAL BIOPSY IN HOUSE:  After consent, anesthesia with LEC and prep, tangential excision performed.  No complications and routine wound care.     2. Actinic keratosis on left eyebrow   LN2:  Treated with LN2 for 5s for 1-2 cycles. Warned risks of blistering, pain, pigment change, scarring, and incomplete resolution.  Advised patient to return if lesions do not completely resolve.  Wound care sheet given.        Again, thank you for allowing me to participate in the care of your patient.        Sincerely,        Renee Cardenas PA-C

## 2021-10-26 NOTE — PROGRESS NOTES
r brow:There is hyperkeratosis & parakeratosis of the epidermis, with full thickness epidermal involvement by atypical keratinocytes with rare pale vacuolated cells.  Unremarkable dermis.     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

## 2021-10-26 NOTE — NURSING NOTE
Chief Complaint   Patient presents with     Derm Problem     spot check on face       Vitals:    10/26/21 1356   BP: 139/74   BP Location: Left arm   Patient Position: Sitting   Cuff Size: Adult Large   Pulse: 89   SpO2: 95%     Wt Readings from Last 1 Encounters:   09/27/21 90.7 kg (200 lb)       Patrica Powell LPN .................10/26/2021

## 2021-10-26 NOTE — TELEPHONE ENCOUNTER
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

## 2021-10-26 NOTE — PATIENT INSTRUCTIONS
Wound Care Instructions     FOR SUPERFICIAL WOUNDS     [facility] [phone]  [facility] [phone]                AFTER 24 HOURS YOU SHOULD REMOVE THE BANDAGE AND BEGIN DAILY DRESSING CHANGES AS FOLLOWS:     1) Remove Dressing.     2) Clean and dry the area with tap water using a Q-tip or sterile gauze pad.     3) Apply Vaseline, Aquaphor, Polysporin ointment or Bacitracin ointment over entire wound.  Do NOT use Neosporin ointment.     4) Cover the wound with a band-aid, or a sterile non-stick gauze pad and micropore paper tape      REPEAT THESE INSTRUCTIONS AT LEAST ONCE A DAY UNTIL THE WOUND HAS COMPLETELY HEALED.    It is an old wives tale that a wound heals better when it is exposed to air and allowed to dry out. The wound will heal faster with a better cosmetic result if it is kept moist with ointment and covered with a bandage.    **Do not let the wound dry out.**      Supplies Needed:      *Cotton tipped applicators (Q-tips)    *Polysporin Ointment or Bacitracin Ointment (NOT NEOSPORIN)    *Band-aids or non-stick gauze pads and micropore paper tape.      PATIENT INFORMATION:    During the healing process you will notice a number of changes. All wounds develop a small halo of redness surrounding the wound.  This means healing is occurring. Severe itching with extensive redness usually indicates sensitivity to the ointment or bandage tape used to dress the wound.  You should call our office if this develops.      Swelling  and/or discoloration around your surgical site is common, particularly when performed around the eye.    All wounds normally drain.  The larger the wound the more drainage there will be.  After 7-10 days, you will notice the wound beginning to shrink and new skin will begin to grow.  The wound is healed when you can see skin has formed over the entire area.  A healed wound has a healthy, shiny look to the surface and is red to dark pink in color to  normalize.  Wounds may take approximately 4-6 weeks to heal.  Larger wounds may take 6-8 weeks.  After the wound is healed you may discontinue dressing changes.    You may experience a sensation of tightness as your wound heals. This is normal and will gradually subside.    Your healed wound may be sensitive to temperature changes. This sensitivity improves with time, but if you re having a lot of discomfort, try to avoid temperature extremes.    Patients frequently experience itching after their wound appears to have healed because of the continue healing under the skin.  Plain Vaseline will help relieve the itching.        POSSIBLE COMPLICATIONS    BLEEDIN. Leave the bandage in place.  2. Use tightly rolled up gauze or a cloth to apply direct pressure over the bandage for 30  minutes.  3. Reapply pressure for an additional 30 minutes if necessary  4. Use additional gauze and tape to maintain pressure once the bleeding has stopped.    WOUND CARE INSTRUCTIONS   FOR CRYOSURGERY   This area treated with liquid nitrogen should form a blister (areas treated may or may not blister-skin may just turn dark and slough off). You do not need to bandage the area unless a blister forms and breaks (which may be a few days). When the blister breaks, begin daily dressing changes as follows:  1) Clean and dry the area with tap water using clean Q-tip or sterile gauze pad.   2) Apply Polysporin ointment or Bacitracin ointment over entire wound. Do NOT use Neosporin ointment.   3) Cover the wound with a band-aid or sterile non-stick gauze pad and micropore paper tape.   REPEAT THESE INSTRUCTIONS AT LEAST ONCE A DAY UNTIL THE WOUND HAS COMPLETELY HEALED.   It is an old wives tale that a wound heals better when it is exposed to air and allowed to dry out. The wound will heal faster with a better cosmetic result if it is kept moist with ointment and covered with a bandage.   Do not let the wound dry out.   IMPORTANT INFORMATION  ON REVERSE SIDE   Supplies Needed:   *Cotton tipped applicators (Q-tips)   *Polysporin ointment or Bacitracin ointment (NOT NEOSPORIN)   *Band-aids, or non stick gauze pads and micropore paper tape   PATIENT INFORMATION   During the healing process you will notice a number of changes. All wounds develop a small halo of redness surrounding the wound. This means healing is occurring. Severe itching with extensive redness usually indicates sensitivity to the ointment or bandage tape used to dress the wound. You should call our office if this develops.   Swelling and/or discoloration around your surgical site is common, particularly when performed around the eye.   All wounds normally drain. The larger the wound the more drainage there will be. After 7-10 days, you will notice the wound beginning to shrink and new skin will begin to grow. The wound is healed when you can see skin has formed over the entire area. A healed wound has a healthy, shiny look to the surface and is red to dark pink in color to normalize. Wounds may take approximately 4-6 weeks to heal. Larger wounds may take 6-8 weeks. After the wound is healed you may discontinue dressing changes.   You may experience a sensation of tightness as your wound heals. This is normal and will gradually subside.   Your healed wound may be sensitive to temperature changes. This sensitivity improves with time, but if you re having a lot of discomfort, try to avoid temperature extremes.   Patients frequently experience itching after their wound appears to have healed because of the continue healing under the skin. Plain Vaseline will help relieve the itching.

## 2021-10-26 NOTE — PROGRESS NOTES
Precious Paris is an extremely pleasant 80 year old year old female patient here today for spot by brow. Present for a few months, not healing. No pain, only bleed once with washing face. Patient has no other skin complaints today.  Remainder of the HPI, Meds, PMH, Allergies, FH, and SH was reviewed in chart.    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     anosmia     Diverticulosis of colon (without mention of hemorrhage)      Malignant neoplasm of right breast (H) 3/13/2020    Added automatically from request for surgery [id number]     Other and unspecified hyperlipidemia      Pain in joint, lower leg      Plantar fascial fibromatosis      PURE HYPERCHOLESTEROLEM 9/9/2007     PURE HYPERCHOLESTEROLEM 9/9/2007     Squamous cell carcinoma        Past Surgical History:   Procedure Laterality Date     BIOPSY       BIOPSY BREAST       INSERT PORT VASCULAR ACCESS N/A 11/29/2019    Procedure: INSERTION OF VENOUS ACCESS PORT;  Surgeon: Hair Broderick DO;  Location: [facility]     JOINT REPLACEMTN, KNEE RT/LT  2010    right     JOINT REPLACEMTN, KNEE RT/LT  2011    left     LIGATE VEIN VARICOSE, PHLEBECTOMY MULTIPLE STAB, COMBINED  10/17/2013    Procedure: COMBINED LIGATE VEIN VARICOSE, PHLEBECTOMY MULTIPLE STAB;  Phlebectomy of Right Knee Varicose Veins;  Surgeon: Andrea Duffy MD;  Location: [facility]     LUMPECTOMY BREAST       LUMPECTOMY BREAST WITH SENTINEL NODE, COMBINED Right 3/19/2020    Procedure: LUMPECTOMY, BREAST, WITH SENTINEL LYMPH NODE BIOPSY, Right Lumpectomy With Right [place] Lymph Node Biopsy And Luisa Cath Removal;  Surgeon: Venancio Frausto MD;  Location: [facility]     OSTEOTOMY FOOT  8/19/2013     Procedure: OSTEOTOMY FOOT;  Left 2nd metatarsal osteotomy, 2nd Metatarsophalangeal joint & 2nd toe correction;  Surgeon: Jose Phillips DPM;  Location: [facility]     PHACOEMULSIFICATION WITH STANDARD INTRAOCULAR LENS IMPLANT Left 12/7/2017    Procedure: PHACOEMULSIFICATION WITH STANDARD INTRAOCULAR LENS IMPLANT;  Left Cataract Removal with Implant;  Surgeon: Mata Deleon MD;  Location: [facility]     SURGICAL HISTORY OF -   1979    Stripping of Veins in Left Leg     SURGICAL HISTORY OF -   1970    Bilateral Tubal Ligation     SURGICAL HISTORY OF -   2000    Carpal Tunnel Repair, right     SURGICAL HISTORY OF -   9-12-08    Rt let ablation     ZZHC COLONOSCOPY THRU STOMA, DIAGNOSTIC  04/05    diverticulosis and hemorroids, due 2015        Family History   Problem Relation Age of Onset     Hypertension Mother      Cerebrovascular Disease Mother      Hypertension Father      Cerebrovascular Disease Father      Hypertension Brother      Cancer Brother         lymphoma     Hypertension Sister      Gastrointestinal Disease Sister      Hypertension Brother      Cancer Brother         melanoma     Eye Disorder Brother      Hypertension Brother      Hypertension Brother      Heart Defect Son        Social History     Socioeconomic History     Marital status:      Spouse name: Not on file     Number of children: Not on file     Years of education: Not on file     Highest education level: Not on file   Occupational History     Not on file   Tobacco Use     Smoking status: Former Smoker     Smokeless tobacco: Never Used     Tobacco comment: quit 20 years ago   Vaping Use     Vaping Use: Never used   Substance and Sexual Activity     Alcohol use: Yes     Comment: occ.     Drug use: No     Sexual activity: Yes     Partners: Male   Other Topics Concern     Parent/sibling w/ CABG, MI or angioplasty before 65F 55M? No   Social History Narrative     Not on file     Social Determinants of Health     Financial Resource  Strain:      Difficulty of Paying Living Expenses:    Food Insecurity:      Worried About Running Out of Food in the Last Year:      Ran Out of Food in the Last Year:    Transportation Needs:      Lack of Transportation (Medical):      Lack of Transportation (Non-Medical):    Physical Activity:      Days of Exercise per Week:      Minutes of Exercise per Session:    Stress:      Feeling of Stress :    Social Connections:      Frequency of Communication with Friends and Family:      Frequency of Social Gatherings with Friends and Family:      Attends [employer] Services:      Active Member of Clubs or Organizations:      Attends Club or Organization Meetings:      Marital Status:    Intimate Partner Violence:      Fear of Current or Ex-Partner:      Emotionally Abused:      Physically Abused:      Sexually Abused:        Outpatient Encounter Medications as of 10/26/2021   Medication Sig Dispense Refill     ASPIRIN 81 MG OR TABS Take 1 tablet by mouth daily       CALCIUM OR Take 1 tablet by mouth daily        fish oil-omega-3 fatty acids (OMEGA 3) 1000 MG capsule Take 1 capsule (1 g) by mouth daily 30 capsule 1     IBUPROFEN 200 MG OR TABS Take 400 mg by mouth 2 times daily as needed for pain       losartan-hydrochlorothiazide (HYZAAR) 50-12.5 MG tablet Take 1 tablet by mouth every morning 90 tablet 3     magnesium 250 MG tablet Take 1 tablet by mouth daily       Melatonin 10 MG TABS tablet Take 10 mg by mouth At Bedtime       simvastatin (ZOCOR) 20 MG tablet Take 1 tablet (20 mg) by mouth At Bedtime 90 tablet 3     vitamin B complex with vitamin C (VITAMIN  B COMPLEX) tablet Take 1 tablet by mouth daily       VITAMIN C OR 1 DAILY       VITAMIN D, CHOLECALCIFEROL, PO Take 25 Units by mouth daily        vitamin E 400 units TABS Take by mouth daily 2 tablets daily       [DISCONTINUED] capecitabine (XELODA) 500 MG tablet Start out 1500 mg po bid 7 days on, 7 days off (Patient not taking: Reported on 8/19/2020) 84 tablet 0      No facility-administered encounter medications on file as of 10/26/2021.             O:   NAD, WDWN, Alert & Oriented, Mood & Affect wnl, Vitals stable   Here today alone   /74 (BP Location: Left arm, Patient Position: Sitting, Cuff Size: Adult Large)   Pulse 89   SpO2 95%    General appearance normal   Vitals stable   Alert, oriented and in no acute distress       Gritty papule on left eyebrow   0.5 cm pink scabbed papule on right superior brow      Eyes: Conjunctivae/lids:Normal     ENT: Lips normal    MSK:Normal    Pulm: Breathing Normal    Neuro/Psych: Orientation:Alert and Orientedx3 ; Mood/Affect:normal       A/P:  1. R/O  AK vs NMSC on right superior brow   TANGENTIAL BIOPSY IN HOUSE:  After consent, anesthesia with LEC and prep, tangential excision performed.  No complications and routine wound care.     2. Actinic keratosis on left eyebrow   LN2:  Treated with LN2 for 5s for 1-2 cycles. Warned risks of blistering, pain, pigment change, scarring, and incomplete resolution.  Advised patient to return if lesions do not completely resolve.  Wound care sheet given.

## 2021-10-27 NOTE — TELEPHONE ENCOUNTER
Called patient and gave results. Patient was scheduled for MOHS with Dr. Anthony on 11/9/21. All questions were answered.   Patrica Powell LPN

## 2021-10-28 ENCOUNTER — HOSPITAL ENCOUNTER (OUTPATIENT)
Dept: PHYSICAL THERAPY | Facility: CLINIC | Age: 81
Setting detail: THERAPIES SERIES
End: 2021-10-28
Attending: NURSE PRACTITIONER
Payer: COMMERCIAL

## 2021-10-28 PROCEDURE — 97140 MANUAL THERAPY 1/> REGIONS: CPT | Mod: GP | Performed by: REHABILITATION PRACTITIONER

## 2021-11-03 ENCOUNTER — HOSPITAL ENCOUNTER (OUTPATIENT)
Dept: PHYSICAL THERAPY | Facility: CLINIC | Age: 81
Setting detail: THERAPIES SERIES
End: 2021-11-03
Attending: NURSE PRACTITIONER
Payer: COMMERCIAL

## 2021-11-03 PROCEDURE — 97140 MANUAL THERAPY 1/> REGIONS: CPT | Mod: GP,CQ | Performed by: REHABILITATION PRACTITIONER

## 2021-11-08 NOTE — PROGRESS NOTES
Surgical Office Location :   [facility]  [address] [place] [address]

## 2021-11-09 ENCOUNTER — OFFICE VISIT (OUTPATIENT)
Dept: DERMATOLOGY | Facility: CLINIC | Age: 81
End: 2021-11-09
Payer: COMMERCIAL

## 2021-11-09 VITALS — HEART RATE: 85 BPM | DIASTOLIC BLOOD PRESSURE: 80 MMHG | SYSTOLIC BLOOD PRESSURE: 145 MMHG

## 2021-11-09 DIAGNOSIS — L57.0 AK (ACTINIC KERATOSIS): Primary | ICD-10-CM

## 2021-11-09 DIAGNOSIS — D04.30 SQUAMOUS CELL CARCINOMA IN SITU (SCCIS) OF SKIN OF FACE: ICD-10-CM

## 2021-11-09 PROCEDURE — 17311 MOHS 1 STAGE H/N/HF/G: CPT | Performed by: DERMATOLOGY

## 2021-11-09 PROCEDURE — 17000 DESTRUCT PREMALG LESION: CPT | Performed by: DERMATOLOGY

## 2021-11-09 NOTE — LETTER
11/9/2021         RE: Precious Paris  [address] [place] [address]        Dear Colleague,    Thank you for referring your patient, Precious Paris, to the [facility]. Please see a copy of my visit note below.    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  Today she notes spot on left cheek.   .   Patient states this has been present for ?.  Patient reports the following symptoms:  Tender and scaly.  .  Patient reports the following previous treatments none.  These treatments did not work.  Patient reports the following modifying factors none.  Associated symptoms: none.  Patient has no other skin complaints today.  Remainder of the HPI, Meds, PMH, Allergies, FH, and SH was reviewed in chart.      Past Medical History:   Diagnosis Date     Allergic rhinitis due to other allergen      Asymptomatic postmenopausal status (age-related) (natural)      Basal cell carcinoma      Breast cancer (H)      Cervical spondylosis without myelopathy     cervical DJD     Disturbances of sensation of smell and taste     anosmia     Diverticulosis of colon (without mention of hemorrhage)      Malignant neoplasm of right breast (H) 3/13/2020    Added automatically from request for surgery [id number]     Other and unspecified hyperlipidemia      Pain in joint, lower leg      Plantar fascial fibromatosis      PURE HYPERCHOLESTEROLEM 9/9/2007     PURE HYPERCHOLESTEROLEM 9/9/2007     Squamous cell carcinoma        Past Surgical History:   Procedure Laterality Date     BIOPSY       BIOPSY BREAST       INSERT PORT VASCULAR ACCESS N/A 11/29/2019    Procedure: INSERTION OF VENOUS ACCESS PORT;  Surgeon: Hair Broderick DO;  Location: [facility]     JOINT REPLACEMTN, KNEE RT/LT   2010    right     JOINT REPLACEMTN, KNEE RT/LT  2011    left     LIGATE VEIN VARICOSE, PHLEBECTOMY MULTIPLE STAB, COMBINED  10/17/2013    Procedure: COMBINED LIGATE VEIN VARICOSE, PHLEBECTOMY MULTIPLE STAB;  Phlebectomy of Right Knee Varicose Veins;  Surgeon: Andrea Duffy MD;  Location: [facility]     LUMPECTOMY BREAST       LUMPECTOMY BREAST WITH SENTINEL NODE, COMBINED Right 3/19/2020    Procedure: LUMPECTOMY, BREAST, WITH SENTINEL LYMPH NODE BIOPSY, Right Lumpectomy With Right [place] Lymph Node Biopsy And Luisa Cath Removal;  Surgeon: Venancio Frausto MD;  Location: [facility]     OSTEOTOMY FOOT  8/19/2013    Procedure: OSTEOTOMY FOOT;  Left 2nd metatarsal osteotomy, 2nd Metatarsophalangeal joint & 2nd toe correction;  Surgeon: Jose Phillips DPM;  Location: [facility]     PHACOEMULSIFICATION WITH STANDARD INTRAOCULAR LENS IMPLANT Left 12/7/2017    Procedure: PHACOEMULSIFICATION WITH STANDARD INTRAOCULAR LENS IMPLANT;  Left Cataract Removal with Implant;  Surgeon: Mata Deleon MD;  Location: [facility]     SURGICAL HISTORY OF -   1979    Stripping of Veins in Left Leg     SURGICAL HISTORY OF -   1970    Bilateral Tubal Ligation     SURGICAL HISTORY OF -   2000    Carpal Tunnel Repair, right     SURGICAL HISTORY OF -   9-12-08    Rt let ablation     ZZHC COLONOSCOPY THRU STOMA, DIAGNOSTIC  04/05    diverticulosis and hemorroids, due 2015        Family History   Problem Relation Age of Onset     Hypertension Mother      Cerebrovascular Disease Mother      Hypertension Father      Cerebrovascular Disease Father      Hypertension Brother      Cancer Brother         lymphoma     Hypertension Sister      Gastrointestinal Disease Sister      Hypertension Brother      Cancer Brother         melanoma     Eye Disorder Brother      Hypertension Brother      Hypertension Brother      Heart Defect Son        Social History     Socioeconomic History     Marital status:      Spouse name: Not on file     Number of  children: Not on file     Years of education: Not on file     Highest education level: Not on file   Occupational History     Not on file   Tobacco Use     Smoking status: Former Smoker     Smokeless tobacco: Never Used     Tobacco comment: quit 20 years ago   Vaping Use     Vaping Use: Never used   Substance and Sexual Activity     Alcohol use: Yes     Comment: occ.     Drug use: No     Sexual activity: Yes     Partners: Male   Other Topics Concern     Parent/sibling w/ CABG, MI or angioplasty before 65F 55M? No   Social History Narrative     Not on file     Social Determinants of Health     Financial Resource Strain: Not on file   Food Insecurity: Not on file   Transportation Needs: Not on file   Physical Activity: Not on file   Stress: Not on file   Social Connections: Not on file   Intimate Partner Violence: Not on file   Housing Stability: Not on file       Outpatient Encounter Medications as of 11/9/2021   Medication Sig Dispense Refill     ASPIRIN 81 MG OR TABS Take 1 tablet by mouth daily       CALCIUM OR Take 1 tablet by mouth daily        fish oil-omega-3 fatty acids (OMEGA 3) 1000 MG capsule Take 1 capsule (1 g) by mouth daily 30 capsule 1     IBUPROFEN 200 MG OR TABS Take 400 mg by mouth 2 times daily as needed for pain       losartan-hydrochlorothiazide (HYZAAR) 50-12.5 MG tablet Take 1 tablet by mouth every morning 90 tablet 3     magnesium 250 MG tablet Take 1 tablet by mouth daily       Melatonin 10 MG TABS tablet Take 10 mg by mouth At Bedtime       simvastatin (ZOCOR) 20 MG tablet Take 1 tablet (20 mg) by mouth At Bedtime 90 tablet 3     vitamin B complex with vitamin C (VITAMIN  B COMPLEX) tablet Take 1 tablet by mouth daily       VITAMIN C OR 1 DAILY       VITAMIN D, CHOLECALCIFEROL, PO Take 25 Units by mouth daily        vitamin E 400 units TABS Take by mouth daily 2 tablets daily       [DISCONTINUED] capecitabine (XELODA) 500 MG tablet Start out 1500 mg po bid 7 days on, 7 days off (Patient not  taking: Reported on 8/19/2020) 84 tablet 0     No facility-administered encounter medications on file as of 11/9/2021.             O:   NAD, WDWN, Alert & Oriented, Mood & Affect wnl, Vitals stable   Here today alone   BP (!) 145/80   Pulse 85    General appearance normal   Vitals stable   Alert, oriented and in no acute distress      Following lymph nodes palpated: Occipital, Cervical, Supraclavicular no lad   R brow ill-defined 5mm scaly papule   L zygoma gritty scaly papule       Eyes: Conjunctivae/lids:Normal     ENT: Lips, buccal mucosa, tongue: normal    MSK:Normal    Cardiovascular: peripheral edema none    Pulm: Breathing Normal    Neuro/Psych: Orientation:Alert and Orientedx3 ; Mood/Affect:normal       A/P:  1. l zygoma actinic keratosis   LN2:  Treated with LN2 for 5s for 1-2 cycles. Warned risks of blistering, pain, pigment change, scarring, and incomplete resolution.  Advised patient to return if lesions do not completely resolve.  Wound care sheet given.  2. R brow squamous cell carcinoma in situ   It was a pleasure speaking to Precious Paris today.  Previous clinic notes and pertinent laboratory tests were reviewed prior to Precious Paris's visit.  Nature and genetics of benign skin lesions dicussed with patient.  Signs and Symptoms of skin cancer discussed with patient.  Patient encouraged to perform monthly skin exams.  UV precautions reviewed with patient.  Risks of non-melanoma skin cancer discussed with patient   Return to clinic 6 months  PROCEDURE NOTE  R brow squamous cell carcinoma in situ   MOHS:   Location and Ill-defined margins    The rationale for Mohs surgery was discussed with the patient and consent was obtained.  The risks and benefits as well as alternatives to therapy were discussed, in detail.  Specifically, the risks of infection, scarring, bleeding, prolonged wound healing, incomplete removal, allergy to anesthesia, nerve injury and recurrence were addressed.  Indication  for Mohs was Location and Ill-defined margins. Prior to the procedure, the treatment site was clearly identified and, if available, confirmed with previous photos and confirmed by the patient   All components of the Universal Protocol/PAUSE rule were completed.  The Mohs surgeon operated in two distinct and integrated capacities as the surgeon and pathologist.      The area was prepped with Betasept.  A rim of normal appearing skin was marked circumferentially around the lesion.  The area was infiltrated with local anesthesia.  The tumor was first debulked to remove all clinically apparent tumor.  An incision following the standard Mohs approach was done and the specimen was oriented,mapped and placed in 1 block(s).  Each specimen was then chromacoded and processed in the Mohs laboratory using standard Mohs technique and submitted for frozen section histology.  Frozen section analysis showed no  residual tumor but CLEAR MARGINS.      The tumor was excised using standard Mohs technique in 1 stages(s).  CLEAR MARGINS OBTAINED and Final defect size was 1 cm.     We discussed the options for wound management in full with the patient including risks/benefits/ possible outcomes.        REPAIR SECOND INTENT: We discussed the options for wound management in full with the patient including risks/benefits/possible outcomes. Decision made to allow the wound to heal by second intention. Cautery was used for for hemostasis. EBL minimal; complications none; wound care routine.  The patient was discharged in good condition and will return in one month or prn for wound evaluation.        Again, thank you for allowing me to participate in the care of your patient.        Sincerely,        Danielito Anthony MD

## 2021-11-09 NOTE — PROGRESS NOTES
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  Today she notes spot on left cheek.   .   Patient states this has been present for ?.  Patient reports the following symptoms:  Tender and scaly.  .  Patient reports the following previous treatments none.  These treatments did not work.  Patient reports the following modifying factors none.  Associated symptoms: none.  Patient has no other skin complaints today.  Remainder of the HPI, Meds, PMH, Allergies, FH, and SH was reviewed in chart.      Past Medical History:   Diagnosis Date     Allergic rhinitis due to other allergen      Asymptomatic postmenopausal status (age-related) (natural)      Basal cell carcinoma      Breast cancer (H)      Cervical spondylosis without myelopathy     cervical DJD     Disturbances of sensation of smell and taste     anosmia     Diverticulosis of colon (without mention of hemorrhage)      Malignant neoplasm of right breast (H) 3/13/2020    Added automatically from request for surgery [id number]     Other and unspecified hyperlipidemia      Pain in joint, lower leg      Plantar fascial fibromatosis      PURE HYPERCHOLESTEROLEM 9/9/2007     PURE HYPERCHOLESTEROLEM 9/9/2007     Squamous cell carcinoma        Past Surgical History:   Procedure Laterality Date     BIOPSY       BIOPSY BREAST       INSERT PORT VASCULAR ACCESS N/A 11/29/2019    Procedure: INSERTION OF VENOUS ACCESS PORT;  Surgeon: Hair Broderick DO;  Location: [facility]     JOINT REPLACEMTN, KNEE RT/LT  2010    right     JOINT REPLACEMTN, KNEE RT/LT  2011    left     LIGATE VEIN VARICOSE, PHLEBECTOMY MULTIPLE STAB, COMBINED  10/17/2013    Procedure: COMBINED LIGATE VEIN VARICOSE, PHLEBECTOMY MULTIPLE STAB;  Phlebectomy of Right Knee Varicose Veins;  Surgeon: Andrea Duffy MD;  Location: [facility]     LUMPECTOMY BREAST       LUMPECTOMY BREAST WITH  SENTINEL NODE, COMBINED Right 3/19/2020    Procedure: LUMPECTOMY, BREAST, WITH SENTINEL LYMPH NODE BIOPSY, Right Lumpectomy With Right [place] Lymph Node Biopsy And Luisa Cath Removal;  Surgeon: Venancio Frausto MD;  Location: [facility]     OSTEOTOMY FOOT  8/19/2013    Procedure: OSTEOTOMY FOOT;  Left 2nd metatarsal osteotomy, 2nd Metatarsophalangeal joint & 2nd toe correction;  Surgeon: Jose Phillips DPM;  Location: [facility]     PHACOEMULSIFICATION WITH STANDARD INTRAOCULAR LENS IMPLANT Left 12/7/2017    Procedure: PHACOEMULSIFICATION WITH STANDARD INTRAOCULAR LENS IMPLANT;  Left Cataract Removal with Implant;  Surgeon: Mata Deleon MD;  Location: [facility]     SURGICAL HISTORY OF -   1979    Stripping of Veins in Left Leg     SURGICAL HISTORY OF -   1970    Bilateral Tubal Ligation     SURGICAL HISTORY OF -   2000    Carpal Tunnel Repair, right     SURGICAL HISTORY OF -   9-12-08    Rt let ablation     ZZHC COLONOSCOPY THRU STOMA, DIAGNOSTIC  04/05    diverticulosis and hemorroids, due 2015        Family History   Problem Relation Age of Onset     Hypertension Mother      Cerebrovascular Disease Mother      Hypertension Father      Cerebrovascular Disease Father      Hypertension Brother      Cancer Brother         lymphoma     Hypertension Sister      Gastrointestinal Disease Sister      Hypertension Brother      Cancer Brother         melanoma     Eye Disorder Brother      Hypertension Brother      Hypertension Brother      Heart Defect Son        Social History     Socioeconomic History     Marital status:      Spouse name: Not on file     Number of children: Not on file     Years of education: Not on file     Highest education level: Not on file   Occupational History     Not on file   Tobacco Use     Smoking status: Former Smoker     Smokeless tobacco: Never Used     Tobacco comment: quit 20 years ago   Vaping Use     Vaping Use: Never used   Substance and Sexual Activity     Alcohol use: Yes      Comment: occ.     Drug use: No     Sexual activity: Yes     Partners: Male   Other Topics Concern     Parent/sibling w/ CABG, MI or angioplasty before 65F 55M? No   Social History Narrative     Not on file     Social Determinants of Health     Financial Resource Strain: Not on file   Food Insecurity: Not on file   Transportation Needs: Not on file   Physical Activity: Not on file   Stress: Not on file   Social Connections: Not on file   Intimate Partner Violence: Not on file   Housing Stability: Not on file       Outpatient Encounter Medications as of 11/9/2021   Medication Sig Dispense Refill     ASPIRIN 81 MG OR TABS Take 1 tablet by mouth daily       CALCIUM OR Take 1 tablet by mouth daily        fish oil-omega-3 fatty acids (OMEGA 3) 1000 MG capsule Take 1 capsule (1 g) by mouth daily 30 capsule 1     IBUPROFEN 200 MG OR TABS Take 400 mg by mouth 2 times daily as needed for pain       losartan-hydrochlorothiazide (HYZAAR) 50-12.5 MG tablet Take 1 tablet by mouth every morning 90 tablet 3     magnesium 250 MG tablet Take 1 tablet by mouth daily       Melatonin 10 MG TABS tablet Take 10 mg by mouth At Bedtime       simvastatin (ZOCOR) 20 MG tablet Take 1 tablet (20 mg) by mouth At Bedtime 90 tablet 3     vitamin B complex with vitamin C (VITAMIN  B COMPLEX) tablet Take 1 tablet by mouth daily       VITAMIN C OR 1 DAILY       VITAMIN D, CHOLECALCIFEROL, PO Take 25 Units by mouth daily        vitamin E 400 units TABS Take by mouth daily 2 tablets daily       [DISCONTINUED] capecitabine (XELODA) 500 MG tablet Start out 1500 mg po bid 7 days on, 7 days off (Patient not taking: Reported on 8/19/2020) 84 tablet 0     No facility-administered encounter medications on file as of 11/9/2021.             O:   NAD, WDWN, Alert & Oriented, Mood & Affect wnl, Vitals stable   Here today alone   BP (!) 145/80   Pulse 85    General appearance normal   Vitals stable   Alert, oriented and in no acute distress      Following lymph  nodes palpated: Occipital, Cervical, Supraclavicular no lad   R brow ill-defined 5mm scaly papule   L zygoma gritty scaly papule       Eyes: Conjunctivae/lids:Normal     ENT: Lips, buccal mucosa, tongue: normal    MSK:Normal    Cardiovascular: peripheral edema none    Pulm: Breathing Normal    Neuro/Psych: Orientation:Alert and Orientedx3 ; Mood/Affect:normal       A/P:  1. l zygoma actinic keratosis   LN2:  Treated with LN2 for 5s for 1-2 cycles. Warned risks of blistering, pain, pigment change, scarring, and incomplete resolution.  Advised patient to return if lesions do not completely resolve.  Wound care sheet given.  2. R brow squamous cell carcinoma in situ   It was a pleasure speaking to Precious Paris today.  Previous clinic notes and pertinent laboratory tests were reviewed prior to Precious Paris's visit.  Nature and genetics of benign skin lesions dicussed with patient.  Signs and Symptoms of skin cancer discussed with patient.  Patient encouraged to perform monthly skin exams.  UV precautions reviewed with patient.  Risks of non-melanoma skin cancer discussed with patient   Return to clinic 6 months  PROCEDURE NOTE  R brow squamous cell carcinoma in situ   MOHS:   Location and Ill-defined margins    The rationale for Mohs surgery was discussed with the patient and consent was obtained.  The risks and benefits as well as alternatives to therapy were discussed, in detail.  Specifically, the risks of infection, scarring, bleeding, prolonged wound healing, incomplete removal, allergy to anesthesia, nerve injury and recurrence were addressed.  Indication for Mohs was Location and Ill-defined margins. Prior to the procedure, the treatment site was clearly identified and, if available, confirmed with previous photos and confirmed by the patient   All components of the Universal Protocol/PAUSE rule were completed.  The Mohs surgeon operated in two distinct and integrated capacities as the surgeon and  pathologist.      The area was prepped with Betasept.  A rim of normal appearing skin was marked circumferentially around the lesion.  The area was infiltrated with local anesthesia.  The tumor was first debulked to remove all clinically apparent tumor.  An incision following the standard Mohs approach was done and the specimen was oriented,mapped and placed in 1 block(s).  Each specimen was then chromacoded and processed in the Mohs laboratory using standard Mohs technique and submitted for frozen section histology.  Frozen section analysis showed no  residual tumor but CLEAR MARGINS.      The tumor was excised using standard Mohs technique in 1 stages(s).  CLEAR MARGINS OBTAINED and Final defect size was 1 cm.     We discussed the options for wound management in full with the patient including risks/benefits/ possible outcomes.        REPAIR SECOND INTENT: We discussed the options for wound management in full with the patient including risks/benefits/possible outcomes. Decision made to allow the wound to heal by second intention. Cautery was used for for hemostasis. EBL minimal; complications none; wound care routine.  The patient was discharged in good condition and will return in one month or prn for wound evaluation.

## 2021-11-09 NOTE — PATIENT INSTRUCTIONS
Open Wound Care     for _____brow_________        ? No strenuous activity for 48 hours    ? Take Tylenol as needed for discomfort.                                                .         ? Do not drink alcoholic beverages for 48 hours.    ? Keep the pressure bandage in place for 24 hours. If the bandage becomes blood tinged or loose, reinforce it with gauze and tape.        (Refer to the reverse side of this page for management of bleeding).    ? Remove bandage in 24 hours and begin wound care as follows:     1. Clean area with tap water using a Q tip or gauze pad, (shower / bathe normally)  2. Dry wound with Q tip or gauze pad  3. Apply Aquaphor, Vaseline, Polysporin or Bacitracin Ointment with a Q tip    Do NOT use Neosporin Ointment *  4. Cover the wound with a band-aid or nonstick gauze pad and paper tape.  5. Repeat wound care once a day until wound is completely healed.    It is an old wives tale that a wound heals better when it is exposed to air and allowed to dry out. The wound will heal faster with a better cosmetic result if it is kept moist with ointment and covered with a bandage.  Do not let the wound dry out.      Supplies Needed:                Qtips or gauze pads                Polysporin or Bacitracin Ointment                Bandaids or nonstick gauze pads and paper tape    Wound care kits and brown paper tape are available for purchase at   the pharmacy.    BLEEDIN. Use tightly rolled up gauze or cloth to apply direct pressure over the bandage for 20   minutes.  2. Reapply pressure for an additional 20 minutes if necessary  3. Call the office or go to the nearest emergency room if pressure fails to stop the bleeding.  4. Use additional gauze and tape to maintain pressure once the bleeding has stopped.  5. Begin wound care 24 hours after surgery as directed.                  WOUND HEALING    1. One week after surgery a pink / red halo will form around the outside of the wound.   This is new  skin.  2. The center of the wound will appear yellowish white and produce some drainage.  3. The pink halo will slowly migrate in toward the center of the wound until the wound is covered with new shiny pink skin.  4. There will be no more drainage when the wound is completely healed.  5. It will take six months to one year for the redness to fade.  6. The scar may be itchy, tight and sensitive to extreme temperatures for a year after the surgery.  7. Massaging the area several times a day for several minutes after the wound is completely healed will help the scar soften and normalize faster. Begin massage only after healing is complete.      In case of emergency call: Dr Anthony: [phone]    [facility]: [phone]    [facility]:[phone]        WOUND CARE INSTRUCTIONS  LEFT CHEEK  FOR CRYOSURGERY   This area treated with liquid nitrogen should form a blister (areas treated may or may not blister-skin may just turn dark and slough off). You do not need to bandage the area unless a blister forms and breaks (which may be a few days). When the blister breaks, begin daily dressing changes as follows:  1) Clean and dry the area with tap water using clean Q-tip or sterile gauze pad.   2) Apply Polysporin ointment or Bacitracin ointment over entire wound. Do NOT use Neosporin ointment.   3) Cover the wound with a band-aid or sterile non-stick gauze pad and micropore paper tape.   REPEAT THESE INSTRUCTIONS AT LEAST ONCE A DAY UNTIL THE WOUND HAS COMPLETELY HEALED.   It is an old wives tale that a wound heals better when it is exposed to air and allowed to dry out. The wound will heal faster with a better cosmetic result if it is kept moist with ointment and covered with a bandage.   Do not let the wound dry out.   IMPORTANT INFORMATION ON REVERSE SIDE   Supplies Needed:   *Cotton tipped applicators (Q-tips)   *Polysporin ointment or Bacitracin ointment (NOT NEOSPORIN)   *Band-aids, or non stick  gauze pads and micropore paper tape   PATIENT INFORMATION   During the healing process you will notice a number of changes. All wounds develop a small halo of redness surrounding the wound. This means healing is occurring. Severe itching with extensive redness usually indicates sensitivity to the ointment or bandage tape used to dress the wound. You should call our office if this develops.   Swelling and/or discoloration around your surgical site is common, particularly when performed around the eye.   All wounds normally drain. The larger the wound the more drainage there will be. After 7-10 days, you will notice the wound beginning to shrink and new skin will begin to grow. The wound is healed when you can see skin has formed over the entire area. A healed wound has a healthy, shiny look to the surface and is red to dark pink in color to normalize. Wounds may take approximately 4-6 weeks to heal. Larger wounds may take 6-8 weeks. After the wound is healed you may discontinue dressing changes.   You may experience a sensation of tightness as your wound heals. This is normal and will gradually subside.   Your healed wound may be sensitive to temperature changes. This sensitivity improves with time, but if you re having a lot of discomfort, try to avoid temperature extremes.   Patients frequently experience itching after their wound appears to have healed because of the continue healing under the skin. Plain Vaseline will help relieve the itching.

## 2021-11-10 ENCOUNTER — HOSPITAL ENCOUNTER (OUTPATIENT)
Dept: PHYSICAL THERAPY | Facility: CLINIC | Age: 81
Setting detail: THERAPIES SERIES
End: 2021-11-10
Attending: NURSE PRACTITIONER
Payer: COMMERCIAL

## 2021-11-10 PROCEDURE — 97140 MANUAL THERAPY 1/> REGIONS: CPT | Mod: GP | Performed by: REHABILITATION PRACTITIONER

## 2021-11-15 ENCOUNTER — TELEPHONE (OUTPATIENT)
Dept: ONCOLOGY | Facility: CLINIC | Age: 81
End: 2021-11-15

## 2021-11-15 ENCOUNTER — ONCOLOGY VISIT (OUTPATIENT)
Dept: ONCOLOGY | Facility: CLINIC | Age: 81
End: 2021-11-15
Attending: INTERNAL MEDICINE
Payer: COMMERCIAL

## 2021-11-15 ENCOUNTER — APPOINTMENT (OUTPATIENT)
Dept: LAB | Facility: CLINIC | Age: 81
End: 2021-11-15
Payer: COMMERCIAL

## 2021-11-15 VITALS
HEART RATE: 68 BPM | BODY MASS INDEX: 33.73 KG/M2 | RESPIRATION RATE: 18 BRPM | WEIGHT: 199.6 LBS | SYSTOLIC BLOOD PRESSURE: 170 MMHG | TEMPERATURE: 97.2 F | DIASTOLIC BLOOD PRESSURE: 73 MMHG | OXYGEN SATURATION: 96 %

## 2021-11-15 DIAGNOSIS — C50.911 INFILTRATING DUCTAL CARCINOMA OF RIGHT BREAST (H): Primary | ICD-10-CM

## 2021-11-15 DIAGNOSIS — E78.00 PURE HYPERCHOLESTEROLEMIA: ICD-10-CM

## 2021-11-15 DIAGNOSIS — E78.5 HYPERLIPIDEMIA LDL GOAL <130: ICD-10-CM

## 2021-11-15 LAB
ALBUMIN SERPL-MCNC: 3.6 G/DL (ref 3.4–5)
ALP SERPL-CCNC: 82 U/L (ref 40–150)
ALT SERPL W P-5'-P-CCNC: 29 U/L (ref 0–50)
ANION GAP SERPL CALCULATED.3IONS-SCNC: 5 MMOL/L (ref 3–14)
AST SERPL W P-5'-P-CCNC: 22 U/L (ref 0–45)
BASOPHILS # BLD AUTO: 0.1 10E3/UL (ref 0–0.2)
BASOPHILS NFR BLD AUTO: 1 %
BILIRUB SERPL-MCNC: 0.5 MG/DL (ref 0.2–1.3)
BUN SERPL-MCNC: 13 MG/DL (ref 7–30)
CALCIUM SERPL-MCNC: 9.5 MG/DL (ref 8.5–10.1)
CANCER AG27-29 SERPL-ACNC: 22 U/ML (ref 0–39)
CHLORIDE BLD-SCNC: 105 MMOL/L (ref 94–109)
CHOLEST SERPL-MCNC: 203 MG/DL
CO2 SERPL-SCNC: 29 MMOL/L (ref 20–32)
CREAT SERPL-MCNC: 0.65 MG/DL (ref 0.52–1.04)
EOSINOPHIL # BLD AUTO: 0.3 10E3/UL (ref 0–0.7)
EOSINOPHIL NFR BLD AUTO: 4 %
ERYTHROCYTE [DISTWIDTH] IN BLOOD BY AUTOMATED COUNT: 12.9 % (ref 10–15)
FASTING STATUS PATIENT QL REPORTED: YES
GFR SERPL CREATININE-BSD FRML MDRD: 84 ML/MIN/1.73M2
GLUCOSE BLD-MCNC: 107 MG/DL (ref 70–99)
HCT VFR BLD AUTO: 42.1 % (ref 35–47)
HDLC SERPL-MCNC: 75 MG/DL
HGB BLD-MCNC: 13.8 G/DL (ref 11.7–15.7)
IMM GRANULOCYTES # BLD: 0 10E3/UL
IMM GRANULOCYTES NFR BLD: 0 %
LDLC SERPL CALC-MCNC: 109 MG/DL
LYMPHOCYTES # BLD AUTO: 1.5 10E3/UL (ref 0.8–5.3)
LYMPHOCYTES NFR BLD AUTO: 21 %
MCH RBC QN AUTO: 30.7 PG (ref 26.5–33)
MCHC RBC AUTO-ENTMCNC: 32.8 G/DL (ref 31.5–36.5)
MCV RBC AUTO: 94 FL (ref 78–100)
MONOCYTES # BLD AUTO: 0.6 10E3/UL (ref 0–1.3)
MONOCYTES NFR BLD AUTO: 9 %
NEUTROPHILS # BLD AUTO: 4.7 10E3/UL (ref 1.6–8.3)
NEUTROPHILS NFR BLD AUTO: 65 %
NONHDLC SERPL-MCNC: 128 MG/DL
NRBC # BLD AUTO: 0 10E3/UL
NRBC BLD AUTO-RTO: 0 /100
PLATELET # BLD AUTO: 299 10E3/UL (ref 150–450)
POTASSIUM BLD-SCNC: 3.8 MMOL/L (ref 3.4–5.3)
PROT SERPL-MCNC: 8.2 G/DL (ref 6.8–8.8)
RBC # BLD AUTO: 4.49 10E6/UL (ref 3.8–5.2)
SODIUM SERPL-SCNC: 139 MMOL/L (ref 133–144)
TRIGL SERPL-MCNC: 95 MG/DL
WBC # BLD AUTO: 7.1 10E3/UL (ref 4–11)

## 2021-11-15 PROCEDURE — 36415 COLL VENOUS BLD VENIPUNCTURE: CPT | Performed by: INTERNAL MEDICINE

## 2021-11-15 PROCEDURE — 36415 COLL VENOUS BLD VENIPUNCTURE: CPT

## 2021-11-15 PROCEDURE — 80053 COMPREHEN METABOLIC PANEL: CPT | Performed by: INTERNAL MEDICINE

## 2021-11-15 PROCEDURE — 99214 OFFICE O/P EST MOD 30 MIN: CPT | Performed by: INTERNAL MEDICINE

## 2021-11-15 PROCEDURE — G0463 HOSPITAL OUTPT CLINIC VISIT: HCPCS | Mod: 25

## 2021-11-15 PROCEDURE — 83718 ASSAY OF LIPOPROTEIN: CPT | Performed by: INTERNAL MEDICINE

## 2021-11-15 PROCEDURE — 86300 IMMUNOASSAY TUMOR CA 15-3: CPT | Performed by: INTERNAL MEDICINE

## 2021-11-15 PROCEDURE — 85025 COMPLETE CBC W/AUTO DIFF WBC: CPT | Performed by: INTERNAL MEDICINE

## 2021-11-15 RX ORDER — SIMVASTATIN 20 MG
20 TABLET ORAL AT BEDTIME
Qty: 90 TABLET | Refills: 3 | Status: CANCELLED | OUTPATIENT
Start: 2021-11-15

## 2021-11-15 ASSESSMENT — PAIN SCALES - GENERAL: PAINLEVEL: NO PAIN (0)

## 2021-11-15 NOTE — TELEPHONE ENCOUNTER
This was refilled for a year on 9/27/2021 to [facility] - do they not have the prescription?    Ester Jorge M.D.

## 2021-11-15 NOTE — TELEPHONE ENCOUNTER
Pt in oncology clinic requesting a refill of her Zocor - not sure if she needs labs but pt leaving for [place] in a few week for 4 months.     Patrica Perla RN on 11/15/2021 at 9:39 AM

## 2021-11-15 NOTE — LETTER
11/15/2021         RE: Precious Paris  [address] [place] [address]        Dear Colleague,    Thank you for referring your patient, Precious Paris, to the [facility] [place]. Please see a copy of my visit note below.    [facility] Hematology and Oncology Progress Note    Patient: Precious Paris  MRN: [id number]  Date of Service: Nov 15, 2021         Reason for Visit    Chief Complaint   Patient presents with     Oncology Clinic Visit     Breast cancer follow up       Assessment and Plan    Cancer Staging  No matching staging information was found for the patient.    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 Valdivia tomorrow, next visit with us in June 2022.    Interval History  Ms.Peggy KEISHA Paris is a 81 year old currently 2 years post treatment for early stage triple negative breast cancer.    She continues to experience swelling and discomfort in the right breast, related to lymphedema.  It's been helped by physical therapy measures.    Mammogram last month was benign.    She and her  leave for [place] on 1 December 2021 and won't be back in [place] until May 2022 so she prefers next visit here in June 2022.    She has known high cholesterol and requests lipid panel to be checked.      Encounter Diagnoses:    Problem List Items Addressed This Visit        Other    Infiltrating ductal carcinoma of right breast (H) - Primary             CC: Ester Jorge MD   ______________________________________________________________________________    History of Present Illness    Per Lazara Ashton NP on 26 July 2021  Oncology  History/Treatment  Diagnosis/Stage:   11/2019: Right breast cancer, triple negative (pH2u-O0)     BRCA negative     Treatment:  2019: Neoadjuvant taxol     Lumpectomy     5/2020: Adjuvant RT     6 - 7/2020: Adjuvant Xeloda. Stopped for rash      Review of systems.  No fever or night sweats.  No loss of weight.  No lumps or bumps anywhere.  No unusual headaches or eyesight issues.  No dizziness.  No bleeding from the nose.  No sores in the mouth. No problems with swallowing.  No chest pain. No shortness of breath. No cough.  Chronic issues with lymphedema in the right breast and right upper arm, has seen lymphedema team and will be getting a home pump to minimize problems.    No abdominal pain. No nausea or vomiting.  No diarrhea or constipation.  No blood in stool or black colored stools.  No problems passing urine.  No numbness or tingling in hands or feet.  Recent excision of squamous cell cancer on right forehead    A 14 point review of systems is otherwise negative.        Past History    Past Medical History:   Diagnosis Date     Allergic rhinitis due to other allergen      Asymptomatic postmenopausal status (age-related) (natural)      Basal cell carcinoma      Breast cancer (H)      Cervical spondylosis without myelopathy     cervical DJD     Disturbances of sensation of smell and taste     anosmia     Diverticulosis of colon (without mention of hemorrhage)      Malignant neoplasm of right breast (H) 3/13/2020    Added automatically from request for surgery [id number]     Other and unspecified hyperlipidemia      Pain in joint, lower leg      Plantar fascial fibromatosis      PURE HYPERCHOLESTEROLEM 9/9/2007     PURE HYPERCHOLESTEROLEM 9/9/2007     Squamous cell carcinoma        Past Surgical History:   Procedure Laterality Date     BIOPSY       BIOPSY BREAST       INSERT PORT VASCULAR ACCESS N/A 11/29/2019    Procedure: INSERTION OF VENOUS ACCESS PORT;  Surgeon: Hair Broderick DO;  Location: [facility]      JOINT REPLACEMTN, KNEE RT/LT  2010    right     JOINT REPLACEMTN, KNEE RT/LT  2011    left     LIGATE VEIN VARICOSE, PHLEBECTOMY MULTIPLE STAB, COMBINED  10/17/2013    Procedure: COMBINED LIGATE VEIN VARICOSE, PHLEBECTOMY MULTIPLE STAB;  Phlebectomy of Right Knee Varicose Veins;  Surgeon: Andrea Duffy MD;  Location: [facility]     LUMPECTOMY BREAST       LUMPECTOMY BREAST WITH SENTINEL NODE, COMBINED Right 3/19/2020    Procedure: LUMPECTOMY, BREAST, WITH SENTINEL LYMPH NODE BIOPSY, Right Lumpectomy With Right Cunningham Lymph Node Biopsy And Luisa Cath Removal;  Surgeon: Venancio Frausto MD;  Location: [facility] OR     OSTEOTOMY FOOT  8/19/2013    Procedure: OSTEOTOMY FOOT;  Left 2nd metatarsal osteotomy, 2nd Metatarsophalangeal joint & 2nd toe correction;  Surgeon: Jose Phillips DPM;  Location: [facility]     PHACOEMULSIFICATION WITH STANDARD INTRAOCULAR LENS IMPLANT Left 12/7/2017    Procedure: PHACOEMULSIFICATION WITH STANDARD INTRAOCULAR LENS IMPLANT;  Left Cataract Removal with Implant;  Surgeon: Mata Deleon MD;  Location: [facility] OR     SURGICAL HISTORY OF -   1979    Stripping of Veins in Left Leg     SURGICAL HISTORY OF -   1970    Bilateral Tubal Ligation     SURGICAL HISTORY OF -   2000    Carpal Tunnel Repair, right     SURGICAL HISTORY OF -   9-12-08    Rt let ablation     ZZHC COLONOSCOPY THRU STOMA, DIAGNOSTIC  04/05    diverticulosis and hemorroids, due 2015         Physical Exam    BP (!) 170/73 (BP Location: Right arm, Patient Position: Sitting, Cuff Size: Adult Regular)   Pulse 68   Temp 97.2  F (36.2  C) (Axillary)   Resp 18   Wt 90.5 kg (199 lb 9.6 oz)   SpO2 96%   BMI 33.73 kg/m        Physical Exam  Vitals and nursing note reviewed.   Constitutional:       General: She is not in acute distress.     Appearance: Normal appearance. She is not ill-appearing.      Comments: White haired blue eyed woman in no distress   HENT:      Head: Normocephalic.        Right Ear: External ear normal.       Left Ear: External ear normal.      Nose: Nose normal.   Eyes:      Extraocular Movements: Extraocular movements intact.      Pupils: Pupils are equal, round, and reactive to light.   Cardiovascular:      Rate and Rhythm: Normal rate and regular rhythm.      Heart sounds: Normal heart sounds.   Pulmonary:      Effort: Pulmonary effort is normal.      Breath sounds: Normal breath sounds.   Chest:   Breasts:      Right: Skin change present.         Abdominal:      General: Abdomen is flat. Bowel sounds are normal.      Palpations: Abdomen is soft. There is no mass.      Tenderness: There is no abdominal tenderness.   Musculoskeletal:         General: No swelling or deformity. Normal range of motion.      Cervical back: Normal range of motion and neck supple.      Comments: Bilateral TKA prostheses   Skin:     Findings: Lesion present.   Neurological:      General: No focal deficit present.      Mental Status: She is alert and oriented to person, place, and time.      Cranial Nerves: No cranial nerve deficit.   Psychiatric:         Mood and Affect: Mood normal.         Behavior: Behavior normal.         Thought Content: Thought content normal.         Judgment: Judgment normal.         Lab Results    No results found for this or any previous visit (from the past 168 hour(s)).    I have added labs to be drawn following today's visit.    Imaging    MA Screen Bilateral w/Michael    Result Date: 10/21/2021  BILATERAL FULL FIELD DIGITAL SCREENING MAMMOGRAM WITH TOMOSYNTHESIS Performed on: 10/21/21 Compared to: 10/19/2020, 10/30/2019, 10/18/2018, and 10/13/2017 Technique:  This study was evaluated with the assistance of Computer-Aided Detection.  Breast Tomosynthesis was used in interpretation. Findings: The breasts have scattered areas of fibroglandular density.  There are breast conservation changes in the right breast. There is no radiographic evidence of malignancy. IMPRESSION: ACR BI-RADS Category 2: Benign RECOMMENDED  "FOLLOW-UP: Annual routine screening mammogram The results and recommendations of this examination will be communicated to the patient.         Signed by: Ruth Dow MD      Oncology Rooming Note    November 15, 2021 9:40 AM   Precious Paris is a 81 year old female who presents for:    Chief Complaint   Patient presents with     Oncology Clinic Visit     Breast cancer follow up     Initial Vitals: BP (!) 170/73 (BP Location: Right arm, Patient Position: Sitting, Cuff Size: Adult Regular)   Pulse 68   Temp 97.2  F (36.2  C) (Axillary)   Resp 18   Wt 90.5 kg (199 lb 9.6 oz)   SpO2 96%   BMI 33.73 kg/m   Estimated body mass index is 33.73 kg/m  as calculated from the following:    Height as of 9/27/21: 1.638 m (5' 4.5\").    Weight as of this encounter: 90.5 kg (199 lb 9.6 oz). Body surface area is 2.03 meters squared.  No Pain (0) Comment: Data Unavailable   No LMP recorded. Patient is postmenopausal.  Allergies reviewed: Yes  Medications reviewed: Yes    Medications: Medication refills not needed today.  Pharmacy name entered into [facility]: [facility] [id number] [facility]    Clinical concerns: 3 mo follow up breast cancer, finishing up Lymphedema treatment- getting pump. Reports some insomnia lately.       Patrica Perla RN                  Again, thank you for allowing me to participate in the care of your patient.        Sincerely,        Ruth Dow MD    "

## 2021-11-15 NOTE — PROGRESS NOTES
"Oncology Rooming Note    November 15, 2021 9:40 AM   Precious Paris is a 81 year old female who presents for:    Chief Complaint   Patient presents with     Oncology Clinic Visit     Breast cancer follow up     Initial Vitals: BP (!) 170/73 (BP Location: Right arm, Patient Position: Sitting, Cuff Size: Adult Regular)   Pulse 68   Temp 97.2  F (36.2  C) (Axillary)   Resp 18   Wt 90.5 kg (199 lb 9.6 oz)   SpO2 96%   BMI 33.73 kg/m   Estimated body mass index is 33.73 kg/m  as calculated from the following:    Height as of 9/27/21: 1.638 m (5' 4.5\").    Weight as of this encounter: 90.5 kg (199 lb 9.6 oz). Body surface area is 2.03 meters squared.  No Pain (0) Comment: Data Unavailable   No LMP recorded. Patient is postmenopausal.  Allergies reviewed: Yes  Medications reviewed: Yes    Medications: Medication refills not needed today.  Pharm[facility] name [facility] into [facility] [id number] [facility]    Clinical concerns: 3 mo follow up breast cancer, finishing up Lymphedema treatment- getting pump. Reports some insomnia lately.       Patrica Perla RN              "

## 2021-11-15 NOTE — PROGRESS NOTES
[facility] Hematology and Oncology Progress Note    Patient: Precious Paris  MRN: [id number]  Date of Service: Nov 15, 2021         Reason for Visit    Chief Complaint   Patient presents with     Oncology Clinic Visit     Breast cancer follow up       Assessment and Plan    Cancer Staging  No matching staging information was found for the patient.    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 Valdivia tomorrow, next visit with us in June 2022.    Interval History  Ms.Peggy KEISHA Paris is a 81 year old currently 2 years post treatment for early stage triple negative breast cancer.    She continues to experience swelling and discomfort in the right breast, related to lymphedema.  It's been helped by physical therapy measures.    Mammogram last month was benign.    She and her  leave for [place] on 1 December 2021 and won't be back in [place] until May 2022 so she prefers next visit here in June 2022.    She has known high cholesterol and requests lipid panel to be checked.      Encounter Diagnoses:    Problem List Items Addressed This Visit        Other    Infiltrating ductal carcinoma of right breast (H) - Primary             CC: Ester Jorge MD   ______________________________________________________________________________    History of Present Illness    Per Lazara Ashton NP on 26 July 2021  Oncology History/Treatment  Diagnosis/Stage:   11/2019: Right breast cancer, triple negative ([id number])     BRCA negative     Treatment:  2019: Neoadjuvant taxol     Lumpectomy     5/2020: Adjuvant RT     6 - 7/2020: Adjuvant Xeloda. Stopped for rash      Review of systems.  No fever or night sweats.  No  loss of weight.  No lumps or bumps anywhere.  No unusual headaches or eyesight issues.  No dizziness.  No bleeding from the nose.  No sores in the mouth. No problems with swallowing.  No chest pain. No shortness of breath. No cough.  Chronic issues with lymphedema in the right breast and right upper arm, has seen lymphedema team and will be getting a home pump to minimize problems.    No abdominal pain. No nausea or vomiting.  No diarrhea or constipation.  No blood in stool or black colored stools.  No problems passing urine.  No numbness or tingling in hands or feet.  Recent excision of squamous cell cancer on right forehead    A 14 point review of systems is otherwise negative.        Past History    Past Medical History:   Diagnosis Date     Allergic rhinitis due to other allergen      Asymptomatic postmenopausal status (age-related) (natural)      Basal cell carcinoma      Breast cancer (H)      Cervical spondylosis without myelopathy     cervical DJD     Disturbances of sensation of smell and taste     anosmia     Diverticulosis of colon (without mention of hemorrhage)      Malignant neoplasm of right breast (H) 3/13/2020    Added automatically from request for surgery [id number]     Other and unspecified hyperlipidemia      Pain in joint, lower leg      Plantar fascial fibromatosis      PURE HYPERCHOLESTEROLEM 9/9/2007     PURE HYPERCHOLESTEROLEM 9/9/2007     Squamous cell carcinoma        Past Surgical History:   Procedure Laterality Date     BIOPSY       BIOPSY BREAST       INSERT PORT VASCULAR ACCESS N/A 11/29/2019    Procedure: INSERTION OF VENOUS ACCESS PORT;  Surgeon: Hair Broderick DO;  Location: [facility]     JOINT REPLACEMTN, KNEE RT/LT  2010    right     JOINT REPLACEMTN, KNEE RT/LT  2011    left     LIGATE VEIN VARICOSE, PHLEBECTOMY MULTIPLE STAB, COMBINED  10/17/2013    Procedure: COMBINED LIGATE VEIN VARICOSE, PHLEBECTOMY MULTIPLE STAB;  Phlebectomy of Right Knee Varicose Veins;  Surgeon:  Andrea Duffy MD;  Location: [facility]     LUMPECTOMY BREAST       LUMPECTOMY BREAST WITH SENTINEL NODE, COMBINED Right 3/19/2020    Procedure: LUMPECTOMY, BREAST, WITH SENTINEL LYMPH NODE BIOPSY, Right Lumpectomy With Right Chauncey Lymph Node Biopsy And Luisa Cath Removal;  Surgeon: Venancio Frausto MD;  Location: [facility]     OSTEOTOMY FOOT  8/19/2013    Procedure: OSTEOTOMY FOOT;  Left 2nd metatarsal osteotomy, 2nd Metatarsophalangeal joint & 2nd toe correction;  Surgeon: Jose Phillips DPM;  Location: [facility]     PHACOEMULSIFICATION WITH STANDARD INTRAOCULAR LENS IMPLANT Left 12/7/2017    Procedure: PHACOEMULSIFICATION WITH STANDARD INTRAOCULAR LENS IMPLANT;  Left Cataract Removal with Implant;  Surgeon: Mata Deleon MD;  Location: [facility] OR     SURGICAL HISTORY OF -   1979    Stripping of Veins in Left Leg     SURGICAL HISTORY OF -   1970    Bilateral Tubal Ligation     SURGICAL HISTORY OF -   2000    Carpal Tunnel Repair, right     SURGICAL HISTORY OF -   9-12-08    Rt let ablation     ZZHC COLONOSCOPY THRU STOMA, DIAGNOSTIC  04/05    diverticulosis and hemorroids, due 2015         Physical Exam    BP (!) 170/73 (BP Location: Right arm, Patient Position: Sitting, Cuff Size: Adult Regular)   Pulse 68   Temp 97.2  F (36.2  C) (Axillary)   Resp 18   Wt 90.5 kg (199 lb 9.6 oz)   SpO2 96%   BMI 33.73 kg/m        Physical Exam  Vitals and nursing note reviewed.   Constitutional:       General: She is not in acute distress.     Appearance: Normal appearance. She is not ill-appearing.      Comments: White haired blue eyed woman in no distress   HENT:      Head: Normocephalic.        Right Ear: External ear normal.      Left Ear: External ear normal.      Nose: Nose normal.   Eyes:      Extraocular Movements: Extraocular movements intact.      Pupils: Pupils are equal, round, and reactive to light.   Cardiovascular:      Rate and Rhythm: Normal rate and regular rhythm.      Heart sounds: Normal heart  sounds.   Pulmonary:      Effort: Pulmonary effort is normal.      Breath sounds: Normal breath sounds.   Chest:   Breasts:      Right: Skin change present.         Abdominal:      General: Abdomen is flat. Bowel sounds are normal.      Palpations: Abdomen is soft. There is no mass.      Tenderness: There is no abdominal tenderness.   Musculoskeletal:         General: No swelling or deformity. Normal range of motion.      Cervical back: Normal range of motion and neck supple.      Comments: Bilateral TKA prostheses   Skin:     Findings: Lesion present.   Neurological:      General: No focal deficit present.      Mental Status: She is alert and oriented to person, place, and time.      Cranial Nerves: No cranial nerve deficit.   Psychiatric:         Mood and Affect: Mood normal.         Behavior: Behavior normal.         Thought Content: Thought content normal.         Judgment: Judgment normal.         Lab Results    No results found for this or any previous visit (from the past 168 hour(s)).    I have added labs to be drawn following today's visit.    Imaging    MA Screen Bilateral w/Michael    Result Date: 10/21/2021  BILATERAL FULL FIELD DIGITAL SCREENING MAMMOGRAM WITH TOMOSYNTHESIS Performed on: 10/21/21 Compared to: 10/19/2020, 10/30/2019, 10/18/2018, and 10/13/2017 Technique:  This study was evaluated with the assistance of Computer-Aided Detection.  Breast Tomosynthesis was used in interpretation. Findings: The breasts have scattered areas of fibroglandular density.  There are breast conservation changes in the right breast. There is no radiographic evidence of malignancy. IMPRESSION: ACR BI-RADS Category 2: Benign RECOMMENDED FOLLOW-UP: Annual routine screening mammogram The results and recommendations of this examination will be communicated to the patient.         Signed by: Ruth Dow MD

## 2021-11-16 ENCOUNTER — OFFICE VISIT (OUTPATIENT)
Dept: RADIATION THERAPY | Facility: OUTPATIENT CENTER | Age: 81
End: 2021-11-16
Payer: COMMERCIAL

## 2021-11-16 VITALS
BODY MASS INDEX: 33.19 KG/M2 | WEIGHT: 199.2 LBS | OXYGEN SATURATION: 96 % | HEIGHT: 65 IN | DIASTOLIC BLOOD PRESSURE: 73 MMHG | SYSTOLIC BLOOD PRESSURE: 177 MMHG | RESPIRATION RATE: 12 BRPM | HEART RATE: 76 BPM

## 2021-11-16 DIAGNOSIS — C50.411 MALIGNANT NEOPLASM OF UPPER-OUTER QUADRANT OF RIGHT BREAST IN FEMALE, ESTROGEN RECEPTOR NEGATIVE (H): Primary | ICD-10-CM

## 2021-11-16 DIAGNOSIS — Z17.1 MALIGNANT NEOPLASM OF UPPER-OUTER QUADRANT OF RIGHT BREAST IN FEMALE, ESTROGEN RECEPTOR NEGATIVE (H): Primary | ICD-10-CM

## 2021-11-16 ASSESSMENT — MIFFLIN-ST. JEOR: SCORE: 1374.45

## 2021-11-16 NOTE — PROGRESS NOTES
Department of Radiation Oncology  Radiation Therapy Center  [facility] [place] [address]  [phone]       Radiation Oncology Follow-up Visit  2021      Precious Paris  MRN: [id number]   : 1940     ID: 81 year old F with right multifocal IDC triple negative s/p neoadjuvant chemotherapy followed by lumpectomy and SLN biopsy with short course of Xeloda, didn't tolerate. Stopped after one month.    RT to breast with boost.     DIAGNOSIS: invasive ductal carcinoma of the right breast status post neoadjuvant chemotherapy with Taxol x12C and lumpectomy with sentinel lymph node evaluation  PATHOLOGY:  Final pathology demonstrated IDC, grade 3, triple negative, multifocal, positive LVSI, negative margin, 0 out of 1 sentinel lymph node                           STAGE: [id number]   INTENT OF RADIOTHERAPY: adjuvant RT.   CONCURRENT SYSTEMIC THERAPY: No (only took one month of Xeloda)     ONCOLOGIC HISTORY:    The patient initially presented with a right breast mass prompting further evaluation.  She underwent evaluation with ultrasound and mammography in 2019 which demonstrated 2 hypoechoic masses in the area of concern.  On 10/30/2019 the patient underwent biopsy of a suspicious mass in the right breast, pathology demonstrated IDC.  On 11/15/2019 the patient underwent biopsy of the right breast mass at the 9 o'clock position, 9 cm from the nipple.  Final pathology demonstrated IDC, grade 3, ER negative, LA negative, HER-2 negative.  The patient was started on neoadjuvant systemic treatment with weekly Taxol, x12 cycles.  Due to advanced age Adriamycin was not recommended.  Patient subsequently underwent lumpectomy of the right breast on 3/19/2020 by Dr. Frausto.  Final pathology demonstrated IDC, grade 3, triple negative, multifocal (tumor #1-1.9 cm; tumor #2-1.2 cm, positive LVSI, negative margin, 0 out of 1 sentinel lymph node, yp T1cN0.  Patient was seen by Dr. Villa  "of medical oncology who discussed consideration of adjuvant Xeloda after completion of radiation therapy. Stopped after one month due to forgetfulness and a rash. She subsequently completed adjuvant RT.   Mammogram 10/21/21 benign.      SITE OF TREATMENT: R breast     DATES  OF TREATMENT: 5/05/20-6/02/20     TOTAL DOSE OF TREATMENT: 5005 cGy     DOSE PER FRACTION OF TREATMENT: 267 cGy x 15 fractions to R breast + 200 cGy x 5 fractions R breast boost    Subjective:Pt is doing well. Had a right forehead SCC removed earlier this week. Seen by medical oncology yesterday. She is going to [place] in a few weeks with her  for the winter.   No complaints. Took BP med this morning. Seen by her PCP last month.     ROS: 14 point ROS is negative other than what is stated in the HPI      PHYSICAL EXAM:  BP (!) 177/73 (BP Location: Right leg, Patient Position: Sitting, Cuff Size: Adult Large)   Pulse 76   Resp 12   Ht 1.651 m (5' 5\")   Wt 90.4 kg (199 lb 3.2 oz)   SpO2 96%   BMI 33.15 kg/m  Gen: Alert, in NAD  Eyes: PERRL, EOMI, sclera anicteric  HENT     Head: NC/AT. Has a band aid over right temporal region from recent surgical biopsy  Nodes: no supraclavicular infraclavicular cervical or axillary lymphadenopathy  Breasts: right breast surgical scar right UOQ, right axillary scar. Both are well healed. Right breast is smaller and firmer than left secondary to surgical and radiation changes. Bilaterally there are no masses, nodules, or other skin changes, no nipple discharge.   Neck: Supple, full ROM, no LAD  Pulm: No wheezing, stridor or respiratory distress  CV: Well-perfused, no cyanosis, no pedal edema  Abdominal: Soft, nontender, nondistended, no hepatomegaly  Back: No step-offs or pain to palpation along the thoracolumbar spine, no CVA tenderness  Musculoskeletal: Normal bulk and tone   Skin: Normal color and turgor  Neurologic: A/Ox3, CN II-XII intact  Psychiatric: Appropriate mood and affect    LABS AND " IMAGING:  10/21/2021 mammogram bilaterally benign.     IMPRESSION/Plan  Ms. Paris is a 81 year old female with a ypT1c N0 IDC multifocal triple negative s/p neoadjuvant chemotherapy followed by lumpectomy and SLN biopsy. She took Xeloda for a month stopped secondary to forgetfulness and rash which both resolved when she stopped.   Today she is doing well. Her exam is normal. Her BP is high today and was also elevated yesterday when seen in Med Onc. She is complaint with Cozaar  She feels fine, she will let her PCP know her BP readings.   RTO one year with me in person.     Jaz OKEEFE     A total of 20 minutes spent with the patient.

## 2021-11-16 NOTE — TELEPHONE ENCOUNTER
Verified with [facility]. They do have a prescription for patient for 1 year for simvastatin. Patient notified.  Benita Araujo RN

## 2021-11-16 NOTE — LETTER
2021         RE: Precious Paris  [address] [place] [address]        Dear Colleague,    Thank you for referring your patient, Precious Paris, to the [facility]. Please see a copy of my visit note below.       Department of Radiation Oncology  [facility]  [facility] [place] [address]  [phone]       Radiation Oncology Follow-up Visit  2021      Precious Paris  MRN: [id number]   : 1940     ID: 81 year old F with right multifocal IDC triple negative s/p neoadjuvant chemotherapy followed by lumpectomy and SLN biopsy with short course of Xeloda, didn't tolerate. Stopped after one month.    RT to breast with boost.     DIAGNOSIS: invasive ductal carcinoma of the right breast status post neoadjuvant chemotherapy with Taxol x12C and lumpectomy with sentinel lymph node evaluation  PATHOLOGY:  Final pathology demonstrated IDC, grade 3, triple negative, multifocal, positive LVSI, negative margin, 0 out of 1 sentinel lymph node                           STAGE: [id number]   INTENT OF RADIOTHERAPY: adjuvant RT.   CONCURRENT SYSTEMIC THERAPY: No (only took one month of Xeloda)     ONCOLOGIC HISTORY:    The patient initially presented with a right breast mass prompting further evaluation.  She underwent evaluation with ultrasound and mammography in 2019 which demonstrated 2 hypoechoic masses in the area of concern.  On 10/30/2019 the patient underwent biopsy of a suspicious mass in the right breast, pathology demonstrated IDC.  On 11/15/2019 the patient underwent biopsy of the right breast mass at the 9 o'clock position, 9 cm from the nipple.  Final pathology demonstrated IDC, grade 3, ER negative, MI negative, HER-2 negative.  The patient was started on neoadjuvant systemic treatment with weekly Taxol, x12 cycles.  Due to advanced age Adriamycin was not recommended.  Patient subsequently underwent lumpectomy of the  "right breast on 3/19/2020 by Dr. Frausto.  Final pathology demonstrated IDC, grade 3, triple negative, multifocal (tumor #1-1.9 cm; tumor #2-1.2 cm, positive LVSI, negative margin, 0 out of 1 sentinel lymph node, yp T1cN0.  Patient was seen by Dr. Villa of medical oncology who discussed consideration of adjuvant Xeloda after completion of radiation therapy. Stopped after one month due to forgetfulness and a rash. She subsequently completed adjuvant RT.   Mammogram 10/21/21 benign.      SITE OF TREATMENT: R breast     DATES  OF TREATMENT: 5/05/20-6/02/20     TOTAL DOSE OF TREATMENT: 5005 cGy     DOSE PER FRACTION OF TREATMENT: 267 cGy x 15 fractions to R breast + 200 cGy x 5 fractions R breast boost    Subjective:Pt is doing well. Had a right forehead SCC removed earlier this week. Seen by medical oncology yesterday. She is going to [place] in a few weeks with her  for the winter.   No complaints. Took BP med this morning. Seen by her PCP last month.     ROS: 14 point ROS is negative other than what is stated in the HPI      PHYSICAL EXAM:  BP (!) 177/73 (BP Location: Right leg, Patient Position: Sitting, Cuff Size: Adult Large)   Pulse 76   Resp 12   Ht 1.651 m (5' 5\")   Wt 90.4 kg (199 lb 3.2 oz)   SpO2 96%   BMI 33.15 kg/m  Gen: Alert, in NAD  Eyes: PERRL, EOMI, sclera anicteric  HENT     Head: NC/AT. Has a band aid over right temporal region from recent surgical biopsy  Nodes: no supraclavicular infraclavicular cervical or axillary lymphadenopathy  Breasts: right breast surgical scar right UOQ, right axillary scar. Both are well healed. Right breast is smaller and firmer than left secondary to surgical and radiation changes. Bilaterally there are no masses, nodules, or other skin changes, no nipple discharge.   Neck: Supple, full ROM, no LAD  Pulm: No wheezing, stridor or respiratory distress  CV: Well-perfused, no cyanosis, no pedal edema  Abdominal: Soft, nontender, nondistended, no hepatomegaly  Back: " No step-offs or pain to palpation along the thoracolumbar spine, no CVA tenderness  Musculoskeletal: Normal bulk and tone   Skin: Normal color and turgor  Neurologic: A/Ox3, CN II-XII intact  Psychiatric: Appropriate mood and affect    LABS AND IMAGING:  10/21/2021 mammogram bilaterally benign.     IMPRESSION/Plan  Ms. Paris is a 81 year old female with a ypT1c N0 IDC multifocal triple negative s/p neoadjuvant chemotherapy followed by lumpectomy and SLN biopsy. She took Xeloda for a month stopped secondary to forgetfulness and rash which both resolved when she stopped.   Today she is doing well. Her exam is normal. Her BP is high today and was also elevated yesterday when seen in Med Onc. She is complaint with Cozaar  She feels fine, she will let her PCP know her BP readings.   RTO one year with me in person.     Jaz OKEEFE     A total of 20 minutes spent with the patient.

## 2021-11-17 ENCOUNTER — ALLIED HEALTH/NURSE VISIT (OUTPATIENT)
Dept: FAMILY MEDICINE | Facility: CLINIC | Age: 81
End: 2021-11-17
Payer: COMMERCIAL

## 2021-11-17 VITALS — HEART RATE: 64 BPM | DIASTOLIC BLOOD PRESSURE: 72 MMHG | SYSTOLIC BLOOD PRESSURE: 128 MMHG

## 2021-11-17 DIAGNOSIS — I10 BENIGN ESSENTIAL HYPERTENSION: Primary | ICD-10-CM

## 2021-11-17 PROCEDURE — 99207 PR NO CHARGE NURSE ONLY: CPT

## 2021-11-17 NOTE — PROGRESS NOTES
Precious Paris is a 81 year old year old patient who comes in today for a Blood Pressure check because of ongoing blood pressure monitoring.  Patient is taking medication as prescribed  Patient is tolerating medications well.  Patient is monitoring Blood Pressure at home.  Current complaints: none  Disposition:  patient to continue with the same medication    BP Readings from Last 3 Encounters:   11/17/21 128/72   11/16/21 (!) 177/73   11/15/21 (!) 170/73     Bailee Barrera RN

## 2021-11-19 NOTE — PROGRESS NOTES
[facility]    Outpatient Physical Therapy Progress Note  Patient: Precious Paris  : 1940    Beginning/End Dates of Reporting Period:  10/7/21 to 2021     Referring Provider: Dr. Lazara Ashton     Therapy Diagnosis: R UE and UQ secondary lymphedema     Client Self Report: 11/10/21-mi is coming out to my house and bringing the new pump with her     Objective Measurements:  Objective Measure: breast girth  Details: 36.6 cm, 11/10/21       Outcome Measures (most recent score):   LLIS 2 at eval  Goals:  Goal Identifier stg 1   Goal Description pt to have around the clock tolerance to RUE Spandigrip/GCB for edema reduction response   Target Date 21   Date Met  21   Progress (detail required for progress note): pt not currently wanting UE compression- goal discontinued     Goal Identifier ltg 1   Goal Description once appropriate, pt to be independent with donning, doffing and care of compression stocking/garment for longterm edema management for maintenance   Target Date 21   Date Met      Progress (detail required for progress note): waiting for compression bra that she ordered     Goal Identifier ltg 2   Goal Description pt to be independent with longterm RUE lymphedema management via HEP, elevation, compression garment wear and MLD (when/if appropriate)   Target Date 21   Date Met      Progress (detail required for progress note): doing self MLD and stretches     Goal Identifier stg 2   Goal Description Pt will reports tolerance to wearing a bra with Komprex 2 to maintain and reduce lymphedema for home managment.   Target Date 21   Date Met  21   Progress (detail required for progress note): wearing but needs tighter tank top or bra to give appropriate compression     Plan:  Continue therapy per current plan of care. Next visit will assess girth and how the  pump is working, if she is making progress and able to cont HEP will discharge    Discharge:  No

## 2021-11-29 ENCOUNTER — HOSPITAL ENCOUNTER (OUTPATIENT)
Dept: PHYSICAL THERAPY | Facility: CLINIC | Age: 81
Setting detail: THERAPIES SERIES
End: 2021-11-29
Attending: NURSE PRACTITIONER
Payer: COMMERCIAL

## 2021-11-29 PROCEDURE — 97140 MANUAL THERAPY 1/> REGIONS: CPT | Mod: GP | Performed by: PHYSICAL THERAPIST

## 2021-11-29 NOTE — PROGRESS NOTES
[facility] Rehabilitation Service    Outpatient Physical Therapy Discharge Note  Patient: Precious Paris  : 1940    Beginning/End Dates of Reporting Period:  21 to 21    Referring Provider: Lazara Ashton NP    Therapy Diagnosis: R UE and UQ secondary lymphedema     Client Self Report: I did wake up with a soreness in the breast last night, which was a first, but I think that's because I didn't put my compression bra back on after pumping yesterday    Objective Measurements:  Objective Measure: breast girth  Details: +3.1cm since last seen on 11/10        Outcome Measures (most recent score):  Lymphedema Life Impact Scale (score range 0-72). A higher score indicates greater impairment.: 2    Goals:  Goal Identifier stg 1   Goal Description pt to have around the clock tolerance to RUE Spandigrip/GCB for edema reduction response   Target Date 21   Date Met  21   Progress (detail required for progress note): pt not currently wanting UE compression- goal discontinued     Goal Identifier ltg 1   Goal Description once appropriate, pt to be independent with donning, doffing and care of compression stocking/garment for longterm edema management for maintenance   Target Date 21   Date Met  21   Progress (detail required for progress note): waiting for compression bra that she ordered     Goal Identifier ltg 2   Goal Description pt to be independent with longterm RUE lymphedema management via HEP, elevation, compression garment wear and MLD (when/if appropriate)   Target Date 21   Date Met  21   Progress (detail required for progress note): doing self MLD and stretches     Goal Identifier stg 2   Goal Description Pt will reports tolerance to wearing a bra with Komprex 2 to maintain and reduce lymphedema for home managment.   Target Date 21   Date Met  21   Progress (detail  required for progress note):           Plan:  Discharge from therapy.  Patient has met all OP lymphedema therapy goals.    Discharge:    Reason for Discharge: Patient has met all goals.    Equipment Issued: Serratous anterior pad sz medium pad from [employer], Flexitouch pump, compression bra pt purchased on own online    Discharge Plan: Patient to continue home program.

## 2022-04-18 ENCOUNTER — ONCOLOGY VISIT (OUTPATIENT)
Dept: ONCOLOGY | Facility: CLINIC | Age: 82
End: 2022-04-18
Attending: INTERNAL MEDICINE
Payer: COMMERCIAL

## 2022-04-18 VITALS
OXYGEN SATURATION: 97 % | TEMPERATURE: 97.3 F | WEIGHT: 201.6 LBS | BODY MASS INDEX: 33.59 KG/M2 | RESPIRATION RATE: 18 BRPM | HEART RATE: 70 BPM | DIASTOLIC BLOOD PRESSURE: 66 MMHG | HEIGHT: 65 IN | SYSTOLIC BLOOD PRESSURE: 154 MMHG

## 2022-04-18 DIAGNOSIS — Z17.1 MALIGNANT NEOPLASM OF UPPER-OUTER QUADRANT OF RIGHT BREAST IN FEMALE, ESTROGEN RECEPTOR NEGATIVE (H): Primary | ICD-10-CM

## 2022-04-18 DIAGNOSIS — E89.89 POST-LYMPHADENECTOMY LYMPHEDEMA OF ARM: ICD-10-CM

## 2022-04-18 DIAGNOSIS — I89.0 POST-LYMPHADENECTOMY LYMPHEDEMA OF ARM: ICD-10-CM

## 2022-04-18 DIAGNOSIS — C50.411 MALIGNANT NEOPLASM OF UPPER-OUTER QUADRANT OF RIGHT BREAST IN FEMALE, ESTROGEN RECEPTOR NEGATIVE (H): Primary | ICD-10-CM

## 2022-04-18 PROCEDURE — 99214 OFFICE O/P EST MOD 30 MIN: CPT | Performed by: INTERNAL MEDICINE

## 2022-04-18 PROCEDURE — G0463 HOSPITAL OUTPT CLINIC VISIT: HCPCS

## 2022-04-18 ASSESSMENT — PAIN SCALES - GENERAL: PAINLEVEL: NO PAIN (0)

## 2022-04-18 NOTE — PROGRESS NOTES
[facility] Hematology and Oncology Progress Note    Patient: Precious Paris  MRN: [id number]        Reason for Visit    1. Right triple negative breast cancer    Problem List Items Addressed This Visit        Other    Malignant neoplasm of upper-outer quadrant of right breast in female, 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) - Primary      Other Visit Diagnoses     Post-lymphadenectomy lymphedema of arm              Assessment/Plan  1.  Triple negative right breast cancer  Clinically, doing well. No findings to suggest cancer recurrence. Continues to have significant edema in the right breast area.  Mostly it is dependent edema in the lower part of the breast along with some mild redness of the skin.  No signs of inflammation or infection.  Continues to use compression sleeves.  She actually has a machine that does massage the area.  Clinical exam was unremarkable for any lumps or new masses.  No lymphadenopathy.  Plan is to continue observation with periodic exam and yearly mammograms.  She is due for one in October.    2.   Right axilla/breast lymphedema  Continue current cares.    3.    Dizziness  I wonder if her dizziness is due to antiparkinson medication use.  I asked her to check her blood pressures at home.  Also recommended adequate hydration.  It complained that her urine smelled strong.    She takes ibuprofen for arthritic pain.  I recommended switching to Tylenol.    Patient Instructions   RTC in 6 months with mammogram prior.       Interval History    Ms. Precious Paris  is a 80 year old treated for right triple negative breast cancer status post neoadjuvant Taxol followed by surgery, who is seen in oncology clinic for follow-up.    Doing really well since last visit.  Continues to have significant edema in the right breast and right arm.  She uses compression devices to help the edema.  Denies any fevers or chills.  There is some redness of the skin but it has not  progressed.  No ulcers or wound.  No new lumps reported.  Denies any weight loss.  No bone pain.  She does complain of dizziness and lightheadedness once in a while.  Does not seem to be orthostatic in nature.  Is on lisinopril and hydrochlorothiazide for hypertension.      ECOG PS: 1      Oncology History/Treatment  Diagnosis/Stage:   11/2019: Right breast cancer, triple negative ([id number]-E9)    BRCA negative    Treatment:  2019: Neoadjuvant taxol    Lumpectomy    5/2020: Adjuvant RT    6 - 7/2020: Adjuvant Xeloda. Stopped for rash      Physical Exam    GENERAL: Alert and oriented to time place and person. Seated comfortably. In no distress.  HEAD: Atraumatic and normocephalic. No alopecia.  EYES: DELISA, EOMI. No erythema. No icterus.  LYMPH NODES: No palpable supraclavicular, cervical, axillary lymphadenopathy.  BREASTS: Right breast firm with dependent edema and slightly warm to touch, no discrete masses/lumps.  Right nipple is retracted a little bit which is probably due to scarring from lumpectomy.  Mild redness but no signs of infection. Left breast without abnormality.  CHEST: clear to auscultation bilaterally. Resonant to percussion throughout bilaterally. Symmetrical breath movements bilaterally.  CVS: S1 and S2 are heard. Regular rate and rhythm. No murmur or gallop or rub heard.  ABDOMEN: Soft. Not tender. Not distended.   EXTREMITIES: Warm. No peripheral edema.  SKIN: no rash, or bruising or purpura.   NEURO: No gross deficit noted. Non-antalgic gait.      Lab Results    None    Imaging    None    Billing  Total time 30 minutes, to include face to face visit, review of EMR, ordering, documentation and coordination of care    Signed by: Maricarmen Beebe MD on 4/18/2022 at 1:41 PM

## 2022-04-18 NOTE — PROGRESS NOTES
"Oncology Rooming Note    April 18, 2022 11:09 AM   Precious Paris is a 81 year old female who presents for:    Chief Complaint   Patient presents with     Oncology Clinic Visit     Infiltrating ductal carcinoma of right breast.      Initial Vitals: /50 (BP Location: Left arm, Patient Position: Sitting, Cuff Size: Adult Large)   Pulse 70   Temp 97.3  F (36.3  C) (Tympanic)   Resp 18   Ht 1.638 m (5' 4.5\")   Wt 91.4 kg (201 lb 9.6 oz)   SpO2 97%   Breastfeeding No   BMI 34.07 kg/m   Estimated body mass index is 34.07 kg/m  as calculated from the following:    Height as of this encounter: 1.638 m (5' 4.5\").    Weight as of this encounter: 91.4 kg (201 lb 9.6 oz). Body surface area is 2.04 meters squared.  No Pain (0) Comment: Data Unavailable   No LMP recorded. Patient is postmenopausal.  Allergies reviewed: Yes  Medications reviewed: Yes    Medications: Medication refills not needed today.  Pharmacy [facility] entered into [facility]: [facility] [id number] [facility]    Clinical concerns: Infiltrating ductal carcinoma of right breast. At times will notice slight dead ache in her right breast lasting few seconds. Over the last few weeks has felt very fatigued. She reports feeling off balance in her bathroom associated with dizziness.       Jaimee Kelley, AXEL            "

## 2022-04-18 NOTE — LETTER
"    4/18/2022         RE: Precious Paris  [address] [place] [address]        Dear Colleague,    Thank you for referring your patient, Precious Paris, to the [facility]. Please see a copy of my visit note below.    Oncology Rooming Note    April 18, 2022 11:09 AM   Precious Paris is a 81 year old female who presents for:    Chief Complaint   Patient presents with     Oncology Clinic Visit     Infiltrating ductal carcinoma of right breast.      Initial Vitals: /50 (BP Location: Left arm, Patient Position: Sitting, Cuff Size: Adult Large)   Pulse 70   Temp 97.3  F (36.3  C) (Tympanic)   Resp 18   Ht 1.638 m (5' 4.5\")   Wt 91.4 kg (201 lb 9.6 oz)   SpO2 97%   Breastfeeding No   BMI 34.07 kg/m   Estimated body mass index is 34.07 kg/m  as calculated from the following:    Height as of this encounter: 1.638 m (5' 4.5\").    Weight as of this encounter: 91.4 kg (201 lb 9.6 oz). Body surface area is 2.04 meters squared.  No Pain (0) Comment: Data Unavailable   No LMP recorded. Patient is postmenopausal.  Allergies reviewed: Yes  Medications reviewed: Yes    Medications: Medication refills not needed today.  Pharmacy name entered into [employer]: [facility] [id number] [facility]    Clinical concerns: Infiltrating ductal carcinoma of right breast. At times will notice slight dead ache in her right breast lasting few seconds. Over the last few weeks has felt very fatigued. She reports feeling off balance in her bathroom associated with dizziness.       Jaimee Kelley, AXEL                               [facility] Hematology and Oncology Progress Note    Patient: Precious Paris  MRN: [id number]        Reason for Visit    1. Right triple negative breast cancer    Problem List Items Addressed This Visit        Other    Malignant neoplasm of upper-outer quadrant of right breast in female, estrogen receptor " negative (H) - Primary      Other Visit Diagnoses     Post-lymphadenectomy lymphedema of arm              Assessment/Plan  1.  Triple negative right breast cancer  Clinically, doing well. No findings to suggest cancer recurrence. Continues to have significant edema in the right breast area.  Mostly it is dependent edema in the lower part of the breast along with some mild redness of the skin.  No signs of inflammation or infection.  Continues to use compression sleeves.  She actually has a machine that does massage the area.  Clinical exam was unremarkable for any lumps or new masses.  No lymphadenopathy.  Plan is to continue observation with periodic exam and yearly mammograms.  She is due for one in October.    2.   Right axilla/breast lymphedema  Continue current cares.    3.    Dizziness  I wonder if her dizziness is due to antiparkinson medication use.  I asked her to check her blood pressures at home.  Also recommended adequate hydration.  It complained that her urine smelled strong.    She takes ibuprofen for arthritic pain.  I recommended switching to Tylenol.    Patient Instructions   RTC in 6 months with mammogram prior.       Interval History    Ms. Precious Paris  is a 80 year old treated for right triple negative breast cancer status post neoadjuvant Taxol followed by surgery, who is seen in oncology clinic for follow-up.    Doing really well since last visit.  Continues to have significant edema in the right breast and right arm.  She uses compression devices to help the edema.  Denies any fevers or chills.  There is some redness of the skin but it has not progressed.  No ulcers or wound.  No new lumps reported.  Denies any weight loss.  No bone pain.  She does complain of dizziness and lightheadedness once in a while.  Does not seem to be orthostatic in nature.  Is on lisinopril and hydrochlorothiazide for hypertension.      ECOG PS: 1      Oncology History/Treatment  Diagnosis/Stage:   11/2019:  Right breast cancer, triple negative (cO7h-S3)    BRCA negative    Treatment:  2019: Neoadjuvant taxol    Lumpectomy    5/2020: Adjuvant RT    6 - 7/2020: Adjuvant Xeloda. Stopped for rash      Physical Exam    GENERAL: Alert and oriented to time place and person. Seated comfortably. In no distress.  HEAD: Atraumatic and normocephalic. No alopecia.  EYES: DELISA, EOMI. No erythema. No icterus.  LYMPH NODES: No palpable supraclavicular, cervical, axillary lymphadenopathy.  BREASTS: Right breast firm with dependent edema and slightly warm to touch, no discrete masses/lumps.  Right nipple is retracted a little bit which is probably due to scarring from lumpectomy.  Mild redness but no signs of infection. Left breast without abnormality.  CHEST: clear to auscultation bilaterally. Resonant to percussion throughout bilaterally. Symmetrical breath movements bilaterally.  CVS: S1 and S2 are heard. Regular rate and rhythm. No murmur or gallop or rub heard.  ABDOMEN: Soft. Not tender. Not distended.   EXTREMITIES: Warm. No peripheral edema.  SKIN: no rash, or bruising or purpura.   NEURO: No gross deficit noted. Non-antalgic gait.      Lab Results    None    Imaging    None    Billing  Total time 30 minutes, to include face to face visit, review of EMR, ordering, documentation and coordination of care    Signed by: Maricarmen Beebe MD on 4/18/2022 at 1:41 PM        Again, thank you for allowing me to participate in the care of your patient.        Sincerely,        Maricarmen Beebe MD

## 2022-04-25 ENCOUNTER — TELEPHONE (OUTPATIENT)
Dept: FAMILY MEDICINE | Facility: CLINIC | Age: 82
End: 2022-04-25
Payer: COMMERCIAL

## 2022-04-25 NOTE — TELEPHONE ENCOUNTER
Reason for call:  Patient reporting a symptom    Symptom or request: Pt and her spouse have had the stomach flu x 4 days - Vomiting, diarrhea and nausea.  Pt wants Rx sent to pharmacy to help them feel better?  Please call patient and advise.      Duration (how long have symptoms been present): 4 days    Have you been treated for this before? Yes    Additional comments:     Phone Number patient can be reached at:  Cell number on file:    Telephone Information:   Mobile [phone]     Best Time:  any    Can we leave a detailed message on this number:  YES    Call taken on 4/25/2022 at 10:59 AM by Colleen Browne

## 2022-04-25 NOTE — TELEPHONE ENCOUNTER
RN called and spoke to Precious. Precious states she is doing a little better (barely) but is really worried about her  Lyndon. Precious told me he fell last night, he is super weak, he has been dizzy hasn't vomitted yet today, but still having the diarrhea. RN advised that he be brought into the ED and he mostly likely needs fluids and to be evaluated from a Provider. Lyndon can be heard in the background yelling that he will NOT be going to the ED. Precious unable to make him go in, unable to get him into a car. RN suggested the ambulance but that turned into a hard NO. RN made lyndon a virtual visit to hear it himself from a Provider with recommendations on what to do about his condition.     Margareth Waggoner RN BSN PHN

## 2022-05-27 ENCOUNTER — OFFICE VISIT (OUTPATIENT)
Dept: DERMATOLOGY | Facility: CLINIC | Age: 82
End: 2022-05-27
Payer: COMMERCIAL

## 2022-05-27 VITALS — SYSTOLIC BLOOD PRESSURE: 114 MMHG | DIASTOLIC BLOOD PRESSURE: 76 MMHG | HEART RATE: 67 BPM | OXYGEN SATURATION: 96 %

## 2022-05-27 DIAGNOSIS — L57.0 ACTINIC KERATOSIS: ICD-10-CM

## 2022-05-27 DIAGNOSIS — Z85.828 HISTORY OF NONMELANOMA SKIN CANCER: ICD-10-CM

## 2022-05-27 DIAGNOSIS — D18.01 CHERRY ANGIOMA: ICD-10-CM

## 2022-05-27 DIAGNOSIS — L81.4 LENTIGO: Primary | ICD-10-CM

## 2022-05-27 DIAGNOSIS — L82.1 SEBORRHEIC KERATOSIS: ICD-10-CM

## 2022-05-27 PROCEDURE — 99213 OFFICE O/P EST LOW 20 MIN: CPT | Mod: 25 | Performed by: PHYSICIAN ASSISTANT

## 2022-05-27 PROCEDURE — 17003 DESTRUCT PREMALG LES 2-14: CPT | Performed by: PHYSICIAN ASSISTANT

## 2022-05-27 PROCEDURE — 17000 DESTRUCT PREMALG LESION: CPT | Performed by: PHYSICIAN ASSISTANT

## 2022-05-27 NOTE — NURSING NOTE
Chief Complaint   Patient presents with     Skin Check     No concerns       Vitals:    05/27/22 1006   BP: 114/76   BP Location: Left arm   Patient Position: Sitting   Cuff Size: Adult Large   Pulse: 67   SpO2: 96%     Wt Readings from Last 1 Encounters:   04/18/22 91.4 kg (201 lb 9.6 oz)       Patrica Powell LPN .................5/27/2022     unknown

## 2022-05-27 NOTE — PROGRESS NOTES
Precious Paris is an extremely pleasant 81 year old year old female patient here today for skin check. She denies any concerning skin lesions. Notes no painful or bleeding areas on her skin. Patient has no other skin complaints today.  Remainder of the HPI, Meds, PMH, Allergies, FH, and SH was reviewed in chart.    Pertinent Hx:   History of NMSC  Past Medical History:   Diagnosis Date     Allergic rhinitis due to other allergen      Asymptomatic postmenopausal status (age-related) (natural)      Basal cell carcinoma      Breast cancer (H)      Cervical spondylosis without myelopathy     cervical DJD     Disturbances of sensation of smell and taste     anosmia     Diverticulosis of colon (without mention of hemorrhage)      Malignant neoplasm of right breast (H) 3/13/2020    Added automatically from request for surgery [id number]     Other and unspecified hyperlipidemia      Pain in joint, lower leg      Plantar fascial fibromatosis      PURE HYPERCHOLESTEROLEM 9/9/2007     PURE HYPERCHOLESTEROLEM 9/9/2007     Squamous cell carcinoma        Past Surgical History:   Procedure Laterality Date     BIOPSY       BIOPSY BREAST       INSERT PORT VASCULAR ACCESS N/A 11/29/2019    Procedure: INSERTION OF VENOUS ACCESS PORT;  Surgeon: Hair Broderick DO;  Location: [facility]     JOINT REPLACEMTN, KNEE RT/LT  2010    right     JOINT REPLACEMTN, KNEE RT/LT  2011    left     LIGATE VEIN VARICOSE, PHLEBECTOMY MULTIPLE STAB, COMBINED  10/17/2013    Procedure: COMBINED LIGATE VEIN VARICOSE, PHLEBECTOMY MULTIPLE STAB;  Phlebectomy of Right Knee Varicose Veins;  Surgeon: Andrea Duffy MD;  Location: [facility]     LUMPECTOMY BREAST       LUMPECTOMY BREAST WITH SENTINEL NODE, COMBINED Right 3/19/2020    Procedure: LUMPECTOMY, BREAST, WITH SENTINEL LYMPH NODE BIOPSY, Right Lumpectomy With Right [place] Lymph Node Biopsy And Luisa Cath Removal;  Surgeon: Venancio Frausto MD;  Location: [facility]     OSTEOTOMY FOOT  8/19/2013     Procedure: OSTEOTOMY FOOT;  Left 2nd metatarsal osteotomy, 2nd Metatarsophalangeal joint & 2nd toe correction;  Surgeon: Jose Phillips DPM;  Location: [facility]     PHACOEMULSIFICATION WITH STANDARD INTRAOCULAR LENS IMPLANT Left 12/7/2017    Procedure: PHACOEMULSIFICATION WITH STANDARD INTRAOCULAR LENS IMPLANT;  Left Cataract Removal with Implant;  Surgeon: Mata Deleon MD;  Location: [facility]     SURGICAL HISTORY OF -   1979    Stripping of Veins in Left Leg     SURGICAL HISTORY OF -   1970    Bilateral Tubal Ligation     SURGICAL HISTORY OF -   2000    Carpal Tunnel Repair, right     SURGICAL HISTORY OF -   9-12-08    Rt let ablation     ZZHC COLONOSCOPY THRU STOMA, DIAGNOSTIC  04/05    diverticulosis and hemorroids, due 2015        Family History   Problem Relation Age of Onset     Hypertension Mother      Cerebrovascular Disease Mother      Hypertension Father      Cerebrovascular Disease Father      Hypertension Brother      Cancer Brother         lymphoma     Hypertension Sister      Gastrointestinal Disease Sister      Hypertension Brother      Cancer Brother         melanoma     Eye Disorder Brother      Hypertension Brother      Hypertension Brother      Heart Defect Son        Social History     Socioeconomic History     Marital status:      Spouse name: Not on file     Number of children: Not on file     Years of education: Not on file     Highest education level: Not on file   Occupational History     Not on file   Tobacco Use     Smoking status: Former Smoker     Smokeless tobacco: Never Used     Tobacco comment: quit 20 years ago   Vaping Use     Vaping Use: Never used   Substance and Sexual Activity     Alcohol use: Yes     Comment: occ.     Drug use: No     Sexual activity: Yes     Partners: Male   Other Topics Concern     Parent/sibling w/ CABG, MI or angioplasty before 65F 55M? No   Social History Narrative     Not on file     Social Determinants of Health     Financial Resource  Strain: Not on file   Food Insecurity: Not on file   Transportation Needs: Not on file   Physical Activity: Not on file   Stress: Not on file   Social Connections: Not on file   Intimate Partner Violence: Not on file   Housing Stability: Not on file       Outpatient Encounter Medications as of 5/27/2022   Medication Sig Dispense Refill     ASPIRIN 81 MG OR TABS Take 1 tablet by mouth daily       CALCIUM OR Take 1 tablet by mouth daily        fish oil-omega-3 fatty acids 1000 MG capsule Take 1 capsule (1 g) by mouth daily 30 capsule 1     IBUPROFEN 200 MG OR TABS Take 400 mg by mouth 2 times daily as needed for pain       losartan-hydrochlorothiazide (HYZAAR) 50-12.5 MG tablet Take 1 tablet by mouth every morning 90 tablet 3     magnesium 250 MG tablet Take 1 tablet by mouth daily       Melatonin 10 MG TABS tablet Take 10 mg by mouth At Bedtime       simvastatin (ZOCOR) 20 MG tablet Take 1 tablet (20 mg) by mouth At Bedtime 90 tablet 3     vitamin B complex with vitamin C (STRESS TAB) tablet Take 1 tablet by mouth daily       VITAMIN C OR 1 DAILY       VITAMIN D, CHOLECALCIFEROL, PO Take 25 Units by mouth daily        vitamin E 400 units TABS Take by mouth daily 2 tablets daily       [DISCONTINUED] capecitabine (XELODA) 500 MG tablet Start out 1500 mg po bid 7 days on, 7 days off (Patient not taking: Reported on 8/19/2020) 84 tablet 0     No facility-administered encounter medications on file as of 5/27/2022.             O:   NAD, WDWN, Alert & Oriented, Mood & Affect wnl, Vitals stable   Here today alone   /76 (BP Location: Left arm, Patient Position: Sitting, Cuff Size: Adult Large)   Pulse 67   SpO2 96%    General appearance normal   Vitals stable   Alert, oriented and in no acute distress     Gritty papule on right temple, left nasal ala, left dorsal hand   Stuck on papules and brown macules on trunk and ext   Red papules on trunk     The remainder of skin exam is normal       Eyes:  Conjunctivae/lids:Normal     ENT: Lips: normal    MSK:Normal    Cardiovascular: peripheral edema none    Pulm: Breathing Normal    Neuro/Psych: Orientation:Alert and Orientedx3 ; Mood/Affect:normal   A/P:  1. Actinic keratosis on right temple, left nasal ala, left dorsal hand x 3  LN2:  Treated with LN2 for 5s for 1-2 cycles. Warned risks of blistering, pain, pigment change, scarring, and incomplete resolution.  Advised patient to return if lesions do not completely resolve.  Wound care sheet given.  2. Seborrheic keratosis, lentigo, angioma, History of NMSC  It was a pleasure speaking to Precious Paris today.  BENIGN LESIONS DISCUSSED WITH PATIENT:  I discussed the specifics of tumor, prognosis, and genetics of benign lesions.  I explained that treatment of these lesions would be purely cosmetic and not medically neccessary.  I discussed with patient different removal options including excision, cautery and /or laser.      Nature and genetics of benign skin lesions dicussed with patient.  Signs and Symptoms of skin cancer discussed with patient.  ABCDEs of melanoma reviewed with patient.  Patient encouraged to perform monthly skin exams.  UV precautions reviewed with patient.  Risks of non-melanoma skin cancer discussed with patient   Return to clinic in one year or sooner if needed.

## 2022-05-27 NOTE — LETTER
5/27/2022         RE: Precious Paris  [address] [place] [address]        Dear Colleague,    Thank you for referring your patient, Precious Paris, to the [facility]. Please see a copy of my visit note below.    Precious Paris is an extremely pleasant 81 year old year old female patient here today for skin check. She denies any concerning skin lesions. Notes no painful or bleeding areas on her skin. Patient has no other skin complaints today.  Remainder of the HPI, Meds, PMH, Allergies, FH, and SH was reviewed in chart.    Pertinent Hx:   History of NMSC  Past Medical History:   Diagnosis Date     Allergic rhinitis due to other allergen      Asymptomatic postmenopausal status (age-related) (natural)      Basal cell carcinoma      Breast cancer (H)      Cervical spondylosis without myelopathy     cervical DJD     Disturbances of sensation of smell and taste     anosmia     Diverticulosis of colon (without mention of hemorrhage)      Malignant neoplasm of right breast (H) 3/13/2020    Added automatically from request for surgery [id number]     Other and unspecified hyperlipidemia      Pain in joint, lower leg      Plantar fascial fibromatosis      PURE HYPERCHOLESTEROLEM 9/9/2007     PURE HYPERCHOLESTEROLEM 9/9/2007     Squamous cell carcinoma        Past Surgical History:   Procedure Laterality Date     BIOPSY       BIOPSY BREAST       INSERT PORT VASCULAR ACCESS N/A 11/29/2019    Procedure: INSERTION OF VENOUS ACCESS PORT;  Surgeon: Hair Broderick DO;  Location: [facility]     JOINT REPLACEMTN, KNEE RT/LT  2010    right     JOINT REPLACEMTN, KNEE RT/LT  2011    left     LIGATE VEIN VARICOSE, PHLEBECTOMY MULTIPLE STAB, COMBINED  10/17/2013    Procedure: COMBINED LIGATE VEIN VARICOSE, PHLEBECTOMY MULTIPLE STAB;  Phlebectomy of Right Knee Varicose Veins;  Surgeon: Andrea Duffy MD;  Location: [facility]     LUMPECTOMY BREAST       LUMPECTOMY BREAST WITH SENTINEL  NODE, COMBINED Right 3/19/2020    Procedure: LUMPECTOMY, BREAST, WITH SENTINEL LYMPH NODE BIOPSY, Right Lumpectomy With Right [place] Lymph Node Biopsy And Luisa Cath Removal;  Surgeon: Venancio Frausto MD;  Location: [facility]     OSTEOTOMY FOOT  8/19/2013    Procedure: OSTEOTOMY FOOT;  Left 2nd metatarsal osteotomy, 2nd Metatarsophalangeal joint & 2nd toe correction;  Surgeon: Jose Phillips DPM;  Location: [facility]     PHACOEMULSIFICATION WITH STANDARD INTRAOCULAR LENS IMPLANT Left 12/7/2017    Procedure: PHACOEMULSIFICATION WITH STANDARD INTRAOCULAR LENS IMPLANT;  Left Cataract Removal with Implant;  Surgeon: Mata Deleon MD;  Location: [facility]     SURGICAL HISTORY OF -   1979    Stripping of Veins in Left Leg     SURGICAL HISTORY OF -   1970    Bilateral Tubal Ligation     SURGICAL HISTORY OF -   2000    Carpal Tunnel Repair, right     SURGICAL HISTORY OF -   9-12-08    Rt let ablation     ZZHC COLONOSCOPY THRU STOMA, DIAGNOSTIC  04/05    diverticulosis and hemorroids, due 2015        Family History   Problem Relation Age of Onset     Hypertension Mother      Cerebrovascular Disease Mother      Hypertension Father      Cerebrovascular Disease Father      Hypertension Brother      Cancer Brother         lymphoma     Hypertension Sister      Gastrointestinal Disease Sister      Hypertension Brother      Cancer Brother         melanoma     Eye Disorder Brother      Hypertension Brother      Hypertension Brother      Heart Defect Son        Social History     Socioeconomic History     Marital status:      Spouse name: Not on file     Number of children: Not on file     Years of education: Not on file     Highest education level: Not on file   Occupational History     Not on file   Tobacco Use     Smoking status: Former Smoker     Smokeless tobacco: Never Used     Tobacco comment: quit 20 years ago   Vaping Use     Vaping Use: Never used   Substance and Sexual Activity     Alcohol use: Yes     Comment:  occ.     Drug use: No     Sexual activity: Yes     Partners: Male   Other Topics Concern     Parent/sibling w/ CABG, MI or angioplasty before 65F 55M? No   Social History Narrative     Not on file     Social Determinants of Health     Financial Resource Strain: Not on file   Food Insecurity: Not on file   Transportation Needs: Not on file   Physical Activity: Not on file   Stress: Not on file   Social Connections: Not on file   Intimate Partner Violence: Not on file   Housing Stability: Not on file       Outpatient Encounter Medications as of 5/27/2022   Medication Sig Dispense Refill     ASPIRIN 81 MG OR TABS Take 1 tablet by mouth daily       CALCIUM OR Take 1 tablet by mouth daily        fish oil-omega-3 fatty acids 1000 MG capsule Take 1 capsule (1 g) by mouth daily 30 capsule 1     IBUPROFEN 200 MG OR TABS Take 400 mg by mouth 2 times daily as needed for pain       losartan-hydrochlorothiazide (HYZAAR) 50-12.5 MG tablet Take 1 tablet by mouth every morning 90 tablet 3     magnesium 250 MG tablet Take 1 tablet by mouth daily       Melatonin 10 MG TABS tablet Take 10 mg by mouth At Bedtime       simvastatin (ZOCOR) 20 MG tablet Take 1 tablet (20 mg) by mouth At Bedtime 90 tablet 3     vitamin B complex with vitamin C (STRESS TAB) tablet Take 1 tablet by mouth daily       VITAMIN C OR 1 DAILY       VITAMIN D, CHOLECALCIFEROL, PO Take 25 Units by mouth daily        vitamin E 400 units TABS Take by mouth daily 2 tablets daily       [DISCONTINUED] capecitabine (XELODA) 500 MG tablet Start out 1500 mg po bid 7 days on, 7 days off (Patient not taking: Reported on 8/19/2020) 84 tablet 0     No facility-administered encounter medications on file as of 5/27/2022.             O:   NAD, WDWN, Alert & Oriented, Mood & Affect wnl, Vitals stable   Here today alone   /76 (BP Location: Left arm, Patient Position: Sitting, Cuff Size: Adult Large)   Pulse 67   SpO2 96%    General appearance normal   Vitals stable   Alert,  oriented and in no acute distress     Gritty papule on right temple, left nasal ala, left dorsal hand   Stuck on papules and brown macules on trunk and ext   Red papules on trunk     The remainder of skin exam is normal       Eyes: Conjunctivae/lids:Normal     ENT: Lips: normal    MSK:Normal    Cardiovascular: peripheral edema none    Pulm: Breathing Normal    Neuro/Psych: Orientation:Alert and Orientedx3 ; Mood/Affect:normal   A/P:  1. Actinic keratosis on right temple, left nasal ala, left dorsal hand x 3  LN2:  Treated with LN2 for 5s for 1-2 cycles. Warned risks of blistering, pain, pigment change, scarring, and incomplete resolution.  Advised patient to return if lesions do not completely resolve.  Wound care sheet given.  2. Seborrheic keratosis, lentigo, angioma, History of NMSC  It was a pleasure speaking to Precious Paris today.  BENIGN LESIONS DISCUSSED WITH PATIENT:  I discussed the specifics of tumor, prognosis, and genetics of benign lesions.  I explained that treatment of these lesions would be purely cosmetic and not medically neccessary.  I discussed with patient different removal options including excision, cautery and /or laser.      Nature and genetics of benign skin lesions dicussed with patient.  Signs and Symptoms of skin cancer discussed with patient.  ABCDEs of melanoma reviewed with patient.  Patient encouraged to perform monthly skin exams.  UV precautions reviewed with patient.  Risks of non-melanoma skin cancer discussed with patient   Return to clinic in one year or sooner if needed.       Again, thank you for allowing me to participate in the care of your patient.        Sincerely,        Renee Cardenas PA-C

## 2022-06-30 ENCOUNTER — OFFICE VISIT (OUTPATIENT)
Dept: FAMILY MEDICINE | Facility: CLINIC | Age: 82
End: 2022-06-30
Payer: COMMERCIAL

## 2022-06-30 VITALS
BODY MASS INDEX: 32.42 KG/M2 | HEART RATE: 72 BPM | OXYGEN SATURATION: 95 % | WEIGHT: 194.6 LBS | HEIGHT: 65 IN | SYSTOLIC BLOOD PRESSURE: 120 MMHG | TEMPERATURE: 97.4 F | DIASTOLIC BLOOD PRESSURE: 72 MMHG | RESPIRATION RATE: 18 BRPM

## 2022-06-30 DIAGNOSIS — E78.5 HYPERLIPIDEMIA LDL GOAL <130: ICD-10-CM

## 2022-06-30 DIAGNOSIS — I10 BENIGN ESSENTIAL HYPERTENSION: ICD-10-CM

## 2022-06-30 DIAGNOSIS — Z00.00 ENCOUNTER FOR MEDICARE ANNUAL WELLNESS EXAM: Primary | ICD-10-CM

## 2022-06-30 DIAGNOSIS — R73.01 ELEVATED FASTING GLUCOSE: ICD-10-CM

## 2022-06-30 DIAGNOSIS — I89.0 SECONDARY LYMPHEDEMA: ICD-10-CM

## 2022-06-30 DIAGNOSIS — C50.911 INFILTRATING DUCTAL CARCINOMA OF RIGHT BREAST (H): ICD-10-CM

## 2022-06-30 LAB
ALBUMIN SERPL-MCNC: 3.4 G/DL (ref 3.4–5)
ALP SERPL-CCNC: 86 U/L (ref 40–150)
ALT SERPL W P-5'-P-CCNC: 26 U/L (ref 0–50)
ANION GAP SERPL CALCULATED.3IONS-SCNC: 4 MMOL/L (ref 3–14)
AST SERPL W P-5'-P-CCNC: 26 U/L (ref 0–45)
BILIRUB SERPL-MCNC: 0.4 MG/DL (ref 0.2–1.3)
BUN SERPL-MCNC: 13 MG/DL (ref 7–30)
CALCIUM SERPL-MCNC: 9.1 MG/DL (ref 8.5–10.1)
CHLORIDE BLD-SCNC: 106 MMOL/L (ref 94–109)
CHOLEST SERPL-MCNC: 166 MG/DL
CO2 SERPL-SCNC: 29 MMOL/L (ref 20–32)
CREAT SERPL-MCNC: 0.6 MG/DL (ref 0.52–1.04)
FASTING STATUS PATIENT QL REPORTED: NO
GFR SERPL CREATININE-BSD FRML MDRD: 90 ML/MIN/1.73M2
GLUCOSE BLD-MCNC: 104 MG/DL (ref 70–99)
HBA1C MFR BLD: 5.6 % (ref 0–5.6)
HDLC SERPL-MCNC: 64 MG/DL
LDLC SERPL CALC-MCNC: 78 MG/DL
NONHDLC SERPL-MCNC: 102 MG/DL
POTASSIUM BLD-SCNC: 3.9 MMOL/L (ref 3.4–5.3)
PROT SERPL-MCNC: 7.9 G/DL (ref 6.8–8.8)
SODIUM SERPL-SCNC: 139 MMOL/L (ref 133–144)
TRIGL SERPL-MCNC: 119 MG/DL

## 2022-06-30 PROCEDURE — 99213 OFFICE O/P EST LOW 20 MIN: CPT | Mod: 25 | Performed by: FAMILY MEDICINE

## 2022-06-30 PROCEDURE — 36415 COLL VENOUS BLD VENIPUNCTURE: CPT | Performed by: FAMILY MEDICINE

## 2022-06-30 PROCEDURE — G0439 PPPS, SUBSEQ VISIT: HCPCS | Performed by: FAMILY MEDICINE

## 2022-06-30 PROCEDURE — 80053 COMPREHEN METABOLIC PANEL: CPT | Performed by: FAMILY MEDICINE

## 2022-06-30 PROCEDURE — 83036 HEMOGLOBIN GLYCOSYLATED A1C: CPT | Performed by: FAMILY MEDICINE

## 2022-06-30 PROCEDURE — 80061 LIPID PANEL: CPT | Performed by: FAMILY MEDICINE

## 2022-06-30 RX ORDER — LOSARTAN POTASSIUM AND HYDROCHLOROTHIAZIDE 12.5; 5 MG/1; MG/1
1 TABLET ORAL EVERY MORNING
Qty: 90 TABLET | Refills: 3 | Status: SHIPPED | OUTPATIENT
Start: 2022-06-30 | End: 2023-06-28

## 2022-06-30 RX ORDER — SIMVASTATIN 20 MG
20 TABLET ORAL AT BEDTIME
Qty: 90 TABLET | Refills: 3 | Status: SHIPPED | OUTPATIENT
Start: 2022-06-30 | End: 2023-06-28

## 2022-06-30 ASSESSMENT — ENCOUNTER SYMPTOMS
NERVOUS/ANXIOUS: 1
EYE PAIN: 0
PALPITATIONS: 0
DYSURIA: 0
FREQUENCY: 1
NAUSEA: 0
JOINT SWELLING: 0
MYALGIAS: 0
ARTHRALGIAS: 1
CONSTIPATION: 0
DIARRHEA: 0
ABDOMINAL PAIN: 0
FEVER: 0
CHILLS: 0
HEARTBURN: 0
HEMATOCHEZIA: 0
HEADACHES: 0
SHORTNESS OF BREATH: 0
PARESTHESIAS: 0
HEMATURIA: 0
WEAKNESS: 0
DIZZINESS: 0
COUGH: 0
BREAST MASS: 0
SORE THROAT: 0

## 2022-06-30 ASSESSMENT — PAIN SCALES - GENERAL: PAINLEVEL: NO PAIN (0)

## 2022-06-30 ASSESSMENT — ACTIVITIES OF DAILY LIVING (ADL): CURRENT_FUNCTION: NO ASSISTANCE NEEDED

## 2022-06-30 NOTE — PROGRESS NOTES
"SUBJECTIVE:   Precious Paris is a 81 year old female who presents for Preventive Visit.      Patient has been advised of split billing requirements and indicates understanding: Yes  Are you in the first 12 months of your Medicare coverage?  No    Healthy Habits:     In general, how would you rate your overall health?  Good    Frequency of exercise:  2-3 days/week    Duration of exercise:  30-45 minutes    Do you usually eat at least 4 servings of fruit and vegetables a day, include whole grains    & fiber and avoid regularly eating high fat or \"junk\" foods?  Yes    Taking medications regularly:  Yes    Barriers to taking medications:  None    Medication side effects:  None    Ability to successfully perform activities of daily living:  No assistance needed    Home Safety:  No safety concerns identified    Hearing Impairment:  No hearing concerns    In the past 6 months, have you been bothered by leaking of urine?  No    In general, how would you rate your overall mental or emotional health?  Fair      PHQ-2 Total Score: 0    Additional concerns today:  No    Do you feel safe in your environment? Yes    Have you ever done Advance Care Planning? (For example, a Health Directive, POLST, or a discussion with a medical provider or your loved ones about your wishes): Yes, patient states has an Advance Care Planning document and will bring a copy to the clinic.      Right Ear:      1000 Hz RESPONSE- on Level: 40 db (Conditioning sound)   1000 Hz: RESPONSE- on Level: tone not heard   2000 Hz: RESPONSE- on Level: tone not heard   4000 Hz: RESPONSE- on Level: tone not heard    Left Ear:      4000 Hz: RESPONSE- on Level: tone not heard   2000 Hz: RESPONSE- on Level: tone not heard   1000 Hz: RESPONSE- on Level: tone not heard    500 Hz: RESPONSE- on Level: 25 db    Right Ear:    500 Hz: RESPONSE- on Level: 25 db    Hearing Acuity: REFER    Hearing Assessment: abnormal--refer to audiology   Fall risk  Fallen 2 or more times " in the past year?: No  Any fall with injury in the past year?: No    Cognitive Screening   1) Repeat 3 items (Leader, Season, Table)    2) Clock draw: NORMAL  3) 3 item recall: Recalls 3 objects  Results: NORMAL clock, 1-2 items recalled: COGNITIVE IMPAIRMENT LESS LIKELY    Mini-CogTM Copyright CRISTOFER Bunch. Licensed by the author for use in [facility]; reprinted with permission (jacinda@[facility]). All rights reserved.      Do you have sleep apnea, excessive snoring or daytime drowsiness?: no    Reviewed and updated as needed this visit by clinical staff   Tobacco  Allergies  Meds   Med Hx  Surg Hx  Fam Hx  Soc Hx          Reviewed and updated as needed this visit by Provider                   Social History     Tobacco Use     Smoking status: Former Smoker     Smokeless tobacco: Never Used     Tobacco comment: quit 20 years ago   Substance Use Topics     Alcohol use: Yes     Comment: occ.     If you drink alcohol do you typically have >3 drinks per day or >7 drinks per week? No    Alcohol Use 6/30/2022   Prescreen: >3 drinks/day or >7 drinks/week? No   Prescreen: >3 drinks/day or >7 drinks/week? -           Hyperlipidemia Follow-Up      Are you regularly taking any medication or supplement to lower your cholesterol?   Yes- simvastatin    Are you having muscle aches or other side effects that you think could be caused by your cholesterol lowering medication?  No    Hypertension Follow-up      Do you check your blood pressure regularly outside of the clinic? yes    Are you following a low salt diet? Yes    Are your blood pressures ever more than 140 on the top number (systolic) OR more   than 90 on the bottom number (diastolic), for example 140/90? No      Current providers sharing in care for this patient include:   Patient Care Team:  Ester Jorge MD as PCP - General (Family Practice)  Patrica Perla, RN as Specialty Care Coordinator (Oncology)  Rocky Valdivia MD as MD (Radiation Oncology)  King  "KONG Patel CNP as Nurse Practitioner (Nurse Practitioner)  Ester Jorge MD as Assigned PCP  Danielito Anthony MD as Assigned Surgical Provider  Renee Rogers PA-C as Physician Assistant (Dermatology)  Danielito Anthony MD as MD (Dermatology)  Maricarmen Beebe MD as Assigned Cancer Care Provider    The following health maintenance items are reviewed in Epic and correct as of today:  Health Maintenance Due   Topic Date Due     COVID-19 Vaccine (4 - Booster for Moderna series) 03/03/2022     Lab work is in process        Pertinent mammograms are reviewed under the imaging tab.    Review of Systems   Constitutional: Negative for chills and fever.   HENT: Negative for congestion, ear pain, hearing loss and sore throat.    Eyes: Negative for pain and visual disturbance.   Respiratory: Negative for cough and shortness of breath.    Cardiovascular: Negative for chest pain, palpitations and peripheral edema.   Gastrointestinal: Negative for abdominal pain, constipation, diarrhea, heartburn, hematochezia and nausea.   Breasts:  Negative for tenderness, breast mass and discharge.   Genitourinary: Positive for frequency. Negative for dysuria, genital sores, hematuria, pelvic pain, urgency, vaginal bleeding and vaginal discharge.   Musculoskeletal: Positive for arthralgias. Negative for joint swelling and myalgias.   Skin: Negative for rash.   Neurological: Negative for dizziness, weakness, headaches and paresthesias.   Psychiatric/Behavioral: Negative for mood changes. The patient is nervous/anxious.          OBJECTIVE:   /72 (BP Location: Left arm, Patient Position: Sitting, Cuff Size: Adult Large)   Pulse 72   Temp 97.4  F (36.3  C) (Tympanic)   Resp 18   Ht 1.638 m (5' 4.5\")   Wt 88.3 kg (194 lb 9.6 oz)   SpO2 95%   Breastfeeding No   BMI 32.89 kg/m   Estimated body mass index is 32.89 kg/m  as calculated from the following:    Height as of this encounter: 1.638 m (5' 4.5\").    " Weight as of this encounter: 88.3 kg (194 lb 9.6 oz).  Physical Exam  GENERAL: healthy, alert and no distress  NECK: no adenopathy, no asymmetry, masses, or scars and thyroid normal to palpation  RESP: lungs clear to auscultation - no rales, rhonchi or wheezes  BREAST: Right breast firm with dependent catia Right nipple  retracted a little bit which is probably due to scarring from lumpectomy.  Mild redness but no signs of infection  CV: regular rate and rhythm, normal S1 S2, no S3 or S4, no murmur, click or rub, no peripheral edema and peripheral pulses strong  ABDOMEN: soft, nontender, no hepatosplenomegaly, no masses and bowel sounds normal  MS: no gross musculoskeletal defects noted, no edema    Diagnostic Test Results:  Labs reviewed in Epic    ASSESSMENT / PLAN:   (Z00.00) Encounter for Medicare annual wellness exam  (primary encounter diagnosis)  Comment:   Plan:      (I10) Benign essential hypertension  Comment:  Well controlled  Plan: losartan-hydrochlorothiazide (HYZAAR) 50-12.5         MG tablet, Comprehensive metabolic panel,         OFFICE/OUTPT VISIT,EST,LEVL III             (E78.5) Hyperlipidemia LDL goal <130  Comment:    Plan: simvastatin (ZOCOR) 20 MG tablet, Comprehensive        metabolic panel, Lipid panel reflex to direct         LDL Fasting, OFFICE/OUTPT VISIT,EST,LEVL III             (C50.911) Infiltrating ductal carcinoma of right breast (H)  Comment:    Plan: ongoing care with oncology    (I89.0) Secondary lymphedema  Comment: right breast/axillary/arm lymphedema  Worsening dependent edema of the breast  Does have a machine at home which she uses  May benefit from manual lymphedema therapy  Plan: Physical Therapy Referral             (R73.01) Elevated fasting glucose  Comment:    Plan: Hemoglobin A1c               Patient has been advised of split billing requirements and indicates understanding: Yes    COUNSELING:  Reviewed preventive health counseling, as reflected in patient instructions    "    Regular exercise       Healthy diet/nutrition    Estimated body mass index is 32.89 kg/m  as calculated from the following:    Height as of this encounter: 1.638 m (5' 4.5\").    Weight as of this encounter: 88.3 kg (194 lb 9.6 oz).    Weight management plan: Discussed healthy diet and exercise guidelines    She reports that she has quit smoking. She has never used smokeless tobacco.      Appropriate preventive services were discussed with this patient, including applicable screening as appropriate for cardiovascular disease, diabetes, osteopenia/osteoporosis, and glaucoma.  As appropriate for age/gender, discussed screening for colorectal cancer, prostate cancer, breast cancer, and cervical cancer. Checklist reviewing preventive services available has been given to the patient.    Reviewed patients plan of care and provided an AVS. The Basic Care Plan (routine screening as documented in Health Maintenance) for Precious meets the Care Plan requirement. This Care Plan has been established and reviewed with the Patient.    Counseling Resources:  ATP IV Guidelines  Pooled Cohorts Equation Calculator  Breast Cancer Risk Calculator  Breast Cancer: Medication to Reduce Risk  FRAX Risk Assessment  ICSI Preventive Guidelines  Dietary Guidelines for Americans, 2010  USDA's MyPlate  ASA Prophylaxis  Lung CA Screening    Ester Jorge MD  [facility]    Identified Health Risks:  "

## 2022-06-30 NOTE — PATIENT INSTRUCTIONS
Patient Education   Personalized Prevention Plan  You are due for the preventive services outlined below.  Your care team is available to assist you in scheduling these services.  If you have already completed any of these items, please share that information with your care team to update in your medical record.  Health Maintenance Due   Topic Date Due     COVID-19 Vaccine (4 - Booster for Moderna series) 03/03/2022     Your Health Risk Assessment indicates you feel you are not in good emotional health.    Recreation   Recreation is not limited to sports and team events. It includes any activity that provides relaxation, interest, enjoyment, and exercise. Recreation provides an outlet for physical, mental, and social energy. It can give a sense of worth and achievement. It can help you stay healthy.    Mental Exercise and Social Involvement  Mental and emotional health is as important as physical health. Keep in touch with friends and family. Stay as active as possible. Continue to learn and challenge yourself.   Things you can do to stay mentally active are:    Learn something new, like a foreign language or musical instrument.     Play SCRABBLE or do crossword puzzles. If you cannot find people to play these games with you at home, you can play them with others on your computer through the Internet.     Join a games club--anything from card games to chess or checkers or lawn bowling.     Start a new hobby.     Go back to school.     Volunteer.     Read.   Keep up with world events.

## 2022-07-20 ENCOUNTER — HOSPITAL ENCOUNTER (OUTPATIENT)
Dept: PHYSICAL THERAPY | Facility: CLINIC | Age: 82
Setting detail: THERAPIES SERIES
Discharge: HOME | End: 2022-07-20
Attending: FAMILY MEDICINE
Payer: COMMERCIAL

## 2022-07-20 DIAGNOSIS — I89.0 SECONDARY LYMPHEDEMA: ICD-10-CM

## 2022-07-20 PROCEDURE — 97161 PT EVAL LOW COMPLEX 20 MIN: CPT | Mod: GP | Performed by: PHYSICAL THERAPIST

## 2022-07-20 PROCEDURE — 97140 MANUAL THERAPY 1/> REGIONS: CPT | Mod: GP | Performed by: PHYSICAL THERAPIST

## 2022-07-20 NOTE — PROGRESS NOTES
07/20/22 1200   Rehab Discipline   Discipline PT   Type of Visit   Type of visit Initial Edema Evaluation   General Information   Start of care 07/20/22   Referring physician Ester Jorge MD   Orders Evaluate and treat as indicated   Order date 06/30/22   Medical diagnosis R breast lumpectomy and sentinal node biopsy   Onset of illness / date of surgery 06/30/22  (date of referral)   Edema onset   (increase in fullness over past month)   Affected body parts RUE;Trunk   Edema etiology Cancer with lymph node dissection;Surgery   Location - Cancer with lymph node dissection R breast   Pertinent history of current problem (PT: include personal factors and/or comorbidities that impact the POC; OT: include additional occupational profile info) pt completed lymph therapy last year before she went to [place] for the winter, had the flexitouch pump and compression bras, now here with increased fullness, tightness, pain, swelling R upper arm, axilla and breast and trunk wall   Surgical / medical history reviewed Yes   Prior treatment Compression pump;MLD   Living environment comments lives with spouse, he is sick so pt has been having to do more around the home and to help him   Assistive device comments none   Fall Risk Screen   Fall screen completed by PT   Have you fallen 2 or more times in the past year? No   Have you fallen and had an injury in the past year? No   Abuse Screen (yes response referral indicated)   Feels Unsafe at Home or Work/School no   Feels Threatened by Someone no   System Outcome Measures   Outcome Measures Lymphedema   Lymphedema Life Impact Scale (score range 0-72). A higher score indicates greater impairment. 4   Subjective Report   Patient report of symptoms using flexitouch daily for upper body, feels more fullness under upper arm and armpit   Patient / Family Goals   Patient / family goals statement make sure nothing is wrong and get swelling down   Pain   Patient currently in pain No    Cognitive Status   Orientation Orientation to person, place and time   Edema Exam / Assessment   Skin condition Pitting;Non-pitting   Skin condition comments 1+ pitting R upper forearm and upper arm, non pitting fullness and firm lateral and lower R breast, orange peel texture, puckering of the 2 R breast scars laterally, R axilla with arm ovverhead cording noted 2-3?, no pulling into elbow or forearm reported, R nipple with some inversion, pink sangeeta under R breast in fold and R breast laterally   Pitting 1+   Scar Yes   Location R breast superior and lateral at nipple   Mobility decreased mobility, firm   Ulceration No   Girth Measurements   Girth Measurements Refer to separate girth measurement flowsheet   Volume UE   Right UE (mL) 1903.77   Left UE (mL) 1727.07   UE volume comparison RUE volume greater than LUE volume   % difference 9.3   Range of Motion   ROM comments R shoulder flexion slightly less than L but both WFL   Palpation   Palpation tender with palpation to R breast and lateral flank and axilla   Planned Edema Interventions   Planned edema interventions Manual lymph drainage;Gradient compression bandaging;Fit for compression garment;Exercises;Precautions to prevent infection / exacerbation;Education;Manual therapy;Skin care / precautions;Soft tissue mobilization;Myofascial release;Home management program development;Scar mobilization   Clinical Impression   Criteria for skilled therapeutic intervention met Yes   Therapy diagnosis R UE/UQ secondary lymphedema   Influenced by the following impairments / conditions Edema;Stage 2;Axillary Web Syndrome   Functional limitations due to impairments / conditions tight fitting bra, tightness with overhead R UE activities   Clinical Presentation Stable/Uncomplicated   Clinical Presentation Rationale LLIS, girth   Clinical Decision Making (Complexity) Low complexity   Treatment Frequency 1x/week  (rec 2x a week but pt wants to start with 1x a week and see how  it goes)   Treatment duration 12 weeks   Patient / family and/or staff in agreement with plan of care Yes   Risks and benefits of therapy have been explained Yes   Education Assessment   Preferred learning style Listening   Barriers to learning No barriers   Goals   Edema Eval Goals 1;2   Goal 1   Goal identifier stg 1   Goal description pt to have daytime tolerance to compression bra and insert for edema reduction and skin healing response to decrease risk of infections   Target date 08/19/22   Goal 2   Goal identifier ltg 1   Goal description pt to be indep with longterm R UE/UQ edema management via HEP, elevation, skin cares and compression garment wear/use   Target date 10/18/22   Total Evaluation Time   PT Eval, Low Complexity Minutes (54811) 10   Certification   Certification date from 07/20/22   Certification date to 10/18/22   Medical Diagnosis R breast lumpectomy and sentinal node biopsy

## 2022-07-20 NOTE — PROGRESS NOTES
[facility]        OUTPATIENT PHYSICAL THERAPY EDEMA EVALUATION  PLAN OF TREATMENT FOR OUTPATIENT REHABILITATION  (COMPLETE FOR INITIAL CLAIMS ONLY)  Patient's Last Name, First Name, Precious Prajapati                           Provider s Name:   [facility] Medical Record No.  [id number]     Start of Care Date:  07/20/22   Onset Date:   (increase in fullness over past month)   Type:  PT   Medical Diagnosis:  R breast lumpectomy and sentinal node biopsy   Therapy Diagnosis:  R UE/UQ secondary lymphedema Visits from SOC:  1                                     __________________________________________________________________________________   Plan of Treatment/Functional Goals:    Manual lymph drainage, Gradient compression bandaging, Fit for compression garment, Exercises, Precautions to prevent infection / exacerbation, Education, Manual therapy, Skin care / precautions, Soft tissue mobilization, Myofascial release, Home management program development, Scar mobilization        GOALS  1. Goal description: pt to have daytime tolerance to compression bra and insert for edema reduction and skin healing response to decrease risk of infections       Target date: 08/19/22  2. Goal description: pt to be indep with longterm R UE/UQ edema management via HEP, elevation, skin cares and compression garment wear/use       Target date: 10/18/22              Treatment Frequency: 1x/week (rec 2x a week but pt wants to start with 1x a week and see how it goes)   Treatment duration: 12 weeks  7/20-10/18/22     Zeinab Montero, PT, DPT, CLT                                   I CERTIFY THE NEED FOR THESE SERVICES FURNISHED UNDER        THIS PLAN OF TREATMENT AND WHILE UNDER MY CARE     (Physician co-signature of this document indicates review and certification of the therapy plan).                    Certification date from: 07/20/22       Certification date to: 10/18/22           Referring physician: Ester Jorge MD   Initial Assessment  See Epic Evaluation- Start of care: 07/20/22

## 2022-07-28 ENCOUNTER — HOSPITAL ENCOUNTER (OUTPATIENT)
Dept: PHYSICAL THERAPY | Facility: CLINIC | Age: 82
Setting detail: THERAPIES SERIES
Discharge: HOME | End: 2022-07-28
Attending: FAMILY MEDICINE
Payer: COMMERCIAL

## 2022-07-28 PROCEDURE — 97140 MANUAL THERAPY 1/> REGIONS: CPT | Mod: GP | Performed by: REHABILITATION PRACTITIONER

## 2022-08-04 ENCOUNTER — HOSPITAL ENCOUNTER (OUTPATIENT)
Dept: PHYSICAL THERAPY | Facility: CLINIC | Age: 82
Setting detail: THERAPIES SERIES
Discharge: HOME | End: 2022-08-04
Attending: FAMILY MEDICINE
Payer: COMMERCIAL

## 2022-08-04 PROCEDURE — 97140 MANUAL THERAPY 1/> REGIONS: CPT | Mod: GP | Performed by: REHABILITATION PRACTITIONER

## 2022-08-11 ENCOUNTER — HOSPITAL ENCOUNTER (OUTPATIENT)
Dept: PHYSICAL THERAPY | Facility: CLINIC | Age: 82
Setting detail: THERAPIES SERIES
Discharge: HOME | End: 2022-08-11
Attending: FAMILY MEDICINE
Payer: COMMERCIAL

## 2022-08-11 PROCEDURE — 97140 MANUAL THERAPY 1/> REGIONS: CPT | Mod: GP | Performed by: REHABILITATION PRACTITIONER

## 2022-08-15 ENCOUNTER — HOSPITAL ENCOUNTER (OUTPATIENT)
Dept: PHYSICAL THERAPY | Facility: CLINIC | Age: 82
Setting detail: THERAPIES SERIES
Discharge: HOME | End: 2022-08-15
Attending: FAMILY MEDICINE
Payer: COMMERCIAL

## 2022-08-15 PROCEDURE — 97140 MANUAL THERAPY 1/> REGIONS: CPT | Mod: GP | Performed by: REHABILITATION PRACTITIONER

## 2022-08-23 ENCOUNTER — HOSPITAL ENCOUNTER (OUTPATIENT)
Dept: PHYSICAL THERAPY | Facility: CLINIC | Age: 82
Setting detail: THERAPIES SERIES
Discharge: HOME | End: 2022-08-23
Attending: FAMILY MEDICINE
Payer: COMMERCIAL

## 2022-08-23 PROCEDURE — 97140 MANUAL THERAPY 1/> REGIONS: CPT | Mod: GP | Performed by: REHABILITATION PRACTITIONER

## 2022-08-30 NOTE — PROGRESS NOTES
[facility] Rehabilitation Service    Outpatient Physical Therapy Progress Note  Patient: Precious Paris  : 1940    Beginning/End Dates of Reporting Period:  22 to 22    Referring Provider: Dr. Ester Jorge MD    Therapy Diagnosis: RUQ/RUQ lymphedema      Client Self Report: using the pump daily and compression bra and pad when I take the pad off the indentations are definetly there    Objective Measurements:  Objective Measure: R breast girth  Details: 39.0 cm in sitting     Outcome Measures (most recent score):   LLIS = 4 on eval; will again complete LLIS at discharge    Goals:  Goal Identifier stg 1   Goal Description pt to have daytime tolerance to compression bra and insert for edema reduction and skin healing response to decrease risk of infections   Target Date 22   Date Met   08/15/22   Progress (detail required for progress note):       Goal Identifier ltg 1   Goal Description pt to be indep with longterm R UE/UQ edema management via HEP, elevation, skin cares and compression garment wear/use   Target Date 10/18/22   Date Met      Progress (detail required for progress note):         Plan:  Continue therapy per current plan of care with focus on MFR and cupping as pt has pump    Discharge:  No

## 2022-08-31 ENCOUNTER — HOSPITAL ENCOUNTER (OUTPATIENT)
Dept: PHYSICAL THERAPY | Facility: CLINIC | Age: 82
Setting detail: THERAPIES SERIES
Discharge: HOME | End: 2022-08-31
Attending: FAMILY MEDICINE
Payer: COMMERCIAL

## 2022-08-31 PROCEDURE — 97140 MANUAL THERAPY 1/> REGIONS: CPT | Mod: GP | Performed by: REHABILITATION PRACTITIONER

## 2022-09-08 ENCOUNTER — HOSPITAL ENCOUNTER (OUTPATIENT)
Dept: PHYSICAL THERAPY | Facility: CLINIC | Age: 82
Setting detail: THERAPIES SERIES
Discharge: HOME | End: 2022-09-08
Attending: FAMILY MEDICINE
Payer: COMMERCIAL

## 2022-09-08 PROCEDURE — 97140 MANUAL THERAPY 1/> REGIONS: CPT | Mod: GP | Performed by: REHABILITATION PRACTITIONER

## 2022-09-13 ENCOUNTER — HOSPITAL ENCOUNTER (OUTPATIENT)
Dept: PHYSICAL THERAPY | Facility: CLINIC | Age: 82
Setting detail: THERAPIES SERIES
Discharge: HOME | End: 2022-09-13
Attending: FAMILY MEDICINE
Payer: COMMERCIAL

## 2022-09-13 PROCEDURE — 97140 MANUAL THERAPY 1/> REGIONS: CPT | Mod: GP | Performed by: REHABILITATION PRACTITIONER

## 2022-09-19 NOTE — PROGRESS NOTES
[facility] Rehabilitation Service    Outpatient Physical Therapy Progress Note  Patient: Precious Paris  : 1940    Beginning/End Dates of Reporting Period:  22 to 22    Referring Provider: Dr. Ester Jorge MD     Therapy Diagnosis: RUQ/RUQ lymphedema      Client Self Report: I have been doing what I need to do    Objective Measurements:  Objective Measure: R breast girth  Details: 39.7 cm in sitting    Objective Measure: R UE  Details: +3.96%, see flow sheet for breast measurement     Outcome Measures (most recent score):    LLIS = 4 on eval; will again complete LLIS at discharge    Goals:  Goal Identifier stg 1   Goal Description pt to have daytime tolerance to compression bra and insert for edema reduction and skin healing response to decrease risk of infections   Target Date 22   Date Met  08/15/22   Progress (detail required for progress note):       Goal Identifier ltg 1   Goal Description pt to be indep with longterm R UE/UQ edema management via HEP, elevation, skin cares and compression garment wear/use   Target Date 10/18/22   Date Met      Progress (detail required for progress note):       Plan:  Continue therapy per current plan of care to address R-breast lymphedema, fibrosis and myofascial tightness.     Discharge:  No

## 2022-09-20 ENCOUNTER — HOSPITAL ENCOUNTER (OUTPATIENT)
Dept: PHYSICAL THERAPY | Facility: CLINIC | Age: 82
Setting detail: THERAPIES SERIES
Discharge: HOME | End: 2022-09-20
Attending: FAMILY MEDICINE
Payer: COMMERCIAL

## 2022-09-20 PROCEDURE — 97140 MANUAL THERAPY 1/> REGIONS: CPT | Mod: GP | Performed by: PHYSICAL THERAPIST

## 2022-09-26 ENCOUNTER — PATIENT OUTREACH (OUTPATIENT)
Dept: ONCOLOGY | Facility: CLINIC | Age: 82
End: 2022-09-26

## 2022-09-26 NOTE — PROGRESS NOTES
[facility]: Cancer Care                                                                                          Pt called to report for the last week she has been having nipple drainage - started out looking like pus and now has turned bloody.     Pt has dealt with lymphedema and is currently in therapy for this but this is new x 1 week.    Pt scheduled to see Lazara tomorrow for an exam.    Signature:  Patrica Perla RN

## 2022-09-27 ENCOUNTER — HOSPITAL ENCOUNTER (OUTPATIENT)
Dept: PHYSICAL THERAPY | Facility: CLINIC | Age: 82
Setting detail: THERAPIES SERIES
Discharge: HOME | End: 2022-09-27
Attending: FAMILY MEDICINE
Payer: COMMERCIAL

## 2022-09-27 ENCOUNTER — ONCOLOGY VISIT (OUTPATIENT)
Dept: ONCOLOGY | Facility: CLINIC | Age: 82
End: 2022-09-27
Attending: NURSE PRACTITIONER
Payer: COMMERCIAL

## 2022-09-27 VITALS
WEIGHT: 194 LBS | DIASTOLIC BLOOD PRESSURE: 67 MMHG | SYSTOLIC BLOOD PRESSURE: 150 MMHG | TEMPERATURE: 97.6 F | BODY MASS INDEX: 32.79 KG/M2 | RESPIRATION RATE: 12 BRPM | HEART RATE: 78 BPM | OXYGEN SATURATION: 97 %

## 2022-09-27 DIAGNOSIS — N63.10 MASS OF RIGHT BREAST: ICD-10-CM

## 2022-09-27 DIAGNOSIS — C50.411 MALIGNANT NEOPLASM OF UPPER-OUTER QUADRANT OF RIGHT BREAST IN FEMALE, ESTROGEN RECEPTOR NEGATIVE (H): Primary | ICD-10-CM

## 2022-09-27 DIAGNOSIS — R59.0 LOCALIZED ENLARGED LYMPH NODES: ICD-10-CM

## 2022-09-27 DIAGNOSIS — N64.52 NIPPLE DISCHARGE: ICD-10-CM

## 2022-09-27 DIAGNOSIS — Z17.1 MALIGNANT NEOPLASM OF UPPER-OUTER QUADRANT OF RIGHT BREAST IN FEMALE, ESTROGEN RECEPTOR NEGATIVE (H): Primary | ICD-10-CM

## 2022-09-27 PROCEDURE — 99214 OFFICE O/P EST MOD 30 MIN: CPT | Performed by: NURSE PRACTITIONER

## 2022-09-27 PROCEDURE — G0463 HOSPITAL OUTPT CLINIC VISIT: HCPCS

## 2022-09-27 PROCEDURE — 97140 MANUAL THERAPY 1/> REGIONS: CPT | Mod: GP | Performed by: REHABILITATION PRACTITIONER

## 2022-09-27 RX ORDER — CEPHALEXIN 500 MG/1
500 CAPSULE ORAL 2 TIMES DAILY
Qty: 20 CAPSULE | Refills: 0 | Status: SHIPPED | OUTPATIENT
Start: 2022-09-27 | End: 2022-10-07

## 2022-09-27 ASSESSMENT — PAIN SCALES - GENERAL: PAINLEVEL: NO PAIN (0)

## 2022-09-27 NOTE — PATIENT INSTRUCTIONS
Diagnostic mammogram (bilateral) and right breast + axilla US ASAP - this week/next week.     Keflex Rx on hand to start if increased redness/nipple drainage before we see her back    Return with Lazara/Abiel after imaging to reassess

## 2022-09-27 NOTE — PROGRESS NOTES
"Oncology Rooming Note    September 27, 2022 8:19 AM   Precious Paris is a 81 year old female who presents for:    Chief Complaint   Patient presents with     Oncology Clinic Visit     Malignant neoplasm of upper-outer quadrant of right breast in female, estrogen receptor negative - Provider visit only     Initial Vitals: BP (!) 150/67 (BP Location: Left arm, Patient Position: Sitting, Cuff Size: Adult Regular)   Pulse 78   Temp 97.6  F (36.4  C) (Tympanic)   Resp 12   Wt 88 kg (194 lb)   SpO2 97%   BMI 32.79 kg/m   Estimated body mass index is 32.79 kg/m  as calculated from the following:    Height as of 6/30/22: 1.638 m (5' 4.5\").    Weight as of this encounter: 88 kg (194 lb). Body surface area is 2 meters squared.  No Pain (0) Comment: Data Unavailable   No LMP recorded. Patient is postmenopausal.  Allergies reviewed: Yes  Medications reviewed: Yes    Medications: Medication refills not needed today.  Pharmacy name entered into [facility]: [facility] - [facility] [id number] [facility]    Clinical concerns:  None      Malgorzata Vallejo CMA            "

## 2022-09-27 NOTE — PROGRESS NOTES
[facility] H[facility]ology and Oncology Progress Note    Patient: Precious Paris  MRN: [id number]  Sep 27, 2022          Reason for Visit    1. New onset rightnipple drainage  2. Right triple negative breast cancer (11/2019)    Primary Oncologist: Dr. Beebe    _____________________________________________________________________________    History of Present Illness/ Interval History    Ms. Precious Paris is a 81 year old treated for right triple negative breast cancer almost 2 yrs ago. Returns for acute visit for new onset nipple drainage.     When she was evaluated in April, she was well with stable right breast edema and mild redness felt consistent with her ongoing lymphedema post surgery/RT. She's been undergoing lymphedema with minimal  Improvement.     Starting last week, she noted pus-like drainage from her right nipple which has now evolved to minimal bloody drainage.  No discrete lumps/masses. No fevers/chills.     Last mammogram was 10/2021 (normal.). She is due for her next mammogram next month.    No significant weight loss (5 lb/one year), good appetite.   One week hx right wrist discomfort, no other sites of acute pain.  No headaches. No respiratory symptoms.    ECOG PS: 1      Oncology History/Treatment  Diagnosis/Stage:   11/2019: Right breast cancer, triple negative ([id number])    BRCA negative    Treatment:  2019: Neoadjuvant taxol    Lumpectomy    5/2020: Adjuvant RT    6 - 7/2020: Adjuvant Xeloda. Stopped for rash      Physical Exam    GENERAL: Alert and oriented to time place and person. Seated comfortably. In no distress.  HEAD: Atraumatic and normocephalic. No alopecia.  EYES: DELISA, EOMI. No erythema. No icterus.  BREASTS: Right breast firm (primarily lower/outer quadrants), moderately erythematous and slightly warm to touch, no discrete masses/lumps. Firmness extends into proximal right axilla, no discrete nodes.  Nipple partially retracted with small open wound  superior to nipple, no drainage noted today.   CHEST: clear to auscultation bilaterally. Resonant to percussion throughout bilaterally. Symmetrical breath movements bilaterally.  CVS: S1 and S2 are heard. Regular rate and rhythm. No murmur or gallop or rub heard.  ABDOMEN: Soft. Not tender. Not distended. No palpable hepatomegaly or splenomegaly. No other mass palpable. Bowel sounds present.  EXTREMITIES: Warm. No peripheral edema.  SKIN: no rash, or bruising or purpura.   NEURO: No gross deficit noted. Non-antalgic gait.      Lab Results    None    Imaging    10/2021 screening mammogram negative    Assessment/Plan  1. Triple negative right breast cancer (11/2019)  2. Acute right nipple drainage  Exam is concerning for possible right breast recurrence.   No fevers. The firmness and erythema have been chronic and no acute changes.     Differential are mastitis/ductal infection or locally recurrent cancer.    Plan:  -Get bilateral diagnostic mammogram and US right breast + axilla as soon as able to schedule  -If above imaging is negative, I would get MRI breast as well  -Rx for Keflex given to her to have on hand and start if breast redness, pain, nipple drainage increase or any fevers.   -Return to clinic after imaging    2.   Right axilla/breast lymphedema  She's been undergoing Lymphedema therapy several months without a lot of response      Billing  Total time 30 minutes, to include face to face visit, review of EMR, ordering, documentation and coordination of care    Signed by: Lazara Ashton NP

## 2022-09-27 NOTE — LETTER
9/27/2022         RE: Precious Paris  [address] [place] [address]        Dear Colleague,    Thank you for referring your patient, Precious Paris, to the [facility]. Please see a copy of my visit note below.    [facility] Hematology and Oncology Progress Note    Patient: Precious Paris  MRN: [id number]  Sep 27, 2022          Reason for Visit    1. New onset rightnipple drainage  2. Right triple negative breast cancer (11/2019)    Primary Oncologist: Dr. Beebe    _____________________________________________________________________________    History of Present Illness/ Interval History    Ms. Precious Paris is a 81 year old treated for right triple negative breast cancer almost 2 yrs ago. Returns for acute visit for new onset nipple drainage.     When she was evaluated in April, she was well with stable right breast edema and mild redness felt consistent with her ongoing lymphedema post surgery/RT. She's been undergoing lymphedema with minimal  Improvement.     Starting last week, she noted pus-like drainage from her right nipple which has now evolved to minimal bloody drainage.  No discrete lumps/masses. No fevers/chills.     Last mammogram was 10/2021 (normal.). She is due for her next mammogram next month.    No significant weight loss (5 lb/one year), good appetite.   One week hx right wrist discomfort, no other sites of acute pain.  No headaches. No respiratory symptoms.    ECOG PS: 1      Oncology History/Treatment  Diagnosis/Stage:   11/2019: Right breast cancer, triple negative ([id number])    BRCA negative    Treatment:  2019: Neoadjuvant taxol    Lumpectomy    5/2020: Adjuvant RT    6 - 7/2020: Adjuvant Xeloda. Stopped for rash      Physical Exam    GENERAL: Alert and oriented to time place and person. Seated comfortably. In no distress.  HEAD: Atraumatic and normocephalic. No alopecia.  EYES: DELISA, EOMI. No erythema. No  icterus.  BREASTS: Right breast firm (primarily lower/outer quadrants), moderately erythematous and slightly warm to touch, no discrete masses/lumps. Firmness extends into proximal right axilla, no discrete nodes.  Nipple partially retracted with small open wound superior to nipple, no drainage noted today.   CHEST: clear to auscultation bilaterally. Resonant to percussion throughout bilaterally. Symmetrical breath movements bilaterally.  CVS: S1 and S2 are heard. Regular rate and rhythm. No murmur or gallop or rub heard.  ABDOMEN: Soft. Not tender. Not distended. No palpable hepatomegaly or splenomegaly. No other mass palpable. Bowel sounds present.  EXTREMITIES: Warm. No peripheral edema.  SKIN: no rash, or bruising or purpura.   NEURO: No gross deficit noted. Non-antalgic gait.      Lab Results    None    Imaging    10/2021 screening mammogram negative    Assessment/Plan  1. Triple negative right breast cancer (11/2019)  2. Acute right nipple drainage  Exam is concerning for possible right breast recurrence.   No fevers. The firmness and erythema have been chronic and no acute changes.     Differential are mastitis/ductal infection or locally recurrent cancer.    Plan:  -Get bilateral diagnostic mammogram and US right breast + axilla as soon as able to schedule  -If above imaging is negative, I would get MRI breast as well  -Rx for Keflex given to her to have on hand and start if breast redness, pain, nipple drainage increase or any fevers.   -Return to clinic after imaging    2.   Right axilla/breast lymphedema  She's been undergoing Lymphedema therapy several months without a lot of response      Billing  Total time 30 minutes, to include face to face visit, review of EMR, ordering, documentation and coordination of care    Signed by: Lazara Ashton NP    Oncology Rooming Note    September 27, 2022 8:19 AM   Precious Paris is a 81 year old female who presents for:    Chief Complaint   Patient presents with  "    Oncology Clinic Visit     Malignant neoplasm of upper-outer quadrant of right breast in female, estrogen receptor negative - Provider visit only     Initial Vitals: BP (!) 150/67 (BP Location: Left arm, Patient Position: Sitting, Cuff Size: Adult Regular)   Pulse 78   Temp 97.6  F (36.4  C) (Tympanic)   Resp 12   Wt 88 kg (194 lb)   SpO2 97%   BMI 32.79 kg/m   Estimated body mass index is 32.79 kg/m  as calculated from the following:    Height as of 6/30/22: 1.638 m (5' 4.5\").    Weight as of this encounter: 88 kg (194 lb). Body surface area is 2 meters squared.  No Pain (0) Comment: Data Unavailable   No LMP recorded. Patient is postmenopausal.  Allergies reviewed: Yes  Medications reviewed: Yes    Medications: Medication refills not needed today.  Pharmacy name entered into [facility]: [facility] - [facility] [address] [facility]    Clinical concerns:  None      Malgorzata Vallejo CMA                Again, thank you for allowing me to participate in the care of your patient.        Sincerely,        Lazara Torres NP    "

## 2022-10-12 ENCOUNTER — HOSPITAL ENCOUNTER (OUTPATIENT)
Dept: MAMMOGRAPHY | Facility: CLINIC | Age: 82
Discharge: HOME | End: 2022-10-12
Attending: NURSE PRACTITIONER
Payer: COMMERCIAL

## 2022-10-12 ENCOUNTER — HOSPITAL ENCOUNTER (OUTPATIENT)
Dept: ULTRASOUND IMAGING | Facility: CLINIC | Age: 82
Discharge: HOME | End: 2022-10-12
Attending: NURSE PRACTITIONER
Payer: COMMERCIAL

## 2022-10-12 DIAGNOSIS — N64.52 NIPPLE DISCHARGE: ICD-10-CM

## 2022-10-12 DIAGNOSIS — N63.10 MASS OF RIGHT BREAST: ICD-10-CM

## 2022-10-12 DIAGNOSIS — C50.411 MALIGNANT NEOPLASM OF UPPER-OUTER QUADRANT OF RIGHT BREAST IN FEMALE, ESTROGEN RECEPTOR NEGATIVE (H): ICD-10-CM

## 2022-10-12 DIAGNOSIS — R59.0 LOCALIZED ENLARGED LYMPH NODES: ICD-10-CM

## 2022-10-12 DIAGNOSIS — Z17.1 MALIGNANT NEOPLASM OF UPPER-OUTER QUADRANT OF RIGHT BREAST IN FEMALE, ESTROGEN RECEPTOR NEGATIVE (H): ICD-10-CM

## 2022-10-12 DIAGNOSIS — R92.8 ABNORMAL MAMMOGRAM: Primary | ICD-10-CM

## 2022-10-12 DIAGNOSIS — Z80.3 FAMILY HISTORY OF MALIGNANT NEOPLASM OF BREAST: ICD-10-CM

## 2022-10-12 PROCEDURE — 77062 BREAST TOMOSYNTHESIS BI: CPT

## 2022-10-12 PROCEDURE — 76642 ULTRASOUND BREAST LIMITED: CPT | Mod: RT

## 2022-10-13 ENCOUNTER — TELEPHONE (OUTPATIENT)
Dept: MAMMOGRAPHY | Facility: CLINIC | Age: 82
End: 2022-10-13

## 2022-10-13 NOTE — TELEPHONE ENCOUNTER
MACKENZIE Burciaga  from [facility] called to request sooner U/S Biopsy appointment for patient.      Called patient and she accepted our appointment for the following and has been scheduled:    Right Breast and Right Axilla Ultrasound Core Needle Biopsies  10/18/22 @ 8:00   [address], [address]   [place] [address]  [phone]    Lynn Lyn RN, BSN, PHN, CBCN  [facility] Imaging Nurse Coordinator   [address]  [place] [address]  [phone]

## 2022-10-17 NOTE — PROGRESS NOTES
[facility]    Outpatient Physical Therapy Progress Note  Patient: Precious Paris  : 1940    Beginning/End Dates of Reporting Period:  22 to 10/17/22    Referring Provider: Dr. Ester Jorge MD     Therapy Diagnosis: RUE/RUQ lymphedema      Client Self Report: I have been in the clinic since 8am today over in Oncology because of pus and bleeding from nipple, they are concerned and made me an appt in [place] to have a scan and something with a needle    Objective Measurements:  Objective Measure: R breast girth  Details: 41.2 cm in sittingObjective Measure: R UE  Details: -1.5%     Outcome Measures (most recent score):   LLIS = 4 on eval; will again complete at discharge    Goals:  Goal Identifier stg 1   Goal Description pt to have daytime tolerance to compression bra and insert for edema reduction and skin healing response to decrease risk of infections   Target Date 22   Date Met  08/15/22   Progress (detail required for progress note):       Goal Identifier ltg 1   Goal Description pt to be indep with longterm R UE/UQ edema management via HEP, elevation, skin cares and compression garment wear/use   Target Date 10/18/22   Date Met      Progress (detail required for progress note):       Plan:  Other: Patient last seen in clinic on 22 and then at that time all remaining appts cancelled and therapy being held until further diagnostics are completed as there is concern for cancer reoccurrence.  Will continue when/as medically appropriate once findings are known and plan established.    Discharge:  No      RECERTIFICATION    Precious Paris  1940  MRN: [id number]    Session Number: 11 ROBB RAYGOZA/Dr Jorge/[facility] since start of care.    Diagnosis: RUE/RUQ lymphedema   Onset Date: 22 (Date of referral)  Start of Care: 22 (date of eval)    Reasons for Continuing Treatment:    Goals not yet met. Therapy on hold due to possibility of cancer reoccurrence. See above.    Frequency/Duration  2 times per week for 12 weeks for a total of 24 visits.    Recertification Period  10/17/22 - 1/15/23    Physician Signature:    Date:    X_______________________________________________________    Physician Name: Dr. Ester Jorge MD    I certify the need for these services furnished under this plan of treatment and while under my care. Physician co-signature of this document indicates review and certification of the therapy plan.  This signature may be written on paper, or electronically signed within EPIC.

## 2022-10-18 ENCOUNTER — ANCILLARY PROCEDURE (OUTPATIENT)
Dept: MAMMOGRAPHY | Facility: CLINIC | Age: 82
End: 2022-10-18
Attending: NURSE PRACTITIONER
Payer: COMMERCIAL

## 2022-10-18 DIAGNOSIS — Z80.3 FAMILY HISTORY OF MALIGNANT NEOPLASM OF BREAST: ICD-10-CM

## 2022-10-18 DIAGNOSIS — N64.52 NIPPLE DISCHARGE: ICD-10-CM

## 2022-10-18 DIAGNOSIS — C50.411 MALIGNANT NEOPLASM OF UPPER-OUTER QUADRANT OF RIGHT BREAST IN FEMALE, ESTROGEN RECEPTOR NEGATIVE (H): ICD-10-CM

## 2022-10-18 DIAGNOSIS — R92.8 ABNORMAL MAMMOGRAM: ICD-10-CM

## 2022-10-18 DIAGNOSIS — C50.919 METASTATIC BREAST CANCER: ICD-10-CM

## 2022-10-18 DIAGNOSIS — Z17.1 MALIGNANT NEOPLASM OF UPPER-OUTER QUADRANT OF RIGHT BREAST IN FEMALE, ESTROGEN RECEPTOR NEGATIVE (H): ICD-10-CM

## 2022-10-18 PROCEDURE — 272N000431 US BREAST BIOPSY CORE NEEDLE RIGHT

## 2022-10-18 PROCEDURE — 272N000717 US BREAST BIOPSY CORE LYMPH NODE RIGHT

## 2022-10-18 PROCEDURE — 19083 BX BREAST 1ST LESION US IMAG: CPT | Mod: RT

## 2022-10-18 PROCEDURE — 88305 TISSUE EXAM BY PATHOLOGIST: CPT | Mod: TC | Performed by: NURSE PRACTITIONER

## 2022-10-18 PROCEDURE — 250N000009 HC RX 250: Performed by: NURSE PRACTITIONER

## 2022-10-18 PROCEDURE — 38505 NEEDLE BIOPSY LYMPH NODES: CPT | Mod: RT

## 2022-10-18 PROCEDURE — 999N000065 MA POST PROCEDURE RIGHT

## 2022-10-18 RX ADMIN — LIDOCAINE HYDROCHLORIDE 10 ML: 10 INJECTION, SOLUTION INFILTRATION; PERINEURAL at 08:27

## 2022-10-18 RX ADMIN — LIDOCAINE HYDROCHLORIDE 10 ML: 10 INJECTION, SOLUTION INFILTRATION; PERINEURAL at 08:28

## 2022-10-18 NOTE — DISCHARGE INSTRUCTIONS

## 2022-10-24 DIAGNOSIS — C77.3 CARCINOMA OF RIGHT BREAST METASTATIC TO AXILLARY LYMPH NODE (H): ICD-10-CM

## 2022-10-24 DIAGNOSIS — C50.411 MALIGNANT NEOPLASM OF UPPER-OUTER QUADRANT OF RIGHT BREAST IN FEMALE, ESTROGEN RECEPTOR NEGATIVE (H): Primary | ICD-10-CM

## 2022-10-24 DIAGNOSIS — Z17.1 MALIGNANT NEOPLASM OF UPPER-OUTER QUADRANT OF RIGHT BREAST IN FEMALE, ESTROGEN RECEPTOR NEGATIVE (H): Primary | ICD-10-CM

## 2022-10-24 DIAGNOSIS — C50.911 CARCINOMA OF RIGHT BREAST METASTATIC TO AXILLARY LYMPH NODE (H): ICD-10-CM

## 2022-10-24 PROCEDURE — 88305 TISSUE EXAM BY PATHOLOGIST: CPT | Mod: 26 | Performed by: PATHOLOGY

## 2022-10-24 PROCEDURE — 88342 IMHCHEM/IMCYTCHM 1ST ANTB: CPT | Mod: 26 | Performed by: PATHOLOGY

## 2022-10-24 PROCEDURE — 88341 IMHCHEM/IMCYTCHM EA ADD ANTB: CPT | Mod: 26 | Performed by: PATHOLOGY

## 2022-10-24 PROCEDURE — 88360 TUMOR IMMUNOHISTOCHEM/MANUAL: CPT | Mod: 26 | Performed by: PATHOLOGY

## 2022-10-27 ENCOUNTER — LAB (OUTPATIENT)
Dept: LAB | Facility: CLINIC | Age: 82
End: 2022-10-27
Attending: NURSE PRACTITIONER
Payer: COMMERCIAL

## 2022-10-27 ENCOUNTER — HOSPITAL ENCOUNTER (OUTPATIENT)
Dept: PET IMAGING | Facility: CLINIC | Age: 82
Discharge: HOME | End: 2022-10-27
Attending: NURSE PRACTITIONER
Payer: COMMERCIAL

## 2022-10-27 DIAGNOSIS — C50.411 MALIGNANT NEOPLASM OF UPPER-OUTER QUADRANT OF RIGHT BREAST IN FEMALE, ESTROGEN RECEPTOR NEGATIVE (H): ICD-10-CM

## 2022-10-27 DIAGNOSIS — C50.911 CARCINOMA OF RIGHT BREAST METASTATIC TO AXILLARY LYMPH NODE (H): ICD-10-CM

## 2022-10-27 DIAGNOSIS — Z17.1 MALIGNANT NEOPLASM OF UPPER-OUTER QUADRANT OF RIGHT BREAST IN FEMALE, ESTROGEN RECEPTOR NEGATIVE (H): ICD-10-CM

## 2022-10-27 DIAGNOSIS — C77.3 CARCINOMA OF RIGHT BREAST METASTATIC TO AXILLARY LYMPH NODE (H): ICD-10-CM

## 2022-10-27 LAB
ALBUMIN SERPL BCG-MCNC: 4.3 G/DL (ref 3.5–5.2)
ALP SERPL-CCNC: 84 U/L (ref 35–104)
ALT SERPL W P-5'-P-CCNC: 16 U/L (ref 10–35)
ANION GAP SERPL CALCULATED.3IONS-SCNC: 10 MMOL/L (ref 7–15)
AST SERPL W P-5'-P-CCNC: 22 U/L (ref 10–35)
BASOPHILS # BLD AUTO: 0 10E3/UL (ref 0–0.2)
BASOPHILS NFR BLD AUTO: 1 %
BILIRUB SERPL-MCNC: 0.3 MG/DL
BUN SERPL-MCNC: 13.1 MG/DL (ref 8–23)
CALCIUM SERPL-MCNC: 9.8 MG/DL (ref 8.8–10.2)
CHLORIDE SERPL-SCNC: 102 MMOL/L (ref 98–107)
CREAT SERPL-MCNC: 0.64 MG/DL (ref 0.51–0.95)
DEPRECATED HCO3 PLAS-SCNC: 27 MMOL/L (ref 22–29)
EOSINOPHIL # BLD AUTO: 0.1 10E3/UL (ref 0–0.7)
EOSINOPHIL NFR BLD AUTO: 1 %
ERYTHROCYTE [DISTWIDTH] IN BLOOD BY AUTOMATED COUNT: 13.6 % (ref 10–15)
GFR SERPL CREATININE-BSD FRML MDRD: 89 ML/MIN/1.73M2
GLUCOSE SERPL-MCNC: 116 MG/DL (ref 70–99)
HCT VFR BLD AUTO: 39.6 % (ref 35–47)
HGB BLD-MCNC: 13.1 G/DL (ref 11.7–15.7)
IMM GRANULOCYTES # BLD: 0 10E3/UL
IMM GRANULOCYTES NFR BLD: 0 %
LYMPHOCYTES # BLD AUTO: 1.4 10E3/UL (ref 0.8–5.3)
LYMPHOCYTES NFR BLD AUTO: 17 %
MCH RBC QN AUTO: 30.8 PG (ref 26.5–33)
MCHC RBC AUTO-ENTMCNC: 33.1 G/DL (ref 31.5–36.5)
MCV RBC AUTO: 93 FL (ref 78–100)
MONOCYTES # BLD AUTO: 0.7 10E3/UL (ref 0–1.3)
MONOCYTES NFR BLD AUTO: 9 %
NEUTROPHILS # BLD AUTO: 5.6 10E3/UL (ref 1.6–8.3)
NEUTROPHILS NFR BLD AUTO: 72 %
NRBC # BLD AUTO: 0 10E3/UL
NRBC BLD AUTO-RTO: 0 /100
PLATELET # BLD AUTO: 301 10E3/UL (ref 150–450)
POTASSIUM SERPL-SCNC: 4.7 MMOL/L (ref 3.4–5.3)
PROT SERPL-MCNC: 8.3 G/DL (ref 6.4–8.3)
RBC # BLD AUTO: 4.25 10E6/UL (ref 3.8–5.2)
SODIUM SERPL-SCNC: 139 MMOL/L (ref 136–145)
WBC # BLD AUTO: 7.9 10E3/UL (ref 4–11)

## 2022-10-27 PROCEDURE — 86300 IMMUNOASSAY TUMOR CA 15-3: CPT

## 2022-10-27 PROCEDURE — 78815 PET IMAGE W/CT SKULL-THIGH: CPT | Mod: PI

## 2022-10-27 PROCEDURE — 80053 COMPREHEN METABOLIC PANEL: CPT

## 2022-10-27 PROCEDURE — 36415 COLL VENOUS BLD VENIPUNCTURE: CPT

## 2022-10-27 PROCEDURE — 343N000001 HC RX 343: Performed by: NURSE PRACTITIONER

## 2022-10-27 PROCEDURE — A9552 F18 FDG: HCPCS | Performed by: NURSE PRACTITIONER

## 2022-10-27 PROCEDURE — 78815 PET IMAGE W/CT SKULL-THIGH: CPT | Mod: 26 | Performed by: RADIOLOGY

## 2022-10-27 PROCEDURE — 85025 COMPLETE CBC W/AUTO DIFF WBC: CPT

## 2022-10-27 RX ADMIN — FLUDEOXYGLUCOSE F-18 13.4 MCI.: 500 INJECTION, SOLUTION INTRAVENOUS at 08:11

## 2022-10-28 DIAGNOSIS — Z17.1 MALIGNANT NEOPLASM OF UPPER-OUTER QUADRANT OF RIGHT BREAST IN FEMALE, ESTROGEN RECEPTOR NEGATIVE (H): Primary | ICD-10-CM

## 2022-10-28 DIAGNOSIS — C50.411 MALIGNANT NEOPLASM OF UPPER-OUTER QUADRANT OF RIGHT BREAST IN FEMALE, ESTROGEN RECEPTOR NEGATIVE (H): Primary | ICD-10-CM

## 2022-10-28 LAB — CANCER AG15-3 SERPL-ACNC: 23 U/ML

## 2022-10-31 ENCOUNTER — ONCOLOGY VISIT (OUTPATIENT)
Dept: ONCOLOGY | Facility: CLINIC | Age: 82
End: 2022-10-31
Attending: NURSE PRACTITIONER
Payer: COMMERCIAL

## 2022-10-31 VITALS
RESPIRATION RATE: 12 BRPM | SYSTOLIC BLOOD PRESSURE: 164 MMHG | TEMPERATURE: 98.4 F | WEIGHT: 189 LBS | DIASTOLIC BLOOD PRESSURE: 76 MMHG | OXYGEN SATURATION: 97 % | HEART RATE: 87 BPM | BODY MASS INDEX: 31.94 KG/M2

## 2022-10-31 DIAGNOSIS — C50.911 INFILTRATING DUCTAL CARCINOMA OF RIGHT BREAST (H): ICD-10-CM

## 2022-10-31 DIAGNOSIS — Z17.1 MALIGNANT NEOPLASM OF UPPER-OUTER QUADRANT OF RIGHT BREAST IN FEMALE, ESTROGEN RECEPTOR NEGATIVE (H): Primary | ICD-10-CM

## 2022-10-31 DIAGNOSIS — C50.411 MALIGNANT NEOPLASM OF UPPER-OUTER QUADRANT OF RIGHT BREAST IN FEMALE, ESTROGEN RECEPTOR NEGATIVE (H): Primary | ICD-10-CM

## 2022-10-31 DIAGNOSIS — Z51.11 ENCOUNTER FOR ANTINEOPLASTIC CHEMOTHERAPY: ICD-10-CM

## 2022-10-31 PROCEDURE — 99215 OFFICE O/P EST HI 40 MIN: CPT | Performed by: INTERNAL MEDICINE

## 2022-10-31 PROCEDURE — G0463 HOSPITAL OUTPT CLINIC VISIT: HCPCS

## 2022-10-31 ASSESSMENT — PAIN SCALES - GENERAL: PAINLEVEL: NO PAIN (0)

## 2022-10-31 NOTE — H&P (VIEW-ONLY)
rodolfo kessler  [facility] Hematology and Oncology Progress Note    Patient: Precious Paris  MRN: [id number]  Oct 31, 2022          Reason for Visit  Chief Complaint   Patient presents with     Oncology Clinic Visit     Malignant neoplasm of upper-outer quadrant of right breast in female, estrogen receptor negative - provider visit only         Assessment/Plan  1. 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  PET scan was done on 10/27/2022.  Fortunately showing hypermetabolic lymph nodes in the bilateral axilla along with mediastinal and hilar lymphadenopathy and FDG avid pulmonary nodules all consistent with metastatic disease.  Reviewed PET scan images with her and her daughter today along with breast biopsy results.  This is concerning for metastatic triple negative breast cancer.  However it is a bit unusual for the contralateral axillary lymph nodes to be involved by the right-sided breast cancer.  And also to establish the metastatic disease I recommended biopsy of the left axillary lymph nodes.  If these come back positive for breast cancer then we have established that she has stage IV and we will move on with treatment plan which will be systemic chemotherapy.      Previously she has single agent Taxol in the neoadjuvant setting followed by adjuvant capecitabine.  She did not do that well with both neoadjuvant and adjuvant chemotherapy.  Adjuvant capecitabine was per chart soon after she started it due to significant skin rash.  However she has good performance status and could tolerate single agent chemotherapy is like Doxil or gemcitabine etc. she has left axillary lymph node biopsy scheduled for tomorrow.  We will see her back in a week to discuss the biopsy results and make further plans.  She will need an echocardiogram had a port  placement along with an MRI of the brain to make sure there is no intracranial mets.    She is agreeable with the plan.  ____________________________________________________________________________    History of Present Illness/ Interval History    Ms. Precious Paris is a 81 year old treated for right triple negative breast cancer almost 2 yrs ago.  She is here to discuss PET scan and recent breast biopsy results and make further follow-up plans.     Recently she was seen by my colleague regarding new onset right-sided nipple discharge.  There was concern for possible infection and she was treated with some antibiotics.  The nipple discharge started a few weeks ago and turn bloody within a matter of a week.  Associated with some discomfort and swelling in the right breast.  She has had issues with right-sided breast postlumpectomy and sentinel lymph node biopsy about 2 years ago.  Mammogram was ordered which came back showing abnormality in the right breast along with right axillary lymphadenopathy.    She underwent ultrasound-guided biopsy of the right breast mass and right axillary lymph node which came back positive for invasive ductal carcinoma which was triple negative.  This was consistent with disease recurrence.  However since she had clinically node positive disease a PET scan was ordered.  She is here to review the PET scan results and make further follow-up plans.  She continues to have issues with right breast swelling and edema.  Continues to have some bloody discharge from the right nipple.  No fever.    ECOG PS: 1      Oncology History/Treatment  Diagnosis/Stage:   11/2019: Right breast cancer, triple negative ([id number])    BRCA negative    Treatment:  2019: Neoadjuvant taxol    Lumpectomy    5/2020: Adjuvant RT    6 - 7/2020: Adjuvant Xeloda. Stopped for rash      Physical Exam    GENERAL: Alert and oriented to time place and person. Seated comfortably. In no distress.  HEAD: Atraumatic and  normocephalic. No alopecia.  EYES: DELISA, EOMI. No erythema. No icterus.  BREASTS: Emigrate is felt in the right breast with inverted nipple and bloody nipple discharge.  She has dried blood on the nipple.  No redness or signs of infection.  Does have lymphedema.  Also has bilateral hard palpable lymphadenopathy consistent with metastatic disease.  Left breast is normal.   CHEST: clear to auscultation bilaterally. Resonant to percussion throughout bilaterally. Symmetrical breath movements bilaterally.  CVS: S1 and S2 are heard. Regular rate and rhythm.   ABDOMEN: Soft. Not tender. Not distended.  EXTREMITIES: Warm. No peripheral edema.  SKIN: no rash, or bruising or purpura.   NEURO: No gross deficit noted. Non-antalgic gait.      Lab Results    None    Imaging  MA Post Procedure Right    Addendum Date: 10/25/2022    Pathology shows invasive ductal carcinoma. This is consistent with imaging findings. A verbal report was given to the patient. Surgical  and oncological consultation is recommended.    Result Date: 10/25/2022  US Breast Biopsy Core Needle, 1St Lesion Right INDICATION: Right breast mass. PROCEDURE: Informed consent was obtained from the patient. Ultrasound was used to localize the mass at the 11 o'clock position of the right breast, 1 cm from the nipple.  The skin was marked, then prepped and draped in a sterile fashion. 1% lidocaine was used for local anesthesia. Under direct sonographic guidance, a 14-gauge coaxial needle was used to obtain 3 core biopsies.  A biopsy marking clip was then placed.  Digital mammograms performed in a separate room, using separate equipment, to document clip placement. The mammogram showed the clip to be in good position. The patient tolerated this well; there are no immediate complications.    IMPRESSION: 1. Ultrasound-guided biopsy of mass in the right breast. Pathology pending. 2. Post procedure mammogram for marker placement     US Breast Biopsy Core Lymph Node  Right    Addendum Date: 10/25/2022    Pathology shows METASTATIC/RECURRENT BREAST CARCINOMA. This is consistent with imaging findings. A verbal report was given to the patient. Surgical and oncological consultation is recommended.    Result Date: 10/25/2022  RIGHT LYMPH NODE ULTRASOUND GUIDED BIOPSY, CLIP PLACEMENT: INDICATIONS: Abnormal Lymph Node PROCEDURE: Informed consent was obtained from the patient. Ultrasound was used to localize the lymph node in the right axilla. The skin was prepped and draped in a sterile fashion. Lidocaine was used for local anesthesia. Under direct sonographic guidance, a 12 gauge coaxial needle was used to obtain 2 core biopises.  A hydromark T4 marking clip was placed with ultrasound guidance.   A mammogram was obtained but the axillary clip could not be visualized.  The patient tolerated this well, there are no immediate complications.     IMPRESSION: Ultrasound guided biopsy of a suspicious lymph node in the right axilla. Pathology pending.     US Breast Right    Result Date: 10/13/2022  DIAGNOSTIC MAMMOGRAM, BILATERAL, DIGITAL w/FIDEL, w/CAD; TARGETED ULTRASOUND, RIGHT BREAST  10/12/2022 12:59 PM HISTORY: History of breast cancer in the right breast in 2019 status post lumpectomy, chemotherapy and radiation treatment. Right breast is progressively getting firmer with worsening nipple retraction. Patient also has worsening axillary tenderness. Patient had initially clear right breast discharge, now is bloody. Reported history of right-sided lymphedema since breast cancer treatment. COMPARISON:  Mammograms dated 10/21/2021, 10/19/2021, 3/20/2020, 11/15/2019, 10/30/2019, 10/18/2018 and 9/30/2016. BREAST DENSITY: Heterogeneously dense. Limiting mammographic sensitivity. FINDINGS: There has been increased density of the right breast parenchymal tissues as compared to the prior studies. Skin thickening in the right breast has also slightly worsened since the older studies but is similar  to the prior study from 2021. Conservation therapy changes of the right breast are again noted. There is a subtle density in the right axillary region likely representing lymph node seen on the prior studies. No other new mammographic findings of concern for malignancy. Targeted ultrasound of the periareolar/retroareolar right breast demonstrates a hypoechoic lobular mass measuring 3.2 x 1.6 x 2.4 cm at the approximate 11:00 position 1 cm from the nipple. Multiple other small hypoechoic solid-appearing lesions are seen in the right breast with three located at the approximate 12 to 1:00 position measuring 0.7 x 0.6 x 0.8 cm (11:00 position 1 cm from the nipple, 0.4 x 0.4 x 0.5 cm at the 12:00 position 1 cm from the nipple, and 0.4 x 0.4 x 0.4 cm at the 12:00 position 1 cm from the nipple. Another hypoechoic solid-appearing lesion at the 9:00 position 2 cm from the nipple of the right breast measures 1.2 x 0.8 x 0.9 cm. There is skin thickening of the breast which could be due to radiation treatment. Targeted ultrasound of the right axilla demonstrates at least two hypoechoic vascular lesions measuring up to 2.5 x 1.3 cm, likely representing abnormally enlarged lymph nodes.     IMPRESSION: BI-RADS CATEGORY: 5 - Highly Suggestive of Malignancy-Appropriate Action Should Be Taken. Findings are likely suspicious for recurrent malignancy in the retroareolar right breast, a large mass with questionable multiple additional satellite lesions. Additionally there is lymphadenopathy in the right axillary region. RECOMMENDED FOLLOW-UP: Biopsy of the largest retroareolar mass and of the right axillary lymphadenopathy. MRI of the breasts as clinically indicated. I discussed the findings and recommendations with the patient at the time of the exam. I also discussed these findings and recommendations with Lazara Torres, nurse practitioner taking care of the patient, on 10/12/2022 at approximately 2:00 PM. EDVIN ALMEIDA MD   SYSTEM ID:   [id number]     US Breast Biopsy Core Needle, 1St Lesion Right    Addendum Date: 10/25/2022    Pathology shows invasive ductal carcinoma. This is consistent with imaging findings. A verbal report was given to the patient. Surgical  and oncological consultation is recommended.    Result Date: 10/25/2022  US Breast Biopsy Core Needle, 1St Lesion Right INDICATION: Right breast mass. PROCEDURE: Informed consent was obtained from the patient. Ultrasound was used to localize the mass at the 11 o'clock position of the right breast, 1 cm from the nipple.  The skin was marked, then prepped and draped in a sterile fashion. 1% lidocaine was used for local anesthesia. Under direct sonographic guidance, a 14-gauge coaxial needle was used to obtain 3 core biopsies.  A biopsy marking clip was then placed.  Digital mammograms performed in a separate room, using separate equipment, to document clip placement. The mammogram showed the clip to be in good position. The patient tolerated this well; there are no immediate complications.    IMPRESSION: 1. Ultrasound-guided biopsy of mass in the right breast. Pathology pending. 2. Post procedure mammogram for marker placement     PET Oncology (Eyes to Thighs)    Result Date: 10/27/2022  Combined Report of:    PET and CT on  10/27/2022 9:54 AM : 1. PET of the neck, chest, abdomen, and pelvis. 2. PET CT Fusion for Attenuation Correction and Anatomical Localization:  3. 3D MIP and PET-CT fused images were processed on an independent workstation and archived to PACS and reviewed by a radiologist. Technique: 1. PET: The patient received 13.4 mCi of F-18-FDG; the serum glucose was 116 prior to administration, body weight was 88 kg. Images were evaluated in the axial, sagittal, and coronal planes as well as the rotational whole body MIP. Images were acquired from the Vertex to the thighs. UPTAKE WAS MEASURED AT 67 MINUTES. BACKGROUND:  Liver SUV max= 3.1,   Aorta Blood SUV Max: 2.1. 2. CT: CT only  obtained for attenuation correction and not diagnostic purposes. INDICATION: recurrent triple negative breast cancer. Restaging; Malignant neoplasm of upper-outer quadrant of right breast in female, 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); Malignant neoplasm of upper-outer quadrant of right breast in female, 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); Carcinoma of right breast metastatic to axillary lymph node (H); Carcinoma of right breast metastatic to axillary lymph node (H) ADDITIONAL INFORMATION OBTAINED FROM EMR: Has recent drainage from her right nipple. History of right axilla lymphedema COMPARISON: None. FINDINGS: HEAD/NECK: There is no suspicious FDG uptake in the neck. CHEST: Hypermetabolic right breast mass at the area of prior lumpectomy measured up to 4.7 x 2.1 cm with max SUV 9.7. There are hypermetabolic axillary and retropectoral lymph nodes bilaterally such as on the right with Max SUV 14.5. There are multiple hypermetabolic supraclavicular lymph nodes, left greater than right and multiple hypermetabolic mediastinal lymph nodes such as a precarinal lymph node with max SUV 11.2. Multiple hypermetabolic right hilar lymph nodes. There is a hypermetabolic right lower lobe subpleural nodule measuring up to 11 mm with maximum SUV 6.3. There is also hypermetabolic subpleural nodule in the left lower lobe measuring up to 12 mm. A few other smaller pulmonary nodules. Right anterior pleural metastatic deposits. ABDOMEN AND PELVIS: There is no suspicious FDG uptake in the abdomen or pelvis. Cholelithiasis. Diffuse fatty atrophy of the pancreas. Simple cyst in the left kidney. Colonic diverticulosis without evidence of diverticulitis. Vascular calcifications. Small sliding type hiatal hernia. Diffuse large bowel uptake, presumably physiologic. LOWER EXTREMITIES: No abnormal masses or hypermetabolic lesions. BONES: There are no suspicious lytic or blastic osseous lesions.  There is no abnormal FDG uptake in the skeleton.  Degenerative changes in the spine.     IMPRESSION: In this patient with history of triple negative right breast cancer: 1. Hypermetabolic, recurrent right breast mass, consistent with disease recurrence. 2. Hypermetabolic lymph nodes in the bilateral axillary, retropectoral, supraclavicular, mediastinal and right hilar stations. The left-sided lymph nodes are in an atypical pattern for right-sided breast cancer and a concurrent secondary malignancy is possible, such as a lymphoproliferative process. Consider tissue sampling of the left axillary lymph nodes. 3. Hypermetabolic bilateral lung and right pleural nodules, consistent with metastatic disease. I have personally reviewed the examination and initial interpretation and I agree with the findings. HIRAM LOPEZ MD   SYSTEM ID:  [id number]    MA Diagnostic Bilateral w/Fidel    Result Date: 10/13/2022  DIAGNOSTIC MAMMOGRAM, BILATERAL, DIGITAL w/FIDEL, w/CAD; TARGETED ULTRASOUND, RIGHT BREAST  10/12/2022 12:59 PM HISTORY: History of breast cancer in the right breast in 2019 status post lumpectomy, chemotherapy and radiation treatment. Right breast is progressively getting firmer with worsening nipple retraction. Patient also has worsening axillary tenderness. Patient had initially clear right breast discharge, now is bloody. Reported history of right-sided lymphedema since breast cancer treatment. COMPARISON:  Mammograms dated 10/21/2021, 10/19/2021, 3/20/2020, 11/15/2019, 10/30/2019, 10/18/2018 and 9/30/2016. BREAST DENSITY: Heterogeneously dense. Limiting mammographic sensitivity. FINDINGS: There has been increased density of the right breast parenchymal tissues as compared to the prior studies. Skin thickening in the right breast has also slightly worsened since the older studies but is similar to the prior study from 2021. Conservation therapy changes of the right breast are again noted. There is a subtle density in the right axillary region likely  representing lymph node seen on the prior studies. No other new mammographic findings of concern for malignancy. Targeted ultrasound of the periareolar/retroareolar right breast demonstrates a hypoechoic lobular mass measuring 3.2 x 1.6 x 2.4 cm at the approximate 11:00 position 1 cm from the nipple. Multiple other small hypoechoic solid-appearing lesions are seen in the right breast with three located at the approximate 12 to 1:00 position measuring 0.7 x 0.6 x 0.8 cm (11:00 position 1 cm from the nipple, 0.4 x 0.4 x 0.5 cm at the 12:00 position 1 cm from the nipple, and 0.4 x 0.4 x 0.4 cm at the 12:00 position 1 cm from the nipple. Another hypoechoic solid-appearing lesion at the 9:00 position 2 cm from the nipple of the right breast measures 1.2 x 0.8 x 0.9 cm. There is skin thickening of the breast which could be due to radiation treatment. Targeted ultrasound of the right axilla demonstrates at least two hypoechoic vascular lesions measuring up to 2.5 x 1.3 cm, likely representing abnormally enlarged lymph nodes.     IMPRESSION: BI-RADS CATEGORY: 5 - Highly Suggestive of Malignancy-Appropriate Action Should Be Taken. Findings are likely suspicious for recurrent malignancy in the retroareolar right breast, a large mass with questionable multiple additional satellite lesions. Additionally there is lymphadenopathy in the right axillary region. RECOMMENDED FOLLOW-UP: Biopsy of the largest retroareolar mass and of the right axillary lymphadenopathy. MRI of the breasts as clinically indicated. I discussed the findings and recommendations with the patient at the time of the exam. I also discussed these findings and recommendations with Lazara Torres, nurse practitioner taking care of the patient, on 10/12/2022 at approximately 2:00 PM. EDVIN ALMEIDA MD   SYSTEM ID:  [id number]         Billing  Total time 45 minutes, to include face to face visit, review of EMR, ordering, documentation and coordination of care    Signed by:  Maricarmen Beebe MD   '

## 2022-10-31 NOTE — PROGRESS NOTES
"Oncology Rooming Note    October 31, 2022 11:03 AM   Precious Paris is a 81 year old female who presents for:    Chief Complaint   Patient presents with     Oncology Clinic Visit     Malignant neoplasm of upper-outer quadrant of right breast in female, estrogen receptor negative - provider visit only     Initial Vitals: BP (!) 164/76 (BP Location: Right arm, Patient Position: Sitting, Cuff Size: Adult Large)   Pulse 87   Temp 98.4  F (36.9  C) (Tympanic)   Resp 12   Wt 85.7 kg (189 lb)   SpO2 97%   BMI 31.94 kg/m   Estimated body mass index is 31.94 kg/m  as calculated from the following:    Height as of 6/30/22: 1.638 m (5' 4.5\").    Weight as of this encounter: 85.7 kg (189 lb). Body surface area is 1.97 meters squared.  No Pain (0) Comment: Data Unavailable   No LMP recorded. Patient is postmenopausal.  Allergies reviewed: Yes  Medications reviewed: Yes    Medications: Medication refills not needed today.  Pharmacy name entered into [facility]: [facility] - [facility] [id number] [facility]    Clinical concerns:  None      Malgorzata Vallejo CMA            "

## 2022-10-31 NOTE — LETTER
"    10/31/2022         RE: Precious Paris  [address] [place] [address]        Dear Colleague,    Thank you for referring your patient, Precious Paris, to the [facility]. Please see a copy of my visit note below.    Oncology Rooming Note    October 31, 2022 11:03 AM   Precious Paris is a 81 year old female who presents for:    Chief Complaint   Patient presents with     Oncology Clinic Visit     Malignant neoplasm of upper-outer quadrant of right breast in female, estrogen receptor negative - provider visit only     Initial Vitals: BP (!) 164/76 (BP Location: Right arm, Patient Position: Sitting, Cuff Size: Adult Large)   Pulse 87   Temp 98.4  F (36.9  C) (Tympanic)   Resp 12   Wt 85.7 kg (189 lb)   SpO2 97%   BMI 31.94 kg/m   Estimated body mass index is 31.94 kg/m  as calculated from the following:    Height as of 6/30/22: 1.638 m (5' 4.5\").    Weight as of this encounter: 85.7 kg (189 lb). Body surface area is 1.97 meters squared.  No Pain (0) Comment: Data Unavailable   No LMP recorded. Patient is postmenopausal.  Allergies reviewed: Yes  Medications reviewed: Yes    Medications: Medication refills not needed today.  [facility]arm[facility] entered into [employer] [facility] [place] [facility] [id number] [facility]    Clinical concerns:  None      Malgorzata Vallejo, AXEL              [facility]  [facility] Hematology and Oncology Progress Note    Patient: Precious Paris  MRN: [id number]  Oct 31, 2022          Reason for Visit  Chief Complaint   Patient presents with     Oncology Clinic Visit     Malignant neoplasm of upper-outer quadrant of right breast in female, estrogen receptor negative - provider visit only         Assessment/Plan  1. 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  " PET scan was done on 10/27/2022.  Fortunately showing hypermetabolic lymph nodes in the bilateral axilla along with mediastinal and hilar lymphadenopathy and FDG avid pulmonary nodules all consistent with metastatic disease.  Reviewed PET scan images with her and her daughter today along with breast biopsy results.  This is concerning for metastatic triple negative breast cancer.  However it is a bit unusual for the contralateral axillary lymph nodes to be involved by the right-sided breast cancer.  And also to establish the metastatic disease I recommended biopsy of the left axillary lymph nodes.  If these come back positive for breast cancer then we have established that she has stage IV and we will move on with treatment plan which will be systemic chemotherapy.      Previously she has single agent Taxol in the neoadjuvant setting followed by adjuvant capecitabine.  She did not do that well with both neoadjuvant and adjuvant chemotherapy.  Adjuvant capecitabine was per chart soon after she started it due to significant skin rash.  However she has good performance status and could tolerate single agent chemotherapy is like Doxil or gemcitabine etc. she has left axillary lymph node biopsy scheduled for tomorrow.  We will see her back in a week to discuss the biopsy results and make further plans.  She will need an echocardiogram had a port placement along with an MRI of the brain to make sure there is no intracranial mets.    She is agreeable with the plan.  ____________________________________________________________________________    History of Present Illness/ Interval History    Ms. Precious Paris is a 81 year old treated for right triple negative breast cancer almost 2 yrs ago.  She is here to discuss PET scan and recent breast biopsy results and make further follow-up plans.     Recently she was seen by my colleague regarding new onset right-sided nipple discharge.  There was concern for possible  infection and she was treated with some antibiotics.  The nipple discharge started a few weeks ago and turn bloody within a matter of a week.  Associated with some discomfort and swelling in the right breast.  She has had issues with right-sided breast postlumpectomy and sentinel lymph node biopsy about 2 years ago.  Mammogram was ordered which came back showing abnormality in the right breast along with right axillary lymphadenopathy.    She underwent ultrasound-guided biopsy of the right breast mass and right axillary lymph node which came back positive for invasive ductal carcinoma which was triple negative.  This was consistent with disease recurrence.  However since she had clinically node positive disease a PET scan was ordered.  She is here to review the PET scan results and make further follow-up plans.  She continues to have issues with right breast swelling and edema.  Continues to have some bloody discharge from the right nipple.  No fever.    ECOG PS: 1      Oncology History/Treatment  Diagnosis/Stage:   11/2019: Right breast cancer, triple negative ([id number])    BRCA negative    Treatment:  2019: Neoadjuvant taxol    Lumpectomy    5/2020: Adjuvant RT    6 - 7/2020: Adjuvant Xeloda. Stopped for rash      Physical Exam    GENERAL: Alert and oriented to time place and person. Seated comfortably. In no distress.  HEAD: Atraumatic and normocephalic. No alopecia.  EYES: DELISA, EOMI. No erythema. No icterus.  BREASTS: Emigrate is felt in the right breast with inverted nipple and bloody nipple discharge.  She has dried blood on the nipple.  No redness or signs of infection.  Does have lymphedema.  Also has bilateral hard palpable lymphadenopathy consistent with metastatic disease.  Left breast is normal.   CHEST: clear to auscultation bilaterally. Resonant to percussion throughout bilaterally. Symmetrical breath movements bilaterally.  CVS: S1 and S2 are heard. Regular rate and rhythm.   ABDOMEN: Soft. Not  tender. Not distended.  EXTREMITIES: Warm. No peripheral edema.  SKIN: no rash, or bruising or purpura.   NEURO: No gross deficit noted. Non-antalgic gait.      Lab Results    None    Imaging  MA Post Procedure Right    Addendum Date: 10/25/2022    Pathology shows invasive ductal carcinoma. This is consistent with imaging findings. A verbal report was given to the patient. Surgical  and oncological consultation is recommended.    Result Date: 10/25/2022  US Breast Biopsy Core Needle, 1St Lesion Right INDICATION: Right breast mass. PROCEDURE: Informed consent was obtained from the patient. Ultrasound was used to localize the mass at the 11 o'clock position of the right breast, 1 cm from the nipple.  The skin was marked, then prepped and draped in a sterile fashion. 1% lidocaine was used for local anesthesia. Under direct sonographic guidance, a 14-gauge coaxial needle was used to obtain 3 core biopsies.  A biopsy marking clip was then placed.  Digital mammograms performed in a separate room, using separate equipment, to document clip placement. The mammogram showed the clip to be in good position. The patient tolerated this well; there are no immediate complications.    IMPRESSION: 1. Ultrasound-guided biopsy of mass in the right breast. Pathology pending. 2. Post procedure mammogram for marker placement     US Breast Biopsy Core Lymph Node Right    Addendum Date: 10/25/2022    Pathology shows METASTATIC/RECURRENT BREAST CARCINOMA. This is consistent with imaging findings. A verbal report was given to the patient. Surgical and oncological consultation is recommended.    Result Date: 10/25/2022  RIGHT LYMPH NODE ULTRASOUND GUIDED BIOPSY, CLIP PLACEMENT: INDICATIONS: Abnormal Lymph Node PROCEDURE: Informed consent was obtained from the patient. Ultrasound was used to localize the lymph node in the right axilla. The skin was prepped and draped in a sterile fashion. Lidocaine was used for local anesthesia. Under direct  sonographic guidance, a 12 gauge coaxial needle was used to obtain 2 core biopises.  A hydromark T4 marking clip was placed with ultrasound guidance.   A mammogram was obtained but the axillary clip could not be visualized.  The patient tolerated this well, there are no immediate complications.     IMPRESSION: Ultrasound guided biopsy of a suspicious lymph node in the right axilla. Pathology pending.     US Breast Right    Result Date: 10/13/2022  DIAGNOSTIC MAMMOGRAM, BILATERAL, DIGITAL w/FIDEL, w/CAD; TARGETED ULTRASOUND, RIGHT BREAST  10/12/2022 12:59 PM HISTORY: History of breast cancer in the right breast in 2019 status post lumpectomy, chemotherapy and radiation treatment. Right breast is progressively getting firmer with worsening nipple retraction. Patient also has worsening axillary tenderness. Patient had initially clear right breast discharge, now is bloody. Reported history of right-sided lymphedema since breast cancer treatment. COMPARISON:  Mammograms dated 10/21/2021, 10/19/2021, 3/20/2020, 11/15/2019, 10/30/2019, 10/18/2018 and 9/30/2016. BREAST DENSITY: Heterogeneously dense. Limiting mammographic sensitivity. FINDINGS: There has been increased density of the right breast parenchymal tissues as compared to the prior studies. Skin thickening in the right breast has also slightly worsened since the older studies but is similar to the prior study from 2021. Conservation therapy changes of the right breast are again noted. There is a subtle density in the right axillary region likely representing lymph node seen on the prior studies. No other new mammographic findings of concern for malignancy. Targeted ultrasound of the periareolar/retroareolar right breast demonstrates a hypoechoic lobular mass measuring 3.2 x 1.6 x 2.4 cm at the approximate 11:00 position 1 cm from the nipple. Multiple other small hypoechoic solid-appearing lesions are seen in the right breast with three located at the approximate 12  to 1:00 position measuring 0.7 x 0.6 x 0.8 cm (11:00 position 1 cm from the nipple, 0.4 x 0.4 x 0.5 cm at the 12:00 position 1 cm from the nipple, and 0.4 x 0.4 x 0.4 cm at the 12:00 position 1 cm from the nipple. Another hypoechoic solid-appearing lesion at the 9:00 position 2 cm from the nipple of the right breast measures 1.2 x 0.8 x 0.9 cm. There is skin thickening of the breast which could be due to radiation treatment. Targeted ultrasound of the right axilla demonstrates at least two hypoechoic vascular lesions measuring up to 2.5 x 1.3 cm, likely representing abnormally enlarged lymph nodes.     IMPRESSION: BI-RADS CATEGORY: 5 - Highly Suggestive of Malignancy-Appropriate Action Should Be Taken. Findings are likely suspicious for recurrent malignancy in the retroareolar right breast, a large mass with questionable multiple additional satellite lesions. Additionally there is lymphadenopathy in the right axillary region. RECOMMENDED FOLLOW-UP: Biopsy of the largest retroareolar mass and of the right axillary lymphadenopathy. MRI of the breasts as clinically indicated. I discussed the findings and recommendations with the patient at the time of the exam. I also discussed these findings and recommendations with Lazara Torres, nurse practitioner taking care of the patient, on 10/12/2022 at approximately 2:00 PM. EDVIN ALMEIDA MD   SYSTEM ID:  [id number]     US Breast Biopsy Core Needle, 1St Lesion Right    Addendum Date: 10/25/2022    Pathology shows invasive ductal carcinoma. This is consistent with imaging findings. A verbal report was given to the patient. Surgical  and oncological consultation is recommended.    Result Date: 10/25/2022  US Breast Biopsy Core Needle, 1St Lesion Right INDICATION: Right breast mass. PROCEDURE: Informed consent was obtained from the patient. Ultrasound was used to localize the mass at the 11 o'clock position of the right breast, 1 cm from the nipple.  The skin was marked, then prepped  and draped in a sterile fashion. 1% lidocaine was used for local anesthesia. Under direct sonographic guidance, a 14-gauge coaxial needle was used to obtain 3 core biopsies.  A biopsy marking clip was then placed.  Digital mammograms performed in a separate room, using separate equipment, to document clip placement. The mammogram showed the clip to be in good position. The patient tolerated this well; there are no immediate complications.    IMPRESSION: 1. Ultrasound-guided biopsy of mass in the right breast. Pathology pending. 2. Post procedure mammogram for marker placement     PET Oncology (Eyes to Thighs)    Result Date: 10/27/2022  Combined Report of:    PET and CT on  10/27/2022 9:54 AM : 1. PET of the neck, chest, abdomen, and pelvis. 2. PET CT Fusion for Attenuation Correction and Anatomical Localization:  3. 3D MIP and PET-CT fused images were processed on an independent workstation and archived to PACS and reviewed by a radiologist. Technique: 1. PET: The patient received 13.4 mCi of F-18-FDG; the serum glucose was 116 prior to administration, body weight was 88 kg. Images were evaluated in the axial, sagittal, and coronal planes as well as the rotational whole body MIP. Images were acquired from the Vertex to the thighs. UPTAKE WAS MEASURED AT 67 MINUTES. BACKGROUND:  Liver SUV max= 3.1,   Aorta Blood SUV Max: 2.1. 2. CT: CT only obtained for attenuation correction and not diagnostic purposes. INDICATION: recurrent triple negative breast cancer. Restaging; Malignant neoplasm of upper-outer quadrant of right breast in female, 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); Malignant neoplasm of upper-outer quadrant of right breast in female, 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); Carcinoma of right breast metastatic to axillary lymph node (H); Carcinoma of right breast metastatic to axillary lymph node (H) ADDITIONAL INFORMATION OBTAINED FROM EMR: Has recent drainage from her right nipple. History of right axilla lymphedema  COMPARISON: None. FINDINGS: HEAD/NECK: There is no suspicious FDG uptake in the neck. CHEST: Hypermetabolic right breast mass at the area of prior lumpectomy measured up to 4.7 x 2.1 cm with max SUV 9.7. There are hypermetabolic axillary and retropectoral lymph nodes bilaterally such as on the right with Max SUV 14.5. There are multiple hypermetabolic supraclavicular lymph nodes, left greater than right and multiple hypermetabolic mediastinal lymph nodes such as a precarinal lymph node with max SUV 11.2. Multiple hypermetabolic right hilar lymph nodes. There is a hypermetabolic right lower lobe subpleural nodule measuring up to 11 mm with maximum SUV 6.3. There is also hypermetabolic subpleural nodule in the left lower lobe measuring up to 12 mm. A few other smaller pulmonary nodules. Right anterior pleural metastatic deposits. ABDOMEN AND PELVIS: There is no suspicious FDG uptake in the abdomen or pelvis. Cholelithiasis. Diffuse fatty atrophy of the pancreas. Simple cyst in the left kidney. Colonic diverticulosis without evidence of diverticulitis. Vascular calcifications. Small sliding type hiatal hernia. Diffuse large bowel uptake, presumably physiologic. LOWER EXTREMITIES: No abnormal masses or hypermetabolic lesions. BONES: There are no suspicious lytic or blastic osseous lesions.  There is no abnormal FDG uptake in the skeleton. Degenerative changes in the spine.     IMPRESSION: In this patient with history of triple negative right breast cancer: 1. Hypermetabolic, recurrent right breast mass, consistent with disease recurrence. 2. Hypermetabolic lymph nodes in the bilateral axillary, retropectoral, supraclavicular, mediastinal and right hilar stations. The left-sided lymph nodes are in an atypical pattern for right-sided breast cancer and a concurrent secondary malignancy is possible, such as a lymphoproliferative process. Consider tissue sampling of the left axillary lymph nodes. 3. Hypermetabolic bilateral  lung and right pleural nodules, consistent with metastatic disease. I have personally reviewed the examination and initial interpretation and I agree with the findings. HIRAM LOPEZ MD   SYSTEM ID:  [id number]    MA Diagnostic Bilateral w/Fidel    Result Date: 10/13/2022  DIAGNOSTIC MAMMOGRAM, BILATERAL, DIGITAL w/FIDEL, w/CAD; TARGETED ULTRASOUND, RIGHT BREAST  10/12/2022 12:59 PM HISTORY: History of breast cancer in the right breast in 2019 status post lumpectomy, chemotherapy and radiation treatment. Right breast is progressively getting firmer with worsening nipple retraction. Patient also has worsening axillary tenderness. Patient had initially clear right breast discharge, now is bloody. Reported history of right-sided lymphedema since breast cancer treatment. COMPARISON:  Mammograms dated 10/21/2021, 10/19/2021, 3/20/2020, 11/15/2019, 10/30/2019, 10/18/2018 and 9/30/2016. BREAST DENSITY: Heterogeneously dense. Limiting mammographic sensitivity. FINDINGS: There has been increased density of the right breast parenchymal tissues as compared to the prior studies. Skin thickening in the right breast has also slightly worsened since the older studies but is similar to the prior study from 2021. Conservation therapy changes of the right breast are again noted. There is a subtle density in the right axillary region likely representing lymph node seen on the prior studies. No other new mammographic findings of concern for malignancy. Targeted ultrasound of the periareolar/retroareolar right breast demonstrates a hypoechoic lobular mass measuring 3.2 x 1.6 x 2.4 cm at the approximate 11:00 position 1 cm from the nipple. Multiple other small hypoechoic solid-appearing lesions are seen in the right breast with three located at the approximate 12 to 1:00 position measuring 0.7 x 0.6 x 0.8 cm (11:00 position 1 cm from the nipple, 0.4 x 0.4 x 0.5 cm at the 12:00 position 1 cm from the nipple, and 0.4 x 0.4 x 0.4 cm at  the 12:00 position 1 cm from the nipple. Another hypoechoic solid-appearing lesion at the 9:00 position 2 cm from the nipple of the right breast measures 1.2 x 0.8 x 0.9 cm. There is skin thickening of the breast which could be due to radiation treatment. Targeted ultrasound of the right axilla demonstrates at least two hypoechoic vascular lesions measuring up to 2.5 x 1.3 cm, likely representing abnormally enlarged lymph nodes.     IMPRESSION: BI-RADS CATEGORY: 5 - Highly Suggestive of Malignancy-Appropriate Action Should Be Taken. Findings are likely suspicious for recurrent malignancy in the retroareolar right breast, a large mass with questionable multiple additional satellite lesions. Additionally there is lymphadenopathy in the right axillary region. RECOMMENDED FOLLOW-UP: Biopsy of the largest retroareolar mass and of the right axillary lymphadenopathy. MRI of the breasts as clinically indicated. I discussed the findings and recommendations with the patient at the time of the exam. I also discussed these findings and recommendations with Lazara Torres, nurse practitioner taking care of the patient, on 10/12/2022 at approximately 2:00 PM. EDVIN ALMEIDA MD   SYSTEM ID:  [id number]         Billing  Total time 45 minutes, to include face to face visit, review of EMR, ordering, documentation and coordination of care    Signed by: Maricarmen Beebe MD   '      Again, thank you for allowing me to participate in the care of your patient.        Sincerely,        Maricarmen Beebe MD

## 2022-10-31 NOTE — PROGRESS NOTES
rodolfo kessler  [facility] Hematology and Oncology Progress Note    Patient: Precious aPris  MRN: [id number]  Oct 31, 2022          Reason for Visit  Chief Complaint   Patient presents with     Oncology Clinic Visit     Malignant neoplasm of upper-outer quadrant of right breast in female, estrogen receptor negative - provider visit only         Assessment/Plan  1. 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  PET scan was done on 10/27/2022.  Fortunately showing hypermetabolic lymph nodes in the bilateral axilla along with mediastinal and hilar lymphadenopathy and FDG avid pulmonary nodules all consistent with metastatic disease.  Reviewed PET scan images with her and her daughter today along with breast biopsy results.  This is concerning for metastatic triple negative breast cancer.  However it is a bit unusual for the contralateral axillary lymph nodes to be involved by the right-sided breast cancer.  And also to establish the metastatic disease I recommended biopsy of the left axillary lymph nodes.  If these come back positive for breast cancer then we have established that she has stage IV and we will move on with treatment plan which will be systemic chemotherapy.      Previously she has single agent Taxol in the neoadjuvant setting followed by adjuvant capecitabine.  She did not do that well with both neoadjuvant and adjuvant chemotherapy.  Adjuvant capecitabine was per chart soon after she started it due to significant skin rash.  However she has good performance status and could tolerate single agent chemotherapy is like Doxil or gemcitabine etc. she has left axillary lymph node biopsy scheduled for tomorrow.  We will see her back in a week to discuss the biopsy results and make further plans.  She will need an echocardiogram had a port  placement along with an MRI of the brain to make sure there is no intracranial mets.    She is agreeable with the plan.  ____________________________________________________________________________    History of Present Illness/ Interval History    Ms. Precious Paris is a 81 year old treated for right triple negative breast cancer almost 2 yrs ago.  She is here to discuss PET scan and recent breast biopsy results and make further follow-up plans.     Recently she was seen by my colleague regarding new onset right-sided nipple discharge.  There was concern for possible infection and she was treated with some antibiotics.  The nipple discharge started a few weeks ago and turn bloody within a matter of a week.  Associated with some discomfort and swelling in the right breast.  She has had issues with right-sided breast postlumpectomy and sentinel lymph node biopsy about 2 years ago.  Mammogram was ordered which came back showing abnormality in the right breast along with right axillary lymphadenopathy.    She underwent ultrasound-guided biopsy of the right breast mass and right axillary lymph node which came back positive for invasive ductal carcinoma which was triple negative.  This was consistent with disease recurrence.  However since she had clinically node positive disease a PET scan was ordered.  She is here to review the PET scan results and make further follow-up plans.  She continues to have issues with right breast swelling and edema.  Continues to have some bloody discharge from the right nipple.  No fever.    ECOG PS: 1      Oncology History/Treatment  Diagnosis/Stage:   11/2019: Right breast cancer, triple negative ([id number])    BRCA negative    Treatment:  2019: Neoadjuvant taxol    Lumpectomy    5/2020: Adjuvant RT    6 - 7/2020: Adjuvant Xeloda. Stopped for rash      Physical Exam    GENERAL: Alert and oriented to time place and person. Seated comfortably. In no distress.  HEAD: Atraumatic and  normocephalic. No alopecia.  EYES: DELISA, EOMI. No erythema. No icterus.  BREASTS: Emigrate is felt in the right breast with inverted nipple and bloody nipple discharge.  She has dried blood on the nipple.  No redness or signs of infection.  Does have lymphedema.  Also has bilateral hard palpable lymphadenopathy consistent with metastatic disease.  Left breast is normal.   CHEST: clear to auscultation bilaterally. Resonant to percussion throughout bilaterally. Symmetrical breath movements bilaterally.  CVS: S1 and S2 are heard. Regular rate and rhythm.   ABDOMEN: Soft. Not tender. Not distended.  EXTREMITIES: Warm. No peripheral edema.  SKIN: no rash, or bruising or purpura.   NEURO: No gross deficit noted. Non-antalgic gait.      Lab Results    None    Imaging  MA Post Procedure Right    Addendum Date: 10/25/2022    Pathology shows invasive ductal carcinoma. This is consistent with imaging findings. A verbal report was given to the patient. Surgical  and oncological consultation is recommended.    Result Date: 10/25/2022  US Breast Biopsy Core Needle, 1St Lesion Right INDICATION: Right breast mass. PROCEDURE: Informed consent was obtained from the patient. Ultrasound was used to localize the mass at the 11 o'clock position of the right breast, 1 cm from the nipple.  The skin was marked, then prepped and draped in a sterile fashion. 1% lidocaine was used for local anesthesia. Under direct sonographic guidance, a 14-gauge coaxial needle was used to obtain 3 core biopsies.  A biopsy marking clip was then placed.  Digital mammograms performed in a separate room, using separate equipment, to document clip placement. The mammogram showed the clip to be in good position. The patient tolerated this well; there are no immediate complications.    IMPRESSION: 1. Ultrasound-guided biopsy of mass in the right breast. Pathology pending. 2. Post procedure mammogram for marker placement     US Breast Biopsy Core Lymph Node  Right    Addendum Date: 10/25/2022    Pathology shows METASTATIC/RECURRENT BREAST CARCINOMA. This is consistent with imaging findings. A verbal report was given to the patient. Surgical and oncological consultation is recommended.    Result Date: 10/25/2022  RIGHT LYMPH NODE ULTRASOUND GUIDED BIOPSY, CLIP PLACEMENT: INDICATIONS: Abnormal Lymph Node PROCEDURE: Informed consent was obtained from the patient. Ultrasound was used to localize the lymph node in the right axilla. The skin was prepped and draped in a sterile fashion. Lidocaine was used for local anesthesia. Under direct sonographic guidance, a 12 gauge coaxial needle was used to obtain 2 core biopises.  A hydromark T4 marking clip was placed with ultrasound guidance.   A mammogram was obtained but the axillary clip could not be visualized.  The patient tolerated this well, there are no immediate complications.     IMPRESSION: Ultrasound guided biopsy of a suspicious lymph node in the right axilla. Pathology pending.     US Breast Right    Result Date: 10/13/2022  DIAGNOSTIC MAMMOGRAM, BILATERAL, DIGITAL w/FIDEL, w/CAD; TARGETED ULTRASOUND, RIGHT BREAST  10/12/2022 12:59 PM HISTORY: History of breast cancer in the right breast in 2019 status post lumpectomy, chemotherapy and radiation treatment. Right breast is progressively getting firmer with worsening nipple retraction. Patient also has worsening axillary tenderness. Patient had initially clear right breast discharge, now is bloody. Reported history of right-sided lymphedema since breast cancer treatment. COMPARISON:  Mammograms dated 10/21/2021, 10/19/2021, 3/20/2020, 11/15/2019, 10/30/2019, 10/18/2018 and 9/30/2016. BREAST DENSITY: Heterogeneously dense. Limiting mammographic sensitivity. FINDINGS: There has been increased density of the right breast parenchymal tissues as compared to the prior studies. Skin thickening in the right breast has also slightly worsened since the older studies but is similar  to the prior study from 2021. Conservation therapy changes of the right breast are again noted. There is a subtle density in the right axillary region likely representing lymph node seen on the prior studies. No other new mammographic findings of concern for malignancy. Targeted ultrasound of the periareolar/retroareolar right breast demonstrates a hypoechoic lobular mass measuring 3.2 x 1.6 x 2.4 cm at the approximate 11:00 position 1 cm from the nipple. Multiple other small hypoechoic solid-appearing lesions are seen in the right breast with three located at the approximate 12 to 1:00 position measuring 0.7 x 0.6 x 0.8 cm (11:00 position 1 cm from the nipple, 0.4 x 0.4 x 0.5 cm at the 12:00 position 1 cm from the nipple, and 0.4 x 0.4 x 0.4 cm at the 12:00 position 1 cm from the nipple. Another hypoechoic solid-appearing lesion at the 9:00 position 2 cm from the nipple of the right breast measures 1.2 x 0.8 x 0.9 cm. There is skin thickening of the breast which could be due to radiation treatment. Targeted ultrasound of the right axilla demonstrates at least two hypoechoic vascular lesions measuring up to 2.5 x 1.3 cm, likely representing abnormally enlarged lymph nodes.     IMPRESSION: BI-RADS CATEGORY: 5 - Highly Suggestive of Malignancy-Appropriate Action Should Be Taken. Findings are likely suspicious for recurrent malignancy in the retroareolar right breast, a large mass with questionable multiple additional satellite lesions. Additionally there is lymphadenopathy in the right axillary region. RECOMMENDED FOLLOW-UP: Biopsy of the largest retroareolar mass and of the right axillary lymphadenopathy. MRI of the breasts as clinically indicated. I discussed the findings and recommendations with the patient at the time of the exam. I also discussed these findings and recommendations with Lazara Torres, nurse practitioner taking care of the patient, on 10/12/2022 at approximately 2:00 PM. EDVIN ALMEIDA MD   SYSTEM ID:   [id number]     US Breast Biopsy Core Needle, 1St Lesion Right    Addendum Date: 10/25/2022    Pathology shows invasive ductal carcinoma. This is consistent with imaging findings. A verbal report was given to the patient. Surgical  and oncological consultation is recommended.    Result Date: 10/25/2022  US Breast Biopsy Core Needle, 1St Lesion Right INDICATION: Right breast mass. PROCEDURE: Informed consent was obtained from the patient. Ultrasound was used to localize the mass at the 11 o'clock position of the right breast, 1 cm from the nipple.  The skin was marked, then prepped and draped in a sterile fashion. 1% lidocaine was used for local anesthesia. Under direct sonographic guidance, a 14-gauge coaxial needle was used to obtain 3 core biopsies.  A biopsy marking clip was then placed.  Digital mammograms performed in a separate room, using separate equipment, to document clip placement. The mammogram showed the clip to be in good position. The patient tolerated this well; there are no immediate complications.    IMPRESSION: 1. Ultrasound-guided biopsy of mass in the right breast. Pathology pending. 2. Post procedure mammogram for marker placement     PET Oncology (Eyes to Thighs)    Result Date: 10/27/2022  Combined Report of:    PET and CT on  10/27/2022 9:54 AM : 1. PET of the neck, chest, abdomen, and pelvis. 2. PET CT Fusion for Attenuation Correction and Anatomical Localization:  3. 3D MIP and PET-CT fused images were processed on an independent workstation and archived to PACS and reviewed by a radiologist. Technique: 1. PET: The patient received 13.4 mCi of F-18-FDG; the serum glucose was 116 prior to administration, body weight was 88 kg. Images were evaluated in the axial, sagittal, and coronal planes as well as the rotational whole body MIP. Images were acquired from the Vertex to the thighs. UPTAKE WAS MEASURED AT 67 MINUTES. BACKGROUND:  Liver SUV max= 3.1,   Aorta Blood SUV Max: 2.1. 2. CT: CT only  obtained for attenuation correction and not diagnostic purposes. INDICATION: recurrent triple negative breast cancer. Restaging; Malignant neoplasm of upper-outer quadrant of right breast in female, 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); Malignant neoplasm of upper-outer quadrant of right breast in female, 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); Carcinoma of right breast metastatic to axillary lymph node (H); Carcinoma of right breast metastatic to axillary lymph node (H) ADDITIONAL INFORMATION OBTAINED FROM EMR: Has recent drainage from her right nipple. History of right axilla lymphedema COMPARISON: None. FINDINGS: HEAD/NECK: There is no suspicious FDG uptake in the neck. CHEST: Hypermetabolic right breast mass at the area of prior lumpectomy measured up to 4.7 x 2.1 cm with max SUV 9.7. There are hypermetabolic axillary and retropectoral lymph nodes bilaterally such as on the right with Max SUV 14.5. There are multiple hypermetabolic supraclavicular lymph nodes, left greater than right and multiple hypermetabolic mediastinal lymph nodes such as a precarinal lymph node with max SUV 11.2. Multiple hypermetabolic right hilar lymph nodes. There is a hypermetabolic right lower lobe subpleural nodule measuring up to 11 mm with maximum SUV 6.3. There is also hypermetabolic subpleural nodule in the left lower lobe measuring up to 12 mm. A few other smaller pulmonary nodules. Right anterior pleural metastatic deposits. ABDOMEN AND PELVIS: There is no suspicious FDG uptake in the abdomen or pelvis. Cholelithiasis. Diffuse fatty atrophy of the pancreas. Simple cyst in the left kidney. Colonic diverticulosis without evidence of diverticulitis. Vascular calcifications. Small sliding type hiatal hernia. Diffuse large bowel uptake, presumably physiologic. LOWER EXTREMITIES: No abnormal masses or hypermetabolic lesions. BONES: There are no suspicious lytic or blastic osseous lesions.  There is no abnormal FDG uptake in the skeleton.  Degenerative changes in the spine.     IMPRESSION: In this patient with history of triple negative right breast cancer: 1. Hypermetabolic, recurrent right breast mass, consistent with disease recurrence. 2. Hypermetabolic lymph nodes in the bilateral axillary, retropectoral, supraclavicular, mediastinal and right hilar stations. The left-sided lymph nodes are in an atypical pattern for right-sided breast cancer and a concurrent secondary malignancy is possible, such as a lymphoproliferative process. Consider tissue sampling of the left axillary lymph nodes. 3. Hypermetabolic bilateral lung and right pleural nodules, consistent with metastatic disease. I have personally reviewed the examination and initial interpretation and I agree with the findings. HIRAM LOPEZ MD   SYSTEM ID:  [id number]    MA Diagnostic Bilateral w/Fidel    Result Date: 10/13/2022  DIAGNOSTIC MAMMOGRAM, BILATERAL, DIGITAL w/FIDEL, w/CAD; TARGETED ULTRASOUND, RIGHT BREAST  10/12/2022 12:59 PM HISTORY: History of breast cancer in the right breast in 2019 status post lumpectomy, chemotherapy and radiation treatment. Right breast is progressively getting firmer with worsening nipple retraction. Patient also has worsening axillary tenderness. Patient had initially clear right breast discharge, now is bloody. Reported history of right-sided lymphedema since breast cancer treatment. COMPARISON:  Mammograms dated 10/21/2021, 10/19/2021, 3/20/2020, 11/15/2019, 10/30/2019, 10/18/2018 and 9/30/2016. BREAST DENSITY: Heterogeneously dense. Limiting mammographic sensitivity. FINDINGS: There has been increased density of the right breast parenchymal tissues as compared to the prior studies. Skin thickening in the right breast has also slightly worsened since the older studies but is similar to the prior study from 2021. Conservation therapy changes of the right breast are again noted. There is a subtle density in the right axillary region likely  representing lymph node seen on the prior studies. No other new mammographic findings of concern for malignancy. Targeted ultrasound of the periareolar/retroareolar right breast demonstrates a hypoechoic lobular mass measuring 3.2 x 1.6 x 2.4 cm at the approximate 11:00 position 1 cm from the nipple. Multiple other small hypoechoic solid-appearing lesions are seen in the right breast with three located at the approximate 12 to 1:00 position measuring 0.7 x 0.6 x 0.8 cm (11:00 position 1 cm from the nipple, 0.4 x 0.4 x 0.5 cm at the 12:00 position 1 cm from the nipple, and 0.4 x 0.4 x 0.4 cm at the 12:00 position 1 cm from the nipple. Another hypoechoic solid-appearing lesion at the 9:00 position 2 cm from the nipple of the right breast measures 1.2 x 0.8 x 0.9 cm. There is skin thickening of the breast which could be due to radiation treatment. Targeted ultrasound of the right axilla demonstrates at least two hypoechoic vascular lesions measuring up to 2.5 x 1.3 cm, likely representing abnormally enlarged lymph nodes.     IMPRESSION: BI-RADS CATEGORY: 5 - Highly Suggestive of Malignancy-Appropriate Action Should Be Taken. Findings are likely suspicious for recurrent malignancy in the retroareolar right breast, a large mass with questionable multiple additional satellite lesions. Additionally there is lymphadenopathy in the right axillary region. RECOMMENDED FOLLOW-UP: Biopsy of the largest retroareolar mass and of the right axillary lymphadenopathy. MRI of the breasts as clinically indicated. I discussed the findings and recommendations with the patient at the time of the exam. I also discussed these findings and recommendations with Lazara Torres, nurse practitioner taking care of the patient, on 10/12/2022 at approximately 2:00 PM. EDVIN ALMEIDA MD   SYSTEM ID:  [id number]         Billing  Total time 45 minutes, to include face to face visit, review of EMR, ordering, documentation and coordination of care    Signed by:  Maricarmen Beebe MD   '

## 2022-11-01 ENCOUNTER — HOSPITAL ENCOUNTER (OUTPATIENT)
Dept: ULTRASOUND IMAGING | Facility: CLINIC | Age: 82
Discharge: HOME | End: 2022-11-01
Attending: INTERNAL MEDICINE | Admitting: INTERNAL MEDICINE
Payer: COMMERCIAL

## 2022-11-01 VITALS — SYSTOLIC BLOOD PRESSURE: 153 MMHG | DIASTOLIC BLOOD PRESSURE: 80 MMHG

## 2022-11-01 DIAGNOSIS — C50.011 MALIGNANT NEOPLASM INVOLVING BOTH NIPPLE AND AREOLA OF RIGHT BREAST IN FEMALE, UNSPECIFIED ESTROGEN RECEPTOR STATUS (H): Primary | ICD-10-CM

## 2022-11-01 DIAGNOSIS — Z17.1 MALIGNANT NEOPLASM OF UPPER-OUTER QUADRANT OF RIGHT BREAST IN FEMALE, ESTROGEN RECEPTOR NEGATIVE (H): ICD-10-CM

## 2022-11-01 DIAGNOSIS — C50.411 MALIGNANT NEOPLASM OF UPPER-OUTER QUADRANT OF RIGHT BREAST IN FEMALE, ESTROGEN RECEPTOR NEGATIVE (H): ICD-10-CM

## 2022-11-01 PROCEDURE — 38505 NEEDLE BIOPSY LYMPH NODES: CPT | Mod: LT

## 2022-11-01 PROCEDURE — 88305 TISSUE EXAM BY PATHOLOGIST: CPT | Mod: TC | Performed by: INTERNAL MEDICINE

## 2022-11-01 PROCEDURE — 250N000009 HC RX 250: Performed by: RADIOLOGY

## 2022-11-01 RX ADMIN — LIDOCAINE HYDROCHLORIDE 9 ML: 10 INJECTION, SOLUTION EPIDURAL; INFILTRATION; INTRACAUDAL; PERINEURAL at 09:18

## 2022-11-03 ENCOUNTER — PATIENT OUTREACH (OUTPATIENT)
Dept: ONCOLOGY | Facility: CLINIC | Age: 82
End: 2022-11-03

## 2022-11-03 DIAGNOSIS — C50.411 MALIGNANT NEOPLASM OF UPPER-OUTER QUADRANT OF RIGHT BREAST IN FEMALE, ESTROGEN RECEPTOR NEGATIVE (H): Primary | ICD-10-CM

## 2022-11-03 DIAGNOSIS — Z17.1 MALIGNANT NEOPLASM OF UPPER-OUTER QUADRANT OF RIGHT BREAST IN FEMALE, ESTROGEN RECEPTOR NEGATIVE (H): Primary | ICD-10-CM

## 2022-11-03 LAB
PATH REPORT.COMMENTS IMP SPEC: ABNORMAL
PATH REPORT.COMMENTS IMP SPEC: YES
PATH REPORT.FINAL DX SPEC: ABNORMAL
PATH REPORT.GROSS SPEC: ABNORMAL
PATH REPORT.MICROSCOPIC SPEC OTHER STN: ABNORMAL
PATH REPORT.RELEVANT HX SPEC: ABNORMAL
PATHOLOGY SYNOPTIC REPORT: ABNORMAL
PHOTO IMAGE: ABNORMAL

## 2022-11-03 PROCEDURE — 88342 IMHCHEM/IMCYTCHM 1ST ANTB: CPT | Mod: 26 | Performed by: STUDENT IN AN ORGANIZED HEALTH CARE EDUCATION/TRAINING PROGRAM

## 2022-11-03 PROCEDURE — 88360 TUMOR IMMUNOHISTOCHEM/MANUAL: CPT | Mod: 26 | Performed by: STUDENT IN AN ORGANIZED HEALTH CARE EDUCATION/TRAINING PROGRAM

## 2022-11-03 PROCEDURE — 88341 IMHCHEM/IMCYTCHM EA ADD ANTB: CPT | Mod: 26 | Performed by: STUDENT IN AN ORGANIZED HEALTH CARE EDUCATION/TRAINING PROGRAM

## 2022-11-03 PROCEDURE — 88305 TISSUE EXAM BY PATHOLOGIST: CPT | Mod: 26 | Performed by: STUDENT IN AN ORGANIZED HEALTH CARE EDUCATION/TRAINING PROGRAM

## 2022-11-03 NOTE — PROGRESS NOTES
Malignant Path:  Pathology report reviewed with our breast radiologist Dr. Ablerto Yeh, who confirmed the recent breast imaging is concordant with the final surgical pathology results below.    Ultrasound Guided Left Axillary Lymph Node Needle Biopsy results are (+) positive for metastatic carcinoma consistent with Invasive Ductal Carcinoma.  Estrogen/ Progesterone Receptors and HER2 are (-) negative.    Recommended follow up is Oncology Management per Dr. Maricarmen Beebe.  Dr. Beebe's nurse- Patrica Perla-RN informed me she will be notifying patient of results and the follow up.      Precious is scheduled for follow up visit with Dr. Beebe on 11/7/22.  I will forward this note, along with the pathology results.   Anusha Hameed RN, BSN  Breast Care Nurse Coordinator  [facility] [place]  [phone]      Precious Paris [id number]  F, 1940  Surgical Pathology Report (Final result) [id number]  Authorizing Provider: Maricarmen Beebe MD Ordering Provider: Maricarmen Beebe MD  Ordering Location: [facility]  Imaging  Collected: 11/01/2022 09:46 AM  Pathologist: Jaimee William MD Received: 11/01/2022 10:13 AM  .  Specimens  A Lymph Node(s), Axillary, Left, 4 cores placed in 30 mL sodium chloride and 60 mL formalin  .  .  Final Diagnosis  A. Lymph node, left breast and axillary, core biopsy:  -Lymph node positive for metastatic carcinoma consistent with invasive ductal carcinoma.  Electronically signed by Jaimee William MD on 11/3/2022 at 12:09 PM

## 2022-11-03 NOTE — PROGRESS NOTES
Foundation one testing ordered on [id number]    Patrica Perla RN on 11/3/2022 at 2:58 PM      Brain MRI arranged for next week. Also working with the Echo team to move her appt sooner.      for pt to update her with the results and plan.     Patrica Perla RN on 11/3/2022 at 3:05 PM

## 2022-11-07 ENCOUNTER — ONCOLOGY VISIT (OUTPATIENT)
Dept: ONCOLOGY | Facility: CLINIC | Age: 82
End: 2022-11-07
Attending: INTERNAL MEDICINE
Payer: COMMERCIAL

## 2022-11-07 VITALS
DIASTOLIC BLOOD PRESSURE: 66 MMHG | BODY MASS INDEX: 32.03 KG/M2 | SYSTOLIC BLOOD PRESSURE: 171 MMHG | TEMPERATURE: 98.6 F | HEART RATE: 89 BPM | WEIGHT: 189.5 LBS | RESPIRATION RATE: 12 BRPM | OXYGEN SATURATION: 98 %

## 2022-11-07 DIAGNOSIS — F40.240 CLAUSTROPHOBIA: ICD-10-CM

## 2022-11-07 DIAGNOSIS — C50.919 METASTATIC BREAST CANCER: ICD-10-CM

## 2022-11-07 DIAGNOSIS — C50.411 MALIGNANT NEOPLASM OF UPPER-OUTER QUADRANT OF RIGHT BREAST IN FEMALE, ESTROGEN RECEPTOR NEGATIVE (H): Primary | ICD-10-CM

## 2022-11-07 DIAGNOSIS — Z17.1 MALIGNANT NEOPLASM OF UPPER-OUTER QUADRANT OF RIGHT BREAST IN FEMALE, ESTROGEN RECEPTOR NEGATIVE (H): Primary | ICD-10-CM

## 2022-11-07 PROCEDURE — 99215 OFFICE O/P EST HI 40 MIN: CPT | Performed by: INTERNAL MEDICINE

## 2022-11-07 PROCEDURE — G0463 HOSPITAL OUTPT CLINIC VISIT: HCPCS

## 2022-11-07 RX ORDER — LORAZEPAM 0.5 MG/1
0.5 TABLET ORAL EVERY 6 HOURS PRN
Qty: 2 TABLET | Refills: 0 | Status: SHIPPED | OUTPATIENT
Start: 2022-11-07 | End: 2024-08-14

## 2022-11-07 ASSESSMENT — PAIN SCALES - GENERAL: PAINLEVEL: NO PAIN (0)

## 2022-11-07 NOTE — PATIENT INSTRUCTIONS
Please cycle 1 doxil next week with labs prior. RTC in 3 weeks with labs prior for toxicity check.

## 2022-11-07 NOTE — PROGRESS NOTES
"Oncology Rooming Note    November 7, 2022 12:28 PM   Precious Paris is a 82 year old female who presents for:    Chief Complaint   Patient presents with     Oncology Clinic Visit     Malignant neoplasm of upper-outer quadrant of right breast in female, estrogen receptor negative - Provider visit     Initial Vitals: BP (!) 171/66 (BP Location: Right arm, Patient Position: Sitting, Cuff Size: Adult Large)   Pulse 89   Temp 98.6  F (37  C) (Tympanic)   Resp 12   Wt 86 kg (189 lb 8 oz)   SpO2 98%   BMI 32.03 kg/m   Estimated body mass index is 32.03 kg/m  as calculated from the following:    Height as of 6/30/22: 1.638 m (5' 4.5\").    Weight as of this encounter: 86 kg (189 lb 8 oz). Body surface area is 1.98 meters squared.  No Pain (0) Comment: Data Unavailable   No LMP recorded. Patient is postmenopausal.  Allergies reviewed: Yes  Medications reviewed: Yes    Medications: Medication refills not needed today.  Pharmacy name entered into Open Me: [facility] - [facility] [id number] [address]    Clinical concerns:  None      Barbie James CMA            "

## 2022-11-07 NOTE — LETTER
"    11/7/2022         RE: Precious Paris  [address] [place] [address]        Dear Colleague,    Thank you for referring your patient, Precious Paris, to the [facility]. Please see a copy of my visit note below.    PDL1 ordered ASAP today on [id number]    Patrica Perla RN on 11/7/2022 at 9:15 AM      Oncology Rooming Note    November 7, 2022 12:28 PM   Precious Paris is a 82 year old female who presents for:    Chief Complaint   Patient presents with     Oncology Clinic Visit     Malignant neoplasm of upper-outer quadrant of right breast in female, estrogen receptor negative - Provider visit     Initial Vitals: BP (!) 171/66 (BP Location: Right arm, Patient Position: Sitting, Cuff Size: Adult Large)   Pulse 89   Temp 98.6  F (37  C) (Tympanic)   Resp 12   Wt 86 kg (189 lb 8 oz)   SpO2 98%   BMI 32.03 kg/m   Estimated body mass index is 32.03 kg/m  as calculated from the following:    Height as of 6/30/22: 1.638 m (5' 4.5\").    Weight as of this encounter: 86 kg (189 lb 8 oz). Body surface area is 1.98 meters squared.  No Pain (0) Comment: Data Unavailable   No LMP recorded. Patient is postmenopausal.  Allergies reviewed: Yes  Medications reviewed: Yes    Medications: Medication refills not needed today.  Pharmacy name entered into [facility]: [facility] - [place] - [address] [facility]    Clinical concerns:  None      Barbie James, AXEL [facility] Progress Note    Patient: Precious Paris  MRN: [id number]  Nov 7, 2022          Reason for Visit  Chief Complaint   Patient presents with     Oncology Clinic Visit     Malignant neoplasm of upper-outer quadrant of right breast in female, estrogen receptor negative - Provider visit         Assessment/Plan  1. 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  PET scan was done on 10/27/2022.  It showed hypermetabolic lymph nodes in the bilateral axilla along with mediastinal and hilar lymphadenopathy and FDG avid pulmonary nodules all consistent with metastatic disease. To establish metastatic disease I recommended biopsy of the left axillary lymph nodes.  These have come back positive.  I reviewed the left axilla lymph node biopsy report with her today.  This is consistent with metastatic triple negative breast cancer.  I have asked to add on PD-L1 testing since she is BRCA negative.  I explained to them that if her CPS score is more than 10 then potentially she can be a candidate for adding immunotherapy to chemotherapy regimens.  I discussed potential treatment options.    Previously she had single agent Taxol in the neoadjuvant setting followed by adjuvant capecitabine.  She did not do that well with both neoadjuvant and adjuvant chemotherapy.  She had significant fatigue and other side effects from Taxol.  Adjuvant capecitabine was discontinued soon after she started it due to significant skin rash.  She is still considered to have good performance status.  So in this setting I discussed about doing taxane versus anthracycline.  She has no contraindications for anthracycline.  So recommended to start with Doxil.  In the meantime we will wait for the PD-L1 testing to come back.  Bluntly we can add pembrolizumab to chemotherapy in the future.  I reviewed the potential side effects and complications associated with Doxil today.  She is agreeable to proceed with the treatment.  She will get an echocardiogram tomorrow.  Also get an MRI of the brain to make sure there are no metastatic disease.      She is agreeable with the plan.    Patient Instructions   Please cycle 1 doxil next week with labs prior. RTC in 3 weeks with labs prior for toxicity  check.      ____________________________________________________________________________    History of Present Illness/ Interval History    Ms. Precious Paris is a 81 year old treated for right triple negative breast cancer almost 2 yrs ago.  She is here to discuss PET scan and recent breast biopsy results and make further follow-up plans.     Recently she was seen by my colleague regarding new onset right-sided nipple discharge.  There was concern for possible infection and she was treated with some antibiotics.  The nipple discharge started a few weeks ago and turn bloody within a matter of a week.  Associated with some discomfort and swelling in the right breast.  She has had issues with right-sided breast postlumpectomy and sentinel lymph node biopsy about 2 years ago.  Mammogram was ordered which came back showing abnormality in the right breast along with right axillary lymphadenopathy.    She underwent ultrasound-guided biopsy of the right breast mass and right axillary lymph node which came back positive for invasive ductal carcinoma which was triple negative.  This was consistent with disease recurrence.  However since she had clinically node positive disease a PET scan was ordered.  PET scan showed bilateral FDG avid axillary lymphadenopathy along with mediastinal and hilar lymphadenopathy and FDG avid pulmonary nodules consistent with metastatic disease.  I recommended biopsy of the left axillary lymph node to confirm metastatic disease which would dictate treatment.  She had a biopsy done last week and it is showing triple negative high-grade breast cancer.  She is here to review treatment options.    ECOG PS: 1      Oncology History/Treatment  Diagnosis/Stage:   11/2019: Right breast cancer, triple negative ([id number])    BRCA negative    Treatment:  2019: Neoadjuvant taxol    Lumpectomy    5/2020: Adjuvant RT    6 - 7/2020: Adjuvant Xeloda. Stopped for rash      Physical Exam    GENERAL: Alert and  oriented to time place and person. Seated comfortably. In no distress.  HEAD: Atraumatic and normocephalic. No alopecia.  EYES: DELISA, EOMI. No erythema. No icterus.  BREASTS: Emigrate is felt in the right breast with inverted nipple and bloody nipple discharge.  She has dried blood on the nipple.  No redness or signs of infection.  Does have lymphedema.  Also has bilateral hard palpable lymphadenopathy consistent with metastatic disease.  Left breast is normal.   CHEST: clear to auscultation bilaterally. Resonant to percussion throughout bilaterally. Symmetrical breath movements bilaterally.  CVS: S1 and S2 are heard. Regular rate and rhythm.   ABDOMEN: Soft. Not tender. Not distended.  EXTREMITIES: Warm. No peripheral edema.  SKIN: no rash, or bruising or purpura.   NEURO: No gross deficit noted. Non-antalgic gait.      Lab Results    None    Imaging  Echocardiogram Complete    Result Date: 2022  [id number]^CHEYANNE^[place] [address]  Name: ANGELIKA HUDDLESTON MRN: [id number] : 1940 Study Date: 2022 12:33 PM Age: 82 yrs Gender: Female Patient Location: [facility] Reason For Study: Malignant neoplasm of upper-outer quadrant of right breast in  Ordering Physician: DEBORAH EDWARD Referring Physician: DEBORAH EDWARD Performed By: ACE  BSA: 1.9 m2 Height: 64 in Weight: 189 lb HR: 83 BP: 144/74 mmHg ______________________________________________________________________________ Procedure Complete Echo Adult. ______________________________________________________________________________ Interpretation Summary  Left ventricular size, wall motion and function are normal. The ejection fraction is > 65%. Normal right ventricle size and systolic function. Mild valvular aortic stenosis. ______________________________________________________________________________ Left Ventricle Left ventricular size, wall motion and function  are normal. The ejection fraction is > 65%. There is normal left ventricular wall thickness. Left ventricular diastolic function is indeterminate. Biplane LVEF is 70%. No regional wall motion abnormalities noted.  Right Ventricle Normal right ventricle size and systolic function.  Atria Normal left atrial size. Right atrial size is normal. There is no color Doppler evidence of an atrial shunt.  Mitral Valve Mitral valve leaflets appear normal. There is no evidence of mitral stenosis or clinically significant mitral regurgitation.  Tricuspid Valve Tricuspid valve leaflets appear normal. There is no evidence of tricuspid stenosis or clinically significant tricuspid regurgitation. Right ventricular systolic pressure could not be approximated due to inadequate tricuspid regurgitation.  Aortic Valve Mild aortic valve calcification is present. The aortic valve is trileaflet. Mild valvular aortic stenosis.  Pulmonic Valve The pulmonic valve is not well seen, but is grossly normal. This degree of valvular regurgitation is within normal limits.  Vessels The aorta root is normal. Normal size ascending aorta. IVC diameter <2.1 cm collapsing >50% with sniff suggests a normal RA pressure of 3 mmHg.  Pericardium There is no pericardial effusion.  ______________________________________________________________________________ MMode/2D Measurements & Calculations IVSd: 0.60 cm LVIDd: 4.4 cm LVIDs: 2.7 cm LVPWd: 0.72 cm FS: 38.2 % LV mass(C)d: 84.9 grams LV mass(C)dI: 44.5 grams/m2  Ao root diam: 2.9 cm LA dimension: 4.0 cm asc Aorta Diam: 3.5 cm LA/Ao: 1.4 LVOT diam: 2.1 cm LVOT area: 3.5 cm2 LA Volume Indexed (AL/bp): 30.6 ml/m2 RWT: 0.33  Time Measurements MM HR: 72.0 BPM  Doppler Measurements & Calculations MV E max nick: 68.4 cm/sec MV A max nick: 103.3 cm/sec MV E/A: 0.66 MV dec slope: 349.3 cm/sec2 MV dec time: 0.20 sec Ao V2 max: 212.0 cm/sec Ao max P.0 mmHg Ao V2 mean: 155.9 cm/sec Ao mean PG: 10.9 mmHg Ao V2 VTI: 48.8 cm  MANUEL(I,D): 2.1 cm2 MANUEL(V,D): 2.4 cm2 LV V1 max P.1 mmHg LV V1 max: 142.6 cm/sec LV V1 VTI: 29.4 cm SV(LVOT): 102.8 ml SI(LVOT): 53.8 ml/m2 PA acc time: 0.07 sec AV Rush Ratio (DI): 0.67 MANUEL Index (cm2/m2): 1.1 E/E': 13.6 E/E' av.6 Lateral E/e': 13.6 Medial E/e': 13.6 Peak E' Rush: 5.0 cm/sec  ______________________________________________________________________________ Report approved by: Heide Gee 2022 03:28 PM       XR Chest Port 1 View    Result Date: 2022  CHEST ONE VIEW PORTABLE  2022 2:18 PM HISTORY: Status post right subclavian port placement COMPARISON: 2019. FINDINGS/    IMPRESSION: A right chest wall Port-A-Cath is present with the tip in the region of the mid SVC. No postprocedure complications. Specifically, no pneumothorax. Lungs appear clear. No pleural effusion. Heart size and pulmonary vasculature are within normal limits. Several surgical clips project over the right breast versus low right axilla. Multilevel hypertrophic and degenerative changes of the spine are present. There is mild levoscoliosis of the lower thoracic spine. ROSEMARY FLEMING MD   SYSTEM ID:  [id number]    MR Brain w/o & w Contrast    Result Date: 2022  MRI BRAIN WITHOUT AND WITH CONTRAST  2022 11:18 AM HISTORY:  Malignant neoplasm of upper-outer quadrant of right breast in female, 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). TECHNIQUE:  Multiplanar, multisequence MRI of the brain without and with 8 mL Gadavist. COMPARISON: None. FINDINGS:  Mild generalized parenchymal volume loss is present. Scattered nonspecific frontoparietal predominant white matter T2 hyperintensities likely represent chronic small vessel ischemic change. No evidence of acute ischemia, hemorrhage, mass, mass effect, or hydrocephalus. No abnormal enhancement or diffusion restriction. Small areas of nonspecific relative hypoenhancement within the pituitary gland which could represent subtle adenomas or cysts, incompletely  characterized (correlation with pituitary function could be considered). Marrow signal is within normal limits. Trace paranasal sinus mucosal thickening. Left lens replacement.     IMPRESSION:  No evidence of metastatic disease. KODY UP MD   SYSTEM ID:  [id number]    XR Surgery YOVANI Fluoro Less Than 5 Min    Result Date: 11/11/2022  This exam was marked as non-reportable because it will not be read by a radiologist or a [facility] non-radiologist provider.         Billing  Total time 45 minutes, to include face to face visit, review of EMR, ordering, documentation and coordination of care    Signed by: Maricarmen Beebe MD   '      Again, thank you for allowing me to participate in the care of your patient.        Sincerely,        Maricarmen Beebe MD

## 2022-11-08 ENCOUNTER — DOCUMENTATION ONLY (OUTPATIENT)
Dept: ONCOLOGY | Facility: HOSPITAL | Age: 82
End: 2022-11-08

## 2022-11-08 ENCOUNTER — HOSPITAL ENCOUNTER (OUTPATIENT)
Dept: CARDIOLOGY | Facility: HOSPITAL | Age: 82
Discharge: HOME | End: 2022-11-08
Attending: INTERNAL MEDICINE | Admitting: INTERNAL MEDICINE
Payer: COMMERCIAL

## 2022-11-08 DIAGNOSIS — C50.411 MALIGNANT NEOPLASM OF UPPER-OUTER QUADRANT OF RIGHT BREAST IN FEMALE, ESTROGEN RECEPTOR NEGATIVE (H): ICD-10-CM

## 2022-11-08 DIAGNOSIS — Z17.1 MALIGNANT NEOPLASM OF UPPER-OUTER QUADRANT OF RIGHT BREAST IN FEMALE, ESTROGEN RECEPTOR NEGATIVE (H): ICD-10-CM

## 2022-11-08 DIAGNOSIS — C50.919 METASTATIC BREAST CANCER: Primary | ICD-10-CM

## 2022-11-08 DIAGNOSIS — Z51.11 ENCOUNTER FOR ANTINEOPLASTIC CHEMOTHERAPY: ICD-10-CM

## 2022-11-08 LAB
BI-PLANE LVEF ECHO: NORMAL
LVEF ECHO: NORMAL

## 2022-11-08 PROCEDURE — 93306 TTE W/DOPPLER COMPLETE: CPT | Mod: 26 | Performed by: INTERNAL MEDICINE

## 2022-11-08 PROCEDURE — 93306 TTE W/DOPPLER COMPLETE: CPT

## 2022-11-08 NOTE — PROGRESS NOTES
RN Cancer Care Coordinator called Estrella in [facility] lab asking a question about a different patient. When she realized writer works with Dr. Beebe she asked that writer assist in correcting an order that apparently had been entered with an incorrect case number. Writer could not see evidence of an order with the incorrect number, but was willing to have Estrella walk through re-entering the PDL1 request with correct information. Notified pt's RNCC.

## 2022-11-09 ENCOUNTER — HOSPITAL ENCOUNTER (OUTPATIENT)
Dept: MRI IMAGING | Facility: CLINIC | Age: 82
Discharge: HOME | End: 2022-11-09
Attending: INTERNAL MEDICINE | Admitting: INTERNAL MEDICINE
Payer: COMMERCIAL

## 2022-11-09 ENCOUNTER — PATIENT OUTREACH (OUTPATIENT)
Dept: ONCOLOGY | Facility: CLINIC | Age: 82
End: 2022-11-09

## 2022-11-09 DIAGNOSIS — C50.919 METASTATIC BREAST CANCER: Primary | ICD-10-CM

## 2022-11-09 DIAGNOSIS — Z17.1 MALIGNANT NEOPLASM OF UPPER-OUTER QUADRANT OF RIGHT BREAST IN FEMALE, ESTROGEN RECEPTOR NEGATIVE (H): ICD-10-CM

## 2022-11-09 DIAGNOSIS — C50.411 MALIGNANT NEOPLASM OF UPPER-OUTER QUADRANT OF RIGHT BREAST IN FEMALE, ESTROGEN RECEPTOR NEGATIVE (H): ICD-10-CM

## 2022-11-09 PROCEDURE — A9585 GADOBUTROL INJECTION: HCPCS | Performed by: INTERNAL MEDICINE

## 2022-11-09 PROCEDURE — 255N000002 HC RX 255 OP 636: Performed by: INTERNAL MEDICINE

## 2022-11-09 PROCEDURE — 70553 MRI BRAIN STEM W/O & W/DYE: CPT

## 2022-11-09 RX ORDER — GADOBUTROL 604.72 MG/ML
8 INJECTION INTRAVENOUS ONCE
Status: COMPLETED | OUTPATIENT
Start: 2022-11-09 | End: 2022-11-09

## 2022-11-09 RX ORDER — PROCHLORPERAZINE MALEATE 10 MG
5 TABLET ORAL EVERY 6 HOURS PRN
Qty: 30 TABLET | Refills: 2 | Status: SHIPPED | OUTPATIENT
Start: 2022-11-13 | End: 2023-01-23

## 2022-11-09 RX ADMIN — GADOBUTROL 8 ML: 604.72 INJECTION INTRAVENOUS at 10:54

## 2022-11-09 NOTE — PROGRESS NOTES
[facility]: Cancer Care Plan of Care Education Note                                    Discussion with Patient:                                                      Called pt to review Doxil chemotherapy plan. Pt has had chemo in the past.      Antiemetics: Compazine        Assessment:                                                      Assessment completed with:: Patient    Current living arrangement  I live in a private home with spouse    Plan of Care Education   Diagnosis:: metastatic breast cancer  Does patient understand diagnosis?: Yes  Tx plan/regimen:: doxil  Does patient understand treatment plan/regimen?: Yes  Preparing for treatment:: Reviewed treatment preparation information with patient (vascular access, day of chemo, visitor policy, what to bring, etc.)  Vascular access:: Port  Side effect education:: Infection;Lab value monitoring (anemia, neutropenia, thrombocytopenia);Hair loss;Fatigue;Nausea/Vomiting  Informal Support system:: Family  Plan of Care:: MD follow-up appointment;Treatment schedule  When to call provider:: Bleeding;Increased shortness of breath;Uncontrolled nausea/vomiting;New/worsening pain;Shaking chills;Temperature >100.4F;Uncontrolled diarrhea/constipation  Reasons for deferring treatment reviewed with patient:: Yes    Evaluation of Learning  Patient Education Provided: Yes  Readiness:: Acceptance  Method:: Booklet/Handout;Explanation  Response:: Verbalizes understanding    No assessment indicated    Intervention/Education provided during outreach:                                                       Pt scheduled for C1D1 Monday 11.14    Patient to follow up as scheduled at next appt    Signature:  Patrica Perla RN

## 2022-11-10 ENCOUNTER — ANESTHESIA EVENT (OUTPATIENT)
Dept: SURGERY | Facility: CLINIC | Age: 82
End: 2022-11-10
Payer: COMMERCIAL

## 2022-11-10 LAB
BKR LAB AP ADD'L TEST STATUS: NORMAL
BKR LAB LINKED CASE OR SPECIMEN ID: NORMAL
BKR LAB LINKED CASE OR TEST ORDER DATE: NORMAL
BKR PATH ADDL TEST FINAL COMMENTS: NORMAL
PATH REPORT.ADDENDUM SPEC: ABNORMAL
PATH REPORT.COMMENTS IMP SPEC: ABNORMAL
PATH REPORT.COMMENTS IMP SPEC: YES
PATH REPORT.FINAL DX SPEC: ABNORMAL
PATH REPORT.GROSS SPEC: ABNORMAL
PATH REPORT.MICROSCOPIC SPEC OTHER STN: ABNORMAL
PATH REPORT.RELEVANT HX SPEC: ABNORMAL
PHOTO IMAGE: ABNORMAL

## 2022-11-10 PROCEDURE — 88360 TUMOR IMMUNOHISTOCHEM/MANUAL: CPT | Mod: 26 | Performed by: PATHOLOGY

## 2022-11-10 NOTE — ANESTHESIA PREPROCEDURE EVALUATION
Anesthesia Pre-Procedure Evaluation    Patient: Precious Paris   MRN: [id number] : 1940        Procedure : Procedure(s):  INSERTION, VASCULAR ACCESS PORT          Past Medical History:   Diagnosis Date     Allergic rhinitis due to other allergen      Asymptomatic postmenopausal status (age-related) (natural)      Basal cell carcinoma      Breast cancer (H)      Cervical spondylosis without myelopathy     cervical DJD     Disturbances of sensation of smell and taste     anosmia     Diverticulosis of colon (without mention of hemorrhage)      Malignant neoplasm of right breast (H) 3/13/2020    Added automatically from request for surgery [id number]     Other and unspecified hyperlipidemia      Pain in joint, lower leg      Plantar fascial fibromatosis      PURE HYPERCHOLESTEROLEM 2007     PURE HYPERCHOLESTEROLEM 2007     Squamous cell carcinoma       Past Surgical History:   Procedure Laterality Date     BIOPSY       BIOPSY BREAST       INSERT PORT VASCULAR ACCESS N/A 2019    Procedure: INSERTION OF VENOUS ACCESS PORT;  Surgeon: Hair Broderick DO;  Location: [facility] OR     JOINT REPLACEMTN, KNEE RT/LT      right     JOINT REPLACEMTN, KNEE RT/LT      left     LIGATE VEIN VARICOSE, PHLEBECTOMY MULTIPLE STAB, COMBINED  10/17/2013    Procedure: COMBINED LIGATE VEIN VARICOSE, PHLEBECTOMY MULTIPLE STAB;  Phlebectomy of Right Knee Varicose Veins;  Surgeon: Andrea Duffy MD;  Location: [facility]     LUMPECTOMY BREAST       LUMPECTOMY BREAST WITH SENTINEL NODE, COMBINED Right 3/19/2020    Procedure: LUMPECTOMY, BREAST, WITH SENTINEL LYMPH NODE BIOPSY, Right Lumpectomy With Right Otis Lymph Node Biopsy And Luisa Cath Removal;  Surgeon: Venancio Frausto MD;  Location: [facility] OR     OSTEOTOMY FOOT  2013    Procedure: OSTEOTOMY FOOT;  Left 2nd metatarsal osteotomy, 2nd Metatarsophalangeal joint & 2nd toe correction;  Surgeon: Jose Phillips DPM;  Location: [facility]      PHACOEMULSIFICATION WITH STANDARD INTRAOCULAR LENS IMPLANT Left 12/7/2017    Procedure: PHACOEMULSIFICATION WITH STANDARD INTRAOCULAR LENS IMPLANT;  Left Cataract Removal with Implant;  Surgeon: Mata Deleon MD;  Location: [facility] OR     SURGICAL HISTORY OF -   1979    Stripping of Veins in Left Leg     SURGICAL HISTORY OF -   1970    Bilateral Tubal Ligation     SURGICAL HISTORY OF -   2000    Carpal Tunnel Repair, right     SURGICAL HISTORY OF -   9-12-08    Rt let ablation     ZZHC COLONOSCOPY THRU STOMA, DIAGNOSTIC  04/05    diverticulosis and hemorroids, due 2015      Allergies   Allergen Reactions     Conjugated Estrogens Anaphylaxis and Other (See Comments)     Tightness in throat     Medroxyprogesterone Anaphylaxis and Other (See Comments)     Tightness in throat     Ace Inhibitors Cough     Premarin      Tightness in throat     Provera [Medroxyprogesterone Acetate]      Tightness in throat      Social History     Tobacco Use     Smoking status: Former     Smokeless tobacco: Never     Tobacco comments:     quit 20 years ago   Substance Use Topics     Alcohol use: Yes     Comment: occ.      Wt Readings from Last 1 Encounters:   11/07/22 86 kg (189 lb 8 oz)        Anesthesia Evaluation   Pt has had prior anesthetic. Type: General, MAC and Regional.    No history of anesthetic complications       ROS/MED HX  ENT/Pulmonary:     (+) allergic rhinitis, tobacco use, Past use,     Neurologic:  - neg neurologic ROS     Cardiovascular:     (+) Dyslipidemia hypertension-----Irregular Heartbeat/Palpitations, Previous cardiac testing   Echo: Date: 11/8/22 Results:  Interpretation Summary     Left ventricular size, wall motion and function are normal. The ejection  fraction is > 65%.  Normal right ventricle size and systolic function.  Mild valvular aortic stenosis.  ______________________________________________________________________________  Left Ventricle  Left ventricular size, wall motion and function are normal.  The ejection  fraction is > 65%. There is normal left ventricular wall thickness. Left  ventricular diastolic function is indeterminate. Biplane LVEF is 70%. No  regional wall motion abnormalities noted.     Right Ventricle  Normal right ventricle size and systolic function.     Atria  Normal left atrial size. Right atrial size is normal. There is no color  Doppler evidence of an atrial shunt.     Mitral Valve  Mitral valve leaflets appear normal. There is no evidence of mitral stenosis  or clinically significant mitral regurgitation.     Tricuspid Valve  Tricuspid valve leaflets appear normal. There is no evidence of tricuspid  stenosis or clinically significant tricuspid regurgitation. Right ventricular  systolic pressure could not be approximated due to inadequate tricuspid  regurgitation.     Aortic Valve  Mild aortic valve calcification is present. The aortic valve is trileaflet.  Mild valvular aortic stenosis.     Pulmonic Valve  The pulmonic valve is not well seen, but is grossly normal. This degree of  valvular regurgitation is within normal limits.     Vessels  The aorta root is normal. Normal size ascending aorta. IVC diameter <2.1 cm  collapsing >50% with sniff suggests a normal RA pressure of 3 mmHg.     Pericardium  There is no pericardial effusion.  Stress Test: Date: Results:    ECG Reviewed: Date: 3/17/2020 Results:  Sinus Rhythm   WITHIN NORMAL LIMITS  Cath: Date: Results:      METS/Exercise Tolerance:     Hematologic:  - neg hematologic  ROS     Musculoskeletal: Comment: Spinal stenosis    (+) arthritis,     GI/Hepatic: Comment: diverticulosis      Renal/Genitourinary:  - neg Renal ROS     Endo:     (+) Obesity,     Psychiatric/Substance Use:  - neg psychiatric ROS     Infectious Disease:       Malignancy:   (+) Malignancy, History of Breast and Skin.Breast CA Active status post Chemo.  Skin CA Remission status post Surgery.        Other:  - neg other ROS          Physical Exam    Airway  airway  exam normal           Respiratory Devices and Support         Dental  no notable dental history         Cardiovascular   cardiovascular exam normal          Pulmonary   pulmonary exam normal                OUTSIDE LABS:  CBC:   Lab Results   Component Value Date    WBC 7.9 10/27/2022    WBC 7.1 11/15/2021    HGB 13.1 10/27/2022    HGB 13.8 11/15/2021    HCT 39.6 10/27/2022    HCT 42.1 11/15/2021     10/27/2022     11/15/2021     BMP:   Lab Results   Component Value Date     10/27/2022     06/30/2022    POTASSIUM 4.7 10/27/2022    POTASSIUM 3.9 06/30/2022    CHLORIDE 102 10/27/2022    CHLORIDE 106 06/30/2022    CO2 27 10/27/2022    CO2 29 06/30/2022    BUN 13.1 10/27/2022    BUN 13 06/30/2022    CR 0.64 10/27/2022    CR 0.60 06/30/2022     (H) 10/27/2022     (H) 06/30/2022     COAGS:   Lab Results   Component Value Date    INR 1.2 (A) 09/16/2011     POC: No results found for: BGM, HCG, HCGS  HEPATIC:   Lab Results   Component Value Date    ALBUMIN 4.3 10/27/2022    PROTTOTAL 8.3 10/27/2022    ALT 16 10/27/2022    AST 22 10/27/2022    ALKPHOS 84 10/27/2022    BILITOTAL 0.3 10/27/2022     OTHER:   Lab Results   Component Value Date    A1C 5.6 06/30/2022    YADIRA 9.8 10/27/2022    TSH 1.62 07/26/2021    T4 1.14 07/18/2007    SED 16 06/14/2006       Anesthesia Plan    ASA Status:  3   NPO Status:  NPO Appropriate    Anesthesia Type: General.     - Airway: Native airway      Maintenance: Balanced.        Consents    Anesthesia Plan(s) and associated risks, benefits, and realistic alternatives discussed. Questions answered and patient/representative(s) expressed understanding.     - Discussed: Risks, Benefits and Alternatives for BOTH SEDATION and the PROCEDURE were discussed     - Discussed with:  Patient         Postoperative Care    Pain management: IV analgesics, Oral pain medications, Multi-modal analgesia.   PONV prophylaxis: Ondansetron (or other 5HT-3), Dexamethasone or  Solumedrol     Comments:                KONG Womack CRNA

## 2022-11-11 ENCOUNTER — APPOINTMENT (OUTPATIENT)
Dept: GENERAL RADIOLOGY | Facility: CLINIC | Age: 82
End: 2022-11-11
Attending: SURGERY
Payer: COMMERCIAL

## 2022-11-11 ENCOUNTER — ANESTHESIA (OUTPATIENT)
Dept: SURGERY | Facility: CLINIC | Age: 82
End: 2022-11-11
Payer: COMMERCIAL

## 2022-11-11 ENCOUNTER — HOSPITAL ENCOUNTER (OUTPATIENT)
Facility: CLINIC | Age: 82
Discharge: HOME | End: 2022-11-11
Attending: SURGERY | Admitting: SURGERY
Payer: COMMERCIAL

## 2022-11-11 VITALS
HEART RATE: 80 BPM | RESPIRATION RATE: 16 BRPM | BODY MASS INDEX: 31.59 KG/M2 | WEIGHT: 189.6 LBS | SYSTOLIC BLOOD PRESSURE: 178 MMHG | HEIGHT: 65 IN | OXYGEN SATURATION: 93 % | DIASTOLIC BLOOD PRESSURE: 73 MMHG | TEMPERATURE: 97.6 F

## 2022-11-11 PROCEDURE — 710N000012 HC RECOVERY PHASE 2, PER MINUTE: Performed by: SURGERY

## 2022-11-11 PROCEDURE — 258N000003 HC RX IP 258 OP 636: Performed by: SURGERY

## 2022-11-11 PROCEDURE — 999N000180 XR SURGERY CARM FLUORO LESS THAN 5 MIN: Mod: TC

## 2022-11-11 PROCEDURE — 250N000011 HC RX IP 250 OP 636: Performed by: NURSE ANESTHETIST, CERTIFIED REGISTERED

## 2022-11-11 PROCEDURE — 77001 FLUOROGUIDE FOR VEIN DEVICE: CPT | Mod: 26 | Performed by: SURGERY

## 2022-11-11 PROCEDURE — 272N000001 HC OR GENERAL SUPPLY STERILE: Performed by: SURGERY

## 2022-11-11 PROCEDURE — 360N000082 HC SURGERY LEVEL 2 W/ FLUORO, PER MIN: Performed by: SURGERY

## 2022-11-11 PROCEDURE — 999N000065 XR CHEST PORT 1 VIEW

## 2022-11-11 PROCEDURE — 370N000017 HC ANESTHESIA TECHNICAL FEE, PER MIN: Performed by: SURGERY

## 2022-11-11 PROCEDURE — 250N000009 HC RX 250: Performed by: NURSE ANESTHETIST, CERTIFIED REGISTERED

## 2022-11-11 PROCEDURE — 250N000013 HC RX MED GY IP 250 OP 250 PS 637: Performed by: NURSE ANESTHETIST, CERTIFIED REGISTERED

## 2022-11-11 PROCEDURE — 250N000009 HC RX 250: Performed by: SURGERY

## 2022-11-11 PROCEDURE — 36561 INSERT TUNNELED CV CATH: CPT | Performed by: SURGERY

## 2022-11-11 PROCEDURE — 999N000141 HC STATISTIC PRE-PROCEDURE NURSING ASSESSMENT: Performed by: SURGERY

## 2022-11-11 PROCEDURE — 258N000003 HC RX IP 258 OP 636: Performed by: NURSE ANESTHETIST, CERTIFIED REGISTERED

## 2022-11-11 PROCEDURE — 250N000011 HC RX IP 250 OP 636: Performed by: SURGERY

## 2022-11-11 PROCEDURE — 271N000001 HC OR GENERAL SUPPLY NON-STERILE: Performed by: SURGERY

## 2022-11-11 PROCEDURE — C1788 PORT, INDWELLING, IMP: HCPCS | Performed by: SURGERY

## 2022-11-11 DEVICE — CATH PORT MRI POWERPORT 8FR SL 1808000: Type: IMPLANTABLE DEVICE | Site: CHEST | Status: FUNCTIONAL

## 2022-11-11 RX ORDER — ONDANSETRON 2 MG/ML
INJECTION INTRAMUSCULAR; INTRAVENOUS PRN
Status: DISCONTINUED | OUTPATIENT
Start: 2022-11-11 | End: 2022-11-11

## 2022-11-11 RX ORDER — ACETAMINOPHEN 325 MG/1
975 TABLET ORAL ONCE
Status: COMPLETED | OUTPATIENT
Start: 2022-11-11 | End: 2022-11-11

## 2022-11-11 RX ORDER — NALOXONE HYDROCHLORIDE 0.4 MG/ML
0.4 INJECTION, SOLUTION INTRAMUSCULAR; INTRAVENOUS; SUBCUTANEOUS
Status: DISCONTINUED | OUTPATIENT
Start: 2022-11-11 | End: 2022-11-11 | Stop reason: HOSPADM

## 2022-11-11 RX ORDER — FENTANYL CITRATE 50 UG/ML
INJECTION, SOLUTION INTRAMUSCULAR; INTRAVENOUS PRN
Status: DISCONTINUED | OUTPATIENT
Start: 2022-11-11 | End: 2022-11-11

## 2022-11-11 RX ORDER — DEXAMETHASONE SODIUM PHOSPHATE 4 MG/ML
INJECTION, SOLUTION INTRA-ARTICULAR; INTRALESIONAL; INTRAMUSCULAR; INTRAVENOUS; SOFT TISSUE PRN
Status: DISCONTINUED | OUTPATIENT
Start: 2022-11-11 | End: 2022-11-11

## 2022-11-11 RX ORDER — NALOXONE HYDROCHLORIDE 0.4 MG/ML
0.2 INJECTION, SOLUTION INTRAMUSCULAR; INTRAVENOUS; SUBCUTANEOUS
Status: DISCONTINUED | OUTPATIENT
Start: 2022-11-11 | End: 2022-11-11 | Stop reason: HOSPADM

## 2022-11-11 RX ORDER — LIDOCAINE HYDROCHLORIDE 10 MG/ML
INJECTION, SOLUTION INFILTRATION; PERINEURAL PRN
Status: DISCONTINUED | OUTPATIENT
Start: 2022-11-11 | End: 2022-11-11

## 2022-11-11 RX ORDER — GABAPENTIN 100 MG/1
100 CAPSULE ORAL
Status: COMPLETED | OUTPATIENT
Start: 2022-11-11 | End: 2022-11-11

## 2022-11-11 RX ORDER — ONDANSETRON 2 MG/ML
4 INJECTION INTRAMUSCULAR; INTRAVENOUS
Status: DISCONTINUED | OUTPATIENT
Start: 2022-11-11 | End: 2022-11-11 | Stop reason: HOSPADM

## 2022-11-11 RX ORDER — ONDANSETRON 2 MG/ML
4 INJECTION INTRAMUSCULAR; INTRAVENOUS EVERY 30 MIN PRN
Status: DISCONTINUED | OUTPATIENT
Start: 2022-11-11 | End: 2022-11-11 | Stop reason: HOSPADM

## 2022-11-11 RX ORDER — DIMENHYDRINATE 50 MG/ML
25 INJECTION, SOLUTION INTRAMUSCULAR; INTRAVENOUS
Status: DISCONTINUED | OUTPATIENT
Start: 2022-11-11 | End: 2022-11-11 | Stop reason: HOSPADM

## 2022-11-11 RX ORDER — SODIUM CHLORIDE, SODIUM LACTATE, POTASSIUM CHLORIDE, CALCIUM CHLORIDE 600; 310; 30; 20 MG/100ML; MG/100ML; MG/100ML; MG/100ML
INJECTION, SOLUTION INTRAVENOUS CONTINUOUS
Status: DISCONTINUED | OUTPATIENT
Start: 2022-11-11 | End: 2022-11-11 | Stop reason: HOSPADM

## 2022-11-11 RX ORDER — LIDOCAINE HYDROCHLORIDE AND EPINEPHRINE 10; 10 MG/ML; UG/ML
INJECTION, SOLUTION INFILTRATION; PERINEURAL PRN
Status: DISCONTINUED | OUTPATIENT
Start: 2022-11-11 | End: 2022-11-11 | Stop reason: HOSPADM

## 2022-11-11 RX ORDER — LIDOCAINE 40 MG/G
CREAM TOPICAL
Status: DISCONTINUED | OUTPATIENT
Start: 2022-11-11 | End: 2022-11-11 | Stop reason: HOSPADM

## 2022-11-11 RX ORDER — CEFAZOLIN SODIUM 1 G/3ML
INJECTION, POWDER, FOR SOLUTION INTRAMUSCULAR; INTRAVENOUS PRN
Status: DISCONTINUED | OUTPATIENT
Start: 2022-11-11 | End: 2022-11-11

## 2022-11-11 RX ORDER — BUPIVACAINE HYDROCHLORIDE 5 MG/ML
INJECTION, SOLUTION PERINEURAL PRN
Status: DISCONTINUED | OUTPATIENT
Start: 2022-11-11 | End: 2022-11-11 | Stop reason: HOSPADM

## 2022-11-11 RX ORDER — GLYCOPYRROLATE 0.2 MG/ML
INJECTION, SOLUTION INTRAMUSCULAR; INTRAVENOUS PRN
Status: DISCONTINUED | OUTPATIENT
Start: 2022-11-11 | End: 2022-11-11

## 2022-11-11 RX ORDER — MEPERIDINE HYDROCHLORIDE 25 MG/ML
12.5 INJECTION INTRAMUSCULAR; INTRAVENOUS; SUBCUTANEOUS
Status: DISCONTINUED | OUTPATIENT
Start: 2022-11-11 | End: 2022-11-11 | Stop reason: HOSPADM

## 2022-11-11 RX ORDER — OXYCODONE HYDROCHLORIDE 5 MG/1
5 TABLET ORAL EVERY 4 HOURS PRN
Status: DISCONTINUED | OUTPATIENT
Start: 2022-11-11 | End: 2022-11-11 | Stop reason: HOSPADM

## 2022-11-11 RX ORDER — ONDANSETRON 4 MG/1
4 TABLET, ORALLY DISINTEGRATING ORAL EVERY 30 MIN PRN
Status: DISCONTINUED | OUTPATIENT
Start: 2022-11-11 | End: 2022-11-11 | Stop reason: HOSPADM

## 2022-11-11 RX ORDER — PROPOFOL 10 MG/ML
INJECTION, EMULSION INTRAVENOUS CONTINUOUS PRN
Status: DISCONTINUED | OUTPATIENT
Start: 2022-11-11 | End: 2022-11-11

## 2022-11-11 RX ORDER — HEPARIN SODIUM 1000 [USP'U]/ML
INJECTION, SOLUTION INTRAVENOUS; SUBCUTANEOUS PRN
Status: DISCONTINUED | OUTPATIENT
Start: 2022-11-11 | End: 2022-11-11 | Stop reason: HOSPADM

## 2022-11-11 RX ADMIN — DEXAMETHASONE SODIUM PHOSPHATE 4 MG: 4 INJECTION, SOLUTION INTRA-ARTICULAR; INTRALESIONAL; INTRAMUSCULAR; INTRAVENOUS; SOFT TISSUE at 13:00

## 2022-11-11 RX ADMIN — MIDAZOLAM 1 MG: 1 INJECTION INTRAMUSCULAR; INTRAVENOUS at 12:56

## 2022-11-11 RX ADMIN — SODIUM CHLORIDE, POTASSIUM CHLORIDE, SODIUM LACTATE AND CALCIUM CHLORIDE: 600; 310; 30; 20 INJECTION, SOLUTION INTRAVENOUS at 12:28

## 2022-11-11 RX ADMIN — CEFAZOLIN 2 G: 1 INJECTION, POWDER, FOR SOLUTION INTRAMUSCULAR; INTRAVENOUS at 12:49

## 2022-11-11 RX ADMIN — MIDAZOLAM 1 MG: 1 INJECTION INTRAMUSCULAR; INTRAVENOUS at 12:53

## 2022-11-11 RX ADMIN — LIDOCAINE HYDROCHLORIDE 0.1 ML: 10 INJECTION, SOLUTION EPIDURAL; INFILTRATION; INTRACAUDAL; PERINEURAL at 12:29

## 2022-11-11 RX ADMIN — GLYCOPYRROLATE 0.1 MG: 0.2 INJECTION, SOLUTION INTRAMUSCULAR; INTRAVENOUS at 12:53

## 2022-11-11 RX ADMIN — SODIUM CHLORIDE, POTASSIUM CHLORIDE, SODIUM LACTATE AND CALCIUM CHLORIDE: 600; 310; 30; 20 INJECTION, SOLUTION INTRAVENOUS at 12:52

## 2022-11-11 RX ADMIN — GABAPENTIN 100 MG: 100 CAPSULE ORAL at 12:29

## 2022-11-11 RX ADMIN — PROPOFOL 75 MCG/KG/MIN: 10 INJECTION, EMULSION INTRAVENOUS at 12:55

## 2022-11-11 RX ADMIN — ONDANSETRON 4 MG: 2 INJECTION INTRAMUSCULAR; INTRAVENOUS at 13:00

## 2022-11-11 RX ADMIN — ACETAMINOPHEN 975 MG: 325 TABLET, FILM COATED ORAL at 12:29

## 2022-11-11 RX ADMIN — FENTANYL CITRATE 50 MCG: 50 INJECTION, SOLUTION INTRAMUSCULAR; INTRAVENOUS at 12:55

## 2022-11-11 RX ADMIN — GLYCOPYRROLATE 0.1 MG: 0.2 INJECTION, SOLUTION INTRAMUSCULAR; INTRAVENOUS at 12:56

## 2022-11-11 RX ADMIN — FENTANYL CITRATE 50 MCG: 50 INJECTION, SOLUTION INTRAMUSCULAR; INTRAVENOUS at 13:03

## 2022-11-11 RX ADMIN — LIDOCAINE HYDROCHLORIDE 75 MG: 10 INJECTION, SOLUTION INFILTRATION; PERINEURAL at 12:55

## 2022-11-11 ASSESSMENT — ACTIVITIES OF DAILY LIVING (ADL)
ADLS_ACUITY_SCORE: 35
ADLS_ACUITY_SCORE: 35

## 2022-11-11 ASSESSMENT — LIFESTYLE VARIABLES: TOBACCO_USE: 1

## 2022-11-11 NOTE — ANESTHESIA CARE TRANSFER NOTE
Patient: Precious Paris    Procedure: Procedure(s):  INSERTION, VASCULAR ACCESS PORT right subclavian       Diagnosis: Malignant neoplasm involving both nipple and areola of right breast in female, unspecified estrogen receptor status (H) [C50.011]  Diagnosis Additional Information: No value filed.    Anesthesia Type:   General     Note:    Oropharynx: oropharynx clear of all foreign objects and spontaneously breathing  Level of Consciousness: drowsy  Oxygen Supplementation: room air    Independent Airway: airway patency satisfactory and stable  Dentition: dentition unchanged  Vital Signs Stable: post-procedure vital signs reviewed and stable  Report to RN Given: handoff report given  Patient transferred to: Phase II    Handoff Report: Identifed the Patient, Identified the Reponsible Provider, Reviewed the pertinent medical history, Discussed the surgical course, Reviewed Intra-OP anesthesia mangement and issues during anesthesia, Set expectations for post-procedure period and Allowed opportunity for questions and acknowledgement of understanding      Vitals:  Vitals Value Taken Time   /80 11/11/22 1404   Temp 36.4  C (97.5  F) 11/11/22 1352   Pulse 93 11/11/22 1404   Resp     SpO2 94 % 11/11/22 1411   Vitals shown include unvalidated device data.    Electronically Signed By: KONG Lopez CRNA  November 11, 2022  2:11 PM

## 2022-11-11 NOTE — DISCHARGE INSTRUCTIONS
Discharge for Port Placement  Routine Postoperative Care  You may shower now   Extra Strength Tylenol and over the counter Motrin (if you can take these medications) should be sufficient for your painis sufficient.   Eat a balanced diet and try to maintain good nutrition. Also drink plenty of fluids. Some of the pain medications can cause constipation, and drinking plenty of water will help prevent this.  If you do get constipated, you may need a stool softener or laxative. We or your pharmacist can help you with this.  You may resume light activity as you feel up to it. Avoid any heavy lifting (heavier than 10 pounds), or strenuous activity (including exercise) for one week.    Incision Sites  After 24 hours, you may shower.  The glue will follow-off on its own  2)            After 24-48 hours you may shower, cleaning wounds with soapy water. After showering, pat the wound dry with a clean towel. Do not apply any creams or ointments                   to the incision.   3)            You should call with any signs that the wound may be infected. Signs of infection include: the skin around the wound becoming redder, swollen or feeling hot; the                 wound has green or yellow drainage or smells bad, you have fever over 101oF or increasing pain.       Follow-up  You do not require routine follow-up for your procedure.  You must have your port accessed at least once a month to be flushed.  If there are any issues with your port or you are finished with use and would like it removed, please call the office to schedule an appointment.    Remember that healing from surgery, even if that surgery seems small, takes time. If you have any questions about your procedure, your follow-up instructions or the plan of care, do not hesitate to call.  For urgent calls weekends and holidays or prior to 8 am or after 4:30 pm weekdays, call the [facility] Surgery Office ([phone]) will be forwarded to a nurse  and if necessary Dr. Broderick or the on call general surgeon.

## 2022-11-11 NOTE — H&P
Surgical Consultation/History and Physical  [facility] General Surgery    Chief Complaint:  Breast cancer    HPI:  Precious Paris presents in consultation today for evaluation of infusion port placement. She has a history of right sided breast cancer. The patient is right dominant, has never pneumothorax, lung surgery, history of dialysis or renal failure, and denies repetitive movements with the upper extremity (such as pitching, chopping wood, bowling, or hunting).  She has history of right sided SVC CVC.     Patient Active Problem List   Diagnosis     Spinal stenosis in cervical region     Varicose veins of lower extremities with inflammation     Malabsorption of glucose     OA (osteoarthritis) of knee     Premature atrial contractions     HYPERLIPIDEMIA LDL GOAL <130     Healthcare maintenance     Hx of skin cancer, basal cell     Hammer toe     Hypertension, goal below 150/90     Primary osteoarthritis of both hands     Senile nuclear cataract     Hypertensive retinopathy of right eye     Infiltrating ductal carcinoma of right breast (H)     Malignant neoplasm of upper-outer quadrant of right breast in female, 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)     Metastatic breast cancer (H)       Past Medical History:   Diagnosis Date     Allergic rhinitis due to other allergen      Asymptomatic postmenopausal status (age-related) (natural)      Basal cell carcinoma      Breast cancer (H)      Cervical spondylosis without myelopathy     cervical DJD     Disturbances of sensation of smell and taste     anosmia     Diverticulosis of colon (without mention of hemorrhage)      Malignant neoplasm of right breast (H) 3/13/2020    Added automatically from request for surgery [id number]     Other and unspecified hyperlipidemia      Pain in joint, lower leg      Plantar fascial fibromatosis      PURE HYPERCHOLESTEROLEM 9/9/2007     PURE HYPERCHOLESTEROLEM 9/9/2007     Squamous cell carcinoma        Past Surgical History:   Procedure  Laterality Date     BIOPSY       BIOPSY BREAST       INSERT PORT VASCULAR ACCESS N/A 11/29/2019    Procedure: INSERTION OF VENOUS ACCESS PORT;  Surgeon: Hair Broderick DO;  Location: [facility] OR     JOINT REPLACEMTN, KNEE RT/LT  2010    right     JOINT REPLACEMTN, KNEE RT/LT  2011    left     LIGATE VEIN VARICOSE, PHLEBECTOMY MULTIPLE STAB, COMBINED  10/17/2013    Procedure: COMBINED LIGATE VEIN VARICOSE, PHLEBECTOMY MULTIPLE STAB;  Phlebectomy of Right Knee Varicose Veins;  Surgeon: Andrea Duffy MD;  Location: [facility] OR     LUMPECTOMY BREAST       LUMPECTOMY BREAST WITH SENTINEL NODE, COMBINED Right 3/19/2020    Procedure: LUMPECTOMY, BREAST, WITH SENTINEL LYMPH NODE BIOPSY, Right Lumpectomy With Right Flat Top Lymph Node Biopsy And Luisa Cath Removal;  Surgeon: Venancio Frausto MD;  Location: [facility] OR     OSTEOTOMY FOOT  8/19/2013    Procedure: OSTEOTOMY FOOT;  Left 2nd metatarsal osteotomy, 2nd Metatarsophalangeal joint & 2nd toe correction;  Surgeon: Jose Phillips DPM;  Location: [facility] OR     PHACOEMULSIFICATION WITH STANDARD INTRAOCULAR LENS IMPLANT Left 12/7/2017    Procedure: PHACOEMULSIFICATION WITH STANDARD INTRAOCULAR LENS IMPLANT;  Left Cataract Removal with Implant;  Surgeon: Mata Deleon MD;  Location: [facility] OR     SURGICAL HISTORY OF -   1979    Stripping of Veins in Left Leg     SURGICAL HISTORY OF -   1970    Bilateral Tubal Ligation     SURGICAL HISTORY OF -   2000    Carpal Tunnel Repair, right     SURGICAL HISTORY OF -   9-12-08    Rt let ablation     ZZHC COLONOSCOPY THRU STOMA, DIAGNOSTIC  04/05    diverticulosis and hemorroids, due 2015       Family History   Problem Relation Age of Onset     Hypertension Mother      Cerebrovascular Disease Mother      Hypertension Father      Cerebrovascular Disease Father      Hypertension Brother      Cancer Brother         lymphoma     Hypertension Sister      Gastrointestinal Disease Sister      Hypertension Brother      Cancer Brother        "  melanoma     Eye Disorder Brother      Hypertension Brother      Hypertension Brother      Heart Defect Son        Social History     Tobacco Use     Smoking status: Former     Smokeless tobacco: Never     Tobacco comments:     quit 20 years ago   Substance Use Topics     Alcohol use: Yes     Comment: occ.        History   Drug Use No       No current outpatient medications on file.       Allergies   Allergen Reactions     Conjugated Estrogens Anaphylaxis and Other (See Comments)     Tightness in throat     Medroxyprogesterone Anaphylaxis and Other (See Comments)     Tightness in throat     Ace Inhibitors Cough     Premarin      Tightness in throat     Provera [Medroxyprogesterone Acetate]      Tightness in throat       Physical Exam:  BP (!) 152/87 (BP Location: Left arm)   Pulse 109   Temp 98.4  F (36.9  C) (Oral)   Resp 16   Ht 1.638 m (5' 4.5\")   Wt 86 kg (189 lb 9.5 oz)   SpO2 94%   BMI 32.04 kg/m      Constitutional- No acute distress, well nourished, non-toxic  Eyes: Anicteric, no injection.  PERRL  ENT:  Normocephalic, atraumatic, Nose midline, moist mucus membranes  Neck - supple, no LAD  Respiratory- Clear to auscultation bilaterally, good inspiratory effort  Cardiovascular - Heart RRR, no lift's, thrills, murmurs, rubs, or gallop.  No peripheral edema.  No clubbing.  Neuro - No focal neuro deficits, Alert and oriented x 3  Psych: Appropriate mood and affect  Musculoskeletal: Normal gait, symmetric strength.  FROM upper and lower extremities.  Skin: Warm, Dry    LABS:     Lab Results   Component Value Date    WBC 7.9 10/27/2022    HGB 13.1 10/27/2022    HCT 39.6 10/27/2022    MCV 93 10/27/2022     10/27/2022      Lab Results   Component Value Date    INR 1.2 (A) 09/16/2011    INR 2.00 (H) 09/13/2011    INR 2.0 (H) 07/23/2010          INFORMED CONSENT:     A discussion of the indications, risks, benefits and alternatives to surgery was held with the patient, and the risks discussed include " beeding, infection, thrombosis, stenosis, port malfunction or malposition, air embolism, pneumothorax, hemothorax, or cardiac arrythmia . The patient understood the information given, questions addressed, and she wishes to proceed. She understands the need for maintenance of the infusion port at least once a month while in place.    ASSESSMENT AND PLAN:     Ms. Precious Paris is in need of an infusion port for breast cancer, and is being expedited to surgery to facilitate care. She  understood the information given, and wishes to proceed accordingly.- and is being accordingly scheduled.    Hair Broderick,  on 11/11/2022 at 12:37 PM

## 2022-11-11 NOTE — OR NURSING
preop done and  visits pt and daughter preop.  in to see pt preop. Pt states has right breast discharge. preop meds given and ready for surgery

## 2022-11-11 NOTE — OP NOTE
Procedure Date: 11/11/22     PREOPERATIVE DIAGNOSIS: 1. Recurrent breast cancer  POSTOPERATIVE DIAGNOSIS: Same    PROCEDURE:    1. Placement of Right Subclavian Infusion port (8 [place] Bard Power-Port)  2. Fluoroscopic guidance and confirmation of port placement.    SURGEON: Hair Broderick DO    ESTIMATED BLOOD LOSS: 3 mL    INDICATIONS:Ms. Precious Paris is a 82 year old white female who presents with a history of recurrent breast cancer. The patient is in need of an infusion port for chemotherapy access, and a discussion was held with the patient regarding indications, risks, benefits, and alternatives (including, but not limited to, risks of bleeding, infection, pneumothorax, need for revision, thrombosis, stenosis, port malposition/malfunction, cardiac arrhythmia, and the rare risk of gas embolism). She understood the information given, questions were addressed, and she wishes to proceed.     DESCRIPTION OF PROCEDURE: The patient was brought to the operating room and placed supine on the operating room table. Sedative agents were administered by the anesthesia service. The bilateral chest and neck were then prepped and draped in the usual sterile fashion. The patient was placed in Trendelenburg position. The right subclavian vein was percutaneously accessed after two passes of the introducer needle. A guidewire was advanced under direct fluoroscopic guidance, and the trajectory was confirmed toward the superior vena cava/heart. Additional local anesthesia was used, and a cutdown was created inferior to the entry point of the guidewire, and the incision was deepened to create an appropriate pocket to allow the port reservoir to comfortably reside. An introducer sheath was then passed over the guidewire without difficulty, under direct fluoroscopic guidance, and the dilator and guidewire removed. The infusion port catheter was then passed until the tip the catheter was in the region of the cavo-atrial  junction via fluoroscopy. The sheath was torn away in the standard fashion. The catheter was tunneled to the reservoir pocket, manicured to an appropriate length, and secured to the infusion port reservoir and the locking collar affixed. The port was easily flushed with a heparin solution, after assuring ease of aspiration. The infusion port itself was then secured to the tissue in the base of the pocket using interrupted 0-Ethibond suture. The final catheter position was again assessed under fluoroscopy and appeared satisfactory, with no abnormal trajectory or kinks noted, and with the tip of the catheter again noted at the cavo-atrial junction. The pocket was irrigated. Hemostasis was assured. An instrument count was conducted, and included laparotomy pads, needles and sponges, and was found to be correct. The subcutaneous tissue was closed with interrupted 3-0 Vicryl. Skin edges were re-approximated using 4-0 Monocryl, and the wound was cleansed and dried prior to application of sterile glue.     The patient tolerated the procedure well. She was then transferred to the recovery room in stable condition, was allowed to recover and was seated upright for a post-procedure chest x-ray.     Hair Broderick DO on 11/11/2022 at 1:50 PM

## 2022-11-11 NOTE — ANESTHESIA POSTPROCEDURE EVALUATION
Patient: Precious Paris    Procedure: Procedure(s):  INSERTION, VASCULAR ACCESS PORT right subclavian       Anesthesia Type:  General    Note:  Disposition: Outpatient   Postop Pain Control: Uneventful            Sign Out: Well controlled pain   PONV: No   Neuro/Psych: Uneventful            Sign Out: Acceptable/Baseline neuro status   Airway/Respiratory: Uneventful            Sign Out: Acceptable/Baseline resp. status   CV/Hemodynamics: Uneventful            Sign Out: Acceptable CV status; No obvious hypovolemia; No obvious fluid overload   Other NRE: NONE   DID A NON-ROUTINE EVENT OCCUR? No           Last vitals:  Vitals Value Taken Time   /80 11/11/22 1404   Temp 36.4  C (97.5  F) 11/11/22 1352   Pulse 93 11/11/22 1404   Resp     SpO2 94 % 11/11/22 1411   Vitals shown include unvalidated device data.    Electronically Signed By: KONG Lopez CRNA  November 11, 2022  2:12 PM

## 2022-11-11 NOTE — INTERVAL H&P NOTE
"I have reviewed the surgical (or preoperative) H&P that is linked to this encounter, and examined the patient. There are no significant changes    Clinical Conditions Present on Arrival:  Clinically Significant Risk Factors Present on Admission                    # Obesity: Estimated body mass index is 32.04 kg/m  as calculated from the following:    Height as of this encounter: 1.638 m (5' 4.5\").    Weight as of this encounter: 86 kg (189 lb 9.5 oz).       "

## 2022-11-14 ENCOUNTER — INFUSION THERAPY VISIT (OUTPATIENT)
Dept: INFUSION THERAPY | Facility: CLINIC | Age: 82
End: 2022-11-14
Attending: INTERNAL MEDICINE
Payer: COMMERCIAL

## 2022-11-14 VITALS — DIASTOLIC BLOOD PRESSURE: 50 MMHG | HEART RATE: 72 BPM | RESPIRATION RATE: 16 BRPM | SYSTOLIC BLOOD PRESSURE: 134 MMHG

## 2022-11-14 VITALS
TEMPERATURE: 98.3 F | DIASTOLIC BLOOD PRESSURE: 70 MMHG | RESPIRATION RATE: 16 BRPM | HEART RATE: 97 BPM | WEIGHT: 188 LBS | BODY MASS INDEX: 31.77 KG/M2 | SYSTOLIC BLOOD PRESSURE: 166 MMHG

## 2022-11-14 DIAGNOSIS — C50.411 MALIGNANT NEOPLASM OF UPPER-OUTER QUADRANT OF RIGHT BREAST IN FEMALE, ESTROGEN RECEPTOR NEGATIVE (H): ICD-10-CM

## 2022-11-14 DIAGNOSIS — C50.919 METASTATIC BREAST CANCER: Primary | ICD-10-CM

## 2022-11-14 DIAGNOSIS — Z17.1 MALIGNANT NEOPLASM OF UPPER-OUTER QUADRANT OF RIGHT BREAST IN FEMALE, ESTROGEN RECEPTOR NEGATIVE (H): ICD-10-CM

## 2022-11-14 LAB
ALBUMIN SERPL BCG-MCNC: 4 G/DL (ref 3.5–5.2)
ALP SERPL-CCNC: 77 U/L (ref 35–104)
ALT SERPL W P-5'-P-CCNC: 17 U/L (ref 10–35)
ANION GAP SERPL CALCULATED.3IONS-SCNC: 8 MMOL/L (ref 7–15)
AST SERPL W P-5'-P-CCNC: 22 U/L (ref 10–35)
BASOPHILS # BLD AUTO: 0 10E3/UL (ref 0–0.2)
BASOPHILS NFR BLD AUTO: 1 %
BILIRUB SERPL-MCNC: 0.3 MG/DL
BUN SERPL-MCNC: 10.4 MG/DL (ref 8–23)
CALCIUM SERPL-MCNC: 9.6 MG/DL (ref 8.8–10.2)
CEA SERPL-MCNC: 2.4 NG/ML
CHLORIDE SERPL-SCNC: 102 MMOL/L (ref 98–107)
CREAT SERPL-MCNC: 0.58 MG/DL (ref 0.51–0.95)
DEPRECATED HCO3 PLAS-SCNC: 29 MMOL/L (ref 22–29)
EOSINOPHIL # BLD AUTO: 0.1 10E3/UL (ref 0–0.7)
EOSINOPHIL NFR BLD AUTO: 1 %
ERYTHROCYTE [DISTWIDTH] IN BLOOD BY AUTOMATED COUNT: 13.2 % (ref 10–15)
GFR SERPL CREATININE-BSD FRML MDRD: 90 ML/MIN/1.73M2
GLUCOSE SERPL-MCNC: 116 MG/DL (ref 70–99)
HCT VFR BLD AUTO: 38.5 % (ref 35–47)
HGB BLD-MCNC: 12.6 G/DL (ref 11.7–15.7)
IMM GRANULOCYTES # BLD: 0.1 10E3/UL
IMM GRANULOCYTES NFR BLD: 1 %
LYMPHOCYTES # BLD AUTO: 1.1 10E3/UL (ref 0.8–5.3)
LYMPHOCYTES NFR BLD AUTO: 14 %
MCH RBC QN AUTO: 30.4 PG (ref 26.5–33)
MCHC RBC AUTO-ENTMCNC: 32.7 G/DL (ref 31.5–36.5)
MCV RBC AUTO: 93 FL (ref 78–100)
MONOCYTES # BLD AUTO: 0.7 10E3/UL (ref 0–1.3)
MONOCYTES NFR BLD AUTO: 8 %
NEUTROPHILS # BLD AUTO: 5.9 10E3/UL (ref 1.6–8.3)
NEUTROPHILS NFR BLD AUTO: 75 %
NRBC # BLD AUTO: 0 10E3/UL
NRBC BLD AUTO-RTO: 0 /100
PLATELET # BLD AUTO: 324 10E3/UL (ref 150–450)
POTASSIUM SERPL-SCNC: 3.8 MMOL/L (ref 3.4–5.3)
PROT SERPL-MCNC: 7.7 G/DL (ref 6.4–8.3)
RBC # BLD AUTO: 4.15 10E6/UL (ref 3.8–5.2)
SODIUM SERPL-SCNC: 139 MMOL/L (ref 136–145)
WBC # BLD AUTO: 7.8 10E3/UL (ref 4–11)

## 2022-11-14 PROCEDURE — 82378 CARCINOEMBRYONIC ANTIGEN: CPT | Performed by: INTERNAL MEDICINE

## 2022-11-14 PROCEDURE — 86300 IMMUNOASSAY TUMOR CA 15-3: CPT | Performed by: INTERNAL MEDICINE

## 2022-11-14 PROCEDURE — 258N000003 HC RX IP 258 OP 636: Performed by: INTERNAL MEDICINE

## 2022-11-14 PROCEDURE — 96375 TX/PRO/DX INJ NEW DRUG ADDON: CPT

## 2022-11-14 PROCEDURE — 80053 COMPREHEN METABOLIC PANEL: CPT | Performed by: INTERNAL MEDICINE

## 2022-11-14 PROCEDURE — 96413 CHEMO IV INFUSION 1 HR: CPT

## 2022-11-14 PROCEDURE — 85025 COMPLETE CBC W/AUTO DIFF WBC: CPT | Performed by: INTERNAL MEDICINE

## 2022-11-14 PROCEDURE — 250N000011 HC RX IP 250 OP 636: Performed by: INTERNAL MEDICINE

## 2022-11-14 RX ORDER — ALBUTEROL SULFATE 90 UG/1
1-2 AEROSOL, METERED RESPIRATORY (INHALATION)
Status: CANCELLED
Start: 2022-11-14

## 2022-11-14 RX ORDER — HEPARIN SODIUM,PORCINE 10 UNIT/ML
5 VIAL (ML) INTRAVENOUS
Status: CANCELLED | OUTPATIENT
Start: 2022-11-14

## 2022-11-14 RX ORDER — ONDANSETRON 2 MG/ML
8 INJECTION INTRAMUSCULAR; INTRAVENOUS ONCE
Status: COMPLETED | OUTPATIENT
Start: 2022-11-14 | End: 2022-11-14

## 2022-11-14 RX ORDER — HEPARIN SODIUM (PORCINE) LOCK FLUSH IV SOLN 100 UNIT/ML 100 UNIT/ML
5 SOLUTION INTRAVENOUS
Status: CANCELLED | OUTPATIENT
Start: 2022-11-14

## 2022-11-14 RX ORDER — HEPARIN SODIUM (PORCINE) LOCK FLUSH IV SOLN 100 UNIT/ML 100 UNIT/ML
5 SOLUTION INTRAVENOUS
Status: DISCONTINUED | OUTPATIENT
Start: 2022-11-14 | End: 2022-11-14 | Stop reason: HOSPADM

## 2022-11-14 RX ORDER — LORAZEPAM 2 MG/ML
0.5 INJECTION INTRAMUSCULAR EVERY 4 HOURS PRN
Status: CANCELLED | OUTPATIENT
Start: 2022-11-14

## 2022-11-14 RX ORDER — ONDANSETRON 2 MG/ML
8 INJECTION INTRAMUSCULAR; INTRAVENOUS ONCE
Status: CANCELLED | OUTPATIENT
Start: 2022-11-14

## 2022-11-14 RX ORDER — EPINEPHRINE 1 MG/ML
0.3 INJECTION, SOLUTION, CONCENTRATE INTRAVENOUS EVERY 5 MIN PRN
Status: CANCELLED | OUTPATIENT
Start: 2022-11-14

## 2022-11-14 RX ORDER — MEPERIDINE HYDROCHLORIDE 25 MG/ML
25 INJECTION INTRAMUSCULAR; INTRAVENOUS; SUBCUTANEOUS EVERY 30 MIN PRN
Status: CANCELLED | OUTPATIENT
Start: 2022-11-14

## 2022-11-14 RX ORDER — METHYLPREDNISOLONE SODIUM SUCCINATE 125 MG/2ML
125 INJECTION, POWDER, LYOPHILIZED, FOR SOLUTION INTRAMUSCULAR; INTRAVENOUS
Status: CANCELLED
Start: 2022-11-14

## 2022-11-14 RX ORDER — ALBUTEROL SULFATE 0.83 MG/ML
2.5 SOLUTION RESPIRATORY (INHALATION)
Status: CANCELLED | OUTPATIENT
Start: 2022-11-14

## 2022-11-14 RX ORDER — DIPHENHYDRAMINE HYDROCHLORIDE 50 MG/ML
50 INJECTION INTRAMUSCULAR; INTRAVENOUS
Status: CANCELLED
Start: 2022-11-14

## 2022-11-14 RX ADMIN — DEXTROSE MONOHYDRATE 250 ML: 50 INJECTION, SOLUTION INTRAVENOUS at 10:34

## 2022-11-14 RX ADMIN — FAMOTIDINE 20 MG: 10 INJECTION INTRAVENOUS at 10:38

## 2022-11-14 RX ADMIN — Medication 5 ML: at 12:51

## 2022-11-14 RX ADMIN — DOXORUBICIN HYDROCHLORIDE 100 MG: 2 INJECTABLE, LIPOSOMAL INTRAVENOUS at 10:57

## 2022-11-14 RX ADMIN — ONDANSETRON 8 MG: 2 INJECTION INTRAMUSCULAR; INTRAVENOUS at 10:35

## 2022-11-14 NOTE — PROGRESS NOTES
Infusion Nursing Note:  Precious Paris presents today for C1D1 Doxil.    Patient seen by provider today: No   present during visit today: Not Applicable.    Note: Pt confirms she was given chemo education and additional literature from [facility] was provided.    Intravenous Access:  Implanted Port.    Treatment Conditions:  Lab Results   Component Value Date    HGB 12.6 11/14/2022    WBC 7.8 11/14/2022    ANEU 3.7 04/09/2021    ANEUTAUTO 5.9 11/14/2022     11/14/2022      Lab Results   Component Value Date     11/14/2022    POTASSIUM 3.8 11/14/2022    CR 0.58 11/14/2022    YADIRA 9.6 11/14/2022    BILITOTAL 0.3 11/14/2022    ALBUMIN 4.0 11/14/2022    ALT 17 11/14/2022    AST 22 11/14/2022     Results reviewed, labs MET treatment parameters, ok to proceed with treatment.    Post Infusion Assessment:  Patient tolerated infusion without incident.  Blood return noted pre and post infusion.  Site patent and intact, free from redness, edema or discomfort.  No evidence of extravasations.  Access discontinued per protocol.     Discharge Plan:   Discharge instructions reviewed with: Patient.  Patient and/or family verbalized understanding of discharge instructions and all questions answered.  Patient discharged in stable condition accompanied by: self.  Departure Mode: Ambulatory.      Brittany Miller RN

## 2022-11-14 NOTE — PROGRESS NOTES
PAC labs drawn and sent. Port accessed without difficulty. Good blood return noted.      Brittany Miller RN

## 2022-11-15 LAB — CANCER AG27-29 SERPL-ACNC: 59.5 U/ML

## 2022-11-17 ENCOUNTER — PATIENT OUTREACH (OUTPATIENT)
Dept: ONCOLOGY | Facility: CLINIC | Age: 82
End: 2022-11-17

## 2022-11-17 LAB — SCANNED LAB RESULT: NORMAL

## 2022-11-23 DIAGNOSIS — C50.919 METASTATIC BREAST CANCER: Primary | ICD-10-CM

## 2022-11-28 ENCOUNTER — LAB (OUTPATIENT)
Dept: INFUSION THERAPY | Facility: CLINIC | Age: 82
End: 2022-11-28
Attending: INTERNAL MEDICINE
Payer: COMMERCIAL

## 2022-11-28 ENCOUNTER — ONCOLOGY VISIT (OUTPATIENT)
Dept: ONCOLOGY | Facility: CLINIC | Age: 82
End: 2022-11-28
Attending: INTERNAL MEDICINE
Payer: COMMERCIAL

## 2022-11-28 VITALS
WEIGHT: 188.2 LBS | SYSTOLIC BLOOD PRESSURE: 152 MMHG | BODY MASS INDEX: 31.81 KG/M2 | TEMPERATURE: 98.2 F | OXYGEN SATURATION: 97 % | DIASTOLIC BLOOD PRESSURE: 70 MMHG | HEART RATE: 78 BPM

## 2022-11-28 DIAGNOSIS — T45.1X5A CHEMOTHERAPY-INDUCED NAUSEA: ICD-10-CM

## 2022-11-28 DIAGNOSIS — R11.0 CHEMOTHERAPY-INDUCED NAUSEA: ICD-10-CM

## 2022-11-28 DIAGNOSIS — C50.919 METASTATIC BREAST CANCER: Primary | ICD-10-CM

## 2022-11-28 LAB
ALBUMIN SERPL BCG-MCNC: 4.1 G/DL (ref 3.5–5.2)
ALP SERPL-CCNC: 86 U/L (ref 35–104)
ALT SERPL W P-5'-P-CCNC: 17 U/L (ref 10–35)
ANION GAP SERPL CALCULATED.3IONS-SCNC: 8 MMOL/L (ref 7–15)
AST SERPL W P-5'-P-CCNC: 23 U/L (ref 10–35)
BASOPHILS # BLD MANUAL: 0.1 10E3/UL (ref 0–0.2)
BASOPHILS NFR BLD MANUAL: 3 %
BILIRUB SERPL-MCNC: 0.2 MG/DL
BUN SERPL-MCNC: 12.7 MG/DL (ref 8–23)
CALCIUM SERPL-MCNC: 9.8 MG/DL (ref 8.8–10.2)
CHLORIDE SERPL-SCNC: 98 MMOL/L (ref 98–107)
CREAT SERPL-MCNC: 0.55 MG/DL (ref 0.51–0.95)
DEPRECATED HCO3 PLAS-SCNC: 30 MMOL/L (ref 22–29)
EOSINOPHIL # BLD MANUAL: 0 10E3/UL (ref 0–0.7)
EOSINOPHIL NFR BLD MANUAL: 0 %
ERYTHROCYTE [DISTWIDTH] IN BLOOD BY AUTOMATED COUNT: 12.5 % (ref 10–15)
GFR SERPL CREATININE-BSD FRML MDRD: >90 ML/MIN/1.73M2
GLUCOSE SERPL-MCNC: 120 MG/DL (ref 70–99)
HCT VFR BLD AUTO: 34.2 % (ref 35–47)
HGB BLD-MCNC: 11.3 G/DL (ref 11.7–15.7)
LYMPHOCYTES # BLD MANUAL: 0.9 10E3/UL (ref 0.8–5.3)
LYMPHOCYTES NFR BLD MANUAL: 24 %
MCH RBC QN AUTO: 30.1 PG (ref 26.5–33)
MCHC RBC AUTO-ENTMCNC: 33 G/DL (ref 31.5–36.5)
MCV RBC AUTO: 91 FL (ref 78–100)
MONOCYTES # BLD MANUAL: 0.1 10E3/UL (ref 0–1.3)
MONOCYTES NFR BLD MANUAL: 2 %
NEUTROPHILS # BLD MANUAL: 2.6 10E3/UL (ref 1.6–8.3)
NEUTROPHILS NFR BLD MANUAL: 71 %
PLAT MORPH BLD: NORMAL
PLATELET # BLD AUTO: 208 10E3/UL (ref 150–450)
POTASSIUM SERPL-SCNC: 3.6 MMOL/L (ref 3.4–5.3)
PROT SERPL-MCNC: 8 G/DL (ref 6.4–8.3)
RBC # BLD AUTO: 3.76 10E6/UL (ref 3.8–5.2)
RBC MORPH BLD: NORMAL
SODIUM SERPL-SCNC: 136 MMOL/L (ref 136–145)
WBC # BLD AUTO: 3.6 10E3/UL (ref 4–11)

## 2022-11-28 PROCEDURE — 99214 OFFICE O/P EST MOD 30 MIN: CPT | Performed by: INTERNAL MEDICINE

## 2022-11-28 PROCEDURE — G0463 HOSPITAL OUTPT CLINIC VISIT: HCPCS

## 2022-11-28 PROCEDURE — 85007 BL SMEAR W/DIFF WBC COUNT: CPT | Performed by: INTERNAL MEDICINE

## 2022-11-28 PROCEDURE — 85014 HEMATOCRIT: CPT | Performed by: INTERNAL MEDICINE

## 2022-11-28 PROCEDURE — 36591 DRAW BLOOD OFF VENOUS DEVICE: CPT

## 2022-11-28 PROCEDURE — 80053 COMPREHEN METABOLIC PANEL: CPT | Performed by: INTERNAL MEDICINE

## 2022-11-28 RX ORDER — ONDANSETRON 4 MG/1
4 TABLET, ORALLY DISINTEGRATING ORAL EVERY 8 HOURS PRN
Qty: 30 TABLET | Refills: 0 | Status: SHIPPED | OUTPATIENT
Start: 2022-11-28 | End: 2023-01-23

## 2022-11-28 ASSESSMENT — PAIN SCALES - GENERAL: PAINLEVEL: NO PAIN (0)

## 2022-11-28 NOTE — PROGRESS NOTES
[facility]    Outpatient Physical Therapy Discharge Note  Patient: Precious Paris  : 1940    Beginning/End Dates of Reporting Period:  10/17/22 to 22    Referring Provider: Dr. Ester Jorge MD    Therapy Diagnosis: RUE/RUQ lymphedema      Client Self Report: Not seen this reporting period    Objective Measurements:  Objective Measure: R breast girth  Details: 41.2 cm in sitting    Objective Measure: R UE  Details: -1.5%     Outcome Measures (most recent score):   LLIS = 4 on eval; unable to again complete due to not returning to clinic due to cancer return    Goals:  Goal Identifier stg 1   Goal Description pt to have daytime tolerance to compression bra and insert for edema reduction and skin healing response to decrease risk of infections   Target Date 22   Date Met  08/15/22   Progress (detail required for progress note):       Goal Identifier ltg 1   Goal Description pt to be indep with longterm R UE/UQ edema management via HEP, elevation, skin cares and compression garment wear/use   Target Date 10/18/22   Date Met      Progress (detail required for progress note):       Plan:  Discharge from therapy.  Patient last seen in clinic on 22 and at that time cancelled all remaining appointments as pt's R-breast cancer returning. Per chart review, treatment has started.     Discharge:    Reason for Discharge: Change in medical status - return of R-breast cancer    Equipment Issued: none    Discharge Plan: Patient to continue home program and return to OP lymph clinic in the future if needed. Will require a new eval.

## 2022-11-28 NOTE — PROGRESS NOTES
"Oncology Rooming Note    November 28, 2022 3:46 PM   Precious Paris is a 82 year old female who presents for:    Chief Complaint   Patient presents with     Oncology Clinic Visit     Infiltrating ductal carcinoma of right breast - Lab and provider     Initial Vitals: BP (!) 152/70 (BP Location: Left arm, Patient Position: Sitting, Cuff Size: Adult Regular)   Pulse 78   Temp 98.2  F (36.8  C) (Tympanic)   Wt 85.4 kg (188 lb 3.2 oz)   SpO2 97%   BMI 31.81 kg/m   Estimated body mass index is 31.81 kg/m  as calculated from the following:    Height as of 11/11/22: 1.638 m (5' 4.5\").    Weight as of this encounter: 85.4 kg (188 lb 3.2 oz). Body surface area is 1.97 meters squared.  No Pain (0) Comment: Data Unavailable   No LMP recorded. Patient is postmenopausal.  Allergies reviewed: Yes  Medications reviewed: Yes    Medications: Medication refills not needed today.  Pharmacy name entered into [employer]: [facility] - [facility] [address] [facility]    Clinical concerns:  None      Barbie James, AXEL            "

## 2022-11-28 NOTE — LETTER
"    11/28/2022         RE: Precious Paris  [address] [place] [address]        Dear Colleague,    Thank you for referring your patient, Precious Paris, to the [facility]. Please see a copy of my visit note below.    Oncology Rooming Note    November 28, 2022 3:46 PM   Precious Paris is a 82 year old female who presents for:    Chief Complaint   Patient presents with     Oncology Clinic Visit     Infiltrating ductal carcinoma of right breast - Lab and provider     Initial Vitals: BP (!) 152/70 (BP Location: Left arm, Patient Position: Sitting, Cuff Size: Adult Regular)   Pulse 78   Temp 98.2  F (36.8  C) (Tympanic)   Wt 85.4 kg (188 lb 3.2 oz)   SpO2 97%   BMI 31.81 kg/m   Estimated body mass index is 31.81 kg/m  as calculated from the following:    Height as of 11/11/22: 1.638 m (5' 4.5\").    Weight as of this encounter: 85.4 kg (188 lb 3.2 oz). Body surface area is 1.97 meters squared.  No Pain (0) Comment: Data Unavailable   No LMP recorded. Patient is postmenopausal.  Allergies reviewed: Yes  Medications reviewed: Yes    Medications: Medication refills not needed today.  Pharmacy name entered into [employer]: [facility] [place] [facility] [id number] [facility]    Clinical concerns:  None      Barbie James, AXEL kessler  [facility] Hematology and Oncology Progress Note    Patient: Precious Paris  MRN: [id number]  Nov 28, 2022          Reason for Visit  Chief Complaint   Patient presents with     Oncology Clinic Visit     Infiltrating ductal carcinoma of right breast - Lab and provider         Assessment/Plan  1. Triple negative right breast cancer (11/2019), now with recurrence  Currently on single agent Doxil.  Received cycle 1 2 weeks ago.  Tolerating okay.  Her PD-L1 testing came back elevated with CPS at >20% and TPS 60%.  This is considered to be high PD-L1 expression.  Based on KEYNOTE 355 " study, addition of pembrolizumab to chemotherapy in metastatic triple negative cancers who are BRCA negative, resulted in improved response rates and survival.  I think he is a candidate for this.  Although in the study mostly taxanes were used as the chemotherapy backbone, since she has already had Taxol in the past and did not do well with it, I think it is okay to add it with doxorubicin.  Also single agent pembrolizumab and immunotherapy agents have shown some effectiveness interval negative breast cancers were not candidates for chemotherapy.  So in this setting I think adding pembrolizumab to her chemotherapy backbone makes sense.  Since pembrolizumab is given once every 3 weeks I will change from Doxil to 3 weekly doxorubicin.  If she does not respond to this or tolerate this then we should switch to single agent carboplatin with pembrolizumab.    At the end of the visit she did become significantly nauseated.  I sent a prescription for Zofran to be taken immediately.  After a while her symptom subsided.    Patient Instructions   RTC on December 12th with labs prior. Doxorubicin with pembrolizumab to follow the same day.       ____________________________________________________________________________    History of Present Illness/ Interval History    Ms. Precious Paris is a 81 year old treated for right triple negative breast cancer almost 2 yrs ago.      Now with recurrent metastatic triple negative breast cancer.  She started Doxil 2 weeks ago.  Did well with it.  No major side effects.  Denies any fevers or chills.  Today she does feel a bit nauseated.  Denies any shortness of breath or chest pain.  She continues to have discharge to her right nipple which is bloody at times.  No signs of infection.    ECOG PS: 1      Oncology History/Treatment  Diagnosis/Stage:   11/2019: Right breast cancer, triple negative ([id number])    BRCA negative    Treatment:  2019: Neoadjuvant taxol    Lumpectomy    5/2020:  Adjuvant RT    6 - 7/2020: Adjuvant Xeloda. Stopped for rash      Physical Exam    GENERAL: Alert and oriented to time place and person. Seated comfortably. In no distress.  HEAD: Atraumatic and normocephalic. No alopecia.  EYES: DELISA, EOMI. No erythema. No icterus.  BREASTS: Large mass is felt in the right breast with inverted nipple and bloody nipple discharge.  She has dried blood on the nipple.  No redness or signs of infection.  Does have lymphedema.  Also has bilateral hard palpable lymphadenopathy consistent with metastatic disease.  Left breast is normal.   CHEST: clear to auscultation bilaterally. Resonant to percussion throughout bilaterally. Symmetrical breath movements bilaterally.  CVS: S1 and S2 are heard. Regular rate and rhythm.   ABDOMEN: Soft. Not tender. Not distended.  EXTREMITIES: Warm. No peripheral edema.  SKIN: no rash, or bruising or purpura.   NEURO: No gross deficit noted. Non-antalgic gait.      Lab Results    None    Imaging  No results found.      Billing  Total time 45 minutes, to include face to face visit, review of EMR, ordering, documentation and coordination of care    Signed by: Maricarmen Beebe MD         Again, thank you for allowing me to participate in the care of your patient.        Sincerely,        Maricarmen Beebe MD

## 2022-12-06 NOTE — PROGRESS NOTES
rodolfo kessler  [facility] Hematology and Oncology Progress Note    Patient: Precious Paris  MRN: [id number]  Nov 7, 2022          Reason for Visit  Chief Complaint   Patient presents with     Oncology Clinic Visit     Malignant neoplasm of upper-outer quadrant of right breast in female, estrogen receptor negative - Provider visit         Assessment/Plan  1. 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  PET scan was done on 10/27/2022.  It showed hypermetabolic lymph nodes in the bilateral axilla along with mediastinal and hilar lymphadenopathy and FDG avid pulmonary nodules all consistent with metastatic disease. To establish metastatic disease I recommended biopsy of the left axillary lymph nodes.  These have come back positive.  I reviewed the left axilla lymph node biopsy report with her today.  This is consistent with metastatic triple negative breast cancer.  I have asked to add on PD-L1 testing since she is BRCA negative.  I explained to them that if her CPS score is more than 10 then potentially she can be a candidate for adding immunotherapy to chemotherapy regimens.  I discussed potential treatment options.    Previously she had single agent Taxol in the neoadjuvant setting followed by adjuvant capecitabine.  She did not do that well with both neoadjuvant and adjuvant chemotherapy.  She had significant fatigue and other side effects from Taxol.  Adjuvant capecitabine was discontinued soon after she started it due to significant skin rash.  She is still considered to have good performance status.  So in this setting I discussed about doing taxane versus anthracycline.  She has no contraindications for anthracycline.  So recommended to start with Doxil.  In the meantime we will wait for the PD-L1 testing to come back.  Bluntly we can add  pembrolizumab to chemotherapy in the future.  I reviewed the potential side effects and complications associated with Doxil today.  She is agreeable to proceed with the treatment.  She will get an echocardiogram tomorrow.  Also get an MRI of the brain to make sure there are no metastatic disease.      She is agreeable with the plan.    Patient Instructions   Please cycle 1 doxil next week with labs prior. RTC in 3 weeks with labs prior for toxicity check.      ____________________________________________________________________________    History of Present Illness/ Interval History    Ms. Precious Paris is a 81 year old treated for right triple negative breast cancer almost 2 yrs ago.  She is here to discuss PET scan and recent breast biopsy results and make further follow-up plans.     Recently she was seen by my colleague regarding new onset right-sided nipple discharge.  There was concern for possible infection and she was treated with some antibiotics.  The nipple discharge started a few weeks ago and turn bloody within a matter of a week.  Associated with some discomfort and swelling in the right breast.  She has had issues with right-sided breast postlumpectomy and sentinel lymph node biopsy about 2 years ago.  Mammogram was ordered which came back showing abnormality in the right breast along with right axillary lymphadenopathy.    She underwent ultrasound-guided biopsy of the right breast mass and right axillary lymph node which came back positive for invasive ductal carcinoma which was triple negative.  This was consistent with disease recurrence.  However since she had clinically node positive disease a PET scan was ordered.  PET scan showed bilateral FDG avid axillary lymphadenopathy along with mediastinal and hilar lymphadenopathy and FDG avid pulmonary nodules consistent with metastatic disease.  I recommended biopsy of the left axillary lymph node to confirm metastatic disease which would dictate  treatment.  She had a biopsy done last week and it is showing triple negative high-grade breast cancer.  She is here to review treatment options.    ECOG PS: 1      Oncology History/Treatment  Diagnosis/Stage:   2019: Right breast cancer, triple negative ([id number])    BRCA negative    Treatment:  2019: Neoadjuvant taxol    Lumpectomy    2020: Adjuvant RT     - 2020: Adjuvant Xeloda. Stopped for rash      Physical Exam    GENERAL: Alert and oriented to time place and person. Seated comfortably. In no distress.  HEAD: Atraumatic and normocephalic. No alopecia.  EYES: DELISA, EOMI. No erythema. No icterus.  BREASTS: Emigrate is felt in the right breast with inverted nipple and bloody nipple discharge.  She has dried blood on the nipple.  No redness or signs of infection.  Does have lymphedema.  Also has bilateral hard palpable lymphadenopathy consistent with metastatic disease.  Left breast is normal.   CHEST: clear to auscultation bilaterally. Resonant to percussion throughout bilaterally. Symmetrical breath movements bilaterally.  CVS: S1 and S2 are heard. Regular rate and rhythm.   ABDOMEN: Soft. Not tender. Not distended.  EXTREMITIES: Warm. No peripheral edema.  SKIN: no rash, or bruising or purpura.   NEURO: No gross deficit noted. Non-antalgic gait.      Lab Results    None    Imaging  Echocardiogram Complete    Result Date: 2022  [id number]^CHEYANNE^[place] [address]  Name: ANGELIKA HUDDLESTON MRN: [id number] : 1940 Study Date: 2022 12:33 PM Age: 82 yrs Gender: Female Patient Location: [facility] Reason For Study: Malignant neoplasm of upper-outer quadrant of right breast in  Ordering Physician: DEBORAH EDWARD Referring Physician: DEBORAH EDWARD Performed By: ACE  BSA: 1.9 m2 Height: 64 in Weight: 189 lb HR: 83 BP: 144/74 mmHg ______________________________________________________________________________  Procedure Complete Echo Adult. ______________________________________________________________________________ Interpretation Summary  Left ventricular size, wall motion and function are normal. The ejection fraction is > 65%. Normal right ventricle size and systolic function. Mild valvular aortic stenosis. ______________________________________________________________________________ Left Ventricle Left ventricular size, wall motion and function are normal. The ejection fraction is > 65%. There is normal left ventricular wall thickness. Left ventricular diastolic function is indeterminate. Biplane LVEF is 70%. No regional wall motion abnormalities noted.  Right Ventricle Normal right ventricle size and systolic function.  Atria Normal left atrial size. Right atrial size is normal. There is no color Doppler evidence of an atrial shunt.  Mitral Valve Mitral valve leaflets appear normal. There is no evidence of mitral stenosis or clinically significant mitral regurgitation.  Tricuspid Valve Tricuspid valve leaflets appear normal. There is no evidence of tricuspid stenosis or clinically significant tricuspid regurgitation. Right ventricular systolic pressure could not be approximated due to inadequate tricuspid regurgitation.  Aortic Valve Mild aortic valve calcification is present. The aortic valve is trileaflet. Mild valvular aortic stenosis.  Pulmonic Valve The pulmonic valve is not well seen, but is grossly normal. This degree of valvular regurgitation is within normal limits.  Vessels The aorta root is normal. Normal size ascending aorta. IVC diameter <2.1 cm collapsing >50% with sniff suggests a normal RA pressure of 3 mmHg.  Pericardium There is no pericardial effusion.  ______________________________________________________________________________ MMode/2D Measurements & Calculations IVSd: 0.60 cm LVIDd: 4.4 cm LVIDs: 2.7 cm LVPWd: 0.72 cm FS: 38.2 % LV mass(C)d: 84.9 grams LV mass(C)dI: 44.5 grams/m2  Ao root  diam: 2.9 cm LA dimension: 4.0 cm asc Aorta Diam: 3.5 cm LA/Ao: 1.4 LVOT diam: 2.1 cm LVOT area: 3.5 cm2 LA Volume Indexed (AL/bp): 30.6 ml/m2 RWT: 0.33  Time Measurements MM HR: 72.0 BPM  Doppler Measurements & Calculations MV E max rush: 68.4 cm/sec MV A max rush: 103.3 cm/sec MV E/A: 0.66 MV dec slope: 349.3 cm/sec2 MV dec time: 0.20 sec Ao V2 max: 212.0 cm/sec Ao max P.0 mmHg Ao V2 mean: 155.9 cm/sec Ao mean PG: 10.9 mmHg Ao V2 VTI: 48.8 cm MANUEL(I,D): 2.1 cm2 MANUEL(V,D): 2.4 cm2 LV V1 max P.1 mmHg LV V1 max: 142.6 cm/sec LV V1 VTI: 29.4 cm SV(LVOT): 102.8 ml SI(LVOT): 53.8 ml/m2 PA acc time: 0.07 sec AV Rush Ratio (DI): 0.67 MANUEL Index (cm2/m2): 1.1 E/E': 13.6 E/E' av.6 Lateral E/e': 13.6 Medial E/e': 13.6 Peak E' Rush: 5.0 cm/sec  ______________________________________________________________________________ Report approved by: Heide Gee 2022 03:28 PM       XR Chest Port 1 View    Result Date: 2022  CHEST ONE VIEW PORTABLE  2022 2:18 PM HISTORY: Status post right subclavian port placement COMPARISON: 2019. FINDINGS/    IMPRESSION: A right chest wall Port-A-Cath is present with the tip in the region of the mid SVC. No postprocedure complications. Specifically, no pneumothorax. Lungs appear clear. No pleural effusion. Heart size and pulmonary vasculature are within normal limits. Several surgical clips project over the right breast versus low right axilla. Multilevel hypertrophic and degenerative changes of the spine are present. There is mild levoscoliosis of the lower thoracic spine. ROSEMARY FLEMING MD   SYSTEM ID:  [id number]    MR Brain w/o & w Contrast    Result Date: 2022  MRI BRAIN WITHOUT AND WITH CONTRAST  2022 11:18 AM HISTORY:  Malignant neoplasm of upper-outer quadrant of right breast in female, 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). TECHNIQUE:  Multiplanar, multisequence MRI of the brain without and with 8 mL Gadavist. COMPARISON: None. FINDINGS:  Mild  generalized parenchymal volume loss is present. Scattered nonspecific frontoparietal predominant white matter T2 hyperintensities likely represent chronic small vessel ischemic change. No evidence of acute ischemia, hemorrhage, mass, mass effect, or hydrocephalus. No abnormal enhancement or diffusion restriction. Small areas of nonspecific relative hypoenhancement within the pituitary gland which could represent subtle adenomas or cysts, incompletely characterized (correlation with pituitary function could be considered). Marrow signal is within normal limits. Trace paranasal sinus mucosal thickening. Left lens replacement.     IMPRESSION:  No evidence of metastatic disease. KODY UP MD   SYSTEM ID:  [id number]    XR Surgery YOVANI Fluoro Less Than 5 Min    Result Date: 11/11/2022  This exam was marked as non-reportable because it will not be read by a radiologist or a [facility] non-radiologist provider.         Billing  Total time 45 minutes, to include face to face visit, review of EMR, ordering, documentation and coordination of care    Signed by: Maricarmen Beebe MD   '

## 2022-12-12 ENCOUNTER — LAB (OUTPATIENT)
Dept: INFUSION THERAPY | Facility: CLINIC | Age: 82
End: 2022-12-12
Attending: INTERNAL MEDICINE
Payer: COMMERCIAL

## 2022-12-12 ENCOUNTER — ONCOLOGY VISIT (OUTPATIENT)
Dept: ONCOLOGY | Facility: CLINIC | Age: 82
End: 2022-12-12
Attending: NURSE PRACTITIONER
Payer: COMMERCIAL

## 2022-12-12 VITALS
TEMPERATURE: 98.7 F | DIASTOLIC BLOOD PRESSURE: 57 MMHG | RESPIRATION RATE: 12 BRPM | WEIGHT: 187.6 LBS | BODY MASS INDEX: 31.7 KG/M2 | SYSTOLIC BLOOD PRESSURE: 142 MMHG | OXYGEN SATURATION: 99 % | HEART RATE: 77 BPM

## 2022-12-12 VITALS — DIASTOLIC BLOOD PRESSURE: 81 MMHG | HEART RATE: 117 BPM | SYSTOLIC BLOOD PRESSURE: 145 MMHG

## 2022-12-12 DIAGNOSIS — C50.919 METASTATIC BREAST CANCER: ICD-10-CM

## 2022-12-12 DIAGNOSIS — R35.0 URINARY FREQUENCY: ICD-10-CM

## 2022-12-12 DIAGNOSIS — C50.919 METASTATIC BREAST CANCER: Primary | ICD-10-CM

## 2022-12-12 DIAGNOSIS — C50.411 MALIGNANT NEOPLASM OF UPPER-OUTER QUADRANT OF RIGHT BREAST IN FEMALE, ESTROGEN RECEPTOR NEGATIVE (H): ICD-10-CM

## 2022-12-12 DIAGNOSIS — Z17.1 MALIGNANT NEOPLASM OF UPPER-OUTER QUADRANT OF RIGHT BREAST IN FEMALE, ESTROGEN RECEPTOR NEGATIVE (H): ICD-10-CM

## 2022-12-12 DIAGNOSIS — R35.0 URINARY FREQUENCY: Primary | ICD-10-CM

## 2022-12-12 LAB
ALBUMIN SERPL BCG-MCNC: 3.9 G/DL (ref 3.5–5.2)
ALBUMIN UR-MCNC: NEGATIVE MG/DL
ALP SERPL-CCNC: 88 U/L (ref 35–104)
ALT SERPL W P-5'-P-CCNC: 17 U/L (ref 10–35)
ANION GAP SERPL CALCULATED.3IONS-SCNC: 7 MMOL/L (ref 7–15)
APPEARANCE UR: CLEAR
AST SERPL W P-5'-P-CCNC: 25 U/L (ref 10–35)
BACTERIA #/AREA URNS HPF: ABNORMAL /HPF
BASOPHILS # BLD AUTO: 0.1 10E3/UL (ref 0–0.2)
BASOPHILS NFR BLD AUTO: 1 %
BILIRUB SERPL-MCNC: 0.2 MG/DL
BILIRUB UR QL STRIP: NEGATIVE
BUN SERPL-MCNC: 11.6 MG/DL (ref 8–23)
CALCIUM SERPL-MCNC: 9.9 MG/DL (ref 8.8–10.2)
CHLORIDE SERPL-SCNC: 102 MMOL/L (ref 98–107)
COLOR UR AUTO: YELLOW
CREAT SERPL-MCNC: 0.55 MG/DL (ref 0.51–0.95)
DEPRECATED HCO3 PLAS-SCNC: 30 MMOL/L (ref 22–29)
EOSINOPHIL # BLD AUTO: 0 10E3/UL (ref 0–0.7)
EOSINOPHIL NFR BLD AUTO: 0 %
ERYTHROCYTE [DISTWIDTH] IN BLOOD BY AUTOMATED COUNT: 13.6 % (ref 10–15)
GFR SERPL CREATININE-BSD FRML MDRD: >90 ML/MIN/1.73M2
GLUCOSE SERPL-MCNC: 118 MG/DL (ref 70–99)
GLUCOSE UR STRIP-MCNC: NEGATIVE MG/DL
HCT VFR BLD AUTO: 35.1 % (ref 35–47)
HGB BLD-MCNC: 11.7 G/DL (ref 11.7–15.7)
HGB UR QL STRIP: NEGATIVE
IMM GRANULOCYTES # BLD: 0 10E3/UL
IMM GRANULOCYTES NFR BLD: 1 %
KETONES UR STRIP-MCNC: NEGATIVE MG/DL
LEUKOCYTE ESTERASE UR QL STRIP: ABNORMAL
LYMPHOCYTES # BLD AUTO: 1.2 10E3/UL (ref 0.8–5.3)
LYMPHOCYTES NFR BLD AUTO: 24 %
MCH RBC QN AUTO: 31 PG (ref 26.5–33)
MCHC RBC AUTO-ENTMCNC: 33.3 G/DL (ref 31.5–36.5)
MCV RBC AUTO: 93 FL (ref 78–100)
MONOCYTES # BLD AUTO: 0.8 10E3/UL (ref 0–1.3)
MONOCYTES NFR BLD AUTO: 16 %
MUCOUS THREADS #/AREA URNS LPF: PRESENT /LPF
NEUTROPHILS # BLD AUTO: 2.9 10E3/UL (ref 1.6–8.3)
NEUTROPHILS NFR BLD AUTO: 58 %
NITRATE UR QL: NEGATIVE
NRBC # BLD AUTO: 0 10E3/UL
NRBC BLD AUTO-RTO: 0 /100
PH UR STRIP: 6 [PH] (ref 5–7)
PLATELET # BLD AUTO: 323 10E3/UL (ref 150–450)
POTASSIUM SERPL-SCNC: 3.7 MMOL/L (ref 3.4–5.3)
PROT SERPL-MCNC: 7.7 G/DL (ref 6.4–8.3)
RBC # BLD AUTO: 3.78 10E6/UL (ref 3.8–5.2)
RBC URINE: <1 /HPF
SODIUM SERPL-SCNC: 139 MMOL/L (ref 136–145)
SP GR UR STRIP: 1.02 (ref 1–1.03)
SQUAMOUS EPITHELIAL: 1 /HPF
UROBILINOGEN UR STRIP-MCNC: NORMAL MG/DL
WBC # BLD AUTO: 5 10E3/UL (ref 4–11)
WBC URINE: 2 /HPF

## 2022-12-12 PROCEDURE — 258N000003 HC RX IP 258 OP 636: Performed by: NURSE PRACTITIONER

## 2022-12-12 PROCEDURE — 81001 URINALYSIS AUTO W/SCOPE: CPT | Performed by: NURSE PRACTITIONER

## 2022-12-12 PROCEDURE — 85025 COMPLETE CBC W/AUTO DIFF WBC: CPT | Performed by: NURSE PRACTITIONER

## 2022-12-12 PROCEDURE — 250N000011 HC RX IP 250 OP 636: Performed by: NURSE PRACTITIONER

## 2022-12-12 PROCEDURE — 96376 TX/PRO/DX INJ SAME DRUG ADON: CPT

## 2022-12-12 PROCEDURE — G0463 HOSPITAL OUTPT CLINIC VISIT: HCPCS | Mod: 25

## 2022-12-12 PROCEDURE — 99214 OFFICE O/P EST MOD 30 MIN: CPT | Performed by: NURSE PRACTITIONER

## 2022-12-12 PROCEDURE — 86300 IMMUNOASSAY TUMOR CA 15-3: CPT | Performed by: NURSE PRACTITIONER

## 2022-12-12 PROCEDURE — 80053 COMPREHEN METABOLIC PANEL: CPT | Performed by: NURSE PRACTITIONER

## 2022-12-12 PROCEDURE — 96409 CHEMO IV PUSH SNGL DRUG: CPT

## 2022-12-12 PROCEDURE — 96375 TX/PRO/DX INJ NEW DRUG ADDON: CPT

## 2022-12-12 RX ORDER — DIPHENHYDRAMINE HYDROCHLORIDE 50 MG/ML
50 INJECTION INTRAMUSCULAR; INTRAVENOUS
Status: CANCELLED
Start: 2022-12-12

## 2022-12-12 RX ORDER — HEPARIN SODIUM (PORCINE) LOCK FLUSH IV SOLN 100 UNIT/ML 100 UNIT/ML
5 SOLUTION INTRAVENOUS
Status: CANCELLED | OUTPATIENT
Start: 2022-12-12

## 2022-12-12 RX ORDER — ALBUTEROL SULFATE 0.83 MG/ML
2.5 SOLUTION RESPIRATORY (INHALATION)
Status: CANCELLED | OUTPATIENT
Start: 2022-12-12

## 2022-12-12 RX ORDER — DEXAMETHASONE 4 MG/1
8 TABLET ORAL DAILY
Qty: 6 TABLET | Refills: 2 | Status: SHIPPED | OUTPATIENT
Start: 2022-12-12 | End: 2023-01-02

## 2022-12-12 RX ORDER — METHYLPREDNISOLONE SODIUM SUCCINATE 125 MG/2ML
125 INJECTION, POWDER, LYOPHILIZED, FOR SOLUTION INTRAMUSCULAR; INTRAVENOUS
Status: CANCELLED
Start: 2022-12-12

## 2022-12-12 RX ORDER — HEPARIN SODIUM (PORCINE) LOCK FLUSH IV SOLN 100 UNIT/ML 100 UNIT/ML
5 SOLUTION INTRAVENOUS
Status: DISCONTINUED | OUTPATIENT
Start: 2022-12-12 | End: 2022-12-12 | Stop reason: HOSPADM

## 2022-12-12 RX ORDER — PROCHLORPERAZINE MALEATE 10 MG
10 TABLET ORAL EVERY 6 HOURS PRN
Qty: 30 TABLET | Refills: 2 | Status: SHIPPED | OUTPATIENT
Start: 2022-12-12 | End: 2023-01-17

## 2022-12-12 RX ORDER — MEPERIDINE HYDROCHLORIDE 25 MG/ML
25 INJECTION INTRAMUSCULAR; INTRAVENOUS; SUBCUTANEOUS EVERY 30 MIN PRN
Status: CANCELLED | OUTPATIENT
Start: 2022-12-12

## 2022-12-12 RX ORDER — LORAZEPAM 2 MG/ML
0.5 INJECTION INTRAMUSCULAR EVERY 4 HOURS PRN
Status: CANCELLED | OUTPATIENT
Start: 2022-12-12

## 2022-12-12 RX ORDER — PALONOSETRON 0.05 MG/ML
0.25 INJECTION, SOLUTION INTRAVENOUS ONCE
Status: COMPLETED | OUTPATIENT
Start: 2022-12-12 | End: 2022-12-12

## 2022-12-12 RX ORDER — EPINEPHRINE 1 MG/ML
0.3 INJECTION, SOLUTION, CONCENTRATE INTRAVENOUS EVERY 5 MIN PRN
Status: CANCELLED | OUTPATIENT
Start: 2022-12-12

## 2022-12-12 RX ORDER — PALONOSETRON 0.05 MG/ML
0.25 INJECTION, SOLUTION INTRAVENOUS ONCE
Status: CANCELLED | OUTPATIENT
Start: 2022-12-12

## 2022-12-12 RX ORDER — DOXORUBICIN HYDROCHLORIDE 2 MG/ML
60 INJECTION, SOLUTION INTRAVENOUS ONCE
Status: CANCELLED | OUTPATIENT
Start: 2022-12-12

## 2022-12-12 RX ORDER — ALBUTEROL SULFATE 90 UG/1
1-2 AEROSOL, METERED RESPIRATORY (INHALATION)
Status: CANCELLED
Start: 2022-12-12

## 2022-12-12 RX ORDER — ONDANSETRON 8 MG/1
8 TABLET, FILM COATED ORAL EVERY 8 HOURS PRN
Qty: 30 TABLET | Refills: 2 | Status: SHIPPED | OUTPATIENT
Start: 2022-12-12 | End: 2023-01-17

## 2022-12-12 RX ORDER — DOXORUBICIN HYDROCHLORIDE 2 MG/ML
60 INJECTION, SOLUTION INTRAVENOUS ONCE
Status: COMPLETED | OUTPATIENT
Start: 2022-12-12 | End: 2022-12-12

## 2022-12-12 RX ORDER — HEPARIN SODIUM,PORCINE 10 UNIT/ML
5 VIAL (ML) INTRAVENOUS
Status: CANCELLED | OUTPATIENT
Start: 2022-12-12

## 2022-12-12 RX ADMIN — PALONOSETRON 0.25 MG: 0.05 INJECTION, SOLUTION INTRAVENOUS at 14:45

## 2022-12-12 RX ADMIN — FOSAPREPITANT: 150 INJECTION, POWDER, LYOPHILIZED, FOR SOLUTION INTRAVENOUS at 14:54

## 2022-12-12 RX ADMIN — SODIUM CHLORIDE 250 ML: 9 INJECTION, SOLUTION INTRAVENOUS at 14:42

## 2022-12-12 RX ADMIN — FAMOTIDINE 20 MG: 10 INJECTION INTRAVENOUS at 14:42

## 2022-12-12 RX ADMIN — Medication 5 ML: at 15:36

## 2022-12-12 RX ADMIN — DOXORUBICIN HYDROCHLORIDE 120 MG: 2 INJECTION, SOLUTION INTRAVENOUS at 15:21

## 2022-12-12 ASSESSMENT — PAIN SCALES - GENERAL: PAINLEVEL: NO PAIN (0)

## 2022-12-12 NOTE — PROGRESS NOTES
"Oncology Rooming Note    December 12, 2022 1:33 PM   Precious Paris is a 82 year old female who presents for:    Chief Complaint   Patient presents with     Oncology Clinic Visit     Malignant neoplasm of upper-outer quadrant of right breast in female, estrogen receptor negative - Lab provider and infusion     Initial Vitals: BP (!) 142/57 (BP Location: Right arm, Patient Position: Sitting, Cuff Size: Adult Large)   Pulse 77   Temp 98.7  F (37.1  C) (Tympanic)   Resp 12   Wt 85.1 kg (187 lb 9.6 oz)   SpO2 99%   BMI 31.70 kg/m   Estimated body mass index is 31.7 kg/m  as calculated from the following:    Height as of 11/11/22: 1.638 m (5' 4.5\").    Weight as of this encounter: 85.1 kg (187 lb 9.6 oz). Body surface area is 1.97 meters squared.  No Pain (0) Comment: Data Unavailable   No LMP recorded. Patient is postmenopausal.  Allergies reviewed: Yes  Medications reviewed: Yes    Medications: Medication refills not needed today.  Pharmacy name entered into [facility]: [facility] - [facility] [id number] [facility]    Clinical concerns:  None      Barbie James CMA            "

## 2022-12-12 NOTE — PATIENT INSTRUCTIONS
UA/UC today (urinary frequency). If negative, can follow-up with PCP on this symptom     Proceed with doxorubin only today. Pembro still not approved by insurance, working on getting that approved for next cycle     Dex Rx for tomorrow x 3 days    3 weeks - lab Abiel/Lazara, chemo (Doxorubicin + Pembro planned, pending insurance coverage)

## 2022-12-12 NOTE — PROGRESS NOTES
Infusion Nursing Note:  Precious KEISHA Paris presents today for Adriamycin.    Patient seen by provider today: Yes   present during visit today: Not Applicable.    Note: N/A.    Intravenous Access:  Implanted Port.    Treatment Conditions:  Lab Results   Component Value Date    HGB 11.7 12/12/2022    WBC 5.0 12/12/2022    ANEU 2.6 11/28/2022    ANEUTAUTO 2.9 12/12/2022     12/12/2022      Lab Results   Component Value Date     12/12/2022    POTASSIUM 3.7 12/12/2022    CR 0.55 12/12/2022    YADIRA 9.9 12/12/2022    BILITOTAL 0.2 12/12/2022    ALBUMIN 3.9 12/12/2022    ALT 17 12/12/2022    AST 25 12/12/2022     Results reviewed, labs MET treatment parameters, ok to proceed with treatment.  ECHO/MUGA completed 11/8  EF 65%.    Post Infusion Assessment:  Patient tolerated infusion without incident.  Blood return noted pre and post infusion.  Site patent and intact, free from redness, edema or discomfort.  No evidence of extravasations.  Access discontinued per protocol.     Discharge Plan:   Patient discharged in stable condition accompanied by: .  Departure Mode: Ambulatory.      Roula Flores RN

## 2022-12-12 NOTE — PROGRESS NOTES
[facility] Hematology and Oncology Progress Note    Patient: Precious Paris  MRN: [id number]  Dec 12, 2022          Reason for Visit    1. 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 Beebe    _____________________________________________________________________________    History of Present Illness/ Interval History    Ms. Precious Paris is a 82 year old recently diagnosed with metastatic triple negative breast cancer recurrence. Started single-agent Doxil four weeks ago. Returns for cycle 2, changing to doxorubin every 3 weeks. In interim, given her high PDL1 expression, Dr. Beebe was planning to add pembrolizumab to Doxil but this has not yet been approved by insurance. Additional paperwork is being completed to try to get this approved for next cycle.     She tolerated the first cycle of chemo quite well.   No major side effects.  Denies any fevers or chills.  She had nausea just once. Denies any shortness of breath or chest pain. She has very mild neuropathy in her fingertips that comes and goes, new on this chemo. Baseline urinary frequency/urgency feels more notable on chemo; no odor, abd pain nor fevers. Right breast firmness and pain is stable. The black scab over her nipple is resolved and the nipple drainage is now clear/less (bleeding and purulent drainage stopped).       ECOG PS: 1      Oncology History/Treatment  Diagnosis/Stage:   11/2019: Right breast cancer, triple negative ([id number])    BRCA negative    10/2022: Recurrent, stage IV triple negative breast cancer (bilateral axillary, supraclav, mediastinal nodes + lung)  -persistent/progressive right breast firmness with new right nipple drainage and right axillary adenopathy  -bilateral diagnostic mammogram: increased density R breast with skin thickening. R breast US: 3.2 cm hypoechoic mass at 11:00 position 1 cm from nipple and multiple other small hypoechoic  solid-appearing lesions in R breast + right axillary adenopathy (at least two hypoechoic vascular lesions measuring up to 2.5 cm)  -10/18/2022 biopsy R breast mass + R axillary node: grade 3 invasive ductal cancer with tumor necrosis, ER/AZ/HER2 negative.   -PET: + right breast mass; bilateral axillary, retropectoral, supraclavicular, mediastinal/R hilar nodes and bilateral lung/R pleural nodules suspicious for mets.  -Brain MRI: negative for mets  -11/1/2022 biopsy L axillary node: metastatic invasive ductal carcinoma  -Foundation One: no actionable mutations MS stable, TMB 1 mut/mb.   -PDL1 testing: CPS >20% and TPS 60% (considered high PDL1 expression)  -CA 27.29: 59.5(elevated). CA 15-3: 23 (normal)    Treatment:  Initial breast cancer (2019)  -2019: Neoadjuvant taxol  -Lumpectomy  -5/2020: Adjuvant RT  -6 - 7/2020: Adjuvant Xeloda. Stopped for rash    Recurrent, stage IV breast cancer (2022)  -11/14/2022 - present: Doxil every 4 weeks  -12/12/2022 - doxorubicin every 3 weeks. (Planning addition of pembrolizumab once approved by insurance)      Physical Exam    GENERAL: Alert and oriented to time place and person. Seated comfortably. In no distress.  accompanies.  HEAD: Atraumatic and normocephalic. No alopecia.  EYES: DELISA, EOMI. No erythema. No icterus.  LYMPH: Diffuse density in proximal right axilla. One-two 2 cm left axillary nodes. No cervical/supraclav nodes noted.   BREASTS: Right breast firm (primarily lower/outer quadrants), moderately erythematous and slightly warm to touch, no discrete masses/lumps. Firmness extends into proximal right axilla, no discrete nodes.  Nipple partially retracted with small open wound superior to nipple, no drainage noted today.   CHEST: clear to auscultation bilaterally. Resonant to percussion throughout bilaterally. Symmetrical breath movements bilaterally.  CVS: S1 and S2 are heard. Regular rate and rhythm. No murmur or gallop or rub heard.  ABDOMEN: Soft. Not  tender. Not distended. No palpable hepatomegaly or splenomegaly. No other mass palpable. Bowel sounds present.  EXTREMITIES: Warm. No peripheral edema.  SKIN: no rash, or bruising or purpura.   NEURO: No gross deficit noted. Non-antalgic gait.      Lab Results    CBC and CMP unremarkable    Imaging    None     Assessment/Plan  1. Recurrent, stage IV triple negative right breast cancer (nodes, lung); CPS>20% + TPS 60%  2. 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 Reports very mild/intermittent tingling in fingertips that's new; no pain nor interference with ADLs.    Labs unremarkable CBC and CMP.    Clinically, exam is stable.   Tumor marker (CA 27.29) pending; this was mildly elevated ahead of starting chemo    Based off KEYNOTE 355 study, where addition of pembrolizumab to chemo for metastatic triple negative BRCA negative breast cancer resulted in improved RR and survival, Dr. Beebe is working on getting pembrolizumab approved by insurance to be added to doxorubicin (every 3 weeks). This is still in process so not approved as of today.    Baseline ECHO 11/8/2022 normal.    Reviewed goals of chemo palliative/manage cancer as long as possible, if treatment side effects tolerable.    Plan:  -Proceed with cycle 2; changed to doxorubicin so it can be given every 3 weeks in line with pembro once approved. Reviewed use of Dex 8 mg days 2-5 each cycle, she will get this Rx to start tomorrow.  -Once pembrolizumab approved, will add this to cycle 3 Doxil  -If insurance will not approve addition of pembro to Doxil, Dr. Beebe is planning to change to carboplatin + pembrolizumab next cycle. Reviewed this with patient and her .  -Repeat ECHO after cycle 3-4, on doxorubicin  -Trend CA 27.29  -Repeat scans after cycle 3, pending clinical assessment    2.   Right breast pain  Can treat with Tylenol, as needed. Minimize use of NSAIDs on chemo.    3.   Urinary  frequency  Chronic, but thinks a bit worse since on chemo.     Plan:  -UA/UC to rule out infection requiring antibiotic  -If normal, manage with PCP    ADDENDUM: UA negative. Should not be a side effect of the chemo. She can follow-up with her PCP for mgmt      Billing  Total time 35 minutes, to include face to face visit, review of EMR, ordering, documentation and coordination of care    Signed by: Lazara Ashton NP

## 2022-12-12 NOTE — LETTER
12/12/2022         RE: Precious Paris  [address] [place] [address]        Dear Colleague,    Thank you for referring your patient, Precious Paris, to the [facility]. Please see a copy of my visit note below.    [facility] Hematology and Oncology Progress Note    Patient: Precoius Paris  MRN: [id number]  Dec 12, 2022          Reason for Visit    1. 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 Beebe    _____________________________________________________________________________    History of Present Illness/ Interval History    Ms. Precious Paris is a 82 year old recently diagnosed with metastatic triple negative breast cancer recurrence. Started single-agent Doxil four weeks ago. Returns for cycle 2, changing to doxorubin every 3 weeks. In interim, given her high PDL1 expression, Dr. Beebe was planning to add pembrolizumab to Doxil but this has not yet been approved by insurance. Additional paperwork is being completed to try to get this approved for next cycle.     She tolerated the first cycle of chemo quite well.   No major side effects.  Denies any fevers or chills.  She had nausea just once. Denies any shortness of breath or chest pain. She has very mild neuropathy in her fingertips that comes and goes, new on this chemo. Baseline urinary frequency/urgency feels more notable on chemo; no odor, abd pain nor fevers. Right breast firmness and pain is stable. The black scab over her nipple is resolved and the nipple drainage is now clear/less (bleeding and purulent drainage stopped).       ECOG PS: 1      Oncology History/Treatment  Diagnosis/Stage:   11/2019: Right breast cancer, triple negative ([id number])    BRCA negative    10/2022: Recurrent, stage IV triple negative breast cancer (bilateral axillary, supraclav, mediastinal nodes + lung)  -persistent/progressive right breast firmness  with new right nipple drainage and right axillary adenopathy  -bilateral diagnostic mammogram: increased density R breast with skin thickening. R breast US: 3.2 cm hypoechoic mass at 11:00 position 1 cm from nipple and multiple other small hypoechoic solid-appearing lesions in R breast + right axillary adenopathy (at least two hypoechoic vascular lesions measuring up to 2.5 cm)  -10/18/2022 biopsy R breast mass + R axillary node: grade 3 invasive ductal cancer with tumor necrosis, ER/DC/HER2 negative.   -PET: + right breast mass; bilateral axillary, retropectoral, supraclavicular, mediastinal/R hilar nodes and bilateral lung/R pleural nodules suspicious for mets.  -Brain MRI: negative for mets  -11/1/2022 biopsy L axillary node: metastatic invasive ductal carcinoma  -Foundation One: no actionable mutations MS stable, TMB 1 mut/mb.   -PDL1 testing: CPS >20% and TPS 60% (considered high PDL1 expression)  -CA 27.29: 59.5(elevated). CA 15-3: 23 (normal)    Treatment:  Initial breast cancer (2019)  -2019: Neoadjuvant taxol  -Lumpectomy  -5/2020: Adjuvant RT  -6 - 7/2020: Adjuvant Xeloda. Stopped for rash    Recurrent, stage IV breast cancer (2022)  -11/14/2022 - present: Doxil every 4 weeks  -12/12/2022 - doxorubicin every 3 weeks. (Planning addition of pembrolizumab once approved by insurance)      Physical Exam    GENERAL: Alert and oriented to time place and person. Seated comfortably. In no distress.  accompanies.  HEAD: Atraumatic and normocephalic. No alopecia.  EYES: DELISA, EOMI. No erythema. No icterus.  LYMPH: Diffuse density in proximal right axilla. One-two 2 cm left axillary nodes. No cervical/supraclav nodes noted.   BREASTS: Right breast firm (primarily lower/outer quadrants), moderately erythematous and slightly warm to touch, no discrete masses/lumps. Firmness extends into proximal right axilla, no discrete nodes.  Nipple partially retracted with small open wound superior to nipple, no drainage  noted today.   CHEST: clear to auscultation bilaterally. Resonant to percussion throughout bilaterally. Symmetrical breath movements bilaterally.  CVS: S1 and S2 are heard. Regular rate and rhythm. No murmur or gallop or rub heard.  ABDOMEN: Soft. Not tender. Not distended. No palpable hepatomegaly or splenomegaly. No other mass palpable. Bowel sounds present.  EXTREMITIES: Warm. No peripheral edema.  SKIN: no rash, or bruising or purpura.   NEURO: No gross deficit noted. Non-antalgic gait.      Lab Results    CBC and CMP unremarkable    Imaging    None     Assessment/Plan  1. Recurrent, stage IV triple negative right breast cancer (nodes, lung); CPS>20% + TPS 60%  2. 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 Reports very mild/intermittent tingling in fingertips that's new; no pain nor interference with ADLs.    Labs unremarkable CBC and CMP.    Clinically, exam is stable.   Tumor marker (CA 27.29) pending; this was mildly elevated ahead of starting chemo    Based off KEYNOTE 355 study, where addition of pembrolizumab to chemo for metastatic triple negative BRCA negative breast cancer resulted in improved RR and survival, Dr. Beebe is working on getting pembrolizumab approved by insurance to be added to doxorubicin (every 3 weeks). This is still in process so not approved as of today.    Baseline ECHO 11/8/2022 normal.    Reviewed goals of chemo palliative/manage cancer as long as possible, if treatment side effects tolerable.    Plan:  -Proceed with cycle 2; changed to doxorubicin so it can be given every 3 weeks in line with pembro once approved. Reviewed use of Dex 8 mg days 2-5 each cycle, she will get this Rx to start tomorrow.  -Once pembrolizumab approved, will add this to cycle 3 Doxil  -If insurance will not approve addition of pembro to Doxil, Dr. Beebe is planning to change to carboplatin + pembrolizumab next cycle. Reviewed this with patient  "and her .  -Repeat ECHO after cycle 3-4, on doxorubicin  -Trend CA 27.29  -Repeat scans after cycle 3, pending clinical assessment    2.   Right breast pain  Can treat with Tylenol, as needed. Minimize use of NSAIDs on chemo.    3.   Urinary frequency  Chronic, but thinks a bit worse since on chemo.     Plan:  -UA/UC to rule out infection requiring antibiotic  -If normal, manage with PCP      Billing  Total time 35 minutes, to include face to face visit, review of EMR, ordering, documentation and coordination of care    Signed by: Lazara Ashton NP    Oncology Rooming Note    December 12, 2022 1:33 PM   Precious Paris is a 82 year old female who presents for:    Chief Complaint   Patient presents with     Oncology Clinic Visit     Malignant neoplasm of upper-outer quadrant of right breast in female, estrogen receptor negative - Lab provider and infusion     Initial Vitals: BP (!) 142/57 (BP Location: Right arm, Patient Position: Sitting, Cuff Size: Adult Large)   Pulse 77   Temp 98.7  F (37.1  C) (Tympanic)   Resp 12   Wt 85.1 kg (187 lb 9.6 oz)   SpO2 99%   BMI 31.70 kg/m   Estimated body mass index is 31.7 kg/m  as calculated from the following:    Height as of 11/11/22: 1.638 m (5' 4.5\").    Weight as of this encounter: 85.1 kg (187 lb 9.6 oz). Body surface area is 1.97 meters squared.  No Pain (0) Comment: Data Unavailable   No LMP recorded. Patient is postmenopausal.  Allergies reviewed: Yes  Medications reviewed: Yes    Medications: Medication refills not needed today.  Pharmacy name entered into [facility]: [facility] [id number] [facility]    Clinical concerns:  None      Barbie James, [facility]                Again, thank you for allowing me to participate in the care of your patient.        Sincerely,        Lazara Torres NP    "

## 2022-12-12 NOTE — PROGRESS NOTES
rodolfo kessler  [facility] Hematology and Oncology Progress Note    Patient: Precious Paris  MRN: [id number]  Nov 28, 2022          Reason for Visit  Chief Complaint   Patient presents with     Oncology Clinic Visit     Infiltrating ductal carcinoma of right breast - Lab and provider         Assessment/Plan  1. Triple negative right breast cancer (11/2019), now with recurrence  Currently on single agent Doxil.  Received cycle 1 2 weeks ago.  Tolerating okay.  Her PD-L1 testing came back elevated with CPS at >20% and TPS 60%.  This is considered to be high PD-L1 expression.  Based on KEYNOTE 355 study, addition of pembrolizumab to chemotherapy in metastatic triple negative cancers who are BRCA negative, resulted in improved response rates and survival.  I think he is a candidate for this.  Although in the study mostly taxanes were used as the chemotherapy backbone, since she has already had Taxol in the past and did not do well with it, I think it is okay to add it with doxorubicin.  Also single agent pembrolizumab and immunotherapy agents have shown some effectiveness interval negative breast cancers were not candidates for chemotherapy.  So in this setting I think adding pembrolizumab to her chemotherapy backbone makes sense.  Since pembrolizumab is given once every 3 weeks I will change from Doxil to 3 weekly doxorubicin.  If she does not respond to this or tolerate this then we should switch to single agent carboplatin with pembrolizumab.    At the end of the visit she did become significantly nauseated.  I sent a prescription for Zofran to be taken immediately.  After a while her symptom subsided.    Patient Instructions   RTC on December 12th with labs prior. Doxorubicin with pembrolizumab to follow the same day.       ____________________________________________________________________________    History of Present Illness/ Interval History    Ms. Precious Paris is a 81 year old  treated for right triple negative breast cancer almost 2 yrs ago.      Now with recurrent metastatic triple negative breast cancer.  She started Doxil 2 weeks ago.  Did well with it.  No major side effects.  Denies any fevers or chills.  Today she does feel a bit nauseated.  Denies any shortness of breath or chest pain.  She continues to have discharge to her right nipple which is bloody at times.  No signs of infection.    ECOG PS: 1      Oncology History/Treatment  Diagnosis/Stage:   11/2019: Right breast cancer, triple negative ([id number])    BRCA negative    Treatment:  2019: Neoadjuvant taxol    Lumpectomy    5/2020: Adjuvant RT    6 - 7/2020: Adjuvant Xeloda. Stopped for rash      Physical Exam    GENERAL: Alert and oriented to time place and person. Seated comfortably. In no distress.  HEAD: Atraumatic and normocephalic. No alopecia.  EYES: DELISA, EOMI. No erythema. No icterus.  BREASTS: Large mass is felt in the right breast with inverted nipple and bloody nipple discharge.  She has dried blood on the nipple.  No redness or signs of infection.  Does have lymphedema.  Also has bilateral hard palpable lymphadenopathy consistent with metastatic disease.  Left breast is normal.   CHEST: clear to auscultation bilaterally. Resonant to percussion throughout bilaterally. Symmetrical breath movements bilaterally.  CVS: S1 and S2 are heard. Regular rate and rhythm.   ABDOMEN: Soft. Not tender. Not distended.  EXTREMITIES: Warm. No peripheral edema.  SKIN: no rash, or bruising or purpura.   NEURO: No gross deficit noted. Non-antalgic gait.      Lab Results    None    Imaging  No results found.      Billing  Total time 45 minutes, to include face to face visit, review of EMR, ordering, documentation and coordination of care    Signed by: Maricarmen Beebe MD

## 2022-12-14 LAB — CANCER AG27-29 SERPL-ACNC: 79.3 U/ML

## 2022-12-28 ENCOUNTER — PATIENT OUTREACH (OUTPATIENT)
Dept: ONCOLOGY | Facility: CLINIC | Age: 82
End: 2022-12-28

## 2022-12-28 NOTE — PROGRESS NOTES
[facility]: Cancer Care                                                                                          Sores on left leg. Starts as a dry spot then red and now inflamed. Pt notes 2 spot on legs, 1 bigger than the other (eraser size).    Pt thinks they start about 4 days ago. They are not warm to touch.    Sore spot on top left of head,  cannot see anything.     Pt has Ketoconazole cream on hand from  recent admission. She tried it once and thought it helped but wanted to check.     She will try to send a picture on [employer].     Signature:  Patrica Perla RN    Reviewed with Dr. Beebe and plan for pt to watch - no need to put anything on it and it if gets worse (redness spreading, feels hot) pt will need to see Lazara in clinic this week.     Pt in agreement with plan.    Patrica Perla RN on 12/28/2022 at 4:20 PM

## 2023-01-02 ENCOUNTER — ONCOLOGY VISIT (OUTPATIENT)
Dept: ONCOLOGY | Facility: CLINIC | Age: 83
End: 2023-01-02
Attending: NURSE PRACTITIONER
Payer: COMMERCIAL

## 2023-01-02 ENCOUNTER — LAB (OUTPATIENT)
Dept: INFUSION THERAPY | Facility: CLINIC | Age: 83
End: 2023-01-02
Attending: INTERNAL MEDICINE
Payer: COMMERCIAL

## 2023-01-02 VITALS
SYSTOLIC BLOOD PRESSURE: 141 MMHG | WEIGHT: 188 LBS | DIASTOLIC BLOOD PRESSURE: 72 MMHG | HEART RATE: 92 BPM | RESPIRATION RATE: 12 BRPM | BODY MASS INDEX: 31.77 KG/M2 | OXYGEN SATURATION: 94 %

## 2023-01-02 VITALS — HEART RATE: 95 BPM | RESPIRATION RATE: 16 BRPM | SYSTOLIC BLOOD PRESSURE: 146 MMHG | DIASTOLIC BLOOD PRESSURE: 79 MMHG

## 2023-01-02 DIAGNOSIS — Z17.1 MALIGNANT NEOPLASM OF UPPER-OUTER QUADRANT OF RIGHT BREAST IN FEMALE, ESTROGEN RECEPTOR NEGATIVE (H): ICD-10-CM

## 2023-01-02 DIAGNOSIS — C50.919 METASTATIC BREAST CANCER: Primary | ICD-10-CM

## 2023-01-02 DIAGNOSIS — C50.411 MALIGNANT NEOPLASM OF UPPER-OUTER QUADRANT OF RIGHT BREAST IN FEMALE, ESTROGEN RECEPTOR NEGATIVE (H): ICD-10-CM

## 2023-01-02 DIAGNOSIS — Z17.1 MALIGNANT NEOPLASM OF UPPER-OUTER QUADRANT OF RIGHT BREAST IN FEMALE, ESTROGEN RECEPTOR NEGATIVE (H): Primary | ICD-10-CM

## 2023-01-02 DIAGNOSIS — C50.919 METASTATIC BREAST CANCER: ICD-10-CM

## 2023-01-02 DIAGNOSIS — C50.411 MALIGNANT NEOPLASM OF UPPER-OUTER QUADRANT OF RIGHT BREAST IN FEMALE, ESTROGEN RECEPTOR NEGATIVE (H): Primary | ICD-10-CM

## 2023-01-02 DIAGNOSIS — L03.116 CELLULITIS OF LEFT LOWER EXTREMITY: ICD-10-CM

## 2023-01-02 LAB
ALBUMIN SERPL BCG-MCNC: 4 G/DL (ref 3.5–5.2)
ALP SERPL-CCNC: 88 U/L (ref 35–104)
ALT SERPL W P-5'-P-CCNC: 19 U/L (ref 10–35)
ANION GAP SERPL CALCULATED.3IONS-SCNC: 8 MMOL/L (ref 7–15)
AST SERPL W P-5'-P-CCNC: 25 U/L (ref 10–35)
BASOPHILS # BLD AUTO: 0.1 10E3/UL (ref 0–0.2)
BASOPHILS NFR BLD AUTO: 1 %
BILIRUB SERPL-MCNC: <0.2 MG/DL
BUN SERPL-MCNC: 11.4 MG/DL (ref 8–23)
CALCIUM SERPL-MCNC: 9.5 MG/DL (ref 8.8–10.2)
CHLORIDE SERPL-SCNC: 103 MMOL/L (ref 98–107)
CREAT SERPL-MCNC: 0.64 MG/DL (ref 0.51–0.95)
DEPRECATED HCO3 PLAS-SCNC: 30 MMOL/L (ref 22–29)
EOSINOPHIL # BLD AUTO: 0 10E3/UL (ref 0–0.7)
EOSINOPHIL NFR BLD AUTO: 0 %
ERYTHROCYTE [DISTWIDTH] IN BLOOD BY AUTOMATED COUNT: 13.9 % (ref 10–15)
GFR SERPL CREATININE-BSD FRML MDRD: 88 ML/MIN/1.73M2
GLUCOSE SERPL-MCNC: 109 MG/DL (ref 70–99)
HCT VFR BLD AUTO: 34.6 % (ref 35–47)
HGB BLD-MCNC: 11.5 G/DL (ref 11.7–15.7)
IMM GRANULOCYTES # BLD: 0.1 10E3/UL
IMM GRANULOCYTES NFR BLD: 1 %
LYMPHOCYTES # BLD AUTO: 1.2 10E3/UL (ref 0.8–5.3)
LYMPHOCYTES NFR BLD AUTO: 17 %
MCH RBC QN AUTO: 31.2 PG (ref 26.5–33)
MCHC RBC AUTO-ENTMCNC: 33.2 G/DL (ref 31.5–36.5)
MCV RBC AUTO: 94 FL (ref 78–100)
MONOCYTES # BLD AUTO: 1 10E3/UL (ref 0–1.3)
MONOCYTES NFR BLD AUTO: 14 %
NEUTROPHILS # BLD AUTO: 4.7 10E3/UL (ref 1.6–8.3)
NEUTROPHILS NFR BLD AUTO: 67 %
NRBC # BLD AUTO: 0 10E3/UL
NRBC BLD AUTO-RTO: 0 /100
PLATELET # BLD AUTO: 416 10E3/UL (ref 150–450)
POTASSIUM SERPL-SCNC: 3.8 MMOL/L (ref 3.4–5.3)
PROT SERPL-MCNC: 7.3 G/DL (ref 6.4–8.3)
RBC # BLD AUTO: 3.69 10E6/UL (ref 3.8–5.2)
SODIUM SERPL-SCNC: 141 MMOL/L (ref 136–145)
WBC # BLD AUTO: 7.1 10E3/UL (ref 4–11)

## 2023-01-02 PROCEDURE — 96413 CHEMO IV INFUSION 1 HR: CPT

## 2023-01-02 PROCEDURE — 96367 TX/PROPH/DG ADDL SEQ IV INF: CPT

## 2023-01-02 PROCEDURE — 99215 OFFICE O/P EST HI 40 MIN: CPT | Performed by: NURSE PRACTITIONER

## 2023-01-02 PROCEDURE — 250N000011 HC RX IP 250 OP 636: Performed by: NURSE PRACTITIONER

## 2023-01-02 PROCEDURE — 86300 IMMUNOASSAY TUMOR CA 15-3: CPT | Performed by: NURSE PRACTITIONER

## 2023-01-02 PROCEDURE — 85025 COMPLETE CBC W/AUTO DIFF WBC: CPT | Performed by: NURSE PRACTITIONER

## 2023-01-02 PROCEDURE — 96375 TX/PRO/DX INJ NEW DRUG ADDON: CPT

## 2023-01-02 PROCEDURE — 96411 CHEMO IV PUSH ADDL DRUG: CPT

## 2023-01-02 PROCEDURE — 80053 COMPREHEN METABOLIC PANEL: CPT | Performed by: NURSE PRACTITIONER

## 2023-01-02 PROCEDURE — 258N000003 HC RX IP 258 OP 636: Performed by: NURSE PRACTITIONER

## 2023-01-02 PROCEDURE — G0463 HOSPITAL OUTPT CLINIC VISIT: HCPCS | Mod: 25 | Performed by: NURSE PRACTITIONER

## 2023-01-02 RX ORDER — ALBUTEROL SULFATE 0.83 MG/ML
2.5 SOLUTION RESPIRATORY (INHALATION)
Status: CANCELLED | OUTPATIENT
Start: 2023-01-02

## 2023-01-02 RX ORDER — CEPHALEXIN 500 MG/1
500 CAPSULE ORAL 4 TIMES DAILY
Qty: 20 CAPSULE | Refills: 0 | Status: SHIPPED | OUTPATIENT
Start: 2023-01-02 | End: 2023-01-07

## 2023-01-02 RX ORDER — ALBUTEROL SULFATE 90 UG/1
1-2 AEROSOL, METERED RESPIRATORY (INHALATION)
Status: CANCELLED
Start: 2023-01-02

## 2023-01-02 RX ORDER — LORAZEPAM 2 MG/ML
0.5 INJECTION INTRAMUSCULAR EVERY 4 HOURS PRN
Status: CANCELLED | OUTPATIENT
Start: 2023-01-02

## 2023-01-02 RX ORDER — HEPARIN SODIUM,PORCINE 10 UNIT/ML
5 VIAL (ML) INTRAVENOUS
Status: CANCELLED | OUTPATIENT
Start: 2023-01-02

## 2023-01-02 RX ORDER — HEPARIN SODIUM (PORCINE) LOCK FLUSH IV SOLN 100 UNIT/ML 100 UNIT/ML
5 SOLUTION INTRAVENOUS
Status: DISCONTINUED | OUTPATIENT
Start: 2023-01-02 | End: 2023-01-02 | Stop reason: HOSPADM

## 2023-01-02 RX ORDER — DOXORUBICIN HYDROCHLORIDE 2 MG/ML
60 INJECTION, SOLUTION INTRAVENOUS ONCE
Status: COMPLETED | OUTPATIENT
Start: 2023-01-02 | End: 2023-01-02

## 2023-01-02 RX ORDER — MEPERIDINE HYDROCHLORIDE 25 MG/ML
25 INJECTION INTRAMUSCULAR; INTRAVENOUS; SUBCUTANEOUS EVERY 30 MIN PRN
Status: CANCELLED | OUTPATIENT
Start: 2023-01-02

## 2023-01-02 RX ORDER — DEXAMETHASONE 4 MG/1
8 TABLET ORAL DAILY
Qty: 6 TABLET | Refills: 2 | Status: SHIPPED | OUTPATIENT
Start: 2023-01-02 | End: 2023-01-17

## 2023-01-02 RX ORDER — HEPARIN SODIUM (PORCINE) LOCK FLUSH IV SOLN 100 UNIT/ML 100 UNIT/ML
5 SOLUTION INTRAVENOUS
Status: CANCELLED | OUTPATIENT
Start: 2023-01-02

## 2023-01-02 RX ORDER — EPINEPHRINE 1 MG/ML
0.3 INJECTION, SOLUTION, CONCENTRATE INTRAVENOUS EVERY 5 MIN PRN
Status: CANCELLED | OUTPATIENT
Start: 2023-01-02

## 2023-01-02 RX ORDER — PALONOSETRON 0.05 MG/ML
0.25 INJECTION, SOLUTION INTRAVENOUS ONCE
Status: CANCELLED | OUTPATIENT
Start: 2023-01-02

## 2023-01-02 RX ORDER — DIPHENHYDRAMINE HYDROCHLORIDE 50 MG/ML
50 INJECTION INTRAMUSCULAR; INTRAVENOUS
Status: CANCELLED
Start: 2023-01-02

## 2023-01-02 RX ORDER — DOXORUBICIN HYDROCHLORIDE 2 MG/ML
60 INJECTION, SOLUTION INTRAVENOUS ONCE
Status: CANCELLED | OUTPATIENT
Start: 2023-01-02

## 2023-01-02 RX ORDER — METHYLPREDNISOLONE SODIUM SUCCINATE 125 MG/2ML
125 INJECTION, POWDER, LYOPHILIZED, FOR SOLUTION INTRAMUSCULAR; INTRAVENOUS
Status: CANCELLED
Start: 2023-01-02

## 2023-01-02 RX ORDER — PALONOSETRON 0.05 MG/ML
0.25 INJECTION, SOLUTION INTRAVENOUS ONCE
Status: COMPLETED | OUTPATIENT
Start: 2023-01-02 | End: 2023-01-02

## 2023-01-02 RX ADMIN — PALONOSETRON 0.25 MG: 0.05 INJECTION, SOLUTION INTRAVENOUS at 14:17

## 2023-01-02 RX ADMIN — Medication 5 ML: at 15:57

## 2023-01-02 RX ADMIN — FAMOTIDINE 20 MG: 10 INJECTION INTRAVENOUS at 14:19

## 2023-01-02 RX ADMIN — DOXORUBICIN HYDROCHLORIDE 120 MG: 2 INJECTION, SOLUTION INTRAVENOUS at 15:36

## 2023-01-02 RX ADMIN — SODIUM CHLORIDE 250 ML: 9 INJECTION, SOLUTION INTRAVENOUS at 14:17

## 2023-01-02 RX ADMIN — FOSAPREPITANT: 150 INJECTION, POWDER, LYOPHILIZED, FOR SOLUTION INTRAVENOUS at 14:39

## 2023-01-02 RX ADMIN — SODIUM CHLORIDE 200 MG: 9 INJECTION, SOLUTION INTRAVENOUS at 15:04

## 2023-01-02 NOTE — PROGRESS NOTES
"Oncology Rooming Note    January 2, 2023 1:18 PM   Precious Paris is a 82 year old female who presents for:    Chief Complaint   Patient presents with     Oncology Clinic Visit     Metastatic breast cancer - Labs provider and infusion     Initial Vitals: Wt 85.3 kg (188 lb)   BMI 31.77 kg/m   Estimated body mass index is 31.77 kg/m  as calculated from the following:    Height as of 11/11/22: 1.638 m (5' 4.5\").    Weight as of this encounter: 85.3 kg (188 lb). Body surface area is 1.97 meters squared.  Data Unavailable Comment: Data Unavailable   No LMP recorded. Patient is postmenopausal.  Allergies reviewed: Yes  Medications reviewed: Yes    Medications: Medication refills not needed today.  Pharmacy name entered into [employer]: [facility] - [facility] [id number] [facility]    Clinical concerns:  Patient wants clarification if she should be taking aspirin.       Malgorzata Vallejo, AXEL            "

## 2023-01-02 NOTE — PROGRESS NOTES
Infusion Nursing Note:  Precious Paris presents today for C2D1 Keytruda & Adriamycin.    Patient seen by provider today: Yes: Lazara Torres NP   present during visit today: Not Applicable.    Note: Pt denies any new health changes or concerns.    Intravenous Access:  Implanted Port.    Treatment Conditions:  Lab Results   Component Value Date    HGB 11.5 (L) 01/02/2023    WBC 7.1 01/02/2023    ANEU 2.6 11/28/2022    ANEUTAUTO 4.7 01/02/2023     01/02/2023      Lab Results   Component Value Date     01/02/2023    POTASSIUM 3.8 01/02/2023    CR 0.64 01/02/2023    YADIRA 9.5 01/02/2023    BILITOTAL <0.2 01/02/2023    ALBUMIN 4.0 01/02/2023    ALT 19 01/02/2023    AST 25 01/02/2023     Results reviewed, labs MET treatment parameters, ok to proceed with treatment.    Post Infusion Assessment:  Patient tolerated infusion without incident.  Blood return noted pre and post infusion.  Site patent and intact, free from redness, edema or discomfort.  No evidence of extravasations.  Access discontinued per protocol.     Discharge Plan:   Discharge instructions reviewed with: Patient.  Patient and/or family verbalized understanding of discharge instructions and all questions answered.  Patient discharged in stable condition accompanied by: self.  Departure Mode: Ambulatory.      Brittany Miller RN

## 2023-01-02 NOTE — PROGRESS NOTES
[facility] Hematology and Oncology Progress Note    Patient: Precious Paris  MRN: [id number]  Jan 2, 2023          Reason for Visit    1. 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 Beebe    _____________________________________________________________________________    History of Present Illness/ Interval History    Ms. Precious Paris is a 82 year old recently diagnosed with metastatic triple negative breast cancer recurrence, two years after her initial diagnosis/treatment. Has completed two cycles of chemo; first was single-agent Doxil and second was doxorubin; changed to this to be given to every 3 weeks in prep for addition of pembrolizumab given her high PDL1 expression. This was pending insurance approval last cycle, but has now been approved.     She tolerated the second cycle of chemo quite well.   She developed a few skin lesions - 2 on her left leg and 1 on her left dorsal hand; a more dominant white lesion on distal left leg (inner shin area) is a bit tender with mild surrounding erythema.  Denies any fevers or chills. She lost all of her hair. No nausea, good appetite. Denies any shortness of breath or chest pain. She has very mild neuropathy in her fingertips that comes and goes, new on this chemo. Baseline urinary frequency/urgency feels more notable on chemo; no odor, abd pain nor fevers. UA was negative.     Right breast firmness and pain is a bit better. The black scab over her nipple is resolved and the nipple drainage has now completely stopped.      She and her  are hoping to go to [place] for the month of February. She got some of her chemo with an Oncologist there in 2019    ECOG PS: 1      Oncology History/Treatment  Diagnosis/Stage:   11/2019: Right breast cancer, triple negative ([id number])    BRCA negative    10/2022: Recurrent, stage IV triple negative breast cancer (bilateral axillary, supraclav, mediastinal nodes  + lung)  -persistent/progressive right breast firmness with new right nipple drainage and right axillary adenopathy  -bilateral diagnostic mammogram: increased density R breast with skin thickening. R breast US: 3.2 cm hypoechoic mass at 11:00 position 1 cm from nipple and multiple other small hypoechoic solid-appearing lesions in R breast + right axillary adenopathy (at least two hypoechoic vascular lesions measuring up to 2.5 cm)  -10/18/2022 biopsy R breast mass + R axillary node: grade 3 invasive ductal cancer with tumor necrosis, ER/VT/HER2 negative.   -PET: + right breast mass; bilateral axillary, retropectoral, supraclavicular, mediastinal/R hilar nodes and bilateral lung/R pleural nodules suspicious for mets.  -Brain MRI: negative for mets  -11/1/2022 biopsy L axillary node: metastatic invasive ductal carcinoma  -Foundation One: no actionable mutations MS stable, TMB 1 mut/mb.   -PDL1 testing: CPS >20% and TPS 60% (considered high PDL1 expression)  -CA 27.29: 59.5(elevated). CA 15-3: 23 (normal)    Treatment:  Initial breast cancer (2019)  -2019: Neoadjuvant taxol  -Lumpectomy  -5/2020: Adjuvant RT  -6 - 7/2020: Adjuvant Xeloda. Stopped for rash    Recurrent, stage IV breast cancer (2022)  -11/14/2022 - present: Doxil every 4 weeks  -12/12/2022 - doxorubicin every 3 weeks. (Planning addition of pembrolizumab once approved by insurance)  -1/2/2023 - doxorubicin + pembrolizumab every 3 weeks      Physical Exam    GENERAL: Alert and oriented to time place and person. Seated comfortably. In no distress.  accompanies.  HEAD: Atraumatic and normocephalic. Complete alopecia.  EYES: DELISA, EOMI. No erythema. No icterus.  LYMPH: Diffuse density in proximal right axilla is less discrete. I did not appreciate left axillary nodes. No cervical/supraclav nodes noted.   BREASTS: Right breast firm (primarily lower/outer quadrants) is softer with no discrete masses/lumps. Firmness extends into proximal right axilla.  The erythema and warmth of the breast is improved.  Nipple partially retracted with small open wound superior to nipple closing, no drainage noted today.   CHEST: clear to auscultation bilaterally. Resonant to percussion throughout bilaterally. Symmetrical breath movements bilaterally.  CVS: S1 and S2 are heard. Regular rate and rhythm. No murmur or gallop or rub heard.  ABDOMEN: Soft. Not tender. Not distended. No palpable hepatomegaly or splenomegaly. No other mass palpable. Bowel sounds present.  EXTREMITIES: Warm. No peripheral edema.  SKIN: White raised lesion that appears to be an actinic keratosis with mild surrounding erythema, no induration, left medial distal leg. Small erythematous flat dry lesion left proximal leg and similar-appearing lesion left dorsal hand.   NEURO: No gross deficit noted. Non-antalgic gait.      Lab Results    CBC and CMP unremarkable    Imaging    None     Assessment/Plan  1. Recurrent, stage IV triple negative right breast cancer (nodes, lung); CPS>20% + TPS 60%  2. 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   Reports very mild/intermittent tingling in fingertips that's new but not progressing, no pain nor interference with ADLs.    Labs unremarkable CBC and CMP.    Clinically, right exam is improved - the degree of firmness and redness is improved and open lesion near nipple healing.   Tumor marker (CA 27.29) was up some after cycle 1 and is pending today.    Based off KEYNOTE 355 study, where addition of pembrolizumab to chemo for metastatic triple negative BRCA negative breast cancer resulted in improved RR and survival, Dr. Beebe recommended adding pembrolizumab to doxorubicin (every 3 weeks). This has now been approved by insurance, so will start with this cycle.    Side effects of immunotherapy reviewed with patient. She consents to proceed.     Baseline ECHO 11/8/2022 normal.    Reviewed goals of chemo  palliative/manage cancer as long as possible, if treatment side effects tolerable.    Plan:  -Proceed with cycle 3 doxorubicin + addition of pembrolizumab today  -Return in 3 weeks with chest CT, ahead of cycle 4. It may take 2-3 cycles of immunotherapy to gauge overall response to his combination, but this will help us assess response to 3 cycles of doxorubicin. If progressing, Dr. Beebe would consider changing chemo to carboplatin with pembrolizumab.  -Repeat ECHO after cycle 4 doxorubicin  -Trend CA 27.29  -She is considering doing some of her treatment in [place] the month of February. She will reach out to the Oncologist who treated her in 2019 to see if this could be feasible, and can be discussed more at her next follow-up when we decide what the ongoing plan will be based on her response.     2.   Skin lesions  The left distal lesion appears to be an inflamed actinic keratosis. No evident cellulitis, but some mild erythema that will require monitoring.     Plan:  -Monitor for now  -If degree of erythema around lesion increases or new drainage, she will start Keflex. Rx was provided for her to have on hand and asked she call to update us if there are progressive changes.     3.   Right breast pain  Can treat with Tylenol, as needed. Minimize use of NSAIDs on chemo.    4.   Urinary frequency, chronic   Chronic, but thinks a bit worse since on chemo.  UA was negative for UTI last cycle.     Plan:  -follow-up with her PCP for mgmt      Billing  Total time 40 minutes, to include face to face visit, review of EMR, ordering, documentation and coordination of care    Signed by: Lazara Ashton NP

## 2023-01-02 NOTE — PATIENT INSTRUCTIONS
Keflex - Rx to keep on hand. Start only if area of redness around sore increases or new drainage    Proceed with cycle 3 doxorubicin and pembro today    3 weeks - CT chest, Abiel, cycle 4

## 2023-01-02 NOTE — LETTER
1/2/2023         RE: Precious Paris  [address] [place] [address]        Dear Colleague,    Thank you for referring your patient, Precious Paris, to the [facility]. Please see a copy of my visit note below.    [facility] Hematology and Oncology Progress Note    Patient: Precious Paris  MRN: [id number]  Jan 2, 2023          Reason for Visit    1. 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 Beebe    _____________________________________________________________________________    History of Present Illness/ Interval History    Ms. Precious Paris is a 82 year old recently diagnosed with metastatic triple negative breast cancer recurrence, two years after her initial diagnosis/treatment. Has completed two cycles of chemo; first was single-agent Doxil and second was doxorubin; changed to this to be given to every 3 weeks in prep for addition of pembrolizumab given her high PDL1 expression. This was pending insurance approval last cycle, but has now been approved.     She tolerated the second cycle of chemo quite well.   She developed a few skin lesions - 2 on her left leg and 1 on her left dorsal hand; a more dominant white lesion on distal left leg (inner shin area) is a bit tender with mild surrounding erythema.  Denies any fevers or chills. She lost all of her hair. No nausea, good appetite. Denies any shortness of breath or chest pain. She has very mild neuropathy in her fingertips that comes and goes, new on this chemo. Baseline urinary frequency/urgency feels more notable on chemo; no odor, abd pain nor fevers. UA was negative.     Right breast firmness and pain is a bit better. The black scab over her nipple is resolved and the nipple drainage has now completely stopped.      She and her  are hoping to go to [place] for the month of February. She got some of her chemo with an Oncologist  there in 2019    ECOG PS: 1      Oncology History/Treatment  Diagnosis/Stage:   11/2019: Right breast cancer, triple negative (pH5o-L5)    BRCA negative    10/2022: Recurrent, stage IV triple negative breast cancer (bilateral axillary, supraclav, mediastinal nodes + lung)  -persistent/progressive right breast firmness with new right nipple drainage and right axillary adenopathy  -bilateral diagnostic mammogram: increased density R breast with skin thickening. R breast US: 3.2 cm hypoechoic mass at 11:00 position 1 cm from nipple and multiple other small hypoechoic solid-appearing lesions in R breast + right axillary adenopathy (at least two hypoechoic vascular lesions measuring up to 2.5 cm)  -10/18/2022 biopsy R breast mass + R axillary node: grade 3 invasive ductal cancer with tumor necrosis, ER/ID/HER2 negative.   -PET: + right breast mass; bilateral axillary, retropectoral, supraclavicular, mediastinal/R hilar nodes and bilateral lung/R pleural nodules suspicious for mets.  -Brain MRI: negative for mets  -11/1/2022 biopsy L axillary node: metastatic invasive ductal carcinoma  -Foundation One: no actionable mutations MS stable, TMB 1 mut/mb.   -PDL1 testing: CPS >20% and TPS 60% (considered high PDL1 expression)  -CA 27.29: 59.5(elevated). CA 15-3: 23 (normal)    Treatment:  Initial breast cancer (2019)  -2019: Neoadjuvant taxol  -Lumpectomy  -5/2020: Adjuvant RT  -6 - 7/2020: Adjuvant Xeloda. Stopped for rash    Recurrent, stage IV breast cancer (2022)  -11/14/2022 - present: Doxil every 4 weeks  -12/12/2022 - doxorubicin every 3 weeks. (Planning addition of pembrolizumab once approved by insurance)  -1/2/2023 - doxorubicin + pembrolizumab every 3 weeks      Physical Exam    GENERAL: Alert and oriented to time place and person. Seated comfortably. In no distress.  accompanies.  HEAD: Atraumatic and normocephalic. Complete alopecia.  EYES: DELISA, EOMI. No erythema. No icterus.  LYMPH: Diffuse density in  proximal right axilla is less discrete. I did not appreciate left axillary nodes. No cervical/supraclav nodes noted.   BREASTS: Right breast firm (primarily lower/outer quadrants) is softer with no discrete masses/lumps. Firmness extends into proximal right axilla. The erythema and warmth of the breast is improved.  Nipple partially retracted with small open wound superior to nipple closing, no drainage noted today.   CHEST: clear to auscultation bilaterally. Resonant to percussion throughout bilaterally. Symmetrical breath movements bilaterally.  CVS: S1 and S2 are heard. Regular rate and rhythm. No murmur or gallop or rub heard.  ABDOMEN: Soft. Not tender. Not distended. No palpable hepatomegaly or splenomegaly. No other mass palpable. Bowel sounds present.  EXTREMITIES: Warm. No peripheral edema.  SKIN: White raised lesion that appears to be an actinic keratosis with mild surrounding erythema, no induration, left medial distal leg. Small erythematous flat dry lesion left proximal leg and similar-appearing lesion left dorsal hand.   NEURO: No gross deficit noted. Non-antalgic gait.      Lab Results    CBC and CMP unremarkable    Imaging    None     Assessment/Plan  1. Recurrent, stage IV triple negative right breast cancer (nodes, lung); CPS>20% + TPS 60%  2. 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   Reports very mild/intermittent tingling in fingertips that's new but not progressing, no pain nor interference with ADLs.    Labs unremarkable CBC and CMP.    Clinically, right exam is improved - the degree of firmness and redness is improved and open lesion near nipple healing.   Tumor marker (CA 27.29) was up some after cycle 1 and is pending today.    Based off KEYNOTE 355 study, where addition of pembrolizumab to chemo for metastatic triple negative BRCA negative breast cancer resulted in improved RR and survival, Dr. Beebe recommended adding  pembrolizumab to doxorubicin (every 3 weeks). This has now been approved by insurance, so will start with this cycle.    Side effects of immunotherapy reviewed with patient. She consents to proceed.     Baseline ECHO 11/8/2022 normal.    Reviewed goals of chemo palliative/manage cancer as long as possible, if treatment side effects tolerable.    Plan:  -Proceed with cycle 3 doxorubicin + addition of pembrolizumab today  -Return in 3 weeks with chest CT, ahead of cycle 4. It may take 2-3 cycles of immunotherapy to gauge overall response to his combination, but this will help us assess response to 3 cycles of doxorubicin. If progressing, Dr. Beebe would consider changing chemo to carboplatin with pembrolizumab.  -Repeat ECHO after cycle 4 doxorubicin  -Trend CA 27.29  -She is considering doing some of her treatment in [place] the month of February. She will reach out to the Oncologist who treated her in 2019 to see if this could be feasible, and can be discussed more at her next follow-up when we decide what the ongoing plan will be based on her response.     2.   Skin lesions  The left distal lesion appears to be an inflamed actinic keratosis. No evident cellulitis, but some mild erythema that will require monitoring.     Plan:  -Monitor for now  -If degree of erythema around lesion increases or new drainage, she will start Keflex. Rx was provided for her to have on hand and asked she call to update us if there are progressive changes.     3.   Right breast pain  Can treat with Tylenol, as needed. Minimize use of NSAIDs on chemo.    4.   Urinary frequency, chronic   Chronic, but thinks a bit worse since on chemo.  UA was negative for UTI last cycle.     Plan:  -follow-up with her PCP for mgmt      Billing  Total time 40 minutes, to include face to face visit, review of EMR, ordering, documentation and coordination of care    Signed by: Lazara Ashton NP    Oncology Rooming Note    January 2, 2023 1:18 PM   Precious VALDES  "Wellington is a 82 year old female who presents for:    Chief Complaint   Patient presents with     Oncology Clinic Visit     Metastatic breast cancer - Labs provider and infusion     Initial Vitals: Wt 85.3 kg (188 lb)   BMI 31.77 kg/m   Estimated body mass index is 31.77 kg/m  as calculated from the following:    Height as of 11/11/22: 1.638 m (5' 4.5\").    Weight as of this encounter: 85.3 kg (188 lb). Body surface area is 1.97 meters squared.  Data Unavailable Comment: Data Unavailable   No LMP recorded. Patient is postmenopausal.  Allergies reviewed: Yes  Medications reviewed: Yes    Medications: Medication refills not needed today.  Pharmacy name entered into [facility]: [facility] [id number] [facility]    Clinical concerns:  Patient wants clarification if she should be taking aspirin.       Malgorzata Vallejo CMA                Again, thank you for allowing me to participate in the care of your patient.        Sincerely,        Lazara Torres NP    "

## 2023-01-04 LAB — CANCER AG27-29 SERPL-ACNC: 89.3 U/ML

## 2023-01-16 ENCOUNTER — HOSPITAL ENCOUNTER (OUTPATIENT)
Dept: CT IMAGING | Facility: CLINIC | Age: 83
Discharge: HOME | End: 2023-01-16
Attending: NURSE PRACTITIONER | Admitting: NURSE PRACTITIONER
Payer: COMMERCIAL

## 2023-01-16 DIAGNOSIS — C50.919 METASTATIC BREAST CANCER: ICD-10-CM

## 2023-01-16 PROCEDURE — 71260 CT THORAX DX C+: CPT

## 2023-01-16 PROCEDURE — 250N000011 HC RX IP 250 OP 636: Performed by: RADIOLOGY

## 2023-01-16 PROCEDURE — 250N000009 HC RX 250: Performed by: RADIOLOGY

## 2023-01-16 RX ORDER — IOPAMIDOL 755 MG/ML
83 INJECTION, SOLUTION INTRAVASCULAR ONCE
Status: COMPLETED | OUTPATIENT
Start: 2023-01-16 | End: 2023-01-16

## 2023-01-16 RX ADMIN — IOPAMIDOL 83 ML: 755 INJECTION, SOLUTION INTRAVENOUS at 12:32

## 2023-01-16 RX ADMIN — SODIUM CHLORIDE 63 ML: 9 INJECTION, SOLUTION INTRAVENOUS at 12:33

## 2023-01-23 ENCOUNTER — ONCOLOGY VISIT (OUTPATIENT)
Dept: ONCOLOGY | Facility: CLINIC | Age: 83
End: 2023-01-23
Attending: INTERNAL MEDICINE
Payer: COMMERCIAL

## 2023-01-23 ENCOUNTER — INFUSION THERAPY VISIT (OUTPATIENT)
Dept: INFUSION THERAPY | Facility: CLINIC | Age: 83
End: 2023-01-23
Attending: INTERNAL MEDICINE
Payer: COMMERCIAL

## 2023-01-23 VITALS — HEART RATE: 86 BPM | SYSTOLIC BLOOD PRESSURE: 136 MMHG | DIASTOLIC BLOOD PRESSURE: 85 MMHG

## 2023-01-23 VITALS
TEMPERATURE: 98.6 F | SYSTOLIC BLOOD PRESSURE: 150 MMHG | WEIGHT: 186 LBS | OXYGEN SATURATION: 95 % | RESPIRATION RATE: 12 BRPM | HEART RATE: 88 BPM | DIASTOLIC BLOOD PRESSURE: 76 MMHG | BODY MASS INDEX: 31.43 KG/M2

## 2023-01-23 DIAGNOSIS — C50.411 MALIGNANT NEOPLASM OF UPPER-OUTER QUADRANT OF RIGHT BREAST IN FEMALE, ESTROGEN RECEPTOR NEGATIVE (H): ICD-10-CM

## 2023-01-23 DIAGNOSIS — C50.919 METASTATIC BREAST CANCER: Primary | ICD-10-CM

## 2023-01-23 DIAGNOSIS — Z17.1 MALIGNANT NEOPLASM OF UPPER-OUTER QUADRANT OF RIGHT BREAST IN FEMALE, ESTROGEN RECEPTOR NEGATIVE (H): ICD-10-CM

## 2023-01-23 LAB
ALBUMIN SERPL BCG-MCNC: 3.8 G/DL (ref 3.5–5.2)
ALP SERPL-CCNC: 93 U/L (ref 35–104)
ALT SERPL W P-5'-P-CCNC: 20 U/L (ref 10–35)
ANION GAP SERPL CALCULATED.3IONS-SCNC: 9 MMOL/L (ref 7–15)
AST SERPL W P-5'-P-CCNC: 26 U/L (ref 10–35)
BASOPHILS # BLD AUTO: 0.1 10E3/UL (ref 0–0.2)
BASOPHILS NFR BLD AUTO: 1 %
BILIRUB SERPL-MCNC: 0.2 MG/DL
BUN SERPL-MCNC: 10.5 MG/DL (ref 8–23)
CALCIUM SERPL-MCNC: 9.3 MG/DL (ref 8.8–10.2)
CEA SERPL-MCNC: 2.7 NG/ML
CHLORIDE SERPL-SCNC: 101 MMOL/L (ref 98–107)
CREAT SERPL-MCNC: 0.58 MG/DL (ref 0.51–0.95)
DEPRECATED HCO3 PLAS-SCNC: 29 MMOL/L (ref 22–29)
EOSINOPHIL # BLD AUTO: 0 10E3/UL (ref 0–0.7)
EOSINOPHIL NFR BLD AUTO: 1 %
ERYTHROCYTE [DISTWIDTH] IN BLOOD BY AUTOMATED COUNT: 14.6 % (ref 10–15)
GFR SERPL CREATININE-BSD FRML MDRD: 90 ML/MIN/1.73M2
GLUCOSE SERPL-MCNC: 113 MG/DL (ref 70–99)
HCT VFR BLD AUTO: 34.1 % (ref 35–47)
HGB BLD-MCNC: 11.1 G/DL (ref 11.7–15.7)
IMM GRANULOCYTES # BLD: 0.1 10E3/UL
IMM GRANULOCYTES NFR BLD: 2 %
LYMPHOCYTES # BLD AUTO: 1.3 10E3/UL (ref 0.8–5.3)
LYMPHOCYTES NFR BLD AUTO: 17 %
MCH RBC QN AUTO: 30.3 PG (ref 26.5–33)
MCHC RBC AUTO-ENTMCNC: 32.6 G/DL (ref 31.5–36.5)
MCV RBC AUTO: 93 FL (ref 78–100)
MONOCYTES # BLD AUTO: 1.1 10E3/UL (ref 0–1.3)
MONOCYTES NFR BLD AUTO: 14 %
NEUTROPHILS # BLD AUTO: 5 10E3/UL (ref 1.6–8.3)
NEUTROPHILS NFR BLD AUTO: 65 %
NRBC # BLD AUTO: 0 10E3/UL
NRBC BLD AUTO-RTO: 0 /100
PLATELET # BLD AUTO: 433 10E3/UL (ref 150–450)
POTASSIUM SERPL-SCNC: 3.8 MMOL/L (ref 3.4–5.3)
PROT SERPL-MCNC: 7.2 G/DL (ref 6.4–8.3)
RBC # BLD AUTO: 3.66 10E6/UL (ref 3.8–5.2)
SODIUM SERPL-SCNC: 139 MMOL/L (ref 136–145)
WBC # BLD AUTO: 7.6 10E3/UL (ref 4–11)

## 2023-01-23 PROCEDURE — 96375 TX/PRO/DX INJ NEW DRUG ADDON: CPT

## 2023-01-23 PROCEDURE — 82378 CARCINOEMBRYONIC ANTIGEN: CPT | Performed by: INTERNAL MEDICINE

## 2023-01-23 PROCEDURE — 258N000003 HC RX IP 258 OP 636: Performed by: INTERNAL MEDICINE

## 2023-01-23 PROCEDURE — 250N000011 HC RX IP 250 OP 636: Performed by: INTERNAL MEDICINE

## 2023-01-23 PROCEDURE — 99215 OFFICE O/P EST HI 40 MIN: CPT | Performed by: INTERNAL MEDICINE

## 2023-01-23 PROCEDURE — 86300 IMMUNOASSAY TUMOR CA 15-3: CPT | Performed by: INTERNAL MEDICINE

## 2023-01-23 PROCEDURE — 96417 CHEMO IV INFUS EACH ADDL SEQ: CPT

## 2023-01-23 PROCEDURE — 96413 CHEMO IV INFUSION 1 HR: CPT

## 2023-01-23 PROCEDURE — 96367 TX/PROPH/DG ADDL SEQ IV INF: CPT

## 2023-01-23 PROCEDURE — 85025 COMPLETE CBC W/AUTO DIFF WBC: CPT | Performed by: INTERNAL MEDICINE

## 2023-01-23 PROCEDURE — G0463 HOSPITAL OUTPT CLINIC VISIT: HCPCS | Mod: 25 | Performed by: INTERNAL MEDICINE

## 2023-01-23 PROCEDURE — 80053 COMPREHEN METABOLIC PANEL: CPT | Performed by: INTERNAL MEDICINE

## 2023-01-23 RX ORDER — MEPERIDINE HYDROCHLORIDE 25 MG/ML
25 INJECTION INTRAMUSCULAR; INTRAVENOUS; SUBCUTANEOUS EVERY 30 MIN PRN
Status: CANCELLED | OUTPATIENT
Start: 2023-01-23

## 2023-01-23 RX ORDER — EPINEPHRINE 1 MG/ML
0.3 INJECTION, SOLUTION, CONCENTRATE INTRAVENOUS EVERY 5 MIN PRN
Status: CANCELLED | OUTPATIENT
Start: 2023-01-23

## 2023-01-23 RX ORDER — PALONOSETRON 0.05 MG/ML
0.25 INJECTION, SOLUTION INTRAVENOUS ONCE
Status: COMPLETED | OUTPATIENT
Start: 2023-01-23 | End: 2023-01-23

## 2023-01-23 RX ORDER — DEXAMETHASONE 4 MG/1
4 TABLET ORAL DAILY
Qty: 2 TABLET | Refills: 3 | Status: SHIPPED | OUTPATIENT
Start: 2023-01-23 | End: 2023-04-24

## 2023-01-23 RX ORDER — HEPARIN SODIUM (PORCINE) LOCK FLUSH IV SOLN 100 UNIT/ML 100 UNIT/ML
5 SOLUTION INTRAVENOUS
Status: CANCELLED | OUTPATIENT
Start: 2023-01-23

## 2023-01-23 RX ORDER — ALBUTEROL SULFATE 90 UG/1
1-2 AEROSOL, METERED RESPIRATORY (INHALATION)
Status: CANCELLED
Start: 2023-01-23

## 2023-01-23 RX ORDER — ONDANSETRON 8 MG/1
8 TABLET, FILM COATED ORAL EVERY 8 HOURS PRN
Qty: 30 TABLET | Refills: 2 | Status: SHIPPED | OUTPATIENT
Start: 2023-01-23 | End: 2023-03-28

## 2023-01-23 RX ORDER — DEXAMETHASONE 4 MG/1
4 TABLET ORAL DAILY
Qty: 6 TABLET | Refills: 2 | Status: SHIPPED | OUTPATIENT
Start: 2023-01-23 | End: 2023-01-23

## 2023-01-23 RX ORDER — HEPARIN SODIUM,PORCINE 10 UNIT/ML
5 VIAL (ML) INTRAVENOUS
Status: CANCELLED | OUTPATIENT
Start: 2023-01-23

## 2023-01-23 RX ORDER — PROCHLORPERAZINE MALEATE 10 MG
10 TABLET ORAL EVERY 6 HOURS PRN
Qty: 30 TABLET | Refills: 2 | Status: SHIPPED | OUTPATIENT
Start: 2023-01-23 | End: 2023-09-05

## 2023-01-23 RX ORDER — DEXAMETHASONE 4 MG/1
8 TABLET ORAL DAILY
Qty: 6 TABLET | Refills: 2 | Status: SHIPPED | OUTPATIENT
Start: 2023-01-23 | End: 2023-01-23

## 2023-01-23 RX ORDER — METHYLPREDNISOLONE SODIUM SUCCINATE 125 MG/2ML
125 INJECTION, POWDER, LYOPHILIZED, FOR SOLUTION INTRAMUSCULAR; INTRAVENOUS
Status: CANCELLED
Start: 2023-01-23

## 2023-01-23 RX ORDER — ALBUTEROL SULFATE 0.83 MG/ML
2.5 SOLUTION RESPIRATORY (INHALATION)
Status: CANCELLED | OUTPATIENT
Start: 2023-01-23

## 2023-01-23 RX ORDER — PALONOSETRON 0.05 MG/ML
0.25 INJECTION, SOLUTION INTRAVENOUS ONCE
Status: CANCELLED | OUTPATIENT
Start: 2023-01-23

## 2023-01-23 RX ORDER — DIPHENHYDRAMINE HYDROCHLORIDE 50 MG/ML
50 INJECTION INTRAMUSCULAR; INTRAVENOUS
Status: CANCELLED
Start: 2023-01-23

## 2023-01-23 RX ORDER — HEPARIN SODIUM (PORCINE) LOCK FLUSH IV SOLN 100 UNIT/ML 100 UNIT/ML
5 SOLUTION INTRAVENOUS
Status: DISCONTINUED | OUTPATIENT
Start: 2023-01-23 | End: 2023-01-23 | Stop reason: HOSPADM

## 2023-01-23 RX ORDER — LORAZEPAM 2 MG/ML
0.5 INJECTION INTRAMUSCULAR EVERY 4 HOURS PRN
Status: CANCELLED | OUTPATIENT
Start: 2023-01-23

## 2023-01-23 RX ADMIN — PALONOSETRON 0.25 MG: 0.05 INJECTION, SOLUTION INTRAVENOUS at 13:19

## 2023-01-23 RX ADMIN — SODIUM CHLORIDE 200 MG: 9 INJECTION, SOLUTION INTRAVENOUS at 14:45

## 2023-01-23 RX ADMIN — FAMOTIDINE 20 MG: 10 INJECTION INTRAVENOUS at 13:15

## 2023-01-23 RX ADMIN — FOSAPREPITANT: 150 INJECTION, POWDER, LYOPHILIZED, FOR SOLUTION INTRAVENOUS at 13:46

## 2023-01-23 RX ADMIN — CARBOPLATIN 530 MG: 10 INJECTION, SOLUTION INTRAVENOUS at 15:18

## 2023-01-23 RX ADMIN — GEMCITABINE 1600 MG: 38 INJECTION, SOLUTION INTRAVENOUS at 14:12

## 2023-01-23 RX ADMIN — Medication 5 ML: at 15:52

## 2023-01-23 RX ADMIN — SODIUM CHLORIDE 250 ML: 9 INJECTION, SOLUTION INTRAVENOUS at 13:19

## 2023-01-23 ASSESSMENT — PAIN SCALES - GENERAL: PAINLEVEL: NO PAIN (0)

## 2023-01-23 NOTE — PATIENT INSTRUCTIONS
Daryl Lang,     3 prescriptions were sent to [facility]. Take as prescribed;    Dexamethasone 4 mg. Take 1 tablet daily for 2 days. Start tomorrow.     Ondansetron (Zofran) 8 mg as needed every 8 hours for nausea/vomiting.     Prochlorperazine (Compazine) 10 mg every 6 hours as needed for nausea/vomiting.

## 2023-01-23 NOTE — PROGRESS NOTES
"Oncology Rooming Note    January 23, 2023 12:08 PM   Precious Paris is a 82 year old female who presents for:    Chief Complaint   Patient presents with     Oncology Clinic Visit     Malignant neoplasm of upper-outer quadrant of right breast in female, estrogen receptor negative - Labs provider and infusion     Initial Vitals: BP (!) 150/76 (BP Location: Right arm, Patient Position: Sitting, Cuff Size: Adult Regular)   Pulse 88   Temp 98.6  F (37  C) (Tympanic)   Resp 12   Wt 84.4 kg (186 lb)   SpO2 95%   BMI 31.43 kg/m   Estimated body mass index is 31.43 kg/m  as calculated from the following:    Height as of 11/11/22: 1.638 m (5' 4.5\").    Weight as of this encounter: 84.4 kg (186 lb). Body surface area is 1.96 meters squared.  No Pain (0) Comment: Data Unavailable   No LMP recorded. Patient is postmenopausal.  Allergies reviewed: Yes  Medications reviewed: Yes    Medications: Medication refills not needed today.  [facility] [id number] [facility]    Clinical concerns:  None      Malgorzata Vallejo CMA            "

## 2023-01-23 NOTE — PROGRESS NOTES
Infusion Nursing Note:  Precious VALDES Wellington presents today for Gemzar, Carboplatin & Keytruda C1D1   Patient seen by provider today: Yes: Dr. Sanabria   present during visit today: Not Applicable.    Note: N/A.    Intravenous Access:  Implanted Port.    Treatment Conditions:  Lab Results   Component Value Date    HGB 11.1 (L) 01/23/2023    WBC 7.6 01/23/2023    ANEU 2.6 11/28/2022    ANEUTAUTO 5.0 01/23/2023     01/23/2023      Results reviewed, labs MET treatment parameters, ok to proceed with treatment.    Post Infusion Assessment:  Patient tolerated infusion without incident.  Blood return noted pre and post infusion.  Site patent and intact, free from redness, edema or discomfort.  No evidence of extravasations.  Access discontinued per protocol.     Discharge Plan:   Patient discharged in stable condition accompanied by: self.  Departure Mode: Ambulatory.      Mario Worley RN

## 2023-01-24 LAB — CANCER AG27-29 SERPL-ACNC: 76.2 U/ML

## 2023-01-26 ENCOUNTER — PATIENT OUTREACH (OUTPATIENT)
Dept: ONCOLOGY | Facility: CLINIC | Age: 83
End: 2023-01-26
Payer: COMMERCIAL

## 2023-01-26 NOTE — PROGRESS NOTES
[facility]: Cancer Care                                                                                          Called pt to see how she is doing after starting Carbo/[place]    Pt reports she is doing well, little nauseated today but relieved with the compazine. Eating and drinking ok, no problems with bowel or bladder.     Pt will call if anything arises otherwise her follow up is on Feb 14.    Pt also reports her port site seems better.     Signature:  Patrica Perla RN

## 2023-02-14 ENCOUNTER — INFUSION THERAPY VISIT (OUTPATIENT)
Dept: INFUSION THERAPY | Facility: CLINIC | Age: 83
End: 2023-02-14
Attending: INTERNAL MEDICINE
Payer: COMMERCIAL

## 2023-02-14 ENCOUNTER — ONCOLOGY VISIT (OUTPATIENT)
Dept: ONCOLOGY | Facility: CLINIC | Age: 83
End: 2023-02-14
Attending: INTERNAL MEDICINE
Payer: COMMERCIAL

## 2023-02-14 VITALS
TEMPERATURE: 98.4 F | OXYGEN SATURATION: 96 % | RESPIRATION RATE: 12 BRPM | SYSTOLIC BLOOD PRESSURE: 146 MMHG | WEIGHT: 186 LBS | BODY MASS INDEX: 31.43 KG/M2 | HEART RATE: 75 BPM | DIASTOLIC BLOOD PRESSURE: 70 MMHG

## 2023-02-14 VITALS — DIASTOLIC BLOOD PRESSURE: 77 MMHG | HEART RATE: 78 BPM | SYSTOLIC BLOOD PRESSURE: 127 MMHG

## 2023-02-14 DIAGNOSIS — C50.411 MALIGNANT NEOPLASM OF UPPER-OUTER QUADRANT OF RIGHT BREAST IN FEMALE, ESTROGEN RECEPTOR NEGATIVE (H): Primary | ICD-10-CM

## 2023-02-14 DIAGNOSIS — C50.919 METASTATIC BREAST CANCER: Primary | ICD-10-CM

## 2023-02-14 DIAGNOSIS — Z17.1 MALIGNANT NEOPLASM OF UPPER-OUTER QUADRANT OF RIGHT BREAST IN FEMALE, ESTROGEN RECEPTOR NEGATIVE (H): ICD-10-CM

## 2023-02-14 DIAGNOSIS — C50.411 MALIGNANT NEOPLASM OF UPPER-OUTER QUADRANT OF RIGHT BREAST IN FEMALE, ESTROGEN RECEPTOR NEGATIVE (H): ICD-10-CM

## 2023-02-14 DIAGNOSIS — Z17.1 MALIGNANT NEOPLASM OF UPPER-OUTER QUADRANT OF RIGHT BREAST IN FEMALE, ESTROGEN RECEPTOR NEGATIVE (H): Primary | ICD-10-CM

## 2023-02-14 DIAGNOSIS — C50.919 METASTATIC BREAST CANCER: ICD-10-CM

## 2023-02-14 LAB
ALBUMIN SERPL BCG-MCNC: 3.9 G/DL (ref 3.5–5.2)
ALP SERPL-CCNC: 98 U/L (ref 35–104)
ALT SERPL W P-5'-P-CCNC: 20 U/L (ref 10–35)
ANION GAP SERPL CALCULATED.3IONS-SCNC: 10 MMOL/L (ref 7–15)
AST SERPL W P-5'-P-CCNC: 26 U/L (ref 10–35)
BASOPHILS # BLD AUTO: 0.1 10E3/UL (ref 0–0.2)
BASOPHILS NFR BLD AUTO: 1 %
BILIRUB SERPL-MCNC: 0.2 MG/DL
BUN SERPL-MCNC: 12.2 MG/DL (ref 8–23)
CALCIUM SERPL-MCNC: 9.8 MG/DL (ref 8.8–10.2)
CEA SERPL-MCNC: 2.8 NG/ML
CHLORIDE SERPL-SCNC: 101 MMOL/L (ref 98–107)
CREAT SERPL-MCNC: 0.57 MG/DL (ref 0.51–0.95)
DEPRECATED HCO3 PLAS-SCNC: 28 MMOL/L (ref 22–29)
EOSINOPHIL # BLD AUTO: 0.1 10E3/UL (ref 0–0.7)
EOSINOPHIL NFR BLD AUTO: 2 %
ERYTHROCYTE [DISTWIDTH] IN BLOOD BY AUTOMATED COUNT: 15.6 % (ref 10–15)
GFR SERPL CREATININE-BSD FRML MDRD: >90 ML/MIN/1.73M2
GLUCOSE SERPL-MCNC: 97 MG/DL (ref 70–99)
HCT VFR BLD AUTO: 32.5 % (ref 35–47)
HGB BLD-MCNC: 10.9 G/DL (ref 11.7–15.7)
IMM GRANULOCYTES # BLD: 0 10E3/UL
IMM GRANULOCYTES NFR BLD: 1 %
LYMPHOCYTES # BLD AUTO: 0.9 10E3/UL (ref 0.8–5.3)
LYMPHOCYTES NFR BLD AUTO: 19 %
MCH RBC QN AUTO: 31.1 PG (ref 26.5–33)
MCHC RBC AUTO-ENTMCNC: 33.5 G/DL (ref 31.5–36.5)
MCV RBC AUTO: 93 FL (ref 78–100)
MONOCYTES # BLD AUTO: 0.8 10E3/UL (ref 0–1.3)
MONOCYTES NFR BLD AUTO: 16 %
NEUTROPHILS # BLD AUTO: 3.1 10E3/UL (ref 1.6–8.3)
NEUTROPHILS NFR BLD AUTO: 61 %
NRBC # BLD AUTO: 0 10E3/UL
NRBC BLD AUTO-RTO: 0 /100
PLATELET # BLD AUTO: 344 10E3/UL (ref 150–450)
POTASSIUM SERPL-SCNC: 3.7 MMOL/L (ref 3.4–5.3)
PROT SERPL-MCNC: 7.5 G/DL (ref 6.4–8.3)
RBC # BLD AUTO: 3.5 10E6/UL (ref 3.8–5.2)
SODIUM SERPL-SCNC: 139 MMOL/L (ref 136–145)
WBC # BLD AUTO: 5 10E3/UL (ref 4–11)

## 2023-02-14 PROCEDURE — 250N000011 HC RX IP 250 OP 636: Performed by: NURSE PRACTITIONER

## 2023-02-14 PROCEDURE — 96417 CHEMO IV INFUS EACH ADDL SEQ: CPT

## 2023-02-14 PROCEDURE — 82378 CARCINOEMBRYONIC ANTIGEN: CPT | Performed by: INTERNAL MEDICINE

## 2023-02-14 PROCEDURE — 96375 TX/PRO/DX INJ NEW DRUG ADDON: CPT

## 2023-02-14 PROCEDURE — 96413 CHEMO IV INFUSION 1 HR: CPT

## 2023-02-14 PROCEDURE — 80053 COMPREHEN METABOLIC PANEL: CPT | Performed by: INTERNAL MEDICINE

## 2023-02-14 PROCEDURE — 86300 IMMUNOASSAY TUMOR CA 15-3: CPT | Performed by: INTERNAL MEDICINE

## 2023-02-14 PROCEDURE — 96367 TX/PROPH/DG ADDL SEQ IV INF: CPT

## 2023-02-14 PROCEDURE — 85004 AUTOMATED DIFF WBC COUNT: CPT | Performed by: INTERNAL MEDICINE

## 2023-02-14 PROCEDURE — G0463 HOSPITAL OUTPT CLINIC VISIT: HCPCS | Mod: 25 | Performed by: NURSE PRACTITIONER

## 2023-02-14 PROCEDURE — 258N000003 HC RX IP 258 OP 636: Performed by: NURSE PRACTITIONER

## 2023-02-14 PROCEDURE — 99214 OFFICE O/P EST MOD 30 MIN: CPT | Performed by: NURSE PRACTITIONER

## 2023-02-14 RX ORDER — ALBUTEROL SULFATE 90 UG/1
1-2 AEROSOL, METERED RESPIRATORY (INHALATION)
Status: CANCELLED
Start: 2023-02-14

## 2023-02-14 RX ORDER — LORAZEPAM 2 MG/ML
0.5 INJECTION INTRAMUSCULAR EVERY 4 HOURS PRN
Status: CANCELLED | OUTPATIENT
Start: 2023-02-14

## 2023-02-14 RX ORDER — HEPARIN SODIUM (PORCINE) LOCK FLUSH IV SOLN 100 UNIT/ML 100 UNIT/ML
5 SOLUTION INTRAVENOUS
Status: DISCONTINUED | OUTPATIENT
Start: 2023-02-14 | End: 2023-02-14 | Stop reason: HOSPADM

## 2023-02-14 RX ORDER — PALONOSETRON 0.05 MG/ML
0.25 INJECTION, SOLUTION INTRAVENOUS ONCE
Status: COMPLETED | OUTPATIENT
Start: 2023-02-14 | End: 2023-02-14

## 2023-02-14 RX ORDER — HEPARIN SODIUM (PORCINE) LOCK FLUSH IV SOLN 100 UNIT/ML 100 UNIT/ML
5 SOLUTION INTRAVENOUS
Status: CANCELLED | OUTPATIENT
Start: 2023-02-14

## 2023-02-14 RX ORDER — MEPERIDINE HYDROCHLORIDE 25 MG/ML
25 INJECTION INTRAMUSCULAR; INTRAVENOUS; SUBCUTANEOUS EVERY 30 MIN PRN
Status: CANCELLED | OUTPATIENT
Start: 2023-02-14

## 2023-02-14 RX ORDER — PALONOSETRON 0.05 MG/ML
0.25 INJECTION, SOLUTION INTRAVENOUS ONCE
Status: CANCELLED | OUTPATIENT
Start: 2023-02-14

## 2023-02-14 RX ORDER — EPINEPHRINE 1 MG/ML
0.3 INJECTION, SOLUTION, CONCENTRATE INTRAVENOUS EVERY 5 MIN PRN
Status: CANCELLED | OUTPATIENT
Start: 2023-02-14

## 2023-02-14 RX ORDER — HEPARIN SODIUM,PORCINE 10 UNIT/ML
5 VIAL (ML) INTRAVENOUS
Status: CANCELLED | OUTPATIENT
Start: 2023-02-14

## 2023-02-14 RX ORDER — DIPHENHYDRAMINE HYDROCHLORIDE 50 MG/ML
50 INJECTION INTRAMUSCULAR; INTRAVENOUS
Status: CANCELLED
Start: 2023-02-14

## 2023-02-14 RX ORDER — ALBUTEROL SULFATE 0.83 MG/ML
2.5 SOLUTION RESPIRATORY (INHALATION)
Status: CANCELLED | OUTPATIENT
Start: 2023-02-14

## 2023-02-14 RX ORDER — METHYLPREDNISOLONE SODIUM SUCCINATE 125 MG/2ML
125 INJECTION, POWDER, LYOPHILIZED, FOR SOLUTION INTRAMUSCULAR; INTRAVENOUS
Status: CANCELLED
Start: 2023-02-14

## 2023-02-14 RX ADMIN — Medication 5 ML: at 14:31

## 2023-02-14 RX ADMIN — SODIUM CHLORIDE 200 MG: 9 INJECTION, SOLUTION INTRAVENOUS at 13:56

## 2023-02-14 RX ADMIN — PALONOSETRON 0.25 MG: 0.05 INJECTION, SOLUTION INTRAVENOUS at 11:39

## 2023-02-14 RX ADMIN — SODIUM CHLORIDE 250 ML: 9 INJECTION, SOLUTION INTRAVENOUS at 11:39

## 2023-02-14 RX ADMIN — FOSAPREPITANT: 150 INJECTION, POWDER, LYOPHILIZED, FOR SOLUTION INTRAVENOUS at 12:04

## 2023-02-14 RX ADMIN — GEMCITABINE 1600 MG: 38 INJECTION, SOLUTION INTRAVENOUS at 12:54

## 2023-02-14 RX ADMIN — CARBOPLATIN 535 MG: 10 INJECTION, SOLUTION INTRAVENOUS at 13:25

## 2023-02-14 ASSESSMENT — PAIN SCALES - GENERAL: PAINLEVEL: NO PAIN (0)

## 2023-02-14 NOTE — PROGRESS NOTES
Health Maintenance Summary     Topic Due On Due Status Completed On Postpone Until Reason    Pap Smear - Cervical Cancer Screening  Jun 6, 2022 Not Due Jun 6, 2017      Immunization - TDAP Pregnancy  Hidden       IMMUNIZATION - DTaP/Tdap/Td Jun 7, 1994 Overdue       Immunization-Influenza Sep 1, 2017 Postponed  Apr 1, 2018 Patient Refused    Depression Screening Jorje 15, 2019 Not Due Jorje 15, 2018            Patient is due for topics as listed above, she wishes to decline at this time .             Infusion Nursing Note:  Precious Paris presents today for Gemzar, Keytruda, Carboplatin.    Patient seen by provider today: Yes: NP Lazara Torres therefore assessment deferred   present during visit today: Not Applicable.    Note: N/A.    Intravenous Access:  Implanted Port.    Treatment Conditions:  Lab Results   Component Value Date    HGB 10.9 (L) 02/14/2023    WBC 5.0 02/14/2023    ANEU 2.6 11/28/2022    ANEUTAUTO 3.1 02/14/2023     02/14/2023      Results reviewed, labs MET treatment parameters, ok to proceed with treatment.    Post Infusion Assessment:  Patient tolerated infusion without incident.  Blood return noted pre and post infusion.  Site patent and intact, free from redness, edema or discomfort.  No evidence of extravasations.  Access discontinued per protocol.     Discharge Plan:   Patient discharged in stable condition accompanied by: .  Departure Mode: Ambulatory.      Krystin Givens RN

## 2023-02-14 NOTE — LETTER
"    2/14/2023         RE: Precious Paris  [address] [place] [address]        Dear Colleague,    Thank you for referring your patient, Precious Paris, to the [facility]. Please see a copy of my visit note below.    Oncology Rooming Note    February 14, 2023 10:54 AM   Precious Paris is a 82 year old female who presents for:    Chief Complaint   Patient presents with     Oncology Clinic Visit     Malignant neoplasm of upper-outer quadrant of right breast in female, estrogen receptor negative - Labs provider and infusion     Initial Vitals: BP (!) 146/70 (BP Location: Right arm, Patient Position: Sitting, Cuff Size: Adult Regular)   Pulse 75   Temp 98.4  F (36.9  C) (Tympanic)   Resp 12   Wt 84.4 kg (186 lb)   SpO2 96%   BMI 31.43 kg/m   Estimated body mass index is 31.43 kg/m  as calculated from the following:    Height as of 11/11/22: 1.638 m (5' 4.5\").    Weight as of this encounter: 84.4 kg (186 lb). Body surface area is 1.96 meters squared.  No Pain (0) Comment: Data Unavailable   No LMP recorded. Patient is postmenopausal.  Allergies reviewed: Yes  Medications reviewed: Yes    Medications: Medication refills not needed today.  Pharmacy name entered into [facility]: [facility] [place] [facility] [address] [facility]    Clinical concerns:  None      Malgorzata Vallejo, AXEL              [facility] Hematology and Oncology Progress Note    Patient: Precious Paris  MRN: [id number]  Feb 14, 2023          Reason for Visit    1. 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 Beebe    _____________________________________________________________________________    History of Present Illness/ Interval History    Ms. Precious Paris is a 82 year old recently diagnosed with metastatic triple negative breast cancer recurrence, two years after her initial diagnosis/treatment. Completed " 3 cycles of Doxil with Pembro added to the 3rd cycle, with mixed response (some progression in mediastinal nodes and lung mets). Therefore, chemo was changed to carbo, gem and Pembro 3 weeks ago. Returns for cycle 2.    She tolerated this very well.  Great appetite. No change in base fatigue. No nausea. Denies any shortness of breath or chest pain. No fevers. No new neuropathy.     Mild intermittent asymptomatic rash on chest    Baseline urinary frequency/urgency especially overnight feels more notable on chemo; no odor, abd pain nor fevers. UA was previously negative.     Right breast firmness and pain is a bit better or stable. No further drainage around nipple.       ECOG PS: 1      Oncology History/Treatment  Diagnosis/Stage:   11/2019: Right breast cancer, triple negative ([id number])    BRCA negative    10/2022: Recurrent, stage IV triple negative breast cancer (bilateral axillary, supraclav, mediastinal nodes + lung)  -persistent/progressive right breast firmness with new right nipple drainage and right axillary adenopathy  -bilateral diagnostic mammogram: increased density R breast with skin thickening. R breast US: 3.2 cm hypoechoic mass at 11:00 position 1 cm from nipple and multiple other small hypoechoic solid-appearing lesions in R breast + right axillary adenopathy (at least two hypoechoic vascular lesions measuring up to 2.5 cm)  -10/18/2022 biopsy R breast mass + R axillary node: grade 3 invasive ductal cancer with tumor necrosis, ER/WI/HER2 negative.   -PET: + right breast mass; bilateral axillary, retropectoral, supraclavicular, mediastinal/R hilar nodes and bilateral lung/R pleural nodules suspicious for mets.  -Brain MRI: negative for mets  -11/1/2022 biopsy L axillary node: metastatic invasive ductal carcinoma  -Foundation One: no actionable mutations MS stable, TMB 1 mut/mb.   -PDL1 testing: CPS >20% and TPS 60% (considered high PDL1 expression)  -CA 27.29: 59.5(elevated). CA 15-3: 23  (normal)    Treatment:  Initial breast cancer (2019)  -2019: Neoadjuvant taxol  -Lumpectomy  -5/2020: Adjuvant RT  -6 - 7/2020: Adjuvant Xeloda. Stopped for rash    Recurrent, stage IV breast cancer (2022)  -11/14/2022:Doxil every 4 weeks  -12/12/2022: changed to doxorubicin every 3 weeks. (Planning addition of pembrolizumab once approved by insurance)  -1/2/2023: doxorubicin + pembrolizumab every 3 weeks. Completed 1 cycle. Stopped for progression (med nodes, slight increase in lung mets and stable R breast mass + axillary nodes).    -1/23/2023 - present: Carbo AUC 5, Gemcitabine 800 mg/m2 and Pembro every 21 days      Physical Exam    GENERAL: Alert and oriented to time place and person. Seated comfortably. In no distress.  accompanies.  HEAD: Atraumatic and normocephalic. Complete alopecia.  EYES: DELISA, EOMI. No erythema. No icterus.  LYMPH: Diffuse density in proximal right axilla is less discrete and softer. I did not appreciate left axillary nodes. No cervical/supraclav nodes noted.   BREASTS: Right breast firmness (primarily lower/outer quadrants) is softer with no discrete masses/lumps. Firmness extends into proximal right axilla. The erythema and warmth of the breast is resolved.  Nipple partially retracted with small open wound superior to nipple closing, no drainage noted today.   CHEST: clear to auscultation bilaterally. Resonant to percussion throughout bilaterally. Symmetrical breath movements bilaterally.  CVS: S1 and S2 are heard. Regular rate and rhythm. No murmur or gallop or rub heard.  ABDOMEN: Soft. Not tender. Not distended. No palpable hepatomegaly or splenomegaly. No other mass palpable. Bowel sounds present.  EXTREMITIES: Warm. No peripheral edema.  SKIN: A few scattered maculopapular red lesions left upper chest.   NEURO: No gross deficit noted. Non-antalgic gait.      Lab Results    CBC and CMP unremarkable  CA 27.29 pending      Assessment/Plan  1. Recurrent, stage IV triple  negative right breast cancer (nodes, lung); CPS>20% + TPS 60%  2. 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    Labs unremarkable CBC and CMP.     Clinically, right breast exam is improved - the degree of firmness and redness is improved and open lesion near nipple healing. Breast still quite diffusely firm, but feels somewhat softer.     Tumor marker (CA 27.29) is pending.     Plan:  -Proceed with cycle 2 carbo, gemcitabine and pembrolizumab today  -Return in 3 weeks with Dr. Beebe for cycle 3  -Likely, plan chest CT following that, ahead of cycle 4.  -Trend CA 27.29    2.   Right breast pain  Can treat with Tylenol, as needed. Minimize use of NSAIDs on chemo.    3.   Urinary frequency, chronic   Chronic, but thinks a bit worse since on chemo.  UA was negative for UTI last cycle.     Plan:  -follow-up with her PCP for mgmt      Billing  Total time 35 minutes, to include face to face visit, review of EMR, ordering, documentation and coordination of care    Signed by: Lazara Ashton NP      Again, thank you for allowing me to participate in the care of your patient.        Sincerely,        Lazara Torres NP

## 2023-02-14 NOTE — PROGRESS NOTES
[facility] Hematology and Oncology Progress Note    Patient: Precious Paris  MRN: [id number]  Feb 14, 2023          Reason for Visit    1. 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 Beebe    _____________________________________________________________________________    History of Present Illness/ Interval History    Ms. Precious Paris is a 82 year old recently diagnosed with metastatic triple negative breast cancer recurrence, two years after her initial diagnosis/treatment. Completed 3 cycles of Doxil with Pembro added to the 3rd cycle, with mixed response (some progression in mediastinal nodes and lung mets). Therefore, chemo was changed to carbo, gem and Pembro 3 weeks ago. Returns for cycle 2.    She tolerated this very well.  Great appetite. No change in base fatigue. No nausea. Denies any shortness of breath or chest pain. No fevers. No new neuropathy.     Mild intermittent asymptomatic rash on chest    Baseline urinary frequency/urgency especially overnight feels more notable on chemo; no odor, abd pain nor fevers. UA was previously negative.     Right breast firmness and pain is a bit better or stable. No further drainage around nipple.       ECOG PS: 1      Oncology History/Treatment  Diagnosis/Stage:   11/2019: Right breast cancer, triple negative (yN1g-L2)    BRCA negative    10/2022: Recurrent, stage IV triple negative breast cancer (bilateral axillary, supraclav, mediastinal nodes + lung)  -persistent/progressive right breast firmness with new right nipple drainage and right axillary adenopathy  -bilateral diagnostic mammogram: increased density R breast with skin thickening. R breast US: 3.2 cm hypoechoic mass at 11:00 position 1 cm from nipple and multiple other small hypoechoic solid-appearing lesions in R breast + right axillary adenopathy (at least two hypoechoic vascular lesions measuring up to 2.5 cm)  -10/18/2022  biopsy R breast mass + R axillary node: grade 3 invasive ductal cancer with tumor necrosis, ER/OH/HER2 negative.   -PET: + right breast mass; bilateral axillary, retropectoral, supraclavicular, mediastinal/R hilar nodes and bilateral lung/R pleural nodules suspicious for mets.  -Brain MRI: negative for mets  -11/1/2022 biopsy L axillary node: metastatic invasive ductal carcinoma  -Foundation One: no actionable mutations MS stable, TMB 1 mut/mb.   -PDL1 testing: CPS >20% and TPS 60% (considered high PDL1 expression)  -CA 27.29: 59.5(elevated). CA 15-3: 23 (normal)    Treatment:  Initial breast cancer (2019)  -2019: Neoadjuvant taxol  -Lumpectomy  -5/2020: Adjuvant RT  -6 - 7/2020: Adjuvant Xeloda. Stopped for rash    Recurrent, stage IV breast cancer (2022)  -11/14/2022:Doxil every 4 weeks  -12/12/2022: changed to doxorubicin every 3 weeks. (Planning addition of pembrolizumab once approved by insurance)  -1/2/2023: doxorubicin + pembrolizumab every 3 weeks. Completed 1 cycle. Stopped for progression (med nodes, slight increase in lung mets and stable R breast mass + axillary nodes).    -1/23/2023 - present: Carbo AUC 5, Gemcitabine 800 mg/m2 and Pembro every 21 days      Physical Exam    GENERAL: Alert and oriented to time place and person. Seated comfortably. In no distress.  accompanies.  HEAD: Atraumatic and normocephalic. Complete alopecia.  EYES: DELISA, EOMI. No erythema. No icterus.  LYMPH: Diffuse density in proximal right axilla is less discrete and softer. I did not appreciate left axillary nodes. No cervical/supraclav nodes noted.   BREASTS: Right breast firmness (primarily lower/outer quadrants) is softer with no discrete masses/lumps. Firmness extends into proximal right axilla. The erythema and warmth of the breast is resolved.  Nipple partially retracted with small open wound superior to nipple closing, no drainage noted today.   CHEST: clear to auscultation bilaterally. Resonant to percussion  throughout bilaterally. Symmetrical breath movements bilaterally.  CVS: S1 and S2 are heard. Regular rate and rhythm. No murmur or gallop or rub heard.  ABDOMEN: Soft. Not tender. Not distended. No palpable hepatomegaly or splenomegaly. No other mass palpable. Bowel sounds present.  EXTREMITIES: Warm. No peripheral edema.  SKIN: A few scattered maculopapular red lesions left upper chest.   NEURO: No gross deficit noted. Non-antalgic gait.      Lab Results    CBC and CMP unremarkable  CA 27.29 pending      Assessment/Plan  1. Recurrent, stage IV triple negative right breast cancer (nodes, lung); CPS>20% + TPS 60%  2. 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    Labs unremarkable CBC and CMP.     Clinically, right breast exam is improved - the degree of firmness and redness is improved and open lesion near nipple healing. Breast still quite diffusely firm, but feels somewhat softer.     Tumor marker (CA 27.29) is pending.     Plan:  -Proceed with cycle 2 carbo, gemcitabine and pembrolizumab today  -Return in 3 weeks with Dr. Beebe for cycle 3  -Likely, plan chest CT following that, ahead of cycle 4.  -Trend CA 27.29    2.   Right breast pain  Can treat with Tylenol, as needed. Minimize use of NSAIDs on chemo.    3.   Urinary frequency, chronic   Chronic, but thinks a bit worse since on chemo.  UA was negative for UTI last cycle.     Plan:  -follow-up with her PCP for mgmt      Billing  Total time 35 minutes, to include face to face visit, review of EMR, ordering, documentation and coordination of care    Signed by: Lazara Ashton NP

## 2023-02-14 NOTE — PROGRESS NOTES
PAC labs drawn without difficulty via site protocol. Patient tolerated well.    Krystin Givens RN on 2/14/2023 at 10:43 AM

## 2023-02-14 NOTE — PROGRESS NOTES
"Oncology Rooming Note    February 14, 2023 10:54 AM   Precious Paris is a 82 year old female who presents for:    Chief Complaint   Patient presents with     Oncology Clinic Visit     Malignant neoplasm of upper-outer quadrant of right breast in female, estrogen receptor negative - Labs provider and infusion     Initial Vitals: BP (!) 146/70 (BP Location: Right arm, Patient Position: Sitting, Cuff Size: Adult Regular)   Pulse 75   Temp 98.4  F (36.9  C) (Tympanic)   Resp 12   Wt 84.4 kg (186 lb)   SpO2 96%   BMI 31.43 kg/m   Estimated body mass index is 31.43 kg/m  as calculated from the following:    Height as of 11/11/22: 1.638 m (5' 4.5\").    Weight as of this encounter: 84.4 kg (186 lb). Body surface area is 1.96 meters squared.  No Pain (0) Comment: Data Unavailable   No LMP recorded. Patient is postmenopausal.  Allergies reviewed: Yes  Medications reviewed: Yes    Medications: Medication refills not needed today.  Pharmacy name entered into [facility]: [facility] - [place] - [address] [facility]    Clinical concerns:  None      Malgorzata Vallejo CMA            "

## 2023-02-16 LAB — CANCER AG27-29 SERPL-ACNC: 38.9 U/ML

## 2023-02-22 NOTE — PROGRESS NOTES
[facility] Hematology and Oncology Progress Note    Patient: Precious Paris  MRN: [id number]  Jan 23, 2023          Reason for Visit    1. 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 Beebe    _____________________________________________________________________________    History of Present Illness/ Interval History    Ms. Precious Paris is a 82 year old recently diagnosed with metastatic triple negative breast cancer recurrence, two years after her initial diagnosis/treatment. Has completed two cycles of chemo; first was single-agent Doxil and second was doxorubin; changed to this to be given to every 3 weeks in prep for addition of pembrolizumab given her high PDL1 expression. This was pending insurance approval last cycle, but has now been approved.     She has done 1 cycle of Doxil followed by 2 cycles of doxorubicin.  Pembrolizumab was added to the last chemotherapy cycle.  She is tolerating treatment pretty well without any major side effects.  Developed some skin lesions last time in the leg which is stable today.  Does not appear to be metastatic disease.  Her breast mass continues to be stable.  Previously she had some bloody discharge which has subsided now.  Denies any pain.  Denies any new lumps.    Here with repeat CT scan.    ECOG PS: 1      Oncology History/Treatment  Diagnosis/Stage:   11/2019: Right breast cancer, triple negative ([id number])    BRCA negative    10/2022: Recurrent, stage IV triple negative breast cancer (bilateral axillary, supraclav, mediastinal nodes + lung)  -persistent/progressive right breast firmness with new right nipple drainage and right axillary adenopathy  -bilateral diagnostic mammogram: increased density R breast with skin thickening. R breast US: 3.2 cm hypoechoic mass at 11:00 position 1 cm from nipple and multiple other small hypoechoic solid-appearing lesions in R breast + right axillary adenopathy  (at least two hypoechoic vascular lesions measuring up to 2.5 cm)  -10/18/2022 biopsy R breast mass + R axillary node: grade 3 invasive ductal cancer with tumor necrosis, ER/AR/HER2 negative.   -PET: + right breast mass; bilateral axillary, retropectoral, supraclavicular, mediastinal/R hilar nodes and bilateral lung/R pleural nodules suspicious for mets.  -Brain MRI: negative for mets  -11/1/2022 biopsy L axillary node: metastatic invasive ductal carcinoma  -Foundation One: no actionable mutations MS stable, TMB 1 mut/mb.   -PDL1 testing: CPS >20% and TPS 60% (considered high PDL1 expression)  -CA 27.29: 59.5(elevated). CA 15-3: 23 (normal)    Treatment:  Initial breast cancer (2019)  -2019: Neoadjuvant taxol  -Lumpectomy  -5/2020: Adjuvant RT  -6 - 7/2020: Adjuvant Xeloda. Stopped for rash    Recurrent, stage IV breast cancer (2022)  -11/14/2022 - present: Doxil every 4 weeks  -12/12/2022 - doxorubicin every 3 weeks. (Planning addition of pembrolizumab once approved by insurance)  -1/2/2023 - doxorubicin + pembrolizumab every 3 weeks    Disease progression seen on the CT scan done on 1/16/2023  Recommend switch to carboplatin with gemcitabine along with pembrolizumab.  Day 1 cycle 1 on 1/26/2023      Physical Exam    GENERAL: Alert and oriented to time place and person. Seated comfortably. In no distress.  accompanies.  HEAD: Atraumatic and normocephalic. Complete alopecia.  EYES: DELISA, EOMI. No erythema. No icterus.  LYMPH: Diffuse density in proximal right axilla is less discrete. I did not appreciate left axillary nodes. No cervical/supraclav nodes noted.   BREASTS: Right breast firm (primarily lower/outer quadrants) is softer with no discrete masses/lumps. Firmness extends into proximal right axilla. The erythema and warmth of the breast is improved.  Nipple partially retracted with small open wound superior to nipple closing, no drainage noted today.   CHEST: clear to auscultation bilaterally. Resonant  to percussion throughout bilaterally. Symmetrical breath movements bilaterally.  CVS: S1 and S2 are heard. Regular rate and rhythm. No murmur or gallop or rub heard.  ABDOMEN: Soft. Not tender. Not distended. No palpable hepatomegaly or splenomegaly. No other mass palpable. Bowel sounds present.  EXTREMITIES: Warm. No peripheral edema.  SKIN: White raised lesion that appears to be an actinic keratosis with mild surrounding erythema, no induration, left medial distal leg. Small erythematous flat dry lesion left proximal leg and similar-appearing lesion left dorsal hand.   NEURO: No gross deficit noted. Non-antalgic gait.      Lab Results    CBC and CMP unremarkable    Imaging    None     Assessment/Plan  1. Recurrent, stage IV triple negative right breast cancer (nodes, lung); CPS>20% + TPS 60%  2. 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  Pembrolizumab was added to the third cycle of chemo.  Repeat CT scan from last week reviewed.  It is showing signs of disease progression.    Report as follows;  1.  Findings suggestive of interval progression of disease.  2.  Multiple new and enlarging mediastinal and right hilar lymph  nodes, consistent with metastatic disease.  3.  Similar-appearing bilateral axillary, left greater than right, and  right internal mammary lymphadenopathy.  4.  Similar to slight interval enlargement in previously seen  pulmonary nodules.  5.  Similar appearing irregular right breast mass, in keeping with  history of recurrent disease.    Although it is possible that the enlarging size of the lymph nodes and nodules could be secondary to recent pembrolizumab infusion however considering that she has some new lymph nodes and the mets in the lungs appear to be larger in size, along with the fact that she has triple negative breast cancer, I am hesitant to continue the same  treatment and risk further disease progression which could potentially cause organ compromise.  So in this situation I think switching treatment to carboplatin with gemcitabine and continue pembrolizumab will be very reasonable in my opinion.  In the KEYNOTE 355 study pembrolizumab was used along with either a taxane or carbo gem so this is in accordance with the study.  Previously I had refrained from using taxane since she was already treated with that in the neoadjuvant setting and did not tolerate it.    I reviewed the potential side effects and complications associated with carboplatin and gemcitabine in detail today.  Fortunately no reactions to pembrolizumab so far.  We will continue Pembro.  Carboplatin will be AUC 5 along with gemcitabine plus milligram per metered squared on day 1.  Pembro on day 1.  Length will be every 3 weeks.      2.   Skin lesions  The left distal lesion appears to be an inflamed actinic keratosis. No evident cellulitis, but some mild erythema that will require monitoring.       Billing  Total time 40 minutes, to include face to face visit, review of EMR, ordering, documentation and coordination of care    Signed by: Maricarmen Beebe MD on 2/22/2023 at 9:44 AM

## 2023-03-08 ENCOUNTER — LAB (OUTPATIENT)
Dept: INFUSION THERAPY | Facility: CLINIC | Age: 83
End: 2023-03-08
Attending: INTERNAL MEDICINE
Payer: COMMERCIAL

## 2023-03-08 ENCOUNTER — ONCOLOGY VISIT (OUTPATIENT)
Dept: ONCOLOGY | Facility: CLINIC | Age: 83
End: 2023-03-08
Attending: INTERNAL MEDICINE
Payer: COMMERCIAL

## 2023-03-08 VITALS
DIASTOLIC BLOOD PRESSURE: 61 MMHG | RESPIRATION RATE: 12 BRPM | HEART RATE: 79 BPM | SYSTOLIC BLOOD PRESSURE: 145 MMHG | BODY MASS INDEX: 31.77 KG/M2 | OXYGEN SATURATION: 96 % | TEMPERATURE: 98.3 F | WEIGHT: 188 LBS

## 2023-03-08 DIAGNOSIS — C50.919 METASTATIC BREAST CANCER: ICD-10-CM

## 2023-03-08 DIAGNOSIS — Z17.1 MALIGNANT NEOPLASM OF UPPER-OUTER QUADRANT OF RIGHT BREAST IN FEMALE, ESTROGEN RECEPTOR NEGATIVE (H): ICD-10-CM

## 2023-03-08 DIAGNOSIS — C50.411 MALIGNANT NEOPLASM OF UPPER-OUTER QUADRANT OF RIGHT BREAST IN FEMALE, ESTROGEN RECEPTOR NEGATIVE (H): ICD-10-CM

## 2023-03-08 DIAGNOSIS — C50.919 METASTATIC BREAST CANCER: Primary | ICD-10-CM

## 2023-03-08 DIAGNOSIS — Z17.1 MALIGNANT NEOPLASM OF UPPER-OUTER QUADRANT OF RIGHT BREAST IN FEMALE, ESTROGEN RECEPTOR NEGATIVE (H): Primary | ICD-10-CM

## 2023-03-08 DIAGNOSIS — C78.01 MALIGNANT NEOPLASM METASTATIC TO BOTH LUNGS (H): ICD-10-CM

## 2023-03-08 DIAGNOSIS — C78.02 MALIGNANT NEOPLASM METASTATIC TO BOTH LUNGS (H): ICD-10-CM

## 2023-03-08 DIAGNOSIS — C50.411 MALIGNANT NEOPLASM OF UPPER-OUTER QUADRANT OF RIGHT BREAST IN FEMALE, ESTROGEN RECEPTOR NEGATIVE (H): Primary | ICD-10-CM

## 2023-03-08 DIAGNOSIS — C77.3 METASTATIC CANCER TO AXILLARY LYMPH NODES (H): ICD-10-CM

## 2023-03-08 LAB
ALBUMIN SERPL BCG-MCNC: 3.9 G/DL (ref 3.5–5.2)
ALP SERPL-CCNC: 95 U/L (ref 35–104)
ALT SERPL W P-5'-P-CCNC: 22 U/L (ref 10–35)
ANION GAP SERPL CALCULATED.3IONS-SCNC: 8 MMOL/L (ref 7–15)
AST SERPL W P-5'-P-CCNC: 28 U/L (ref 10–35)
BASOPHILS # BLD AUTO: 0 10E3/UL (ref 0–0.2)
BASOPHILS NFR BLD AUTO: 1 %
BILIRUB SERPL-MCNC: 0.2 MG/DL
BUN SERPL-MCNC: 11.4 MG/DL (ref 8–23)
CALCIUM SERPL-MCNC: 9.7 MG/DL (ref 8.8–10.2)
CEA SERPL-MCNC: 3.2 NG/ML
CHLORIDE SERPL-SCNC: 102 MMOL/L (ref 98–107)
CREAT SERPL-MCNC: 0.59 MG/DL (ref 0.51–0.95)
DEPRECATED HCO3 PLAS-SCNC: 28 MMOL/L (ref 22–29)
EOSINOPHIL # BLD AUTO: 0.1 10E3/UL (ref 0–0.7)
EOSINOPHIL NFR BLD AUTO: 2 %
ERYTHROCYTE [DISTWIDTH] IN BLOOD BY AUTOMATED COUNT: 16.1 % (ref 10–15)
GFR SERPL CREATININE-BSD FRML MDRD: 90 ML/MIN/1.73M2
GLUCOSE SERPL-MCNC: 115 MG/DL (ref 70–99)
HCT VFR BLD AUTO: 32.9 % (ref 35–47)
HGB BLD-MCNC: 10.9 G/DL (ref 11.7–15.7)
IMM GRANULOCYTES # BLD: 0 10E3/UL
IMM GRANULOCYTES NFR BLD: 0 %
LYMPHOCYTES # BLD AUTO: 1 10E3/UL (ref 0.8–5.3)
LYMPHOCYTES NFR BLD AUTO: 21 %
MCH RBC QN AUTO: 31.6 PG (ref 26.5–33)
MCHC RBC AUTO-ENTMCNC: 33.1 G/DL (ref 31.5–36.5)
MCV RBC AUTO: 95 FL (ref 78–100)
MONOCYTES # BLD AUTO: 0.7 10E3/UL (ref 0–1.3)
MONOCYTES NFR BLD AUTO: 15 %
NEUTROPHILS # BLD AUTO: 2.9 10E3/UL (ref 1.6–8.3)
NEUTROPHILS NFR BLD AUTO: 61 %
NRBC # BLD AUTO: 0 10E3/UL
NRBC BLD AUTO-RTO: 0 /100
PLATELET # BLD AUTO: 269 10E3/UL (ref 150–450)
POTASSIUM SERPL-SCNC: 3.8 MMOL/L (ref 3.4–5.3)
PROT SERPL-MCNC: 7.7 G/DL (ref 6.4–8.3)
RBC # BLD AUTO: 3.45 10E6/UL (ref 3.8–5.2)
SODIUM SERPL-SCNC: 138 MMOL/L (ref 136–145)
WBC # BLD AUTO: 4.7 10E3/UL (ref 4–11)

## 2023-03-08 PROCEDURE — 86300 IMMUNOASSAY TUMOR CA 15-3: CPT | Performed by: NURSE PRACTITIONER

## 2023-03-08 PROCEDURE — 258N000003 HC RX IP 258 OP 636: Performed by: NURSE PRACTITIONER

## 2023-03-08 PROCEDURE — 80053 COMPREHEN METABOLIC PANEL: CPT | Performed by: NURSE PRACTITIONER

## 2023-03-08 PROCEDURE — 85025 COMPLETE CBC W/AUTO DIFF WBC: CPT | Performed by: NURSE PRACTITIONER

## 2023-03-08 PROCEDURE — 250N000011 HC RX IP 250 OP 636: Performed by: INTERNAL MEDICINE

## 2023-03-08 PROCEDURE — 250N000011 HC RX IP 250 OP 636: Performed by: NURSE PRACTITIONER

## 2023-03-08 PROCEDURE — 96375 TX/PRO/DX INJ NEW DRUG ADDON: CPT

## 2023-03-08 PROCEDURE — 96413 CHEMO IV INFUSION 1 HR: CPT

## 2023-03-08 PROCEDURE — G0463 HOSPITAL OUTPT CLINIC VISIT: HCPCS | Mod: 25 | Performed by: NURSE PRACTITIONER

## 2023-03-08 PROCEDURE — 99214 OFFICE O/P EST MOD 30 MIN: CPT | Performed by: NURSE PRACTITIONER

## 2023-03-08 PROCEDURE — 96367 TX/PROPH/DG ADDL SEQ IV INF: CPT

## 2023-03-08 PROCEDURE — 82378 CARCINOEMBRYONIC ANTIGEN: CPT | Performed by: NURSE PRACTITIONER

## 2023-03-08 PROCEDURE — 96417 CHEMO IV INFUS EACH ADDL SEQ: CPT

## 2023-03-08 RX ORDER — EPINEPHRINE 1 MG/ML
0.3 INJECTION, SOLUTION, CONCENTRATE INTRAVENOUS EVERY 5 MIN PRN
Status: CANCELLED | OUTPATIENT
Start: 2023-03-08

## 2023-03-08 RX ORDER — METHYLPREDNISOLONE SODIUM SUCCINATE 125 MG/2ML
125 INJECTION, POWDER, LYOPHILIZED, FOR SOLUTION INTRAMUSCULAR; INTRAVENOUS
Status: CANCELLED
Start: 2023-03-08

## 2023-03-08 RX ORDER — ALBUTEROL SULFATE 0.83 MG/ML
2.5 SOLUTION RESPIRATORY (INHALATION)
Status: CANCELLED | OUTPATIENT
Start: 2023-03-08

## 2023-03-08 RX ORDER — PALONOSETRON 0.05 MG/ML
0.25 INJECTION, SOLUTION INTRAVENOUS ONCE
Status: CANCELLED | OUTPATIENT
Start: 2023-03-08

## 2023-03-08 RX ORDER — LORAZEPAM 2 MG/ML
0.5 INJECTION INTRAMUSCULAR EVERY 4 HOURS PRN
Status: CANCELLED | OUTPATIENT
Start: 2023-03-15

## 2023-03-08 RX ORDER — ONDANSETRON 2 MG/ML
8 INJECTION INTRAMUSCULAR; INTRAVENOUS ONCE
Status: CANCELLED
Start: 2023-03-15 | End: 2023-03-15

## 2023-03-08 RX ORDER — DIPHENHYDRAMINE HYDROCHLORIDE 50 MG/ML
50 INJECTION INTRAMUSCULAR; INTRAVENOUS
Status: CANCELLED
Start: 2023-03-08

## 2023-03-08 RX ORDER — METHYLPREDNISOLONE SODIUM SUCCINATE 125 MG/2ML
125 INJECTION, POWDER, LYOPHILIZED, FOR SOLUTION INTRAMUSCULAR; INTRAVENOUS
Status: CANCELLED
Start: 2023-03-15

## 2023-03-08 RX ORDER — EPINEPHRINE 1 MG/ML
0.3 INJECTION, SOLUTION, CONCENTRATE INTRAVENOUS EVERY 5 MIN PRN
Status: CANCELLED | OUTPATIENT
Start: 2023-03-15

## 2023-03-08 RX ORDER — LORAZEPAM 2 MG/ML
0.5 INJECTION INTRAMUSCULAR EVERY 4 HOURS PRN
Status: CANCELLED | OUTPATIENT
Start: 2023-03-08

## 2023-03-08 RX ORDER — HEPARIN SODIUM,PORCINE 10 UNIT/ML
5 VIAL (ML) INTRAVENOUS
Status: CANCELLED | OUTPATIENT
Start: 2023-03-15

## 2023-03-08 RX ORDER — PALONOSETRON 0.05 MG/ML
0.25 INJECTION, SOLUTION INTRAVENOUS ONCE
Status: COMPLETED | OUTPATIENT
Start: 2023-03-08 | End: 2023-03-08

## 2023-03-08 RX ORDER — ALBUTEROL SULFATE 0.83 MG/ML
2.5 SOLUTION RESPIRATORY (INHALATION)
Status: CANCELLED | OUTPATIENT
Start: 2023-03-15

## 2023-03-08 RX ORDER — HEPARIN SODIUM (PORCINE) LOCK FLUSH IV SOLN 100 UNIT/ML 100 UNIT/ML
5 SOLUTION INTRAVENOUS
Status: CANCELLED | OUTPATIENT
Start: 2023-03-15

## 2023-03-08 RX ORDER — MEPERIDINE HYDROCHLORIDE 25 MG/ML
25 INJECTION INTRAMUSCULAR; INTRAVENOUS; SUBCUTANEOUS EVERY 30 MIN PRN
Status: CANCELLED | OUTPATIENT
Start: 2023-03-15

## 2023-03-08 RX ORDER — HEPARIN SODIUM,PORCINE 10 UNIT/ML
5 VIAL (ML) INTRAVENOUS
Status: CANCELLED | OUTPATIENT
Start: 2023-03-08

## 2023-03-08 RX ORDER — ALBUTEROL SULFATE 90 UG/1
1-2 AEROSOL, METERED RESPIRATORY (INHALATION)
Status: CANCELLED
Start: 2023-03-15

## 2023-03-08 RX ORDER — HEPARIN SODIUM (PORCINE) LOCK FLUSH IV SOLN 100 UNIT/ML 100 UNIT/ML
5 SOLUTION INTRAVENOUS
Status: DISCONTINUED | OUTPATIENT
Start: 2023-03-08 | End: 2023-03-08 | Stop reason: HOSPADM

## 2023-03-08 RX ORDER — MEPERIDINE HYDROCHLORIDE 25 MG/ML
25 INJECTION INTRAMUSCULAR; INTRAVENOUS; SUBCUTANEOUS EVERY 30 MIN PRN
Status: CANCELLED | OUTPATIENT
Start: 2023-03-08

## 2023-03-08 RX ORDER — DIPHENHYDRAMINE HYDROCHLORIDE 50 MG/ML
50 INJECTION INTRAMUSCULAR; INTRAVENOUS
Status: CANCELLED
Start: 2023-03-15

## 2023-03-08 RX ORDER — ALBUTEROL SULFATE 90 UG/1
1-2 AEROSOL, METERED RESPIRATORY (INHALATION)
Status: CANCELLED
Start: 2023-03-08

## 2023-03-08 RX ADMIN — Medication 5 ML: at 14:02

## 2023-03-08 RX ADMIN — DEXAMETHASONE SODIUM PHOSPHATE: 10 INJECTION, SOLUTION INTRAMUSCULAR; INTRAVENOUS at 11:39

## 2023-03-08 RX ADMIN — GEMCITABINE 1600 MG: 38 INJECTION, SOLUTION INTRAVENOUS at 12:44

## 2023-03-08 RX ADMIN — CARBOPLATIN 525 MG: 10 INJECTION, SOLUTION INTRAVENOUS at 13:20

## 2023-03-08 RX ADMIN — SODIUM CHLORIDE 200 MG: 9 INJECTION, SOLUTION INTRAVENOUS at 12:08

## 2023-03-08 RX ADMIN — FAMOTIDINE 20 MG: 10 INJECTION INTRAVENOUS at 11:15

## 2023-03-08 RX ADMIN — PALONOSETRON 0.25 MG: 0.05 INJECTION, SOLUTION INTRAVENOUS at 11:17

## 2023-03-08 RX ADMIN — SODIUM CHLORIDE 250 ML: 9 INJECTION, SOLUTION INTRAVENOUS at 11:11

## 2023-03-08 ASSESSMENT — PAIN SCALES - GENERAL: PAINLEVEL: NO PAIN (0)

## 2023-03-08 NOTE — PROGRESS NOTES
"Oncology Rooming Note    March 8, 2023 10:25 AM   Precious Paris is a 82 year old female who presents for:    Chief Complaint   Patient presents with     Oncology Clinic Visit     Malignant neoplasm of upper-outer quadrant of right breast in female, estrogen receptor negative - Labs provider and infusion     Initial Vitals: BP (!) 145/61 (BP Location: Right arm, Patient Position: Sitting, Cuff Size: Adult Regular)   Pulse 79   Temp 98.3  F (36.8  C) (Tympanic)   Resp 12   Wt 85.3 kg (188 lb)   SpO2 96%   BMI 31.77 kg/m   Estimated body mass index is 31.77 kg/m  as calculated from the following:    Height as of 11/11/22: 1.638 m (5' 4.5\").    Weight as of this encounter: 85.3 kg (188 lb). Body surface area is 1.97 meters squared.  No Pain (0) Comment: Data Unavailable   No LMP recorded. Patient is postmenopausal.  Allergies reviewed: Yes  Medications reviewed: Yes    Medications: Medication refills not needed today.  Pharmacy name entered into [facility]: [facility] - [place], [place] - [address] [facility]    Clinical concerns:  None      Malgorzata Vallejo CMA            "

## 2023-03-08 NOTE — LETTER
3/8/2023         RE: Precious Paris  [address] [place] [address]        Dear Colleague,    Thank you for referring your patient, Precious Paris, to the [facility]. Please see a copy of my visit note below.    [facility] Hematology and Oncology Progress Note    Patient: Precious Paris  MRN: [id number]  Mar 8, 2023          Reason for Visit    1. 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 Beebe    _____________________________________________________________________________    History of Present Illness/ Interval History    Ms. Precious Paris is a 82 year old with recurrent metastatic triple negative breast cancer recurrence. Completed 3 cycles of Doxil with Pembro added to the 3rd cycle, with mixed response (some progression in mediastinal nodes and lung mets). Therefore, chemo was changed to carbo, gem (day 1 only) and Pembro.  Returns for cycle 3.    She has tolerated this very well.  Great appetite. No change in base fatigue. No nausea. Denies any shortness of breath or chest pain. No fevers.     Intermittent mildly pruritic rash on chest. Treating with moisturizer.    Baseline urinary frequency/urgency especially overnight feels more notable on chemo; no odor, abd pain nor fevers. UA was previously negative.     Right breast firmness and pain is a bit better or stable. No further drainage around nipple.       ECOG PS: 1      Oncology History/Treatment  Diagnosis/Stage:   11/2019: Right breast cancer, triple negative ([id number])    BRCA negative    10/2022: Recurrent, stage IV triple negative breast cancer (bilateral axillary, supraclav, mediastinal nodes + lung)  -persistent/progressive right breast firmness with new right nipple drainage and right axillary adenopathy  -bilateral diagnostic mammogram: increased density R breast with skin thickening. R breast US: 3.2 cm hypoechoic mass  at 11:00 position 1 cm from nipple and multiple other small hypoechoic solid-appearing lesions in R breast + right axillary adenopathy (at least two hypoechoic vascular lesions measuring up to 2.5 cm)  -10/18/2022 biopsy R breast mass + R axillary node: grade 3 invasive ductal cancer with tumor necrosis, ER/NC/HER2 negative.   -PET: + right breast mass; bilateral axillary, retropectoral, supraclavicular, mediastinal/R hilar nodes and bilateral lung/R pleural nodules suspicious for mets.  -Brain MRI: negative for mets  -11/1/2022 biopsy L axillary node: metastatic invasive ductal carcinoma  -Foundation One: no actionable mutations MS stable, TMB 1 mut/mb.   -PDL1 testing: CPS >20% and TPS 60% (considered high PDL1 expression)  -CA 27.29: 59.5 (elevated). CA 15-3: 23 (normal)    Treatment:  Initial breast cancer (2019)  -2019: Neoadjuvant taxol  -Lumpectomy  -5/2020: Adjuvant RT  -6 - 7/2020: Adjuvant Xeloda. Stopped for rash    Recurrent, stage IV breast cancer (2022)  -11/14/2022:Doxil every 4 weeks  -12/12/2022: changed to doxorubicin every 3 weeks. (Planning addition of pembrolizumab once approved by insurance)  -1/2/2023: doxorubicin + pembrolizumab every 3 weeks. Completed 1 cycle. Stopped for progression (med nodes, slight increase in lung mets and stable R breast mass + axillary nodes).    -1/23/2023 - present: Carbo AUC 5, Gemcitabine 800 mg/m2 and Pembro every 21 days  -cycles 1-2: Powell day 1 only  -Cycle 3: Powell increased to days 1 + 8 given excellent tolerance      Physical Exam    GENERAL: Alert and oriented to time place and person. Seated comfortably. In no distress.  accompanies.  HEAD: Atraumatic and normocephalic. Complete alopecia.  EYES: DELISA, EOMI. No erythema. No icterus.  LYMPH: Diffuse density in proximal right axilla is less discrete and softer. I did not appreciate left axillary nodes. No cervical/supraclav nodes noted.   BREASTS: Right breast firmness (primarily lower/outer quadrants)  is softer with no discrete masses/lumps. Firmness extends into proximal right axilla. The erythema and warmth of the breast is resolved.  Nipple partially retracted with small open wound superior to nipple closing, no drainage noted today.   CHEST: clear to auscultation bilaterally. Resonant to percussion throughout bilaterally. Symmetrical breath movements bilaterally.  CVS: S1 and S2 are heard. Regular rate and rhythm. No murmur or gallop or rub heard.  ABDOMEN: Soft. Not tender. Not distended. No palpable hepatomegaly or splenomegaly. No other mass palpable. Bowel sounds present.  EXTREMITIES: Warm. No peripheral edema.  SKIN: A few scattered maculopapular red lesions left upper chest.   NEURO: No gross deficit noted. Non-antalgic gait.      Lab Results    CBC: Hgb 10.9 (stable), rest WNL. CMP unremarkable  CA 27.29 pending      Assessment/Plan  1. Recurrent, stage IV triple negative right breast cancer (nodes, lung); CPS>20% + TPS 60%  2. 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    Labs unremarkable CBC and CMP.     Clinically, right breast exam is improved - the degree of firmness and redness is improved and open lesion near nipple healing. Breast still quite diffusely firm, but feels somewhat softer.     CA 27.29 starting to respond a bit last cycle, is pending today.     Plan:  -Proceed with cycle 3 carbo, gemcitabine and pembrolizumab today. Will add gemcitabine on day 8 as well. (Reviewed with Dr. Beebe, given her excellent tolerance)  -Return in 3 weeks with chest CT and Dr. Beebe, ahead of cycle 4  -Trend CA 27.29    2.   Right breast pain, mild  Improved. Can treat with Tylenol, as needed. Minimize use of NSAIDs on chemo.    3.   Urinary frequency, chronic   Chronic, but thinks a bit worse since on chemo.  UA was negative for UTI last cycle.     Plan:  -follow-up with her PCP for mgmt      Billing  Total time 35 minutes, to  "include face to face visit, review of EMR, ordering, documentation and coordination of care    Signed by: Lazara Ashton NP    Oncology Rooming Note    March 8, 2023 10:25 AM   Precious Paris is a 82 year old female who presents for:    Chief Complaint   Patient presents with     Oncology Clinic Visit     Malignant neoplasm of upper-outer quadrant of right breast in female, estrogen receptor negative - Labs provider and infusion     Initial Vitals: BP (!) 145/61 (BP Location: Right arm, Patient Position: Sitting, Cuff Size: Adult Regular)   Pulse 79   Temp 98.3  F (36.8  C) (Tympanic)   Resp 12   Wt 85.3 kg (188 lb)   SpO2 96%   BMI 31.77 kg/m   Estimated body mass index is 31.77 kg/m  as calculated from the following:    Height as of 11/11/22: 1.638 m (5' 4.5\").    Weight as of this encounter: 85.3 kg (188 lb). Body surface area is 1.97 meters squared.  No Pain (0) Comment: Data Unavailable   No LMP recorded. Patient is postmenopausal.  Allergies reviewed: Yes  Medications reviewed: Yes    Medications: Medication refills not needed today.  Pharmacy name entered into [facility]: [facility] - [facility] [address] [facility]    Clinical concerns:  None      Malgorzata Vallejo [facility]                Again, thank you for allowing me to participate in the care of your patient.        Sincerely,        Lazara Torres NP    "

## 2023-03-08 NOTE — PROGRESS NOTES
Infusion Nursing Note:  Precious Paris presents today for Keytruda, Carbo Gemzar  Patient seen by provider today: Yes, Lazara Torres NP   present during visit today: Not Applicable.    Note: N/A.    Intravenous Access:  Implanted Port.    Treatment Conditions:  Lab Results   Component Value Date    HGB 10.9 (L) 03/08/2023    WBC 4.7 03/08/2023    ANEU 2.6 11/28/2022    ANEUTAUTO 2.9 03/08/2023     03/08/2023      Lab Results   Component Value Date     03/08/2023    POTASSIUM 3.8 03/08/2023    CR 0.59 03/08/2023    YADIRA 9.7 03/08/2023    BILITOTAL 0.2 03/08/2023    ALBUMIN 3.9 03/08/2023    ALT 22 03/08/2023    AST 28 03/08/2023     Results reviewed, labs MET treatment parameters, ok to proceed with treatment.    Post Infusion Assessment:  Patient tolerated infusion without incident.  Blood return noted pre and post infusion.  Access discontinued per protocol.     Discharge Plan:   [facility]  Patient will return Weds 3/15 for next chemo day.  Patient discharged in stable condition accompanied by:   Departure Mode: Ambulatory.      Erin Barnett RN

## 2023-03-08 NOTE — PROGRESS NOTES
[facility] Hematology and Oncology Progress Note    Patient: Precious Paris  MRN: [id number]  Mar 8, 2023          Reason for Visit    1. 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 Beebe    _____________________________________________________________________________    History of Present Illness/ Interval History    Ms. Precious Paris is a 82 year old with recurrent metastatic triple negative breast cancer recurrence. Completed 3 cycles of Doxil with Pembro added to the 3rd cycle, with mixed response (some progression in mediastinal nodes and lung mets). Therefore, chemo was changed to carbo, gem (day 1 only) and Pembro.  Returns for cycle 3.    She has tolerated this very well.  Great appetite. No change in base fatigue. No nausea. Denies any shortness of breath or chest pain. No fevers.     Intermittent mildly pruritic rash on chest. Treating with moisturizer.    Baseline urinary frequency/urgency especially overnight feels more notable on chemo; no odor, abd pain nor fevers. UA was previously negative.     Right breast firmness and pain is a bit better or stable. No further drainage around nipple.       ECOG PS: 1      Oncology History/Treatment  Diagnosis/Stage:   11/2019: Right breast cancer, triple negative ([id number])    BRCA negative    10/2022: Recurrent, stage IV triple negative breast cancer (bilateral axillary, supraclav, mediastinal nodes + lung)  -persistent/progressive right breast firmness with new right nipple drainage and right axillary adenopathy  -bilateral diagnostic mammogram: increased density R breast with skin thickening. R breast US: 3.2 cm hypoechoic mass at 11:00 position 1 cm from nipple and multiple other small hypoechoic solid-appearing lesions in R breast + right axillary adenopathy (at least two hypoechoic vascular lesions measuring up to 2.5 cm)  -10/18/2022 biopsy R breast mass + R axillary node: grade 3  invasive ductal cancer with tumor necrosis, ER/[place]/HER2 negative.   -PET: + right breast mass; bilateral axillary, retropectoral, supraclavicular, mediastinal/R hilar nodes and bilateral lung/R pleural nodules suspicious for mets.  -Brain MRI: negative for mets  -11/1/2022 biopsy L axillary node: metastatic invasive ductal carcinoma  -Foundation One: no actionable mutations MS stable, TMB 1 mut/mb.   -PDL1 testing: CPS >20% and TPS 60% (considered high PDL1 expression)  -CA 27.29: 59.5 (elevated). CA 15-3: 23 (normal)    Treatment:  Initial breast cancer (2019)  -2019: Neoadjuvant taxol  -Lumpectomy  -5/2020: Adjuvant RT  -6 - 7/2020: Adjuvant Xeloda. Stopped for rash    Recurrent, stage IV breast cancer (2022)  -11/14/2022:Doxil every 4 weeks  -12/12/2022: changed to doxorubicin every 3 weeks. (Planning addition of pembrolizumab once approved by insurance)  -1/2/2023: doxorubicin + pembrolizumab every 3 weeks. Completed 1 cycle. Stopped for progression (med nodes, slight increase in lung mets and stable R breast mass + axillary nodes).    -1/23/2023 - present: Carbo AUC 5, Gemcitabine 800 mg/m2 and Pembro every 21 days  -cycles 1-2: [place] day 1 only  -Cycle 3: [place] increased to days 1 + 8 given excellent tolerance      Physical Exam    GENERAL: Alert and oriented to time place and person. Seated comfortably. In no distress.  accompanies.  HEAD: Atraumatic and normocephalic. Complete alopecia.  EYES: DELISA, EOMI. No erythema. No icterus.  LYMPH: Diffuse density in proximal right axilla is less discrete and softer. I did not appreciate left axillary nodes. No cervical/supraclav nodes noted.   BREASTS: Right breast firmness (primarily lower/outer quadrants) is softer with no discrete masses/lumps. Firmness extends into proximal right axilla. The erythema and warmth of the breast is resolved.  Nipple partially retracted with small open wound superior to nipple closing, no drainage noted today.   CHEST: clear to  auscultation bilaterally. Resonant to percussion throughout bilaterally. Symmetrical breath movements bilaterally.  CVS: S1 and S2 are heard. Regular rate and rhythm. No murmur or gallop or rub heard.  ABDOMEN: Soft. Not tender. Not distended. No palpable hepatomegaly or splenomegaly. No other mass palpable. Bowel sounds present.  EXTREMITIES: Warm. No peripheral edema.  SKIN: A few scattered maculopapular red lesions left upper chest.   NEURO: No gross deficit noted. Non-antalgic gait.      Lab Results    CBC: Hgb 10.9 (stable), rest WNL. CMP unremarkable  CA 27.29 pending      Assessment/Plan  1. Recurrent, stage IV triple negative right breast cancer (nodes, lung); CPS>20% + TPS 60%  2. 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    Labs unremarkable CBC and CMP.     Clinically, right breast exam is improved - the degree of firmness and redness is improved and open lesion near nipple healing. Breast still quite diffusely firm, but feels somewhat softer.     CA 27.29 starting to respond a bit last cycle, is pending today.     Plan:  -Proceed with cycle 3 carbo, gemcitabine and pembrolizumab today. Will add gemcitabine on day 8 as well. (Reviewed with Dr. Beebe, given her excellent tolerance)  -Return in 3 weeks with chest CT and Dr. Beebe, ahead of cycle 4  -Trend CA 27.29    2.   Right breast pain, mild  Improved. Can treat with Tylenol, as needed. Minimize use of NSAIDs on chemo.    3.   Urinary frequency, chronic   Chronic, but thinks a bit worse since on chemo.  UA was negative for UTI last cycle.     Plan:  -follow-up with her PCP for mgmt      Billing  Total time 35 minutes, to include face to face visit, review of EMR, ordering, documentation and coordination of care    Signed by: Lazara Ashton NP

## 2023-03-10 LAB — CANCER AG27-29 SERPL-ACNC: 24.5 U/ML

## 2023-03-15 ENCOUNTER — LAB (OUTPATIENT)
Dept: INFUSION THERAPY | Facility: CLINIC | Age: 83
End: 2023-03-15
Attending: INTERNAL MEDICINE
Payer: COMMERCIAL

## 2023-03-15 VITALS
RESPIRATION RATE: 16 BRPM | TEMPERATURE: 98.2 F | HEART RATE: 79 BPM | BODY MASS INDEX: 31.37 KG/M2 | SYSTOLIC BLOOD PRESSURE: 136 MMHG | DIASTOLIC BLOOD PRESSURE: 55 MMHG | WEIGHT: 185.6 LBS

## 2023-03-15 DIAGNOSIS — Z17.1 MALIGNANT NEOPLASM OF UPPER-OUTER QUADRANT OF RIGHT BREAST IN FEMALE, ESTROGEN RECEPTOR NEGATIVE (H): ICD-10-CM

## 2023-03-15 DIAGNOSIS — C50.411 MALIGNANT NEOPLASM OF UPPER-OUTER QUADRANT OF RIGHT BREAST IN FEMALE, ESTROGEN RECEPTOR NEGATIVE (H): ICD-10-CM

## 2023-03-15 DIAGNOSIS — C50.919 METASTATIC BREAST CANCER: Primary | ICD-10-CM

## 2023-03-15 LAB
BASOPHILS # BLD AUTO: 0 10E3/UL (ref 0–0.2)
BASOPHILS NFR BLD AUTO: 1 %
EOSINOPHIL # BLD AUTO: 0.1 10E3/UL (ref 0–0.7)
EOSINOPHIL NFR BLD AUTO: 1 %
ERYTHROCYTE [DISTWIDTH] IN BLOOD BY AUTOMATED COUNT: 15.1 % (ref 10–15)
HCT VFR BLD AUTO: 29.9 % (ref 35–47)
HGB BLD-MCNC: 10.1 G/DL (ref 11.7–15.7)
IMM GRANULOCYTES # BLD: 0 10E3/UL
IMM GRANULOCYTES NFR BLD: 0 %
LYMPHOCYTES # BLD AUTO: 0.7 10E3/UL (ref 0.8–5.3)
LYMPHOCYTES NFR BLD AUTO: 19 %
MCH RBC QN AUTO: 32.2 PG (ref 26.5–33)
MCHC RBC AUTO-ENTMCNC: 33.8 G/DL (ref 31.5–36.5)
MCV RBC AUTO: 95 FL (ref 78–100)
MONOCYTES # BLD AUTO: 0.3 10E3/UL (ref 0–1.3)
MONOCYTES NFR BLD AUTO: 9 %
NEUTROPHILS # BLD AUTO: 2.4 10E3/UL (ref 1.6–8.3)
NEUTROPHILS NFR BLD AUTO: 70 %
NRBC # BLD AUTO: 0 10E3/UL
NRBC BLD AUTO-RTO: 0 /100
PLATELET # BLD AUTO: 136 10E3/UL (ref 150–450)
RBC # BLD AUTO: 3.14 10E6/UL (ref 3.8–5.2)
WBC # BLD AUTO: 3.5 10E3/UL (ref 4–11)

## 2023-03-15 PROCEDURE — 96413 CHEMO IV INFUSION 1 HR: CPT

## 2023-03-15 PROCEDURE — 250N000011 HC RX IP 250 OP 636: Performed by: NURSE PRACTITIONER

## 2023-03-15 PROCEDURE — 258N000003 HC RX IP 258 OP 636: Performed by: NURSE PRACTITIONER

## 2023-03-15 PROCEDURE — 96375 TX/PRO/DX INJ NEW DRUG ADDON: CPT

## 2023-03-15 PROCEDURE — 85025 COMPLETE CBC W/AUTO DIFF WBC: CPT | Performed by: NURSE PRACTITIONER

## 2023-03-15 RX ORDER — ONDANSETRON 2 MG/ML
8 INJECTION INTRAMUSCULAR; INTRAVENOUS ONCE
Status: COMPLETED | OUTPATIENT
Start: 2023-03-15 | End: 2023-03-15

## 2023-03-15 RX ORDER — HEPARIN SODIUM (PORCINE) LOCK FLUSH IV SOLN 100 UNIT/ML 100 UNIT/ML
5 SOLUTION INTRAVENOUS
Status: DISCONTINUED | OUTPATIENT
Start: 2023-03-15 | End: 2023-03-15 | Stop reason: HOSPADM

## 2023-03-15 RX ADMIN — GEMCITABINE 1600 MG: 38 INJECTION, SOLUTION INTRAVENOUS at 10:27

## 2023-03-15 RX ADMIN — FAMOTIDINE 20 MG: 10 INJECTION INTRAVENOUS at 09:46

## 2023-03-15 RX ADMIN — ONDANSETRON 8 MG: 2 INJECTION INTRAMUSCULAR; INTRAVENOUS at 09:48

## 2023-03-15 RX ADMIN — Medication 5 ML: at 11:00

## 2023-03-15 RX ADMIN — SODIUM CHLORIDE 250 ML: 9 INJECTION, SOLUTION INTRAVENOUS at 09:46

## 2023-03-15 ASSESSMENT — PAIN SCALES - GENERAL: PAINLEVEL: NO PAIN (0)

## 2023-03-15 NOTE — PROGRESS NOTES
Infusion Nursing Note:  Precious Paris presents today for C3D8 Gemzar.    Patient seen by provider today: No   present during visit today: Not Applicable.    Note: N/A.    Intravenous Access:  Implanted Port.    Treatment Conditions:  Lab Results   Component Value Date    HGB 10.1 (L) 03/15/2023    WBC 3.5 (L) 03/15/2023    ANEU 2.6 11/28/2022    ANEUTAUTO 2.4 03/15/2023     (L) 03/15/2023      Results reviewed, labs MET treatment parameters, ok to proceed with treatment.    Post Infusion Assessment:  Patient tolerated infusion without incident.  Blood return noted pre and post infusion.  Site patent and intact, free from redness, edema or discomfort.  No evidence of extravasations.  Access discontinued per protocol.     Discharge Plan:   Patient discharged in stable condition accompanied by: self.  Departure Mode: Ambulatory.      Roula Flores RN

## 2023-03-23 ENCOUNTER — HOSPITAL ENCOUNTER (OUTPATIENT)
Dept: CT IMAGING | Facility: CLINIC | Age: 83
Discharge: HOME | End: 2023-03-23
Attending: NURSE PRACTITIONER | Admitting: NURSE PRACTITIONER
Payer: COMMERCIAL

## 2023-03-23 DIAGNOSIS — C50.919 METASTATIC BREAST CANCER: ICD-10-CM

## 2023-03-23 DIAGNOSIS — C78.02 MALIGNANT NEOPLASM METASTATIC TO BOTH LUNGS (H): ICD-10-CM

## 2023-03-23 DIAGNOSIS — C78.01 MALIGNANT NEOPLASM METASTATIC TO BOTH LUNGS (H): ICD-10-CM

## 2023-03-23 DIAGNOSIS — C77.3 METASTATIC CANCER TO AXILLARY LYMPH NODES (H): ICD-10-CM

## 2023-03-23 PROCEDURE — 250N000011 HC RX IP 250 OP 636: Performed by: RADIOLOGY

## 2023-03-23 PROCEDURE — 71260 CT THORAX DX C+: CPT

## 2023-03-23 PROCEDURE — 250N000009 HC RX 250: Performed by: RADIOLOGY

## 2023-03-23 RX ORDER — HEPARIN SODIUM (PORCINE) LOCK FLUSH IV SOLN 100 UNIT/ML 100 UNIT/ML
5 SOLUTION INTRAVENOUS ONCE
Status: COMPLETED | OUTPATIENT
Start: 2023-03-23 | End: 2023-03-23

## 2023-03-23 RX ORDER — IOPAMIDOL 755 MG/ML
82 INJECTION, SOLUTION INTRAVASCULAR ONCE
Status: COMPLETED | OUTPATIENT
Start: 2023-03-23 | End: 2023-03-23

## 2023-03-23 RX ADMIN — SODIUM CHLORIDE 63 ML: 9 INJECTION, SOLUTION INTRAVENOUS at 09:55

## 2023-03-23 RX ADMIN — Medication 5 ML: at 10:02

## 2023-03-23 RX ADMIN — IOPAMIDOL 82 ML: 755 INJECTION, SOLUTION INTRAVENOUS at 09:54

## 2023-03-28 ENCOUNTER — ONCOLOGY VISIT (OUTPATIENT)
Dept: ONCOLOGY | Facility: CLINIC | Age: 83
End: 2023-03-28
Attending: NURSE PRACTITIONER
Payer: COMMERCIAL

## 2023-03-28 ENCOUNTER — LAB (OUTPATIENT)
Dept: INFUSION THERAPY | Facility: CLINIC | Age: 83
End: 2023-03-28
Attending: INTERNAL MEDICINE
Payer: COMMERCIAL

## 2023-03-28 VITALS — SYSTOLIC BLOOD PRESSURE: 117 MMHG | HEART RATE: 74 BPM | DIASTOLIC BLOOD PRESSURE: 74 MMHG

## 2023-03-28 VITALS
HEART RATE: 78 BPM | RESPIRATION RATE: 16 BRPM | BODY MASS INDEX: 31.16 KG/M2 | HEIGHT: 65 IN | TEMPERATURE: 98.2 F | DIASTOLIC BLOOD PRESSURE: 62 MMHG | WEIGHT: 187 LBS | OXYGEN SATURATION: 99 % | SYSTOLIC BLOOD PRESSURE: 149 MMHG

## 2023-03-28 DIAGNOSIS — Z17.1 MALIGNANT NEOPLASM OF UPPER-OUTER QUADRANT OF RIGHT BREAST IN FEMALE, ESTROGEN RECEPTOR NEGATIVE (H): ICD-10-CM

## 2023-03-28 DIAGNOSIS — T45.1X5A CHEMOTHERAPY-INDUCED NAUSEA: Primary | ICD-10-CM

## 2023-03-28 DIAGNOSIS — C50.919 METASTATIC BREAST CANCER: ICD-10-CM

## 2023-03-28 DIAGNOSIS — C50.411 MALIGNANT NEOPLASM OF UPPER-OUTER QUADRANT OF RIGHT BREAST IN FEMALE, ESTROGEN RECEPTOR NEGATIVE (H): ICD-10-CM

## 2023-03-28 DIAGNOSIS — R11.0 CHEMOTHERAPY-INDUCED NAUSEA: Primary | ICD-10-CM

## 2023-03-28 DIAGNOSIS — C50.919 METASTATIC BREAST CANCER: Primary | ICD-10-CM

## 2023-03-28 LAB
ALBUMIN SERPL BCG-MCNC: 3.9 G/DL (ref 3.5–5.2)
ALP SERPL-CCNC: 96 U/L (ref 35–104)
ALT SERPL W P-5'-P-CCNC: 21 U/L (ref 10–35)
ANION GAP SERPL CALCULATED.3IONS-SCNC: 8 MMOL/L (ref 7–15)
AST SERPL W P-5'-P-CCNC: 27 U/L (ref 10–35)
BASOPHILS # BLD AUTO: 0 10E3/UL (ref 0–0.2)
BASOPHILS NFR BLD AUTO: 1 %
BILIRUB SERPL-MCNC: 0.2 MG/DL
BUN SERPL-MCNC: 14 MG/DL (ref 8–23)
CALCIUM SERPL-MCNC: 9.5 MG/DL (ref 8.8–10.2)
CEA SERPL-MCNC: 3.1 NG/ML
CHLORIDE SERPL-SCNC: 101 MMOL/L (ref 98–107)
CREAT SERPL-MCNC: 0.62 MG/DL (ref 0.51–0.95)
DEPRECATED HCO3 PLAS-SCNC: 27 MMOL/L (ref 22–29)
EOSINOPHIL # BLD AUTO: 0.1 10E3/UL (ref 0–0.7)
EOSINOPHIL NFR BLD AUTO: 2 %
ERYTHROCYTE [DISTWIDTH] IN BLOOD BY AUTOMATED COUNT: 16.1 % (ref 10–15)
GFR SERPL CREATININE-BSD FRML MDRD: 89 ML/MIN/1.73M2
GLUCOSE SERPL-MCNC: 90 MG/DL (ref 70–99)
HCT VFR BLD AUTO: 29.1 % (ref 35–47)
HGB BLD-MCNC: 9.9 G/DL (ref 11.7–15.7)
IMM GRANULOCYTES # BLD: 0 10E3/UL
IMM GRANULOCYTES NFR BLD: 0 %
LYMPHOCYTES # BLD AUTO: 0.8 10E3/UL (ref 0.8–5.3)
LYMPHOCYTES NFR BLD AUTO: 26 %
MCH RBC QN AUTO: 32.1 PG (ref 26.5–33)
MCHC RBC AUTO-ENTMCNC: 34 G/DL (ref 31.5–36.5)
MCV RBC AUTO: 95 FL (ref 78–100)
MONOCYTES # BLD AUTO: 0.7 10E3/UL (ref 0–1.3)
MONOCYTES NFR BLD AUTO: 22 %
NEUTROPHILS # BLD AUTO: 1.6 10E3/UL (ref 1.6–8.3)
NEUTROPHILS NFR BLD AUTO: 49 %
NRBC # BLD AUTO: 0 10E3/UL
NRBC BLD AUTO-RTO: 0 /100
PLAT MORPH BLD: NORMAL
PLATELET # BLD AUTO: 223 10E3/UL (ref 150–450)
POTASSIUM SERPL-SCNC: 3.9 MMOL/L (ref 3.4–5.3)
PROT SERPL-MCNC: 7.2 G/DL (ref 6.4–8.3)
RBC # BLD AUTO: 3.08 10E6/UL (ref 3.8–5.2)
RBC MORPH BLD: NORMAL
SODIUM SERPL-SCNC: 136 MMOL/L (ref 136–145)
WBC # BLD AUTO: 3.2 10E3/UL (ref 4–11)

## 2023-03-28 PROCEDURE — 82378 CARCINOEMBRYONIC ANTIGEN: CPT | Performed by: NURSE PRACTITIONER

## 2023-03-28 PROCEDURE — 99214 OFFICE O/P EST MOD 30 MIN: CPT | Performed by: INTERNAL MEDICINE

## 2023-03-28 PROCEDURE — 96367 TX/PROPH/DG ADDL SEQ IV INF: CPT

## 2023-03-28 PROCEDURE — 85025 COMPLETE CBC W/AUTO DIFF WBC: CPT | Performed by: NURSE PRACTITIONER

## 2023-03-28 PROCEDURE — 258N000003 HC RX IP 258 OP 636: Performed by: INTERNAL MEDICINE

## 2023-03-28 PROCEDURE — G0463 HOSPITAL OUTPT CLINIC VISIT: HCPCS | Mod: 25 | Performed by: INTERNAL MEDICINE

## 2023-03-28 PROCEDURE — 96375 TX/PRO/DX INJ NEW DRUG ADDON: CPT

## 2023-03-28 PROCEDURE — 250N000011 HC RX IP 250 OP 636: Performed by: INTERNAL MEDICINE

## 2023-03-28 PROCEDURE — 80053 COMPREHEN METABOLIC PANEL: CPT | Performed by: NURSE PRACTITIONER

## 2023-03-28 PROCEDURE — 96417 CHEMO IV INFUS EACH ADDL SEQ: CPT

## 2023-03-28 PROCEDURE — 86300 IMMUNOASSAY TUMOR CA 15-3: CPT | Performed by: NURSE PRACTITIONER

## 2023-03-28 PROCEDURE — 96413 CHEMO IV INFUSION 1 HR: CPT

## 2023-03-28 RX ORDER — ALBUTEROL SULFATE 90 UG/1
1-2 AEROSOL, METERED RESPIRATORY (INHALATION)
Status: CANCELLED
Start: 2023-03-29

## 2023-03-28 RX ORDER — METHYLPREDNISOLONE SODIUM SUCCINATE 125 MG/2ML
125 INJECTION, POWDER, LYOPHILIZED, FOR SOLUTION INTRAMUSCULAR; INTRAVENOUS
Status: CANCELLED
Start: 2023-04-05

## 2023-03-28 RX ORDER — DIPHENHYDRAMINE HYDROCHLORIDE 50 MG/ML
50 INJECTION INTRAMUSCULAR; INTRAVENOUS
Status: CANCELLED
Start: 2023-03-29

## 2023-03-28 RX ORDER — DIPHENHYDRAMINE HYDROCHLORIDE 50 MG/ML
50 INJECTION INTRAMUSCULAR; INTRAVENOUS
Status: CANCELLED
Start: 2023-04-05

## 2023-03-28 RX ORDER — ALBUTEROL SULFATE 0.83 MG/ML
2.5 SOLUTION RESPIRATORY (INHALATION)
Status: CANCELLED | OUTPATIENT
Start: 2023-04-05

## 2023-03-28 RX ORDER — HEPARIN SODIUM (PORCINE) LOCK FLUSH IV SOLN 100 UNIT/ML 100 UNIT/ML
5 SOLUTION INTRAVENOUS
Status: CANCELLED | OUTPATIENT
Start: 2023-03-29

## 2023-03-28 RX ORDER — ALBUTEROL SULFATE 0.83 MG/ML
2.5 SOLUTION RESPIRATORY (INHALATION)
Status: CANCELLED | OUTPATIENT
Start: 2023-03-29

## 2023-03-28 RX ORDER — HEPARIN SODIUM (PORCINE) LOCK FLUSH IV SOLN 100 UNIT/ML 100 UNIT/ML
5 SOLUTION INTRAVENOUS
Status: CANCELLED | OUTPATIENT
Start: 2023-04-05

## 2023-03-28 RX ORDER — METHYLPREDNISOLONE SODIUM SUCCINATE 125 MG/2ML
125 INJECTION, POWDER, LYOPHILIZED, FOR SOLUTION INTRAMUSCULAR; INTRAVENOUS
Status: CANCELLED
Start: 2023-03-29

## 2023-03-28 RX ORDER — EPINEPHRINE 1 MG/ML
0.3 INJECTION, SOLUTION, CONCENTRATE INTRAVENOUS EVERY 5 MIN PRN
Status: CANCELLED | OUTPATIENT
Start: 2023-03-29

## 2023-03-28 RX ORDER — MEPERIDINE HYDROCHLORIDE 25 MG/ML
25 INJECTION INTRAMUSCULAR; INTRAVENOUS; SUBCUTANEOUS EVERY 30 MIN PRN
Status: CANCELLED | OUTPATIENT
Start: 2023-03-29

## 2023-03-28 RX ORDER — ALBUTEROL SULFATE 90 UG/1
1-2 AEROSOL, METERED RESPIRATORY (INHALATION)
Status: CANCELLED
Start: 2023-04-05

## 2023-03-28 RX ORDER — LORAZEPAM 2 MG/ML
0.5 INJECTION INTRAMUSCULAR EVERY 4 HOURS PRN
Status: CANCELLED | OUTPATIENT
Start: 2023-03-29

## 2023-03-28 RX ORDER — ONDANSETRON 4 MG/1
4-8 TABLET, ORALLY DISINTEGRATING ORAL EVERY 8 HOURS PRN
Qty: 45 TABLET | Refills: 1 | Status: SHIPPED | OUTPATIENT
Start: 2023-03-28 | End: 2023-11-10

## 2023-03-28 RX ORDER — LORAZEPAM 2 MG/ML
0.5 INJECTION INTRAMUSCULAR EVERY 4 HOURS PRN
Status: CANCELLED | OUTPATIENT
Start: 2023-04-05

## 2023-03-28 RX ORDER — EPINEPHRINE 1 MG/ML
0.3 INJECTION, SOLUTION, CONCENTRATE INTRAVENOUS EVERY 5 MIN PRN
Status: CANCELLED | OUTPATIENT
Start: 2023-04-05

## 2023-03-28 RX ORDER — HEPARIN SODIUM (PORCINE) LOCK FLUSH IV SOLN 100 UNIT/ML 100 UNIT/ML
5 SOLUTION INTRAVENOUS
Status: DISCONTINUED | OUTPATIENT
Start: 2023-03-28 | End: 2023-03-28 | Stop reason: HOSPADM

## 2023-03-28 RX ORDER — MEPERIDINE HYDROCHLORIDE 25 MG/ML
25 INJECTION INTRAMUSCULAR; INTRAVENOUS; SUBCUTANEOUS EVERY 30 MIN PRN
Status: CANCELLED | OUTPATIENT
Start: 2023-04-05

## 2023-03-28 RX ORDER — HEPARIN SODIUM,PORCINE 10 UNIT/ML
5 VIAL (ML) INTRAVENOUS
Status: CANCELLED | OUTPATIENT
Start: 2023-04-05

## 2023-03-28 RX ORDER — PALONOSETRON 0.05 MG/ML
0.25 INJECTION, SOLUTION INTRAVENOUS ONCE
Status: COMPLETED | OUTPATIENT
Start: 2023-03-28 | End: 2023-03-28

## 2023-03-28 RX ORDER — PALONOSETRON 0.05 MG/ML
0.25 INJECTION, SOLUTION INTRAVENOUS ONCE
Status: CANCELLED | OUTPATIENT
Start: 2023-03-29

## 2023-03-28 RX ORDER — ONDANSETRON 2 MG/ML
8 INJECTION INTRAMUSCULAR; INTRAVENOUS ONCE
Status: CANCELLED
Start: 2023-04-05 | End: 2023-04-05

## 2023-03-28 RX ORDER — HEPARIN SODIUM,PORCINE 10 UNIT/ML
5 VIAL (ML) INTRAVENOUS
Status: CANCELLED | OUTPATIENT
Start: 2023-03-29

## 2023-03-28 RX ORDER — ATORVASTATIN CALCIUM 10 MG/1
10 TABLET, FILM COATED ORAL DAILY
COMMUNITY
End: 2023-06-28

## 2023-03-28 RX ADMIN — CARBOPLATIN 505 MG: 10 INJECTION, SOLUTION INTRAVENOUS at 13:20

## 2023-03-28 RX ADMIN — Medication 5 ML: at 13:52

## 2023-03-28 RX ADMIN — DEXAMETHASONE SODIUM PHOSPHATE: 10 INJECTION, SOLUTION INTRAMUSCULAR; INTRAVENOUS at 11:35

## 2023-03-28 RX ADMIN — SODIUM CHLORIDE 200 MG: 9 INJECTION, SOLUTION INTRAVENOUS at 12:03

## 2023-03-28 RX ADMIN — PALONOSETRON HYDROCHLORIDE 0.25 MG: 0.25 INJECTION INTRAVENOUS at 11:14

## 2023-03-28 RX ADMIN — SODIUM CHLORIDE 250 ML: 9 INJECTION, SOLUTION INTRAVENOUS at 11:00

## 2023-03-28 RX ADMIN — FAMOTIDINE 20 MG: 10 INJECTION INTRAVENOUS at 11:17

## 2023-03-28 RX ADMIN — GEMCITABINE 1600 MG: 38 INJECTION, SOLUTION INTRAVENOUS at 12:43

## 2023-03-28 ASSESSMENT — PAIN SCALES - GENERAL: PAINLEVEL: NO PAIN (0)

## 2023-03-28 NOTE — LETTER
3/28/2023         RE: Precious Paris  [address] [place] [address]        Dear Colleague,    Thank you for referring your patient, Precious Paris, to the [facility]. Please see a copy of my visit note below.      Video-Visit Details    Type of service:  Video Visit   Video Start Time: 10:37 AM  Video End Time:10:53 AM    Originating Location (pt. Location): Other in clinic - ipad    Distant Location (provider location):  Off-site  Platform used for Video Visit: Aaron Vallejo CMA on 3/28/2023 at 10:08 AM      [facility] Hematology and Oncology Progress Note    Patient: Precious Paris  MRN: [id number]  Mar 28, 2023          Reason for Visit    1. 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 Beebe    _____________________________________________________________________________    History of Present Illness/ Interval History    Ms. Precious Paris is a 82 year old with recurrent metastatic triple negative breast cancer recurrence. Completed 3 cycles of Doxil with Pembro added to the 3rd cycle, with mixed response (some progression in mediastinal nodes and lung mets). Therefore, chemo was changed to carbo, gem (day 1 only) and Pembro.  With cycle 3 she received gemcitabine on days 1 and 8.    She is seen as a video visit I had of cycle 4.  She has done really well with the chemotherapy without any significant side effects.  She did feel little bit dizzy after day 8 Gemzar which resolved after she went home and took rest.  Here with repeat CT scan to look for response.  Otherwise denies any major issues today.  No fever chills or night sweats.      ECOG PS: 1      Oncology History/Treatment  Diagnosis/Stage:   11/2019: Right breast cancer, triple negative ([id number])    BRCA negative    10/2022: Recurrent, stage IV triple negative breast cancer (bilateral axillary, supraclav,  mediastinal nodes + lung)  -persistent/progressive right breast firmness with new right nipple drainage and right axillary adenopathy  -bilateral diagnostic mammogram: increased density R breast with skin thickening. R breast US: 3.2 cm hypoechoic mass at 11:00 position 1 cm from nipple and multiple other small hypoechoic solid-appearing lesions in R breast + right axillary adenopathy (at least two hypoechoic vascular lesions measuring up to 2.5 cm)  -10/18/2022 biopsy R breast mass + R axillary node: grade 3 invasive ductal cancer with tumor necrosis, ER/[place]/HER2 negative.   -PET: + right breast mass; bilateral axillary, retropectoral, supraclavicular, mediastinal/R hilar nodes and bilateral lung/R pleural nodules suspicious for mets.  -Brain MRI: negative for mets  -11/1/2022 biopsy L axillary node: metastatic invasive ductal carcinoma  -Foundation One: no actionable mutations MS stable, TMB 1 mut/mb.   -PDL1 testing: CPS >20% and TPS 60% (considered high PDL1 expression)  -CA 27.29: 59.5 (elevated). CA 15-3: 23 (normal)    Treatment:  Initial breast cancer (2019)  -2019: Neoadjuvant taxol  -Lumpectomy  -5/2020: Adjuvant RT  -6 - 7/2020: Adjuvant Xeloda. Stopped for rash    Recurrent, stage IV breast cancer (2022)  -11/14/2022:Doxil every 4 weeks  -12/12/2022: changed to doxorubicin every 3 weeks. (Planning addition of pembrolizumab once approved by insurance)  -1/2/2023: doxorubicin + pembrolizumab every 3 weeks. Completed 1 cycle. Stopped for progression (med nodes, slight increase in lung mets and stable R breast mass + axillary nodes).    -1/23/2023 - present: Carbo AUC 5, Gemcitabine 800 mg/m2 and Pembro every 21 days  -cycles 1-2: [place] day 1 only  -Cycle 3: [place] increased to days 1 + 8 given excellent tolerance      Physical Exam    GENERAL: Alert and oriented to time place and person. Seated comfortably. In no distress.    Lab Results  Recent Results (from the past 168 hour(s))   CBC with platelets and  differential    Collection Time: 03/28/23 10:06 AM   Result Value Ref Range    WBC Count 3.2 (L) 4.0 - 11.0 10e3/uL    RBC Count 3.08 (L) 3.80 - 5.20 10e6/uL    Hemoglobin 9.9 (L) 11.7 - 15.7 g/dL    Hematocrit 29.1 (L) 35.0 - 47.0 %    MCV 95 78 - 100 fL    MCH 32.1 26.5 - 33.0 pg    MCHC 34.0 31.5 - 36.5 g/dL    RDW 16.1 (H) 10.0 - 15.0 %    Platelet Count 223 150 - 450 10e3/uL     CT Chest w Contrast    Result Date: 3/23/2023  CT CHEST WITH CONTRAST 3/23/2023 10:09 AM CLINICAL HISTORY: Metastatic breast cancer to axilla, lung. Assess response to chemotherapy + immunotherapy. Metastatic breast cancer. Malignant neoplasm metastatic to both lungs (H). Metastatic cancer to axillary lymph nodes (H). TECHNIQUE: CT chest with IV contrast. Multiplanar reformats were obtained. Dose reduction techniques were used. CONTRAST: 82mL isovue 370 COMPARISON: 1/16/2023 FINDINGS: LUNGS AND PLEURA: Since the previous exam, the nodules in the posterior aspects of both lower lobes have decreased in size. A couple of the nodules have decreased by a couple of millimeters, but the most significant decrease is in a now 8 mm nodule in the posterior right lower lobe on image 236 of series 4 that previously measured 16 mm. No evidence for new or enlarging nodules. No infiltrate or effusion. The central airways are patent. MEDIASTINUM/AXILLAE: Right chest port is present. Bilateral axillary adenopathy has shown significant decrease in size. No adenopathy is seen on today's exam. The right paratracheal, precarinal, and right hilar adenopathy has resolved. No mediastinal or hilar adenopathy at this time. Small amount of fluid is seen in the superior pericardial recesses. Postsurgical changes are again noted in the right breast. Irregular soft tissue seen in the lateral aspect of the right breast is similar to the previous exam. CORONARY ARTERY CALCIFICATION: Moderate UPPER ABDOMEN: Lobulated contour of the liver is suggestive of underlying  cirrhosis. Exophytic cyst at the posterior aspect of the left kidney is unchanged. No follow-up necessary for this. Small hiatal hernia. MUSCULOSKELETAL: Mild degenerative changes are noted through the spine.     IMPRESSION: 1.  Positive response to interval therapy with decrease in size of nodules in both lower lobes and resolution of bilateral axillary and mediastinal adenopathy. 2.  No change in irregular soft tissue in the lateral aspect of the right breast. SARA WAY MD   SYSTEM ID:  [id number]        Assessment/Plan  1. Recurrent, stage IV triple negative right breast cancer (nodes, lung); CPS>20% + TPS 60%  2. 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  For the first 2 cycles I gave her carboplatin AUC 5 on day 1 and gemcitabine 800 mg per metered squared on day 1.  She did really well with this so with the third cycle we gave her carbo AUC 5 on day 1 along with gemcitabine 800 mg per metered squared on days 1 and 8.  She is done really well on this.  She is due for cycle 4.  Here with repeat CT scan.  I reviewed the CT scan results with her.  It shows really good response.  There is interval decrease in the size of the pulmonary nodules in both the lungs and resolution of bilateral axillary and mediastinal adenopathy.  The soft tissue abnormality in the right breast has not changed a whole lot.  This CA 27-29 is also coming down.  Recently it was 24.  This is an excellent response.  Plan is to continue 3 more cycles of carbo gem with pembrolizumab and repeat a PET scan.  If she continues to have a good response then I will continue pembrolizumab only every 3 weeks until disease progression.  Or intolerance.  We could also switch pembrolizumab to once every 6 weeks.    Labs reviewed today.  Does have a mildly low total white count but ANC is 1.6.  Normal platelet count.  Mild low hemoglobin at 9.9.  Chemistry is  pretty much within normal limits.  I do expect her neutrophil count to be slightly on the lower side when she comes back for day 8 Gemzar.  But as long as it is more than 1 I am okay to proceed with treatment.    2.   Right breast pain, mild  She still has it but this is greatly improved.  Continue to use Tylenol as needed.        Signed by: Maricarmen Beebe MD  Hematology and Medical Oncology  [facility]        Again, thank you for allowing me to participate in the care of your patient.        Sincerely,        Maricarmen Beebe MD

## 2023-03-28 NOTE — PROGRESS NOTES
Infusion Nursing Note:  Precious Paris presents today for Keytruda, Gemzar, carbo  Patient seen by provider today: Yes: Abiel   present during visit today: Not Applicable.    Note: N/A.    Intravenous Access:  Implanted Port.    Treatment Conditions:  Results reviewed, labs MET treatment parameters, ok to proceed with treatment.    Post Infusion Assessment:  Patient tolerated infusion without incident.  Blood return noted pre and post infusion.  Access discontinued per protocol.     Discharge Plan:   Patient discharged in stable condition accompanied by: spouse  Departure Mode: Ambulatory.      Erin Barnett RN

## 2023-03-28 NOTE — PROGRESS NOTES
Video-Visit Details    Type of service:  Video Visit   Video Start Time: 10:37 AM  Video End Time:10:53 AM    Originating Location (pt. Location): Other in clinic - ipad    Distant Location (provider location):  Off-site  Platform used for Video Visit: Aaron Vallejo CMA on 3/28/2023 at 10:08 AM      [facility] Hematology and Oncology Progress Note    Patient: Precious Paris  MRN: [id number]  Mar 28, 2023          Reason for Visit    1. 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 Beebe    _____________________________________________________________________________    History of Present Illness/ Interval History    Ms. Precious Paris is a 82 year old with recurrent metastatic triple negative breast cancer recurrence. Completed 3 cycles of Doxil with Pembro added to the 3rd cycle, with mixed response (some progression in mediastinal nodes and lung mets). Therefore, chemo was changed to carbo, gem (day 1 only) and Pembro.  With cycle 3 she received gemcitabine on days 1 and 8.    She is seen as a video visit I had of cycle 4.  She has done really well with the chemotherapy without any significant side effects.  She did feel little bit dizzy after day 8 Gemzar which resolved after she went home and took rest.  Here with repeat CT scan to look for response.  Otherwise denies any major issues today.  No fever chills or night sweats.      ECOG PS: 1      Oncology History/Treatment  Diagnosis/Stage:   11/2019: Right breast cancer, triple negative ([id number])    BRCA negative    10/2022: Recurrent, stage IV triple negative breast cancer (bilateral axillary, supraclav, mediastinal nodes + lung)  -persistent/progressive right breast firmness with new right nipple drainage and right axillary adenopathy  -bilateral diagnostic mammogram: increased density R breast with skin thickening. R breast US: 3.2 cm hypoechoic mass at 11:00  position 1 cm from nipple and multiple other small hypoechoic solid-appearing lesions in R breast + right axillary adenopathy (at least two hypoechoic vascular lesions measuring up to 2.5 cm)  -10/18/2022 biopsy R breast mass + R axillary node: grade 3 invasive ductal cancer with tumor necrosis, ER/[place]/HER2 negative.   -PET: + right breast mass; bilateral axillary, retropectoral, supraclavicular, mediastinal/R hilar nodes and bilateral lung/R pleural nodules suspicious for mets.  -Brain MRI: negative for mets  -11/1/2022 biopsy L axillary node: metastatic invasive ductal carcinoma  -Foundation One: no actionable mutations MS stable, TMB 1 mut/mb.   -PDL1 testing: CPS >20% and TPS 60% (considered high PDL1 expression)  -CA 27.29: 59.5 (elevated). CA 15-3: 23 (normal)    Treatment:  Initial breast cancer (2019)  -2019: Neoadjuvant taxol  -Lumpectomy  -5/2020: Adjuvant RT  -6 - 7/2020: Adjuvant Xeloda. Stopped for rash    Recurrent, stage IV breast cancer (2022)  -11/14/2022:Doxil every 4 weeks  -12/12/2022: changed to doxorubicin every 3 weeks. (Planning addition of pembrolizumab once approved by insurance)  -1/2/2023: doxorubicin + pembrolizumab every 3 weeks. Completed 1 cycle. Stopped for progression (med nodes, slight increase in lung mets and stable R breast mass + axillary nodes).    -1/23/2023 - present: Carbo AUC 5, Gemcitabine 800 mg/m2 and Pembro every 21 days  -cycles 1-2: [place] day 1 only  -Cycle 3: [place] increased to days 1 + 8 given excellent tolerance      Physical Exam    GENERAL: Alert and oriented to time place and person. Seated comfortably. In no distress.    Lab Results  Recent Results (from the past 168 hour(s))   CBC with platelets and differential    Collection Time: 03/28/23 10:06 AM   Result Value Ref Range    WBC Count 3.2 (L) 4.0 - 11.0 10e3/uL    RBC Count 3.08 (L) 3.80 - 5.20 10e6/uL    Hemoglobin 9.9 (L) 11.7 - 15.7 g/dL    Hematocrit 29.1 (L) 35.0 - 47.0 %    MCV 95 78 - 100 fL    MCH 32.1  26.5 - 33.0 pg    MCHC 34.0 31.5 - 36.5 g/dL    RDW 16.1 (H) 10.0 - 15.0 %    Platelet Count 223 150 - 450 10e3/uL     CT Chest w Contrast    Result Date: 3/23/2023  CT CHEST WITH CONTRAST 3/23/2023 10:09 AM CLINICAL HISTORY: Metastatic breast cancer to axilla, lung. Assess response to chemotherapy + immunotherapy. Metastatic breast cancer. Malignant neoplasm metastatic to both lungs (H). Metastatic cancer to axillary lymph nodes (H). TECHNIQUE: CT chest with IV contrast. Multiplanar reformats were obtained. Dose reduction techniques were used. CONTRAST: 82mL isovue 370 COMPARISON: 1/16/2023 FINDINGS: LUNGS AND PLEURA: Since the previous exam, the nodules in the posterior aspects of both lower lobes have decreased in size. A couple of the nodules have decreased by a couple of millimeters, but the most significant decrease is in a now 8 mm nodule in the posterior right lower lobe on image 236 of series 4 that previously measured 16 mm. No evidence for new or enlarging nodules. No infiltrate or effusion. The central airways are patent. MEDIASTINUM/AXILLAE: Right chest port is present. Bilateral axillary adenopathy has shown significant decrease in size. No adenopathy is seen on today's exam. The right paratracheal, precarinal, and right hilar adenopathy has resolved. No mediastinal or hilar adenopathy at this time. Small amount of fluid is seen in the superior pericardial recesses. Postsurgical changes are again noted in the right breast. Irregular soft tissue seen in the lateral aspect of the right breast is similar to the previous exam. CORONARY ARTERY CALCIFICATION: Moderate UPPER ABDOMEN: Lobulated contour of the liver is suggestive of underlying cirrhosis. Exophytic cyst at the posterior aspect of the left kidney is unchanged. No follow-up necessary for this. Small hiatal hernia. MUSCULOSKELETAL: Mild degenerative changes are noted through the spine.     IMPRESSION: 1.  Positive response to interval therapy with  decrease in size of nodules in both lower lobes and resolution of bilateral axillary and mediastinal adenopathy. 2.  No change in irregular soft tissue in the lateral aspect of the right breast. SARA WAY MD   SYSTEM ID:  [id number]        Assessment/Plan  1. Recurrent, stage IV triple negative right breast cancer (nodes, lung); CPS>20% + TPS 60%  2. 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  For the first 2 cycles I gave her carboplatin AUC 5 on day 1 and gemcitabine 800 mg per metered squared on day 1.  She did really well with this so with the third cycle we gave her carbo AUC 5 on day 1 along with gemcitabine 800 mg per metered squared on days 1 and 8.  She is done really well on this.  She is due for cycle 4.  Here with repeat CT scan.  I reviewed the CT scan results with her.  It shows really good response.  There is interval decrease in the size of the pulmonary nodules in both the lungs and resolution of bilateral axillary and mediastinal adenopathy.  The soft tissue abnormality in the right breast has not changed a whole lot.  This CA 27-29 is also coming down.  Recently it was 24.  This is an excellent response.  Plan is to continue 3 more cycles of carbo gem with pembrolizumab and repeat a PET scan.  If she continues to have a good response then I will continue pembrolizumab only every 3 weeks until disease progression.  Or intolerance.  We could also switch pembrolizumab to once every 6 weeks.    Labs reviewed today.  Does have a mildly low total white count but ANC is 1.6.  Normal platelet count.  Mild low hemoglobin at 9.9.  Chemistry is pretty much within normal limits.  I do expect her neutrophil count to be slightly on the lower side when she comes back for day 8 Gemzar.  But as long as it is more than 1 I am okay to proceed with treatment.    2.   Right breast pain, mild  She still has it but this is  greatly improved.  Continue to use Tylenol as needed.        Signed by: Maricarmen Beebe MD  Hematology and Medical Oncology  [facility]

## 2023-03-30 LAB — CANCER AG27-29 SERPL-ACNC: 17.9 U/ML

## 2023-04-03 PROBLEM — C50.919 PRIMARY MALIGNANT NEOPLASM OF BREAST WITH METASTASIS (H): Status: ACTIVE | Noted: 2022-11-07

## 2023-04-04 ENCOUNTER — LAB (OUTPATIENT)
Dept: INFUSION THERAPY | Facility: CLINIC | Age: 83
End: 2023-04-04
Attending: INTERNAL MEDICINE
Payer: COMMERCIAL

## 2023-04-04 VITALS
HEART RATE: 56 BPM | DIASTOLIC BLOOD PRESSURE: 74 MMHG | WEIGHT: 189.6 LBS | BODY MASS INDEX: 32.04 KG/M2 | SYSTOLIC BLOOD PRESSURE: 116 MMHG | TEMPERATURE: 98.3 F

## 2023-04-04 DIAGNOSIS — C50.411 MALIGNANT NEOPLASM OF UPPER-OUTER QUADRANT OF RIGHT BREAST IN FEMALE, ESTROGEN RECEPTOR NEGATIVE (H): Primary | ICD-10-CM

## 2023-04-04 DIAGNOSIS — Z17.1 MALIGNANT NEOPLASM OF UPPER-OUTER QUADRANT OF RIGHT BREAST IN FEMALE, ESTROGEN RECEPTOR NEGATIVE (H): ICD-10-CM

## 2023-04-04 DIAGNOSIS — C50.919 PRIMARY MALIGNANT NEOPLASM OF BREAST WITH METASTASIS (H): ICD-10-CM

## 2023-04-04 DIAGNOSIS — Z17.1 MALIGNANT NEOPLASM OF UPPER-OUTER QUADRANT OF RIGHT BREAST IN FEMALE, ESTROGEN RECEPTOR NEGATIVE (H): Primary | ICD-10-CM

## 2023-04-04 DIAGNOSIS — C50.919 PRIMARY MALIGNANT NEOPLASM OF BREAST WITH METASTASIS (H): Primary | ICD-10-CM

## 2023-04-04 DIAGNOSIS — C50.411 MALIGNANT NEOPLASM OF UPPER-OUTER QUADRANT OF RIGHT BREAST IN FEMALE, ESTROGEN RECEPTOR NEGATIVE (H): ICD-10-CM

## 2023-04-04 LAB
BASOPHILS # BLD AUTO: 0 10E3/UL (ref 0–0.2)
BASOPHILS NFR BLD AUTO: 1 %
EOSINOPHIL # BLD AUTO: 0 10E3/UL (ref 0–0.7)
EOSINOPHIL NFR BLD AUTO: 1 %
ERYTHROCYTE [DISTWIDTH] IN BLOOD BY AUTOMATED COUNT: 15.8 % (ref 10–15)
HCT VFR BLD AUTO: 28.1 % (ref 35–47)
HGB BLD-MCNC: 9.4 G/DL (ref 11.7–15.7)
IMM GRANULOCYTES # BLD: 0 10E3/UL
IMM GRANULOCYTES NFR BLD: 1 %
LYMPHOCYTES # BLD AUTO: 0.9 10E3/UL (ref 0.8–5.3)
LYMPHOCYTES NFR BLD AUTO: 23 %
MCH RBC QN AUTO: 31.4 PG (ref 26.5–33)
MCHC RBC AUTO-ENTMCNC: 33.5 G/DL (ref 31.5–36.5)
MCV RBC AUTO: 94 FL (ref 78–100)
MONOCYTES # BLD AUTO: 0.5 10E3/UL (ref 0–1.3)
MONOCYTES NFR BLD AUTO: 14 %
NEUTROPHILS # BLD AUTO: 2.4 10E3/UL (ref 1.6–8.3)
NEUTROPHILS NFR BLD AUTO: 60 %
NRBC # BLD AUTO: 0 10E3/UL
NRBC BLD AUTO-RTO: 0 /100
PLATELET # BLD AUTO: 269 10E3/UL (ref 150–450)
RBC # BLD AUTO: 2.99 10E6/UL (ref 3.8–5.2)
WBC # BLD AUTO: 3.8 10E3/UL (ref 4–11)

## 2023-04-04 PROCEDURE — 85025 COMPLETE CBC W/AUTO DIFF WBC: CPT | Performed by: INTERNAL MEDICINE

## 2023-04-04 PROCEDURE — 258N000003 HC RX IP 258 OP 636: Performed by: INTERNAL MEDICINE

## 2023-04-04 PROCEDURE — 96413 CHEMO IV INFUSION 1 HR: CPT

## 2023-04-04 PROCEDURE — 250N000011 HC RX IP 250 OP 636: Performed by: INTERNAL MEDICINE

## 2023-04-04 PROCEDURE — 96375 TX/PRO/DX INJ NEW DRUG ADDON: CPT

## 2023-04-04 RX ORDER — ONDANSETRON 2 MG/ML
8 INJECTION INTRAMUSCULAR; INTRAVENOUS ONCE
Status: COMPLETED | OUTPATIENT
Start: 2023-04-04 | End: 2023-04-04

## 2023-04-04 RX ORDER — HEPARIN SODIUM (PORCINE) LOCK FLUSH IV SOLN 100 UNIT/ML 100 UNIT/ML
5 SOLUTION INTRAVENOUS
Status: DISCONTINUED | OUTPATIENT
Start: 2023-04-04 | End: 2023-04-04 | Stop reason: HOSPADM

## 2023-04-04 RX ADMIN — ONDANSETRON 8 MG: 2 INJECTION INTRAMUSCULAR; INTRAVENOUS at 14:22

## 2023-04-04 RX ADMIN — FAMOTIDINE 20 MG: 10 INJECTION INTRAVENOUS at 14:25

## 2023-04-04 RX ADMIN — Medication 5 ML: at 15:22

## 2023-04-04 RX ADMIN — GEMCITABINE 1600 MG: 38 INJECTION, SOLUTION INTRAVENOUS at 14:50

## 2023-04-04 RX ADMIN — SODIUM CHLORIDE 250 ML: 9 INJECTION, SOLUTION INTRAVENOUS at 14:22

## 2023-04-04 NOTE — PROGRESS NOTES
Infusion Nursing Note:  Precious Paris presents today for C4D8 Gemzar    Patient seen by provider today: No   present during visit today: Not Applicable.    Note: N/A.    Intravenous Access:  Labs drawn without difficulty.  Implanted Port.    Treatment Conditions:  Lab Results   Component Value Date    HGB 9.4 (L) 04/04/2023    WBC 3.8 (L) 04/04/2023    ANEU 2.6 11/28/2022    ANEUTAUTO 2.4 04/04/2023     04/04/2023      Results reviewed, labs MET treatment parameters, ok to proceed with treatment.    Post Infusion Assessment:  Patient tolerated infusion without incident.  Blood return noted pre and post infusion.  Site patent and intact, free from redness, edema or discomfort.  No evidence of extravasations.  Access discontinued per protocol.     Discharge Plan:   Patient discharged in stable condition accompanied by: self.  Departure Mode: Ambulatory.  Pt to return on 4/17/23 at 8:00 am for PAC labs followed by clinic visit with Lazara Torres NP and then C5D1 Gemzar/Keytruda/Carboplatin     Laura Love RN

## 2023-04-17 ENCOUNTER — ONCOLOGY VISIT (OUTPATIENT)
Dept: ONCOLOGY | Facility: CLINIC | Age: 83
End: 2023-04-17
Attending: INTERNAL MEDICINE
Payer: COMMERCIAL

## 2023-04-17 ENCOUNTER — INFUSION THERAPY VISIT (OUTPATIENT)
Dept: INFUSION THERAPY | Facility: CLINIC | Age: 83
End: 2023-04-17
Attending: INTERNAL MEDICINE
Payer: COMMERCIAL

## 2023-04-17 VITALS
WEIGHT: 184 LBS | TEMPERATURE: 98.1 F | BODY MASS INDEX: 31.1 KG/M2 | SYSTOLIC BLOOD PRESSURE: 135 MMHG | HEART RATE: 73 BPM | DIASTOLIC BLOOD PRESSURE: 64 MMHG | RESPIRATION RATE: 12 BRPM | OXYGEN SATURATION: 97 %

## 2023-04-17 VITALS — HEART RATE: 79 BPM | SYSTOLIC BLOOD PRESSURE: 141 MMHG | DIASTOLIC BLOOD PRESSURE: 71 MMHG

## 2023-04-17 DIAGNOSIS — T45.1X5A ANTINEOPLASTIC CHEMOTHERAPY INDUCED ANEMIA: ICD-10-CM

## 2023-04-17 DIAGNOSIS — Z17.1 MALIGNANT NEOPLASM OF UPPER-OUTER QUADRANT OF RIGHT BREAST IN FEMALE, ESTROGEN RECEPTOR NEGATIVE (H): ICD-10-CM

## 2023-04-17 DIAGNOSIS — C50.411 MALIGNANT NEOPLASM OF UPPER-OUTER QUADRANT OF RIGHT BREAST IN FEMALE, ESTROGEN RECEPTOR NEGATIVE (H): ICD-10-CM

## 2023-04-17 DIAGNOSIS — C50.919 PRIMARY MALIGNANT NEOPLASM OF BREAST WITH METASTASIS (H): Primary | ICD-10-CM

## 2023-04-17 DIAGNOSIS — D64.81 ANTINEOPLASTIC CHEMOTHERAPY INDUCED ANEMIA: ICD-10-CM

## 2023-04-17 LAB
ALBUMIN SERPL BCG-MCNC: 3.9 G/DL (ref 3.5–5.2)
ALP SERPL-CCNC: 98 U/L (ref 35–104)
ALT SERPL W P-5'-P-CCNC: 21 U/L (ref 10–35)
ANION GAP SERPL CALCULATED.3IONS-SCNC: 13 MMOL/L (ref 7–15)
AST SERPL W P-5'-P-CCNC: 28 U/L (ref 10–35)
BASOPHILS # BLD AUTO: 0 10E3/UL (ref 0–0.2)
BASOPHILS NFR BLD AUTO: 0 %
BILIRUB SERPL-MCNC: 0.2 MG/DL
BUN SERPL-MCNC: 11.1 MG/DL (ref 8–23)
CALCIUM SERPL-MCNC: 9.4 MG/DL (ref 8.8–10.2)
CEA SERPL-MCNC: 3.8 NG/ML
CHLORIDE SERPL-SCNC: 103 MMOL/L (ref 98–107)
CREAT SERPL-MCNC: 0.61 MG/DL (ref 0.51–0.95)
DEPRECATED HCO3 PLAS-SCNC: 24 MMOL/L (ref 22–29)
EOSINOPHIL # BLD AUTO: 0.1 10E3/UL (ref 0–0.7)
EOSINOPHIL NFR BLD AUTO: 1 %
ERYTHROCYTE [DISTWIDTH] IN BLOOD BY AUTOMATED COUNT: 17.5 % (ref 10–15)
GFR SERPL CREATININE-BSD FRML MDRD: 89 ML/MIN/1.73M2
GLUCOSE SERPL-MCNC: 95 MG/DL (ref 70–99)
HCT VFR BLD AUTO: 29.5 % (ref 35–47)
HGB BLD-MCNC: 9.9 G/DL (ref 11.7–15.7)
IMM GRANULOCYTES # BLD: 0 10E3/UL
IMM GRANULOCYTES NFR BLD: 0 %
LYMPHOCYTES # BLD AUTO: 0.8 10E3/UL (ref 0.8–5.3)
LYMPHOCYTES NFR BLD AUTO: 18 %
MCH RBC QN AUTO: 32.9 PG (ref 26.5–33)
MCHC RBC AUTO-ENTMCNC: 33.6 G/DL (ref 31.5–36.5)
MCV RBC AUTO: 98 FL (ref 78–100)
MONOCYTES # BLD AUTO: 0.9 10E3/UL (ref 0–1.3)
MONOCYTES NFR BLD AUTO: 20 %
NEUTROPHILS # BLD AUTO: 2.7 10E3/UL (ref 1.6–8.3)
NEUTROPHILS NFR BLD AUTO: 61 %
NRBC # BLD AUTO: 0 10E3/UL
NRBC BLD AUTO-RTO: 0 /100
PLATELET # BLD AUTO: 200 10E3/UL (ref 150–450)
POTASSIUM SERPL-SCNC: 3.8 MMOL/L (ref 3.4–5.3)
PROT SERPL-MCNC: 7.2 G/DL (ref 6.4–8.3)
RBC # BLD AUTO: 3.01 10E6/UL (ref 3.8–5.2)
SODIUM SERPL-SCNC: 140 MMOL/L (ref 136–145)
WBC # BLD AUTO: 4.5 10E3/UL (ref 4–11)

## 2023-04-17 PROCEDURE — 99215 OFFICE O/P EST HI 40 MIN: CPT | Performed by: INTERNAL MEDICINE

## 2023-04-17 PROCEDURE — 86300 IMMUNOASSAY TUMOR CA 15-3: CPT | Performed by: INTERNAL MEDICINE

## 2023-04-17 PROCEDURE — 80053 COMPREHEN METABOLIC PANEL: CPT | Performed by: INTERNAL MEDICINE

## 2023-04-17 PROCEDURE — 85025 COMPLETE CBC W/AUTO DIFF WBC: CPT | Performed by: INTERNAL MEDICINE

## 2023-04-17 PROCEDURE — G0463 HOSPITAL OUTPT CLINIC VISIT: HCPCS | Mod: 25 | Performed by: INTERNAL MEDICINE

## 2023-04-17 PROCEDURE — 96413 CHEMO IV INFUSION 1 HR: CPT

## 2023-04-17 PROCEDURE — 96417 CHEMO IV INFUS EACH ADDL SEQ: CPT

## 2023-04-17 PROCEDURE — 96375 TX/PRO/DX INJ NEW DRUG ADDON: CPT

## 2023-04-17 PROCEDURE — 258N000003 HC RX IP 258 OP 636: Performed by: INTERNAL MEDICINE

## 2023-04-17 PROCEDURE — 96367 TX/PROPH/DG ADDL SEQ IV INF: CPT

## 2023-04-17 PROCEDURE — 250N000011 HC RX IP 250 OP 636: Performed by: INTERNAL MEDICINE

## 2023-04-17 PROCEDURE — 82378 CARCINOEMBRYONIC ANTIGEN: CPT | Performed by: INTERNAL MEDICINE

## 2023-04-17 RX ORDER — LORAZEPAM 2 MG/ML
0.5 INJECTION INTRAMUSCULAR EVERY 4 HOURS PRN
Status: CANCELLED | OUTPATIENT
Start: 2023-04-26

## 2023-04-17 RX ORDER — METHYLPREDNISOLONE SODIUM SUCCINATE 125 MG/2ML
125 INJECTION, POWDER, LYOPHILIZED, FOR SOLUTION INTRAMUSCULAR; INTRAVENOUS
Status: CANCELLED
Start: 2023-04-19

## 2023-04-17 RX ORDER — HEPARIN SODIUM,PORCINE 10 UNIT/ML
5 VIAL (ML) INTRAVENOUS
Status: CANCELLED | OUTPATIENT
Start: 2023-04-26

## 2023-04-17 RX ORDER — PALONOSETRON 0.05 MG/ML
0.25 INJECTION, SOLUTION INTRAVENOUS ONCE
Status: COMPLETED | OUTPATIENT
Start: 2023-04-17 | End: 2023-04-17

## 2023-04-17 RX ORDER — DIPHENHYDRAMINE HYDROCHLORIDE 50 MG/ML
50 INJECTION INTRAMUSCULAR; INTRAVENOUS
Status: CANCELLED
Start: 2023-04-19

## 2023-04-17 RX ORDER — METHYLPREDNISOLONE SODIUM SUCCINATE 125 MG/2ML
125 INJECTION, POWDER, LYOPHILIZED, FOR SOLUTION INTRAMUSCULAR; INTRAVENOUS
Status: CANCELLED
Start: 2023-04-26

## 2023-04-17 RX ORDER — ALBUTEROL SULFATE 0.83 MG/ML
2.5 SOLUTION RESPIRATORY (INHALATION)
Status: CANCELLED | OUTPATIENT
Start: 2023-04-26

## 2023-04-17 RX ORDER — EPINEPHRINE 1 MG/ML
0.3 INJECTION, SOLUTION, CONCENTRATE INTRAVENOUS EVERY 5 MIN PRN
Status: CANCELLED | OUTPATIENT
Start: 2023-04-19

## 2023-04-17 RX ORDER — ALBUTEROL SULFATE 90 UG/1
1-2 AEROSOL, METERED RESPIRATORY (INHALATION)
Status: CANCELLED
Start: 2023-04-19

## 2023-04-17 RX ORDER — HEPARIN SODIUM (PORCINE) LOCK FLUSH IV SOLN 100 UNIT/ML 100 UNIT/ML
5 SOLUTION INTRAVENOUS
Status: CANCELLED | OUTPATIENT
Start: 2023-04-19

## 2023-04-17 RX ORDER — HEPARIN SODIUM (PORCINE) LOCK FLUSH IV SOLN 100 UNIT/ML 100 UNIT/ML
5 SOLUTION INTRAVENOUS
Status: DISCONTINUED | OUTPATIENT
Start: 2023-04-17 | End: 2023-04-17 | Stop reason: HOSPADM

## 2023-04-17 RX ORDER — ALBUTEROL SULFATE 90 UG/1
1-2 AEROSOL, METERED RESPIRATORY (INHALATION)
Status: CANCELLED
Start: 2023-04-26

## 2023-04-17 RX ORDER — ONDANSETRON 2 MG/ML
8 INJECTION INTRAMUSCULAR; INTRAVENOUS ONCE
Status: CANCELLED
Start: 2023-04-26 | End: 2023-04-26

## 2023-04-17 RX ORDER — PALONOSETRON 0.05 MG/ML
0.25 INJECTION, SOLUTION INTRAVENOUS ONCE
Status: CANCELLED | OUTPATIENT
Start: 2023-04-19

## 2023-04-17 RX ORDER — HEPARIN SODIUM (PORCINE) LOCK FLUSH IV SOLN 100 UNIT/ML 100 UNIT/ML
5 SOLUTION INTRAVENOUS
Status: CANCELLED | OUTPATIENT
Start: 2023-04-26

## 2023-04-17 RX ORDER — MEPERIDINE HYDROCHLORIDE 25 MG/ML
25 INJECTION INTRAMUSCULAR; INTRAVENOUS; SUBCUTANEOUS EVERY 30 MIN PRN
Status: CANCELLED | OUTPATIENT
Start: 2023-04-19

## 2023-04-17 RX ORDER — LORAZEPAM 2 MG/ML
0.5 INJECTION INTRAMUSCULAR EVERY 4 HOURS PRN
Status: CANCELLED | OUTPATIENT
Start: 2023-04-19

## 2023-04-17 RX ORDER — DIPHENHYDRAMINE HYDROCHLORIDE 50 MG/ML
50 INJECTION INTRAMUSCULAR; INTRAVENOUS
Status: CANCELLED
Start: 2023-04-26

## 2023-04-17 RX ORDER — EPINEPHRINE 1 MG/ML
0.3 INJECTION, SOLUTION, CONCENTRATE INTRAVENOUS EVERY 5 MIN PRN
Status: CANCELLED | OUTPATIENT
Start: 2023-04-26

## 2023-04-17 RX ORDER — MEPERIDINE HYDROCHLORIDE 25 MG/ML
25 INJECTION INTRAMUSCULAR; INTRAVENOUS; SUBCUTANEOUS EVERY 30 MIN PRN
Status: CANCELLED | OUTPATIENT
Start: 2023-04-26

## 2023-04-17 RX ORDER — ALBUTEROL SULFATE 0.83 MG/ML
2.5 SOLUTION RESPIRATORY (INHALATION)
Status: CANCELLED | OUTPATIENT
Start: 2023-04-19

## 2023-04-17 RX ORDER — HEPARIN SODIUM,PORCINE 10 UNIT/ML
5 VIAL (ML) INTRAVENOUS
Status: CANCELLED | OUTPATIENT
Start: 2023-04-19

## 2023-04-17 RX ADMIN — Medication 5 ML: at 11:20

## 2023-04-17 RX ADMIN — FAMOTIDINE 20 MG: 10 INJECTION INTRAVENOUS at 09:06

## 2023-04-17 RX ADMIN — DEXAMETHASONE SODIUM PHOSPHATE: 10 INJECTION, SOLUTION INTRAMUSCULAR; INTRAVENOUS at 09:14

## 2023-04-17 RX ADMIN — CARBOPLATIN 505 MG: 10 INJECTION, SOLUTION INTRAVENOUS at 10:47

## 2023-04-17 RX ADMIN — SODIUM CHLORIDE 200 MG: 9 INJECTION, SOLUTION INTRAVENOUS at 09:37

## 2023-04-17 RX ADMIN — SODIUM CHLORIDE 250 ML: 9 INJECTION, SOLUTION INTRAVENOUS at 09:11

## 2023-04-17 RX ADMIN — PALONOSETRON HYDROCHLORIDE 0.25 MG: 0.25 INJECTION INTRAVENOUS at 09:08

## 2023-04-17 RX ADMIN — GEMCITABINE 1600 MG: 38 INJECTION, SOLUTION INTRAVENOUS at 10:13

## 2023-04-17 ASSESSMENT — PAIN SCALES - GENERAL: PAINLEVEL: NO PAIN (0)

## 2023-04-17 NOTE — LETTER
4/17/2023         RE: Precious Paris  [address] [place] [address]        Dear Colleague,    Thank you for referring your patient, Precious Paris, to the [facility]. Please see a copy of my visit note below.      [facility] Hematology and Oncology Progress Note    Patient: Precious Paris  MRN: [id number]  Apr 17, 2023          Reason for Visit    1. 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 Beebe    _____________________________________________________________________________    History of Present Illness/ Interval History    Ms. Precious Paris is a 82 year old with recurrent metastatic triple negative breast cancer recurrence. Completed 3 cycles of Doxil with Pembro added to the 3rd cycle, with mixed response (some progression in mediastinal nodes and lung mets). Therefore, chemo was changed to carbo, gem (day 1 only) and Pembro.  With cycle 3 she received gemcitabine on days 1 and 8.    She has done really well with the chemotherapy without any significant side effects.  She did feel little bit dizzy after day 8 Gemzar which resolved after she went home and took rest.  Here with repeat CT scan to look for response.  Otherwise denies any major issues today.  No fever chills or night sweats.      ECOG PS: 1      Oncology History/Treatment  Diagnosis/Stage:   11/2019: Right breast cancer, triple negative ([id number])    BRCA negative    10/2022: Recurrent, stage IV triple negative breast cancer (bilateral axillary, supraclav, mediastinal nodes + lung)  -persistent/progressive right breast firmness with new right nipple drainage and right axillary adenopathy  -bilateral diagnostic mammogram: increased density R breast with skin thickening. R breast US: 3.2 cm hypoechoic mass at 11:00 position 1 cm from nipple and multiple other small hypoechoic solid-appearing lesions in R breast + right  axillary adenopathy (at least two hypoechoic vascular lesions measuring up to 2.5 cm)  -10/18/2022 biopsy R breast mass + R axillary node: grade 3 invasive ductal cancer with tumor necrosis, ER/[place]/HER2 negative.   -PET: + right breast mass; bilateral axillary, retropectoral, supraclavicular, mediastinal/R hilar nodes and bilateral lung/R pleural nodules suspicious for mets.  -Brain MRI: negative for mets  -11/1/2022 biopsy L axillary node: metastatic invasive ductal carcinoma  -Foundation One: no actionable mutations MS stable, TMB 1 mut/mb.   -PDL1 testing: CPS >20% and TPS 60% (considered high PDL1 expression)  -CA 27.29: 59.5 (elevated). CA 15-3: 23 (normal)    Treatment:  Initial breast cancer (2019)  -2019: Neoadjuvant taxol  -Lumpectomy  -5/2020: Adjuvant RT  -6 - 7/2020: Adjuvant Xeloda. Stopped for rash    Recurrent, stage IV breast cancer (2022)  -11/14/2022:Doxil every 4 weeks  -12/12/2022: changed to doxorubicin every 3 weeks. (Planning addition of pembrolizumab once approved by insurance)  -1/2/2023: doxorubicin + pembrolizumab every 3 weeks. Completed 1 cycle. Stopped for progression (med nodes, slight increase in lung mets and stable R breast mass + axillary nodes).    -1/23/2023 - present: Carbo AUC 5, Gemcitabine 800 mg/m2 and Pembro every 21 days  -cycles 1-2: [place] day 1 only  -Cycle 3: [place] increased to days 1 + 8 given excellent tolerance    CT scan after 3 cycles showed excellent response.      Physical Exam    GENERAL: Alert and oriented to time place and person. Seated comfortably. In no distress.  Lungs clear to auscultation bilaterally:  CVS: S1-S2 heard, regular rate and rhythm  Abdomen: Soft  Extremities: No peripheral edema  Neuro: Alert and oriented x3  Lymph nodes: Previously palpable bilateral axilla lymph nodes have almost completely resolved.    Lab Results  Recent Results (from the past 168 hour(s))   Comprehensive metabolic panel    Collection Time: 04/17/23  8:07 AM   Result Value  Ref Range    Sodium 140 136 - 145 mmol/L    Potassium 3.8 3.4 - 5.3 mmol/L    Chloride 103 98 - 107 mmol/L    Carbon Dioxide (CO2) 24 22 - 29 mmol/L    Anion Gap 13 7 - 15 mmol/L    Urea Nitrogen 11.1 8.0 - 23.0 mg/dL    Creatinine 0.61 0.51 - 0.95 mg/dL    Calcium 9.4 8.8 - 10.2 mg/dL    Glucose 95 70 - 99 mg/dL    Alkaline Phosphatase 98 35 - 104 U/L    AST 28 10 - 35 U/L    ALT 21 10 - 35 U/L    Protein Total 7.2 6.4 - 8.3 g/dL    Albumin 3.9 3.5 - 5.2 g/dL    Bilirubin Total 0.2 <=1.2 mg/dL    GFR Estimate 89 >60 mL/min/1.73m2   CBC with platelets and differential    Collection Time: 04/17/23  8:07 AM   Result Value Ref Range    WBC Count 4.5 4.0 - 11.0 10e3/uL    RBC Count 3.01 (L) 3.80 - 5.20 10e6/uL    Hemoglobin 9.9 (L) 11.7 - 15.7 g/dL    Hematocrit 29.5 (L) 35.0 - 47.0 %    MCV 98 78 - 100 fL    MCH 32.9 26.5 - 33.0 pg    MCHC 33.6 31.5 - 36.5 g/dL    RDW 17.5 (H) 10.0 - 15.0 %    Platelet Count 200 150 - 450 10e3/uL    % Neutrophils 61 %    % Lymphocytes 18 %    % Monocytes 20 %    % Eosinophils 1 %    % Basophils 0 %    % Immature Granulocytes 0 %    NRBCs per 100 WBC 0 <1 /100    Absolute Neutrophils 2.7 1.6 - 8.3 10e3/uL    Absolute Lymphocytes 0.8 0.8 - 5.3 10e3/uL    Absolute Monocytes 0.9 0.0 - 1.3 10e3/uL    Absolute Eosinophils 0.1 0.0 - 0.7 10e3/uL    Absolute Basophils 0.0 0.0 - 0.2 10e3/uL    Absolute Immature Granulocytes 0.0 <=0.4 10e3/uL    Absolute NRBCs 0.0 10e3/uL     CT Chest w Contrast    Result Date: 3/23/2023  CT CHEST WITH CONTRAST 3/23/2023 10:09 AM CLINICAL HISTORY: Metastatic breast cancer to axilla, lung. Assess response to chemotherapy + immunotherapy. Metastatic breast cancer. Malignant neoplasm metastatic to both lungs (H). Metastatic cancer to axillary lymph nodes (H). TECHNIQUE: CT chest with IV contrast. Multiplanar reformats were obtained. Dose reduction techniques were used. CONTRAST: 82mL isovue 370 COMPARISON: 1/16/2023 FINDINGS: LUNGS AND PLEURA: Since the previous  exam, the nodules in the posterior aspects of both lower lobes have decreased in size. A couple of the nodules have decreased by a couple of millimeters, but the most significant decrease is in a now 8 mm nodule in the posterior right lower lobe on image 236 of series 4 that previously measured 16 mm. No evidence for new or enlarging nodules. No infiltrate or effusion. The central airways are patent. MEDIASTINUM/AXILLAE: Right chest port is present. Bilateral axillary adenopathy has shown significant decrease in size. No adenopathy is seen on today's exam. The right paratracheal, precarinal, and right hilar adenopathy has resolved. No mediastinal or hilar adenopathy at this time. Small amount of fluid is seen in the superior pericardial recesses. Postsurgical changes are again noted in the right breast. Irregular soft tissue seen in the lateral aspect of the right breast is similar to the previous exam. CORONARY ARTERY CALCIFICATION: Moderate UPPER ABDOMEN: Lobulated contour of the liver is suggestive of underlying cirrhosis. Exophytic cyst at the posterior aspect of the left kidney is unchanged. No follow-up necessary for this. Small hiatal hernia. MUSCULOSKELETAL: Mild degenerative changes are noted through the spine.     IMPRESSION: 1.  Positive response to interval therapy with decrease in size of nodules in both lower lobes and resolution of bilateral axillary and mediastinal adenopathy. 2.  No change in irregular soft tissue in the lateral aspect of the right breast. SARA WAY MD   SYSTEM ID:  [id number]        Assessment/Plan  1. Recurrent, stage IV triple negative right breast cancer (nodes, lung); CPS>20% + TPS 60%  2. 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  For the first 2 cycles I gave her carboplatin AUC 5 on day 1 and gemcitabine 800 mg per metered squared on day 1.  She did  really well with this so with the third cycle we gave her carbo AUC 5 on day 1 along with gemcitabine 800 mg per metered squared on days 1 and 8.      The previously noted bilateral axillary and mediastinal lymphadenopathy had almost completely resolved.  Pulmonary nodules were also decreasing in size.  She is here to continue treatment.  This is cycle 5.  No major side effects.  Does have some nausea which responds to antinausea medications.  No diarrhea.  No peripheral neuropathy.  No skin rash.  Labs reviewed today.  Normal serum chemistry.  LFTs are within normal limits.  Creatinine is 0.61.  CBC shows mild anemia with hemoglobin of 9.9.  Normal platelet and white counts.    Clinical exam shows resolution of previously palpable bilateral axillary adenopathy.  Right breast mass also has decreased in size and is more softer.  No nipple discharge.    No contraindications to proceed with cycle 5-day 1 treatment today.  She will get all 3 drugs.  She will come back in 1 week for labs followed by day 8 gemcitabine.  In 3 weeks she will return for cycle 6.    Plan is to repeat a PET scan after cycle 6 and if she continues to show good response then we will continue pembrolizumab maintenance only once every 3 weeks and repeat imaging every 3 to 4 months.    She is agreeable to the plan.    E5 visit.      Signed by: Maricarmen Beebe MD  Hematology and Medical Oncology  [facility]    Oncology Rooming Note    April 17, 2023 8:23 AM   Precious Paris is a 82 year old female who presents for:    Chief Complaint   Patient presents with     Oncology Clinic Visit     Malignant neoplasm of upper-outer quadrant of right breast in female, estrogen receptor negative - Labs provider and infusion     Initial Vitals: /64 (BP Location: Right arm, Patient Position: Sitting, Cuff Size: Adult Regular)   Pulse 73   Temp 98.1  F (36.7  C) (Tympanic)   Resp 12   Wt 83.5 kg (184 lb)   SpO2 97%   BMI  "31.10 kg/m   Estimated body mass index is 31.1 kg/m  as calculated from the following:    Height as of 3/28/23: 1.638 m (5' 4.5\").    Weight as of this encounter: 83.5 kg (184 lb). Body surface area is 1.95 meters squared.  No Pain (0) Comment: Data Unavailable   No LMP recorded. Patient is postmenopausal.  Allergies reviewed: Yes  Medications reviewed: Yes    Medications: Medication refills not needed today.  Pharmacy name entered into [facility]: [facility] - [facility] [id number] [facility]    Clinical concerns:  None      Malgorzata Vallejo CMA                Again, thank you for allowing me to participate in the care of your patient.        Sincerely,        Maricarmen Beebe MD    "

## 2023-04-17 NOTE — PROGRESS NOTES
[facility] Hematology and Oncology Progress Note    Patient: Precious Paris  MRN: [id number]  Apr 17, 2023          Reason for Visit    1. 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 Beebe    _____________________________________________________________________________    History of Present Illness/ Interval History    Ms. Precious Paris is a 82 year old with recurrent metastatic triple negative breast cancer recurrence. Completed 3 cycles of Doxil with Pembro added to the 3rd cycle, with mixed response (some progression in mediastinal nodes and lung mets). Therefore, chemo was changed to carbo, gem (day 1 only) and Pembro.  With cycle 3 she received gemcitabine on days 1 and 8.    She has done really well with the chemotherapy without any significant side effects.  She did feel little bit dizzy after day 8 Gemzar which resolved after she went home and took rest.  Here with repeat CT scan to look for response.  Otherwise denies any major issues today.  No fever chills or night sweats.      ECOG PS: 1      Oncology History/Treatment  Diagnosis/Stage:   11/2019: Right breast cancer, triple negative ([id number])    BRCA negative    10/2022: Recurrent, stage IV triple negative breast cancer (bilateral axillary, supraclav, mediastinal nodes + lung)  -persistent/progressive right breast firmness with new right nipple drainage and right axillary adenopathy  -bilateral diagnostic mammogram: increased density R breast with skin thickening. R breast US: 3.2 cm hypoechoic mass at 11:00 position 1 cm from nipple and multiple other small hypoechoic solid-appearing lesions in R breast + right axillary adenopathy (at least two hypoechoic vascular lesions measuring up to 2.5 cm)  -10/18/2022 biopsy R breast mass + R axillary node: grade 3 invasive ductal cancer with tumor necrosis, ER/[place]/HER2 negative.   -PET: + right breast mass; bilateral axillary,  retropectoral, supraclavicular, mediastinal/R hilar nodes and bilateral lung/R pleural nodules suspicious for mets.  -Brain MRI: negative for mets  -11/1/2022 biopsy L axillary node: metastatic invasive ductal carcinoma  -Foundation One: no actionable mutations MS stable, TMB 1 mut/mb.   -PDL1 testing: CPS >20% and TPS 60% (considered high PDL1 expression)  -CA 27.29: 59.5 (elevated). CA 15-3: 23 (normal)    Treatment:  Initial breast cancer (2019)  -2019: Neoadjuvant taxol  -Lumpectomy  -5/2020: Adjuvant RT  -6 - 7/2020: Adjuvant Xeloda. Stopped for rash    Recurrent, stage IV breast cancer (2022)  -11/14/2022:Doxil every 4 weeks  -12/12/2022: changed to doxorubicin every 3 weeks. (Planning addition of pembrolizumab once approved by insurance)  -1/2/2023: doxorubicin + pembrolizumab every 3 weeks. Completed 1 cycle. Stopped for progression (med nodes, slight increase in lung mets and stable R breast mass + axillary nodes).    -1/23/2023 - present: Carbo AUC 5, Gemcitabine 800 mg/m2 and Pembro every 21 days  -cycles 1-2: [place] day 1 only  -Cycle 3: [place] increased to days 1 + 8 given excellent tolerance    CT scan after 3 cycles showed excellent response.      Physical Exam    GENERAL: Alert and oriented to time place and person. Seated comfortably. In no distress.  Lungs clear to auscultation bilaterally:  CVS: S1-S2 heard, regular rate and rhythm  Abdomen: Soft  Extremities: No peripheral edema  Neuro: Alert and oriented x3  Lymph nodes: Previously palpable bilateral axilla lymph nodes have almost completely resolved.    Lab Results  Recent Results (from the past 168 hour(s))   Comprehensive metabolic panel    Collection Time: 04/17/23  8:07 AM   Result Value Ref Range    Sodium 140 136 - 145 mmol/L    Potassium 3.8 3.4 - 5.3 mmol/L    Chloride 103 98 - 107 mmol/L    Carbon Dioxide (CO2) 24 22 - 29 mmol/L    Anion Gap 13 7 - 15 mmol/L    Urea Nitrogen 11.1 8.0 - 23.0 mg/dL    Creatinine 0.61 0.51 - 0.95 mg/dL     Calcium 9.4 8.8 - 10.2 mg/dL    Glucose 95 70 - 99 mg/dL    Alkaline Phosphatase 98 35 - 104 U/L    AST 28 10 - 35 U/L    ALT 21 10 - 35 U/L    Protein Total 7.2 6.4 - 8.3 g/dL    Albumin 3.9 3.5 - 5.2 g/dL    Bilirubin Total 0.2 <=1.2 mg/dL    GFR Estimate 89 >60 mL/min/1.73m2   CBC with platelets and differential    Collection Time: 04/17/23  8:07 AM   Result Value Ref Range    WBC Count 4.5 4.0 - 11.0 10e3/uL    RBC Count 3.01 (L) 3.80 - 5.20 10e6/uL    Hemoglobin 9.9 (L) 11.7 - 15.7 g/dL    Hematocrit 29.5 (L) 35.0 - 47.0 %    MCV 98 78 - 100 fL    MCH 32.9 26.5 - 33.0 pg    MCHC 33.6 31.5 - 36.5 g/dL    RDW 17.5 (H) 10.0 - 15.0 %    Platelet Count 200 150 - 450 10e3/uL    % Neutrophils 61 %    % Lymphocytes 18 %    % Monocytes 20 %    % Eosinophils 1 %    % Basophils 0 %    % Immature Granulocytes 0 %    NRBCs per 100 WBC 0 <1 /100    Absolute Neutrophils 2.7 1.6 - 8.3 10e3/uL    Absolute Lymphocytes 0.8 0.8 - 5.3 10e3/uL    Absolute Monocytes 0.9 0.0 - 1.3 10e3/uL    Absolute Eosinophils 0.1 0.0 - 0.7 10e3/uL    Absolute Basophils 0.0 0.0 - 0.2 10e3/uL    Absolute Immature Granulocytes 0.0 <=0.4 10e3/uL    Absolute NRBCs 0.0 10e3/uL     CT Chest w Contrast    Result Date: 3/23/2023  CT CHEST WITH CONTRAST 3/23/2023 10:09 AM CLINICAL HISTORY: Metastatic breast cancer to axilla, lung. Assess response to chemotherapy + immunotherapy. Metastatic breast cancer. Malignant neoplasm metastatic to both lungs (H). Metastatic cancer to axillary lymph nodes (H). TECHNIQUE: CT chest with IV contrast. Multiplanar reformats were obtained. Dose reduction techniques were used. CONTRAST: 82mL isovue 370 COMPARISON: 1/16/2023 FINDINGS: LUNGS AND PLEURA: Since the previous exam, the nodules in the posterior aspects of both lower lobes have decreased in size. A couple of the nodules have decreased by a couple of millimeters, but the most significant decrease is in a now 8 mm nodule in the posterior right lower lobe on image 236 of  series 4 that previously measured 16 mm. No evidence for new or enlarging nodules. No infiltrate or effusion. The central airways are patent. MEDIASTINUM/AXILLAE: Right chest port is present. Bilateral axillary adenopathy has shown significant decrease in size. No adenopathy is seen on today's exam. The right paratracheal, precarinal, and right hilar adenopathy has resolved. No mediastinal or hilar adenopathy at this time. Small amount of fluid is seen in the superior pericardial recesses. Postsurgical changes are again noted in the right breast. Irregular soft tissue seen in the lateral aspect of the right breast is similar to the previous exam. CORONARY ARTERY CALCIFICATION: Moderate UPPER ABDOMEN: Lobulated contour of the liver is suggestive of underlying cirrhosis. Exophytic cyst at the posterior aspect of the left kidney is unchanged. No follow-up necessary for this. Small hiatal hernia. MUSCULOSKELETAL: Mild degenerative changes are noted through the spine.     IMPRESSION: 1.  Positive response to interval therapy with decrease in size of nodules in both lower lobes and resolution of bilateral axillary and mediastinal adenopathy. 2.  No change in irregular soft tissue in the lateral aspect of the right breast. SARA WAY MD   SYSTEM ID:  [id number]        Assessment/Plan  1. Recurrent, stage IV triple negative right breast cancer (nodes, lung); CPS>20% + TPS 60%  2. 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  For the first 2 cycles I gave her carboplatin AUC 5 on day 1 and gemcitabine 800 mg per metered squared on day 1.  She did really well with this so with the third cycle we gave her carbo AUC 5 on day 1 along with gemcitabine 800 mg per metered squared on days 1 and 8.      The previously noted bilateral axillary and mediastinal lymphadenopathy had almost completely resolved.   Pulmonary nodules were also decreasing in size.  She is here to continue treatment.  This is cycle 5.  No major side effects.  Does have some nausea which responds to antinausea medications.  No diarrhea.  No peripheral neuropathy.  No skin rash.  Labs reviewed today.  Normal serum chemistry.  LFTs are within normal limits.  Creatinine is 0.61.  CBC shows mild anemia with hemoglobin of 9.9.  Normal platelet and white counts.    Clinical exam shows resolution of previously palpable bilateral axillary adenopathy.  Right breast mass also has decreased in size and is more softer.  No nipple discharge.    No contraindications to proceed with cycle 5-day 1 treatment today.  She will get all 3 drugs.  She will come back in 1 week for labs followed by day 8 gemcitabine.  In 3 weeks she will return for cycle 6.    Plan is to repeat a PET scan after cycle 6 and if she continues to show good response then we will continue pembrolizumab maintenance only once every 3 weeks and repeat imaging every 3 to 4 months.    She is agreeable to the plan.    E5 visit.      Signed by: Maricarmen Beebe MD  Hematology and Medical Oncology  [facility]

## 2023-04-17 NOTE — PROGRESS NOTES
"Oncology Rooming Note    April 17, 2023 8:23 AM   Precious Paris is a 82 year old female who presents for:    Chief Complaint   Patient presents with     Oncology Clinic Visit     Malignant neoplasm of upper-outer quadrant of right breast in female, estrogen receptor negative - Labs provider and infusion     Initial Vitals: /64 (BP Location: Right arm, Patient Position: Sitting, Cuff Size: Adult Regular)   Pulse 73   Temp 98.1  F (36.7  C) (Tympanic)   Resp 12   Wt 83.5 kg (184 lb)   SpO2 97%   BMI 31.10 kg/m   Estimated body mass index is 31.1 kg/m  as calculated from the following:    Height as of 3/28/23: 1.638 m (5' 4.5\").    Weight as of this encounter: 83.5 kg (184 lb). Body surface area is 1.95 meters squared.  No Pain (0) Comment: Data Unavailable   No LMP recorded. Patient is postmenopausal.  Allergies reviewed: Yes  Medications reviewed: Yes    Medications: Medication refills not needed today.  Pharmacy name entered into [facility]: [facility] - [facility] [id number] [facility]    Clinical concerns:  None      Malgorzata Vallejo CMA            "

## 2023-04-17 NOTE — PROGRESS NOTES
Infusion Nursing Note:  Precious VALDES Wellington presents today for Keytruda, Gemzar, Carboplatin C5D1.    Patient seen by provider today: Yes: Dr. Sanabria   present during visit today: Not Applicable.    Note: N/A.    Intravenous Access:  Implanted Port.    Treatment Conditions:  Lab Results   Component Value Date    HGB 9.9 (L) 04/17/2023    WBC 4.5 04/17/2023    ANEU 2.6 11/28/2022    ANEUTAUTO 2.7 04/17/2023     04/17/2023      Results reviewed, labs MET treatment parameters, ok to proceed with treatment.      Post Infusion Assessment:  Patient tolerated infusion without incident.  Blood return noted pre and post infusion.  Site patent and intact, free from redness, edema or discomfort.  No evidence of extravasations.  Access discontinued per protocol.     Discharge Plan:   Patient discharged in stable condition accompanied by: self.  Departure Mode: Ambulatory.    Mario Worley RN

## 2023-04-18 LAB — CANCER AG27-29 SERPL-ACNC: 18.2 U/ML

## 2023-04-24 ENCOUNTER — INFUSION THERAPY VISIT (OUTPATIENT)
Dept: INFUSION THERAPY | Facility: CLINIC | Age: 83
End: 2023-04-24
Attending: INTERNAL MEDICINE
Payer: COMMERCIAL

## 2023-04-24 VITALS
TEMPERATURE: 97.9 F | BODY MASS INDEX: 31.94 KG/M2 | WEIGHT: 189 LBS | SYSTOLIC BLOOD PRESSURE: 135 MMHG | DIASTOLIC BLOOD PRESSURE: 76 MMHG | HEART RATE: 75 BPM | RESPIRATION RATE: 16 BRPM

## 2023-04-24 VITALS — DIASTOLIC BLOOD PRESSURE: 74 MMHG | SYSTOLIC BLOOD PRESSURE: 134 MMHG | HEART RATE: 76 BPM | RESPIRATION RATE: 16 BRPM

## 2023-04-24 DIAGNOSIS — Z17.1 MALIGNANT NEOPLASM OF UPPER-OUTER QUADRANT OF RIGHT BREAST IN FEMALE, ESTROGEN RECEPTOR NEGATIVE (H): ICD-10-CM

## 2023-04-24 DIAGNOSIS — C50.411 MALIGNANT NEOPLASM OF UPPER-OUTER QUADRANT OF RIGHT BREAST IN FEMALE, ESTROGEN RECEPTOR NEGATIVE (H): ICD-10-CM

## 2023-04-24 DIAGNOSIS — C50.919 PRIMARY MALIGNANT NEOPLASM OF BREAST WITH METASTASIS (H): Primary | ICD-10-CM

## 2023-04-24 LAB
BASOPHILS # BLD AUTO: 0 10E3/UL (ref 0–0.2)
BASOPHILS NFR BLD AUTO: 1 %
EOSINOPHIL # BLD AUTO: 0.1 10E3/UL (ref 0–0.7)
EOSINOPHIL NFR BLD AUTO: 2 %
ERYTHROCYTE [DISTWIDTH] IN BLOOD BY AUTOMATED COUNT: 16.7 % (ref 10–15)
HCT VFR BLD AUTO: 27 % (ref 35–47)
HGB BLD-MCNC: 9.1 G/DL (ref 11.7–15.7)
IMM GRANULOCYTES # BLD: 0 10E3/UL
IMM GRANULOCYTES NFR BLD: 1 %
LYMPHOCYTES # BLD AUTO: 0.9 10E3/UL (ref 0.8–5.3)
LYMPHOCYTES NFR BLD AUTO: 21 %
MCH RBC QN AUTO: 32.9 PG (ref 26.5–33)
MCHC RBC AUTO-ENTMCNC: 33.7 G/DL (ref 31.5–36.5)
MCV RBC AUTO: 98 FL (ref 78–100)
MONOCYTES # BLD AUTO: 0.5 10E3/UL (ref 0–1.3)
MONOCYTES NFR BLD AUTO: 12 %
NEUTROPHILS # BLD AUTO: 2.6 10E3/UL (ref 1.6–8.3)
NEUTROPHILS NFR BLD AUTO: 63 %
NRBC # BLD AUTO: 0 10E3/UL
NRBC BLD AUTO-RTO: 0 /100
PLATELET # BLD AUTO: 209 10E3/UL (ref 150–450)
RBC # BLD AUTO: 2.77 10E6/UL (ref 3.8–5.2)
WBC # BLD AUTO: 4.1 10E3/UL (ref 4–11)

## 2023-04-24 PROCEDURE — 258N000003 HC RX IP 258 OP 636: Performed by: INTERNAL MEDICINE

## 2023-04-24 PROCEDURE — 250N000011 HC RX IP 250 OP 636: Performed by: INTERNAL MEDICINE

## 2023-04-24 PROCEDURE — 85025 COMPLETE CBC W/AUTO DIFF WBC: CPT | Performed by: INTERNAL MEDICINE

## 2023-04-24 PROCEDURE — 96413 CHEMO IV INFUSION 1 HR: CPT

## 2023-04-24 PROCEDURE — 96375 TX/PRO/DX INJ NEW DRUG ADDON: CPT

## 2023-04-24 RX ORDER — DEXAMETHASONE 4 MG/1
4 TABLET ORAL DAILY
Qty: 2 TABLET | Refills: 3 | Status: SHIPPED | OUTPATIENT
Start: 2023-04-24 | End: 2023-09-05

## 2023-04-24 RX ORDER — ONDANSETRON 2 MG/ML
8 INJECTION INTRAMUSCULAR; INTRAVENOUS ONCE
Status: COMPLETED | OUTPATIENT
Start: 2023-04-24 | End: 2023-04-24

## 2023-04-24 RX ORDER — DEXAMETHASONE 4 MG/1
4 TABLET ORAL DAILY
Qty: 2 TABLET | Refills: 3 | Status: SHIPPED | OUTPATIENT
Start: 2023-04-24 | End: 2023-04-24

## 2023-04-24 RX ORDER — HEPARIN SODIUM (PORCINE) LOCK FLUSH IV SOLN 100 UNIT/ML 100 UNIT/ML
5 SOLUTION INTRAVENOUS
Status: DISCONTINUED | OUTPATIENT
Start: 2023-04-24 | End: 2023-04-24 | Stop reason: HOSPADM

## 2023-04-24 RX ADMIN — GEMCITABINE 1600 MG: 38 INJECTION, SOLUTION INTRAVENOUS at 14:10

## 2023-04-24 RX ADMIN — SODIUM CHLORIDE 250 ML: 9 INJECTION, SOLUTION INTRAVENOUS at 13:38

## 2023-04-24 RX ADMIN — ONDANSETRON 8 MG: 2 INJECTION INTRAMUSCULAR; INTRAVENOUS at 13:38

## 2023-04-24 RX ADMIN — Medication 5 ML: at 14:44

## 2023-04-24 NOTE — LETTER
2020      RE: Precious Paris  [address] [place] [address]       [facility] PHYSICIANS  SPECIALIZING IN BREAKTHROUGHS  Radiation Oncology    On Treatment Visit Note      Precious Paris      Date: 2020   MRN: [id number]   : 1940  Diagnosis: R breast IDC, triple negative      Reason for Visit:  On Radiation Treatment Visit     Treatment Summary to Date  Treatment Site: R breast Current Dose: 534/5005 cGy Fractions:       Chemotherapy  Chemo concurrent with radx?: No    Subjective:   Doing well. No breast pain. Energy adequate Applying skin creams.     Nursing ROS:   Nutrition Alteration  Diet Type: Patient's Preference  Skin  Skin Reaction: 0 - No changes        Cardiovascular  Respiratory effort: 1 - Normal - without distress           Pain Assessment  Pain Note: had some intermittent breast pain last evening - did not take/need any medication. reviewed what to expect from radiation and pain, skin changes      Objective:   /77   Pulse 76   Resp 18   Wt 89.1 kg (196 lb 6.4 oz)   SpO2 97%   BMI 32.94 kg/m    Skin without erythema or desquamation.    Labs:  CBC RESULTS:   Recent Labs   Lab Test 20  0926   WBC 7.5   RBC 3.92   HGB 12.2   HCT 38.5   MCV 98   MCH 31.1   MCHC 31.7   RDW 14.6        ELECTROLYTES:  Recent Labs   Lab Test 20  0926      POTASSIUM 3.6   CHLORIDE 103   YADIRA 9.2   CO2 30   BUN 11   CR 0.55   *       Assessment:  Ms. Paris is a 79 year old female with a diagnosis of invasive ductal carcinoma of the right breast status post neoadjuvant chemotherapy with Taxol x12C and lumpectomy with sentinel lymph node evaluation.  Final pathology demonstrated IDC, grade 3, triple negative, multifocal, positive LVSI, negative margin, 0 out of 1 sentinel lymph node, [id number].  She is undergoing adjuvant RT.     Tolerating radiation therapy well.  All questions and concerns addressed.    Plan:   1. Continue current  therapy.    2. Continue skin care.   3. Ibuprofen PRN breast pain. Neosporin with pain relief PRN pain.   4. Hydrocortisone PRN pruritus.      Mosaiq chart and setup information reviewed  Ports checked    Medication Review  Med list reviewed with patient?: Yes    Educational Topic Discussed  Education Instructions: reviewed skin care, fatigue and what to do       Rocky Valdivia MD             Eye Clamp Note Details: An eye clamp was used during the procedure.

## 2023-04-24 NOTE — PROGRESS NOTES
Infusion Nursing Note:  Precious Paris presents today for C5 D8 Gemzar.    Patient seen by provider today: No   present during visit today: Not Applicable.    Note: Pt denies any new health changes or concerns since last treatment cycle.  Reordered Dexamethasone, e-scribe sent to [facility].      Intravenous Access:  Implanted Port.    Treatment Conditions:  Lab Results   Component Value Date    HGB 9.1 (L) 04/24/2023    WBC 4.1 04/24/2023    ANEU 2.6 11/28/2022    ANEUTAUTO 2.6 04/24/2023     04/24/2023      Results reviewed, labs MET treatment parameters, ok to proceed with treatment.      Post Infusion Assessment:  Patient tolerated infusion without incident.  Blood return noted pre and post infusion.  Site patent and intact, free from redness, edema or discomfort.  No evidence of extravasations.  Access discontinued per protocol.       Discharge Plan:   Discharge instructions reviewed with: Patient.  Patient and/or family verbalized understanding of discharge instructions and all questions answered.  Patient discharged in stable condition accompanied by: self.  Departure Mode: Ambulatory.      Brittany Miller RN

## 2023-05-08 ENCOUNTER — ONCOLOGY VISIT (OUTPATIENT)
Dept: ONCOLOGY | Facility: CLINIC | Age: 83
End: 2023-05-08
Attending: NURSE PRACTITIONER
Payer: COMMERCIAL

## 2023-05-08 ENCOUNTER — LAB (OUTPATIENT)
Dept: INFUSION THERAPY | Facility: CLINIC | Age: 83
End: 2023-05-08
Attending: INTERNAL MEDICINE
Payer: COMMERCIAL

## 2023-05-08 VITALS
HEART RATE: 80 BPM | OXYGEN SATURATION: 99 % | WEIGHT: 190.8 LBS | RESPIRATION RATE: 12 BRPM | BODY MASS INDEX: 32.24 KG/M2 | SYSTOLIC BLOOD PRESSURE: 128 MMHG | TEMPERATURE: 98.4 F | DIASTOLIC BLOOD PRESSURE: 47 MMHG

## 2023-05-08 VITALS — SYSTOLIC BLOOD PRESSURE: 131 MMHG | DIASTOLIC BLOOD PRESSURE: 60 MMHG

## 2023-05-08 DIAGNOSIS — C50.411 MALIGNANT NEOPLASM OF UPPER-OUTER QUADRANT OF RIGHT BREAST IN FEMALE, ESTROGEN RECEPTOR NEGATIVE (H): ICD-10-CM

## 2023-05-08 DIAGNOSIS — C50.411 MALIGNANT NEOPLASM OF UPPER-OUTER QUADRANT OF RIGHT BREAST IN FEMALE, ESTROGEN RECEPTOR NEGATIVE (H): Primary | ICD-10-CM

## 2023-05-08 DIAGNOSIS — Z17.1 MALIGNANT NEOPLASM OF UPPER-OUTER QUADRANT OF RIGHT BREAST IN FEMALE, ESTROGEN RECEPTOR NEGATIVE (H): Primary | ICD-10-CM

## 2023-05-08 DIAGNOSIS — C78.01 MALIGNANT NEOPLASM METASTATIC TO BOTH LUNGS (H): ICD-10-CM

## 2023-05-08 DIAGNOSIS — C50.919 PRIMARY MALIGNANT NEOPLASM OF BREAST WITH METASTASIS (H): Primary | ICD-10-CM

## 2023-05-08 DIAGNOSIS — C77.3 METASTATIC CANCER TO AXILLARY LYMPH NODES (H): ICD-10-CM

## 2023-05-08 DIAGNOSIS — Z17.1 MALIGNANT NEOPLASM OF UPPER-OUTER QUADRANT OF RIGHT BREAST IN FEMALE, ESTROGEN RECEPTOR NEGATIVE (H): ICD-10-CM

## 2023-05-08 DIAGNOSIS — C78.02 MALIGNANT NEOPLASM METASTATIC TO BOTH LUNGS (H): ICD-10-CM

## 2023-05-08 DIAGNOSIS — C50.919 PRIMARY MALIGNANT NEOPLASM OF BREAST WITH METASTASIS (H): ICD-10-CM

## 2023-05-08 LAB
ALBUMIN SERPL BCG-MCNC: 3.9 G/DL (ref 3.5–5.2)
ALP SERPL-CCNC: 99 U/L (ref 35–104)
ALT SERPL W P-5'-P-CCNC: 20 U/L (ref 10–35)
ANION GAP SERPL CALCULATED.3IONS-SCNC: 10 MMOL/L (ref 7–15)
AST SERPL W P-5'-P-CCNC: 28 U/L (ref 10–35)
BASOPHILS # BLD AUTO: 0 10E3/UL (ref 0–0.2)
BASOPHILS NFR BLD AUTO: 1 %
BILIRUB SERPL-MCNC: 0.2 MG/DL
BUN SERPL-MCNC: 10.7 MG/DL (ref 8–23)
CALCIUM SERPL-MCNC: 9.3 MG/DL (ref 8.8–10.2)
CEA SERPL-MCNC: 4 NG/ML
CHLORIDE SERPL-SCNC: 103 MMOL/L (ref 98–107)
CREAT SERPL-MCNC: 0.63 MG/DL (ref 0.51–0.95)
DEPRECATED HCO3 PLAS-SCNC: 26 MMOL/L (ref 22–29)
EOSINOPHIL # BLD AUTO: 0 10E3/UL (ref 0–0.7)
EOSINOPHIL NFR BLD AUTO: 1 %
ERYTHROCYTE [DISTWIDTH] IN BLOOD BY AUTOMATED COUNT: 18.4 % (ref 10–15)
GFR SERPL CREATININE-BSD FRML MDRD: 89 ML/MIN/1.73M2
GLUCOSE SERPL-MCNC: 122 MG/DL (ref 70–99)
HCT VFR BLD AUTO: 28.1 % (ref 35–47)
HGB BLD-MCNC: 9.2 G/DL (ref 11.7–15.7)
IMM GRANULOCYTES # BLD: 0 10E3/UL
IMM GRANULOCYTES NFR BLD: 1 %
LYMPHOCYTES # BLD AUTO: 0.8 10E3/UL (ref 0.8–5.3)
LYMPHOCYTES NFR BLD AUTO: 20 %
MCH RBC QN AUTO: 33.1 PG (ref 26.5–33)
MCHC RBC AUTO-ENTMCNC: 32.7 G/DL (ref 31.5–36.5)
MCV RBC AUTO: 101 FL (ref 78–100)
MONOCYTES # BLD AUTO: 0.7 10E3/UL (ref 0–1.3)
MONOCYTES NFR BLD AUTO: 18 %
NEUTROPHILS # BLD AUTO: 2.5 10E3/UL (ref 1.6–8.3)
NEUTROPHILS NFR BLD AUTO: 59 %
NRBC # BLD AUTO: 0 10E3/UL
NRBC BLD AUTO-RTO: 0 /100
PLATELET # BLD AUTO: 227 10E3/UL (ref 150–450)
POTASSIUM SERPL-SCNC: 3.4 MMOL/L (ref 3.4–5.3)
PROT SERPL-MCNC: 7 G/DL (ref 6.4–8.3)
RBC # BLD AUTO: 2.78 10E6/UL (ref 3.8–5.2)
SODIUM SERPL-SCNC: 139 MMOL/L (ref 136–145)
WBC # BLD AUTO: 4.1 10E3/UL (ref 4–11)

## 2023-05-08 PROCEDURE — 80053 COMPREHEN METABOLIC PANEL: CPT | Performed by: INTERNAL MEDICINE

## 2023-05-08 PROCEDURE — 96367 TX/PROPH/DG ADDL SEQ IV INF: CPT

## 2023-05-08 PROCEDURE — G0463 HOSPITAL OUTPT CLINIC VISIT: HCPCS | Mod: 25 | Performed by: NURSE PRACTITIONER

## 2023-05-08 PROCEDURE — 99214 OFFICE O/P EST MOD 30 MIN: CPT | Performed by: NURSE PRACTITIONER

## 2023-05-08 PROCEDURE — 258N000003 HC RX IP 258 OP 636: Performed by: NURSE PRACTITIONER

## 2023-05-08 PROCEDURE — 250N000011 HC RX IP 250 OP 636: Performed by: NURSE PRACTITIONER

## 2023-05-08 PROCEDURE — 96375 TX/PRO/DX INJ NEW DRUG ADDON: CPT

## 2023-05-08 PROCEDURE — 96417 CHEMO IV INFUS EACH ADDL SEQ: CPT

## 2023-05-08 PROCEDURE — 82378 CARCINOEMBRYONIC ANTIGEN: CPT | Performed by: INTERNAL MEDICINE

## 2023-05-08 PROCEDURE — 86300 IMMUNOASSAY TUMOR CA 15-3: CPT | Performed by: INTERNAL MEDICINE

## 2023-05-08 PROCEDURE — 96413 CHEMO IV INFUSION 1 HR: CPT

## 2023-05-08 PROCEDURE — 85025 COMPLETE CBC W/AUTO DIFF WBC: CPT | Performed by: INTERNAL MEDICINE

## 2023-05-08 RX ORDER — ALBUTEROL SULFATE 0.83 MG/ML
2.5 SOLUTION RESPIRATORY (INHALATION)
Status: CANCELLED | OUTPATIENT
Start: 2023-05-10

## 2023-05-08 RX ORDER — PALONOSETRON 0.05 MG/ML
0.25 INJECTION, SOLUTION INTRAVENOUS ONCE
Status: COMPLETED | OUTPATIENT
Start: 2023-05-08 | End: 2023-05-08

## 2023-05-08 RX ORDER — METHYLPREDNISOLONE SODIUM SUCCINATE 125 MG/2ML
125 INJECTION, POWDER, LYOPHILIZED, FOR SOLUTION INTRAMUSCULAR; INTRAVENOUS
Status: CANCELLED
Start: 2023-05-17

## 2023-05-08 RX ORDER — METHYLPREDNISOLONE SODIUM SUCCINATE 125 MG/2ML
125 INJECTION, POWDER, LYOPHILIZED, FOR SOLUTION INTRAMUSCULAR; INTRAVENOUS
Status: CANCELLED
Start: 2023-05-10

## 2023-05-08 RX ORDER — EPINEPHRINE 1 MG/ML
0.3 INJECTION, SOLUTION, CONCENTRATE INTRAVENOUS EVERY 5 MIN PRN
Status: CANCELLED | OUTPATIENT
Start: 2023-05-10

## 2023-05-08 RX ORDER — LORAZEPAM 2 MG/ML
0.5 INJECTION INTRAMUSCULAR EVERY 4 HOURS PRN
Status: CANCELLED | OUTPATIENT
Start: 2023-05-10

## 2023-05-08 RX ORDER — HEPARIN SODIUM (PORCINE) LOCK FLUSH IV SOLN 100 UNIT/ML 100 UNIT/ML
5 SOLUTION INTRAVENOUS
Status: CANCELLED | OUTPATIENT
Start: 2023-05-17

## 2023-05-08 RX ORDER — ALBUTEROL SULFATE 0.83 MG/ML
2.5 SOLUTION RESPIRATORY (INHALATION)
Status: CANCELLED | OUTPATIENT
Start: 2023-05-17

## 2023-05-08 RX ORDER — HEPARIN SODIUM (PORCINE) LOCK FLUSH IV SOLN 100 UNIT/ML 100 UNIT/ML
5 SOLUTION INTRAVENOUS
Status: DISCONTINUED | OUTPATIENT
Start: 2023-05-08 | End: 2023-05-08 | Stop reason: HOSPADM

## 2023-05-08 RX ORDER — DIPHENHYDRAMINE HYDROCHLORIDE 50 MG/ML
50 INJECTION INTRAMUSCULAR; INTRAVENOUS
Status: CANCELLED
Start: 2023-05-10

## 2023-05-08 RX ORDER — HEPARIN SODIUM (PORCINE) LOCK FLUSH IV SOLN 100 UNIT/ML 100 UNIT/ML
5 SOLUTION INTRAVENOUS
Status: CANCELLED | OUTPATIENT
Start: 2023-05-10

## 2023-05-08 RX ORDER — DIPHENHYDRAMINE HYDROCHLORIDE 50 MG/ML
50 INJECTION INTRAMUSCULAR; INTRAVENOUS
Status: CANCELLED
Start: 2023-05-17

## 2023-05-08 RX ORDER — MEPERIDINE HYDROCHLORIDE 25 MG/ML
25 INJECTION INTRAMUSCULAR; INTRAVENOUS; SUBCUTANEOUS EVERY 30 MIN PRN
Status: CANCELLED | OUTPATIENT
Start: 2023-05-17

## 2023-05-08 RX ORDER — EPINEPHRINE 1 MG/ML
0.3 INJECTION, SOLUTION, CONCENTRATE INTRAVENOUS EVERY 5 MIN PRN
Status: CANCELLED | OUTPATIENT
Start: 2023-05-17

## 2023-05-08 RX ORDER — LORAZEPAM 2 MG/ML
0.5 INJECTION INTRAMUSCULAR EVERY 4 HOURS PRN
Status: CANCELLED | OUTPATIENT
Start: 2023-05-17

## 2023-05-08 RX ORDER — HEPARIN SODIUM,PORCINE 10 UNIT/ML
5 VIAL (ML) INTRAVENOUS
Status: CANCELLED | OUTPATIENT
Start: 2023-05-17

## 2023-05-08 RX ORDER — ONDANSETRON 2 MG/ML
8 INJECTION INTRAMUSCULAR; INTRAVENOUS ONCE
Status: CANCELLED
Start: 2023-05-17 | End: 2023-05-17

## 2023-05-08 RX ORDER — ALBUTEROL SULFATE 90 UG/1
1-2 AEROSOL, METERED RESPIRATORY (INHALATION)
Status: CANCELLED
Start: 2023-05-10

## 2023-05-08 RX ORDER — ALBUTEROL SULFATE 90 UG/1
1-2 AEROSOL, METERED RESPIRATORY (INHALATION)
Status: CANCELLED
Start: 2023-05-17

## 2023-05-08 RX ORDER — PALONOSETRON 0.05 MG/ML
0.25 INJECTION, SOLUTION INTRAVENOUS ONCE
Status: CANCELLED | OUTPATIENT
Start: 2023-05-10

## 2023-05-08 RX ORDER — HEPARIN SODIUM,PORCINE 10 UNIT/ML
5 VIAL (ML) INTRAVENOUS
Status: CANCELLED | OUTPATIENT
Start: 2023-05-10

## 2023-05-08 RX ORDER — MEPERIDINE HYDROCHLORIDE 25 MG/ML
25 INJECTION INTRAMUSCULAR; INTRAVENOUS; SUBCUTANEOUS EVERY 30 MIN PRN
Status: CANCELLED | OUTPATIENT
Start: 2023-05-10

## 2023-05-08 RX ADMIN — PALONOSETRON HYDROCHLORIDE 0.25 MG: 0.25 INJECTION INTRAVENOUS at 10:00

## 2023-05-08 RX ADMIN — GEMCITABINE 1600 MG: 38 INJECTION, SOLUTION INTRAVENOUS at 10:51

## 2023-05-08 RX ADMIN — SODIUM CHLORIDE 250 ML: 9 INJECTION, SOLUTION INTRAVENOUS at 09:59

## 2023-05-08 RX ADMIN — DEXAMETHASONE SODIUM PHOSPHATE: 10 INJECTION, SOLUTION INTRAMUSCULAR; INTRAVENOUS at 09:58

## 2023-05-08 RX ADMIN — CARBOPLATIN 450 MG: 10 INJECTION, SOLUTION INTRAVENOUS at 11:46

## 2023-05-08 RX ADMIN — SODIUM CHLORIDE 200 MG: 9 INJECTION, SOLUTION INTRAVENOUS at 10:20

## 2023-05-08 RX ADMIN — Medication 5 ML: at 12:25

## 2023-05-08 ASSESSMENT — PAIN SCALES - GENERAL: PAINLEVEL: NO PAIN (0)

## 2023-05-08 NOTE — PROGRESS NOTES
Infusion Nursing Note:  Precious VALDES Wellington presents today for Gemzar, Keytruda, Carboplatin.    Patient seen by provider today: Yes: NP Lazara Torres, therefore assessment deferred   present during visit today: Not Applicable.    Note: N/A.      Intravenous Access:  Implanted Port.    Treatment Conditions:  Lab Results   Component Value Date    HGB 9.2 (L) 05/08/2023    WBC 4.1 05/08/2023    ANEU 2.6 11/28/2022    ANEUTAUTO 2.5 05/08/2023     05/08/2023      Results reviewed, labs MET treatment parameters, ok to proceed with treatment.      Post Infusion Assessment:  Patient tolerated infusion without incident.  Blood return noted pre and post infusion.  Site patent and intact, free from redness, edema or discomfort.  No evidence of extravasations.  Access discontinued per protocol.       Discharge Plan:   Copy of AVS reviewed with patient and/or family.  Patient will return 5/15/23 for next appointment.  Patient discharged in stable condition accompanied by: self.  Departure Mode: Ambulatory.      VENKATA ALLEN RN

## 2023-05-08 NOTE — LETTER
5/8/2023         RE: Precious Paris  [address] [place] [address]        Dear Colleague,    Thank you for referring your patient, Precious Paris, to the [facility]. Please see a copy of my visit note below.    [facility] Hematology and Oncology Progress Note    Patient: Precious Paris  MRN: [id number]  May 8, 2023          Reason for Visit    1. 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 Beebe    _____________________________________________________________________________    History of Present Illness/ Interval History    Ms. Precious Paris is a 82 year old with recurrent metastatic triple negative breast cancer recurrence. Completed 3 cycles of Doxil with Pembro added to the 3rd cycle, with mixed response (some progression in mediastinal nodes and lung mets). Therefore, chemo was changed to carbo, gem and Pembro.  She had an excellent response after 3 cycles. Returns ahead of cycle 6 today.    She has tolerated this very well.  Great appetite. Mild nausea, alleviated with antiemetics as needed. No change in base fatigue. Denies any shortness of breath or chest pain. No fevers. No rash. No diarrhea.    Baseline urinary frequency/urgency especially overnight feels more notable on chemo; no odor, abd pain nor fevers. UA was previously negative.     Right breast firmness and pain is a bit better. No further drainage around nipple.       ECOG PS: 1      Oncology History/Treatment  Diagnosis/Stage:   11/2019: Right breast cancer, triple negative (tA2q-X6)    BRCA negative    10/2022: Recurrent, stage IV triple negative breast cancer (bilateral axillary, supraclav, mediastinal nodes + lung)  -persistent/progressive right breast firmness with new right nipple drainage and right axillary adenopathy  -bilateral diagnostic mammogram: increased density R breast with skin thickening. R breast US:  3.2 cm hypoechoic mass at 11:00 position 1 cm from nipple and multiple other small hypoechoic solid-appearing lesions in R breast + right axillary adenopathy (at least two hypoechoic vascular lesions measuring up to 2.5 cm)  -10/18/2022 biopsy R breast mass + R axillary node: grade 3 invasive ductal cancer with tumor necrosis, ER/[place]/HER2 negative.   -PET: + right breast mass; bilateral axillary, retropectoral, supraclavicular, mediastinal/R hilar nodes and bilateral lung/R pleural nodules suspicious for mets.  -Brain MRI: negative for mets  -11/1/2022 biopsy L axillary node: metastatic invasive ductal carcinoma  -Foundation One: no actionable mutations MS stable, TMB 1 mut/mb.   -PDL1 testing: CPS >20% and TPS 60% (considered high PDL1 expression)  -CA 27.29: 59.5 (elevated). CA 15-3: 23 (normal)    Treatment:  Initial breast cancer (2019)  -2019: Neoadjuvant taxol  -Lumpectomy  -5/2020: Adjuvant RT  -6 - 7/2020: Adjuvant Xeloda. Stopped for rash    Recurrent, stage IV breast cancer (2022)  -11/14/2022:Doxil every 4 weeks  -12/12/2022: changed to doxorubicin every 3 weeks. (Planning addition of pembrolizumab once approved by insurance)  -1/2/2023: doxorubicin + pembrolizumab every 3 weeks. Completed 1 cycle. Stopped for progression (med nodes, slight increase in lung mets and stable R breast mass + axillary nodes).    -1/23/2023 - present: Carbo AUC 5, Gemcitabine 800 mg/m2 and Pembro every 21 days  -cycles 1-2: Treasure day 1 only  -Cycle 3: Treasure increased to days 1 + 8 given excellent tolerance  -CT after 3 cycles showed excellent response (near CR axillary + med nodes and [place] in lung mets; clinical improvement in right breast changes and tumor marker).      Physical Exam    GENERAL: Alert and oriented to time place and person. Seated comfortably. In no distress.  accompanies.  HEAD: Atraumatic and normocephalic. Complete alopecia.  EYES: DELISA, EOMI. No erythema. No icterus.  LYMPH: Improved density/nodes in  right axilla. No left axillary nodes. No cervical/supraclav nodes noted.   BREASTS: Right breast firmness (primarily lower/outer quadrants) is softer with no discrete masses/lumps. Firmness extends into proximal right axilla. The erythema and warmth of the breast is resolved. Nipple retraction improved.  CHEST: clear to auscultation bilaterally. Resonant to percussion throughout bilaterally. Symmetrical breath movements bilaterally.  CVS: S1 and S2 are heard. Regular rate and rhythm. No murmur or gallop or rub heard.  ABDOMEN: Soft. Not tender. Not distended. No palpable hepatomegaly or splenomegaly. No other mass palpable. Bowel sounds present.  EXTREMITIES: Warm. No peripheral edema.  SKIN: A few scattered maculopapular red lesions left upper chest.   NEURO: No gross deficit noted. Non-antalgic gait.      Lab Results    CBC: Hgb 9.2 (stable), rest WNL. CMP unremarkable  CA 27.29 pending      Assessment/Plan  1. Recurrent, stage IV triple negative right breast cancer (nodes, lung); CPS>20% + TPS 60%  2. 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    CBC and CMP adequate for treatment.     Clinically, no sign of progression. Right breast mass/firmness is improved.  CA 27.29 has been responding, pending today.    Plan:  -Proceed with cycle 6 carbo, gemcitabine and pembrolizumab today. Continue same doses - carbo AUC 5 day 1 + gem 800 mg/m2 days 1 +8.  -Return in 3 weeks with PET scan and Dr. Beebe. If still responding, plan will be to transition to maintenance Pembro with the next cycle. Reviewed plan is to stay on immunotherapy as long as responding/tolerating, sometimes up to 2 yrs   -Trend CA 27.29      Billing  Total time 35 minutes, to include face to face visit, review of EMR, ordering, documentation and coordination of care    Signed by: Lazara Ashton NP    Oncology Rooming Note    May 8, 2023 8:41 AM   Precious Paris is a 82 year  "old female who presents for:    Chief Complaint   Patient presents with     Oncology Clinic Visit     Malignant neoplasm of upper-outer quadrant of right breast in female, estrogen receptor negative - Lab provider and infusion     Initial Vitals: /47 (BP Location: Right arm, Patient Position: Sitting, Cuff Size: Adult Large)   Pulse 80   Temp 98.4  F (36.9  C) (Tympanic)   Resp 12   Wt 86.5 kg (190 lb 12.8 oz)   SpO2 99%   BMI 32.24 kg/m   Estimated body mass index is 32.24 kg/m  as calculated from the following:    Height as of 3/28/23: 1.638 m (5' 4.5\").    Weight as of this encounter: 86.5 kg (190 lb 12.8 oz). Body surface area is 1.98 meters squared.  No Pain (0) Comment: Data Unavailable   No LMP recorded. Patient is postmenopausal.  Allergies reviewed: Yes  Medications reviewed: Yes    Medications: Medication refills not needed today.  Pharmacy name entered into [facility]: [facility] - [facility] [id number] [facility]    Clinical concerns: OK to use Melatonin?       Barbie James CMA                Again, thank you for allowing me to participate in the care of your patient.        Sincerely,        Lazara Torres NP    "

## 2023-05-08 NOTE — PROGRESS NOTES
PAC labs drawn without difficulty via site protocol. Patient tolerated well.    VENKATA ALLEN RN on 5/8/2023 at 8:12 AM

## 2023-05-08 NOTE — PROGRESS NOTES
[facility] Hematology and Oncology Progress Note    Patient: Precious Paris  MRN: [id number]  May 8, 2023          Reason for Visit    1. 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 Beebe    _____________________________________________________________________________    History of Present Illness/ Interval History    Ms. Precious Paris is a 82 year old with recurrent metastatic triple negative breast cancer recurrence. Completed 3 cycles of Doxil with Pembro added to the 3rd cycle, with mixed response (some progression in mediastinal nodes and lung mets). Therefore, chemo was changed to carbo, gem and Pembro.  She had an excellent response after 3 cycles. Returns ahead of cycle 6 today.    She has tolerated this very well.  Great appetite. Mild nausea, alleviated with antiemetics as needed. No change in base fatigue. Denies any shortness of breath or chest pain. No fevers. No rash. No diarrhea.    Baseline urinary frequency/urgency especially overnight feels more notable on chemo; no odor, abd pain nor fevers. UA was previously negative.     Right breast firmness and pain is a bit better. No further drainage around nipple.       ECOG PS: 1      Oncology History/Treatment  Diagnosis/Stage:   11/2019: Right breast cancer, triple negative (oU3x-C2)    BRCA negative    10/2022: Recurrent, stage IV triple negative breast cancer (bilateral axillary, supraclav, mediastinal nodes + lung)  -persistent/progressive right breast firmness with new right nipple drainage and right axillary adenopathy  -bilateral diagnostic mammogram: increased density R breast with skin thickening. R breast US: 3.2 cm hypoechoic mass at 11:00 position 1 cm from nipple and multiple other small hypoechoic solid-appearing lesions in R breast + right axillary adenopathy (at least two hypoechoic vascular lesions measuring up to 2.5 cm)  -10/18/2022 biopsy R breast mass + R  axillary node: grade 3 invasive ductal cancer with tumor necrosis, ER/[place]/HER2 negative.   -PET: + right breast mass; bilateral axillary, retropectoral, supraclavicular, mediastinal/R hilar nodes and bilateral lung/R pleural nodules suspicious for mets.  -Brain MRI: negative for mets  -11/1/2022 biopsy L axillary node: metastatic invasive ductal carcinoma  -Foundation One: no actionable mutations MS stable, TMB 1 mut/mb.   -PDL1 testing: CPS >20% and TPS 60% (considered high PDL1 expression)  -CA 27.29: 59.5 (elevated). CA 15-3: 23 (normal)    Treatment:  Initial breast cancer (2019)  -2019: Neoadjuvant taxol  -Lumpectomy  -5/2020: Adjuvant RT  -6 - 7/2020: Adjuvant Xeloda. Stopped for rash    Recurrent, stage IV breast cancer (2022)  -11/14/2022:Doxil every 4 weeks  -12/12/2022: changed to doxorubicin every 3 weeks. (Planning addition of pembrolizumab once approved by insurance)  -1/2/2023: doxorubicin + pembrolizumab every 3 weeks. Completed 1 cycle. Stopped for progression (med nodes, slight increase in lung mets and stable R breast mass + axillary nodes).    -1/23/2023 - present: Carbo AUC 5, Gemcitabine 800 mg/m2 and Pembro every 21 days  -cycles 1-2: [place] day 1 only  -Cycle 3: Republic increased to days 1 + 8 given excellent tolerance  -CT after 3 cycles showed excellent response (near CR axillary + med nodes and [place] in lung mets; clinical improvement in right breast changes and tumor marker).      Physical Exam    GENERAL: Alert and oriented to time place and person. Seated comfortably. In no distress.  accompanies.  HEAD: Atraumatic and normocephalic. Complete alopecia.  EYES: DELISA, EOMI. No erythema. No icterus.  LYMPH: Improved density/nodes in right axilla. No left axillary nodes. No cervical/supraclav nodes noted.   BREASTS: Right breast firmness (primarily lower/outer quadrants) is softer with no discrete masses/lumps. Firmness extends into proximal right axilla. The erythema and warmth of the breast is  resolved. Nipple retraction improved.  CHEST: clear to auscultation bilaterally. Resonant to percussion throughout bilaterally. Symmetrical breath movements bilaterally.  CVS: S1 and S2 are heard. Regular rate and rhythm. No murmur or gallop or rub heard.  ABDOMEN: Soft. Not tender. Not distended. No palpable hepatomegaly or splenomegaly. No other mass palpable. Bowel sounds present.  EXTREMITIES: Warm. No peripheral edema.  SKIN: A few scattered maculopapular red lesions left upper chest.   NEURO: No gross deficit noted. Non-antalgic gait.      Lab Results    CBC: Hgb 9.2 (stable), rest WNL. CMP unremarkable  CA 27.29 pending      Assessment/Plan  1. Recurrent, stage IV triple negative right breast cancer (nodes, lung); CPS>20% + TPS 60%  2. 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    CBC and CMP adequate for treatment.     Clinically, no sign of progression. Right breast mass/firmness is improved.  CA 27.29 has been responding, pending today.    Plan:  -Proceed with cycle 6 carbo, gemcitabine and pembrolizumab today. Continue same doses - carbo AUC 5 day 1 + gem 800 mg/m2 days 1 +8.  -Return in 3 weeks with PET scan and Dr. Beebe. If still responding, plan will be to transition to maintenance Pembro with the next cycle. Reviewed plan is to stay on immunotherapy as long as responding/tolerating, sometimes up to 2 yrs   -Trend CA 27.29      Billing  Total time 35 minutes, to include face to face visit, review of EMR, ordering, documentation and coordination of care    Signed by: Lazara Ashton NP

## 2023-05-08 NOTE — PROGRESS NOTES
"Oncology Rooming Note    May 8, 2023 8:41 AM   Precious Paris is a 82 year old female who presents for:    Chief Complaint   Patient presents with     Oncology Clinic Visit     Malignant neoplasm of upper-outer quadrant of right breast in female, estrogen receptor negative - Lab provider and infusion     Initial Vitals: /47 (BP Location: Right arm, Patient Position: Sitting, Cuff Size: Adult Large)   Pulse 80   Temp 98.4  F (36.9  C) (Tympanic)   Resp 12   Wt 86.5 kg (190 lb 12.8 oz)   SpO2 99%   BMI 32.24 kg/m   Estimated body mass index is 32.24 kg/m  as calculated from the following:    Height as of 3/28/23: 1.638 m (5' 4.5\").    Weight as of this encounter: 86.5 kg (190 lb 12.8 oz). Body surface area is 1.98 meters squared.  No Pain (0) Comment: Data Unavailable   No LMP recorded. Patient is postmenopausal.  Allergies reviewed: Yes  Medications reviewed: Yes    Medications: Medication refills not needed today.  Pharmacy name entered into [facility]: [facility] [place] [facility] [id number] [facility]    Clinical concerns: OK to use Melatonin?       Barbie James, AXEL            "

## 2023-05-09 LAB — CANCER AG27-29 SERPL-ACNC: 18.3 U/ML

## 2023-05-15 ENCOUNTER — LAB (OUTPATIENT)
Dept: INFUSION THERAPY | Facility: CLINIC | Age: 83
End: 2023-05-15
Attending: INTERNAL MEDICINE
Payer: COMMERCIAL

## 2023-05-15 VITALS
WEIGHT: 188.8 LBS | TEMPERATURE: 98.2 F | BODY MASS INDEX: 31.91 KG/M2 | HEART RATE: 73 BPM | RESPIRATION RATE: 18 BRPM | DIASTOLIC BLOOD PRESSURE: 78 MMHG | SYSTOLIC BLOOD PRESSURE: 129 MMHG

## 2023-05-15 DIAGNOSIS — Z17.1 MALIGNANT NEOPLASM OF UPPER-OUTER QUADRANT OF RIGHT BREAST IN FEMALE, ESTROGEN RECEPTOR NEGATIVE (H): ICD-10-CM

## 2023-05-15 DIAGNOSIS — C50.919 PRIMARY MALIGNANT NEOPLASM OF BREAST WITH METASTASIS (H): Primary | ICD-10-CM

## 2023-05-15 DIAGNOSIS — C50.411 MALIGNANT NEOPLASM OF UPPER-OUTER QUADRANT OF RIGHT BREAST IN FEMALE, ESTROGEN RECEPTOR NEGATIVE (H): ICD-10-CM

## 2023-05-15 LAB
BASOPHILS # BLD AUTO: 0 10E3/UL (ref 0–0.2)
BASOPHILS NFR BLD AUTO: 1 %
EOSINOPHIL # BLD AUTO: 0.1 10E3/UL (ref 0–0.7)
EOSINOPHIL NFR BLD AUTO: 1 %
ERYTHROCYTE [DISTWIDTH] IN BLOOD BY AUTOMATED COUNT: 16.9 % (ref 10–15)
HCT VFR BLD AUTO: 26.8 % (ref 35–47)
HGB BLD-MCNC: 8.9 G/DL (ref 11.7–15.7)
IMM GRANULOCYTES # BLD: 0 10E3/UL
IMM GRANULOCYTES NFR BLD: 1 %
LYMPHOCYTES # BLD AUTO: 0.9 10E3/UL (ref 0.8–5.3)
LYMPHOCYTES NFR BLD AUTO: 20 %
MCH RBC QN AUTO: 32.8 PG (ref 26.5–33)
MCHC RBC AUTO-ENTMCNC: 33.2 G/DL (ref 31.5–36.5)
MCV RBC AUTO: 99 FL (ref 78–100)
MONOCYTES # BLD AUTO: 0.6 10E3/UL (ref 0–1.3)
MONOCYTES NFR BLD AUTO: 13 %
NEUTROPHILS # BLD AUTO: 2.7 10E3/UL (ref 1.6–8.3)
NEUTROPHILS NFR BLD AUTO: 64 %
NRBC # BLD AUTO: 0 10E3/UL
NRBC BLD AUTO-RTO: 0 /100
PLATELET # BLD AUTO: 235 10E3/UL (ref 150–450)
RBC # BLD AUTO: 2.71 10E6/UL (ref 3.8–5.2)
WBC # BLD AUTO: 4.2 10E3/UL (ref 4–11)

## 2023-05-15 PROCEDURE — 96375 TX/PRO/DX INJ NEW DRUG ADDON: CPT

## 2023-05-15 PROCEDURE — 250N000011 HC RX IP 250 OP 636: Performed by: NURSE PRACTITIONER

## 2023-05-15 PROCEDURE — 96413 CHEMO IV INFUSION 1 HR: CPT

## 2023-05-15 PROCEDURE — 85004 AUTOMATED DIFF WBC COUNT: CPT | Performed by: NURSE PRACTITIONER

## 2023-05-15 PROCEDURE — 258N000003 HC RX IP 258 OP 636: Performed by: NURSE PRACTITIONER

## 2023-05-15 RX ORDER — HEPARIN SODIUM (PORCINE) LOCK FLUSH IV SOLN 100 UNIT/ML 100 UNIT/ML
5 SOLUTION INTRAVENOUS
Status: DISCONTINUED | OUTPATIENT
Start: 2023-05-15 | End: 2023-05-15 | Stop reason: HOSPADM

## 2023-05-15 RX ORDER — ONDANSETRON 2 MG/ML
8 INJECTION INTRAMUSCULAR; INTRAVENOUS ONCE
Status: COMPLETED | OUTPATIENT
Start: 2023-05-15 | End: 2023-05-15

## 2023-05-15 RX ADMIN — Medication 5 ML: at 11:33

## 2023-05-15 RX ADMIN — SODIUM CHLORIDE 250 ML: 9 INJECTION, SOLUTION INTRAVENOUS at 10:27

## 2023-05-15 RX ADMIN — ONDANSETRON 8 MG: 2 INJECTION INTRAMUSCULAR; INTRAVENOUS at 10:27

## 2023-05-15 RX ADMIN — FAMOTIDINE 20 MG: 10 INJECTION INTRAVENOUS at 10:27

## 2023-05-15 RX ADMIN — GEMCITABINE 1600 MG: 38 INJECTION, SOLUTION INTRAVENOUS at 11:04

## 2023-05-15 ASSESSMENT — PAIN SCALES - GENERAL: PAINLEVEL: NO PAIN (0)

## 2023-05-15 NOTE — PROGRESS NOTES
Infusion Nursing Note:  Precious KEISHA ArreolaWellington presents today for Gemzar C6D8.    Patient seen by provider today: No   present during visit today: Not Applicable.    Note: N/A.    Intravenous Access:  Implanted Port.    Treatment Conditions:  Lab Results   Component Value Date    HGB 8.9 (L) 05/15/2023    WBC 4.2 05/15/2023    ANEU 2.6 11/28/2022    ANEUTAUTO 2.7 05/15/2023     05/15/2023      Results reviewed, labs MET treatment parameters, ok to proceed with treatment.    Post Infusion Assessment:  Patient tolerated infusion without incident.  Blood return noted pre and post infusion.  Site patent and intact, free from redness, edema or discomfort.  No evidence of extravasations.  Access discontinued per protocol.     Discharge Plan:   Discharge instructions reviewed with: Patient.  Patient and/or family verbalized understanding of discharge instructions and all questions answered.  AVS to patient via [email or web address].  Patient will return 5/30/2023 for next appointment.   Patient discharged in stable condition accompanied by: self.  Departure Mode: Ambulatory.    Therese Fountain RN

## 2023-05-25 ENCOUNTER — HOSPITAL ENCOUNTER (OUTPATIENT)
Dept: PET IMAGING | Facility: CLINIC | Age: 83
Discharge: HOME | End: 2023-05-25
Attending: NURSE PRACTITIONER | Admitting: NURSE PRACTITIONER
Payer: COMMERCIAL

## 2023-05-25 DIAGNOSIS — Z17.1 MALIGNANT NEOPLASM OF UPPER-OUTER QUADRANT OF RIGHT BREAST IN FEMALE, ESTROGEN RECEPTOR NEGATIVE (H): ICD-10-CM

## 2023-05-25 DIAGNOSIS — C78.01 MALIGNANT NEOPLASM METASTATIC TO BOTH LUNGS (H): ICD-10-CM

## 2023-05-25 DIAGNOSIS — C78.02 MALIGNANT NEOPLASM METASTATIC TO BOTH LUNGS (H): ICD-10-CM

## 2023-05-25 DIAGNOSIS — C77.3 METASTATIC CANCER TO AXILLARY LYMPH NODES (H): ICD-10-CM

## 2023-05-25 DIAGNOSIS — C50.411 MALIGNANT NEOPLASM OF UPPER-OUTER QUADRANT OF RIGHT BREAST IN FEMALE, ESTROGEN RECEPTOR NEGATIVE (H): ICD-10-CM

## 2023-05-25 PROCEDURE — 343N000001 HC RX 343: Performed by: NURSE PRACTITIONER

## 2023-05-25 PROCEDURE — 78815 PET IMAGE W/CT SKULL-THIGH: CPT | Mod: PS

## 2023-05-25 PROCEDURE — A9552 F18 FDG: HCPCS | Performed by: NURSE PRACTITIONER

## 2023-05-25 RX ADMIN — FLUDEOXYGLUCOSE F-18 12.14 MILLICURIE: 500 INJECTION, SOLUTION INTRAVENOUS at 10:33

## 2023-05-30 ENCOUNTER — INFUSION THERAPY VISIT (OUTPATIENT)
Dept: INFUSION THERAPY | Facility: CLINIC | Age: 83
End: 2023-05-30
Attending: INTERNAL MEDICINE
Payer: COMMERCIAL

## 2023-05-30 ENCOUNTER — ONCOLOGY VISIT (OUTPATIENT)
Dept: ONCOLOGY | Facility: CLINIC | Age: 83
End: 2023-05-30
Attending: NURSE PRACTITIONER
Payer: COMMERCIAL

## 2023-05-30 VITALS — SYSTOLIC BLOOD PRESSURE: 131 MMHG | HEART RATE: 71 BPM | DIASTOLIC BLOOD PRESSURE: 66 MMHG

## 2023-05-30 VITALS
SYSTOLIC BLOOD PRESSURE: 148 MMHG | BODY MASS INDEX: 31.49 KG/M2 | OXYGEN SATURATION: 96 % | WEIGHT: 189 LBS | DIASTOLIC BLOOD PRESSURE: 62 MMHG | HEART RATE: 75 BPM | TEMPERATURE: 97.6 F | HEIGHT: 65 IN | RESPIRATION RATE: 12 BRPM

## 2023-05-30 DIAGNOSIS — Z17.1 MALIGNANT NEOPLASM OF UPPER-OUTER QUADRANT OF RIGHT BREAST IN FEMALE, ESTROGEN RECEPTOR NEGATIVE (H): ICD-10-CM

## 2023-05-30 DIAGNOSIS — C50.919 PRIMARY MALIGNANT NEOPLASM OF BREAST WITH METASTASIS (H): ICD-10-CM

## 2023-05-30 DIAGNOSIS — C50.411 MALIGNANT NEOPLASM OF UPPER-OUTER QUADRANT OF RIGHT BREAST IN FEMALE, ESTROGEN RECEPTOR NEGATIVE (H): ICD-10-CM

## 2023-05-30 DIAGNOSIS — C50.911 INFILTRATING DUCTAL CARCINOMA OF RIGHT BREAST (H): Primary | ICD-10-CM

## 2023-05-30 DIAGNOSIS — E78.5 HYPERLIPIDEMIA LDL GOAL <130: ICD-10-CM

## 2023-05-30 DIAGNOSIS — E87.6 HYPOKALEMIA: Primary | ICD-10-CM

## 2023-05-30 DIAGNOSIS — C50.919 PRIMARY MALIGNANT NEOPLASM OF BREAST WITH METASTASIS (H): Primary | ICD-10-CM

## 2023-05-30 LAB
ALBUMIN SERPL BCG-MCNC: 3.9 G/DL (ref 3.5–5.2)
ALP SERPL-CCNC: 89 U/L (ref 35–104)
ALT SERPL W P-5'-P-CCNC: 18 U/L (ref 10–35)
ANION GAP SERPL CALCULATED.3IONS-SCNC: 10 MMOL/L (ref 7–15)
AST SERPL W P-5'-P-CCNC: 29 U/L (ref 10–35)
BASOPHILS # BLD AUTO: 0 10E3/UL (ref 0–0.2)
BASOPHILS NFR BLD AUTO: 1 %
BILIRUB SERPL-MCNC: 0.3 MG/DL
BUN SERPL-MCNC: 11.1 MG/DL (ref 8–23)
CALCIUM SERPL-MCNC: 9.4 MG/DL (ref 8.8–10.2)
CANCER AG15-3 SERPL-ACNC: 17 U/ML
CHLORIDE SERPL-SCNC: 104 MMOL/L (ref 98–107)
CREAT SERPL-MCNC: 0.58 MG/DL (ref 0.51–0.95)
DEPRECATED HCO3 PLAS-SCNC: 27 MMOL/L (ref 22–29)
EOSINOPHIL # BLD AUTO: 0.1 10E3/UL (ref 0–0.7)
EOSINOPHIL NFR BLD AUTO: 1 %
ERYTHROCYTE [DISTWIDTH] IN BLOOD BY AUTOMATED COUNT: 16.9 % (ref 10–15)
GFR SERPL CREATININE-BSD FRML MDRD: 90 ML/MIN/1.73M2
GLUCOSE SERPL-MCNC: 124 MG/DL (ref 70–99)
HCT VFR BLD AUTO: 28.5 % (ref 35–47)
HGB BLD-MCNC: 9.6 G/DL (ref 11.7–15.7)
IMM GRANULOCYTES # BLD: 0 10E3/UL
IMM GRANULOCYTES NFR BLD: 0 %
LYMPHOCYTES # BLD AUTO: 0.9 10E3/UL (ref 0.8–5.3)
LYMPHOCYTES NFR BLD AUTO: 18 %
MCH RBC QN AUTO: 33.8 PG (ref 26.5–33)
MCHC RBC AUTO-ENTMCNC: 33.7 G/DL (ref 31.5–36.5)
MCV RBC AUTO: 100 FL (ref 78–100)
MONOCYTES # BLD AUTO: 0.9 10E3/UL (ref 0–1.3)
MONOCYTES NFR BLD AUTO: 19 %
NEUTROPHILS # BLD AUTO: 2.9 10E3/UL (ref 1.6–8.3)
NEUTROPHILS NFR BLD AUTO: 61 %
NRBC # BLD AUTO: 0 10E3/UL
NRBC BLD AUTO-RTO: 0 /100
PLATELET # BLD AUTO: 248 10E3/UL (ref 150–450)
POTASSIUM SERPL-SCNC: 3.2 MMOL/L (ref 3.4–5.3)
PROT SERPL-MCNC: 6.9 G/DL (ref 6.4–8.3)
RBC # BLD AUTO: 2.84 10E6/UL (ref 3.8–5.2)
SODIUM SERPL-SCNC: 141 MMOL/L (ref 136–145)
TSH SERPL DL<=0.005 MIU/L-ACNC: 1.39 UIU/ML (ref 0.3–4.2)
WBC # BLD AUTO: 4.8 10E3/UL (ref 4–11)

## 2023-05-30 PROCEDURE — G0463 HOSPITAL OUTPT CLINIC VISIT: HCPCS | Mod: 25 | Performed by: INTERNAL MEDICINE

## 2023-05-30 PROCEDURE — 250N000011 HC RX IP 250 OP 636: Performed by: INTERNAL MEDICINE

## 2023-05-30 PROCEDURE — 36591 DRAW BLOOD OFF VENOUS DEVICE: CPT | Performed by: INTERNAL MEDICINE

## 2023-05-30 PROCEDURE — 84443 ASSAY THYROID STIM HORMONE: CPT | Performed by: INTERNAL MEDICINE

## 2023-05-30 PROCEDURE — 258N000003 HC RX IP 258 OP 636: Performed by: INTERNAL MEDICINE

## 2023-05-30 PROCEDURE — 86300 IMMUNOASSAY TUMOR CA 15-3: CPT | Performed by: INTERNAL MEDICINE

## 2023-05-30 PROCEDURE — 85025 COMPLETE CBC W/AUTO DIFF WBC: CPT | Performed by: INTERNAL MEDICINE

## 2023-05-30 PROCEDURE — 99214 OFFICE O/P EST MOD 30 MIN: CPT | Mod: VID | Performed by: INTERNAL MEDICINE

## 2023-05-30 PROCEDURE — 86300 IMMUNOASSAY TUMOR CA 15-3: CPT | Performed by: NURSE PRACTITIONER

## 2023-05-30 PROCEDURE — 80053 COMPREHEN METABOLIC PANEL: CPT | Performed by: INTERNAL MEDICINE

## 2023-05-30 PROCEDURE — 96413 CHEMO IV INFUSION 1 HR: CPT

## 2023-05-30 RX ORDER — ALBUTEROL SULFATE 90 UG/1
1-2 AEROSOL, METERED RESPIRATORY (INHALATION)
Status: CANCELLED
Start: 2023-05-30

## 2023-05-30 RX ORDER — LORAZEPAM 2 MG/ML
0.5 INJECTION INTRAMUSCULAR EVERY 4 HOURS PRN
Status: CANCELLED | OUTPATIENT
Start: 2023-06-20

## 2023-05-30 RX ORDER — HEPARIN SODIUM (PORCINE) LOCK FLUSH IV SOLN 100 UNIT/ML 100 UNIT/ML
5 SOLUTION INTRAVENOUS
Status: CANCELLED | OUTPATIENT
Start: 2023-05-30

## 2023-05-30 RX ORDER — ALBUTEROL SULFATE 0.83 MG/ML
2.5 SOLUTION RESPIRATORY (INHALATION)
Status: CANCELLED | OUTPATIENT
Start: 2023-06-20

## 2023-05-30 RX ORDER — ALBUTEROL SULFATE 90 UG/1
1-2 AEROSOL, METERED RESPIRATORY (INHALATION)
Status: CANCELLED
Start: 2023-06-20

## 2023-05-30 RX ORDER — POTASSIUM CHLORIDE 1.5 G/1.58G
20 POWDER, FOR SOLUTION ORAL DAILY
Qty: 21 PACKET | Refills: 0 | Status: SHIPPED | OUTPATIENT
Start: 2023-05-30 | End: 2023-06-28

## 2023-05-30 RX ORDER — HEPARIN SODIUM (PORCINE) LOCK FLUSH IV SOLN 100 UNIT/ML 100 UNIT/ML
5 SOLUTION INTRAVENOUS
Status: DISCONTINUED | OUTPATIENT
Start: 2023-05-30 | End: 2023-05-30 | Stop reason: HOSPADM

## 2023-05-30 RX ORDER — HEPARIN SODIUM (PORCINE) LOCK FLUSH IV SOLN 100 UNIT/ML 100 UNIT/ML
5 SOLUTION INTRAVENOUS
Status: CANCELLED | OUTPATIENT
Start: 2023-06-20

## 2023-05-30 RX ORDER — METHYLPREDNISOLONE SODIUM SUCCINATE 125 MG/2ML
125 INJECTION, POWDER, LYOPHILIZED, FOR SOLUTION INTRAMUSCULAR; INTRAVENOUS
Status: CANCELLED
Start: 2023-06-20

## 2023-05-30 RX ORDER — METHYLPREDNISOLONE SODIUM SUCCINATE 125 MG/2ML
125 INJECTION, POWDER, LYOPHILIZED, FOR SOLUTION INTRAMUSCULAR; INTRAVENOUS
Status: CANCELLED
Start: 2023-05-30

## 2023-05-30 RX ORDER — DIPHENHYDRAMINE HYDROCHLORIDE 50 MG/ML
50 INJECTION INTRAMUSCULAR; INTRAVENOUS
Status: CANCELLED
Start: 2023-06-20

## 2023-05-30 RX ORDER — MEPERIDINE HYDROCHLORIDE 25 MG/ML
25 INJECTION INTRAMUSCULAR; INTRAVENOUS; SUBCUTANEOUS EVERY 30 MIN PRN
Status: CANCELLED | OUTPATIENT
Start: 2023-06-20

## 2023-05-30 RX ORDER — HEPARIN SODIUM,PORCINE 10 UNIT/ML
5 VIAL (ML) INTRAVENOUS
Status: CANCELLED | OUTPATIENT
Start: 2023-06-20

## 2023-05-30 RX ORDER — DIPHENHYDRAMINE HYDROCHLORIDE 50 MG/ML
50 INJECTION INTRAMUSCULAR; INTRAVENOUS
Status: CANCELLED
Start: 2023-05-30

## 2023-05-30 RX ORDER — MEPERIDINE HYDROCHLORIDE 25 MG/ML
25 INJECTION INTRAMUSCULAR; INTRAVENOUS; SUBCUTANEOUS EVERY 30 MIN PRN
Status: CANCELLED | OUTPATIENT
Start: 2023-05-30

## 2023-05-30 RX ORDER — HEPARIN SODIUM,PORCINE 10 UNIT/ML
5 VIAL (ML) INTRAVENOUS
Status: CANCELLED | OUTPATIENT
Start: 2023-05-30

## 2023-05-30 RX ORDER — EPINEPHRINE 1 MG/ML
0.3 INJECTION, SOLUTION, CONCENTRATE INTRAVENOUS EVERY 5 MIN PRN
Status: CANCELLED | OUTPATIENT
Start: 2023-06-20

## 2023-05-30 RX ORDER — ALBUTEROL SULFATE 0.83 MG/ML
2.5 SOLUTION RESPIRATORY (INHALATION)
Status: CANCELLED | OUTPATIENT
Start: 2023-05-30

## 2023-05-30 RX ORDER — EPINEPHRINE 1 MG/ML
0.3 INJECTION, SOLUTION, CONCENTRATE INTRAVENOUS EVERY 5 MIN PRN
Status: CANCELLED | OUTPATIENT
Start: 2023-05-30

## 2023-05-30 RX ORDER — LORAZEPAM 2 MG/ML
0.5 INJECTION INTRAMUSCULAR EVERY 4 HOURS PRN
Status: CANCELLED | OUTPATIENT
Start: 2023-05-30

## 2023-05-30 RX ADMIN — Medication 5 ML: at 12:07

## 2023-05-30 RX ADMIN — SODIUM CHLORIDE 250 ML: 9 INJECTION, SOLUTION INTRAVENOUS at 11:34

## 2023-05-30 RX ADMIN — SODIUM CHLORIDE 200 MG: 9 INJECTION, SOLUTION INTRAVENOUS at 11:32

## 2023-05-30 ASSESSMENT — PAIN SCALES - GENERAL: PAINLEVEL: NO PAIN (0)

## 2023-05-30 NOTE — PROGRESS NOTES
Virtual Visit Details    Type of service:  Video Visit   Video Start Time: 10:46 AM  Video End Time:10:57 AM    Originating Location (pt. Location): Other in clinic    Distant Location (provider location):  Off-site  Platform used for Video Visit: Aaron Vallejo CMA on 5/30/2023 at 10:09 AM    [facility] Hematology and Oncology Progress Note    Patient: Precious Paris  MRN: [id number]  May 30, 2023          Reason for Visit    1. 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 Beebe    _____________________________________________________________________________    History of Present Illness/ Interval History    Ms. Precious Paris is a 82 year old with recurrent metastatic triple negative breast cancer recurrence. Completed 3 cycles of Doxil with Pembro added to the 3rd cycle, with mixed response (some progression in mediastinal nodes and lung mets). Therefore, chemo was changed to carbo, gem and Pembro.  She had an excellent response after 3 cycles.  Now has completed 6 cycles.    She is seen as a video visit with repeat PET scan.  Overall doing well.  She tolerated chemotherapy without any major side effects.  Did have some nausea issues after each chemo but otherwise no other major side effects.  Doing well today.  Denies any new pain issues.  No new loss of motion noted.  No fevers or chills.        ECOG PS: 1      Oncology History/Treatment  Diagnosis/Stage:   11/2019: Right breast cancer, triple negative ([id number])    BRCA negative    10/2022: Recurrent, stage IV triple negative breast cancer (bilateral axillary, supraclav, mediastinal nodes + lung)  -persistent/progressive right breast firmness with new right nipple drainage and right axillary adenopathy  -bilateral diagnostic mammogram: increased density R breast with skin thickening. R breast US: 3.2 cm hypoechoic mass at 11:00 position 1 cm from nipple and multiple  other small hypoechoic solid-appearing lesions in R breast + right axillary adenopathy (at least two hypoechoic vascular lesions measuring up to 2.5 cm)  -10/18/2022 biopsy R breast mass + R axillary node: grade 3 invasive ductal cancer with tumor necrosis, ER/WA/HER2 negative.   -PET: + right breast mass; bilateral axillary, retropectoral, supraclavicular, mediastinal/R hilar nodes and bilateral lung/R pleural nodules suspicious for mets.  -Brain MRI: negative for mets  -11/1/2022 biopsy L axillary node: metastatic invasive ductal carcinoma  -Foundation One: no actionable mutations MS stable, TMB 1 mut/mb.   -PDL1 testing: CPS >20% and TPS 60% (considered high PDL1 expression)  -CA 27.29: 59.5 (elevated). CA 15-3: 23 (normal)    Treatment:  Initial breast cancer (2019)  -2019: Neoadjuvant taxol  -Lumpectomy  -5/2020: Adjuvant RT  -6 - 7/2020: Adjuvant Xeloda. Stopped for rash    Recurrent, stage IV breast cancer (2022)  -11/14/2022:Doxil every 4 weeks  -12/12/2022: changed to doxorubicin every 3 weeks. (Planning addition of pembrolizumab once approved by insurance)  -1/2/2023: doxorubicin + pembrolizumab every 3 weeks. Completed 1 cycle. Stopped for progression (med nodes, slight increase in lung mets and stable R breast mass + axillary nodes).    -1/23/2023 - present: Carbo AUC 5, Gemcitabine 800 mg/m2 and Pembro every 21 days  -cycles 1-2: [place] day 1 only  -Cycle 3: [place] increased to days 1 + 8 given excellent tolerance  -CT after 3 cycles showed excellent response (near CR axillary + med nodes and WA in lung mets; clinical improvement in right breast changes and tumor marker).    -PET scan after 6 cycles of carboplatin gemcitabine shows complete response    -Maintenance durvalumab starting 5/30/2023      Physical Exam    GENERAL: Alert and oriented to time place and person. Seated comfortably. In no distress.  accompanies.        Lab Results    CBC: Hgb 9.2 (stable), rest WNL. CMP unremarkable  CA 27.29  pending      Assessment/Plan  1. Recurrent, stage IV triple negative right breast cancer (nodes, lung); CPS>20% + TPS 60%  2. 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  Did really well with the treatment.  No major side effects except for some mild nausea.  She had an excellent response on interim scan.  No major immunotherapy related side effects    I reviewed her PET scan in detail today.  It shows complete metabolic response.  No evidence of active cancer on the current PET scan.  Congratulated her on this.  Plan is to continue maintenance pembrolizumab as long as she is tolerating it and has no evidence of disease progression.  We discussed about 3 weekly Pembro versus 6 weekly.  She wants to continue 3 weekly for now and will decide on switching to every 6 weeks later.  Labs reviewed today.  She has mild hypokalemia with a potassium of 3.2 but other electrolytes and LFTs are within normal limits.  We will send potassium chloride packets.  Creatinine is normal.  CBC shows a stable anemia with a hemoglobin of 6.  Normal platelet and white counts.  No contraindications to proceed with pembrolizumab today.  TSH is 1.39.  She Kmak in 3 weeks for labs and pembrolizumab infusion only.  We will see her back in 6 weeks with labs and #2 follow-up same day.        Signed by:   Maricarmen Beebe MD  Hematology and Medical Oncology  [facility]

## 2023-05-30 NOTE — LETTER
5/30/2023         RE: Precious Paris  [address] [place] [address]        Dear Colleague,    Thank you for referring your patient, Precious Paris, to the [facility]. Please see a copy of my visit note below.    Virtual Visit Details    Type of service:  Video Visit   Video Start Time: 10:46 AM  Video End Time:10:57 AM    Originating Location (pt. Location): Other in clinic    Distant Location (provider location):  Off-site  Platform used for Video Visit: Aaron Vallejo CMA on 5/30/2023 at 10:09 AM    [facility] Hematology and Oncology Progress Note    Patient: Precious Paris  MRN: [id number]  May 30, 2023          Reason for Visit    1. 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 Beebe    _____________________________________________________________________________    History of Present Illness/ Interval History    Ms. Precious Paris is a 82 year old with recurrent metastatic triple negative breast cancer recurrence. Completed 3 cycles of Doxil with Pembro added to the 3rd cycle, with mixed response (some progression in mediastinal nodes and lung mets). Therefore, chemo was changed to carbo, gem and Pembro.  She had an excellent response after 3 cycles.  Now has completed 6 cycles.    She is seen as a video visit with repeat PET scan.  Overall doing well.  She tolerated chemotherapy without any major side effects.  Did have some nausea issues after each chemo but otherwise no other major side effects.  Doing well today.  Denies any new pain issues.  No new loss of motion noted.  No fevers or chills.        ECOG PS: 1      Oncology History/Treatment  Diagnosis/Stage:   11/2019: Right breast cancer, triple negative ([id number])    BRCA negative    10/2022: Recurrent, stage IV triple negative breast cancer (bilateral axillary, supraclav, mediastinal nodes +  lung)  -persistent/progressive right breast firmness with new right nipple drainage and right axillary adenopathy  -bilateral diagnostic mammogram: increased density R breast with skin thickening. R breast US: 3.2 cm hypoechoic mass at 11:00 position 1 cm from nipple and multiple other small hypoechoic solid-appearing lesions in R breast + right axillary adenopathy (at least two hypoechoic vascular lesions measuring up to 2.5 cm)  -10/18/2022 biopsy R breast mass + R axillary node: grade 3 invasive ductal cancer with tumor necrosis, ER/[place]/HER2 negative.   -PET: + right breast mass; bilateral axillary, retropectoral, supraclavicular, mediastinal/R hilar nodes and bilateral lung/R pleural nodules suspicious for mets.  -Brain MRI: negative for mets  -11/1/2022 biopsy L axillary node: metastatic invasive ductal carcinoma  -Foundation One: no actionable mutations MS stable, TMB 1 mut/mb.   -PDL1 testing: CPS >20% and TPS 60% (considered high PDL1 expression)  -CA 27.29: 59.5 (elevated). CA 15-3: 23 (normal)    Treatment:  Initial breast cancer (2019)  -2019: Neoadjuvant taxol  -Lumpectomy  -5/2020: Adjuvant RT  -6 - 7/2020: Adjuvant Xeloda. Stopped for rash    Recurrent, stage IV breast cancer (2022)  -11/14/2022:Doxil every 4 weeks  -12/12/2022: changed to doxorubicin every 3 weeks. (Planning addition of pembrolizumab once approved by insurance)  -1/2/2023: doxorubicin + pembrolizumab every 3 weeks. Completed 1 cycle. Stopped for progression (med nodes, slight increase in lung mets and stable R breast mass + axillary nodes).    -1/23/2023 - present: Carbo AUC 5, Gemcitabine 800 mg/m2 and Pembro every 21 days  -cycles 1-2: Wilcox day 1 only  -Cycle 3: Wilcox increased to days 1 + 8 given excellent tolerance  -CT after 3 cycles showed excellent response (near CR axillary + med nodes and W[place] in lung mets; clinical improvement in right breast changes and tumor marker).    -PET scan after 6 cycles of carboplatin gemcitabine shows  complete response    -Maintenance durvalumab starting 5/30/2023      Physical Exam    GENERAL: Alert and oriented to time place and person. Seated comfortably. In no distress.  accompanies.        Lab Results    CBC: Hgb 9.2 (stable), rest WNL. CMP unremarkable  CA 27.29 pending      Assessment/Plan  1. Recurrent, stage IV triple negative right breast cancer (nodes, lung); CPS>20% + TPS 60%  2. 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  Did really well with the treatment.  No major side effects except for some mild nausea.  She had an excellent response on interim scan.  No major immunotherapy related side effects    I reviewed her PET scan in detail today.  It shows complete metabolic response.  No evidence of active cancer on the current PET scan.  Congratulated her on this.  Plan is to continue maintenance pembrolizumab as long as she is tolerating it and has no evidence of disease progression.  We discussed about 3 weekly Pembro versus 6 weekly.  She wants to continue 3 weekly for now and will decide on switching to every 6 weeks later.  Labs reviewed today.  She has mild hypokalemia with a potassium of 3.2 but other electrolytes and LFTs are within normal limits.  We will send potassium chloride packets.  Creatinine is normal.  CBC shows a stable anemia with a hemoglobin of 6.  Normal platelet and white counts.  No contraindications to proceed with pembrolizumab today.  TSH is 1.39.  She Kmak in 3 weeks for labs and pembrolizumab infusion only.  We will see her back in 6 weeks with labs and #2 follow-up same day.        Signed by:   Maricarmen Beebe MD  Hematology and Medical Oncology  [facility]        Again, thank you for allowing me to participate in the care of your patient.        Sincerely,        Maricarmen Beebe MD

## 2023-05-30 NOTE — PROGRESS NOTES
Infusion Nursing Note:  Precious Paris presents today for Keytruda.    Patient seen by provider today: Yes: Dr. Beebe; therefore, assessment deferred   present during visit today: Not Applicable.    Note: N/A.    Intravenous Access:  Implanted Port.    Treatment Conditions:  Lab Results   Component Value Date     05/30/2023    POTASSIUM 3.2 (L) 05/30/2023    CR 0.58 05/30/2023    YADIRA 9.4 05/30/2023    BILITOTAL 0.3 05/30/2023    ALBUMIN 3.9 05/30/2023    ALT 18 05/30/2023    AST 29 05/30/2023   Results reviewed, labs MET treatment parameters, ok to proceed with treatment.    Post Infusion Assessment:  Patient tolerated infusion without incident.  Blood return noted pre and post infusion.  Site patent and intact, free from redness, edema or discomfort.  No evidence of extravasations.  Access discontinued per protocol.     Discharge Plan:   Discharge instructions reviewed with: Patient.  Patient and/or family verbalized understanding of discharge instructions and all questions answered.  AVS to patient via [employer].  Patient will return 6/20/2023 for next appointment.   Patient discharged in stable condition accompanied by: self.  Departure Mode: Ambulatory.    Therese Fountain RN

## 2023-05-31 ENCOUNTER — PATIENT OUTREACH (OUTPATIENT)
Dept: CARE COORDINATION | Facility: CLINIC | Age: 83
End: 2023-05-31
Payer: COMMERCIAL

## 2023-06-20 ENCOUNTER — LAB (OUTPATIENT)
Dept: INFUSION THERAPY | Facility: CLINIC | Age: 83
End: 2023-06-20
Attending: INTERNAL MEDICINE
Payer: COMMERCIAL

## 2023-06-20 VITALS — DIASTOLIC BLOOD PRESSURE: 72 MMHG | HEART RATE: 73 BPM | RESPIRATION RATE: 18 BRPM | SYSTOLIC BLOOD PRESSURE: 128 MMHG

## 2023-06-20 VITALS
RESPIRATION RATE: 18 BRPM | TEMPERATURE: 98.1 F | BODY MASS INDEX: 31.81 KG/M2 | WEIGHT: 188.2 LBS | SYSTOLIC BLOOD PRESSURE: 137 MMHG | HEART RATE: 76 BPM | DIASTOLIC BLOOD PRESSURE: 63 MMHG

## 2023-06-20 DIAGNOSIS — Z17.1 MALIGNANT NEOPLASM OF UPPER-OUTER QUADRANT OF RIGHT BREAST IN FEMALE, ESTROGEN RECEPTOR NEGATIVE (H): ICD-10-CM

## 2023-06-20 DIAGNOSIS — C50.411 MALIGNANT NEOPLASM OF UPPER-OUTER QUADRANT OF RIGHT BREAST IN FEMALE, ESTROGEN RECEPTOR NEGATIVE (H): ICD-10-CM

## 2023-06-20 DIAGNOSIS — C50.919 PRIMARY MALIGNANT NEOPLASM OF BREAST WITH METASTASIS (H): Primary | ICD-10-CM

## 2023-06-20 LAB
ALBUMIN SERPL BCG-MCNC: 4.1 G/DL (ref 3.5–5.2)
ALP SERPL-CCNC: 89 U/L (ref 35–104)
ALT SERPL W P-5'-P-CCNC: 18 U/L (ref 0–50)
ANION GAP SERPL CALCULATED.3IONS-SCNC: 9 MMOL/L (ref 7–15)
AST SERPL W P-5'-P-CCNC: 28 U/L (ref 0–45)
BASOPHILS # BLD AUTO: 0.1 10E3/UL (ref 0–0.2)
BASOPHILS NFR BLD AUTO: 1 %
BILIRUB SERPL-MCNC: 0.2 MG/DL
BUN SERPL-MCNC: 12.8 MG/DL (ref 8–23)
CALCIUM SERPL-MCNC: 9.7 MG/DL (ref 8.8–10.2)
CANCER AG15-3 SERPL-ACNC: 15 U/ML
CHLORIDE SERPL-SCNC: 102 MMOL/L (ref 98–107)
CREAT SERPL-MCNC: 0.63 MG/DL (ref 0.51–0.95)
DEPRECATED HCO3 PLAS-SCNC: 28 MMOL/L (ref 22–29)
EOSINOPHIL # BLD AUTO: 0.1 10E3/UL (ref 0–0.7)
EOSINOPHIL NFR BLD AUTO: 2 %
ERYTHROCYTE [DISTWIDTH] IN BLOOD BY AUTOMATED COUNT: 14.1 % (ref 10–15)
GFR SERPL CREATININE-BSD FRML MDRD: 89 ML/MIN/1.73M2
GLUCOSE SERPL-MCNC: 95 MG/DL (ref 70–99)
HCT VFR BLD AUTO: 32.7 % (ref 35–47)
HGB BLD-MCNC: 10.8 G/DL (ref 11.7–15.7)
HOLD SPECIMEN: NORMAL
IMM GRANULOCYTES # BLD: 0 10E3/UL
IMM GRANULOCYTES NFR BLD: 0 %
LYMPHOCYTES # BLD AUTO: 1.3 10E3/UL (ref 0.8–5.3)
LYMPHOCYTES NFR BLD AUTO: 19 %
MCH RBC QN AUTO: 32.5 PG (ref 26.5–33)
MCHC RBC AUTO-ENTMCNC: 33 G/DL (ref 31.5–36.5)
MCV RBC AUTO: 99 FL (ref 78–100)
MONOCYTES # BLD AUTO: 0.8 10E3/UL (ref 0–1.3)
MONOCYTES NFR BLD AUTO: 11 %
NEUTROPHILS # BLD AUTO: 4.4 10E3/UL (ref 1.6–8.3)
NEUTROPHILS NFR BLD AUTO: 67 %
NRBC # BLD AUTO: 0 10E3/UL
NRBC BLD AUTO-RTO: 0 /100
PLATELET # BLD AUTO: 238 10E3/UL (ref 150–450)
POTASSIUM SERPL-SCNC: 3.6 MMOL/L (ref 3.4–5.3)
PROT SERPL-MCNC: 7.1 G/DL (ref 6.4–8.3)
RBC # BLD AUTO: 3.32 10E6/UL (ref 3.8–5.2)
SODIUM SERPL-SCNC: 139 MMOL/L (ref 136–145)
TSH SERPL DL<=0.005 MIU/L-ACNC: 1.67 UIU/ML (ref 0.3–4.2)
WBC # BLD AUTO: 6.7 10E3/UL (ref 4–11)

## 2023-06-20 PROCEDURE — 258N000003 HC RX IP 258 OP 636: Performed by: INTERNAL MEDICINE

## 2023-06-20 PROCEDURE — 80053 COMPREHEN METABOLIC PANEL: CPT | Performed by: INTERNAL MEDICINE

## 2023-06-20 PROCEDURE — 85025 COMPLETE CBC W/AUTO DIFF WBC: CPT | Performed by: INTERNAL MEDICINE

## 2023-06-20 PROCEDURE — 86300 IMMUNOASSAY TUMOR CA 15-3: CPT | Performed by: INTERNAL MEDICINE

## 2023-06-20 PROCEDURE — 96413 CHEMO IV INFUSION 1 HR: CPT

## 2023-06-20 PROCEDURE — 250N000011 HC RX IP 250 OP 636: Performed by: INTERNAL MEDICINE

## 2023-06-20 PROCEDURE — 84443 ASSAY THYROID STIM HORMONE: CPT | Performed by: INTERNAL MEDICINE

## 2023-06-20 RX ORDER — HEPARIN SODIUM (PORCINE) LOCK FLUSH IV SOLN 100 UNIT/ML 100 UNIT/ML
5 SOLUTION INTRAVENOUS
Status: DISCONTINUED | OUTPATIENT
Start: 2023-06-20 | End: 2023-06-20 | Stop reason: HOSPADM

## 2023-06-20 RX ADMIN — Medication 5 ML: at 15:04

## 2023-06-20 RX ADMIN — SODIUM CHLORIDE 250 ML: 9 INJECTION, SOLUTION INTRAVENOUS at 14:30

## 2023-06-20 RX ADMIN — SODIUM CHLORIDE 200 MG: 9 INJECTION, SOLUTION INTRAVENOUS at 14:30

## 2023-06-20 NOTE — PROGRESS NOTES
Infusion Nursing Note:  Precious KEISHA ArreolaWellington presents today for Keytruda.    Patient seen by provider today: No   present during visit today: Not Applicable.    Note: N/A.      Intravenous Access:  Labs drawn without difficulty.  Implanted Port.    Treatment Conditions:  Lab Results   Component Value Date     06/20/2023    POTASSIUM 3.6 06/20/2023    CR 0.63 06/20/2023    YADIRA 9.7 06/20/2023    BILITOTAL 0.2 06/20/2023    ALBUMIN 4.1 06/20/2023    ALT 18 06/20/2023    AST 28 06/20/2023     Results reviewed, labs MET treatment parameters, ok to proceed with treatment.      Post Infusion Assessment:  Patient tolerated infusion without incident.  Blood return noted pre and post infusion.  Site patent and intact, free from redness, edema or discomfort.       Discharge Plan:   Patient discharged in stable condition accompanied by: self.  Departure Mode: Ambulatory.      Bernie Vernon RN

## 2023-06-21 LAB — CANCER AG27-29 SERPL-ACNC: 17.5 U/ML

## 2023-06-28 ENCOUNTER — OFFICE VISIT (OUTPATIENT)
Dept: FAMILY MEDICINE | Facility: CLINIC | Age: 83
End: 2023-06-28
Payer: COMMERCIAL

## 2023-06-28 ENCOUNTER — LAB (OUTPATIENT)
Dept: LAB | Facility: CLINIC | Age: 83
End: 2023-06-28
Payer: COMMERCIAL

## 2023-06-28 VITALS
BODY MASS INDEX: 30.99 KG/M2 | TEMPERATURE: 97.9 F | RESPIRATION RATE: 18 BRPM | OXYGEN SATURATION: 94 % | HEIGHT: 65 IN | WEIGHT: 186 LBS | HEART RATE: 87 BPM | SYSTOLIC BLOOD PRESSURE: 126 MMHG | DIASTOLIC BLOOD PRESSURE: 70 MMHG

## 2023-06-28 DIAGNOSIS — E78.5 HYPERLIPIDEMIA LDL GOAL <130: ICD-10-CM

## 2023-06-28 DIAGNOSIS — Z17.1 MALIGNANT NEOPLASM OF UPPER-OUTER QUADRANT OF RIGHT BREAST IN FEMALE, ESTROGEN RECEPTOR NEGATIVE (H): ICD-10-CM

## 2023-06-28 DIAGNOSIS — I10 BENIGN ESSENTIAL HYPERTENSION: ICD-10-CM

## 2023-06-28 DIAGNOSIS — C50.919 PRIMARY MALIGNANT NEOPLASM OF BREAST WITH METASTASIS (H): ICD-10-CM

## 2023-06-28 DIAGNOSIS — C50.411 MALIGNANT NEOPLASM OF UPPER-OUTER QUADRANT OF RIGHT BREAST IN FEMALE, ESTROGEN RECEPTOR NEGATIVE (H): ICD-10-CM

## 2023-06-28 DIAGNOSIS — Z00.00 ENCOUNTER FOR MEDICARE ANNUAL WELLNESS EXAM: Primary | ICD-10-CM

## 2023-06-28 LAB
CHOLEST SERPL-MCNC: 222 MG/DL
HDLC SERPL-MCNC: 62 MG/DL
LDLC SERPL CALC-MCNC: 145 MG/DL
NONHDLC SERPL-MCNC: 160 MG/DL
TRIGL SERPL-MCNC: 76 MG/DL

## 2023-06-28 PROCEDURE — 99214 OFFICE O/P EST MOD 30 MIN: CPT | Mod: 25 | Performed by: FAMILY MEDICINE

## 2023-06-28 PROCEDURE — 80061 LIPID PANEL: CPT

## 2023-06-28 PROCEDURE — 36415 COLL VENOUS BLD VENIPUNCTURE: CPT

## 2023-06-28 PROCEDURE — G0439 PPPS, SUBSEQ VISIT: HCPCS | Performed by: FAMILY MEDICINE

## 2023-06-28 RX ORDER — LOSARTAN POTASSIUM AND HYDROCHLOROTHIAZIDE 12.5; 5 MG/1; MG/1
1 TABLET ORAL EVERY MORNING
Qty: 90 TABLET | Refills: 3 | Status: SHIPPED | OUTPATIENT
Start: 2023-06-28 | End: 2024-08-13

## 2023-06-28 ASSESSMENT — ENCOUNTER SYMPTOMS
COUGH: 0
NAUSEA: 0
DYSURIA: 0
WEAKNESS: 0
PALPITATIONS: 0
PARESTHESIAS: 0
ARTHRALGIAS: 0
ABDOMINAL PAIN: 0
FREQUENCY: 0
EYE PAIN: 0
CONSTIPATION: 0
SORE THROAT: 0
MYALGIAS: 0
FEVER: 0
JOINT SWELLING: 0
BREAST MASS: 0
NERVOUS/ANXIOUS: 0
DIARRHEA: 0
SHORTNESS OF BREATH: 0
CHILLS: 0
HEARTBURN: 0
HEADACHES: 0
HEMATURIA: 0
DIZZINESS: 0
HEMATOCHEZIA: 0

## 2023-06-28 ASSESSMENT — PAIN SCALES - GENERAL: PAINLEVEL: NO PAIN (0)

## 2023-06-28 ASSESSMENT — ACTIVITIES OF DAILY LIVING (ADL): CURRENT_FUNCTION: NO ASSISTANCE NEEDED

## 2023-06-28 NOTE — PROGRESS NOTES
"SUBJECTIVE:   Precious is a 82 year old who presents for Preventive Visit.    Are you in the first 12 months of your Medicare coverage?  No    Healthy Habits:     In general, how would you rate your overall health?  Good    Frequency of exercise:  4-5 days/week    Duration of exercise:  15-30 minutes    Do you usually eat at least 4 servings of fruit and vegetables a day, include whole grains    & fiber and avoid regularly eating high fat or \"junk\" foods?  Yes    Taking medications regularly:  Yes    Medication side effects:  Not applicable, No muscle aches and Other    Ability to successfully perform activities of daily living:  No assistance needed    Home Safety:  No safety concerns identified    Hearing Impairment:  No hearing concerns    In the past 6 months, have you been bothered by leaking of urine?  No    In general, how would you rate your overall mental or emotional health?  Excellent      PHQ-2 Total Score: 0    Additional concerns today:  No        Have you ever done Advance Care Planning? (For example, a Health Directive, POLST, or a discussion with a medical provider or your loved ones about your wishes): Yes, advance care planning is on file.       Fall risk       Cognitive Screening   1) Repeat 3 items (Leader, Season, Table)    2) Clock draw: NORMAL  3) 3 item recall: Recalls 3 objects  Results: 3 items recalled: COGNITIVE IMPAIRMENT LESS LIKELY    Mini-CogTM Copyright S Alivia. Licensed by the author for use in [facility]; reprinted with permission (jacinda@.[email or web address]). All rights reserved.      Do you have sleep apnea, excessive snoring or daytime drowsiness?: no    Reviewed and updated as needed this visit by clinical staff   Tobacco  Allergies  Meds              Reviewed and updated as needed this visit by Provider                 Social History     Tobacco Use     Smoking status: Former     Smokeless tobacco: Never     Tobacco comments:     quit 20 years ago   Substance Use Topics     " Alcohol use: Yes     Comment: occ.             6/30/2022     2:31 PM   Alcohol Use   Prescreen: >3 drinks/day or >7 drinks/week? No     Do you have a current opioid prescription? No  Do you use any other controlled substances or medications that are not prescribed by a provider? None      Hypertension Follow-up  Losartan-hydrochlorothiazide 50-12.5mg qd    Do you check your blood pressure regularly outside of the clinic? No     Are you following a low salt diet? No    Are your blood pressures ever more than 140 on the top number (systolic) OR more   than 90 on the bottom number (diastolic), for example 140/90? N/A    BP Readings from Last 6 Encounters:   06/28/23 126/70   06/20/23 128/72   06/20/23 137/63   05/30/23 131/66   05/30/23 (!) 148/62   05/15/23 129/78       Current providers sharing in care for this patient include:   Patient Care Team:  Ester Jorge MD as PCP - General (Family Practice)  Patrica Perla RN as Specialty Care Coordinator (Oncology)  Rocky Valdivia MD as MD (Radiation Oncology)  Otilia Gallegos APRN CNP as Nurse Practitioner (Nurse Practitioner)  Ester Jorge MD as Assigned PCP  Renee Rogers PA-C as Physician Assistant (Dermatology)  Danielito Anthony MD as MD (Dermatology)  Maricarmen Beebe MD as Assigned Cancer Care Provider    The following health maintenance items are reviewed in Epic and correct as of today:  Health Maintenance   Topic Date Due     ANNUAL REVIEW OF HM ORDERS  09/27/2022     PHQ-2 (once per calendar year)  01/01/2023     FALL RISK ASSESSMENT  06/30/2023     MEDICARE ANNUAL WELLNESS VISIT  06/28/2024     ADVANCE CARE PLANNING  06/30/2027     DEXA  05/08/2028     DTAP/TDAP/TD IMMUNIZATION (2 - Td or Tdap) 07/06/2030     INFLUENZA VACCINE  Completed     Pneumococcal Vaccine: 65+ Years  Completed     ZOSTER IMMUNIZATION  Completed     COVID-19 Vaccine  Completed     IPV IMMUNIZATION  Aged Out     MENINGITIS IMMUNIZATION  Aged Out     Lab work  "is in process  Labs reviewed in EPIC        Pertinent mammograms are reviewed under the imaging tab.    Review of Systems   Constitutional: Negative for chills and fever.   HENT: Negative for congestion, ear pain, hearing loss and sore throat.    Eyes: Negative for pain and visual disturbance.   Respiratory: Negative for cough and shortness of breath.    Cardiovascular: Negative for chest pain, palpitations and peripheral edema.   Gastrointestinal: Negative for abdominal pain, constipation, diarrhea, heartburn, hematochezia and nausea.   Breasts:  Negative for tenderness, breast mass and discharge.   Genitourinary: Negative for dysuria, frequency, genital sores, hematuria, pelvic pain, urgency, vaginal bleeding and vaginal discharge.   Musculoskeletal: Negative for arthralgias, joint swelling and myalgias.   Skin: Negative for rash.   Neurological: Negative for dizziness, weakness, headaches and paresthesias.   Psychiatric/Behavioral: Negative for mood changes. The patient is not nervous/anxious.          OBJECTIVE:   /70   Pulse 87   Temp 97.9  F (36.6  C) (Tympanic)   Resp 18   Ht 1.638 m (5' 4.5\")   Wt 84.4 kg (186 lb)   SpO2 94%   BMI 31.43 kg/m   Estimated body mass index is 31.43 kg/m  as calculated from the following:    Height as of this encounter: 1.638 m (5' 4.5\").    Weight as of this encounter: 84.4 kg (186 lb).  Physical Exam  GENERAL: healthy, alert and no distress  NECK: no adenopathy, no asymmetry, masses, or scars and thyroid normal to palpation  RESP: lungs clear to auscultation - no rales, rhonchi or wheezes  CV: regular rate and rhythm, normal S1 S2, no S3 or S4, no murmur, click or rub, no peripheral edema and peripheral pulses strong  MS: no gross musculoskeletal defects noted, no edema  PSYCH: mentation appears normal, affect normal/bright    Diagnostic Test Results:  Labs reviewed in Epic    ASSESSMENT / PLAN:   (Z00.00) Encounter for Medicare annual wellness exam  (primary " "encounter diagnosis)  Comment:    Plan:      (I10) Benign essential hypertension  Comment: well controlled  Plan: losartan-hydrochlorothiazide (HYZAAR) 50-12.5         MG tablet             (E78.5) Hyperlipidemia LDL goal <130  Comment: has been off her statin for quite a while, will recheck lipids  Only plan to restart if significantly elevated  Plan: Lipid panel reflex to direct LDL Fasting             (C50.411,  Z17.1) Malignant neoplasm of upper-outer quadrant of right breast in female, 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)  Comment:  Restarted treatment for recurrent breast cancer in October.  Doing well with treatment.  PET scan negative recently  Plan: on Keytruda, under the care of oncology    (C50.919) Metastatic breast cancer  Comment: as above  Plan:      Patient has been advised of split billing requirements and indicates understanding: Yes      COUNSELING:  Reviewed preventive health counseling, as reflected in patient instructions       Regular exercise       Healthy diet/nutrition      BMI:   Estimated body mass index is 31.43 kg/m  as calculated from the following:    Height as of this encounter: 1.638 m (5' 4.5\").    Weight as of this encounter: 84.4 kg (186 lb).         She reports that she has quit smoking. She has never used smokeless tobacco.      Appropriate preventive services were discussed with this patient, including applicable screening as appropriate for cardiovascular disease, diabetes, osteopenia/osteoporosis, and glaucoma.  As appropriate for age/gender, discussed screening for colorectal cancer, prostate cancer, breast cancer, and cervical cancer. Checklist reviewing preventive services available has been given to the patient.    Reviewed patients plan of care and provided an AVS. The Basic Care Plan (routine screening as documented in Health Maintenance) for Precious meets the Care Plan requirement. This Care Plan has been established and reviewed with the Patient.      Ester Jorge MD  M " [facility]    Identified Health Risks:

## 2023-06-28 NOTE — PATIENT INSTRUCTIONS
Patient Education   Personalized Prevention Plan  You are due for the preventive services outlined below.  Your care team is available to assist you in scheduling these services.  If you have already completed any of these items, please share that information with your care team to update in your medical record.  Health Maintenance Due   Topic Date Due     ANNUAL REVIEW OF HM ORDERS  09/27/2022     PHQ-2 (once per calendar year)  01/01/2023     FALL RISK ASSESSMENT  06/30/2023

## 2023-07-11 ENCOUNTER — ONCOLOGY VISIT (OUTPATIENT)
Dept: ONCOLOGY | Facility: CLINIC | Age: 83
End: 2023-07-11
Attending: INTERNAL MEDICINE
Payer: COMMERCIAL

## 2023-07-11 ENCOUNTER — INFUSION THERAPY VISIT (OUTPATIENT)
Dept: INFUSION THERAPY | Facility: CLINIC | Age: 83
End: 2023-07-11
Attending: INTERNAL MEDICINE
Payer: COMMERCIAL

## 2023-07-11 VITALS
DIASTOLIC BLOOD PRESSURE: 66 MMHG | RESPIRATION RATE: 16 BRPM | WEIGHT: 187 LBS | SYSTOLIC BLOOD PRESSURE: 140 MMHG | TEMPERATURE: 98 F | BODY MASS INDEX: 31.16 KG/M2 | OXYGEN SATURATION: 97 % | HEIGHT: 65 IN | HEART RATE: 72 BPM

## 2023-07-11 DIAGNOSIS — Z17.1 MALIGNANT NEOPLASM OF UPPER-OUTER QUADRANT OF RIGHT BREAST IN FEMALE, ESTROGEN RECEPTOR NEGATIVE (H): ICD-10-CM

## 2023-07-11 DIAGNOSIS — C50.411 MALIGNANT NEOPLASM OF UPPER-OUTER QUADRANT OF RIGHT BREAST IN FEMALE, ESTROGEN RECEPTOR NEGATIVE (H): ICD-10-CM

## 2023-07-11 DIAGNOSIS — C50.919 PRIMARY MALIGNANT NEOPLASM OF BREAST WITH METASTASIS (H): Primary | ICD-10-CM

## 2023-07-11 DIAGNOSIS — Z08 ENCOUNTER FOR FOLLOW-UP EXAMINATION AFTER COMPLETED TREATMENT FOR MALIGNANT NEOPLASM: ICD-10-CM

## 2023-07-11 DIAGNOSIS — C50.919 PRIMARY MALIGNANT NEOPLASM OF BREAST WITH METASTASIS (H): ICD-10-CM

## 2023-07-11 DIAGNOSIS — Z08 ENCOUNTER FOR FOLLOW-UP EXAMINATION AFTER COMPLETED TREATMENT FOR MALIGNANT NEOPLASM: Primary | ICD-10-CM

## 2023-07-11 LAB
ALBUMIN SERPL BCG-MCNC: 4 G/DL (ref 3.5–5.2)
ALP SERPL-CCNC: 91 U/L (ref 35–104)
ALT SERPL W P-5'-P-CCNC: 25 U/L (ref 0–50)
ANION GAP SERPL CALCULATED.3IONS-SCNC: 10 MMOL/L (ref 7–15)
AST SERPL W P-5'-P-CCNC: 29 U/L (ref 0–45)
BASOPHILS # BLD AUTO: 0 10E3/UL (ref 0–0.2)
BASOPHILS NFR BLD AUTO: 1 %
BILIRUB SERPL-MCNC: 0.3 MG/DL
BUN SERPL-MCNC: 12.9 MG/DL (ref 8–23)
CALCIUM SERPL-MCNC: 9.3 MG/DL (ref 8.8–10.2)
CANCER AG15-3 SERPL-ACNC: 14 U/ML
CHLORIDE SERPL-SCNC: 100 MMOL/L (ref 98–107)
CREAT SERPL-MCNC: 0.59 MG/DL (ref 0.51–0.95)
DEPRECATED HCO3 PLAS-SCNC: 28 MMOL/L (ref 22–29)
EOSINOPHIL # BLD AUTO: 0.2 10E3/UL (ref 0–0.7)
EOSINOPHIL NFR BLD AUTO: 3 %
ERYTHROCYTE [DISTWIDTH] IN BLOOD BY AUTOMATED COUNT: 13.6 % (ref 10–15)
GFR SERPL CREATININE-BSD FRML MDRD: 90 ML/MIN/1.73M2
GLUCOSE SERPL-MCNC: 110 MG/DL (ref 70–99)
HCT VFR BLD AUTO: 36 % (ref 35–47)
HGB BLD-MCNC: 11.5 G/DL (ref 11.7–15.7)
IMM GRANULOCYTES # BLD: 0 10E3/UL
IMM GRANULOCYTES NFR BLD: 1 %
LYMPHOCYTES # BLD AUTO: 1.2 10E3/UL (ref 0.8–5.3)
LYMPHOCYTES NFR BLD AUTO: 21 %
MCH RBC QN AUTO: 30.5 PG (ref 26.5–33)
MCHC RBC AUTO-ENTMCNC: 31.9 G/DL (ref 31.5–36.5)
MCV RBC AUTO: 96 FL (ref 78–100)
MONOCYTES # BLD AUTO: 0.7 10E3/UL (ref 0–1.3)
MONOCYTES NFR BLD AUTO: 11 %
NEUTROPHILS # BLD AUTO: 3.7 10E3/UL (ref 1.6–8.3)
NEUTROPHILS NFR BLD AUTO: 63 %
NRBC # BLD AUTO: 0 10E3/UL
NRBC BLD AUTO-RTO: 0 /100
PLATELET # BLD AUTO: 216 10E3/UL (ref 150–450)
POTASSIUM SERPL-SCNC: 3.6 MMOL/L (ref 3.4–5.3)
PROT SERPL-MCNC: 7.1 G/DL (ref 6.4–8.3)
RBC # BLD AUTO: 3.77 10E6/UL (ref 3.8–5.2)
SODIUM SERPL-SCNC: 138 MMOL/L (ref 136–145)
TSH SERPL DL<=0.005 MIU/L-ACNC: 2.71 UIU/ML (ref 0.3–4.2)
WBC # BLD AUTO: 5.8 10E3/UL (ref 4–11)

## 2023-07-11 PROCEDURE — 99214 OFFICE O/P EST MOD 30 MIN: CPT | Performed by: INTERNAL MEDICINE

## 2023-07-11 PROCEDURE — 80053 COMPREHEN METABOLIC PANEL: CPT | Performed by: INTERNAL MEDICINE

## 2023-07-11 PROCEDURE — 250N000011 HC RX IP 250 OP 636: Mod: JZ | Performed by: INTERNAL MEDICINE

## 2023-07-11 PROCEDURE — 86300 IMMUNOASSAY TUMOR CA 15-3: CPT | Performed by: INTERNAL MEDICINE

## 2023-07-11 PROCEDURE — 85025 COMPLETE CBC W/AUTO DIFF WBC: CPT | Performed by: INTERNAL MEDICINE

## 2023-07-11 PROCEDURE — 36591 DRAW BLOOD OFF VENOUS DEVICE: CPT | Performed by: INTERNAL MEDICINE

## 2023-07-11 PROCEDURE — 96413 CHEMO IV INFUSION 1 HR: CPT

## 2023-07-11 PROCEDURE — G0463 HOSPITAL OUTPT CLINIC VISIT: HCPCS | Mod: 25 | Performed by: INTERNAL MEDICINE

## 2023-07-11 PROCEDURE — 258N000003 HC RX IP 258 OP 636: Performed by: INTERNAL MEDICINE

## 2023-07-11 PROCEDURE — 84443 ASSAY THYROID STIM HORMONE: CPT | Performed by: INTERNAL MEDICINE

## 2023-07-11 RX ORDER — EPINEPHRINE 1 MG/ML
0.3 INJECTION, SOLUTION, CONCENTRATE INTRAVENOUS EVERY 5 MIN PRN
Status: CANCELLED | OUTPATIENT
Start: 2023-08-01

## 2023-07-11 RX ORDER — ALBUTEROL SULFATE 90 UG/1
1-2 AEROSOL, METERED RESPIRATORY (INHALATION)
Status: CANCELLED
Start: 2023-07-11

## 2023-07-11 RX ORDER — HEPARIN SODIUM (PORCINE) LOCK FLUSH IV SOLN 100 UNIT/ML 100 UNIT/ML
5 SOLUTION INTRAVENOUS
Status: CANCELLED | OUTPATIENT
Start: 2023-08-01

## 2023-07-11 RX ORDER — HEPARIN SODIUM,PORCINE 10 UNIT/ML
5 VIAL (ML) INTRAVENOUS
Status: CANCELLED | OUTPATIENT
Start: 2023-07-11

## 2023-07-11 RX ORDER — EPINEPHRINE 1 MG/ML
0.3 INJECTION, SOLUTION, CONCENTRATE INTRAVENOUS EVERY 5 MIN PRN
Status: CANCELLED | OUTPATIENT
Start: 2023-07-11

## 2023-07-11 RX ORDER — DIPHENHYDRAMINE HYDROCHLORIDE 50 MG/ML
50 INJECTION INTRAMUSCULAR; INTRAVENOUS
Status: CANCELLED
Start: 2023-07-11

## 2023-07-11 RX ORDER — ALBUTEROL SULFATE 0.83 MG/ML
2.5 SOLUTION RESPIRATORY (INHALATION)
Status: CANCELLED | OUTPATIENT
Start: 2023-07-11

## 2023-07-11 RX ORDER — LORAZEPAM 2 MG/ML
0.5 INJECTION INTRAMUSCULAR EVERY 4 HOURS PRN
Status: CANCELLED | OUTPATIENT
Start: 2023-08-01

## 2023-07-11 RX ORDER — HEPARIN SODIUM (PORCINE) LOCK FLUSH IV SOLN 100 UNIT/ML 100 UNIT/ML
5 SOLUTION INTRAVENOUS
Status: DISCONTINUED | OUTPATIENT
Start: 2023-07-11 | End: 2023-07-11 | Stop reason: HOSPADM

## 2023-07-11 RX ORDER — ALBUTEROL SULFATE 0.83 MG/ML
2.5 SOLUTION RESPIRATORY (INHALATION)
Status: CANCELLED | OUTPATIENT
Start: 2023-08-01

## 2023-07-11 RX ORDER — MEPERIDINE HYDROCHLORIDE 25 MG/ML
25 INJECTION INTRAMUSCULAR; INTRAVENOUS; SUBCUTANEOUS EVERY 30 MIN PRN
Status: CANCELLED | OUTPATIENT
Start: 2023-07-11

## 2023-07-11 RX ORDER — LORAZEPAM 2 MG/ML
0.5 INJECTION INTRAMUSCULAR EVERY 4 HOURS PRN
Status: CANCELLED | OUTPATIENT
Start: 2023-07-11

## 2023-07-11 RX ORDER — MEPERIDINE HYDROCHLORIDE 25 MG/ML
25 INJECTION INTRAMUSCULAR; INTRAVENOUS; SUBCUTANEOUS EVERY 30 MIN PRN
Status: CANCELLED | OUTPATIENT
Start: 2023-08-01

## 2023-07-11 RX ORDER — DIPHENHYDRAMINE HYDROCHLORIDE 50 MG/ML
50 INJECTION INTRAMUSCULAR; INTRAVENOUS
Status: CANCELLED
Start: 2023-08-01

## 2023-07-11 RX ORDER — ALBUTEROL SULFATE 90 UG/1
1-2 AEROSOL, METERED RESPIRATORY (INHALATION)
Status: CANCELLED
Start: 2023-08-01

## 2023-07-11 RX ORDER — HEPARIN SODIUM,PORCINE 10 UNIT/ML
5 VIAL (ML) INTRAVENOUS
Status: DISCONTINUED | OUTPATIENT
Start: 2023-07-11 | End: 2023-07-11 | Stop reason: HOSPADM

## 2023-07-11 RX ORDER — HEPARIN SODIUM (PORCINE) LOCK FLUSH IV SOLN 100 UNIT/ML 100 UNIT/ML
5 SOLUTION INTRAVENOUS
Status: CANCELLED | OUTPATIENT
Start: 2023-07-11

## 2023-07-11 RX ORDER — METHYLPREDNISOLONE SODIUM SUCCINATE 125 MG/2ML
125 INJECTION, POWDER, LYOPHILIZED, FOR SOLUTION INTRAMUSCULAR; INTRAVENOUS
Status: CANCELLED
Start: 2023-07-11

## 2023-07-11 RX ORDER — HEPARIN SODIUM,PORCINE 10 UNIT/ML
5 VIAL (ML) INTRAVENOUS
Status: CANCELLED | OUTPATIENT
Start: 2023-08-01

## 2023-07-11 RX ORDER — METHYLPREDNISOLONE SODIUM SUCCINATE 125 MG/2ML
125 INJECTION, POWDER, LYOPHILIZED, FOR SOLUTION INTRAMUSCULAR; INTRAVENOUS
Status: CANCELLED
Start: 2023-08-01

## 2023-07-11 RX ADMIN — Medication 5 ML: at 10:16

## 2023-07-11 RX ADMIN — SODIUM CHLORIDE 250 ML: 9 INJECTION, SOLUTION INTRAVENOUS at 09:33

## 2023-07-11 RX ADMIN — SODIUM CHLORIDE 200 MG: 9 INJECTION, SOLUTION INTRAVENOUS at 09:32

## 2023-07-11 ASSESSMENT — PAIN SCALES - GENERAL: PAINLEVEL: NO PAIN (0)

## 2023-07-11 NOTE — PROGRESS NOTES
Infusion Nursing Note:  Precious Paris presents today for Keytruda.    Patient seen by provider today: Yes: Abiel.   present during visit today: Not Applicable.    Note: N/A.      Intravenous Access:  Implanted Port.    Treatment Conditions:  Lab Results   Component Value Date    HGB 11.5 (L) 07/11/2023    WBC 5.8 07/11/2023    ANEU 2.6 11/28/2022    ANEUTAUTO 3.7 07/11/2023     07/11/2023        Lab Results   Component Value Date     07/11/2023    POTASSIUM 3.6 07/11/2023    CR 0.59 07/11/2023    YADIRA 9.3 07/11/2023    BILITOTAL 0.3 07/11/2023    ALBUMIN 4.0 07/11/2023    ALT 25 07/11/2023    AST 29 07/11/2023       Results reviewed, labs MET treatment parameters, ok to proceed with treatment.      Post Infusion Assessment:  Patient tolerated infusion without incident.  Blood return noted pre and post infusion.  Site patent and intact, free from redness, edema or discomfort.  No evidence of extravasations.  Access discontinued per protocol.       Discharge Plan:   Discharge instructions reviewed with: Patient.  Patient discharged in stable condition accompanied by: self.  Departure Mode: Ambulatory.      Carmella William RN

## 2023-07-11 NOTE — PROGRESS NOTES
[facility] Hematology and Oncology Progress Note    Patient: Precious Paris  MRN: [id number]  Jul 11, 2023          Reason for Visit    1. 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 Beebe    _____________________________________________________________________________    History of Present Illness/ Interval History    Ms. Precious Paris is a 82 year old with recurrent metastatic triple negative breast cancer recurrence. Completed 3 cycles of Doxil with Pembro added to the 3rd cycle, with mixed response (some progression in mediastinal nodes and lung mets). Therefore, chemo was changed to carbo, gem and Pembro.  She had an excellent response after 3 cycles.  PET scan following 6 cycles showed complete response.  She is currently on pembrolizumab maintenance.    No major side effects from chemotherapy on chemotherapy so far.  No new lumps or masses reported.  No new skin lesions.  Denies any diarrhea.    Some aches and pain reported. No swelling of the joints.    ECOG PS: 1      Oncology History/Treatment  Diagnosis/Stage:   11/2019: Right breast cancer, triple negative ([id number])    BRCA negative    10/2022: Recurrent, stage IV triple negative breast cancer (bilateral axillary, supraclav, mediastinal nodes + lung)  -persistent/progressive right breast firmness with new right nipple drainage and right axillary adenopathy  -bilateral diagnostic mammogram: increased density R breast with skin thickening. R breast US: 3.2 cm hypoechoic mass at 11:00 position 1 cm from nipple and multiple other small hypoechoic solid-appearing lesions in R breast + right axillary adenopathy (at least two hypoechoic vascular lesions measuring up to 2.5 cm)  -10/18/2022 biopsy R breast mass + R axillary node: grade 3 invasive ductal cancer with tumor necrosis, ER/SC/HER2 negative.   -PET: + right breast mass; bilateral axillary, retropectoral,  supraclavicular, mediastinal/R hilar nodes and bilateral lung/R pleural nodules suspicious for mets.  -Brain MRI: negative for mets  -11/1/2022 biopsy L axillary node: metastatic invasive ductal carcinoma  -Foundation One: no actionable mutations MS stable, TMB 1 mut/mb.   -PDL1 testing: CPS >20% and TPS 60% (considered high PDL1 expression)  -CA 27.29: 59.5 (elevated). CA 15-3: 23 (normal)    Treatment:  Initial breast cancer (2019)  -2019: Neoadjuvant taxol  -Lumpectomy  -5/2020: Adjuvant RT  -6 - 7/2020: Adjuvant Xeloda. Stopped for rash    Recurrent, stage IV breast cancer (2022)  -11/14/2022:Doxil every 4 weeks  -12/12/2022: changed to doxorubicin every 3 weeks. (Planning addition of pembrolizumab once approved by insurance)  -1/2/2023: doxorubicin + pembrolizumab every 3 weeks. Completed 1 cycle. Stopped for progression (med nodes, slight increase in lung mets and stable R breast mass + axillary nodes).    -1/23/2023 - present: Carbo AUC 5, Gemcitabine 800 mg/m2 and Pembro every 21 days  -cycles 1-2: [place] day 1 only  -Cycle 3: [place] increased to days 1 + 8 given excellent tolerance  -CT after 3 cycles showed excellent response (near CR axillary + med nodes and OH in lung mets; clinical improvement in right breast changes and tumor marker).    -PET scan after 6 cycles of carboplatin gemcitabine pembrolizumab shows complete response    -Maintenance pembrolizumab starting 5/30/2023      Physical Exam    GENERAL: Alert and oriented to time place and person. Seated comfortably. In no distress.   Lungs: clear  CVS: RRR  Lymph: no significant peripheral adenopathy noted on today's exam  Neuro: alert and oriented x 3      Lab Results  Recent Results (from the past 168 hour(s))   Comprehensive metabolic panel    Collection Time: 07/11/23  8:14 AM   Result Value Ref Range    Sodium 138 136 - 145 mmol/L    Potassium 3.6 3.4 - 5.3 mmol/L    Chloride 100 98 - 107 mmol/L    Carbon Dioxide (CO2) 28 22 - 29 mmol/L    Anion Gap  10 7 - 15 mmol/L    Urea Nitrogen 12.9 8.0 - 23.0 mg/dL    Creatinine 0.59 0.51 - 0.95 mg/dL    Calcium 9.3 8.8 - 10.2 mg/dL    Glucose 110 (H) 70 - 99 mg/dL    Alkaline Phosphatase 91 35 - 104 U/L    AST 29 0 - 45 U/L    ALT 25 0 - 50 U/L    Protein Total 7.1 6.4 - 8.3 g/dL    Albumin 4.0 3.5 - 5.2 g/dL    Bilirubin Total 0.3 <=1.2 mg/dL    GFR Estimate 90 >60 mL/min/1.73m2   CBC with platelets and differential    Collection Time: 07/11/23  8:14 AM   Result Value Ref Range    WBC Count 5.8 4.0 - 11.0 10e3/uL    RBC Count 3.77 (L) 3.80 - 5.20 10e6/uL    Hemoglobin 11.5 (L) 11.7 - 15.7 g/dL    Hematocrit 36.0 35.0 - 47.0 %    MCV 96 78 - 100 fL    MCH 30.5 26.5 - 33.0 pg    MCHC 31.9 31.5 - 36.5 g/dL    RDW 13.6 10.0 - 15.0 %    Platelet Count 216 150 - 450 10e3/uL    % Neutrophils 63 %    % Lymphocytes 21 %    % Monocytes 11 %    % Eosinophils 3 %    % Basophils 1 %    % Immature Granulocytes 1 %    NRBCs per 100 WBC 0 <1 /100    Absolute Neutrophils 3.7 1.6 - 8.3 10e3/uL    Absolute Lymphocytes 1.2 0.8 - 5.3 10e3/uL    Absolute Monocytes 0.7 0.0 - 1.3 10e3/uL    Absolute Eosinophils 0.2 0.0 - 0.7 10e3/uL    Absolute Basophils 0.0 0.0 - 0.2 10e3/uL    Absolute Immature Granulocytes 0.0 <=0.4 10e3/uL    Absolute NRBCs 0.0 10e3/uL   TSH with free T4 reflex    Collection Time: 07/11/23  8:14 AM   Result Value Ref Range    TSH 2.71 0.30 - 4.20 uIU/mL           Assessment/Plan  1. Recurrent, stage IV triple negative right breast cancer (nodes, lung); CPS>20% + TPS 60%  2. 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  Posttreatment PET scan showed complete response.  She is currently on pembrolizumab maintenance.  Doing extremely well on this without any major side effects.  Labs reviewed today.  Clinically she has no evidence of disease progression.  Plan is to continue Pembro maintenance and imaging surveillance once every 3 months.  She is due for  another scan in August.    Labs are all pretty stable today.  Hemoglobin is improving.  She does have mild arthralgia which potentially could be secondary to pembrolizumab but no evidence of any arthritis.  Use Tylenol as needed.  She will come back in 3 weeks for labs and probable infusion only.  I will see her back in 6 weeks with repeat labs and PET scan.    A total of 30 min were spent today on this visit which included face to face conversation with the patient, EMR review, counseling and co-ordination of care and medical documentation.      Signed by:   Maricarmen Beebe MD  Hematology and Medical Oncology  [facility]

## 2023-07-11 NOTE — LETTER
7/11/2023         RE: Precious Paris  [address] [place] [address]        Dear Colleague,    Thank you for referring your patient, Precious Paris, to the [facility]. Please see a copy of my visit note below.        [facility] Hematology and Oncology Progress Note    Patient: Precious Paris  MRN: [id number]  Jul 11, 2023          Reason for Visit    1. 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 Beebe    _____________________________________________________________________________    History of Present Illness/ Interval History    Ms. Precious Paris is a 82 year old with recurrent metastatic triple negative breast cancer recurrence. Completed 3 cycles of Doxil with Pembro added to the 3rd cycle, with mixed response (some progression in mediastinal nodes and lung mets). Therefore, chemo was changed to carbo, gem and Pembro.  She had an excellent response after 3 cycles.  PET scan following 6 cycles showed complete response.  She is currently on pembrolizumab maintenance.    No major side effects from chemotherapy on chemotherapy so far.  No new lumps or masses reported.  No new skin lesions.  Denies any diarrhea.    Some aches and pain reported. No swelling of the joints.    ECOG PS: 1      Oncology History/Treatment  Diagnosis/Stage:   11/2019: Right breast cancer, triple negative ([id number])    BRCA negative    10/2022: Recurrent, stage IV triple negative breast cancer (bilateral axillary, supraclav, mediastinal nodes + lung)  -persistent/progressive right breast firmness with new right nipple drainage and right axillary adenopathy  -bilateral diagnostic mammogram: increased density R breast with skin thickening. R breast US: 3.2 cm hypoechoic mass at 11:00 position 1 cm from nipple and multiple other small hypoechoic solid-appearing lesions in R breast + right axillary adenopathy (at  least two hypoechoic vascular lesions measuring up to 2.5 cm)  -10/18/2022 biopsy R breast mass + R axillary node: grade 3 invasive ductal cancer with tumor necrosis, ER/NE/HER2 negative.   -PET: + right breast mass; bilateral axillary, retropectoral, supraclavicular, mediastinal/R hilar nodes and bilateral lung/R pleural nodules suspicious for mets.  -Brain MRI: negative for mets  -11/1/2022 biopsy L axillary node: metastatic invasive ductal carcinoma  -Foundation One: no actionable mutations MS stable, TMB 1 mut/mb.   -PDL1 testing: CPS >20% and TPS 60% (considered high PDL1 expression)  -CA 27.29: 59.5 (elevated). CA 15-3: 23 (normal)    Treatment:  Initial breast cancer (2019)  -2019: Neoadjuvant taxol  -Lumpectomy  -5/2020: Adjuvant RT  -6 - 7/2020: Adjuvant Xeloda. Stopped for rash    Recurrent, stage IV breast cancer (2022)  -11/14/2022:Doxil every 4 weeks  -12/12/2022: changed to doxorubicin every 3 weeks. (Planning addition of pembrolizumab once approved by insurance)  -1/2/2023: doxorubicin + pembrolizumab every 3 weeks. Completed 1 cycle. Stopped for progression (med nodes, slight increase in lung mets and stable R breast mass + axillary nodes).    -1/23/2023 - present: Carbo AUC 5, Gemcitabine 800 mg/m2 and Pembro every 21 days  -cycles 1-2: [place] day 1 only  -Cycle 3: [place] increased to days 1 + 8 given excellent tolerance  -CT after 3 cycles showed excellent response (near CR axillary + med nodes and NE in lung mets; clinical improvement in right breast changes and tumor marker).    -PET scan after 6 cycles of carboplatin gemcitabine pembrolizumab shows complete response    -Maintenance pembrolizumab starting 5/30/2023      Physical Exam    GENERAL: Alert and oriented to time place and person. Seated comfortably. In no distress.   Lungs: clear  CVS: RRR  Lymph: no significant peripheral adenopathy noted on today's exam  Neuro: alert and oriented x 3      Lab Results  Recent Results (from the past 168  hour(s))   Comprehensive metabolic panel    Collection Time: 07/11/23  8:14 AM   Result Value Ref Range    Sodium 138 136 - 145 mmol/L    Potassium 3.6 3.4 - 5.3 mmol/L    Chloride 100 98 - 107 mmol/L    Carbon Dioxide (CO2) 28 22 - 29 mmol/L    Anion Gap 10 7 - 15 mmol/L    Urea Nitrogen 12.9 8.0 - 23.0 mg/dL    Creatinine 0.59 0.51 - 0.95 mg/dL    Calcium 9.3 8.8 - 10.2 mg/dL    Glucose 110 (H) 70 - 99 mg/dL    Alkaline Phosphatase 91 35 - 104 U/L    AST 29 0 - 45 U/L    ALT 25 0 - 50 U/L    Protein Total 7.1 6.4 - 8.3 g/dL    Albumin 4.0 3.5 - 5.2 g/dL    Bilirubin Total 0.3 <=1.2 mg/dL    GFR Estimate 90 >60 mL/min/1.73m2   CBC with platelets and differential    Collection Time: 07/11/23  8:14 AM   Result Value Ref Range    WBC Count 5.8 4.0 - 11.0 10e3/uL    RBC Count 3.77 (L) 3.80 - 5.20 10e6/uL    Hemoglobin 11.5 (L) 11.7 - 15.7 g/dL    Hematocrit 36.0 35.0 - 47.0 %    MCV 96 78 - 100 fL    MCH 30.5 26.5 - 33.0 pg    MCHC 31.9 31.5 - 36.5 g/dL    RDW 13.6 10.0 - 15.0 %    Platelet Count 216 150 - 450 10e3/uL    % Neutrophils 63 %    % Lymphocytes 21 %    % Monocytes 11 %    % Eosinophils 3 %    % Basophils 1 %    % Immature Granulocytes 1 %    NRBCs per 100 WBC 0 <1 /100    Absolute Neutrophils 3.7 1.6 - 8.3 10e3/uL    Absolute Lymphocytes 1.2 0.8 - 5.3 10e3/uL    Absolute Monocytes 0.7 0.0 - 1.3 10e3/uL    Absolute Eosinophils 0.2 0.0 - 0.7 10e3/uL    Absolute Basophils 0.0 0.0 - 0.2 10e3/uL    Absolute Immature Granulocytes 0.0 <=0.4 10e3/uL    Absolute NRBCs 0.0 10e3/uL   TSH with free T4 reflex    Collection Time: 07/11/23  8:14 AM   Result Value Ref Range    TSH 2.71 0.30 - 4.20 uIU/mL           Assessment/Plan  1. Recurrent, stage IV triple negative right breast cancer (nodes, lung); CPS>20% + TPS 60%  2. 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  Posttreatment PET scan showed complete response.  She is currently on pembrolizumab  "maintenance.  Doing extremely well on this without any major side effects.  Labs reviewed today.  Clinically she has no evidence of disease progression.  Plan is to continue Pembro maintenance and imaging surveillance once every 3 months.  She is due for another scan in August.    Labs are all pretty stable today.  Hemoglobin is improving.  She does have mild arthralgia which potentially could be secondary to pembrolizumab but no evidence of any arthritis.  Use Tylenol as needed.  She will come back in 3 weeks for labs and probable infusion only.  I will see her back in 6 weeks with repeat labs and PET scan.    A total of 30 min were spent today on this visit which included face to face conversation with the patient, EMR review, counseling and co-ordination of care and medical documentation.      Signed by:   Maricarmen Beebe MD  Hematology and Medical Oncology  [facility]    Oncology Rooming Note    July 11, 2023 8:30 AM   Precious Paris is a 82 year old female who presents for:    Chief Complaint   Patient presents with     Oncology Clinic Visit     Malignant neoplasm of upper-outer quadrant of right breast in female, estrogen receptor negative - Labs provider and infusion     Initial Vitals: BP (!) 140/66 (BP Location: Right arm, Patient Position: Sitting, Cuff Size: Adult Regular)   Pulse 72   Temp 98  F (36.7  C) (Tympanic)   Resp 16   Ht 1.638 m (5' 4.5\")   Wt 84.8 kg (187 lb)   SpO2 97%   BMI 31.60 kg/m   Estimated body mass index is 31.6 kg/m  as calculated from the following:    Height as of this encounter: 1.638 m (5' 4.5\").    Weight as of this encounter: 84.8 kg (187 lb). Body surface area is 1.96 meters squared.  No Pain (0) Comment: Data Unavailable   No LMP recorded. Patient is postmenopausal.  Allergies reviewed: Yes  Medications reviewed: Yes    Medications: Medication refills not needed today.  Pharmacy name entered into [employer]: [facility] - " [place], [place] - [address] [facility]    Clinical concerns:  None      Malgorzata Vallejo, AXEL                Again, thank you for allowing me to participate in the care of your patient.        Sincerely,        Maricarmen Beebe MD

## 2023-07-11 NOTE — PROGRESS NOTES
"Oncology Rooming Note    July 11, 2023 8:30 AM   Precious Paris is a 82 year old female who presents for:    Chief Complaint   Patient presents with     Oncology Clinic Visit     Malignant neoplasm of upper-outer quadrant of right breast in female, estrogen receptor negative - Labs provider and infusion     Initial Vitals: BP (!) 140/66 (BP Location: Right arm, Patient Position: Sitting, Cuff Size: Adult Regular)   Pulse 72   Temp 98  F (36.7  C) (Tympanic)   Resp 16   Ht 1.638 m (5' 4.5\")   Wt 84.8 kg (187 lb)   SpO2 97%   BMI 31.60 kg/m   Estimated body mass index is 31.6 kg/m  as calculated from the following:    Height as of this encounter: 1.638 m (5' 4.5\").    Weight as of this encounter: 84.8 kg (187 lb). Body surface area is 1.96 meters squared.  No Pain (0) Comment: Data Unavailable   No LMP recorded. Patient is postmenopausal.  Allergies reviewed: Yes  Medications reviewed: Yes    Medications: Medication refills not needed today.  Pharm[facility] into [facility] [address] [facility]    Clinical concerns:  None      Malgorzata Vallejo CMA            "

## 2023-07-12 LAB — CANCER AG27-29 SERPL-ACNC: 16.9 U/ML

## 2023-08-01 ENCOUNTER — INFUSION THERAPY VISIT (OUTPATIENT)
Dept: INFUSION THERAPY | Facility: CLINIC | Age: 83
End: 2023-08-01
Attending: INTERNAL MEDICINE
Payer: COMMERCIAL

## 2023-08-01 VITALS — TEMPERATURE: 98.8 F | DIASTOLIC BLOOD PRESSURE: 80 MMHG | HEART RATE: 83 BPM | SYSTOLIC BLOOD PRESSURE: 131 MMHG

## 2023-08-01 VITALS
SYSTOLIC BLOOD PRESSURE: 139 MMHG | DIASTOLIC BLOOD PRESSURE: 73 MMHG | HEART RATE: 80 BPM | BODY MASS INDEX: 31.03 KG/M2 | WEIGHT: 183.6 LBS

## 2023-08-01 DIAGNOSIS — C50.411 MALIGNANT NEOPLASM OF UPPER-OUTER QUADRANT OF RIGHT BREAST IN FEMALE, ESTROGEN RECEPTOR NEGATIVE (H): ICD-10-CM

## 2023-08-01 DIAGNOSIS — Z17.1 MALIGNANT NEOPLASM OF UPPER-OUTER QUADRANT OF RIGHT BREAST IN FEMALE, ESTROGEN RECEPTOR NEGATIVE (H): ICD-10-CM

## 2023-08-01 DIAGNOSIS — C50.919 PRIMARY MALIGNANT NEOPLASM OF BREAST WITH METASTASIS (H): Primary | ICD-10-CM

## 2023-08-01 DIAGNOSIS — C50.919 PRIMARY MALIGNANT NEOPLASM OF BREAST WITH METASTASIS (H): ICD-10-CM

## 2023-08-01 DIAGNOSIS — Z08 ENCOUNTER FOR FOLLOW-UP EXAMINATION AFTER COMPLETED TREATMENT FOR MALIGNANT NEOPLASM: ICD-10-CM

## 2023-08-01 DIAGNOSIS — E78.5 HYPERLIPIDEMIA LDL GOAL <130: ICD-10-CM

## 2023-08-01 LAB
ALBUMIN SERPL BCG-MCNC: 3.9 G/DL (ref 3.5–5.2)
ALP SERPL-CCNC: 95 U/L (ref 35–104)
ALT SERPL W P-5'-P-CCNC: 21 U/L (ref 0–50)
ANION GAP SERPL CALCULATED.3IONS-SCNC: 10 MMOL/L (ref 7–15)
AST SERPL W P-5'-P-CCNC: 28 U/L (ref 0–45)
BASOPHILS # BLD AUTO: 0 10E3/UL (ref 0–0.2)
BASOPHILS NFR BLD AUTO: 1 %
BILIRUB SERPL-MCNC: 0.2 MG/DL
BUN SERPL-MCNC: 12.7 MG/DL (ref 8–23)
CALCIUM SERPL-MCNC: 9.9 MG/DL (ref 8.8–10.2)
CANCER AG15-3 SERPL-ACNC: 14 U/ML
CHLORIDE SERPL-SCNC: 101 MMOL/L (ref 98–107)
CREAT SERPL-MCNC: 0.56 MG/DL (ref 0.51–0.95)
DEPRECATED HCO3 PLAS-SCNC: 27 MMOL/L (ref 22–29)
EOSINOPHIL # BLD AUTO: 0.2 10E3/UL (ref 0–0.7)
EOSINOPHIL NFR BLD AUTO: 3 %
ERYTHROCYTE [DISTWIDTH] IN BLOOD BY AUTOMATED COUNT: 13.5 % (ref 10–15)
GFR SERPL CREATININE-BSD FRML MDRD: >90 ML/MIN/1.73M2
GLUCOSE SERPL-MCNC: 109 MG/DL (ref 70–99)
HCT VFR BLD AUTO: 35.7 % (ref 35–47)
HGB BLD-MCNC: 11.8 G/DL (ref 11.7–15.7)
IMM GRANULOCYTES # BLD: 0 10E3/UL
IMM GRANULOCYTES NFR BLD: 0 %
LYMPHOCYTES # BLD AUTO: 1.1 10E3/UL (ref 0.8–5.3)
LYMPHOCYTES NFR BLD AUTO: 20 %
MCH RBC QN AUTO: 30.3 PG (ref 26.5–33)
MCHC RBC AUTO-ENTMCNC: 33.1 G/DL (ref 31.5–36.5)
MCV RBC AUTO: 92 FL (ref 78–100)
MONOCYTES # BLD AUTO: 0.7 10E3/UL (ref 0–1.3)
MONOCYTES NFR BLD AUTO: 13 %
NEUTROPHILS # BLD AUTO: 3.5 10E3/UL (ref 1.6–8.3)
NEUTROPHILS NFR BLD AUTO: 63 %
NRBC # BLD AUTO: 0 10E3/UL
NRBC BLD AUTO-RTO: 0 /100
PLATELET # BLD AUTO: 215 10E3/UL (ref 150–450)
POTASSIUM SERPL-SCNC: 3.9 MMOL/L (ref 3.4–5.3)
PROT SERPL-MCNC: 7.3 G/DL (ref 6.4–8.3)
RBC # BLD AUTO: 3.89 10E6/UL (ref 3.8–5.2)
SODIUM SERPL-SCNC: 138 MMOL/L (ref 136–145)
TSH SERPL DL<=0.005 MIU/L-ACNC: 1.85 UIU/ML (ref 0.3–4.2)
WBC # BLD AUTO: 5.6 10E3/UL (ref 4–11)

## 2023-08-01 PROCEDURE — 84443 ASSAY THYROID STIM HORMONE: CPT | Performed by: INTERNAL MEDICINE

## 2023-08-01 PROCEDURE — 258N000003 HC RX IP 258 OP 636: Performed by: INTERNAL MEDICINE

## 2023-08-01 PROCEDURE — 85025 COMPLETE CBC W/AUTO DIFF WBC: CPT | Performed by: INTERNAL MEDICINE

## 2023-08-01 PROCEDURE — 250N000011 HC RX IP 250 OP 636: Mod: JZ | Performed by: INTERNAL MEDICINE

## 2023-08-01 PROCEDURE — 80053 COMPREHEN METABOLIC PANEL: CPT | Performed by: INTERNAL MEDICINE

## 2023-08-01 PROCEDURE — 96413 CHEMO IV INFUSION 1 HR: CPT

## 2023-08-01 PROCEDURE — 86300 IMMUNOASSAY TUMOR CA 15-3: CPT | Performed by: INTERNAL MEDICINE

## 2023-08-01 PROCEDURE — 36591 DRAW BLOOD OFF VENOUS DEVICE: CPT | Performed by: INTERNAL MEDICINE

## 2023-08-01 RX ORDER — HEPARIN SODIUM (PORCINE) LOCK FLUSH IV SOLN 100 UNIT/ML 100 UNIT/ML
5 SOLUTION INTRAVENOUS
Status: DISCONTINUED | OUTPATIENT
Start: 2023-08-01 | End: 2023-08-01 | Stop reason: HOSPADM

## 2023-08-01 RX ADMIN — SODIUM CHLORIDE 200 MG: 9 INJECTION, SOLUTION INTRAVENOUS at 10:31

## 2023-08-01 RX ADMIN — Medication 5 ML: at 11:04

## 2023-08-01 RX ADMIN — SODIUM CHLORIDE 250 ML: 9 INJECTION, SOLUTION INTRAVENOUS at 10:31

## 2023-08-01 NOTE — PROGRESS NOTES
Infusion Nursing Note:  Precious Paris presents today for Keytruda.    Patient seen by provider today: No   present during visit today: Not Applicable.    Note: N/A.    Intravenous Access:  Implanted Port.    Treatment Conditions:  Lab Results   Component Value Date     08/01/2023    POTASSIUM 3.9 08/01/2023    CR 0.56 08/01/2023    YADIRA 9.9 08/01/2023    BILITOTAL 0.2 08/01/2023    ALBUMIN 3.9 08/01/2023    ALT 21 08/01/2023    AST 28 08/01/2023   Results reviewed, labs MET treatment parameters, ok to proceed with treatment.    Post Infusion Assessment:  Patient tolerated infusion without incident.  Blood return noted pre and post infusion.  Site patent and intact, free from redness, edema or discomfort.  No evidence of extravasations.  Access discontinued per protocol.     Discharge Plan:   Discharge instructions reviewed with: Patient.  Patient and/or family verbalized understanding of discharge instructions and all questions answered.  AVS to patient via [employer].  Patient will return 8/22/2023 for next appointment.   Patient discharged in stable condition accompanied by: self.  Departure Mode: Ambulatory.    Therese Fountain RN

## 2023-08-03 LAB — CANCER AG27-29 SERPL-ACNC: 15.3 U/ML

## 2023-08-10 ENCOUNTER — TRANSFERRED RECORDS (OUTPATIENT)
Dept: HEALTH INFORMATION MANAGEMENT | Facility: CLINIC | Age: 83
End: 2023-08-10
Payer: COMMERCIAL

## 2023-08-17 ENCOUNTER — HOSPITAL ENCOUNTER (OUTPATIENT)
Dept: PET IMAGING | Facility: CLINIC | Age: 83
Discharge: HOME | End: 2023-08-17
Attending: INTERNAL MEDICINE | Admitting: INTERNAL MEDICINE
Payer: COMMERCIAL

## 2023-08-17 DIAGNOSIS — C50.411 MALIGNANT NEOPLASM OF UPPER-OUTER QUADRANT OF RIGHT BREAST IN FEMALE, ESTROGEN RECEPTOR NEGATIVE (H): ICD-10-CM

## 2023-08-17 DIAGNOSIS — C50.919 PRIMARY MALIGNANT NEOPLASM OF BREAST WITH METASTASIS (H): ICD-10-CM

## 2023-08-17 DIAGNOSIS — Z17.1 MALIGNANT NEOPLASM OF UPPER-OUTER QUADRANT OF RIGHT BREAST IN FEMALE, ESTROGEN RECEPTOR NEGATIVE (H): ICD-10-CM

## 2023-08-17 PROCEDURE — 78815 PET IMAGE W/CT SKULL-THIGH: CPT | Mod: PS

## 2023-08-17 PROCEDURE — 343N000001 HC RX 343: Performed by: INTERNAL MEDICINE

## 2023-08-17 PROCEDURE — A9552 F18 FDG: HCPCS | Performed by: INTERNAL MEDICINE

## 2023-08-17 RX ADMIN — FLUDEOXYGLUCOSE F-18 13.53 MILLICURIE: 500 INJECTION, SOLUTION INTRAVENOUS at 09:57

## 2023-08-22 ENCOUNTER — ONCOLOGY VISIT (OUTPATIENT)
Dept: ONCOLOGY | Facility: CLINIC | Age: 83
End: 2023-08-22
Attending: INTERNAL MEDICINE
Payer: COMMERCIAL

## 2023-08-22 ENCOUNTER — LAB (OUTPATIENT)
Dept: INFUSION THERAPY | Facility: CLINIC | Age: 83
End: 2023-08-22
Attending: INTERNAL MEDICINE
Payer: COMMERCIAL

## 2023-08-22 VITALS — HEART RATE: 67 BPM | DIASTOLIC BLOOD PRESSURE: 76 MMHG | SYSTOLIC BLOOD PRESSURE: 142 MMHG

## 2023-08-22 VITALS
HEIGHT: 65 IN | HEART RATE: 82 BPM | WEIGHT: 185 LBS | BODY MASS INDEX: 30.82 KG/M2 | DIASTOLIC BLOOD PRESSURE: 72 MMHG | SYSTOLIC BLOOD PRESSURE: 147 MMHG | RESPIRATION RATE: 12 BRPM | TEMPERATURE: 97.7 F | OXYGEN SATURATION: 96 %

## 2023-08-22 DIAGNOSIS — C50.411 MALIGNANT NEOPLASM OF UPPER-OUTER QUADRANT OF RIGHT BREAST IN FEMALE, ESTROGEN RECEPTOR NEGATIVE (H): Primary | ICD-10-CM

## 2023-08-22 DIAGNOSIS — Z08 ENCOUNTER FOR FOLLOW-UP EXAMINATION AFTER COMPLETED TREATMENT FOR MALIGNANT NEOPLASM: ICD-10-CM

## 2023-08-22 DIAGNOSIS — C50.919 PRIMARY MALIGNANT NEOPLASM OF BREAST WITH METASTASIS (H): ICD-10-CM

## 2023-08-22 DIAGNOSIS — Z17.1 MALIGNANT NEOPLASM OF UPPER-OUTER QUADRANT OF RIGHT BREAST IN FEMALE, ESTROGEN RECEPTOR NEGATIVE (H): ICD-10-CM

## 2023-08-22 DIAGNOSIS — C50.411 MALIGNANT NEOPLASM OF UPPER-OUTER QUADRANT OF RIGHT BREAST IN FEMALE, ESTROGEN RECEPTOR NEGATIVE (H): ICD-10-CM

## 2023-08-22 DIAGNOSIS — Z17.1 MALIGNANT NEOPLASM OF UPPER-OUTER QUADRANT OF RIGHT BREAST IN FEMALE, ESTROGEN RECEPTOR NEGATIVE (H): Primary | ICD-10-CM

## 2023-08-22 DIAGNOSIS — C50.919 PRIMARY MALIGNANT NEOPLASM OF BREAST WITH METASTASIS (H): Primary | ICD-10-CM

## 2023-08-22 LAB
ALBUMIN SERPL BCG-MCNC: 3.9 G/DL (ref 3.5–5.2)
ALP SERPL-CCNC: 96 U/L (ref 35–104)
ALT SERPL W P-5'-P-CCNC: 21 U/L (ref 0–50)
ANION GAP SERPL CALCULATED.3IONS-SCNC: 9 MMOL/L (ref 7–15)
AST SERPL W P-5'-P-CCNC: 26 U/L (ref 0–45)
BASOPHILS # BLD AUTO: 0.1 10E3/UL (ref 0–0.2)
BASOPHILS NFR BLD AUTO: 1 %
BILIRUB SERPL-MCNC: 0.4 MG/DL
BUN SERPL-MCNC: 11.8 MG/DL (ref 8–23)
CALCIUM SERPL-MCNC: 9.2 MG/DL (ref 8.8–10.2)
CANCER AG15-3 SERPL-ACNC: 16 U/ML
CHLORIDE SERPL-SCNC: 100 MMOL/L (ref 98–107)
CREAT SERPL-MCNC: 0.59 MG/DL (ref 0.51–0.95)
DEPRECATED HCO3 PLAS-SCNC: 28 MMOL/L (ref 22–29)
EOSINOPHIL # BLD AUTO: 0.2 10E3/UL (ref 0–0.7)
EOSINOPHIL NFR BLD AUTO: 4 %
ERYTHROCYTE [DISTWIDTH] IN BLOOD BY AUTOMATED COUNT: 14 % (ref 10–15)
GFR SERPL CREATININE-BSD FRML MDRD: 90 ML/MIN/1.73M2
GLUCOSE SERPL-MCNC: 135 MG/DL (ref 70–99)
HCT VFR BLD AUTO: 36.8 % (ref 35–47)
HGB BLD-MCNC: 12.1 G/DL (ref 11.7–15.7)
IMM GRANULOCYTES # BLD: 0 10E3/UL
IMM GRANULOCYTES NFR BLD: 1 %
LYMPHOCYTES # BLD AUTO: 1.3 10E3/UL (ref 0.8–5.3)
LYMPHOCYTES NFR BLD AUTO: 21 %
MCH RBC QN AUTO: 29.2 PG (ref 26.5–33)
MCHC RBC AUTO-ENTMCNC: 32.9 G/DL (ref 31.5–36.5)
MCV RBC AUTO: 89 FL (ref 78–100)
MONOCYTES # BLD AUTO: 0.7 10E3/UL (ref 0–1.3)
MONOCYTES NFR BLD AUTO: 11 %
NEUTROPHILS # BLD AUTO: 3.9 10E3/UL (ref 1.6–8.3)
NEUTROPHILS NFR BLD AUTO: 62 %
NRBC # BLD AUTO: 0 10E3/UL
NRBC BLD AUTO-RTO: 0 /100
PLATELET # BLD AUTO: 224 10E3/UL (ref 150–450)
POTASSIUM SERPL-SCNC: 3.9 MMOL/L (ref 3.4–5.3)
PROT SERPL-MCNC: 7 G/DL (ref 6.4–8.3)
RBC # BLD AUTO: 4.15 10E6/UL (ref 3.8–5.2)
SODIUM SERPL-SCNC: 137 MMOL/L (ref 136–145)
TSH SERPL DL<=0.005 MIU/L-ACNC: 3.41 UIU/ML (ref 0.3–4.2)
WBC # BLD AUTO: 6.2 10E3/UL (ref 4–11)

## 2023-08-22 PROCEDURE — 96413 CHEMO IV INFUSION 1 HR: CPT

## 2023-08-22 PROCEDURE — 86300 IMMUNOASSAY TUMOR CA 15-3: CPT | Performed by: INTERNAL MEDICINE

## 2023-08-22 PROCEDURE — 84443 ASSAY THYROID STIM HORMONE: CPT | Performed by: INTERNAL MEDICINE

## 2023-08-22 PROCEDURE — 85004 AUTOMATED DIFF WBC COUNT: CPT | Performed by: INTERNAL MEDICINE

## 2023-08-22 PROCEDURE — G0463 HOSPITAL OUTPT CLINIC VISIT: HCPCS | Mod: 25 | Performed by: INTERNAL MEDICINE

## 2023-08-22 PROCEDURE — 99215 OFFICE O/P EST HI 40 MIN: CPT | Performed by: INTERNAL MEDICINE

## 2023-08-22 PROCEDURE — 258N000003 HC RX IP 258 OP 636: Performed by: INTERNAL MEDICINE

## 2023-08-22 PROCEDURE — 80053 COMPREHEN METABOLIC PANEL: CPT | Performed by: INTERNAL MEDICINE

## 2023-08-22 PROCEDURE — 250N000011 HC RX IP 250 OP 636: Mod: JZ | Performed by: INTERNAL MEDICINE

## 2023-08-22 RX ORDER — HEPARIN SODIUM (PORCINE) LOCK FLUSH IV SOLN 100 UNIT/ML 100 UNIT/ML
5 SOLUTION INTRAVENOUS
Status: CANCELLED | OUTPATIENT
Start: 2023-08-22

## 2023-08-22 RX ORDER — EPINEPHRINE 1 MG/ML
0.3 INJECTION, SOLUTION, CONCENTRATE INTRAVENOUS EVERY 5 MIN PRN
Status: CANCELLED | OUTPATIENT
Start: 2023-08-22

## 2023-08-22 RX ORDER — LORAZEPAM 2 MG/ML
0.5 INJECTION INTRAMUSCULAR EVERY 4 HOURS PRN
Status: CANCELLED | OUTPATIENT
Start: 2023-08-22

## 2023-08-22 RX ORDER — METHYLPREDNISOLONE SODIUM SUCCINATE 125 MG/2ML
125 INJECTION, POWDER, LYOPHILIZED, FOR SOLUTION INTRAMUSCULAR; INTRAVENOUS
Status: CANCELLED
Start: 2023-08-22

## 2023-08-22 RX ORDER — HEPARIN SODIUM (PORCINE) LOCK FLUSH IV SOLN 100 UNIT/ML 100 UNIT/ML
5 SOLUTION INTRAVENOUS
Status: DISCONTINUED | OUTPATIENT
Start: 2023-08-22 | End: 2023-08-22 | Stop reason: HOSPADM

## 2023-08-22 RX ORDER — HEPARIN SODIUM,PORCINE 10 UNIT/ML
5 VIAL (ML) INTRAVENOUS
Status: DISCONTINUED | OUTPATIENT
Start: 2023-08-22 | End: 2023-08-22 | Stop reason: HOSPADM

## 2023-08-22 RX ORDER — ALBUTEROL SULFATE 90 UG/1
1-2 AEROSOL, METERED RESPIRATORY (INHALATION)
Status: CANCELLED
Start: 2023-08-22

## 2023-08-22 RX ORDER — ALBUTEROL SULFATE 0.83 MG/ML
2.5 SOLUTION RESPIRATORY (INHALATION)
Status: CANCELLED | OUTPATIENT
Start: 2023-08-22

## 2023-08-22 RX ORDER — MEPERIDINE HYDROCHLORIDE 25 MG/ML
25 INJECTION INTRAMUSCULAR; INTRAVENOUS; SUBCUTANEOUS EVERY 30 MIN PRN
Status: CANCELLED | OUTPATIENT
Start: 2023-08-22

## 2023-08-22 RX ORDER — HEPARIN SODIUM,PORCINE 10 UNIT/ML
5 VIAL (ML) INTRAVENOUS
Status: CANCELLED | OUTPATIENT
Start: 2023-08-22

## 2023-08-22 RX ORDER — DIPHENHYDRAMINE HYDROCHLORIDE 50 MG/ML
50 INJECTION INTRAMUSCULAR; INTRAVENOUS
Status: CANCELLED
Start: 2023-08-22

## 2023-08-22 RX ADMIN — SODIUM CHLORIDE 250 ML: 9 INJECTION, SOLUTION INTRAVENOUS at 08:52

## 2023-08-22 RX ADMIN — SODIUM CHLORIDE 200 MG: 9 INJECTION, SOLUTION INTRAVENOUS at 09:47

## 2023-08-22 RX ADMIN — Medication 5 ML: at 10:17

## 2023-08-22 ASSESSMENT — PAIN SCALES - GENERAL: PAINLEVEL: NO PAIN (0)

## 2023-08-22 NOTE — PROGRESS NOTES
Infusion Nursing Note:  Precious Paris presents today for Keytruda.    Patient seen by provider today: Yes: Dr. Beebe   present during visit today: Not Applicable.    Note: N/A.      Intravenous Access:  Implanted Port.    Treatment Conditions:  Lab Results   Component Value Date    HGB 12.1 08/22/2023    WBC 6.2 08/22/2023    ANEU 2.6 11/28/2022    ANEUTAUTO 3.9 08/22/2023     08/22/2023        Lab Results   Component Value Date     08/22/2023    POTASSIUM 3.9 08/22/2023    CR 0.59 08/22/2023    YADIRA 9.2 08/22/2023    BILITOTAL 0.4 08/22/2023    ALBUMIN 3.9 08/22/2023    ALT 21 08/22/2023    AST 26 08/22/2023       Results reviewed, labs MET treatment parameters, ok to proceed with treatment.      Post Infusion Assessment:  Patient tolerated infusion without incident.  Blood return noted pre and post infusion.  No evidence of extravasations.  Access discontinued per protocol.       Discharge Plan:   Discharge instructions reviewed with: Patient.  Patient discharged in stable condition accompanied by: self.  Departure Mode: Ambulatory.      Carmella William RN

## 2023-08-22 NOTE — PROGRESS NOTES
[facility] Hematology and Oncology Progress Note    Patient: Precious Paris  MRN: [id number]  Aug 22, 2023          Reason for Visit    Recurrent stage IV triple negative breast cancer (nodes, lung) (PDL1+)    Primary Oncologist: Dr. Beebe    _____________________________________________________________________________    History of Present Illness/ Interval History    Ms. Precious Paris is a 82 year old with recurrent metastatic triple negative breast cancer recurrence. Completed 3 cycles of Doxil with Pembro added to the 3rd cycle, with mixed response (some progression in mediastinal nodes and lung mets). Therefore, chemo was changed to carbo, gem and Pembro.  She had an excellent response after 3 cycles.  PET scan following 6 cycles showed complete response.  She is currently on pembrolizumab maintenance.    No major side effects from therapy so far.  Denies any diarrhea.  She does have some skin rashes here and there but nothing significant.  No weight loss.  No bone pain.  No nausea or vomiting.  Here with repeat labs and PET scan.      ECOG PS: 1      Oncology History/Treatment  Diagnosis/Stage:   11/2019: Right breast cancer, triple negative (yX1n-V2)    BRCA negative    10/2022: Recurrent, stage IV triple negative breast cancer (bilateral axillary, supraclav, mediastinal nodes + lung)  -persistent/progressive right breast firmness with new right nipple drainage and right axillary adenopathy  -bilateral diagnostic mammogram: increased density R breast with skin thickening. R breast US: 3.2 cm hypoechoic mass at 11:00 position 1 cm from nipple and multiple other small hypoechoic solid-appearing lesions in R breast + right axillary adenopathy (at least two hypoechoic vascular lesions measuring up to 2.5 cm)  -10/18/2022 biopsy R breast mass + R axillary node: grade 3 invasive ductal cancer with tumor necrosis, ER/OR/HER2 negative.   -PET: + right breast mass; bilateral axillary,  retropectoral, supraclavicular, mediastinal/R hilar nodes and bilateral lung/R pleural nodules suspicious for mets.  -Brain MRI: negative for mets  -11/1/2022 biopsy L axillary node: metastatic invasive ductal carcinoma  -Foundation One: no actionable mutations MS stable, TMB 1 mut/mb.   -PDL1 testing: CPS >20% and TPS 60% (considered high PDL1 expression)  -CA 27.29: 59.5 (elevated). CA 15-3: 23 (normal)    Treatment:  Initial breast cancer (2019)  -2019: Neoadjuvant taxol  -Lumpectomy  -5/2020: Adjuvant RT  -6 - 7/2020: Adjuvant Xeloda. Stopped for rash    Recurrent, stage IV breast cancer (2022)  -11/14/2022:Doxil every 4 weeks  -12/12/2022: changed to doxorubicin every 3 weeks. (Planning addition of pembrolizumab once approved by insurance)  -1/2/2023: doxorubicin + pembrolizumab every 3 weeks. Completed 1 cycle. Stopped for progression (med nodes, slight increase in lung mets and stable R breast mass + axillary nodes).    -1/23/2023 - present: Carbo AUC 5, Gemcitabine 800 mg/m2 and Pembro every 21 days  -cycles 1-2: Delco day 1 only  -Cycle 3: Delco increased to days 1 + 8 given excellent tolerance  -CT after 3 cycles showed excellent response (near CR axillary + med nodes and CA in lung mets; clinical improvement in right breast changes and tumor marker).    -PET scan after 6 cycles of carboplatin gemcitabine pembrolizumab shows complete response    -Maintenance pembrolizumab starting 5/30/2023      Physical Exam    GENERAL: Alert and oriented to time place and person. Seated comfortably. In no distress.   Lungs: clear  CVS: RRR  Lymph: no significant peripheral adenopathy noted on today's exam  Neuro: alert and oriented x 3      Lab Results  No results found for this or any previous visit (from the past 168 hour(s)).          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  Posttreatment PET scan showed complete response.  She is currently on pembrolizumab maintenance.  Doing extremely well on this without any major side effects.      Here with repeat PET scan.  Reviewed the PET scan images and report with her in detail today.  It looks like she has reactivation of FDG avid left axillary, right hilar, mediastinal and right supraclavicular lymphadenopathy.  No evidence of any FDG avid lesions in other organs.  Breast mass is not FDG avid.  It is not clear if this represents true progression versus inflammatory or reactive lymphadenopathy.  On exam today I can feel left axillary and right supraclavicular lymph nodes.  The left axilla lymph node is somewhat hard.  It is concerning for disease progression since its SUV max was 25.8 and was hard for palpation.     We discussed further management today.  We have 2 options.  1 is to continue pembrolizumab and repeat PET scan in 6 to 8 weeks.  Other option is to pursue a biopsy of the left axillary lymph node to see if it is truly positive for metastatic disease.  Previously she had a complete response on the PET scan so if she were to have malignant cells in the left axilla lymph node then I would consider it as a true progression.    I think pursuing a biopsy makes sense since it is technically out of the window where we typically see pseudoprogression with immunotherapy.  She is agreeable to the plan.  I have put a referral to IR for this.  The left axillary lymph node can be easily accessed via ultrasound guidance.    In the meantime we will continue pembrolizumab infusion.  Her labs show normal CBC and CMP.  Glucose is mildly elevated.  No contraindications to proceed with pembrolizumab today.  I will see her back in 3 weeks with repeat labs and tentative pembrolizumab infusion on the same day.  If we get biopsy results earlier than that then will contact her and make decisions based on the results.  She is  agreeable to the plan.    Billing:  A total of 40 min were spent today on this visit which included face to face conversation with the patient, EMR review, counseling and co-ordination of care and medical documentation.    Complex patient with metastatic triple negative breast cancer.      Signed by:   Maricarmen Beebe MD  Hematology and Medical Oncology  [facility]

## 2023-08-22 NOTE — LETTER
8/22/2023         RE: Precious Paris  [address] [place] [address]        Dear Colleague,    Thank you for referring your patient, Precious Paris, to the [facility]. Please see a copy of my visit note below.        [facility] Hematology and Oncology Progress Note    Patient: Precious Paris  MRN: [id number]  Aug 22, 2023          Reason for Visit    Recurrent stage IV triple negative breast cancer (nodes, lung) (PDL1+)    Primary Oncologist: Dr. Beebe    _____________________________________________________________________________    History of Present Illness/ Interval History    Ms. Precious Paris is a 82 year old with recurrent metastatic triple negative breast cancer recurrence. Completed 3 cycles of Doxil with Pembro added to the 3rd cycle, with mixed response (some progression in mediastinal nodes and lung mets). Therefore, chemo was changed to carbo, gem and Pembro.  She had an excellent response after 3 cycles.  PET scan following 6 cycles showed complete response.  She is currently on pembrolizumab maintenance.    No major side effects from therapy so far.  Denies any diarrhea.  She does have some skin rashes here and there but nothing significant.  No weight loss.  No bone pain.  No nausea or vomiting.  Here with repeat labs and PET scan.      ECOG PS: 1      Oncology History/Treatment  Diagnosis/Stage:   11/2019: Right breast cancer, triple negative (tX0r-L2)    BRCA negative    10/2022: Recurrent, stage IV triple negative breast cancer (bilateral axillary, supraclav, mediastinal nodes + lung)  -persistent/progressive right breast firmness with new right nipple drainage and right axillary adenopathy  -bilateral diagnostic mammogram: increased density R breast with skin thickening. R breast US: 3.2 cm hypoechoic mass at 11:00 position 1 cm from nipple and multiple other small hypoechoic solid-appearing lesions in R breast + right  axillary adenopathy (at least two hypoechoic vascular lesions measuring up to 2.5 cm)  -10/18/2022 biopsy R breast mass + R axillary node: grade 3 invasive ductal cancer with tumor necrosis, ER/[place]/HER2 negative.   -PET: + right breast mass; bilateral axillary, retropectoral, supraclavicular, mediastinal/R hilar nodes and bilateral lung/R pleural nodules suspicious for mets.  -Brain MRI: negative for mets  -11/1/2022 biopsy L axillary node: metastatic invasive ductal carcinoma  -Foundation One: no actionable mutations MS stable, TMB 1 mut/mb.   -PDL1 testing: CPS >20% and TPS 60% (considered high PDL1 expression)  -CA 27.29: 59.5 (elevated). CA 15-3: 23 (normal)    Treatment:  Initial breast cancer (2019)  -2019: Neoadjuvant taxol  -Lumpectomy  -5/2020: Adjuvant RT  -6 - 7/2020: Adjuvant Xeloda. Stopped for rash    Recurrent, stage IV breast cancer (2022)  -11/14/2022:Doxil every 4 weeks  -12/12/2022: changed to doxorubicin every 3 weeks. (Planning addition of pembrolizumab once approved by insurance)  -1/2/2023: doxorubicin + pembrolizumab every 3 weeks. Completed 1 cycle. Stopped for progression (med nodes, slight increase in lung mets and stable R breast mass + axillary nodes).    -1/23/2023 - present: Carbo AUC 5, Gemcitabine 800 mg/m2 and Pembro every 21 days  -cycles 1-2: Jackson day 1 only  -Cycle 3: Jackson increased to days 1 + 8 given excellent tolerance  -CT after 3 cycles showed excellent response (near CR axillary + med nodes and [place] in lung mets; clinical improvement in right breast changes and tumor marker).    -PET scan after 6 cycles of carboplatin gemcitabine pembrolizumab shows complete response    -Maintenance pembrolizumab starting 5/30/2023      Physical Exam    GENERAL: Alert and oriented to time place and person. Seated comfortably. In no distress.   Lungs: clear  CVS: RRR  Lymph: no significant peripheral adenopathy noted on today's exam  Neuro: alert and oriented x 3      Lab Results  No results  found for this or any previous visit (from the past 168 hour(s)).          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  Posttreatment PET scan showed complete response.  She is currently on pembrolizumab maintenance.  Doing extremely well on this without any major side effects.      Here with repeat PET scan.  Reviewed the PET scan images and report with her in detail today.  It looks like she has reactivation of FDG avid left axillary, right hilar, mediastinal and right supraclavicular lymphadenopathy.  No evidence of any FDG avid lesions in other organs.  Breast mass is not FDG avid.  It is not clear if this represents true progression versus inflammatory or reactive lymphadenopathy.  On exam today I can feel left axillary and right supraclavicular lymph nodes.  The left axilla lymph node is somewhat hard.  It is concerning for disease progression since its SUV max was 25.8 and was hard for palpation.     We discussed further management today.  We have 2 options.  1 is to continue pembrolizumab and repeat PET scan in 6 to 8 weeks.  Other option is to pursue a biopsy of the left axillary lymph node to see if it is truly positive for metastatic disease.  Previously she had a complete response on the PET scan so if she were to have malignant cells in the left axilla lymph node then I would consider it as a true progression.    I think pursuing a biopsy makes sense since it is technically out of the window where we typically see pseudoprogression with immunotherapy.  She is agreeable to the plan.  I have put a referral to IR for this.  The left axillary lymph node can be easily accessed via ultrasound guidance.    In the meantime we will continue pembrolizumab infusion.  Her labs show normal CBC and CMP.  Glucose is mildly elevated.  No  "contraindications to proceed with pembrolizumab today.  I will see her back in 3 weeks with repeat labs and tentative pembrolizumab infusion on the same day.  If we get biopsy results earlier than that then will contact her and make decisions based on the results.  She is agreeable to the plan.    Billing:  A total of 40 min were spent today on this visit which included face to face conversation with the patient, EMR review, counseling and co-ordination of care and medical documentation.    Complex patient with metastatic triple negative breast cancer.      Signed by:   Maricarmen Beebe MD  Hematology and Medical Oncology  [facility]    Oncology Rooming Note    August 22, 2023 8:22 AM   Precious Paris is a 82 year old female who presents for:    Chief Complaint   Patient presents with     Oncology Clinic Visit     Breast cancer - Labs provider and infusion     Initial Vitals: BP (!) 147/72 (BP Location: Left arm, Patient Position: Sitting, Cuff Size: Adult Regular)   Pulse 82   Temp 97.7  F (36.5  C) (Tympanic)   Resp 12   Ht 1.638 m (5' 4.5\")   Wt 83.9 kg (185 lb)   SpO2 96%   BMI 31.26 kg/m   Estimated body mass index is 31.26 kg/m  as calculated from the following:    Height as of this encounter: 1.638 m (5' 4.5\").    Weight as of this encounter: 83.9 kg (185 lb). Body surface area is 1.95 meters squared.  No Pain (0) Comment: Data Unavailable   No LMP recorded. Patient is postmenopausal.  Allergies reviewed: Yes  Medications reviewed: Yes    Medications: Medication refills not needed today.  Pharmacy name entered into [facility]: [facility] [id number] [facility]    Clinical concerns:  None      Malgorzata Vallejo [facility]                Again, thank you for allowing me to participate in the care of your patient.        Sincerely,        Maricarmen Beebe MD  "

## 2023-08-22 NOTE — PROGRESS NOTES
"Oncology Rooming Note    August 22, 2023 8:22 AM   Precious Paris is a 82 year old female who presents for:    Chief Complaint   Patient presents with    Oncology Clinic Visit     Breast cancer - Labs provider and infusion     Initial Vitals: BP (!) 147/72 (BP Location: Left arm, Patient Position: Sitting, Cuff Size: Adult Regular)   Pulse 82   Temp 97.7  F (36.5  C) (Tympanic)   Resp 12   Ht 1.638 m (5' 4.5\")   Wt 83.9 kg (185 lb)   SpO2 96%   BMI 31.26 kg/m   Estimated body mass index is 31.26 kg/m  as calculated from the following:    Height as of this encounter: 1.638 m (5' 4.5\").    Weight as of this encounter: 83.9 kg (185 lb). Body surface area is 1.95 meters squared.  No Pain (0) Comment: Data Unavailable   No LMP recorded. Patient is postmenopausal.  Allergies reviewed: Yes  Medications reviewed: Yes    Medications: Medication refills not needed today.  Pharmacy name entered into [facility]: [facility] - [facility] [id number] [facility]    Clinical concerns:  None      Malgorzata Vallejo CMA              "

## 2023-08-23 LAB — CANCER AG27-29 SERPL-ACNC: 21 U/ML

## 2023-08-28 ENCOUNTER — HOSPITAL ENCOUNTER (OUTPATIENT)
Dept: ULTRASOUND IMAGING | Facility: CLINIC | Age: 83
Discharge: HOME | End: 2023-08-28
Attending: INTERNAL MEDICINE | Admitting: INTERNAL MEDICINE
Payer: COMMERCIAL

## 2023-08-28 VITALS — SYSTOLIC BLOOD PRESSURE: 144 MMHG | DIASTOLIC BLOOD PRESSURE: 88 MMHG

## 2023-08-28 DIAGNOSIS — Z17.1 MALIGNANT NEOPLASM OF UPPER-OUTER QUADRANT OF RIGHT BREAST IN FEMALE, ESTROGEN RECEPTOR NEGATIVE (H): ICD-10-CM

## 2023-08-28 DIAGNOSIS — C50.411 MALIGNANT NEOPLASM OF UPPER-OUTER QUADRANT OF RIGHT BREAST IN FEMALE, ESTROGEN RECEPTOR NEGATIVE (H): ICD-10-CM

## 2023-08-28 PROCEDURE — 250N000009 HC RX 250: Performed by: RADIOLOGY

## 2023-08-28 PROCEDURE — 272N000431 US BIOPSY FINE NEEDLE ASPIRATION LYMPH NODE

## 2023-08-28 PROCEDURE — 88173 CYTOPATH EVAL FNA REPORT: CPT | Mod: TC | Performed by: INTERNAL MEDICINE

## 2023-08-28 RX ADMIN — LIDOCAINE HYDROCHLORIDE 6 ML: 10 INJECTION, SOLUTION EPIDURAL; INFILTRATION; INTRACAUDAL; PERINEURAL at 08:47

## 2023-08-29 LAB
PATH REPORT.COMMENTS IMP SPEC: ABNORMAL
PATH REPORT.COMMENTS IMP SPEC: ABNORMAL
PATH REPORT.COMMENTS IMP SPEC: YES
PATH REPORT.FINAL DX SPEC: ABNORMAL
PATH REPORT.GROSS SPEC: ABNORMAL
PATH REPORT.MICROSCOPIC SPEC OTHER STN: ABNORMAL
PATH REPORT.RELEVANT HX SPEC: ABNORMAL

## 2023-08-29 PROCEDURE — 88173 CYTOPATH EVAL FNA REPORT: CPT | Mod: 26 | Performed by: PATHOLOGY

## 2023-08-30 NOTE — PROGRESS NOTES
Pathology results from LEFT axilla lymph node biopsy performed on 8/28/2023 revealed:     [facility]  Precious Paris [id number]  F, 1940  Non-Gynecologic Cytology Report (Final result) [id number]  Authorizing Provider: Maricarmen Beebe MD Ordering Provider: Maricarmen Beebe MD  Ordering Location: [facility]  Imaging  Collected: 08/28/2023 08:58 AM  Pathologist: Bess Royal MD Received: 08/28/2023 09:24 AM  .  Specimens  A Lymph Node(s), Axillary, Left  .  .  Final Diagnosis  Specimen A  Interpretation:  Positive for malignancy, Metastatic carcinoma. (please see comment)  Adequacy:  Satisfactory for evaluation  Electronically signed by Bess Royal MD on 8/29/2023 at 2:35 PM  .  Comment  The cytomorphology is compatible with a high-grade poorly differentiated malignancy. Clinical history of metastatic triple negative  breast carcinoma is noted and morphology is compatible with metastasis from the same. Unfortunately a cellblock specimen is not  present for performance of confirmatory and/or ancillary testing  Clinical Information  1. Recurrent stage IV triple negative breast cancer (nodes, lung) (PDL1+)     Per Breast Center Radiologist, Dr. Neri Graham, results are concordant with imaging findings.     Recommendation: Continued Oncologic Management      Results and Recommendations communicated to Dr. Maricarmen Beebe who will notify Precious of these results and discuss further oncologic management.        Michelle Rahman RN BSN  Procedure Nurse  [facility]  [phone]

## 2023-09-01 ENCOUNTER — DOCUMENTATION ONLY (OUTPATIENT)
Dept: ONCOLOGY | Facility: HOSPITAL | Age: 83
End: 2023-09-01
Payer: COMMERCIAL

## 2023-09-01 NOTE — PROGRESS NOTES
Attempted to call patient regarding biopsy report.  I could not reach her.  I will reach out to her again on Tuesday.

## 2023-09-05 DIAGNOSIS — Z17.1 MALIGNANT NEOPLASM OF UPPER-OUTER QUADRANT OF RIGHT BREAST IN FEMALE, ESTROGEN RECEPTOR NEGATIVE (H): ICD-10-CM

## 2023-09-05 DIAGNOSIS — C50.411 MALIGNANT NEOPLASM OF UPPER-OUTER QUADRANT OF RIGHT BREAST IN FEMALE, ESTROGEN RECEPTOR NEGATIVE (H): ICD-10-CM

## 2023-09-05 DIAGNOSIS — C77.3 METASTATIC CANCER TO AXILLARY LYMPH NODES (H): Primary | ICD-10-CM

## 2023-09-10 ENCOUNTER — HOSPITAL ENCOUNTER (OUTPATIENT)
Dept: MRI IMAGING | Facility: CLINIC | Age: 83
Discharge: HOME | End: 2023-09-10
Attending: INTERNAL MEDICINE | Admitting: INTERNAL MEDICINE
Payer: COMMERCIAL

## 2023-09-10 DIAGNOSIS — C50.411 MALIGNANT NEOPLASM OF UPPER-OUTER QUADRANT OF RIGHT BREAST IN FEMALE, ESTROGEN RECEPTOR NEGATIVE (H): ICD-10-CM

## 2023-09-10 DIAGNOSIS — C77.3 METASTATIC CANCER TO AXILLARY LYMPH NODES (H): ICD-10-CM

## 2023-09-10 DIAGNOSIS — Z17.1 MALIGNANT NEOPLASM OF UPPER-OUTER QUADRANT OF RIGHT BREAST IN FEMALE, ESTROGEN RECEPTOR NEGATIVE (H): ICD-10-CM

## 2023-09-10 PROCEDURE — A9585 GADOBUTROL INJECTION: HCPCS | Performed by: INTERNAL MEDICINE

## 2023-09-10 PROCEDURE — 70553 MRI BRAIN STEM W/O & W/DYE: CPT

## 2023-09-10 PROCEDURE — 255N000002 HC RX 255 OP 636: Performed by: INTERNAL MEDICINE

## 2023-09-10 RX ORDER — GADOBUTROL 604.72 MG/ML
8 INJECTION INTRAVENOUS ONCE
Status: COMPLETED | OUTPATIENT
Start: 2023-09-10 | End: 2023-09-10

## 2023-09-10 RX ADMIN — GADOBUTROL 8 ML: 604.72 INJECTION INTRAVENOUS at 07:50

## 2023-09-11 ENCOUNTER — OFFICE VISIT (OUTPATIENT)
Dept: DERMATOLOGY | Facility: CLINIC | Age: 83
End: 2023-09-11
Payer: COMMERCIAL

## 2023-09-11 ENCOUNTER — DOCUMENTATION ONLY (OUTPATIENT)
Dept: ONCOLOGY | Facility: HOSPITAL | Age: 83
End: 2023-09-11

## 2023-09-11 DIAGNOSIS — Z85.828 HISTORY OF SCC (SQUAMOUS CELL CARCINOMA) OF SKIN: ICD-10-CM

## 2023-09-11 DIAGNOSIS — L30.9 DERMATITIS: Primary | ICD-10-CM

## 2023-09-11 DIAGNOSIS — L57.0 ACTINIC KERATOSIS: ICD-10-CM

## 2023-09-11 DIAGNOSIS — L81.4 LENTIGO: ICD-10-CM

## 2023-09-11 DIAGNOSIS — L82.1 SEBORRHEIC KERATOSIS: ICD-10-CM

## 2023-09-11 DIAGNOSIS — D18.01 ANGIOMA OF SKIN: ICD-10-CM

## 2023-09-11 DIAGNOSIS — D22.9 NEVUS: ICD-10-CM

## 2023-09-11 PROCEDURE — 17000 DESTRUCT PREMALG LESION: CPT | Performed by: PHYSICIAN ASSISTANT

## 2023-09-11 PROCEDURE — 99213 OFFICE O/P EST LOW 20 MIN: CPT | Mod: 25 | Performed by: PHYSICIAN ASSISTANT

## 2023-09-11 PROCEDURE — 17003 DESTRUCT PREMALG LES 2-14: CPT | Performed by: PHYSICIAN ASSISTANT

## 2023-09-11 RX ORDER — HYDROCORTISONE 2.5 %
CREAM (GRAM) TOPICAL
Qty: 30 G | Refills: 3 | Status: SHIPPED | OUTPATIENT
Start: 2023-09-11

## 2023-09-11 NOTE — PROGRESS NOTES
Recent PET scan showed evidence of disease progression.  She has pretty significant FDG avid lymphadenopathy.  I pursued a biopsy of the lymph nodes to confirm whether it was a true progression or pseudoprogression and unfortunately biopsy has come back showing positive for metastatic carcinoma consistent with breast primary.  In this setting I reviewed further treatment options.  She has had pretty decent chemotherapy exposure in the past.  I do not think she will respond to more chemotherapy without causing significant side effects.  So in this setting the second line treatment option would be sacituzumab govitecan.  This is per NCCN guidelines.  I reviewed the potential side effects and complications associated with the treatment with the patient in detail over the phone.  She is agreeable to the plan.  We will put a plan in place and get prior authorization going.

## 2023-09-11 NOTE — LETTER
9/11/2023         RE: Precious Paris  [address] [place] [address]        Dear Colleague,    Thank you for referring your patient, Precious Paris, to the [facility]. Please see a copy of my visit note below.    HPI:   Chief complaints: Precious Paris is a pleasant 82 year old female who presents for Full skin cancer screening to rule out skin cancer   Last Skin Exam: 1 year ago      1st Baseline: no  Personal HX of Skin Cancer: yes SCC   Personal HX of Malignant Melanoma: no   Family HX of Skin Cancer / Malignant Melanoma: no  Personal HX of Atypical Moles:   no  Risk factors: history of sun exposure and burns  New / Changing lesions:few spots but she has been getting dry patches that come and go from chemo  Social History: currently on chemo for metastatic breast CA - she is feeling pretty good currently  On review of systems, there are no further skin complaints, patient is feeling otherwise well.   ROS of the following were done and are negative: Constitutional, Eyes, Ears, Nose,   Mouth, Throat, Cardiovascular, Respiratory, GI, Genitourinary, Musculoskeletal,   Psychiatric, Endocrine, Allergic/Immunologic.    PHYSICAL EXAM:   There were no vitals taken for this visit.  Skin exam performed as follows: Type 2 skin. Mood appropriate  Alert and Oriented X 3. Well developed, well nourished in no distress.  General appearance: Normal  Head including face: Normal  Eyes: conjunctiva and lids: Normal  Mouth: Lips, teeth, gums: Normal  Neck: Normal  Chest-breast/axillae: Normal  Back: Normal  Extremities: digits/nails (clubbing): Normal  Eccrine and Apocrine glands: Normal  Right upper extremity: Normal  Left upper extremity: Normal  Right lower extremity: Normal  Left lower extremity: Normal  Skin: Scalp and body hair: See below    Pt deferred exam of breasts, groin, buttocks: No    Other physical findings:  1. Multiple pigmented macules on extremities and trunk  2.  Multiple pigmented macules on face, trunk and extremities  3. Multiple vascular papules on trunk, arms and legs  4. Multiple scattered keratotic plaques  5. Pink gritty papule on the left glabella x 2, left temple x 1       Except as noted above, no other signs of skin cancer or melanoma.     ASSESSMENT/PLAN:   Benign Full skin cancer screening today. . Patient with history of SCC  Advised on monthly self exams and 1 year  Patient Education: Appropriate brochures given.    Multiple benign appearing melanocytic nevi on arms, legs and trunk. Discussed ABCDEs of melanoma and sunscreen.   Multiple lentigos on arms, legs and trunk. Advised benign, no treatment needed.  Multiple scattered angiomas. Advised benign, no treatment needed.   Seborrheic keratosis on arms, legs and trunk. Advised benign, no treatment needed.  Actinic keratosis on the left glabella x 2, left temple x 1. As precancerous, cryosurgery performed. Advised on blistering and post-op care. Advised if not resolved in 1-2 months to return for evaluation  Recurrent dermatitis on the hairline, groin - resolved today  --Start HC 2.5% cream BID x 1-2 weeks PRN flares          Follow-up: annually/PRN sooner    1.) Patient was asked about new and changing moles. YES  2.) Patient received a complete physical skin examination: YES  3.) Patient was counseled to perform a monthly self skin examination: YES  Scribed By: Tressa Grove, MS, PAYUNIEL      Again, thank you for allowing me to participate in the care of your patient.        Sincerely,        Tressa Grove PA-C

## 2023-09-11 NOTE — PROGRESS NOTES
HPI:   Chief complaints: Precious Paris is a pleasant 82 year old female who presents for Full skin cancer screening to rule out skin cancer   Last Skin Exam: 1 year ago      1st Baseline: no  Personal HX of Skin Cancer: yes SCC   Personal HX of Malignant Melanoma: no   Family HX of Skin Cancer / Malignant Melanoma: no  Personal HX of Atypical Moles:   no  Risk factors: history of sun exposure and burns  New / Changing lesions:few spots but she has been getting dry patches that come and go from chemo  Social History: currently on chemo for metastatic breast CA - she is feeling pretty good currently  On review of systems, there are no further skin complaints, patient is feeling otherwise well.   ROS of the following were done and are negative: Constitutional, Eyes, Ears, Nose,   Mouth, Throat, Cardiovascular, Respiratory, GI, Genitourinary, Musculoskeletal,   Psychiatric, Endocrine, Allergic/Immunologic.    PHYSICAL EXAM:   There were no vitals taken for this visit.  Skin exam performed as follows: Type 2 skin. Mood appropriate  Alert and Oriented X 3. Well developed, well nourished in no distress.  General appearance: Normal  Head including face: Normal  Eyes: conjunctiva and lids: Normal  Mouth: Lips, teeth, gums: Normal  Neck: Normal  Chest-breast/axillae: Normal  Back: Normal  Extremities: digits/nails (clubbing): Normal  Eccrine and Apocrine glands: Normal  Right upper extremity: Normal  Left upper extremity: Normal  Right lower extremity: Normal  Left lower extremity: Normal  Skin: Scalp and body hair: See below    Pt deferred exam of breasts, groin, buttocks: No    Other physical findings:  1. Multiple pigmented macules on extremities and trunk  2. Multiple pigmented macules on face, trunk and extremities  3. Multiple vascular papules on trunk, arms and legs  4. Multiple scattered keratotic plaques  5. Pink gritty papule on the left glabella x 2, left temple x 1       Except as noted above, no other signs  of skin cancer or melanoma.     ASSESSMENT/PLAN:   Benign Full skin cancer screening today. . Patient with history of SCC  Advised on monthly self exams and 1 year  Patient Education: Appropriate brochures given.    Multiple benign appearing melanocytic nevi on arms, legs and trunk. Discussed ABCDEs of melanoma and sunscreen.   Multiple lentigos on arms, legs and trunk. Advised benign, no treatment needed.  Multiple scattered angiomas. Advised benign, no treatment needed.   Seborrheic keratosis on arms, legs and trunk. Advised benign, no treatment needed.  Actinic keratosis on the left glabella x 2, left temple x 1. As precancerous, cryosurgery performed. Advised on blistering and post-op care. Advised if not resolved in 1-2 months to return for evaluation  Recurrent dermatitis on the hairline, groin - resolved today  --Start HC 2.5% cream BID x 1-2 weeks PRN flares          Follow-up: annually/PRN sooner    1.) Patient was asked about new and changing moles. YES  2.) Patient received a complete physical skin examination: YES  3.) Patient was counseled to perform a monthly self skin examination: YES  Scribed By: Tressa Grove, MS, PA-C

## 2023-09-12 ENCOUNTER — ONCOLOGY VISIT (OUTPATIENT)
Dept: ONCOLOGY | Facility: CLINIC | Age: 83
End: 2023-09-12
Attending: INTERNAL MEDICINE
Payer: COMMERCIAL

## 2023-09-12 ENCOUNTER — LAB (OUTPATIENT)
Dept: INFUSION THERAPY | Facility: CLINIC | Age: 83
End: 2023-09-12
Attending: INTERNAL MEDICINE
Payer: COMMERCIAL

## 2023-09-12 VITALS
TEMPERATURE: 97.8 F | SYSTOLIC BLOOD PRESSURE: 147 MMHG | HEIGHT: 65 IN | OXYGEN SATURATION: 95 % | DIASTOLIC BLOOD PRESSURE: 72 MMHG | HEART RATE: 81 BPM | BODY MASS INDEX: 30.99 KG/M2 | RESPIRATION RATE: 12 BRPM | WEIGHT: 186 LBS

## 2023-09-12 VITALS — HEART RATE: 75 BPM | DIASTOLIC BLOOD PRESSURE: 74 MMHG | RESPIRATION RATE: 16 BRPM | SYSTOLIC BLOOD PRESSURE: 134 MMHG

## 2023-09-12 DIAGNOSIS — C50.919 PRIMARY MALIGNANT NEOPLASM OF BREAST WITH METASTASIS (H): ICD-10-CM

## 2023-09-12 DIAGNOSIS — Z17.1 MALIGNANT NEOPLASM OF UPPER-OUTER QUADRANT OF RIGHT BREAST IN FEMALE, ESTROGEN RECEPTOR NEGATIVE (H): ICD-10-CM

## 2023-09-12 DIAGNOSIS — C50.411 MALIGNANT NEOPLASM OF UPPER-OUTER QUADRANT OF RIGHT BREAST IN FEMALE, ESTROGEN RECEPTOR NEGATIVE (H): ICD-10-CM

## 2023-09-12 DIAGNOSIS — Z08 ENCOUNTER FOR FOLLOW-UP EXAMINATION AFTER COMPLETED TREATMENT FOR MALIGNANT NEOPLASM: Primary | ICD-10-CM

## 2023-09-12 DIAGNOSIS — G93.9 BRAIN LESION: ICD-10-CM

## 2023-09-12 DIAGNOSIS — Z17.1 MALIGNANT NEOPLASM OF UPPER-OUTER QUADRANT OF RIGHT BREAST IN FEMALE, ESTROGEN RECEPTOR NEGATIVE (H): Primary | ICD-10-CM

## 2023-09-12 DIAGNOSIS — C50.411 MALIGNANT NEOPLASM OF UPPER-OUTER QUADRANT OF RIGHT BREAST IN FEMALE, ESTROGEN RECEPTOR NEGATIVE (H): Primary | ICD-10-CM

## 2023-09-12 DIAGNOSIS — Z08 ENCOUNTER FOR FOLLOW-UP EXAMINATION AFTER COMPLETED TREATMENT FOR MALIGNANT NEOPLASM: ICD-10-CM

## 2023-09-12 LAB
ALBUMIN SERPL BCG-MCNC: 4.1 G/DL (ref 3.5–5.2)
ALP SERPL-CCNC: 94 U/L (ref 35–104)
ALT SERPL W P-5'-P-CCNC: 21 U/L (ref 0–50)
ANION GAP SERPL CALCULATED.3IONS-SCNC: 9 MMOL/L (ref 7–15)
AST SERPL W P-5'-P-CCNC: 27 U/L (ref 0–45)
BASOPHILS # BLD AUTO: 0.1 10E3/UL (ref 0–0.2)
BASOPHILS NFR BLD AUTO: 1 %
BILIRUB SERPL-MCNC: 0.4 MG/DL
BUN SERPL-MCNC: 15.8 MG/DL (ref 8–23)
CALCIUM SERPL-MCNC: 9.4 MG/DL (ref 8.8–10.2)
CHLORIDE SERPL-SCNC: 102 MMOL/L (ref 98–107)
CREAT SERPL-MCNC: 0.61 MG/DL (ref 0.51–0.95)
DEPRECATED HCO3 PLAS-SCNC: 27 MMOL/L (ref 22–29)
EGFRCR SERPLBLD CKD-EPI 2021: 89 ML/MIN/1.73M2
EOSINOPHIL # BLD AUTO: 0.2 10E3/UL (ref 0–0.7)
EOSINOPHIL NFR BLD AUTO: 3 %
ERYTHROCYTE [DISTWIDTH] IN BLOOD BY AUTOMATED COUNT: 15.1 % (ref 10–15)
GLUCOSE SERPL-MCNC: 107 MG/DL (ref 70–99)
HCT VFR BLD AUTO: 36.3 % (ref 35–47)
HGB BLD-MCNC: 12.1 G/DL (ref 11.7–15.7)
IMM GRANULOCYTES # BLD: 0 10E3/UL
IMM GRANULOCYTES NFR BLD: 0 %
LYMPHOCYTES # BLD AUTO: 1.5 10E3/UL (ref 0.8–5.3)
LYMPHOCYTES NFR BLD AUTO: 24 %
MCH RBC QN AUTO: 29.4 PG (ref 26.5–33)
MCHC RBC AUTO-ENTMCNC: 33.3 G/DL (ref 31.5–36.5)
MCV RBC AUTO: 88 FL (ref 78–100)
MONOCYTES # BLD AUTO: 0.6 10E3/UL (ref 0–1.3)
MONOCYTES NFR BLD AUTO: 10 %
NEUTROPHILS # BLD AUTO: 3.7 10E3/UL (ref 1.6–8.3)
NEUTROPHILS NFR BLD AUTO: 62 %
NRBC # BLD AUTO: 0 10E3/UL
NRBC BLD AUTO-RTO: 0 /100
PLATELET # BLD AUTO: 239 10E3/UL (ref 150–450)
POTASSIUM SERPL-SCNC: 3.8 MMOL/L (ref 3.4–5.3)
PROT SERPL-MCNC: 7.1 G/DL (ref 6.4–8.3)
RBC # BLD AUTO: 4.11 10E6/UL (ref 3.8–5.2)
SODIUM SERPL-SCNC: 138 MMOL/L (ref 136–145)
WBC # BLD AUTO: 6.1 10E3/UL (ref 4–11)

## 2023-09-12 PROCEDURE — 258N000003 HC RX IP 258 OP 636: Performed by: INTERNAL MEDICINE

## 2023-09-12 PROCEDURE — 80053 COMPREHEN METABOLIC PANEL: CPT | Performed by: INTERNAL MEDICINE

## 2023-09-12 PROCEDURE — 99215 OFFICE O/P EST HI 40 MIN: CPT | Performed by: INTERNAL MEDICINE

## 2023-09-12 PROCEDURE — 96413 CHEMO IV INFUSION 1 HR: CPT

## 2023-09-12 PROCEDURE — 96415 CHEMO IV INFUSION ADDL HR: CPT

## 2023-09-12 PROCEDURE — 85025 COMPLETE CBC W/AUTO DIFF WBC: CPT | Performed by: INTERNAL MEDICINE

## 2023-09-12 PROCEDURE — 96375 TX/PRO/DX INJ NEW DRUG ADDON: CPT

## 2023-09-12 PROCEDURE — G0463 HOSPITAL OUTPT CLINIC VISIT: HCPCS | Mod: 25 | Performed by: INTERNAL MEDICINE

## 2023-09-12 PROCEDURE — 96367 TX/PROPH/DG ADDL SEQ IV INF: CPT

## 2023-09-12 PROCEDURE — 250N000011 HC RX IP 250 OP 636: Mod: JZ | Performed by: INTERNAL MEDICINE

## 2023-09-12 PROCEDURE — 250N000013 HC RX MED GY IP 250 OP 250 PS 637: Performed by: INTERNAL MEDICINE

## 2023-09-12 RX ORDER — MEPERIDINE HYDROCHLORIDE 25 MG/ML
25 INJECTION INTRAMUSCULAR; INTRAVENOUS; SUBCUTANEOUS EVERY 30 MIN PRN
Status: CANCELLED | OUTPATIENT
Start: 2023-09-19

## 2023-09-12 RX ORDER — ALBUTEROL SULFATE 0.83 MG/ML
2.5 SOLUTION RESPIRATORY (INHALATION)
Status: CANCELLED | OUTPATIENT
Start: 2023-09-19

## 2023-09-12 RX ORDER — DEXAMETHASONE 4 MG/1
8 TABLET ORAL DAILY
Qty: 12 TABLET | Refills: 2 | Status: SHIPPED | OUTPATIENT
Start: 2023-09-12 | End: 2023-11-21

## 2023-09-12 RX ORDER — ALBUTEROL SULFATE 90 UG/1
1-2 AEROSOL, METERED RESPIRATORY (INHALATION)
Status: CANCELLED
Start: 2023-09-19

## 2023-09-12 RX ORDER — ALBUTEROL SULFATE 90 UG/1
1-2 AEROSOL, METERED RESPIRATORY (INHALATION)
Status: CANCELLED
Start: 2023-09-12

## 2023-09-12 RX ORDER — HEPARIN SODIUM,PORCINE 10 UNIT/ML
5-20 VIAL (ML) INTRAVENOUS DAILY PRN
Status: CANCELLED | OUTPATIENT
Start: 2023-09-12

## 2023-09-12 RX ORDER — ACETAMINOPHEN 325 MG/1
650 TABLET ORAL ONCE
Status: CANCELLED | OUTPATIENT
Start: 2023-09-19

## 2023-09-12 RX ORDER — PALONOSETRON 0.05 MG/ML
0.25 INJECTION, SOLUTION INTRAVENOUS ONCE
Status: CANCELLED | OUTPATIENT
Start: 2023-09-19

## 2023-09-12 RX ORDER — DIPHENHYDRAMINE HCL 25 MG
50 CAPSULE ORAL ONCE
Status: CANCELLED | OUTPATIENT
Start: 2023-09-12

## 2023-09-12 RX ORDER — ACETAMINOPHEN 325 MG/1
650 TABLET ORAL ONCE
Status: CANCELLED | OUTPATIENT
Start: 2023-09-12

## 2023-09-12 RX ORDER — ATROPINE SULFATE 0.4 MG/ML
0.4 AMPUL (ML) INJECTION
Status: CANCELLED | OUTPATIENT
Start: 2023-09-19

## 2023-09-12 RX ORDER — ONDANSETRON 8 MG/1
8 TABLET, FILM COATED ORAL EVERY 8 HOURS PRN
Qty: 30 TABLET | Refills: 2 | Status: SHIPPED | OUTPATIENT
Start: 2023-09-12 | End: 2024-07-09

## 2023-09-12 RX ORDER — HEPARIN SODIUM (PORCINE) LOCK FLUSH IV SOLN 100 UNIT/ML 100 UNIT/ML
5 SOLUTION INTRAVENOUS
Status: DISCONTINUED | OUTPATIENT
Start: 2023-09-12 | End: 2023-09-12 | Stop reason: HOSPADM

## 2023-09-12 RX ORDER — DIPHENHYDRAMINE HYDROCHLORIDE 50 MG/ML
50 INJECTION INTRAMUSCULAR; INTRAVENOUS
Status: CANCELLED
Start: 2023-09-12

## 2023-09-12 RX ORDER — ACETAMINOPHEN 325 MG/1
650 TABLET ORAL ONCE
Status: COMPLETED | OUTPATIENT
Start: 2023-09-12 | End: 2023-09-12

## 2023-09-12 RX ORDER — DIPHENHYDRAMINE HCL 25 MG
50 CAPSULE ORAL ONCE
Status: CANCELLED | OUTPATIENT
Start: 2023-09-19

## 2023-09-12 RX ORDER — METHYLPREDNISOLONE SODIUM SUCCINATE 125 MG/2ML
125 INJECTION, POWDER, LYOPHILIZED, FOR SOLUTION INTRAMUSCULAR; INTRAVENOUS
Status: CANCELLED
Start: 2023-09-12

## 2023-09-12 RX ORDER — MEPERIDINE HYDROCHLORIDE 25 MG/ML
25 INJECTION INTRAMUSCULAR; INTRAVENOUS; SUBCUTANEOUS EVERY 30 MIN PRN
Status: CANCELLED | OUTPATIENT
Start: 2023-09-12

## 2023-09-12 RX ORDER — HEPARIN SODIUM (PORCINE) LOCK FLUSH IV SOLN 100 UNIT/ML 100 UNIT/ML
5 SOLUTION INTRAVENOUS
Status: CANCELLED | OUTPATIENT
Start: 2023-09-12

## 2023-09-12 RX ORDER — EPINEPHRINE 1 MG/ML
0.3 INJECTION, SOLUTION, CONCENTRATE INTRAVENOUS EVERY 5 MIN PRN
Status: CANCELLED | OUTPATIENT
Start: 2023-09-12

## 2023-09-12 RX ORDER — ATROPINE SULFATE 0.4 MG/ML
0.4 AMPUL (ML) INJECTION
Status: CANCELLED | OUTPATIENT
Start: 2023-09-12

## 2023-09-12 RX ORDER — PALONOSETRON 0.05 MG/ML
0.25 INJECTION, SOLUTION INTRAVENOUS ONCE
Status: COMPLETED | OUTPATIENT
Start: 2023-09-12 | End: 2023-09-12

## 2023-09-12 RX ORDER — PROCHLORPERAZINE MALEATE 10 MG
10 TABLET ORAL EVERY 6 HOURS PRN
Qty: 30 TABLET | Refills: 2 | Status: SHIPPED | OUTPATIENT
Start: 2023-09-12 | End: 2024-07-09

## 2023-09-12 RX ORDER — EPINEPHRINE 1 MG/ML
0.3 INJECTION, SOLUTION, CONCENTRATE INTRAVENOUS EVERY 5 MIN PRN
Status: CANCELLED | OUTPATIENT
Start: 2023-09-19

## 2023-09-12 RX ORDER — DIPHENHYDRAMINE HYDROCHLORIDE 50 MG/ML
50 INJECTION INTRAMUSCULAR; INTRAVENOUS
Status: CANCELLED
Start: 2023-09-19

## 2023-09-12 RX ORDER — HEPARIN SODIUM,PORCINE 10 UNIT/ML
5-20 VIAL (ML) INTRAVENOUS DAILY PRN
Status: CANCELLED | OUTPATIENT
Start: 2023-09-19

## 2023-09-12 RX ORDER — HEPARIN SODIUM (PORCINE) LOCK FLUSH IV SOLN 100 UNIT/ML 100 UNIT/ML
5 SOLUTION INTRAVENOUS
Status: CANCELLED | OUTPATIENT
Start: 2023-09-19

## 2023-09-12 RX ORDER — LORAZEPAM 2 MG/ML
0.5 INJECTION INTRAMUSCULAR EVERY 4 HOURS PRN
Status: CANCELLED | OUTPATIENT
Start: 2023-09-19

## 2023-09-12 RX ORDER — LORAZEPAM 2 MG/ML
0.5 INJECTION INTRAMUSCULAR EVERY 4 HOURS PRN
Status: CANCELLED | OUTPATIENT
Start: 2023-09-12

## 2023-09-12 RX ORDER — ALBUTEROL SULFATE 0.83 MG/ML
2.5 SOLUTION RESPIRATORY (INHALATION)
Status: CANCELLED | OUTPATIENT
Start: 2023-09-12

## 2023-09-12 RX ORDER — LOPERAMIDE HCL 2 MG
CAPSULE ORAL
Qty: 30 CAPSULE | Refills: 0 | Status: SHIPPED | OUTPATIENT
Start: 2023-09-12 | End: 2024-07-09

## 2023-09-12 RX ORDER — PALONOSETRON 0.05 MG/ML
0.25 INJECTION, SOLUTION INTRAVENOUS ONCE
Status: CANCELLED | OUTPATIENT
Start: 2023-09-12

## 2023-09-12 RX ORDER — METHYLPREDNISOLONE SODIUM SUCCINATE 125 MG/2ML
125 INJECTION, POWDER, LYOPHILIZED, FOR SOLUTION INTRAMUSCULAR; INTRAVENOUS
Status: CANCELLED
Start: 2023-09-19

## 2023-09-12 RX ADMIN — SACITUZUMAB GOVITECAN 839 MG: 180 POWDER, FOR SOLUTION INTRAVENOUS at 10:11

## 2023-09-12 RX ADMIN — DEXAMETHASONE SODIUM PHOSPHATE: 10 INJECTION, SOLUTION INTRAMUSCULAR; INTRAVENOUS at 09:45

## 2023-09-12 RX ADMIN — ACETAMINOPHEN 650 MG: 325 TABLET, FILM COATED ORAL at 09:04

## 2023-09-12 RX ADMIN — FAMOTIDINE 20 MG: 10 INJECTION INTRAVENOUS at 09:07

## 2023-09-12 RX ADMIN — PALONOSETRON HYDROCHLORIDE 0.25 MG: 0.25 INJECTION INTRAVENOUS at 09:05

## 2023-09-12 RX ADMIN — DIPHENHYDRAMINE HYDROCHLORIDE 50 MG: 50 INJECTION, SOLUTION INTRAMUSCULAR; INTRAVENOUS at 09:26

## 2023-09-12 RX ADMIN — Medication 5 ML: at 13:51

## 2023-09-12 RX ADMIN — SODIUM CHLORIDE 250 ML: 9 INJECTION, SOLUTION INTRAVENOUS at 09:00

## 2023-09-12 ASSESSMENT — PAIN SCALES - GENERAL: PAINLEVEL: NO PAIN (0)

## 2023-09-12 NOTE — PROGRESS NOTES
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 Beebe    _____________________________________________________________________________    History of Present Illness/ Interval History    Ms. Precious Paris is a 82 year old with recurrent metastatic triple negative breast cancer recurrence. Completed 3 cycles of Doxil with Pembro added to the 3rd cycle, with mixed response (some progression in mediastinal nodes and lung mets). Therefore, chemo was changed to carbo, gem and Pembro.  She had an excellent response after 3 cycles.  PET scan following 6 cycles showed complete response.  We continued her on pembrolizumab maintenance.    Unfortunately recent PET scan showed evidence of disease progression with reactivation/development of FDG avid axillary and thoracic lymphadenopathy consistent with progressive metastatic disease.  I obtained biopsy of one of the axillary lymph nodes and it came back positive for metastatic carcinoma consistent with breast primary.  Since she had a complete response after initial chemoimmunotherapy, I took this as evidence of true progression rather than pseudoprogression and decided to switch treatments to second line therapy.  She is here to discuss the same.  Clinically she is doing well.  No issues.  She also had an MRI over the weekend.  No neurological symptoms.        ECOG PS: 1      Oncology History/Treatment  Diagnosis/Stage:   11/2019: Right breast cancer, triple negative ([id number])    BRCA negative    10/2022: Recurrent, stage IV triple negative breast cancer (bilateral axillary, supraclav, mediastinal nodes + lung)  -persistent/progressive right breast firmness with new right nipple drainage and right axillary adenopathy  -bilateral diagnostic mammogram: increased  density R breast with skin thickening. R breast US: 3.2 cm hypoechoic mass at 11:00 position 1 cm from nipple and multiple other small hypoechoic solid-appearing lesions in R breast + right axillary adenopathy (at least two hypoechoic vascular lesions measuring up to 2.5 cm)  -10/18/2022 biopsy R breast mass + R axillary node: grade 3 invasive ductal cancer with tumor necrosis, ER/[place]/HER2 negative.   -PET: + right breast mass; bilateral axillary, retropectoral, supraclavicular, mediastinal/R hilar nodes and bilateral lung/R pleural nodules suspicious for mets.  -Brain MRI: negative for mets  -11/1/2022 biopsy L axillary node: metastatic invasive ductal carcinoma  -Foundation One: no actionable mutations MS stable, TMB 1 mut/mb.   -PDL1 testing: CPS >20% and TPS 60% (considered high PDL1 expression)  -CA 27.29: 59.5 (elevated). CA 15-3: 23 (normal)    Treatment:  Initial breast cancer (2019)  -2019: Neoadjuvant taxol  -Lumpectomy  -5/2020: Adjuvant RT  -6 - 7/2020: Adjuvant Xeloda. Stopped for rash    Recurrent, stage IV breast cancer (2022)  -11/14/2022:Doxil every 4 weeks  -12/12/2022: changed to doxorubicin every 3 weeks. (Planning addition of pembrolizumab once approved by insurance)  -1/2/2023: doxorubicin + pembrolizumab every 3 weeks. Completed 1 cycle. Stopped for progression (med nodes, slight increase in lung mets and stable R breast mass + axillary nodes).    -1/23/2023 - present: Carbo AUC 5, Gemcitabine 800 mg/m2 and Pembro every 21 days  -cycles 1-2: [place] day 1 only  -Cycle 3: [place] increased to days 1 + 8 given excellent tolerance  -CT after 3 cycles showed excellent response (near CR axillary + med nodes and [place] in lung mets; clinical improvement in right breast changes and tumor marker).    -PET scan after 6 cycles of carboplatin gemcitabine pembrolizumab shows complete response    -Maintenance pembrolizumab starting 5/30/2023    She has progression with reactivation of the previously noted axillary and  thoracic lymph nodes along with 2 punctate brain lesions.    Started on Sacituzumab on 9/12/23      Physical Exam    GENERAL: Alert and oriented to time place and person. Seated comfortably. In no distress.   Lungs: clear  CVS: RRR  Lymph: no significant peripheral adenopathy noted on today's exam  Neuro: alert and oriented x 3      Lab Results  No results found for this or any previous visit (from the past 168 hour(s)).          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  Posttreatment PET scan showed complete response.  Continued on pembrolizumab maintenance.  Doing extremely well on this without any major side effects.      Recent PET scan showed evidence of disease progression in the axillary and thoracic lymph nodes.  Biopsy of the left axilla lymph node positive for metastatic cancer.    This is consistent with true progression.  I discussed further management.  Not a candidate for more chemotherapy.  She already had carbo gem, Doxil and previously had Taxol.  Per guidelines next treatment option would be sacituzumab govitecan.  I reviewed the potential side effects and complications associated with it in detail today including but not limited to GI toxicity, low blood counts, infections, cardiotoxicity, skin rash, liver dysfunction etc.  She does not have any evidence of Gilbert syndrome.  We will hold off on testing for UGT 1 A1 polymorphism unless you develop significant diarrhea or severe neutropenia.  She is in agreement with the plan and has consented to chemotherapy    She had an MRI of the brain over the weekend and I reviewed the images and report with her and her family in detail today.  It is showing 2 lesions 1 in the left frontal cortex and 1 in the right lentiform  nucleus which are concerning for metastasis.  They are too small to characterize.  These were not present in the previous MRI.  I will touch base with radiation oncology to see if this needs intervention right now or can we follow it closely.  Based on the size and the location I think repeat scan in 4 to 6 weeks is probably warranted.  She is agreeable to the plan.    Labs today show normal CBC and serum chemistry.  No contraindications to proceed with cycle 1 day 1 cycle sacituzumab.  She will come back in 1 week with repeat labs prior to day 8.      Billing:  A total of 45 min were spent today on this visit which included face to face conversation with the patient, EMR review, counseling and co-ordination of care and medical documentation.    Complex patient with metastatic triple negative breast cancer.      Signed by:   Maricarmen Beebe MD  Hematology and Medical Oncology  [facility]

## 2023-09-12 NOTE — LETTER
9/12/2023         RE: Precious Paris  [address] [place] [address]        Dear Colleague,    Thank you for referring your patient, Precious Paris, to the [facility]. Please see a copy of my visit note below.        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 Beebe    _____________________________________________________________________________    History of Present Illness/ Interval History    Ms. Precious Paris is a 82 year old with recurrent metastatic triple negative breast cancer recurrence. Completed 3 cycles of Doxil with Pembro added to the 3rd cycle, with mixed response (some progression in mediastinal nodes and lung mets). Therefore, chemo was changed to carbo, gem and Pembro.  She had an excellent response after 3 cycles.  PET scan following 6 cycles showed complete response.  We continued her on pembrolizumab maintenance.    Unfortunately recent PET scan showed evidence of disease progression with reactivation/development of FDG avid axillary and thoracic lymphadenopathy consistent with progressive metastatic disease.  I obtained biopsy of one of the axillary lymph nodes and it came back positive for metastatic carcinoma consistent with breast primary.  Since she had a complete response after initial chemoimmunotherapy, I took this as evidence of true progression rather than pseudoprogression and decided to switch treatments to second line therapy.  She is here to discuss the same.  Clinically she is doing well.  No issues.  She also had an MRI over the weekend.  No neurological symptoms.        ECOG PS: 1      Oncology History/Treatment  Diagnosis/Stage:   11/2019: Right breast cancer, triple negative ([id number])    BRCA negative    10/2022:  Recurrent, stage IV triple negative breast cancer (bilateral axillary, supraclav, mediastinal nodes + lung)  -persistent/progressive right breast firmness with new right nipple drainage and right axillary adenopathy  -bilateral diagnostic mammogram: increased density R breast with skin thickening. R breast US: 3.2 cm hypoechoic mass at 11:00 position 1 cm from nipple and multiple other small hypoechoic solid-appearing lesions in R breast + right axillary adenopathy (at least two hypoechoic vascular lesions measuring up to 2.5 cm)  -10/18/2022 biopsy R breast mass + R axillary node: grade 3 invasive ductal cancer with tumor necrosis, ER/[place]/HER2 negative.   -PET: + right breast mass; bilateral axillary, retropectoral, supraclavicular, mediastinal/R hilar nodes and bilateral lung/R pleural nodules suspicious for mets.  -Brain MRI: negative for mets  -11/1/2022 biopsy L axillary node: metastatic invasive ductal carcinoma  -Foundation One: no actionable mutations MS stable, TMB 1 mut/mb.   -PDL1 testing: CPS >20% and TPS 60% (considered high PDL1 expression)  -CA 27.29: 59.5 (elevated). CA 15-3: 23 (normal)    Treatment:  Initial breast cancer (2019)  -2019: Neoadjuvant taxol  -Lumpectomy  -5/2020: Adjuvant RT  -6 - 7/2020: Adjuvant Xeloda. Stopped for rash    Recurrent, stage IV breast cancer (2022)  -11/14/2022:Doxil every 4 weeks  -12/12/2022: changed to doxorubicin every 3 weeks. (Planning addition of pembrolizumab once approved by insurance)  -1/2/2023: doxorubicin + pembrolizumab every 3 weeks. Completed 1 cycle. Stopped for progression (med nodes, slight increase in lung mets and stable R breast mass + axillary nodes).    -1/23/2023 - present: Carbo AUC 5, Gemcitabine 800 mg/m2 and Pembro every 21 days  -cycles 1-2: [place] day 1 only  -Cycle 3: [place] increased to days 1 + 8 given excellent tolerance  -CT after 3 cycles showed excellent response (near CR axillary + med nodes and [place] in lung mets; clinical improvement in  right breast changes and tumor marker).    -PET scan after 6 cycles of carboplatin gemcitabine pembrolizumab shows complete response    -Maintenance pembrolizumab starting 5/30/2023    She has progression with reactivation of the previously noted axillary and thoracic lymph nodes along with 2 punctate brain lesions.    Started on Sacituzumab on 9/12/23      Physical Exam    GENERAL: Alert and oriented to time place and person. Seated comfortably. In no distress.   Lungs: clear  CVS: RRR  Lymph: no significant peripheral adenopathy noted on today's exam  Neuro: alert and oriented x 3      Lab Results  No results found for this or any previous visit (from the past 168 hour(s)).          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  Posttreatment PET scan showed complete response.  Continued on pembrolizumab maintenance.  Doing extremely well on this without any major side effects.      Recent PET scan showed evidence of disease progression in the axillary and thoracic lymph nodes.  Biopsy of the left axilla lymph node positive for metastatic cancer.    This is consistent with true progression.  I discussed further management.  Not a candidate for more chemotherapy.  She already had carbo gem, Doxil and previously had Taxol.  Per guidelines next treatment option would be sacituzumab govitecan.  I reviewed the potential side effects and complications associated with it in detail today including but not limited to GI toxicity, low blood counts, infections, cardiotoxicity, skin rash, liver dysfunction etc.  She does not have any evidence of Gilbert syndrome.  We will hold off on testing for UGT 1 A1 polymorphism unless you develop significant diarrhea or severe neutropenia.  She is in  "agreement with the plan and has consented to chemotherapy    She had an MRI of the brain over the weekend and I reviewed the images and report with her and her family in detail today.  It is showing 2 lesions 1 in the left frontal cortex and 1 in the right lentiform nucleus which are concerning for metastasis.  They are too small to characterize.  These were not present in the previous MRI.  I will touch base with radiation oncology to see if this needs intervention right now or can we follow it closely.  Based on the size and the location I think repeat scan in 4 to 6 weeks is probably warranted.  She is agreeable to the plan.    Labs today show normal CBC and serum chemistry.  No contraindications to proceed with cycle 1 day 1 cycle sacituzumab.  She will come back in 1 week with repeat labs prior to day 8.      Billing:  A total of 45 min were spent today on this visit which included face to face conversation with the patient, EMR review, counseling and co-ordination of care and medical documentation.    Complex patient with metastatic triple negative breast cancer.      Signed by:   Maricarmen Beebe MD  Hematology and Medical Oncology  [facility]    Oncology Rooming Note    September 12, 2023 7:53 AM   Precious Paris is a 82 year old female who presents for:    Chief Complaint   Patient presents with     Oncology Clinic Visit     Metastatic breast cancer - Labs provider and infusion     Initial Vitals: BP (!) 147/72 (BP Location: Left arm, Patient Position: Sitting, Cuff Size: Adult Regular)   Pulse 81   Temp 97.8  F (36.6  C) (Oral)   Resp 12   Ht 1.638 m (5' 4.5\")   Wt 84.4 kg (186 lb)   SpO2 95%   BMI 31.43 kg/m   Estimated body mass index is 31.43 kg/m  as calculated from the following:    Height as of this encounter: 1.638 m (5' 4.5\").    Weight as of this encounter: 84.4 kg (186 lb). Body surface area is 1.96 meters squared.  No Pain (0) Comment: Data Unavailable   No " LMP recorded. Patient is postmenopausal.  Allergies reviewed: Yes  Medications reviewed: Yes    Medications: Medication refills not needed today.  Pharmacy name entered into [facility]: [facility] - [facility] [id number] [facility]    Clinical concerns:  None      Malgorzata Vallejo [facility]                Again, thank you for allowing me to participate in the care of your patient.        Sincerely,        Maricarmen Beebe MD

## 2023-09-12 NOTE — PROGRESS NOTES
PAC labs drawn and sent. Port accessed without difficulty. Good blood return noted.    [email or web address]

## 2023-09-12 NOTE — PROGRESS NOTES
"Oncology Rooming Note    September 12, 2023 7:53 AM   Precious Paris is a 82 year old female who presents for:    Chief Complaint   Patient presents with    Oncology Clinic Visit     Metastatic breast cancer - Labs provider and infusion     Initial Vitals: BP (!) 147/72 (BP Location: Left arm, Patient Position: Sitting, Cuff Size: Adult Regular)   Pulse 81   Temp 97.8  F (36.6  C) (Oral)   Resp 12   Ht 1.638 m (5' 4.5\")   Wt 84.4 kg (186 lb)   SpO2 95%   BMI 31.43 kg/m   Estimated body mass index is 31.43 kg/m  as calculated from the following:    Height as of this encounter: 1.638 m (5' 4.5\").    Weight as of this encounter: 84.4 kg (186 lb). Body surface area is 1.96 meters squared.  No Pain (0) Comment: Data Unavailable   No LMP recorded. Patient is postmenopausal.  Allergies reviewed: Yes  Medications reviewed: Yes    Medications: Medication refills not needed today.  Pharmacy name entered into [facility]: [facility] - [facility] [id number] [facility]    Clinical concerns:  None      Malgorzata Vallejo CMA              "

## 2023-09-12 NOTE — PROGRESS NOTES
Infusion Nursing Note:  Precious Paris presents today for C1D1 Trodelvy.    Patient seen by provider today: Yes: Abiel   present during visit today: Not Applicable.    Note: Trodelvy education provided via [employer]. Patient denies any new health issues or concerns.      Intravenous Access:  Implanted Port.    Treatment Conditions:  Lab Results   Component Value Date    HGB 12.1 09/12/2023    WBC 6.1 09/12/2023    ANEU 2.6 11/28/2022    ANEUTAUTO 3.7 09/12/2023     09/12/2023        Lab Results   Component Value Date     09/12/2023    POTASSIUM 3.8 09/12/2023    CR 0.61 09/12/2023    YADIRA 9.4 09/12/2023    BILITOTAL 0.4 09/12/2023    ALBUMIN 4.1 09/12/2023    ALT 21 09/12/2023    AST 27 09/12/2023       Results reviewed, labs MET treatment parameters, ok to proceed with treatment.      Post Infusion Assessment:  Patient tolerated infusion without incident.  Patient observed for 30 minutes post Trodelvy per protocol.  Blood return noted pre and post infusion.  Site patent and intact, free from redness, edema or discomfort.  No evidence of extravasations.  Access discontinued per protocol.       Discharge Plan:   Discharge instructions reviewed with: Patient.  Patient and/or family verbalized understanding of discharge instructions and all questions answered.  Patient discharged in stable condition accompanied by: self.  Departure Mode: Ambulatory.      Brittany Miller RN

## 2023-09-14 ENCOUNTER — PATIENT OUTREACH (OUTPATIENT)
Dept: ONCOLOGY | Facility: CLINIC | Age: 83
End: 2023-09-14
Payer: COMMERCIAL

## 2023-09-14 NOTE — PROGRESS NOTES
[facility]: Cancer Care                                                                                        Called pt to see how she is doing after starting Sacitizumab this week. Pt reports no symptoms or side effects at this point. She is taking her steroids as prescribed. Pt has a D8 with a tox check on 09.19 but will call if anything comes up prior.       Signature:  Patrica Perla RN

## 2023-09-18 NOTE — PROGRESS NOTES
[facility] Hematology and Oncology Progress Note    Patient: Precious Paris  MRN: [id number]  Sep 19, 2023          Reason for Visit    Recurrent stage IV triple negative breast cancer (nodes, lung) (PDL1+)    Primary Oncologist: Dr. Beebe    _____________________________________________________________________________    History of Present Illness/ Interval History    Ms. Precious Paris is a 82 year old with recurrent metastatic triple negative breast cancer recurrence. Completed 3 cycles of Doxil with Pembro added to the 3rd cycle, with mixed response (some progression in mediastinal nodes and lung mets). Therefore, chemo was changed to carbo, gem and Pembro.  She had an excellent response after 3 cycles.  PET scan following 6 cycles showed complete response.  She continued on pembrolizumab maintenance for 3 months through August, but unfortunately developed progression.    She, therefore, initiated new line of therapy sacituzumab week ago.  Returns ahead of cycle 1, day 8.    Tolerating this generally well. Some increased belching, no reflux. Very mild/intermittent nausea alleviated with antiemetics. No diarrhea. Tends towards constipation, stays regular with diet. No rash, no fevers. No neuropathy. No dyspnea.     No new lumps/bumps, sites of pain, neurological symptoms.      ECOG PS: 1      Oncology History/Treatment  Diagnosis/Stage:   11/2019: Right breast cancer, triple negative ([id number])    BRCA negative    10/2022: Recurrent, stage IV triple negative breast cancer (bilateral axillary, supraclav, mediastinal nodes + lung)  -persistent/progressive right breast firmness with new right nipple drainage and right axillary adenopathy  -bilateral diagnostic mammogram: increased density R breast with skin thickening. R breast US: 3.2 cm hypoechoic mass at 11:00 position 1 cm from nipple and multiple other small hypoechoic solid-appearing lesions in R breast + right axillary adenopathy  (at least two hypoechoic vascular lesions measuring up to 2.5 cm)  -10/18/2022 biopsy R breast mass + R axillary node: grade 3 invasive ductal cancer with tumor necrosis, ER/[place]/HER2 negative.   -PET: + right breast mass; bilateral axillary, retropectoral, supraclavicular, mediastinal/R hilar nodes and bilateral lung/R pleural nodules suspicious for mets.  -Brain MRI: negative for mets  -11/1/2022 biopsy L axillary node: metastatic invasive ductal carcinoma  -Foundation One: no actionable mutations MS stable, TMB 1 mut/mb.   -PDL1 testing: CPS >20% and TPS 60% (considered high PDL1 expression)  -CA 27.29: 59.5 (elevated). CA 15-3: 23 (normal)  -9/10/2023 Brain MRI: 2 new foci of punctate enhancement in left frontal cortex and right lentiform nucleus, concerning for brain metastases (asymptomatic)    Treatment:  Initial breast cancer (2019)  -2019: Neoadjuvant taxol  -Lumpectomy  -5/2020: Adjuvant RT  -6 - 7/2020: Adjuvant Xeloda. Stopped for rash    Recurrent, stage IV breast cancer (2022)  -11/14/2022:Doxil every 4 weeks  -12/12/2022: changed to doxorubicin every 3 weeks. (Planning addition of pembrolizumab once approved by insurance)  -1/2/2023: doxorubicin + pembrolizumab every 3 weeks. Completed 1 cycle. Stopped for progression (med nodes, slight increase in lung mets and stable R breast mass + axillary nodes).    -1/23/2023 - present: Carbo AUC 5, Gemcitabine 800 mg/m2 and Pembro every 21 days  -cycles 1-2: [place] day 1 only  -Cycle 3: [place] increased to days 1 + 8 given excellent tolerance  -CT after 3 cycles showed excellent response (near CR axillary + med nodes and WI in lung mets; clinical improvement in right breast changes and tumor marker).  -PET scan after 6 cycles of carboplatin gemcitabine pembrolizumab shows complete response    5/30 - 8/22/2023: Maintenance pembrolizumab, until progression (recurrent axillary, thoracic nodes and 2 punctate brain lesions).  Biopsy of left axillary node confirmed  recurrent/metastatic breast cancer.    9/12/2023 - present:  Sacituzumab days 1, 8 every 21 days      Physical Exam    GENERAL: Alert and oriented to time place and person. Seated comfortably. In no distress. Alone.  Lungs: clear  CVS: RRR  Lymph: no significant peripheral adenopathy noted on today's exam  Neuro: alert and oriented x 3      Lab Results  Recent Results (from the past 168 hour(s))   CBC with platelets and differential    Collection Time: 09/19/23  7:45 AM   Result Value Ref Range    WBC Count 3.7 (L) 4.0 - 11.0 10e3/uL    RBC Count 3.89 3.80 - 5.20 10e6/uL    Hemoglobin 11.4 (L) 11.7 - 15.7 g/dL    Hematocrit 34.1 (L) 35.0 - 47.0 %    MCV 88 78 - 100 fL    MCH 29.3 26.5 - 33.0 pg    MCHC 33.4 31.5 - 36.5 g/dL    RDW 14.7 10.0 - 15.0 %    Platelet Count 228 150 - 450 10e3/uL    % Neutrophils 47 %    % Lymphocytes 32 %    % Monocytes 15 %    % Eosinophils 4 %    % Basophils 1 %    % Immature Granulocytes 1 %    NRBCs per 100 WBC 0 <1 /100    Absolute Neutrophils 1.8 1.6 - 8.3 10e3/uL    Absolute Lymphocytes 1.2 0.8 - 5.3 10e3/uL    Absolute Monocytes 0.6 0.0 - 1.3 10e3/uL    Absolute Eosinophils 0.1 0.0 - 0.7 10e3/uL    Absolute Basophils 0.0 0.0 - 0.2 10e3/uL    Absolute Immature Granulocytes 0.0 <=0.4 10e3/uL    Absolute NRBCs 0.0 10e3/uL             Assessment/Plan  Recurrent, stage IV triple negative right breast cancer (nodes, lung); CPS>20% + TPS 60%  Mild intermittent peripheral neuropathy fingertips (gr 1)  Punctate brain lesions concerning for metastatic disease (asymptomatic)  Status post 6 cycles of carbo, gem plus pembrolizumab resulted in complete response; continued on maintenance pembrolizumab alone for 3 months with disease recurrence/progression and axillary and thoracic lymph nodes.  Brain MRI also showed 2 new punctate lesions that were concerning for early metastases.      Therefore, her treatment was changed to sacituzumab last week.  She tolerated her first week quite well with  minimal indigestion/nausea well-managed.    Lab work:WBC 3.7, ANC 1.8, Hgb 11.4, plat WNL    Plan:   -Proceed with cycle 1, day 8 without dose modification.  -Return in 2 weeks for cycle 2  -We will plan to reimage after 3 cycles, if otherwise tolerating well  -UGT 1A1 polymorphism unless you develop significant diarrhea or severe neutropenia.    -6-week follow-up brain MRI ~week of 10/22 (before cycle 3) to follow-up on the suspicious small brain metastases.  If there is progression we will need to consider radiation (possibly stereotactic if it remains only a few lesions). Rx for lorazepam premed sent.      Billing:  A total of 30 min were spent today on this visit which included face to face conversation with the patient, EMR review, counseling and co-ordination of care and medical documentation.        Signed by:   Lazara Torres NP

## 2023-09-19 ENCOUNTER — ONCOLOGY VISIT (OUTPATIENT)
Dept: ONCOLOGY | Facility: CLINIC | Age: 83
End: 2023-09-19
Attending: INTERNAL MEDICINE
Payer: COMMERCIAL

## 2023-09-19 ENCOUNTER — INFUSION THERAPY VISIT (OUTPATIENT)
Dept: INFUSION THERAPY | Facility: CLINIC | Age: 83
End: 2023-09-19
Attending: INTERNAL MEDICINE
Payer: COMMERCIAL

## 2023-09-19 VITALS
TEMPERATURE: 97.8 F | BODY MASS INDEX: 30.66 KG/M2 | RESPIRATION RATE: 12 BRPM | WEIGHT: 184 LBS | OXYGEN SATURATION: 97 % | HEIGHT: 65 IN | SYSTOLIC BLOOD PRESSURE: 127 MMHG | DIASTOLIC BLOOD PRESSURE: 67 MMHG | HEART RATE: 72 BPM

## 2023-09-19 DIAGNOSIS — C50.411 MALIGNANT NEOPLASM OF UPPER-OUTER QUADRANT OF RIGHT BREAST IN FEMALE, ESTROGEN RECEPTOR NEGATIVE (H): Primary | ICD-10-CM

## 2023-09-19 DIAGNOSIS — Z17.1 MALIGNANT NEOPLASM OF UPPER-OUTER QUADRANT OF RIGHT BREAST IN FEMALE, ESTROGEN RECEPTOR NEGATIVE (H): Primary | ICD-10-CM

## 2023-09-19 DIAGNOSIS — Z17.1 MALIGNANT NEOPLASM OF UPPER-OUTER QUADRANT OF RIGHT BREAST IN FEMALE, ESTROGEN RECEPTOR NEGATIVE (H): ICD-10-CM

## 2023-09-19 DIAGNOSIS — G93.9 BRAIN LESION: ICD-10-CM

## 2023-09-19 DIAGNOSIS — Z08 ENCOUNTER FOR FOLLOW-UP EXAMINATION AFTER COMPLETED TREATMENT FOR MALIGNANT NEOPLASM: ICD-10-CM

## 2023-09-19 DIAGNOSIS — C50.919 PRIMARY MALIGNANT NEOPLASM OF BREAST WITH METASTASIS (H): ICD-10-CM

## 2023-09-19 DIAGNOSIS — Z08 ENCOUNTER FOR FOLLOW-UP EXAMINATION AFTER COMPLETED TREATMENT FOR MALIGNANT NEOPLASM: Primary | ICD-10-CM

## 2023-09-19 DIAGNOSIS — F40.240 CLAUSTROPHOBIA: ICD-10-CM

## 2023-09-19 DIAGNOSIS — C50.411 MALIGNANT NEOPLASM OF UPPER-OUTER QUADRANT OF RIGHT BREAST IN FEMALE, ESTROGEN RECEPTOR NEGATIVE (H): ICD-10-CM

## 2023-09-19 LAB
BASOPHILS # BLD AUTO: 0 10E3/UL (ref 0–0.2)
BASOPHILS NFR BLD AUTO: 1 %
EOSINOPHIL # BLD AUTO: 0.1 10E3/UL (ref 0–0.7)
EOSINOPHIL NFR BLD AUTO: 4 %
ERYTHROCYTE [DISTWIDTH] IN BLOOD BY AUTOMATED COUNT: 14.7 % (ref 10–15)
HCT VFR BLD AUTO: 34.1 % (ref 35–47)
HGB BLD-MCNC: 11.4 G/DL (ref 11.7–15.7)
IMM GRANULOCYTES # BLD: 0 10E3/UL
IMM GRANULOCYTES NFR BLD: 1 %
LYMPHOCYTES # BLD AUTO: 1.2 10E3/UL (ref 0.8–5.3)
LYMPHOCYTES NFR BLD AUTO: 32 %
MCH RBC QN AUTO: 29.3 PG (ref 26.5–33)
MCHC RBC AUTO-ENTMCNC: 33.4 G/DL (ref 31.5–36.5)
MCV RBC AUTO: 88 FL (ref 78–100)
MONOCYTES # BLD AUTO: 0.6 10E3/UL (ref 0–1.3)
MONOCYTES NFR BLD AUTO: 15 %
NEUTROPHILS # BLD AUTO: 1.8 10E3/UL (ref 1.6–8.3)
NEUTROPHILS NFR BLD AUTO: 47 %
NRBC # BLD AUTO: 0 10E3/UL
NRBC BLD AUTO-RTO: 0 /100
PLATELET # BLD AUTO: 228 10E3/UL (ref 150–450)
RBC # BLD AUTO: 3.89 10E6/UL (ref 3.8–5.2)
WBC # BLD AUTO: 3.7 10E3/UL (ref 4–11)

## 2023-09-19 PROCEDURE — 96367 TX/PROPH/DG ADDL SEQ IV INF: CPT

## 2023-09-19 PROCEDURE — 96415 CHEMO IV INFUSION ADDL HR: CPT

## 2023-09-19 PROCEDURE — G0463 HOSPITAL OUTPT CLINIC VISIT: HCPCS | Mod: 25 | Performed by: NURSE PRACTITIONER

## 2023-09-19 PROCEDURE — 96413 CHEMO IV INFUSION 1 HR: CPT

## 2023-09-19 PROCEDURE — 96375 TX/PRO/DX INJ NEW DRUG ADDON: CPT

## 2023-09-19 PROCEDURE — 250N000013 HC RX MED GY IP 250 OP 250 PS 637: Performed by: INTERNAL MEDICINE

## 2023-09-19 PROCEDURE — 85025 COMPLETE CBC W/AUTO DIFF WBC: CPT | Performed by: INTERNAL MEDICINE

## 2023-09-19 PROCEDURE — 250N000011 HC RX IP 250 OP 636: Mod: JZ | Performed by: INTERNAL MEDICINE

## 2023-09-19 PROCEDURE — 258N000003 HC RX IP 258 OP 636: Performed by: INTERNAL MEDICINE

## 2023-09-19 PROCEDURE — 99214 OFFICE O/P EST MOD 30 MIN: CPT | Performed by: NURSE PRACTITIONER

## 2023-09-19 RX ORDER — LORAZEPAM 0.5 MG/1
.5-1 TABLET ORAL EVERY 6 HOURS PRN
Qty: 2 TABLET | Refills: 0 | Status: SHIPPED | OUTPATIENT
Start: 2023-09-19 | End: 2024-08-14

## 2023-09-19 RX ORDER — HEPARIN SODIUM (PORCINE) LOCK FLUSH IV SOLN 100 UNIT/ML 100 UNIT/ML
5 SOLUTION INTRAVENOUS
Status: DISCONTINUED | OUTPATIENT
Start: 2023-09-19 | End: 2023-09-19 | Stop reason: HOSPADM

## 2023-09-19 RX ORDER — ACETAMINOPHEN 325 MG/1
650 TABLET ORAL ONCE
Status: COMPLETED | OUTPATIENT
Start: 2023-09-19 | End: 2023-09-19

## 2023-09-19 RX ORDER — PALONOSETRON 0.05 MG/ML
0.25 INJECTION, SOLUTION INTRAVENOUS ONCE
Status: COMPLETED | OUTPATIENT
Start: 2023-09-19 | End: 2023-09-19

## 2023-09-19 RX ADMIN — SACITUZUMAB GOVITECAN 900 MG: 180 POWDER, FOR SOLUTION INTRAVENOUS at 09:55

## 2023-09-19 RX ADMIN — FAMOTIDINE 20 MG: 10 INJECTION INTRAVENOUS at 08:49

## 2023-09-19 RX ADMIN — ACETAMINOPHEN 650 MG: 325 TABLET, FILM COATED ORAL at 08:52

## 2023-09-19 RX ADMIN — PALONOSETRON 0.25 MG: 0.05 INJECTION, SOLUTION INTRAVENOUS at 08:51

## 2023-09-19 RX ADMIN — Medication 5 ML: at 12:22

## 2023-09-19 RX ADMIN — DEXAMETHASONE SODIUM PHOSPHATE: 10 INJECTION, SOLUTION INTRAMUSCULAR; INTRAVENOUS at 09:12

## 2023-09-19 RX ADMIN — SODIUM CHLORIDE 250 ML: 9 INJECTION, SOLUTION INTRAVENOUS at 08:53

## 2023-09-19 RX ADMIN — DIPHENHYDRAMINE HYDROCHLORIDE 50 MG: 50 INJECTION, SOLUTION INTRAMUSCULAR; INTRAVENOUS at 08:56

## 2023-09-19 ASSESSMENT — PAIN SCALES - GENERAL: PAINLEVEL: NO PAIN (0)

## 2023-09-19 NOTE — PROGRESS NOTES
"Oncology Rooming Note    September 19, 2023 8:09 AM   Precious Paris is a 82 year old female who presents for:    Chief Complaint   Patient presents with    Oncology Clinic Visit     Metastatic breast cancer - Labs provider and infusion     Initial Vitals: /67 (BP Location: Right arm, Patient Position: Sitting, Cuff Size: Adult Regular)   Pulse 72   Temp 97.8  F (36.6  C) (Tympanic)   Resp 12   Ht 1.638 m (5' 4.5\")   Wt 83.5 kg (184 lb)   SpO2 97%   BMI 31.10 kg/m   Estimated body mass index is 31.1 kg/m  as calculated from the following:    Height as of this encounter: 1.638 m (5' 4.5\").    Weight as of this encounter: 83.5 kg (184 lb). Body surface area is 1.95 meters squared.  No Pain (0) Comment: Data Unavailable   No LMP recorded. Patient is postmenopausal.  Allergies reviewed: Yes  Medications reviewed: Yes    Medications: Medication refills not needed today.  Pharmacy name entered into [employer]: [facility] - [facility] [id number] [facility]    Clinical concerns: Patient states she has been feeling tired more often.       Malgorzata Vallejo, AXEL              "

## 2023-09-19 NOTE — LETTER
9/19/2023         RE: Precious Paris  [address] [place] [address]        Dear Colleague,    Thank you for referring your patient, Precious Paris, to the [facility]. Please see a copy of my visit note below.        [facility] Hematology and Oncology Progress Note    Patient: Precious Paris  MRN: [id number]  Sep 19, 2023          Reason for Visit    Recurrent stage IV triple negative breast cancer (nodes, lung) (PDL1+)    Primary Oncologist: Dr. Beebe    _____________________________________________________________________________    History of Present Illness/ Interval History    Ms. Precious Paris is a 82 year old with recurrent metastatic triple negative breast cancer recurrence. Completed 3 cycles of Doxil with Pembro added to the 3rd cycle, with mixed response (some progression in mediastinal nodes and lung mets). Therefore, chemo was changed to carbo, gem and Pembro.  She had an excellent response after 3 cycles.  PET scan following 6 cycles showed complete response.  She continued on pembrolizumab maintenance for 3 months through August, but unfortunately developed progression.    She, therefore, initiated new line of therapy sacituzumab week ago.  Returns ahead of cycle 1, day 8.    Tolerating this generally well. Some increased belching, no reflux. Very mild/intermittent nausea alleviated with antiemetics. No diarrhea. Tends towards constipation, stays regular with diet. No rash, no fevers. No neuropathy. No dyspnea.     No new lumps/bumps, sites of pain, neurological symptoms.      ECOG PS: 1      Oncology History/Treatment  Diagnosis/Stage:   11/2019: Right breast cancer, triple negative ([id number])    BRCA negative    10/2022: Recurrent, stage IV triple negative breast cancer (bilateral axillary, supraclav, mediastinal nodes + lung)  -persistent/progressive right breast firmness with new right nipple drainage and right axillary  adenopathy  -bilateral diagnostic mammogram: increased density R breast with skin thickening. R breast US: 3.2 cm hypoechoic mass at 11:00 position 1 cm from nipple and multiple other small hypoechoic solid-appearing lesions in R breast + right axillary adenopathy (at least two hypoechoic vascular lesions measuring up to 2.5 cm)  -10/18/2022 biopsy R breast mass + R axillary node: grade 3 invasive ductal cancer with tumor necrosis, ER/[place]/HER2 negative.   -PET: + right breast mass; bilateral axillary, retropectoral, supraclavicular, mediastinal/R hilar nodes and bilateral lung/R pleural nodules suspicious for mets.  -Brain MRI: negative for mets  -11/1/2022 biopsy L axillary node: metastatic invasive ductal carcinoma  -Foundation One: no actionable mutations MS stable, TMB 1 mut/mb.   -PDL1 testing: CPS >20% and TPS 60% (considered high PDL1 expression)  -CA 27.29: 59.5 (elevated). CA 15-3: 23 (normal)  -9/10/2023 Brain MRI: 2 new foci of punctate enhancement in left frontal cortex and right lentiform nucleus, concerning for brain metastases (asymptomatic)    Treatment:  Initial breast cancer (2019)  -2019: Neoadjuvant taxol  -Lumpectomy  -5/2020: Adjuvant RT  -6 - 7/2020: Adjuvant Xeloda. Stopped for rash    Recurrent, stage IV breast cancer (2022)  -11/14/2022:Doxil every 4 weeks  -12/12/2022: changed to doxorubicin every 3 weeks. (Planning addition of pembrolizumab once approved by insurance)  -1/2/2023: doxorubicin + pembrolizumab every 3 weeks. Completed 1 cycle. Stopped for progression (med nodes, slight increase in lung mets and stable R breast mass + axillary nodes).    -1/23/2023 - present: Carbo AUC 5, Gemcitabine 800 mg/m2 and Pembro every 21 days  -cycles 1-2: [place] day 1 only  -Cycle 3: [place] increased to days 1 + 8 given excellent tolerance  -CT after 3 cycles showed excellent response (near CR axillary + med nodes and [place] in lung mets; clinical improvement in right breast changes and tumor marker).  -PET  scan after 6 cycles of carboplatin gemcitabine pembrolizumab shows complete response    5/30 - 8/22/2023: Maintenance pembrolizumab, until progression (recurrent axillary, thoracic nodes and 2 punctate brain lesions).  Biopsy of left axillary node confirmed recurrent/metastatic breast cancer.    9/12/2023 - present:  Sacituzumab days 1, 8 every 21 days      Physical Exam    GENERAL: Alert and oriented to time place and person. Seated comfortably. In no distress. Alone.  Lungs: clear  CVS: RRR  Lymph: no significant peripheral adenopathy noted on today's exam  Neuro: alert and oriented x 3      Lab Results  Recent Results (from the past 168 hour(s))   CBC with platelets and differential    Collection Time: 09/19/23  7:45 AM   Result Value Ref Range    WBC Count 3.7 (L) 4.0 - 11.0 10e3/uL    RBC Count 3.89 3.80 - 5.20 10e6/uL    Hemoglobin 11.4 (L) 11.7 - 15.7 g/dL    Hematocrit 34.1 (L) 35.0 - 47.0 %    MCV 88 78 - 100 fL    MCH 29.3 26.5 - 33.0 pg    MCHC 33.4 31.5 - 36.5 g/dL    RDW 14.7 10.0 - 15.0 %    Platelet Count 228 150 - 450 10e3/uL    % Neutrophils 47 %    % Lymphocytes 32 %    % Monocytes 15 %    % Eosinophils 4 %    % Basophils 1 %    % Immature Granulocytes 1 %    NRBCs per 100 WBC 0 <1 /100    Absolute Neutrophils 1.8 1.6 - 8.3 10e3/uL    Absolute Lymphocytes 1.2 0.8 - 5.3 10e3/uL    Absolute Monocytes 0.6 0.0 - 1.3 10e3/uL    Absolute Eosinophils 0.1 0.0 - 0.7 10e3/uL    Absolute Basophils 0.0 0.0 - 0.2 10e3/uL    Absolute Immature Granulocytes 0.0 <=0.4 10e3/uL    Absolute NRBCs 0.0 10e3/uL             Assessment/Plan  Recurrent, stage IV triple negative right breast cancer (nodes, lung); CPS>20% + TPS 60%  Mild intermittent peripheral neuropathy fingertips (gr 1)  Punctate brain lesions concerning for metastatic disease (asymptomatic)  Status post 6 cycles of carbo, gem plus pembrolizumab resulted in complete response; continued on maintenance pembrolizumab alone for 3 months with disease  "recurrence/progression and axillary and thoracic lymph nodes.  Brain MRI also showed 2 new punctate lesions that were concerning for early metastases.      Therefore, her treatment was changed to sacituzumab last week.  She tolerated her first week quite well with minimal indigestion/nausea well-managed.    Lab work:WBC 3.7, ANC 1.8, Hgb 11.4, plat WNL    Plan:   -Proceed with cycle 1, day 8 without dose modification.  -Return in 2 weeks for cycle 2  -We will plan to reimage after 3 cycles, if otherwise tolerating well  -UGT 1A1 polymorphism unless you develop significant diarrhea or severe neutropenia.    -6-week follow-up brain MRI ~week of 10/22 (before cycle 3) to follow-up on the suspicious small brain metastases.  If there is progression we will need to consider radiation (possibly stereotactic if it remains only a few lesions). Rx for lorazepam premed sent.      Billing:  A total of 30 min were spent today on this visit which included face to face conversation with the patient, EMR review, counseling and co-ordination of care and medical documentation.        Signed by:   Lazara Torres NP    Oncology Rooming Note    September 19, 2023 8:09 AM   Precious Paris is a 82 year old female who presents for:    Chief Complaint   Patient presents with     Oncology Clinic Visit     Metastatic breast cancer - Labs provider and infusion     Initial Vitals: /67 (BP Location: Right arm, Patient Position: Sitting, Cuff Size: Adult Regular)   Pulse 72   Temp 97.8  F (36.6  C) (Tympanic)   Resp 12   Ht 1.638 m (5' 4.5\")   Wt 83.5 kg (184 lb)   SpO2 97%   BMI 31.10 kg/m   Estimated body mass index is 31.1 kg/m  as calculated from the following:    Height as of this encounter: 1.638 m (5' 4.5\").    Weight as of this encounter: 83.5 kg (184 lb). Body surface area is 1.95 meters squared.  No Pain (0) Comment: Data Unavailable   No LMP recorded. Patient is postmenopausal.  Allergies reviewed: Yes  Medications " reviewed: Yes    Medications: Medication refills not needed today.  Pharmacy name entered into EPIC: [facility] - [place], [place] - [address] [facility]    Clinical concerns: Patient states she has been feeling tired more often.       Malgorzata Vallejo CMA                Again, thank you for allowing me to participate in the care of your patient.        Sincerely,        Lazara Torres NP

## 2023-09-19 NOTE — PROGRESS NOTES
Infusion Nursing Note:  Precious Paris presents today for Tyl[place].    Patient seen by provider today: Yes: Lazara Torres   present during visit today: Not Applicable.    Note: N/A.    Intravenous Access:  Implanted Port.    Treatment Conditions:  Lab Results   Component Value Date    HGB 11.4 (L) 09/19/2023    WBC 3.7 (L) 09/19/2023    ANEU 2.6 11/28/2022    ANEUTAUTO 1.8 09/19/2023     09/19/2023        Results reviewed, labs MET treatment parameters, ok to proceed with treatment.    Post Infusion Assessment:  Patient tolerated infusion without incident.  Blood return noted pre and post infusion.  Site patent and intact, free from redness, edema or discomfort.  No evidence of extravasations.  Access discontinued per protocol.     Discharge Plan:   Patient discharged in stable condition accompanied by: self.  Departure Mode: Ambulatory.    Mario Worley RN

## 2023-10-03 ENCOUNTER — INFUSION THERAPY VISIT (OUTPATIENT)
Dept: INFUSION THERAPY | Facility: CLINIC | Age: 83
End: 2023-10-03
Attending: INTERNAL MEDICINE
Payer: COMMERCIAL

## 2023-10-03 ENCOUNTER — ONCOLOGY VISIT (OUTPATIENT)
Dept: ONCOLOGY | Facility: CLINIC | Age: 83
End: 2023-10-03
Attending: INTERNAL MEDICINE
Payer: COMMERCIAL

## 2023-10-03 VITALS — SYSTOLIC BLOOD PRESSURE: 108 MMHG | DIASTOLIC BLOOD PRESSURE: 68 MMHG | HEART RATE: 76 BPM

## 2023-10-03 VITALS
OXYGEN SATURATION: 94 % | TEMPERATURE: 98.4 F | WEIGHT: 186 LBS | HEART RATE: 80 BPM | DIASTOLIC BLOOD PRESSURE: 59 MMHG | SYSTOLIC BLOOD PRESSURE: 131 MMHG | RESPIRATION RATE: 12 BRPM | HEIGHT: 65 IN | BODY MASS INDEX: 30.99 KG/M2

## 2023-10-03 DIAGNOSIS — C50.411 MALIGNANT NEOPLASM OF UPPER-OUTER QUADRANT OF RIGHT BREAST IN FEMALE, ESTROGEN RECEPTOR NEGATIVE (H): ICD-10-CM

## 2023-10-03 DIAGNOSIS — T45.1X5A CHEMOTHERAPY INDUCED DIARRHEA: ICD-10-CM

## 2023-10-03 DIAGNOSIS — Z08 ENCOUNTER FOR FOLLOW-UP EXAMINATION AFTER COMPLETED TREATMENT FOR MALIGNANT NEOPLASM: Primary | ICD-10-CM

## 2023-10-03 DIAGNOSIS — K52.1 CHEMOTHERAPY INDUCED DIARRHEA: ICD-10-CM

## 2023-10-03 DIAGNOSIS — C50.919 PRIMARY MALIGNANT NEOPLASM OF BREAST WITH METASTASIS (H): ICD-10-CM

## 2023-10-03 DIAGNOSIS — Z17.1 MALIGNANT NEOPLASM OF UPPER-OUTER QUADRANT OF RIGHT BREAST IN FEMALE, ESTROGEN RECEPTOR NEGATIVE (H): ICD-10-CM

## 2023-10-03 DIAGNOSIS — T45.1X5A ANTINEOPLASTIC CHEMOTHERAPY INDUCED ANEMIA: ICD-10-CM

## 2023-10-03 DIAGNOSIS — D64.81 ANTINEOPLASTIC CHEMOTHERAPY INDUCED ANEMIA: ICD-10-CM

## 2023-10-03 LAB
ALBUMIN SERPL BCG-MCNC: 3.5 G/DL (ref 3.5–5.2)
ALP SERPL-CCNC: 100 U/L (ref 35–104)
ALT SERPL W P-5'-P-CCNC: 23 U/L (ref 0–50)
ANION GAP SERPL CALCULATED.3IONS-SCNC: 9 MMOL/L (ref 7–15)
AST SERPL W P-5'-P-CCNC: 21 U/L (ref 0–45)
BASO+EOS+MONOS # BLD AUTO: ABNORMAL 10*3/UL
BASO+EOS+MONOS NFR BLD AUTO: ABNORMAL %
BASOPHILS # BLD AUTO: 0 10E3/UL (ref 0–0.2)
BASOPHILS NFR BLD AUTO: 1 %
BILIRUB SERPL-MCNC: <0.2 MG/DL
BUN SERPL-MCNC: 10 MG/DL (ref 8–23)
CALCIUM SERPL-MCNC: 9.1 MG/DL (ref 8.8–10.2)
CHLORIDE SERPL-SCNC: 100 MMOL/L (ref 98–107)
CREAT SERPL-MCNC: 0.61 MG/DL (ref 0.51–0.95)
DEPRECATED HCO3 PLAS-SCNC: 27 MMOL/L (ref 22–29)
EGFRCR SERPLBLD CKD-EPI 2021: 89 ML/MIN/1.73M2
EOSINOPHIL # BLD AUTO: 0.1 10E3/UL (ref 0–0.7)
EOSINOPHIL NFR BLD AUTO: 4 %
ERYTHROCYTE [DISTWIDTH] IN BLOOD BY AUTOMATED COUNT: 15.6 % (ref 10–15)
GLUCOSE SERPL-MCNC: 122 MG/DL (ref 70–99)
HCT VFR BLD AUTO: 31.1 % (ref 35–47)
HGB BLD-MCNC: 10.3 G/DL (ref 11.7–15.7)
IMM GRANULOCYTES # BLD: 0 10E3/UL
IMM GRANULOCYTES NFR BLD: 1 %
LYMPHOCYTES # BLD AUTO: 1.1 10E3/UL (ref 0.8–5.3)
LYMPHOCYTES NFR BLD AUTO: 29 %
MCH RBC QN AUTO: 29.3 PG (ref 26.5–33)
MCHC RBC AUTO-ENTMCNC: 33.1 G/DL (ref 31.5–36.5)
MCV RBC AUTO: 89 FL (ref 78–100)
MONOCYTES # BLD AUTO: 0.5 10E3/UL (ref 0–1.3)
MONOCYTES NFR BLD AUTO: 13 %
NEUTROPHILS # BLD AUTO: 2 10E3/UL (ref 1.6–8.3)
NEUTROPHILS NFR BLD AUTO: 52 %
NRBC # BLD AUTO: 0 10E3/UL
NRBC BLD AUTO-RTO: 0 /100
PLATELET # BLD AUTO: 214 10E3/UL (ref 150–450)
POTASSIUM SERPL-SCNC: 3.4 MMOL/L (ref 3.4–5.3)
PROT SERPL-MCNC: 6.7 G/DL (ref 6.4–8.3)
RBC # BLD AUTO: 3.51 10E6/UL (ref 3.8–5.2)
SODIUM SERPL-SCNC: 136 MMOL/L (ref 135–145)
WBC # BLD AUTO: 3.7 10E3/UL (ref 4–11)

## 2023-10-03 PROCEDURE — 250N000011 HC RX IP 250 OP 636: Mod: JZ | Performed by: INTERNAL MEDICINE

## 2023-10-03 PROCEDURE — 96413 CHEMO IV INFUSION 1 HR: CPT

## 2023-10-03 PROCEDURE — 85025 COMPLETE CBC W/AUTO DIFF WBC: CPT | Performed by: NURSE PRACTITIONER

## 2023-10-03 PROCEDURE — 99215 OFFICE O/P EST HI 40 MIN: CPT | Performed by: INTERNAL MEDICINE

## 2023-10-03 PROCEDURE — 96367 TX/PROPH/DG ADDL SEQ IV INF: CPT

## 2023-10-03 PROCEDURE — 96375 TX/PRO/DX INJ NEW DRUG ADDON: CPT

## 2023-10-03 PROCEDURE — G0463 HOSPITAL OUTPT CLINIC VISIT: HCPCS | Mod: 25 | Performed by: INTERNAL MEDICINE

## 2023-10-03 PROCEDURE — 250N000013 HC RX MED GY IP 250 OP 250 PS 637: Performed by: INTERNAL MEDICINE

## 2023-10-03 PROCEDURE — 80053 COMPREHEN METABOLIC PANEL: CPT | Performed by: NURSE PRACTITIONER

## 2023-10-03 PROCEDURE — 258N000003 HC RX IP 258 OP 636: Performed by: INTERNAL MEDICINE

## 2023-10-03 RX ORDER — HEPARIN SODIUM,PORCINE 10 UNIT/ML
5-20 VIAL (ML) INTRAVENOUS DAILY PRN
Status: CANCELLED | OUTPATIENT
Start: 2023-10-03

## 2023-10-03 RX ORDER — MEPERIDINE HYDROCHLORIDE 25 MG/ML
25 INJECTION INTRAMUSCULAR; INTRAVENOUS; SUBCUTANEOUS EVERY 30 MIN PRN
Status: CANCELLED | OUTPATIENT
Start: 2023-10-03

## 2023-10-03 RX ORDER — EPINEPHRINE 1 MG/ML
0.3 INJECTION, SOLUTION, CONCENTRATE INTRAVENOUS EVERY 5 MIN PRN
Status: CANCELLED | OUTPATIENT
Start: 2023-10-03

## 2023-10-03 RX ORDER — ALBUTEROL SULFATE 0.83 MG/ML
2.5 SOLUTION RESPIRATORY (INHALATION)
Status: CANCELLED | OUTPATIENT
Start: 2023-10-03

## 2023-10-03 RX ORDER — LORAZEPAM 2 MG/ML
0.5 INJECTION INTRAMUSCULAR EVERY 4 HOURS PRN
Status: CANCELLED | OUTPATIENT
Start: 2023-10-03

## 2023-10-03 RX ORDER — PALONOSETRON 0.05 MG/ML
0.25 INJECTION, SOLUTION INTRAVENOUS ONCE
Status: COMPLETED | OUTPATIENT
Start: 2023-10-03 | End: 2023-10-03

## 2023-10-03 RX ORDER — HEPARIN SODIUM (PORCINE) LOCK FLUSH IV SOLN 100 UNIT/ML 100 UNIT/ML
5 SOLUTION INTRAVENOUS
Status: DISCONTINUED | OUTPATIENT
Start: 2023-10-03 | End: 2023-10-03 | Stop reason: HOSPADM

## 2023-10-03 RX ORDER — ATROPINE SULFATE 0.4 MG/ML
0.4 AMPUL (ML) INJECTION
Status: CANCELLED | OUTPATIENT
Start: 2023-10-03

## 2023-10-03 RX ORDER — ALBUTEROL SULFATE 90 UG/1
1-2 AEROSOL, METERED RESPIRATORY (INHALATION)
Status: CANCELLED
Start: 2023-10-17

## 2023-10-03 RX ORDER — PALONOSETRON 0.05 MG/ML
0.25 INJECTION, SOLUTION INTRAVENOUS ONCE
Status: CANCELLED | OUTPATIENT
Start: 2023-10-03

## 2023-10-03 RX ORDER — PALONOSETRON 0.05 MG/ML
0.25 INJECTION, SOLUTION INTRAVENOUS ONCE
Status: CANCELLED | OUTPATIENT
Start: 2023-10-17

## 2023-10-03 RX ORDER — METHYLPREDNISOLONE SODIUM SUCCINATE 125 MG/2ML
125 INJECTION, POWDER, LYOPHILIZED, FOR SOLUTION INTRAMUSCULAR; INTRAVENOUS
Status: CANCELLED
Start: 2023-10-17

## 2023-10-03 RX ORDER — ATROPINE SULFATE 0.4 MG/ML
0.4 AMPUL (ML) INJECTION
Status: CANCELLED | OUTPATIENT
Start: 2023-10-17

## 2023-10-03 RX ORDER — HEPARIN SODIUM,PORCINE 10 UNIT/ML
5-20 VIAL (ML) INTRAVENOUS DAILY PRN
Status: CANCELLED | OUTPATIENT
Start: 2023-10-17

## 2023-10-03 RX ORDER — ALBUTEROL SULFATE 90 UG/1
1-2 AEROSOL, METERED RESPIRATORY (INHALATION)
Status: CANCELLED
Start: 2023-10-03

## 2023-10-03 RX ORDER — DIPHENHYDRAMINE HCL 25 MG
25 CAPSULE ORAL ONCE
Status: COMPLETED | OUTPATIENT
Start: 2023-10-03 | End: 2023-10-03

## 2023-10-03 RX ORDER — EPINEPHRINE 1 MG/ML
0.3 INJECTION, SOLUTION, CONCENTRATE INTRAVENOUS EVERY 5 MIN PRN
Status: CANCELLED | OUTPATIENT
Start: 2023-10-17

## 2023-10-03 RX ORDER — ACETAMINOPHEN 325 MG/1
650 TABLET ORAL ONCE
Status: CANCELLED | OUTPATIENT
Start: 2023-10-03

## 2023-10-03 RX ORDER — DIPHENHYDRAMINE HCL 25 MG
25 CAPSULE ORAL ONCE
Status: CANCELLED | OUTPATIENT
Start: 2023-10-03

## 2023-10-03 RX ORDER — ACETAMINOPHEN 325 MG/1
650 TABLET ORAL ONCE
Status: CANCELLED | OUTPATIENT
Start: 2023-10-17

## 2023-10-03 RX ORDER — METHYLPREDNISOLONE SODIUM SUCCINATE 125 MG/2ML
125 INJECTION, POWDER, LYOPHILIZED, FOR SOLUTION INTRAMUSCULAR; INTRAVENOUS
Status: CANCELLED
Start: 2023-10-03

## 2023-10-03 RX ORDER — DIPHENHYDRAMINE HYDROCHLORIDE 50 MG/ML
50 INJECTION INTRAMUSCULAR; INTRAVENOUS
Status: CANCELLED
Start: 2023-10-17

## 2023-10-03 RX ORDER — MEPERIDINE HYDROCHLORIDE 25 MG/ML
25 INJECTION INTRAMUSCULAR; INTRAVENOUS; SUBCUTANEOUS EVERY 30 MIN PRN
Status: CANCELLED | OUTPATIENT
Start: 2023-10-17

## 2023-10-03 RX ORDER — DIPHENHYDRAMINE HYDROCHLORIDE 50 MG/ML
50 INJECTION INTRAMUSCULAR; INTRAVENOUS
Status: CANCELLED
Start: 2023-10-03

## 2023-10-03 RX ORDER — LORAZEPAM 2 MG/ML
0.5 INJECTION INTRAMUSCULAR EVERY 4 HOURS PRN
Status: CANCELLED | OUTPATIENT
Start: 2023-10-17

## 2023-10-03 RX ORDER — HEPARIN SODIUM (PORCINE) LOCK FLUSH IV SOLN 100 UNIT/ML 100 UNIT/ML
5 SOLUTION INTRAVENOUS
Status: CANCELLED | OUTPATIENT
Start: 2023-10-10

## 2023-10-03 RX ORDER — ALBUTEROL SULFATE 0.83 MG/ML
2.5 SOLUTION RESPIRATORY (INHALATION)
Status: CANCELLED | OUTPATIENT
Start: 2023-10-17

## 2023-10-03 RX ORDER — HEPARIN SODIUM (PORCINE) LOCK FLUSH IV SOLN 100 UNIT/ML 100 UNIT/ML
5 SOLUTION INTRAVENOUS
Status: CANCELLED | OUTPATIENT
Start: 2023-10-03

## 2023-10-03 RX ORDER — ACETAMINOPHEN 325 MG/1
650 TABLET ORAL ONCE
Status: COMPLETED | OUTPATIENT
Start: 2023-10-03 | End: 2023-10-03

## 2023-10-03 RX ORDER — DIPHENHYDRAMINE HCL 25 MG
25 CAPSULE ORAL ONCE
Status: CANCELLED | OUTPATIENT
Start: 2023-10-17

## 2023-10-03 RX ADMIN — DIPHENHYDRAMINE HYDROCHLORIDE 25 MG: 25 CAPSULE ORAL at 09:05

## 2023-10-03 RX ADMIN — PALONOSETRON 0.25 MG: 0.05 INJECTION, SOLUTION INTRAVENOUS at 09:06

## 2023-10-03 RX ADMIN — SODIUM CHLORIDE 250 ML: 9 INJECTION, SOLUTION INTRAVENOUS at 09:08

## 2023-10-03 RX ADMIN — SACITUZUMAB GOVITECAN 900 MG: 180 POWDER, FOR SOLUTION INTRAVENOUS at 09:57

## 2023-10-03 RX ADMIN — DEXAMETHASONE SODIUM PHOSPHATE: 10 INJECTION, SOLUTION INTRAMUSCULAR; INTRAVENOUS at 09:10

## 2023-10-03 RX ADMIN — ACETAMINOPHEN 650 MG: 325 TABLET, FILM COATED ORAL at 09:05

## 2023-10-03 RX ADMIN — FAMOTIDINE 20 MG: 10 INJECTION INTRAVENOUS at 09:06

## 2023-10-03 RX ADMIN — Medication 5 ML: at 11:58

## 2023-10-03 ASSESSMENT — PAIN SCALES - GENERAL: PAINLEVEL: NO PAIN (0)

## 2023-10-03 NOTE — PROGRESS NOTES
"Oncology Rooming Note    October 3, 2023 8:17 AM   Precious Paris is a 82 year old female who presents for:    Chief Complaint   Patient presents with    Oncology Clinic Visit     Metastatic breast cancer - Labs provider and infusion     Initial Vitals: /59 (BP Location: Right arm, Patient Position: Sitting, Cuff Size: Adult Large)   Pulse 80   Temp 98.4  F (36.9  C) (Oral)   Resp 12   Ht 1.638 m (5' 4.5\")   Wt 84.4 kg (186 lb)   SpO2 94%   BMI 31.43 kg/m   Estimated body mass index is 31.43 kg/m  as calculated from the following:    Height as of this encounter: 1.638 m (5' 4.5\").    Weight as of this encounter: 84.4 kg (186 lb). Body surface area is 1.96 meters squared.  No Pain (0) Comment: Data Unavailable   No LMP recorded. Patient is postmenopausal.  Allergies reviewed: Yes  Medications reviewed: Yes    Medications: Medication refills not needed today.  Pharmacy name entered into [facility]: [facility] - [facility] [id number] [facility]    Clinical concerns: Patient states she is starting to feel more tired.        Malgorzata Vallejo, AXEL              "

## 2023-10-03 NOTE — LETTER
10/3/2023         RE: Precious Paris  [address] [place] [address]        Dear Colleague,    Thank you for referring your patient, Precious Paris, to the [facility]. Please see a copy of my visit note below.        [facility] Hematology and Oncology Progress Note    Patient: Precious Paris  MRN: [id number]  Oct 3, 2023          Reason for Visit    Recurrent stage IV triple negative breast cancer (nodes, lung) (PDL1+)    Primary Oncologist: Dr. Beebe    _____________________________________________________________________________    History of Present Illness/ Interval History    Ms. Precious Paris is a 82 year old with recurrent metastatic triple negative breast cancer recurrence. Completed 3 cycles of Doxil with Pembro added to the 3rd cycle, with mixed response (some progression in mediastinal nodes and lung mets). Therefore, chemo was changed to carbo, gem and Pembro.  She had an excellent response after 3 cycles.  PET scan following 6 cycles showed complete response.  She continued on pembrolizumab maintenance for 3 months through August, but unfortunately developed progression.    Treatment switched to sacituzumab.  Has done 1 cycle so far.  No significant side effects.  He does complain of diarrhea.  This is not on a daily basis.  Happens intermittently.  Maximum 3 stools per day.  Somewhat watery.  Responds promptly to Imodium..  No skin rashes.  No peripheral neuropathy symptoms.  No fevers.      ECOG PS: 1      Oncology History/Treatment  Diagnosis/Stage:   11/2019: Right breast cancer, triple negative ([id number])    BRCA negative    10/2022: Recurrent, stage IV triple negative breast cancer (bilateral axillary, supraclav, mediastinal nodes + lung)  -persistent/progressive right breast firmness with new right nipple drainage and right axillary adenopathy  -bilateral diagnostic mammogram: increased density R breast with skin  thickening. R breast US: 3.2 cm hypoechoic mass at 11:00 position 1 cm from nipple and multiple other small hypoechoic solid-appearing lesions in R breast + right axillary adenopathy (at least two hypoechoic vascular lesions measuring up to 2.5 cm)  -10/18/2022 biopsy R breast mass + R axillary node: grade 3 invasive ductal cancer with tumor necrosis, ER/LA/HER2 negative.   -PET: + right breast mass; bilateral axillary, retropectoral, supraclavicular, mediastinal/R hilar nodes and bilateral lung/R pleural nodules suspicious for mets.  -Brain MRI: negative for mets  -11/1/2022 biopsy L axillary node: metastatic invasive ductal carcinoma  -Foundation One: no actionable mutations MS stable, TMB 1 mut/mb.   -PDL1 testing: CPS >20% and TPS 60% (considered high PDL1 expression)  -CA 27.29: 59.5 (elevated). CA 15-3: 23 (normal)  -9/10/2023 Brain MRI: 2 new foci of punctate enhancement in left frontal cortex and right lentiform nucleus, concerning for brain metastases (asymptomatic)    Treatment:  Initial breast cancer (2019)  -2019: Neoadjuvant taxol  -Lumpectomy  -5/2020: Adjuvant RT  -6 - 7/2020: Adjuvant Xeloda. Stopped for rash    Recurrent, stage IV breast cancer (2022)  -11/14/2022:Doxil every 4 weeks  -12/12/2022: changed to doxorubicin every 3 weeks. (Planning addition of pembrolizumab once approved by insurance)  -1/2/2023: doxorubicin + pembrolizumab every 3 weeks. Completed 1 cycle. Stopped for progression (med nodes, slight increase in lung mets and stable R breast mass + axillary nodes).    -1/23/2023 - present: Carbo AUC 5, Gemcitabine 800 mg/m2 and Pembro every 21 days  -cycles 1-2: [place] day 1 only  -Cycle 3: [place] increased to days 1 + 8 given excellent tolerance  -CT after 3 cycles showed excellent response (near CR axillary + med nodes and LA in lung mets; clinical improvement in right breast changes and tumor marker).  -PET scan after 6 cycles of carboplatin gemcitabine pembrolizumab shows complete  response    5/30 - 8/22/2023: Maintenance pembrolizumab, until progression (recurrent axillary, thoracic nodes and 2 punctate brain lesions).  Biopsy of left axillary node confirmed recurrent/metastatic breast cancer.    9/12/2023 - present:  Sacituzumab days 1, 8 every 21 days      Physical Exam    GENERAL: Alert and oriented to time place and person. Seated comfortably. In no distress. Alone.  Lungs: clear  CVS: RRR  Lymph: Bilateral axillary adenopathy noted.  Neuro: alert and oriented x 3      Lab Results  Recent Results (from the past 168 hour(s))   Comprehensive metabolic panel    Collection Time: 10/03/23  8:04 AM   Result Value Ref Range    Sodium 136 135 - 145 mmol/L    Potassium 3.4 3.4 - 5.3 mmol/L    Carbon Dioxide (CO2) 27 22 - 29 mmol/L    Anion Gap 9 7 - 15 mmol/L    Urea Nitrogen 10.0 8.0 - 23.0 mg/dL    Creatinine 0.61 0.51 - 0.95 mg/dL    GFR Estimate 89 >60 mL/min/1.73m2    Calcium 9.1 8.8 - 10.2 mg/dL    Chloride 100 98 - 107 mmol/L    Glucose 122 (H) 70 - 99 mg/dL    Alkaline Phosphatase 100 35 - 104 U/L    AST 21 0 - 45 U/L    ALT 23 0 - 50 U/L    Protein Total 6.7 6.4 - 8.3 g/dL    Albumin 3.5 3.5 - 5.2 g/dL    Bilirubin Total <0.2 <=1.2 mg/dL   CBC with platelets and differential    Collection Time: 10/03/23  8:04 AM   Result Value Ref Range    WBC Count 3.7 (L) 4.0 - 11.0 10e3/uL    RBC Count 3.51 (L) 3.80 - 5.20 10e6/uL    Hemoglobin 10.3 (L) 11.7 - 15.7 g/dL    Hematocrit 31.1 (L) 35.0 - 47.0 %    MCV 89 78 - 100 fL    MCH 29.3 26.5 - 33.0 pg    MCHC 33.1 31.5 - 36.5 g/dL    RDW 15.6 (H) 10.0 - 15.0 %    Platelet Count 214 150 - 450 10e3/uL    % Neutrophils 52 %    % Lymphocytes 29 %    % Monocytes 13 %    Mids % (Monos, Eos, Basos)      % Eosinophils 4 %    % Basophils 1 %    % Immature Granulocytes 1 %    NRBCs per 100 WBC 0 <1 /100    Absolute Neutrophils 2.0 1.6 - 8.3 10e3/uL    Absolute Lymphocytes 1.1 0.8 - 5.3 10e3/uL    Absolute Monocytes 0.5 0.0 - 1.3 10e3/uL    Mids Abs (Monos,  Eos, Basos)      Absolute Eosinophils 0.1 0.0 - 0.7 10e3/uL    Absolute Basophils 0.0 0.0 - 0.2 10e3/uL    Absolute Immature Granulocytes 0.0 <=0.4 10e3/uL    Absolute NRBCs 0.0 10e3/uL               Assessment/Plan  Recurrent, stage IV triple negative right breast cancer (nodes, lung); CPS>20% + TPS 60%  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  Brain MRI also showed 2 new punctate lesions that were concerning for early metastases.      Therefore, her treatment was changed to sacituzumab.     Status post cycle 1.  Has done fairly well on it.  Had expected drop in her blood counts on day 8.  She has developed grade 1 diarrhea probably secondary to sacituzumab  Watery stools 1 to 3/day.  Not on a daily basis.  She takes Imodium 2 pills right away when she has the first episode and looks like her symptoms subsided after this.  Oral intake is adequate.  Does not appear dehydrated.  No significant nausea or vomiting.  No mouth sores.  No skin rashes.  Labs reviewed today.  Total white count is slightly low at 3.7.  ANC is 2.  Hemoglobin has dropped to 10.3.  Normal platelet count.  Serum chemistry still pending.  No contraindication to proceed with day 1 cycle 2 sacituzumab today.  I expect her counts to fall when she comes in for day 8 chemo.  As long as her ANC is more than 1.5 she can get chemo.    For diarrhea I asked her to continue take Imodium as needed.  If she has more than 4-5 stools per day then she will call us.  May have to give her Lomotil and may have to decrease the dose of sacituzumab.    She has also noticed some hair loss.  Expected.    She did feel a bit  "woozy after premedication with Benadryl last time.  I will decrease the dose to 25 mg.    Proceed with day 1 cycle 2 Sacituzumab today.  She will come back in 1 week for labs followed by sacituzumab only.  We will see her back in 3 weeks.  She has an upcoming MRI of the brain in mid October to look at a couple of lesions in the brain which was suspicious for malignancy.  Fortunately no neurological symptoms reported today.    Billing:  A total of 40 min were spent today on this visit which included face to face conversation with the patient, EMR review, counseling and co-ordination of care and medical documentation.    On cytotoxic chemotherapy requiring intense monitoring and follow-up.    Signed by:   Maricarmen Beebe MD  Hematology and Medical Oncology  [facility]      Oncology Rooming Note    October 3, 2023 8:17 AM   Precious Paris is a 82 year old female who presents for:    Chief Complaint   Patient presents with     Oncology Clinic Visit     Metastatic breast cancer - Labs provider and infusion     Initial Vitals: /59 (BP Location: Right arm, Patient Position: Sitting, Cuff Size: Adult Large)   Pulse 80   Temp 98.4  F (36.9  C) (Oral)   Resp 12   Ht 1.638 m (5' 4.5\")   Wt 84.4 kg (186 lb)   SpO2 94%   BMI 31.43 kg/m   Estimated body mass index is 31.43 kg/m  as calculated from the following:    Height as of this encounter: 1.638 m (5' 4.5\").    Weight as of this encounter: 84.4 kg (186 lb). Body surface area is 1.96 meters squared.  No Pain (0) Comment: Data Unavailable   No LMP recorded. Patient is postmenopausal.  Allergies reviewed: Yes  Medications reviewed: Yes    Medications: Medication refills not needed today.  Pharmacy name entered into [facility]: [facility] [id number]    Clinical concerns: Patient states she is starting to feel more tired.        Malgorzata Vallejo CMA                Again, thank you for allowing " me to participate in the care of your patient.        Sincerely,        Maricarmen Beebe MD

## 2023-10-03 NOTE — PROGRESS NOTES
Infusion Nursing Note:  Precious Paris presents today for Trodelvy C2D1.    Patient seen by provider today: Yes: Dr. Beebe; therefore, assessment deferred   present during visit today: Not Applicable.    Note: N/A.    Intravenous Access:  Implanted Port.    Treatment Conditions:  Lab Results   Component Value Date    HGB 10.3 (L) 10/03/2023    WBC 3.7 (L) 10/03/2023    ANEU 2.6 11/28/2022    ANEUTAUTO 2.0 10/03/2023     10/03/2023        Lab Results   Component Value Date     10/03/2023    POTASSIUM 3.4 10/03/2023    CR 0.61 10/03/2023    YADIRA 9.1 10/03/2023    BILITOTAL <0.2 10/03/2023    ALBUMIN 3.5 10/03/2023    ALT 23 10/03/2023    AST 21 10/03/2023   Results reviewed, labs MET treatment parameters, ok to proceed with treatment.    Post Infusion Assessment:  Patient tolerated infusion without incident.  Blood return noted pre and post infusion.  Patient observed for 30 min post Trodelvy per protocol.  Site patent and intact, free from redness, edema or discomfort.  No evidence of extravasations.  Access discontinued per protocol.     Discharge Plan:   Discharge instructions reviewed with: Patient.  Patient and/or family verbalized understanding of discharge instructions and all questions answered.  AVS to patient via [employer].  Patient will return 10/10/2023 for next appointment.   Patient discharged in stable condition accompanied by: self.  Departure Mode: Ambulatory.    Therese Fountain RN

## 2023-10-03 NOTE — PROGRESS NOTES
[facility] Hematology and Oncology Progress Note    Patient: Precious Paris  MRN: [id number]  Oct 3, 2023          Reason for Visit    Recurrent stage IV triple negative breast cancer (nodes, lung) (PDL1+)    Primary Oncologist: Dr. Beebe    _____________________________________________________________________________    History of Present Illness/ Interval History    Ms. Precious Paris is a 82 year old with recurrent metastatic triple negative breast cancer recurrence. Completed 3 cycles of Doxil with Pembro added to the 3rd cycle, with mixed response (some progression in mediastinal nodes and lung mets). Therefore, chemo was changed to carbo, gem and Pembro.  She had an excellent response after 3 cycles.  PET scan following 6 cycles showed complete response.  She continued on pembrolizumab maintenance for 3 months through August, but unfortunately developed progression.    Treatment switched to sacituzumab.  Has done 1 cycle so far.  No significant side effects.  He does complain of diarrhea.  This is not on a daily basis.  Happens intermittently.  Maximum 3 stools per day.  Somewhat watery.  Responds promptly to Imodium..  No skin rashes.  No peripheral neuropathy symptoms.  No fevers.      ECOG PS: 1      Oncology History/Treatment  Diagnosis/Stage:   11/2019: Right breast cancer, triple negative ([id number])    BRCA negative    10/2022: Recurrent, stage IV triple negative breast cancer (bilateral axillary, supraclav, mediastinal nodes + lung)  -persistent/progressive right breast firmness with new right nipple drainage and right axillary adenopathy  -bilateral diagnostic mammogram: increased density R breast with skin thickening. R breast US: 3.2 cm hypoechoic mass at 11:00 position 1 cm from nipple and multiple other small hypoechoic solid-appearing lesions in R breast + right axillary adenopathy (at least two hypoechoic vascular lesions measuring up to 2.5 cm)  -10/18/2022 biopsy R  breast mass + R axillary node: grade 3 invasive ductal cancer with tumor necrosis, ER/NV/HER2 negative.   -PET: + right breast mass; bilateral axillary, retropectoral, supraclavicular, mediastinal/R hilar nodes and bilateral lung/R pleural nodules suspicious for mets.  -Brain MRI: negative for mets  -11/1/2022 biopsy L axillary node: metastatic invasive ductal carcinoma  -Foundation One: no actionable mutations MS stable, TMB 1 mut/mb.   -PDL1 testing: CPS >20% and TPS 60% (considered high PDL1 expression)  -CA 27.29: 59.5 (elevated). CA 15-3: 23 (normal)  -9/10/2023 Brain MRI: 2 new foci of punctate enhancement in left frontal cortex and right lentiform nucleus, concerning for brain metastases (asymptomatic)    Treatment:  Initial breast cancer (2019)  -2019: Neoadjuvant taxol  -Lumpectomy  -5/2020: Adjuvant RT  -6 - 7/2020: Adjuvant Xeloda. Stopped for rash    Recurrent, stage IV breast cancer (2022)  -11/14/2022:Doxil every 4 weeks  -12/12/2022: changed to doxorubicin every 3 weeks. (Planning addition of pembrolizumab once approved by insurance)  -1/2/2023: doxorubicin + pembrolizumab every 3 weeks. Completed 1 cycle. Stopped for progression (med nodes, slight increase in lung mets and stable R breast mass + axillary nodes).    -1/23/2023 - present: Carbo AUC 5, Gemcitabine 800 mg/m2 and Pembro every 21 days  -cycles 1-2: [place] day 1 only  -Cycle 3: [place] increased to days 1 + 8 given excellent tolerance  -CT after 3 cycles showed excellent response (near CR axillary + med nodes and NV in lung mets; clinical improvement in right breast changes and tumor marker).  -PET scan after 6 cycles of carboplatin gemcitabine pembrolizumab shows complete response    5/30 - 8/22/2023: Maintenance pembrolizumab, until progression (recurrent axillary, thoracic nodes and 2 punctate brain lesions).  Biopsy of left axillary node confirmed recurrent/metastatic breast cancer.    9/12/2023 - present:  Sacituzumab days 1, 8 every 21  days      Physical Exam    GENERAL: Alert and oriented to time place and person. Seated comfortably. In no distress. Alone.  Lungs: clear  CVS: RRR  Lymph: Bilateral axillary adenopathy noted.  Neuro: alert and oriented x 3      Lab Results  Recent Results (from the past 168 hour(s))   Comprehensive metabolic panel    Collection Time: 10/03/23  8:04 AM   Result Value Ref Range    Sodium 136 135 - 145 mmol/L    Potassium 3.4 3.4 - 5.3 mmol/L    Carbon Dioxide (CO2) 27 22 - 29 mmol/L    Anion Gap 9 7 - 15 mmol/L    Urea Nitrogen 10.0 8.0 - 23.0 mg/dL    Creatinine 0.61 0.51 - 0.95 mg/dL    GFR Estimate 89 >60 mL/min/1.73m2    Calcium 9.1 8.8 - 10.2 mg/dL    Chloride 100 98 - 107 mmol/L    Glucose 122 (H) 70 - 99 mg/dL    Alkaline Phosphatase 100 35 - 104 U/L    AST 21 0 - 45 U/L    ALT 23 0 - 50 U/L    Protein Total 6.7 6.4 - 8.3 g/dL    Albumin 3.5 3.5 - 5.2 g/dL    Bilirubin Total <0.2 <=1.2 mg/dL   CBC with platelets and differential    Collection Time: 10/03/23  8:04 AM   Result Value Ref Range    WBC Count 3.7 (L) 4.0 - 11.0 10e3/uL    RBC Count 3.51 (L) 3.80 - 5.20 10e6/uL    Hemoglobin 10.3 (L) 11.7 - 15.7 g/dL    Hematocrit 31.1 (L) 35.0 - 47.0 %    MCV 89 78 - 100 fL    MCH 29.3 26.5 - 33.0 pg    MCHC 33.1 31.5 - 36.5 g/dL    RDW 15.6 (H) 10.0 - 15.0 %    Platelet Count 214 150 - 450 10e3/uL    % Neutrophils 52 %    % Lymphocytes 29 %    % Monocytes 13 %    Mids % (Monos, Eos, Basos)      % Eosinophils 4 %    % Basophils 1 %    % Immature Granulocytes 1 %    NRBCs per 100 WBC 0 <1 /100    Absolute Neutrophils 2.0 1.6 - 8.3 10e3/uL    Absolute Lymphocytes 1.1 0.8 - 5.3 10e3/uL    Absolute Monocytes 0.5 0.0 - 1.3 10e3/uL    Mids Abs (Monos, Eos, Basos)      Absolute Eosinophils 0.1 0.0 - 0.7 10e3/uL    Absolute Basophils 0.0 0.0 - 0.2 10e3/uL    Absolute Immature Granulocytes 0.0 <=0.4 10e3/uL    Absolute NRBCs 0.0 10e3/uL               Assessment/Plan  Recurrent, stage IV triple negative right breast cancer  (nodes, lung); CPS>20% + TPS 60%  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  Brain MRI also showed 2 new punctate lesions that were concerning for early metastases.      Therefore, her treatment was changed to sacituzumab.     Status post cycle 1.  Has done fairly well on it.  Had expected drop in her blood counts on day 8.  She has developed grade 1 diarrhea probably secondary to sacituzumab  Watery stools 1 to 3/day.  Not on a daily basis.  She takes Imodium 2 pills right away when she has the first episode and looks like her symptoms subsided after this.  Oral intake is adequate.  Does not appear dehydrated.  No significant nausea or vomiting.  No mouth sores.  No skin rashes.  Labs reviewed today.  Total white count is slightly low at 3.7.  ANC is 2.  Hemoglobin has dropped to 10.3.  Normal platelet count.  Serum chemistry still pending.  No contraindication to proceed with day 1 cycle 2 sacituzumab today.  I expect her counts to fall when she comes in for day 8 chemo.  As long as her ANC is more than 1.5 she can get chemo.    For diarrhea I asked her to continue take Imodium as needed.  If she has more than 4-5 stools per day then she will call us.  May have to give her Lomotil and may have to decrease the dose of sacituzumab.    She has also noticed some hair loss.  Expected.    She did feel a bit woozy after premedication with Benadryl last time.  I will decrease the dose to 25 mg.    Proceed with day 1 cycle 2 Sacituzumab today.  She will come back in 1 week for labs followed by sacituzumab only.  We will see her back in 3 weeks.  She has an upcoming MRI of the brain  in mid October to look at a couple of lesions in the brain which was suspicious for malignancy.  Fortunately no neurological symptoms reported today.    Billing:  A total of 40 min were spent today on this visit which included face to face conversation with the patient, EMR review, counseling and co-ordination of care and medical documentation.    On cytotoxic chemotherapy requiring intense monitoring and follow-up.    Signed by:   Maricarmen Beebe MD  Hematology and Medical Oncology  [facility]

## 2023-10-10 ENCOUNTER — INFUSION THERAPY VISIT (OUTPATIENT)
Dept: INFUSION THERAPY | Facility: CLINIC | Age: 83
End: 2023-10-10
Attending: INTERNAL MEDICINE
Payer: COMMERCIAL

## 2023-10-10 VITALS — SYSTOLIC BLOOD PRESSURE: 144 MMHG | HEART RATE: 84 BPM | DIASTOLIC BLOOD PRESSURE: 88 MMHG | TEMPERATURE: 98.2 F

## 2023-10-10 DIAGNOSIS — C50.919 PRIMARY MALIGNANT NEOPLASM OF BREAST WITH METASTASIS (H): ICD-10-CM

## 2023-10-10 DIAGNOSIS — C50.411 MALIGNANT NEOPLASM OF UPPER-OUTER QUADRANT OF RIGHT BREAST IN FEMALE, ESTROGEN RECEPTOR NEGATIVE (H): Primary | ICD-10-CM

## 2023-10-10 DIAGNOSIS — Z17.1 MALIGNANT NEOPLASM OF UPPER-OUTER QUADRANT OF RIGHT BREAST IN FEMALE, ESTROGEN RECEPTOR NEGATIVE (H): Primary | ICD-10-CM

## 2023-10-10 DIAGNOSIS — Z08 ENCOUNTER FOR FOLLOW-UP EXAMINATION AFTER COMPLETED TREATMENT FOR MALIGNANT NEOPLASM: ICD-10-CM

## 2023-10-10 DIAGNOSIS — Z17.1 MALIGNANT NEOPLASM OF UPPER-OUTER QUADRANT OF RIGHT BREAST IN FEMALE, ESTROGEN RECEPTOR NEGATIVE (H): ICD-10-CM

## 2023-10-10 DIAGNOSIS — Z08 ENCOUNTER FOR FOLLOW-UP EXAMINATION AFTER COMPLETED TREATMENT FOR MALIGNANT NEOPLASM: Primary | ICD-10-CM

## 2023-10-10 DIAGNOSIS — C50.411 MALIGNANT NEOPLASM OF UPPER-OUTER QUADRANT OF RIGHT BREAST IN FEMALE, ESTROGEN RECEPTOR NEGATIVE (H): ICD-10-CM

## 2023-10-10 LAB
BASO+EOS+MONOS # BLD AUTO: ABNORMAL 10*3/UL
BASO+EOS+MONOS NFR BLD AUTO: ABNORMAL %
BASOPHILS # BLD AUTO: ABNORMAL 10*3/UL
BASOPHILS # BLD MANUAL: 0 10E3/UL (ref 0–0.2)
BASOPHILS NFR BLD AUTO: ABNORMAL %
BASOPHILS NFR BLD MANUAL: 1 %
EOSINOPHIL # BLD AUTO: ABNORMAL 10*3/UL
EOSINOPHIL # BLD MANUAL: 0.1 10E3/UL (ref 0–0.7)
EOSINOPHIL NFR BLD AUTO: ABNORMAL %
EOSINOPHIL NFR BLD MANUAL: 5 %
ERYTHROCYTE [DISTWIDTH] IN BLOOD BY AUTOMATED COUNT: 15.9 % (ref 10–15)
HCT VFR BLD AUTO: 33.3 % (ref 35–47)
HGB BLD-MCNC: 11.3 G/DL (ref 11.7–15.7)
IMM GRANULOCYTES # BLD: ABNORMAL 10*3/UL
IMM GRANULOCYTES NFR BLD: ABNORMAL %
LYMPHOCYTES # BLD AUTO: ABNORMAL 10*3/UL
LYMPHOCYTES # BLD MANUAL: 1.2 10E3/UL (ref 0.8–5.3)
LYMPHOCYTES NFR BLD AUTO: ABNORMAL %
LYMPHOCYTES NFR BLD MANUAL: 50 %
MCH RBC QN AUTO: 29.9 PG (ref 26.5–33)
MCHC RBC AUTO-ENTMCNC: 33.9 G/DL (ref 31.5–36.5)
MCV RBC AUTO: 88 FL (ref 78–100)
MONOCYTES # BLD AUTO: ABNORMAL 10*3/UL
MONOCYTES # BLD MANUAL: 0.3 10E3/UL (ref 0–1.3)
MONOCYTES NFR BLD AUTO: ABNORMAL %
MONOCYTES NFR BLD MANUAL: 11 %
NEUTROPHILS # BLD AUTO: ABNORMAL 10*3/UL
NEUTROPHILS # BLD MANUAL: 0.8 10E3/UL (ref 1.6–8.3)
NEUTROPHILS NFR BLD AUTO: ABNORMAL %
NEUTROPHILS NFR BLD MANUAL: 33 %
NRBC # BLD AUTO: 0 10E3/UL
NRBC BLD AUTO-RTO: 0 /100
PLAT MORPH BLD: ABNORMAL
PLATELET # BLD AUTO: 262 10E3/UL (ref 150–450)
RBC # BLD AUTO: 3.78 10E6/UL (ref 3.8–5.2)
RBC MORPH BLD: ABNORMAL
WBC # BLD AUTO: 2.3 10E3/UL (ref 4–11)

## 2023-10-10 PROCEDURE — 85041 AUTOMATED RBC COUNT: CPT | Performed by: NURSE PRACTITIONER

## 2023-10-10 PROCEDURE — 85007 BL SMEAR W/DIFF WBC COUNT: CPT | Performed by: NURSE PRACTITIONER

## 2023-10-10 PROCEDURE — 250N000011 HC RX IP 250 OP 636: Mod: JZ | Performed by: INTERNAL MEDICINE

## 2023-10-10 RX ORDER — HEPARIN SODIUM (PORCINE) LOCK FLUSH IV SOLN 100 UNIT/ML 100 UNIT/ML
5 SOLUTION INTRAVENOUS
Status: DISCONTINUED | OUTPATIENT
Start: 2023-10-10 | End: 2023-10-10 | Stop reason: HOSPADM

## 2023-10-10 RX ADMIN — Medication 5 ML: at 11:54

## 2023-10-10 NOTE — PROGRESS NOTES
Infusion Nursing Note:  Precious Paris presents today for Trodelvy.    Patient seen by provider today: No   present during visit today: Not Applicable.    Note: N/A.      Intravenous Access:  Implanted Port.    Treatment Conditions:  Results reviewed, labs DID NOT meet parameters. ANC 0.8.        Post Infusion Assessment:  Treatment HELD today.       Discharge Plan:   AVS to patient via MYCHART.  Reviewed neutropenic precautions with patient and provided her with printed information on the subject.   Patient will return Tues 10/17 for next treatment day.   Patient discharged in stable condition accompanied by: self.  Departure Mode: Ambulatory..      Erin Barnett RN

## 2023-10-17 ENCOUNTER — LAB (OUTPATIENT)
Dept: INFUSION THERAPY | Facility: CLINIC | Age: 83
End: 2023-10-17
Attending: INTERNAL MEDICINE
Payer: COMMERCIAL

## 2023-10-17 VITALS
HEART RATE: 87 BPM | DIASTOLIC BLOOD PRESSURE: 82 MMHG | SYSTOLIC BLOOD PRESSURE: 124 MMHG | WEIGHT: 185.8 LBS | BODY MASS INDEX: 31.4 KG/M2 | TEMPERATURE: 98.4 F

## 2023-10-17 DIAGNOSIS — Z08 ENCOUNTER FOR FOLLOW-UP EXAMINATION AFTER COMPLETED TREATMENT FOR MALIGNANT NEOPLASM: Primary | ICD-10-CM

## 2023-10-17 DIAGNOSIS — C50.411 MALIGNANT NEOPLASM OF UPPER-OUTER QUADRANT OF RIGHT BREAST IN FEMALE, ESTROGEN RECEPTOR NEGATIVE (H): ICD-10-CM

## 2023-10-17 DIAGNOSIS — C50.919 PRIMARY MALIGNANT NEOPLASM OF BREAST WITH METASTASIS (H): ICD-10-CM

## 2023-10-17 DIAGNOSIS — Z17.1 MALIGNANT NEOPLASM OF UPPER-OUTER QUADRANT OF RIGHT BREAST IN FEMALE, ESTROGEN RECEPTOR NEGATIVE (H): ICD-10-CM

## 2023-10-17 LAB
BASO+EOS+MONOS # BLD AUTO: ABNORMAL 10*3/UL
BASO+EOS+MONOS NFR BLD AUTO: ABNORMAL %
BASOPHILS # BLD AUTO: 0 10E3/UL (ref 0–0.2)
BASOPHILS NFR BLD AUTO: 1 %
EOSINOPHIL # BLD AUTO: 0.1 10E3/UL (ref 0–0.7)
EOSINOPHIL NFR BLD AUTO: 2 %
ERYTHROCYTE [DISTWIDTH] IN BLOOD BY AUTOMATED COUNT: 16.2 % (ref 10–15)
HCT VFR BLD AUTO: 33.9 % (ref 35–47)
HGB BLD-MCNC: 11 G/DL (ref 11.7–15.7)
IMM GRANULOCYTES # BLD: 0 10E3/UL
IMM GRANULOCYTES NFR BLD: 1 %
LYMPHOCYTES # BLD AUTO: 1.4 10E3/UL (ref 0.8–5.3)
LYMPHOCYTES NFR BLD AUTO: 22 %
MCH RBC QN AUTO: 29.2 PG (ref 26.5–33)
MCHC RBC AUTO-ENTMCNC: 32.4 G/DL (ref 31.5–36.5)
MCV RBC AUTO: 90 FL (ref 78–100)
MONOCYTES # BLD AUTO: 0.8 10E3/UL (ref 0–1.3)
MONOCYTES NFR BLD AUTO: 13 %
NEUTROPHILS # BLD AUTO: 4 10E3/UL (ref 1.6–8.3)
NEUTROPHILS NFR BLD AUTO: 61 %
NRBC # BLD AUTO: 0 10E3/UL
NRBC BLD AUTO-RTO: 0 /100
PLATELET # BLD AUTO: 246 10E3/UL (ref 150–450)
RBC # BLD AUTO: 3.77 10E6/UL (ref 3.8–5.2)
WBC # BLD AUTO: 6.4 10E3/UL (ref 4–11)

## 2023-10-17 PROCEDURE — 96413 CHEMO IV INFUSION 1 HR: CPT

## 2023-10-17 PROCEDURE — 96367 TX/PROPH/DG ADDL SEQ IV INF: CPT

## 2023-10-17 PROCEDURE — 250N000013 HC RX MED GY IP 250 OP 250 PS 637: Performed by: INTERNAL MEDICINE

## 2023-10-17 PROCEDURE — 36591 DRAW BLOOD OFF VENOUS DEVICE: CPT | Performed by: INTERNAL MEDICINE

## 2023-10-17 PROCEDURE — 96375 TX/PRO/DX INJ NEW DRUG ADDON: CPT

## 2023-10-17 PROCEDURE — 258N000003 HC RX IP 258 OP 636: Performed by: INTERNAL MEDICINE

## 2023-10-17 PROCEDURE — 250N000011 HC RX IP 250 OP 636: Mod: JW | Performed by: INTERNAL MEDICINE

## 2023-10-17 PROCEDURE — 85004 AUTOMATED DIFF WBC COUNT: CPT | Performed by: INTERNAL MEDICINE

## 2023-10-17 RX ORDER — MEPERIDINE HYDROCHLORIDE 25 MG/ML
25 INJECTION INTRAMUSCULAR; INTRAVENOUS; SUBCUTANEOUS EVERY 30 MIN PRN
Status: CANCELLED | OUTPATIENT
Start: 2023-10-17

## 2023-10-17 RX ORDER — EPINEPHRINE 1 MG/ML
0.3 INJECTION, SOLUTION, CONCENTRATE INTRAVENOUS EVERY 5 MIN PRN
Status: CANCELLED | OUTPATIENT
Start: 2023-10-17

## 2023-10-17 RX ORDER — ATROPINE SULFATE 0.4 MG/ML
0.4 AMPUL (ML) INJECTION
Status: CANCELLED | OUTPATIENT
Start: 2023-10-17

## 2023-10-17 RX ORDER — PALONOSETRON 0.05 MG/ML
0.25 INJECTION, SOLUTION INTRAVENOUS ONCE
Status: COMPLETED | OUTPATIENT
Start: 2023-10-17 | End: 2023-10-17

## 2023-10-17 RX ORDER — LORAZEPAM 2 MG/ML
0.5 INJECTION INTRAMUSCULAR EVERY 4 HOURS PRN
Status: CANCELLED | OUTPATIENT
Start: 2023-10-17

## 2023-10-17 RX ORDER — HEPARIN SODIUM (PORCINE) LOCK FLUSH IV SOLN 100 UNIT/ML 100 UNIT/ML
5 SOLUTION INTRAVENOUS
Status: DISCONTINUED | OUTPATIENT
Start: 2023-10-17 | End: 2023-10-17 | Stop reason: HOSPADM

## 2023-10-17 RX ORDER — METHYLPREDNISOLONE SODIUM SUCCINATE 125 MG/2ML
125 INJECTION, POWDER, LYOPHILIZED, FOR SOLUTION INTRAMUSCULAR; INTRAVENOUS
Status: CANCELLED
Start: 2023-10-17

## 2023-10-17 RX ORDER — HEPARIN SODIUM,PORCINE 10 UNIT/ML
5-20 VIAL (ML) INTRAVENOUS DAILY PRN
Status: CANCELLED | OUTPATIENT
Start: 2023-10-17

## 2023-10-17 RX ORDER — DIPHENHYDRAMINE HCL 25 MG
25 CAPSULE ORAL ONCE
Status: CANCELLED | OUTPATIENT
Start: 2023-10-17

## 2023-10-17 RX ORDER — HEPARIN SODIUM (PORCINE) LOCK FLUSH IV SOLN 100 UNIT/ML 100 UNIT/ML
5 SOLUTION INTRAVENOUS
Status: CANCELLED | OUTPATIENT
Start: 2023-10-17

## 2023-10-17 RX ORDER — ACETAMINOPHEN 325 MG/1
650 TABLET ORAL ONCE
Status: CANCELLED | OUTPATIENT
Start: 2023-10-17

## 2023-10-17 RX ORDER — ALBUTEROL SULFATE 90 UG/1
1-2 AEROSOL, METERED RESPIRATORY (INHALATION)
Status: CANCELLED
Start: 2023-10-17

## 2023-10-17 RX ORDER — ACETAMINOPHEN 325 MG/1
650 TABLET ORAL ONCE
Status: COMPLETED | OUTPATIENT
Start: 2023-10-17 | End: 2023-10-17

## 2023-10-17 RX ORDER — ALBUTEROL SULFATE 0.83 MG/ML
2.5 SOLUTION RESPIRATORY (INHALATION)
Status: CANCELLED | OUTPATIENT
Start: 2023-10-17

## 2023-10-17 RX ORDER — DIPHENHYDRAMINE HCL 25 MG
25 CAPSULE ORAL ONCE
Status: COMPLETED | OUTPATIENT
Start: 2023-10-17 | End: 2023-10-17

## 2023-10-17 RX ORDER — DIPHENHYDRAMINE HYDROCHLORIDE 50 MG/ML
50 INJECTION INTRAMUSCULAR; INTRAVENOUS
Status: CANCELLED
Start: 2023-10-17

## 2023-10-17 RX ORDER — PALONOSETRON 0.05 MG/ML
0.25 INJECTION, SOLUTION INTRAVENOUS ONCE
Status: CANCELLED | OUTPATIENT
Start: 2023-10-17

## 2023-10-17 RX ADMIN — DEXAMETHASONE SODIUM PHOSPHATE: 10 INJECTION, SOLUTION INTRAMUSCULAR; INTRAVENOUS at 11:52

## 2023-10-17 RX ADMIN — PALONOSETRON 0.25 MG: 0.05 INJECTION, SOLUTION INTRAVENOUS at 11:41

## 2023-10-17 RX ADMIN — FAMOTIDINE 20 MG: 10 INJECTION INTRAVENOUS at 11:39

## 2023-10-17 RX ADMIN — DIPHENHYDRAMINE HYDROCHLORIDE 25 MG: 25 CAPSULE ORAL at 11:38

## 2023-10-17 RX ADMIN — Medication 5 ML: at 13:40

## 2023-10-17 RX ADMIN — SODIUM CHLORIDE 250 ML: 9 INJECTION, SOLUTION INTRAVENOUS at 11:39

## 2023-10-17 RX ADMIN — ACETAMINOPHEN 650 MG: 325 TABLET, FILM COATED ORAL at 11:38

## 2023-10-17 RX ADMIN — SACITUZUMAB GOVITECAN 839 MG: 180 POWDER, FOR SOLUTION INTRAVENOUS at 12:32

## 2023-10-17 NOTE — PROGRESS NOTES
Infusion Nursing Note:  Precious Paris presents today for C2D8 Trodelvy.    Patient seen by provider today: No   present during visit today: Not Applicable.    Note: N/A.      Intravenous Access:  Labs drawn without difficulty.  Implanted Port.    Treatment Conditions:  Lab Results   Component Value Date    HGB 11.0 (L) 10/17/2023    WBC 6.4 10/17/2023    ANEU 0.8 (L) 10/10/2023    ANEUTAUTO 4.0 10/17/2023     10/17/2023        Results reviewed, labs MET treatment parameters, ok to proceed with treatment.      Post Infusion Assessment:  Patient tolerated infusion without incident.  Blood return noted pre and post infusion.  Pt observed 30 minutes post Trodelvy per protocol.   Site patent and intact, free from redness, edema or discomfort.  No evidence of extravasations.  Access discontinued per protocol.       Discharge Plan:   Patient discharged in stable condition accompanied by: self.  Departure Mode: Ambulatory.  Pt to return on 10/31/23 at 9:00 am for pac labs followed by [place].       Laura Love RN

## 2023-10-19 ENCOUNTER — HOSPITAL ENCOUNTER (OUTPATIENT)
Dept: MRI IMAGING | Facility: CLINIC | Age: 83
Discharge: HOME | End: 2023-10-19
Attending: NURSE PRACTITIONER | Admitting: NURSE PRACTITIONER
Payer: COMMERCIAL

## 2023-10-19 DIAGNOSIS — Z17.1 MALIGNANT NEOPLASM OF UPPER-OUTER QUADRANT OF RIGHT BREAST IN FEMALE, ESTROGEN RECEPTOR NEGATIVE (H): ICD-10-CM

## 2023-10-19 DIAGNOSIS — C50.411 MALIGNANT NEOPLASM OF UPPER-OUTER QUADRANT OF RIGHT BREAST IN FEMALE, ESTROGEN RECEPTOR NEGATIVE (H): ICD-10-CM

## 2023-10-19 DIAGNOSIS — G93.9 BRAIN LESION: ICD-10-CM

## 2023-10-19 PROCEDURE — 70553 MRI BRAIN STEM W/O & W/DYE: CPT

## 2023-10-19 PROCEDURE — 250N000011 HC RX IP 250 OP 636: Mod: JZ | Performed by: NURSE PRACTITIONER

## 2023-10-19 PROCEDURE — 255N000002 HC RX 255 OP 636: Performed by: NURSE PRACTITIONER

## 2023-10-19 PROCEDURE — A9585 GADOBUTROL INJECTION: HCPCS | Performed by: NURSE PRACTITIONER

## 2023-10-19 RX ORDER — HEPARIN SODIUM,PORCINE 10 UNIT/ML
3 VIAL (ML) INTRAVENOUS
Status: DISCONTINUED | OUTPATIENT
Start: 2023-10-19 | End: 2023-10-19

## 2023-10-19 RX ORDER — HEPARIN SODIUM,PORCINE 10 UNIT/ML
5 VIAL (ML) INTRAVENOUS
Status: DISCONTINUED | OUTPATIENT
Start: 2023-10-19 | End: 2023-10-20 | Stop reason: HOSPADM

## 2023-10-19 RX ORDER — HEPARIN SODIUM (PORCINE) LOCK FLUSH IV SOLN 100 UNIT/ML 100 UNIT/ML
5 SOLUTION INTRAVENOUS EVERY 8 HOURS
Status: DISCONTINUED | OUTPATIENT
Start: 2023-10-19 | End: 2023-10-20 | Stop reason: HOSPADM

## 2023-10-19 RX ORDER — GADOBUTROL 604.72 MG/ML
8.5 INJECTION INTRAVENOUS ONCE
Status: COMPLETED | OUTPATIENT
Start: 2023-10-19 | End: 2023-10-19

## 2023-10-19 RX ADMIN — GADOBUTROL 8.5 ML: 604.72 INJECTION INTRAVENOUS at 10:24

## 2023-10-19 RX ADMIN — Medication 5 ML: at 11:14

## 2023-10-31 ENCOUNTER — LAB (OUTPATIENT)
Dept: INFUSION THERAPY | Facility: CLINIC | Age: 83
End: 2023-10-31
Attending: INTERNAL MEDICINE
Payer: COMMERCIAL

## 2023-10-31 ENCOUNTER — ONCOLOGY VISIT (OUTPATIENT)
Dept: ONCOLOGY | Facility: CLINIC | Age: 83
End: 2023-10-31
Attending: NURSE PRACTITIONER
Payer: COMMERCIAL

## 2023-10-31 VITALS
WEIGHT: 188 LBS | DIASTOLIC BLOOD PRESSURE: 73 MMHG | TEMPERATURE: 98.6 F | RESPIRATION RATE: 12 BRPM | SYSTOLIC BLOOD PRESSURE: 147 MMHG | OXYGEN SATURATION: 95 % | HEART RATE: 82 BPM | BODY MASS INDEX: 31.32 KG/M2 | HEIGHT: 65 IN

## 2023-10-31 VITALS — SYSTOLIC BLOOD PRESSURE: 139 MMHG | DIASTOLIC BLOOD PRESSURE: 80 MMHG | HEART RATE: 74 BPM

## 2023-10-31 DIAGNOSIS — Z17.1 MALIGNANT NEOPLASM OF UPPER-OUTER QUADRANT OF RIGHT BREAST IN FEMALE, ESTROGEN RECEPTOR NEGATIVE (H): ICD-10-CM

## 2023-10-31 DIAGNOSIS — Z08 ENCOUNTER FOR FOLLOW-UP EXAMINATION AFTER COMPLETED TREATMENT FOR MALIGNANT NEOPLASM: Primary | ICD-10-CM

## 2023-10-31 DIAGNOSIS — D70.1 CHEMOTHERAPY-INDUCED NEUTROPENIA (H): ICD-10-CM

## 2023-10-31 DIAGNOSIS — C50.919 PRIMARY MALIGNANT NEOPLASM OF BREAST WITH METASTASIS (H): ICD-10-CM

## 2023-10-31 DIAGNOSIS — C77.3 METASTATIC CANCER TO AXILLARY LYMPH NODES (H): ICD-10-CM

## 2023-10-31 DIAGNOSIS — C78.02 MALIGNANT NEOPLASM METASTATIC TO BOTH LUNGS (H): ICD-10-CM

## 2023-10-31 DIAGNOSIS — C50.411 MALIGNANT NEOPLASM OF UPPER-OUTER QUADRANT OF RIGHT BREAST IN FEMALE, ESTROGEN RECEPTOR NEGATIVE (H): Primary | ICD-10-CM

## 2023-10-31 DIAGNOSIS — C50.411 MALIGNANT NEOPLASM OF UPPER-OUTER QUADRANT OF RIGHT BREAST IN FEMALE, ESTROGEN RECEPTOR NEGATIVE (H): ICD-10-CM

## 2023-10-31 DIAGNOSIS — Z08 ENCOUNTER FOR FOLLOW-UP EXAMINATION AFTER COMPLETED TREATMENT FOR MALIGNANT NEOPLASM: ICD-10-CM

## 2023-10-31 DIAGNOSIS — T45.1X5A CHEMOTHERAPY-INDUCED NEUTROPENIA (H): ICD-10-CM

## 2023-10-31 DIAGNOSIS — C78.01 MALIGNANT NEOPLASM METASTATIC TO BOTH LUNGS (H): ICD-10-CM

## 2023-10-31 DIAGNOSIS — Z17.1 MALIGNANT NEOPLASM OF UPPER-OUTER QUADRANT OF RIGHT BREAST IN FEMALE, ESTROGEN RECEPTOR NEGATIVE (H): Primary | ICD-10-CM

## 2023-10-31 LAB
ALBUMIN SERPL BCG-MCNC: 3.8 G/DL (ref 3.5–5.2)
ALP SERPL-CCNC: 97 U/L (ref 35–104)
ALT SERPL W P-5'-P-CCNC: 19 U/L (ref 0–50)
ANION GAP SERPL CALCULATED.3IONS-SCNC: 12 MMOL/L (ref 7–15)
AST SERPL W P-5'-P-CCNC: 23 U/L (ref 0–45)
BASOPHILS # BLD AUTO: 0 10E3/UL (ref 0–0.2)
BASOPHILS NFR BLD AUTO: 1 %
BILIRUB SERPL-MCNC: 0.2 MG/DL
BUN SERPL-MCNC: 11 MG/DL (ref 8–23)
CALCIUM SERPL-MCNC: 9.5 MG/DL (ref 8.8–10.2)
CHLORIDE SERPL-SCNC: 101 MMOL/L (ref 98–107)
CREAT SERPL-MCNC: 0.6 MG/DL (ref 0.51–0.95)
DEPRECATED HCO3 PLAS-SCNC: 25 MMOL/L (ref 22–29)
EGFRCR SERPLBLD CKD-EPI 2021: 90 ML/MIN/1.73M2
EOSINOPHIL # BLD AUTO: 0.1 10E3/UL (ref 0–0.7)
EOSINOPHIL NFR BLD AUTO: 2 %
ERYTHROCYTE [DISTWIDTH] IN BLOOD BY AUTOMATED COUNT: 16.6 % (ref 10–15)
GLUCOSE SERPL-MCNC: 110 MG/DL (ref 70–99)
HCT VFR BLD AUTO: 33.2 % (ref 35–47)
HGB BLD-MCNC: 11.1 G/DL (ref 11.7–15.7)
IMM GRANULOCYTES # BLD: 0 10E3/UL
IMM GRANULOCYTES NFR BLD: 0 %
LYMPHOCYTES # BLD AUTO: 1.1 10E3/UL (ref 0.8–5.3)
LYMPHOCYTES NFR BLD AUTO: 23 %
MCH RBC QN AUTO: 30.2 PG (ref 26.5–33)
MCHC RBC AUTO-ENTMCNC: 33.4 G/DL (ref 31.5–36.5)
MCV RBC AUTO: 90 FL (ref 78–100)
MONOCYTES # BLD AUTO: 0.5 10E3/UL (ref 0–1.3)
MONOCYTES NFR BLD AUTO: 11 %
NEUTROPHILS # BLD AUTO: 3.1 10E3/UL (ref 1.6–8.3)
NEUTROPHILS NFR BLD AUTO: 63 %
NRBC # BLD AUTO: 0 10E3/UL
NRBC BLD AUTO-RTO: 0 /100
PLATELET # BLD AUTO: 235 10E3/UL (ref 150–450)
POTASSIUM SERPL-SCNC: 3.3 MMOL/L (ref 3.4–5.3)
PROT SERPL-MCNC: 6.8 G/DL (ref 6.4–8.3)
RBC # BLD AUTO: 3.68 10E6/UL (ref 3.8–5.2)
SODIUM SERPL-SCNC: 138 MMOL/L (ref 135–145)
WBC # BLD AUTO: 4.9 10E3/UL (ref 4–11)

## 2023-10-31 PROCEDURE — 99214 OFFICE O/P EST MOD 30 MIN: CPT | Performed by: NURSE PRACTITIONER

## 2023-10-31 PROCEDURE — 36591 DRAW BLOOD OFF VENOUS DEVICE: CPT | Performed by: NURSE PRACTITIONER

## 2023-10-31 PROCEDURE — 90662 IIV NO PRSV INCREASED AG IM: CPT | Performed by: NURSE PRACTITIONER

## 2023-10-31 PROCEDURE — 96375 TX/PRO/DX INJ NEW DRUG ADDON: CPT

## 2023-10-31 PROCEDURE — 85025 COMPLETE CBC W/AUTO DIFF WBC: CPT | Performed by: NURSE PRACTITIONER

## 2023-10-31 PROCEDURE — 80053 COMPREHEN METABOLIC PANEL: CPT | Performed by: NURSE PRACTITIONER

## 2023-10-31 PROCEDURE — 96367 TX/PROPH/DG ADDL SEQ IV INF: CPT

## 2023-10-31 PROCEDURE — 250N000011 HC RX IP 250 OP 636: Mod: JZ | Performed by: NURSE PRACTITIONER

## 2023-10-31 PROCEDURE — 96413 CHEMO IV INFUSION 1 HR: CPT

## 2023-10-31 PROCEDURE — G0008 ADMIN INFLUENZA VIRUS VAC: HCPCS | Performed by: NURSE PRACTITIONER

## 2023-10-31 PROCEDURE — 250N000013 HC RX MED GY IP 250 OP 250 PS 637: Performed by: NURSE PRACTITIONER

## 2023-10-31 PROCEDURE — 258N000003 HC RX IP 258 OP 636: Performed by: NURSE PRACTITIONER

## 2023-10-31 PROCEDURE — G0463 HOSPITAL OUTPT CLINIC VISIT: HCPCS | Mod: 25 | Performed by: NURSE PRACTITIONER

## 2023-10-31 RX ORDER — DIPHENHYDRAMINE HCL 25 MG
25 CAPSULE ORAL ONCE
Status: CANCELLED | OUTPATIENT
Start: 2023-11-07

## 2023-10-31 RX ORDER — ALBUTEROL SULFATE 90 UG/1
1-2 AEROSOL, METERED RESPIRATORY (INHALATION)
Status: CANCELLED
Start: 2023-10-31

## 2023-10-31 RX ORDER — HEPARIN SODIUM (PORCINE) LOCK FLUSH IV SOLN 100 UNIT/ML 100 UNIT/ML
5 SOLUTION INTRAVENOUS
Status: DISCONTINUED | OUTPATIENT
Start: 2023-10-31 | End: 2023-10-31 | Stop reason: HOSPADM

## 2023-10-31 RX ORDER — ATROPINE SULFATE 0.4 MG/ML
0.4 AMPUL (ML) INJECTION
Status: CANCELLED | OUTPATIENT
Start: 2023-10-31

## 2023-10-31 RX ORDER — DIPHENHYDRAMINE HYDROCHLORIDE 50 MG/ML
50 INJECTION INTRAMUSCULAR; INTRAVENOUS
Status: CANCELLED
Start: 2023-10-31

## 2023-10-31 RX ORDER — ACETAMINOPHEN 325 MG/1
650 TABLET ORAL ONCE
Status: COMPLETED | OUTPATIENT
Start: 2023-10-31 | End: 2023-10-31

## 2023-10-31 RX ORDER — PALONOSETRON 0.05 MG/ML
0.25 INJECTION, SOLUTION INTRAVENOUS ONCE
Status: CANCELLED | OUTPATIENT
Start: 2023-11-07

## 2023-10-31 RX ORDER — LORAZEPAM 2 MG/ML
0.5 INJECTION INTRAMUSCULAR EVERY 4 HOURS PRN
Status: CANCELLED | OUTPATIENT
Start: 2023-10-31

## 2023-10-31 RX ORDER — ATROPINE SULFATE 0.4 MG/ML
0.4 AMPUL (ML) INJECTION
Status: CANCELLED | OUTPATIENT
Start: 2023-11-07

## 2023-10-31 RX ORDER — HEPARIN SODIUM (PORCINE) LOCK FLUSH IV SOLN 100 UNIT/ML 100 UNIT/ML
5 SOLUTION INTRAVENOUS
Status: CANCELLED | OUTPATIENT
Start: 2023-11-07

## 2023-10-31 RX ORDER — EPINEPHRINE 1 MG/ML
0.3 INJECTION, SOLUTION, CONCENTRATE INTRAVENOUS EVERY 5 MIN PRN
Status: CANCELLED | OUTPATIENT
Start: 2023-11-07

## 2023-10-31 RX ORDER — ACETAMINOPHEN 325 MG/1
650 TABLET ORAL ONCE
Status: CANCELLED | OUTPATIENT
Start: 2023-11-07

## 2023-10-31 RX ORDER — ACETAMINOPHEN 325 MG/1
650 TABLET ORAL ONCE
Status: CANCELLED | OUTPATIENT
Start: 2023-10-31

## 2023-10-31 RX ORDER — METHYLPREDNISOLONE SODIUM SUCCINATE 125 MG/2ML
125 INJECTION, POWDER, LYOPHILIZED, FOR SOLUTION INTRAMUSCULAR; INTRAVENOUS
Status: CANCELLED
Start: 2023-11-07

## 2023-10-31 RX ORDER — HEPARIN SODIUM,PORCINE 10 UNIT/ML
5-20 VIAL (ML) INTRAVENOUS DAILY PRN
Status: CANCELLED | OUTPATIENT
Start: 2023-10-31

## 2023-10-31 RX ORDER — PALONOSETRON 0.05 MG/ML
0.25 INJECTION, SOLUTION INTRAVENOUS ONCE
Status: CANCELLED | OUTPATIENT
Start: 2023-10-31

## 2023-10-31 RX ORDER — DIPHENHYDRAMINE HCL 25 MG
25 CAPSULE ORAL ONCE
Status: CANCELLED | OUTPATIENT
Start: 2023-10-31

## 2023-10-31 RX ORDER — METHYLPREDNISOLONE SODIUM SUCCINATE 125 MG/2ML
125 INJECTION, POWDER, LYOPHILIZED, FOR SOLUTION INTRAMUSCULAR; INTRAVENOUS
Status: CANCELLED
Start: 2023-10-31

## 2023-10-31 RX ORDER — DIPHENHYDRAMINE HYDROCHLORIDE 50 MG/ML
50 INJECTION INTRAMUSCULAR; INTRAVENOUS
Status: CANCELLED
Start: 2023-11-07

## 2023-10-31 RX ORDER — ALBUTEROL SULFATE 0.83 MG/ML
2.5 SOLUTION RESPIRATORY (INHALATION)
Status: CANCELLED | OUTPATIENT
Start: 2023-11-07

## 2023-10-31 RX ORDER — HEPARIN SODIUM (PORCINE) LOCK FLUSH IV SOLN 100 UNIT/ML 100 UNIT/ML
5 SOLUTION INTRAVENOUS
Status: CANCELLED | OUTPATIENT
Start: 2023-10-31

## 2023-10-31 RX ORDER — ALBUTEROL SULFATE 90 UG/1
1-2 AEROSOL, METERED RESPIRATORY (INHALATION)
Status: CANCELLED
Start: 2023-11-07

## 2023-10-31 RX ORDER — DIPHENHYDRAMINE HCL 25 MG
25 CAPSULE ORAL ONCE
Status: COMPLETED | OUTPATIENT
Start: 2023-10-31 | End: 2023-10-31

## 2023-10-31 RX ORDER — HEPARIN SODIUM,PORCINE 10 UNIT/ML
5-20 VIAL (ML) INTRAVENOUS DAILY PRN
Status: CANCELLED | OUTPATIENT
Start: 2023-11-07

## 2023-10-31 RX ORDER — MEPERIDINE HYDROCHLORIDE 25 MG/ML
25 INJECTION INTRAMUSCULAR; INTRAVENOUS; SUBCUTANEOUS EVERY 30 MIN PRN
Status: CANCELLED | OUTPATIENT
Start: 2023-10-31

## 2023-10-31 RX ORDER — EPINEPHRINE 1 MG/ML
0.3 INJECTION, SOLUTION, CONCENTRATE INTRAVENOUS EVERY 5 MIN PRN
Status: CANCELLED | OUTPATIENT
Start: 2023-10-31

## 2023-10-31 RX ORDER — ALBUTEROL SULFATE 0.83 MG/ML
2.5 SOLUTION RESPIRATORY (INHALATION)
Status: CANCELLED | OUTPATIENT
Start: 2023-10-31

## 2023-10-31 RX ORDER — LORAZEPAM 2 MG/ML
0.5 INJECTION INTRAMUSCULAR EVERY 4 HOURS PRN
Status: CANCELLED | OUTPATIENT
Start: 2023-11-07

## 2023-10-31 RX ORDER — PALONOSETRON 0.05 MG/ML
0.25 INJECTION, SOLUTION INTRAVENOUS ONCE
Status: COMPLETED | OUTPATIENT
Start: 2023-10-31 | End: 2023-10-31

## 2023-10-31 RX ORDER — MEPERIDINE HYDROCHLORIDE 25 MG/ML
25 INJECTION INTRAMUSCULAR; INTRAVENOUS; SUBCUTANEOUS EVERY 30 MIN PRN
Status: CANCELLED | OUTPATIENT
Start: 2023-11-07

## 2023-10-31 RX ADMIN — FAMOTIDINE 20 MG: 10 INJECTION INTRAVENOUS at 10:06

## 2023-10-31 RX ADMIN — Medication 5 ML: at 11:34

## 2023-10-31 RX ADMIN — PALONOSETRON 0.25 MG: 0.05 INJECTION, SOLUTION INTRAVENOUS at 10:08

## 2023-10-31 RX ADMIN — INFLUENZA A VIRUS A/VICTORIA/4897/2022 IVR-238 (H1N1) ANTIGEN (FORMALDEHYDE INACTIVATED), INFLUENZA A VIRUS A/DARWIN/9/2021 SAN-010 (H3N2) ANTIGEN (FORMALDEHYDE INACTIVATED), INFLUENZA B VIRUS B/PHUKET/3073/2013 ANTIGEN (FORMALDEHYDE INACTIVATED), AND INFLUENZA B VIRUS B/MICHIGAN/01/2021 ANTIGEN (FORMALDEHYDE INACTIVATED) 0.7 ML: 60; 60; 60; 60 INJECTION, SUSPENSION INTRAMUSCULAR at 09:15

## 2023-10-31 RX ADMIN — DIPHENHYDRAMINE HYDROCHLORIDE 25 MG: 25 CAPSULE ORAL at 09:26

## 2023-10-31 RX ADMIN — DEXAMETHASONE SODIUM PHOSPHATE: 10 INJECTION, SOLUTION INTRAMUSCULAR; INTRAVENOUS at 09:42

## 2023-10-31 RX ADMIN — ACETAMINOPHEN 650 MG: 325 TABLET, FILM COATED ORAL at 09:26

## 2023-10-31 RX ADMIN — SACITUZUMAB GOVITECAN 671 MG: 180 POWDER, FOR SOLUTION INTRAVENOUS at 10:11

## 2023-10-31 RX ADMIN — SODIUM CHLORIDE 250 ML: 9 INJECTION, SOLUTION INTRAVENOUS at 09:18

## 2023-10-31 ASSESSMENT — PAIN SCALES - GENERAL: PAINLEVEL: NO PAIN (0)

## 2023-10-31 NOTE — LETTER
10/31/2023         RE: Precious Paris  [address] [place] [address]        Dear Colleague,    Thank you for referring your patient, Precious Paris, to the [facility]. Please see a copy of my visit note below.        [facility] Hematology and Oncology Progress Note    Patient: Precious Paris  MRN: [id number]  Oct 31, 2023          Reason for Visit    Recurrent stage IV triple negative breast cancer (nodes, lung) (PDL1+)    Primary Oncologist: Dr. Beebe    _____________________________________________________________________________    History of Present Illness/ Interval History    Ms. Precious Paris is a 82 year old with recurrent metastatic triple negative breast cancer recurrence. She initiated 3rd line sacituzumab about 6 weeks ago. She has completed 2 cycles. C2D8 was delayed a week for neutropenia (so delivered over a 4-week cycle). She presents for cycle 3 today.    Otherwise, tolerating well with no significant side effects.  Mild/intermittent diarrhea, managed on Imodium.  No skin rashes.  Mild intermittent peripheral neuropathy symptoms in fingers, not interfering with function.  No fevers.    Planning to go to [place] December - March. She followed an Oncologist there a few years ago and is hoping to return there for cancer treatment.    ECOG PS: 1      Oncology History/Treatment  Diagnosis/Stage:   11/2019: Right breast cancer, triple negative ([id number])    BRCA negative    10/2022: Recurrent, stage IV triple negative breast cancer (bilateral axillary, supraclav, mediastinal nodes + lung)  -persistent/progressive right breast firmness with new right nipple drainage and right axillary adenopathy  -bilateral diagnostic mammogram: increased density R breast with skin thickening. R breast US: 3.2 cm hypoechoic mass at 11:00 position 1 cm from nipple and multiple other small hypoechoic solid-appearing lesions in R breast + right axillary  adenopathy (at least two hypoechoic vascular lesions measuring up to 2.5 cm)  -10/18/2022 biopsy R breast mass + R axillary node: grade 3 invasive ductal cancer with tumor necrosis, ER/[place]/HER2 negative.   -PET: + right breast mass; bilateral axillary, retropectoral, supraclavicular, mediastinal/R hilar nodes and bilateral lung/R pleural nodules suspicious for mets.  -Brain MRI: negative for mets  -11/1/2022 biopsy L axillary node: metastatic invasive ductal carcinoma  -Foundation One: no actionable mutations MS stable, TMB 1 mut/mb.   -PDL1 testing: CPS >20% and TPS 60% (considered high PDL1 expression)  -CA 27.29: 59.5 (elevated). CA 15-3: 23 (normal)  -9/10/2023 Brain MRI: 2 new foci of punctate enhancement in left frontal cortex and right lentiform nucleus, concerning for brain metastases (asymptomatic)    Treatment:  Initial breast cancer (2019)  -2019: Neoadjuvant taxol  -Lumpectomy  -5/2020: Adjuvant RT  -6 - 7/2020: Adjuvant Xeloda. Stopped for rash    Recurrent, stage IV breast cancer (2022)  -11/14/2022:Doxil every 4 weeks  -12/12/2022: changed to doxorubicin every 3 weeks. (Planning addition of pembrolizumab once approved by insurance)  -1/2/2023: doxorubicin + pembrolizumab every 3 weeks. Completed 1 cycle. Stopped for progression (med nodes, slight increase in lung mets and stable R breast mass + axillary nodes).    -1/23/2023 - present: Carbo AUC 5, Gemcitabine 800 mg/m2 and Pembro every 21 days  -cycles 1-2: [place] day 1 only  -Cycle 3: [place] increased to days 1 + 8 given excellent tolerance  -CT after 3 cycles showed excellent response (near CR axillary + med nodes and [place] in lung mets; clinical improvement in right breast changes and tumor marker).  -PET scan after 6 cycles of carboplatin gemcitabine pembrolizumab shows complete response    5/30 - 8/22/2023: Maintenance pembrolizumab, until progression (recurrent axillary, thoracic nodes and 2 punctate brain lesions).  Biopsy of left axillary node  confirmed recurrent/metastatic breast cancer.    9/12/2023 - present:  Sacituzumab days 1, 8 every 21 days  --C2D8 deferred x 1 week for neutropenia      Physical Exam    GENERAL: Alert and oriented to time place and person. Seated comfortably. In no distress. Alone.  Lungs: clear bilaterally  CVS: RRR  Lymph: Bilateral axillary adenopathy R>L   Abd: Soft, non-distended  Extremities: No edema  Neuro: alert and oriented x 3      Lab Results  Recent Results (from the past 168 hour(s))   Comprehensive metabolic panel    Collection Time: 10/31/23  8:12 AM   Result Value Ref Range    Sodium 138 135 - 145 mmol/L    Potassium 3.3 (L) 3.4 - 5.3 mmol/L    Carbon Dioxide (CO2) 25 22 - 29 mmol/L    Anion Gap 12 7 - 15 mmol/L    Urea Nitrogen 11.0 8.0 - 23.0 mg/dL    Creatinine 0.60 0.51 - 0.95 mg/dL    GFR Estimate 90 >60 mL/min/1.73m2    Calcium 9.5 8.8 - 10.2 mg/dL    Chloride 101 98 - 107 mmol/L    Glucose 110 (H) 70 - 99 mg/dL    Alkaline Phosphatase 97 35 - 104 U/L    AST 23 0 - 45 U/L    ALT 19 0 - 50 U/L    Protein Total 6.8 6.4 - 8.3 g/dL    Albumin 3.8 3.5 - 5.2 g/dL    Bilirubin Total 0.2 <=1.2 mg/dL   CBC with platelets and differential    Collection Time: 10/31/23  8:12 AM   Result Value Ref Range    WBC Count 4.9 4.0 - 11.0 10e3/uL    RBC Count 3.68 (L) 3.80 - 5.20 10e6/uL    Hemoglobin 11.1 (L) 11.7 - 15.7 g/dL    Hematocrit 33.2 (L) 35.0 - 47.0 %    MCV 90 78 - 100 fL    MCH 30.2 26.5 - 33.0 pg    MCHC 33.4 31.5 - 36.5 g/dL    RDW 16.6 (H) 10.0 - 15.0 %    Platelet Count 235 150 - 450 10e3/uL    % Neutrophils 63 %    % Lymphocytes 23 %    % Monocytes 11 %    % Eosinophils 2 %    % Basophils 1 %    % Immature Granulocytes 0 %    NRBCs per 100 WBC 0 <1 /100    Absolute Neutrophils 3.1 1.6 - 8.3 10e3/uL    Absolute Lymphocytes 1.1 0.8 - 5.3 10e3/uL    Absolute Monocytes 0.5 0.0 - 1.3 10e3/uL    Absolute Eosinophils 0.1 0.0 - 0.7 10e3/uL    Absolute Basophils 0.0 0.0 - 0.2 10e3/uL    Absolute Immature Granulocytes  0.0 <=0.4 10e3/uL    Absolute NRBCs 0.0 10e3/uL         Assessment/Plan  Recurrent, stage IV triple negative right breast cancer (nodes, lung); CPS>20% + TPS 60%  Mild intermittent peripheral neuropathy fingertips (gr 1)  Punctate brain lesions concerning for metastatic disease (asymptomatic), resolved on chemo  Grade 1 chemotherapy-induced diarrhea  Grade 3 chemotherapy-induced neutropenia  She has now completed 2 cyles of sacituzumab. Last cycle, day 8 delayed x 1 week for neutropenia () so cycle was over a 4-week period. Mild diarrhea, managed with imodium, and partial alopecia. No other side effects.    Labs today: Hgb 11.1, WBC/ANC and plat WNL    Brain MRI shows resolution of the two previously noted suspicious lesions, may indicate treatment response.    No clinical signs of cancer progression.    Plan:  -Proceed with cycle 3 sacituzumab. Dose-reduce 20% for neutropenia last cycle.  -Continue 25 mg benadryl premed (side effects with 50 mg dose)  -Continue imodium for diarrhea, as needed  -Return in 3 weeks with PET scan and Dr. Beebe, ahead of cycle 4  -Brain MRI shows resolution of previous suspicious lesions. Repeat brain MRI every 3 months for continued surveillance  -Will coordinate any transitions of care with her Oncologist in [place] when dates known    6.   Vaccinations  Will get influenza vaccine today in infusion. She will get Covid vaccine through PCP or Pharmacy in next few days or when she is due to come back next cycle of chemo.      Billing:  A total of 35 min were spent today on this visit which included face to face conversation with the patient, EMR review, counseling and co-ordination of care and medical documentation.      Signed by:   Lazara Torres NP      Oncology Rooming Note    October 31, 2023 8:18 AM   Precious Paris is a 82 year old female who presents for:    Chief Complaint   Patient presents with     Oncology Clinic Visit     Malignant neoplasm of upper-outer quadrant of  "right breast in female, estrogen receptor negative - Labs provider and infusion     Initial Vitals: BP (!) 147/73 (BP Location: Right arm, Patient Position: Sitting, Cuff Size: Adult Regular)   Pulse 82   Temp 98.6  F (37  C) (Tympanic)   Resp 12   Ht 1.638 m (5' 4.5\")   Wt 85.3 kg (188 lb)   SpO2 95%   BMI 31.77 kg/m   Estimated body mass index is 31.77 kg/m  as calculated from the following:    Height as of this encounter: 1.638 m (5' 4.5\").    Weight as of this encounter: 85.3 kg (188 lb). Body surface area is 1.97 meters squared.  No Pain (0) Comment: Data Unavailable   No LMP recorded. Patient is postmenopausal.  Allergies reviewed: Yes  Medications reviewed: Yes    Medications: Medication refills not needed today.  Pharmacy name entered into [facility]: [facility] - [facility] [id number] [facility]    Clinical concerns:  None      Malgorzata Vallejo CMA                Again, thank you for allowing me to participate in the care of your patient.        Sincerely,        Lazara Torres NP  "

## 2023-10-31 NOTE — PROGRESS NOTES
Infusion Nursing Note:  Precious VALDES Wellington presents today for C3D1 Trodelvy.    Patient seen by provider today: Yes: Lazara Torres NP   present during visit today: Not Applicable.    Note: N/A.      Intravenous Access:  Implanted Port.    Treatment Conditions:  Results reviewed, labs MET treatment parameters, ok to proceed with treatment.      Post Infusion Assessment:  Patient tolerated infusion without incident.  Blood return noted pre and post infusion.  Site patent and intact, free from redness, edema or discomfort.  No evidence of extravasations.  Access discontinued per protocol.       Discharge Plan:   Patient discharged in stable condition accompanied by: self.  Departure Mode: Ambulatory.      Breanne Nicole RN

## 2023-10-31 NOTE — PROGRESS NOTES
"Oncology Rooming Note    October 31, 2023 8:18 AM   Precious Paris is a 82 year old female who presents for:    Chief Complaint   Patient presents with    Oncology Clinic Visit     Malignant neoplasm of upper-outer quadrant of right breast in female, estrogen receptor negative - Labs provider and infusion     Initial Vitals: BP (!) 147/73 (BP Location: Right arm, Patient Position: Sitting, Cuff Size: Adult Regular)   Pulse 82   Temp 98.6  F (37  C) (Tympanic)   Resp 12   Ht 1.638 m (5' 4.5\")   Wt 85.3 kg (188 lb)   SpO2 95%   BMI 31.77 kg/m   Estimated body mass index is 31.77 kg/m  as calculated from the following:    Height as of this encounter: 1.638 m (5' 4.5\").    Weight as of this encounter: 85.3 kg (188 lb). Body surface area is 1.97 meters squared.  No Pain (0) Comment: Data Unavailable   No LMP recorded. Patient is postmenopausal.  Allergies reviewed: Yes  Medications reviewed: Yes    Medications: Medication refills not needed today.  [facility] [id number] [facility]    Clinical concerns:  None      Malgorzata Vallejo CMA              "

## 2023-10-31 NOTE — PROGRESS NOTES
[facility] Hematology and Oncology Progress Note    Patient: Precious Paris  MRN: [id number]  Oct 31, 2023          Reason for Visit    Recurrent stage IV triple negative breast cancer (nodes, lung) (PDL1+)    Primary Oncologist: Dr. Beebe    _____________________________________________________________________________    History of Present Illness/ Interval History    Ms. Precious Paris is a 82 year old with recurrent metastatic triple negative breast cancer recurrence. She initiated 3rd line sacituzumab about 6 weeks ago. She has completed 2 cycles. C2D8 was delayed a week for neutropenia (so delivered over a 4-week cycle). She presents for cycle 3 today.    Otherwise, tolerating well with no significant side effects.  Mild/intermittent diarrhea, managed on Imodium.  No skin rashes.  Mild intermittent peripheral neuropathy symptoms in fingers, not interfering with function.  No fevers.    Planning to go to [place] December - March. She followed an Oncologist there a few years ago and is hoping to return there for cancer treatment.    ECOG PS: 1      Oncology History/Treatment  Diagnosis/Stage:   11/2019: Right breast cancer, triple negative ([id number])    BRCA negative    10/2022: Recurrent, stage IV triple negative breast cancer (bilateral axillary, supraclav, mediastinal nodes + lung)  -persistent/progressive right breast firmness with new right nipple drainage and right axillary adenopathy  -bilateral diagnostic mammogram: increased density R breast with skin thickening. R breast US: 3.2 cm hypoechoic mass at 11:00 position 1 cm from nipple and multiple other small hypoechoic solid-appearing lesions in R breast + right axillary adenopathy (at least two hypoechoic vascular lesions measuring up to 2.5 cm)  -10/18/2022 biopsy R breast mass + R axillary node: grade 3 invasive ductal cancer with tumor necrosis, ER/ND/HER2 negative.   -PET: + right breast mass; bilateral axillary,  retropectoral, supraclavicular, mediastinal/R hilar nodes and bilateral lung/R pleural nodules suspicious for mets.  -Brain MRI: negative for mets  -11/1/2022 biopsy L axillary node: metastatic invasive ductal carcinoma  -Foundation One: no actionable mutations MS stable, TMB 1 mut/mb.   -PDL1 testing: CPS >20% and TPS 60% (considered high PDL1 expression)  -CA 27.29: 59.5 (elevated). CA 15-3: 23 (normal)  -9/10/2023 Brain MRI: 2 new foci of punctate enhancement in left frontal cortex and right lentiform nucleus, concerning for brain metastases (asymptomatic)    Treatment:  Initial breast cancer (2019)  -2019: Neoadjuvant taxol  -Lumpectomy  -5/2020: Adjuvant RT  -6 - 7/2020: Adjuvant Xeloda. Stopped for rash    Recurrent, stage IV breast cancer (2022)  -11/14/2022:Doxil every 4 weeks  -12/12/2022: changed to doxorubicin every 3 weeks. (Planning addition of pembrolizumab once approved by insurance)  -1/2/2023: doxorubicin + pembrolizumab every 3 weeks. Completed 1 cycle. Stopped for progression (med nodes, slight increase in lung mets and stable R breast mass + axillary nodes).    -1/23/2023 - present: Carbo AUC 5, Gemcitabine 800 mg/m2 and Pembro every 21 days  -cycles 1-2: [place] day 1 only  -Cycle 3: [place] increased to days 1 + 8 given excellent tolerance  -CT after 3 cycles showed excellent response (near CR axillary + med nodes and [place] in lung mets; clinical improvement in right breast changes and tumor marker).  -PET scan after 6 cycles of carboplatin gemcitabine pembrolizumab shows complete response    5/30 - 8/22/2023: Maintenance pembrolizumab, until progression (recurrent axillary, thoracic nodes and 2 punctate brain lesions).  Biopsy of left axillary node confirmed recurrent/metastatic breast cancer.    9/12/2023 - present:  Sacituzumab days 1, 8 every 21 days  --C2D8 deferred x 1 week for neutropenia      Physical Exam    GENERAL: Alert and oriented to time place and person. Seated comfortably. In no distress.  Alone.  Lungs: clear bilaterally  CVS: RRR  Lymph: Bilateral axillary adenopathy R>L   Abd: Soft, non-distended  Extremities: No edema  Neuro: alert and oriented x 3      Lab Results  Recent Results (from the past 168 hour(s))   Comprehensive metabolic panel    Collection Time: 10/31/23  8:12 AM   Result Value Ref Range    Sodium 138 135 - 145 mmol/L    Potassium 3.3 (L) 3.4 - 5.3 mmol/L    Carbon Dioxide (CO2) 25 22 - 29 mmol/L    Anion Gap 12 7 - 15 mmol/L    Urea Nitrogen 11.0 8.0 - 23.0 mg/dL    Creatinine 0.60 0.51 - 0.95 mg/dL    GFR Estimate 90 >60 mL/min/1.73m2    Calcium 9.5 8.8 - 10.2 mg/dL    Chloride 101 98 - 107 mmol/L    Glucose 110 (H) 70 - 99 mg/dL    Alkaline Phosphatase 97 35 - 104 U/L    AST 23 0 - 45 U/L    ALT 19 0 - 50 U/L    Protein Total 6.8 6.4 - 8.3 g/dL    Albumin 3.8 3.5 - 5.2 g/dL    Bilirubin Total 0.2 <=1.2 mg/dL   CBC with platelets and differential    Collection Time: 10/31/23  8:12 AM   Result Value Ref Range    WBC Count 4.9 4.0 - 11.0 10e3/uL    RBC Count 3.68 (L) 3.80 - 5.20 10e6/uL    Hemoglobin 11.1 (L) 11.7 - 15.7 g/dL    Hematocrit 33.2 (L) 35.0 - 47.0 %    MCV 90 78 - 100 fL    MCH 30.2 26.5 - 33.0 pg    MCHC 33.4 31.5 - 36.5 g/dL    RDW 16.6 (H) 10.0 - 15.0 %    Platelet Count 235 150 - 450 10e3/uL    % Neutrophils 63 %    % Lymphocytes 23 %    % Monocytes 11 %    % Eosinophils 2 %    % Basophils 1 %    % Immature Granulocytes 0 %    NRBCs per 100 WBC 0 <1 /100    Absolute Neutrophils 3.1 1.6 - 8.3 10e3/uL    Absolute Lymphocytes 1.1 0.8 - 5.3 10e3/uL    Absolute Monocytes 0.5 0.0 - 1.3 10e3/uL    Absolute Eosinophils 0.1 0.0 - 0.7 10e3/uL    Absolute Basophils 0.0 0.0 - 0.2 10e3/uL    Absolute Immature Granulocytes 0.0 <=0.4 10e3/uL    Absolute NRBCs 0.0 10e3/uL         Assessment/Plan  Recurrent, stage IV triple negative right breast cancer (nodes, lung); CPS>20% + TPS 60%  Mild intermittent peripheral neuropathy fingertips (gr 1)  Punctate brain lesions concerning for  metastatic disease (asymptomatic), resolved on chemo  Grade 1 chemotherapy-induced diarrhea  Grade 3 chemotherapy-induced neutropenia  She has now completed 2 cyles of sacituzumab. Last cycle, day 8 delayed x 1 week for neutropenia () so cycle was over a 4-week period. Mild diarrhea, managed with imodium, and partial alopecia. No other side effects.    Labs today: Hgb 11.1, WBC/ANC and plat WNL    Brain MRI shows resolution of the two previously noted suspicious lesions, may indicate treatment response.    No clinical signs of cancer progression.    Plan:  -Proceed with cycle 3 sacituzumab. Dose-reduce 20% for neutropenia last cycle.  -Continue 25 mg benadryl premed (side effects with 50 mg dose)  -Continue imodium for diarrhea, as needed  -Return in 3 weeks with PET scan and Dr. Beebe, ahead of cycle 4  -Brain MRI shows resolution of previous suspicious lesions. Repeat brain MRI every 3 months for continued surveillance  -Will coordinate any transitions of care with her Oncologist in [place] when dates known    6.   Vaccinations  Will get influenza vaccine today in infusion. She will get Covid vaccine through PCP or Pharmacy in next few days or when she is due to come back next cycle of chemo.      Billing:  A total of 35 min were spent today on this visit which included face to face conversation with the patient, EMR review, counseling and co-ordination of care and medical documentation.      Signed by:   Lazara Torres NP

## 2023-11-07 ENCOUNTER — LAB (OUTPATIENT)
Dept: INFUSION THERAPY | Facility: CLINIC | Age: 83
End: 2023-11-07
Attending: INTERNAL MEDICINE
Payer: COMMERCIAL

## 2023-11-07 VITALS — DIASTOLIC BLOOD PRESSURE: 70 MMHG | SYSTOLIC BLOOD PRESSURE: 127 MMHG | HEART RATE: 71 BPM

## 2023-11-07 VITALS — TEMPERATURE: 98.4 F | DIASTOLIC BLOOD PRESSURE: 74 MMHG | SYSTOLIC BLOOD PRESSURE: 136 MMHG | HEART RATE: 76 BPM

## 2023-11-07 DIAGNOSIS — C50.411 MALIGNANT NEOPLASM OF UPPER-OUTER QUADRANT OF RIGHT BREAST IN FEMALE, ESTROGEN RECEPTOR NEGATIVE (H): ICD-10-CM

## 2023-11-07 DIAGNOSIS — Z08 ENCOUNTER FOR FOLLOW-UP EXAMINATION AFTER COMPLETED TREATMENT FOR MALIGNANT NEOPLASM: ICD-10-CM

## 2023-11-07 DIAGNOSIS — Z17.1 MALIGNANT NEOPLASM OF UPPER-OUTER QUADRANT OF RIGHT BREAST IN FEMALE, ESTROGEN RECEPTOR NEGATIVE (H): ICD-10-CM

## 2023-11-07 DIAGNOSIS — C50.919 PRIMARY MALIGNANT NEOPLASM OF BREAST WITH METASTASIS (H): ICD-10-CM

## 2023-11-07 DIAGNOSIS — Z17.1 MALIGNANT NEOPLASM OF UPPER-OUTER QUADRANT OF RIGHT BREAST IN FEMALE, ESTROGEN RECEPTOR NEGATIVE (H): Primary | ICD-10-CM

## 2023-11-07 DIAGNOSIS — C50.411 MALIGNANT NEOPLASM OF UPPER-OUTER QUADRANT OF RIGHT BREAST IN FEMALE, ESTROGEN RECEPTOR NEGATIVE (H): Primary | ICD-10-CM

## 2023-11-07 DIAGNOSIS — Z08 ENCOUNTER FOR FOLLOW-UP EXAMINATION AFTER COMPLETED TREATMENT FOR MALIGNANT NEOPLASM: Primary | ICD-10-CM

## 2023-11-07 LAB
BASOPHILS # BLD AUTO: 0 10E3/UL (ref 0–0.2)
BASOPHILS NFR BLD AUTO: 1 %
EOSINOPHIL # BLD AUTO: 0.1 10E3/UL (ref 0–0.7)
EOSINOPHIL NFR BLD AUTO: 3 %
ERYTHROCYTE [DISTWIDTH] IN BLOOD BY AUTOMATED COUNT: 16.4 % (ref 10–15)
HCT VFR BLD AUTO: 32.7 % (ref 35–47)
HGB BLD-MCNC: 10.9 G/DL (ref 11.7–15.7)
IMM GRANULOCYTES # BLD: 0.1 10E3/UL
IMM GRANULOCYTES NFR BLD: 2 %
LYMPHOCYTES # BLD AUTO: 1.4 10E3/UL (ref 0.8–5.3)
LYMPHOCYTES NFR BLD AUTO: 34 %
MCH RBC QN AUTO: 30.4 PG (ref 26.5–33)
MCHC RBC AUTO-ENTMCNC: 33.3 G/DL (ref 31.5–36.5)
MCV RBC AUTO: 91 FL (ref 78–100)
MONOCYTES # BLD AUTO: 0.6 10E3/UL (ref 0–1.3)
MONOCYTES NFR BLD AUTO: 15 %
NEUTROPHILS # BLD AUTO: 1.8 10E3/UL (ref 1.6–8.3)
NEUTROPHILS NFR BLD AUTO: 45 %
NRBC # BLD AUTO: 0 10E3/UL
NRBC BLD AUTO-RTO: 0 /100
PLATELET # BLD AUTO: 244 10E3/UL (ref 150–450)
RBC # BLD AUTO: 3.59 10E6/UL (ref 3.8–5.2)
WBC # BLD AUTO: 3.9 10E3/UL (ref 4–11)

## 2023-11-07 PROCEDURE — 96367 TX/PROPH/DG ADDL SEQ IV INF: CPT

## 2023-11-07 PROCEDURE — 85004 AUTOMATED DIFF WBC COUNT: CPT | Performed by: INTERNAL MEDICINE

## 2023-11-07 PROCEDURE — 250N000013 HC RX MED GY IP 250 OP 250 PS 637: Performed by: NURSE PRACTITIONER

## 2023-11-07 PROCEDURE — 96413 CHEMO IV INFUSION 1 HR: CPT

## 2023-11-07 PROCEDURE — 250N000011 HC RX IP 250 OP 636: Mod: JZ | Performed by: NURSE PRACTITIONER

## 2023-11-07 PROCEDURE — 36591 DRAW BLOOD OFF VENOUS DEVICE: CPT | Performed by: INTERNAL MEDICINE

## 2023-11-07 PROCEDURE — 96375 TX/PRO/DX INJ NEW DRUG ADDON: CPT

## 2023-11-07 PROCEDURE — 258N000003 HC RX IP 258 OP 636: Performed by: NURSE PRACTITIONER

## 2023-11-07 RX ORDER — DIPHENHYDRAMINE HCL 25 MG
25 CAPSULE ORAL ONCE
Status: COMPLETED | OUTPATIENT
Start: 2023-11-07 | End: 2023-11-07

## 2023-11-07 RX ORDER — ACETAMINOPHEN 325 MG/1
650 TABLET ORAL ONCE
Status: COMPLETED | OUTPATIENT
Start: 2023-11-07 | End: 2023-11-07

## 2023-11-07 RX ORDER — PALONOSETRON 0.05 MG/ML
0.25 INJECTION, SOLUTION INTRAVENOUS ONCE
Status: COMPLETED | OUTPATIENT
Start: 2023-11-07 | End: 2023-11-07

## 2023-11-07 RX ORDER — HEPARIN SODIUM (PORCINE) LOCK FLUSH IV SOLN 100 UNIT/ML 100 UNIT/ML
5 SOLUTION INTRAVENOUS
Status: DISCONTINUED | OUTPATIENT
Start: 2023-11-07 | End: 2023-11-07 | Stop reason: HOSPADM

## 2023-11-07 RX ADMIN — PALONOSETRON 0.25 MG: 0.05 INJECTION, SOLUTION INTRAVENOUS at 10:10

## 2023-11-07 RX ADMIN — SODIUM CHLORIDE 250 ML: 9 INJECTION, SOLUTION INTRAVENOUS at 10:06

## 2023-11-07 RX ADMIN — FAMOTIDINE 20 MG: 10 INJECTION INTRAVENOUS at 10:06

## 2023-11-07 RX ADMIN — DIPHENHYDRAMINE HYDROCHLORIDE 25 MG: 25 CAPSULE ORAL at 10:06

## 2023-11-07 RX ADMIN — DEXAMETHASONE SODIUM PHOSPHATE: 10 INJECTION, SOLUTION INTRAMUSCULAR; INTRAVENOUS at 10:25

## 2023-11-07 RX ADMIN — SACITUZUMAB GOVITECAN 671 MG: 180 POWDER, FOR SOLUTION INTRAVENOUS at 10:51

## 2023-11-07 RX ADMIN — ACETAMINOPHEN 650 MG: 325 TABLET, FILM COATED ORAL at 10:06

## 2023-11-07 RX ADMIN — Medication 5 ML: at 12:32

## 2023-11-07 NOTE — PROGRESS NOTES
Infusion Nursing Note:  Precious KEISHA ArreolaWellington presents today for Eledel.    Patient seen by provider today: No   present during visit today: Not Applicable.    Note: N/A.    Intravenous Access:  Implanted Port.    Treatment Conditions:  Lab Results   Component Value Date    HGB 10.9 (L) 11/07/2023    WBC 3.9 (L) 11/07/2023    ANEU 0.8 (L) 10/10/2023    ANEUTAUTO 1.8 11/07/2023     11/07/2023        Results reviewed, labs MET treatment parameters, ok to proceed with treatment.    Post Infusion Assessment:  Patient tolerated infusion without incident.  Blood return noted pre and post infusion.  Site patent and intact, free from redness, edema or discomfort.  No evidence of extravasations.  Access discontinued per protocol.     Discharge Plan:   Patient discharged in stable condition accompanied by: self.  Departure Mode: Ambulatory.    Mario Worley RN

## 2023-11-08 ENCOUNTER — IMMUNIZATION (OUTPATIENT)
Dept: FAMILY MEDICINE | Facility: CLINIC | Age: 83
End: 2023-11-08
Payer: COMMERCIAL

## 2023-11-08 PROCEDURE — 91320 SARSCV2 VAC 30MCG TRS-SUC IM: CPT

## 2023-11-08 PROCEDURE — 90480 ADMN SARSCOV2 VAC 1/ONLY CMP: CPT

## 2023-11-10 DIAGNOSIS — T45.1X5A CHEMOTHERAPY-INDUCED NAUSEA: ICD-10-CM

## 2023-11-10 DIAGNOSIS — R11.0 CHEMOTHERAPY-INDUCED NAUSEA: ICD-10-CM

## 2023-11-10 RX ORDER — ONDANSETRON 4 MG/1
4-8 TABLET, ORALLY DISINTEGRATING ORAL EVERY 8 HOURS PRN
Qty: 45 TABLET | Refills: 1 | Status: SHIPPED | OUTPATIENT
Start: 2023-11-10

## 2023-11-16 ENCOUNTER — HOSPITAL ENCOUNTER (OUTPATIENT)
Dept: PET IMAGING | Facility: CLINIC | Age: 83
Discharge: HOME | End: 2023-11-16
Attending: NURSE PRACTITIONER | Admitting: NURSE PRACTITIONER
Payer: COMMERCIAL

## 2023-11-16 DIAGNOSIS — C50.919 PRIMARY MALIGNANT NEOPLASM OF BREAST WITH METASTASIS (H): ICD-10-CM

## 2023-11-16 DIAGNOSIS — C78.01 MALIGNANT NEOPLASM METASTATIC TO BOTH LUNGS (H): ICD-10-CM

## 2023-11-16 DIAGNOSIS — C77.3 METASTATIC CANCER TO AXILLARY LYMPH NODES (H): ICD-10-CM

## 2023-11-16 DIAGNOSIS — C78.02 MALIGNANT NEOPLASM METASTATIC TO BOTH LUNGS (H): ICD-10-CM

## 2023-11-16 PROCEDURE — A9552 F18 FDG: HCPCS | Performed by: NURSE PRACTITIONER

## 2023-11-16 PROCEDURE — 78815 PET IMAGE W/CT SKULL-THIGH: CPT | Mod: PS

## 2023-11-16 PROCEDURE — 343N000001 HC RX 343: Performed by: NURSE PRACTITIONER

## 2023-11-16 RX ADMIN — FLUDEOXYGLUCOSE F-18 11.3 MILLICURIE: 500 INJECTION, SOLUTION INTRAVENOUS at 12:48

## 2023-11-21 ENCOUNTER — LAB (OUTPATIENT)
Dept: INFUSION THERAPY | Facility: CLINIC | Age: 83
End: 2023-11-21
Attending: INTERNAL MEDICINE
Payer: COMMERCIAL

## 2023-11-21 ENCOUNTER — ONCOLOGY VISIT (OUTPATIENT)
Dept: ONCOLOGY | Facility: CLINIC | Age: 83
End: 2023-11-21
Attending: INTERNAL MEDICINE
Payer: COMMERCIAL

## 2023-11-21 VITALS
TEMPERATURE: 98 F | WEIGHT: 186 LBS | DIASTOLIC BLOOD PRESSURE: 71 MMHG | OXYGEN SATURATION: 95 % | BODY MASS INDEX: 30.99 KG/M2 | HEART RATE: 89 BPM | SYSTOLIC BLOOD PRESSURE: 143 MMHG | RESPIRATION RATE: 12 BRPM | HEIGHT: 65 IN

## 2023-11-21 VITALS — SYSTOLIC BLOOD PRESSURE: 123 MMHG | DIASTOLIC BLOOD PRESSURE: 70 MMHG | HEART RATE: 80 BPM

## 2023-11-21 DIAGNOSIS — C50.411 MALIGNANT NEOPLASM OF UPPER-OUTER QUADRANT OF RIGHT BREAST IN FEMALE, ESTROGEN RECEPTOR NEGATIVE (H): ICD-10-CM

## 2023-11-21 DIAGNOSIS — Z08 ENCOUNTER FOR FOLLOW-UP EXAMINATION AFTER COMPLETED TREATMENT FOR MALIGNANT NEOPLASM: Primary | ICD-10-CM

## 2023-11-21 DIAGNOSIS — C50.919 PRIMARY MALIGNANT NEOPLASM OF BREAST WITH METASTASIS (H): ICD-10-CM

## 2023-11-21 DIAGNOSIS — Z17.1 MALIGNANT NEOPLASM OF UPPER-OUTER QUADRANT OF RIGHT BREAST IN FEMALE, ESTROGEN RECEPTOR NEGATIVE (H): ICD-10-CM

## 2023-11-21 LAB
ALBUMIN SERPL BCG-MCNC: 4 G/DL (ref 3.5–5.2)
ALP SERPL-CCNC: 95 U/L (ref 40–150)
ALT SERPL W P-5'-P-CCNC: 19 U/L (ref 0–50)
ANION GAP SERPL CALCULATED.3IONS-SCNC: 10 MMOL/L (ref 7–15)
AST SERPL W P-5'-P-CCNC: 21 U/L (ref 0–45)
BASOPHILS # BLD AUTO: 0 10E3/UL (ref 0–0.2)
BASOPHILS NFR BLD AUTO: 1 %
BILIRUB SERPL-MCNC: 0.2 MG/DL
BUN SERPL-MCNC: 12 MG/DL (ref 8–23)
CALCIUM SERPL-MCNC: 9.4 MG/DL (ref 8.8–10.2)
CHLORIDE SERPL-SCNC: 100 MMOL/L (ref 98–107)
CREAT SERPL-MCNC: 0.61 MG/DL (ref 0.51–0.95)
DEPRECATED HCO3 PLAS-SCNC: 29 MMOL/L (ref 22–29)
EGFRCR SERPLBLD CKD-EPI 2021: 89 ML/MIN/1.73M2
EOSINOPHIL # BLD AUTO: 0.1 10E3/UL (ref 0–0.7)
EOSINOPHIL NFR BLD AUTO: 1 %
ERYTHROCYTE [DISTWIDTH] IN BLOOD BY AUTOMATED COUNT: 16.2 % (ref 10–15)
GLUCOSE SERPL-MCNC: 102 MG/DL (ref 70–99)
HCT VFR BLD AUTO: 32.3 % (ref 35–47)
HGB BLD-MCNC: 10.9 G/DL (ref 11.7–15.7)
IMM GRANULOCYTES # BLD: 0 10E3/UL
IMM GRANULOCYTES NFR BLD: 1 %
LYMPHOCYTES # BLD AUTO: 1.4 10E3/UL (ref 0.8–5.3)
LYMPHOCYTES NFR BLD AUTO: 25 %
MCH RBC QN AUTO: 31 PG (ref 26.5–33)
MCHC RBC AUTO-ENTMCNC: 33.7 G/DL (ref 31.5–36.5)
MCV RBC AUTO: 92 FL (ref 78–100)
MONOCYTES # BLD AUTO: 0.7 10E3/UL (ref 0–1.3)
MONOCYTES NFR BLD AUTO: 12 %
NEUTROPHILS # BLD AUTO: 3.3 10E3/UL (ref 1.6–8.3)
NEUTROPHILS NFR BLD AUTO: 60 %
NRBC # BLD AUTO: 0 10E3/UL
NRBC BLD AUTO-RTO: 0 /100
PLATELET # BLD AUTO: 224 10E3/UL (ref 150–450)
POTASSIUM SERPL-SCNC: 3.4 MMOL/L (ref 3.4–5.3)
PROT SERPL-MCNC: 7.1 G/DL (ref 6.4–8.3)
RBC # BLD AUTO: 3.52 10E6/UL (ref 3.8–5.2)
SODIUM SERPL-SCNC: 139 MMOL/L (ref 135–145)
WBC # BLD AUTO: 5.4 10E3/UL (ref 4–11)

## 2023-11-21 PROCEDURE — 250N000011 HC RX IP 250 OP 636: Mod: JZ | Performed by: INTERNAL MEDICINE

## 2023-11-21 PROCEDURE — 85025 COMPLETE CBC W/AUTO DIFF WBC: CPT | Performed by: INTERNAL MEDICINE

## 2023-11-21 PROCEDURE — 96375 TX/PRO/DX INJ NEW DRUG ADDON: CPT

## 2023-11-21 PROCEDURE — 250N000013 HC RX MED GY IP 250 OP 250 PS 637: Performed by: INTERNAL MEDICINE

## 2023-11-21 PROCEDURE — G0463 HOSPITAL OUTPT CLINIC VISIT: HCPCS | Mod: 25 | Performed by: INTERNAL MEDICINE

## 2023-11-21 PROCEDURE — 36591 DRAW BLOOD OFF VENOUS DEVICE: CPT | Performed by: INTERNAL MEDICINE

## 2023-11-21 PROCEDURE — 99214 OFFICE O/P EST MOD 30 MIN: CPT | Performed by: INTERNAL MEDICINE

## 2023-11-21 PROCEDURE — 80053 COMPREHEN METABOLIC PANEL: CPT | Performed by: INTERNAL MEDICINE

## 2023-11-21 PROCEDURE — 96367 TX/PROPH/DG ADDL SEQ IV INF: CPT

## 2023-11-21 PROCEDURE — 258N000003 HC RX IP 258 OP 636: Performed by: INTERNAL MEDICINE

## 2023-11-21 PROCEDURE — 96413 CHEMO IV INFUSION 1 HR: CPT

## 2023-11-21 RX ORDER — ALBUTEROL SULFATE 0.83 MG/ML
2.5 SOLUTION RESPIRATORY (INHALATION)
Status: CANCELLED | OUTPATIENT
Start: 2023-11-21

## 2023-11-21 RX ORDER — ALBUTEROL SULFATE 90 UG/1
1-2 AEROSOL, METERED RESPIRATORY (INHALATION)
Status: CANCELLED
Start: 2023-11-28

## 2023-11-21 RX ORDER — LORAZEPAM 2 MG/ML
0.5 INJECTION INTRAMUSCULAR EVERY 4 HOURS PRN
Status: CANCELLED | OUTPATIENT
Start: 2023-11-28

## 2023-11-21 RX ORDER — METHYLPREDNISOLONE SODIUM SUCCINATE 125 MG/2ML
125 INJECTION, POWDER, LYOPHILIZED, FOR SOLUTION INTRAMUSCULAR; INTRAVENOUS
Status: CANCELLED
Start: 2023-11-28

## 2023-11-21 RX ORDER — MEPERIDINE HYDROCHLORIDE 25 MG/ML
25 INJECTION INTRAMUSCULAR; INTRAVENOUS; SUBCUTANEOUS EVERY 30 MIN PRN
Status: CANCELLED | OUTPATIENT
Start: 2023-11-28

## 2023-11-21 RX ORDER — ATROPINE SULFATE 0.4 MG/ML
0.4 AMPUL (ML) INJECTION
Status: CANCELLED | OUTPATIENT
Start: 2023-11-28

## 2023-11-21 RX ORDER — HEPARIN SODIUM (PORCINE) LOCK FLUSH IV SOLN 100 UNIT/ML 100 UNIT/ML
5 SOLUTION INTRAVENOUS
Status: CANCELLED | OUTPATIENT
Start: 2023-11-21

## 2023-11-21 RX ORDER — ACETAMINOPHEN 325 MG/1
650 TABLET ORAL ONCE
Status: COMPLETED | OUTPATIENT
Start: 2023-11-21 | End: 2023-11-21

## 2023-11-21 RX ORDER — DIPHENHYDRAMINE HYDROCHLORIDE 50 MG/ML
50 INJECTION INTRAMUSCULAR; INTRAVENOUS
Status: CANCELLED
Start: 2023-11-21

## 2023-11-21 RX ORDER — ALBUTEROL SULFATE 90 UG/1
1-2 AEROSOL, METERED RESPIRATORY (INHALATION)
Status: CANCELLED
Start: 2023-11-21

## 2023-11-21 RX ORDER — DIPHENHYDRAMINE HYDROCHLORIDE 50 MG/ML
50 INJECTION INTRAMUSCULAR; INTRAVENOUS
Status: CANCELLED
Start: 2023-11-28

## 2023-11-21 RX ORDER — PALONOSETRON 0.05 MG/ML
0.25 INJECTION, SOLUTION INTRAVENOUS ONCE
Status: CANCELLED | OUTPATIENT
Start: 2023-11-21

## 2023-11-21 RX ORDER — DIPHENHYDRAMINE HCL 25 MG
25 CAPSULE ORAL ONCE
Status: COMPLETED | OUTPATIENT
Start: 2023-11-21 | End: 2023-11-21

## 2023-11-21 RX ORDER — HEPARIN SODIUM (PORCINE) LOCK FLUSH IV SOLN 100 UNIT/ML 100 UNIT/ML
5 SOLUTION INTRAVENOUS
Status: DISCONTINUED | OUTPATIENT
Start: 2023-11-21 | End: 2023-11-21 | Stop reason: HOSPADM

## 2023-11-21 RX ORDER — PALONOSETRON 0.05 MG/ML
0.25 INJECTION, SOLUTION INTRAVENOUS ONCE
Status: CANCELLED | OUTPATIENT
Start: 2023-11-28

## 2023-11-21 RX ORDER — EPINEPHRINE 1 MG/ML
0.3 INJECTION, SOLUTION, CONCENTRATE INTRAVENOUS EVERY 5 MIN PRN
Status: CANCELLED | OUTPATIENT
Start: 2023-11-21

## 2023-11-21 RX ORDER — ATROPINE SULFATE 0.4 MG/ML
0.4 AMPUL (ML) INJECTION
Status: CANCELLED | OUTPATIENT
Start: 2023-11-21

## 2023-11-21 RX ORDER — ACETAMINOPHEN 325 MG/1
650 TABLET ORAL ONCE
Status: CANCELLED | OUTPATIENT
Start: 2023-11-28

## 2023-11-21 RX ORDER — METHYLPREDNISOLONE SODIUM SUCCINATE 125 MG/2ML
125 INJECTION, POWDER, LYOPHILIZED, FOR SOLUTION INTRAMUSCULAR; INTRAVENOUS
Status: CANCELLED
Start: 2023-11-21

## 2023-11-21 RX ORDER — LORAZEPAM 2 MG/ML
0.5 INJECTION INTRAMUSCULAR EVERY 4 HOURS PRN
Status: CANCELLED | OUTPATIENT
Start: 2023-11-21

## 2023-11-21 RX ORDER — HEPARIN SODIUM (PORCINE) LOCK FLUSH IV SOLN 100 UNIT/ML 100 UNIT/ML
5 SOLUTION INTRAVENOUS
Status: CANCELLED | OUTPATIENT
Start: 2023-11-28

## 2023-11-21 RX ORDER — EPINEPHRINE 1 MG/ML
0.3 INJECTION, SOLUTION, CONCENTRATE INTRAVENOUS EVERY 5 MIN PRN
Status: CANCELLED | OUTPATIENT
Start: 2023-11-28

## 2023-11-21 RX ORDER — DEXAMETHASONE 4 MG/1
8 TABLET ORAL DAILY
Qty: 12 TABLET | Refills: 2 | Status: SHIPPED | OUTPATIENT
Start: 2023-11-21 | End: 2024-05-02

## 2023-11-21 RX ORDER — ACETAMINOPHEN 325 MG/1
650 TABLET ORAL ONCE
Status: CANCELLED | OUTPATIENT
Start: 2023-11-21

## 2023-11-21 RX ORDER — DIPHENHYDRAMINE HCL 25 MG
25 CAPSULE ORAL ONCE
Status: CANCELLED | OUTPATIENT
Start: 2023-11-28

## 2023-11-21 RX ORDER — MEPERIDINE HYDROCHLORIDE 25 MG/ML
25 INJECTION INTRAMUSCULAR; INTRAVENOUS; SUBCUTANEOUS EVERY 30 MIN PRN
Status: CANCELLED | OUTPATIENT
Start: 2023-11-21

## 2023-11-21 RX ORDER — DIPHENHYDRAMINE HCL 25 MG
25 CAPSULE ORAL ONCE
Status: CANCELLED | OUTPATIENT
Start: 2023-11-21

## 2023-11-21 RX ORDER — ALBUTEROL SULFATE 0.83 MG/ML
2.5 SOLUTION RESPIRATORY (INHALATION)
Status: CANCELLED | OUTPATIENT
Start: 2023-11-28

## 2023-11-21 RX ORDER — PALONOSETRON 0.05 MG/ML
0.25 INJECTION, SOLUTION INTRAVENOUS ONCE
Status: COMPLETED | OUTPATIENT
Start: 2023-11-21 | End: 2023-11-21

## 2023-11-21 RX ADMIN — DIPHENHYDRAMINE HYDROCHLORIDE 25 MG: 25 CAPSULE ORAL at 12:52

## 2023-11-21 RX ADMIN — ACETAMINOPHEN 650 MG: 325 TABLET, FILM COATED ORAL at 12:52

## 2023-11-21 RX ADMIN — SODIUM CHLORIDE 250 ML: 9 INJECTION, SOLUTION INTRAVENOUS at 12:54

## 2023-11-21 RX ADMIN — SACITUZUMAB GOVITECAN 671 MG: 180 POWDER, FOR SOLUTION INTRAVENOUS at 14:04

## 2023-11-21 RX ADMIN — Medication 5 ML: at 15:11

## 2023-11-21 RX ADMIN — PALONOSETRON 0.25 MG: 0.05 INJECTION, SOLUTION INTRAVENOUS at 12:56

## 2023-11-21 RX ADMIN — FAMOTIDINE 20 MG: 10 INJECTION INTRAVENOUS at 12:52

## 2023-11-21 RX ADMIN — DEXAMETHASONE SODIUM PHOSPHATE: 10 INJECTION, SOLUTION INTRAMUSCULAR; INTRAVENOUS at 13:19

## 2023-11-21 ASSESSMENT — PAIN SCALES - GENERAL: PAINLEVEL: NO PAIN (0)

## 2023-11-21 NOTE — PROGRESS NOTES
Infusion Nursing Note:  [facility]    Patient seen by provider today: Yes: Dr. Beebe   present during visit today: Not Applicable.    Note: N/A.    Intravenous Access:  Implanted Port.    Treatment Conditions:  Lab Results   Component Value Date     11/21/2023    POTASSIUM 3.4 11/21/2023    CR 0.61 11/21/2023    YADIRA 9.4 11/21/2023    BILITOTAL 0.2 11/21/2023    ALBUMIN 4.0 11/21/2023    ALT 19 11/21/2023    AST 21 11/21/2023       Results reviewed, labs MET treatment parameters, ok to proceed with treatment.    Post Infusion Assessment:  Patient tolerated infusion without incident.  Patient observed for 15 minutes per patient request.  Blood return noted pre and post infusion.  Site patent and intact, free from redness, edema or discomfort.  No evidence of extravasations.  Access discontinued per protocol.     Discharge Plan:   Patient discharged in stable condition accompanied by: self.  Departure Mode: Ambulatory.      Mario Worley RN

## 2023-11-21 NOTE — PROGRESS NOTES
"Oncology Rooming Note    November 21, 2023 11:54 AM   Precious Paris is a 83 year old female who presents for:    Chief Complaint   Patient presents with    Oncology Clinic Visit     Malignant neoplasm metastatic to both lungs - Labs provider and infusion     Initial Vitals: BP (!) 143/71 (BP Location: Right arm, Patient Position: Sitting, Cuff Size: Adult Regular)   Pulse 89   Temp 98  F (36.7  C) (Oral)   Resp 12   Ht 1.638 m (5' 4.5\")   Wt 84.4 kg (186 lb)   SpO2 95%   BMI 31.43 kg/m   Estimated body mass index is 31.43 kg/m  as calculated from the following:    Height as of this encounter: 1.638 m (5' 4.5\").    Weight as of this encounter: 84.4 kg (186 lb). Body surface area is 1.96 meters squared.  No Pain (0) Comment: Data Unavailable   No LMP recorded. Patient is postmenopausal.  Allergies reviewed: Yes  Medications reviewed: Yes    Medications: Medication refills not needed today.  Pharmacy name entered into [facility]: [facility] [id number] [facility]    Clinical concerns:  None      Malgorzata Vallejo CMA            "

## 2023-11-21 NOTE — PROGRESS NOTES
[facility] Hematology and Oncology Progress Note    Patient: Precious Paris  MRN: [id number]  Nov 21, 2023          Reason for Visit    Recurrent stage IV triple negative breast cancer (nodes, lung) (PDL1+)    Primary Oncologist: Dr. Beebe    _____________________________________________________________________________    History of Present Illness/ Interval History    Ms. Precious Paris is a 82 year old with recurrent metastatic triple negative breast cancer recurrence. She initiated 3rd line sacituzumab about 6 weeks ago. She has completed 2 cycles. C2D8 was delayed a week for neutropenia (so delivered over a 4-week cycle).     He has finished 3 cycles so far.  Here with repeat PET scan and labs.  Tolerating it pretty well.  Some diarrhea.  No skin rashes.  No significant peripheral neuropathy symptoms.    Eating well.  No fevers.  Overall seems to be doing really well on sacituzumab.    ECOG PS: 1      Oncology History/Treatment  Diagnosis/Stage:   11/2019: Right breast cancer, triple negative ([id number])    BRCA negative    10/2022: Recurrent, stage IV triple negative breast cancer (bilateral axillary, supraclav, mediastinal nodes + lung)  -persistent/progressive right breast firmness with new right nipple drainage and right axillary adenopathy  -bilateral diagnostic mammogram: increased density R breast with skin thickening. R breast US: 3.2 cm hypoechoic mass at 11:00 position 1 cm from nipple and multiple other small hypoechoic solid-appearing lesions in R breast + right axillary adenopathy (at least two hypoechoic vascular lesions measuring up to 2.5 cm)  -10/18/2022 biopsy R breast mass + R axillary node: grade 3 invasive ductal cancer with tumor necrosis, ER/[place]/HER2 negative.   -PET: + right breast mass; bilateral axillary, retropectoral, supraclavicular, mediastinal/R hilar nodes and bilateral lung/R pleural nodules suspicious for mets.  -Brain MRI: negative for mets  -11/1/2022  biopsy L axillary node: metastatic invasive ductal carcinoma  -Foundation One: no actionable mutations MS stable, TMB 1 mut/mb.   -PDL1 testing: CPS >20% and TPS 60% (considered high PDL1 expression)  -CA 27.29: 59.5 (elevated). CA 15-3: 23 (normal)  -9/10/2023 Brain MRI: 2 new foci of punctate enhancement in left frontal cortex and right lentiform nucleus, concerning for brain metastases (asymptomatic)    NGS (foundation 1)  FGF R1 amplification.  Pazopanib has been approved in other tumors but not in breast cancer.  NTRK 1 amplification.  Not actionable  She has multiple other mutations which are not actionable.      Treatment:  Initial breast cancer (2019)  -2019: Neoadjuvant taxol  -Lumpectomy  -5/2020: Adjuvant RT  -6 - 7/2020: Adjuvant Xeloda. Stopped for rash    Recurrent, stage IV breast cancer (2022)  -11/14/2022:Doxil every 4 weeks  -12/12/2022: changed to doxorubicin every 3 weeks. (Planning addition of pembrolizumab once approved by insurance)  -1/2/2023: doxorubicin + pembrolizumab every 3 weeks. Completed 1 cycle. Stopped for progression (med nodes, slight increase in lung mets and stable R breast mass + axillary nodes).    -1/23/2023 - present: Carbo AUC 5, Gemcitabine 800 mg/m2 and Pembro every 21 days  -cycles 1-2: [place] day 1 only  -Cycle 3: [place] increased to days 1 + 8 given excellent tolerance  -CT after 3 cycles showed excellent response (near CR axillary + med nodes and OR in lung mets; clinical improvement in right breast changes and tumor marker).  -PET scan after 6 cycles of carboplatin gemcitabine pembrolizumab shows complete response    5/30 - 8/22/2023: Maintenance pembrolizumab, until progression (recurrent axillary, thoracic nodes and 2 punctate brain lesions).  Biopsy of left axillary node confirmed recurrent/metastatic breast cancer.    9/12/2023 - present:  Sacituzumab days 1, 8 every 21 days  --C2D8 deferred x 1 week for neutropenia      Physical Exam    GENERAL: Alert and oriented to  time place and person. Seated comfortably. In no distress.   Lungs: clear bilaterally  CVS: RRR systolic murmur heard  Lymph: Bilateral ax adenopathy has significantly decreased  Abd: Soft, non-distended  Extremities: No edema  Neuro: alert and oriented x 3      Lab Results  Recent Results (from the past 168 hour(s))   Comprehensive metabolic panel    Collection Time: 11/21/23 11:41 AM   Result Value Ref Range    Sodium 139 135 - 145 mmol/L    Potassium 3.4 3.4 - 5.3 mmol/L    Carbon Dioxide (CO2) 29 22 - 29 mmol/L    Anion Gap 10 7 - 15 mmol/L    Urea Nitrogen 12.0 8.0 - 23.0 mg/dL    Creatinine 0.61 0.51 - 0.95 mg/dL    GFR Estimate 89 >60 mL/min/1.73m2    Calcium 9.4 8.8 - 10.2 mg/dL    Chloride 100 98 - 107 mmol/L    Glucose 102 (H) 70 - 99 mg/dL    Alkaline Phosphatase 95 40 - 150 U/L    AST 21 0 - 45 U/L    ALT 19 0 - 50 U/L    Protein Total 7.1 6.4 - 8.3 g/dL    Albumin 4.0 3.5 - 5.2 g/dL    Bilirubin Total 0.2 <=1.2 mg/dL   CBC with platelets and differential    Collection Time: 11/21/23 11:41 AM   Result Value Ref Range    WBC Count 5.4 4.0 - 11.0 10e3/uL    RBC Count 3.52 (L) 3.80 - 5.20 10e6/uL    Hemoglobin 10.9 (L) 11.7 - 15.7 g/dL    Hematocrit 32.3 (L) 35.0 - 47.0 %    MCV 92 78 - 100 fL    MCH 31.0 26.5 - 33.0 pg    MCHC 33.7 31.5 - 36.5 g/dL    RDW 16.2 (H) 10.0 - 15.0 %    Platelet Count 224 150 - 450 10e3/uL    % Neutrophils 60 %    % Lymphocytes 25 %    % Monocytes 12 %    % Eosinophils 1 %    % Basophils 1 %    % Immature Granulocytes 1 %    NRBCs per 100 WBC 0 <1 /100    Absolute Neutrophils 3.3 1.6 - 8.3 10e3/uL    Absolute Lymphocytes 1.4 0.8 - 5.3 10e3/uL    Absolute Monocytes 0.7 0.0 - 1.3 10e3/uL    Absolute Eosinophils 0.1 0.0 - 0.7 10e3/uL    Absolute Basophils 0.0 0.0 - 0.2 10e3/uL    Absolute Immature Granulocytes 0.0 <=0.4 10e3/uL    Absolute NRBCs 0.0 10e3/uL         Assessment/Plan  Recurrent, stage IV triple negative right breast cancer (nodes, lung); CPS>20% + TPS 60%  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  Her cycle 2-day 8 was delayed by a week due to neutropenia.  But since then she has not had any significant issues.  She is here with repeat PET scan.  I personally reviewed the PET scan images and report and independently interpreted the results.  She appears to have a near complete response.  The previously noted axillary and thoracic adenopathy have significantly decreased in size and FDG avid ET.  She has a mildly FDG avid left axillary lymph node that is still there.  No evidence of new metastasis.    She seems to be handling treatment really well.  Apart from neutropenia which caused a delay in her treatment by a week, no other significant issues.  Stable peripheral neuropathy symptoms.  Repeat labs reviewed today.  Total white count is 5.4.  Normal ANC.  Hemoglobin is 10.9.  Normal platelet count.  Serum chemistry is pretty normal.  Has mildly elevated glucose.  Overall no contraindications to proceed with day 1 cycle 4 sacituzumab.  No dose changes made.  She will get labs in the 8 next week.  She will be leaving to [place] on December 9 and has an appointment with an oncologist there on December 13.  She will be due for day 1 cycle 5 sacituzumab on December 12.  I asked her to call them ahead of time and try to get drug approved and scheduled as soon as possible.  She will continue infusions there.  She is planning to come back in March or April.  She will call us at that time to schedule further appointments here.      With respect to surveillance I was planning to do PET scan after every 3 or 4 cycles.      Billing:  A total of 35 min were spent today on this visit which included face to face conversation with the patient, EMR review, counseling  and co-ordination of care and medical documentation.      Signed by:   Maricarmen Beebe MD  Hematology and Medical Oncology  [facility]

## 2023-11-22 DIAGNOSIS — M25.569 PAIN IN UNSPECIFIED KNEE: ICD-10-CM

## 2023-11-22 RX ORDER — AMOXICILLIN 500 MG/1
CAPSULE ORAL
Qty: 16 CAPSULE | Refills: 4 | Status: SHIPPED | OUTPATIENT
Start: 2023-11-22

## 2023-11-28 ENCOUNTER — INFUSION THERAPY VISIT (OUTPATIENT)
Dept: INFUSION THERAPY | Facility: CLINIC | Age: 83
End: 2023-11-28
Attending: INTERNAL MEDICINE
Payer: COMMERCIAL

## 2023-11-28 VITALS
WEIGHT: 186.8 LBS | TEMPERATURE: 97.5 F | SYSTOLIC BLOOD PRESSURE: 132 MMHG | BODY MASS INDEX: 31.57 KG/M2 | DIASTOLIC BLOOD PRESSURE: 75 MMHG

## 2023-11-28 VITALS — SYSTOLIC BLOOD PRESSURE: 110 MMHG | DIASTOLIC BLOOD PRESSURE: 71 MMHG

## 2023-11-28 DIAGNOSIS — Z08 ENCOUNTER FOR FOLLOW-UP EXAMINATION AFTER COMPLETED TREATMENT FOR MALIGNANT NEOPLASM: Primary | ICD-10-CM

## 2023-11-28 DIAGNOSIS — C50.411 MALIGNANT NEOPLASM OF UPPER-OUTER QUADRANT OF RIGHT BREAST IN FEMALE, ESTROGEN RECEPTOR NEGATIVE (H): Primary | ICD-10-CM

## 2023-11-28 DIAGNOSIS — Z17.1 MALIGNANT NEOPLASM OF UPPER-OUTER QUADRANT OF RIGHT BREAST IN FEMALE, ESTROGEN RECEPTOR NEGATIVE (H): ICD-10-CM

## 2023-11-28 DIAGNOSIS — C50.919 PRIMARY MALIGNANT NEOPLASM OF BREAST WITH METASTASIS (H): ICD-10-CM

## 2023-11-28 DIAGNOSIS — Z08 ENCOUNTER FOR FOLLOW-UP EXAMINATION AFTER COMPLETED TREATMENT FOR MALIGNANT NEOPLASM: ICD-10-CM

## 2023-11-28 DIAGNOSIS — C50.411 MALIGNANT NEOPLASM OF UPPER-OUTER QUADRANT OF RIGHT BREAST IN FEMALE, ESTROGEN RECEPTOR NEGATIVE (H): ICD-10-CM

## 2023-11-28 DIAGNOSIS — Z17.1 MALIGNANT NEOPLASM OF UPPER-OUTER QUADRANT OF RIGHT BREAST IN FEMALE, ESTROGEN RECEPTOR NEGATIVE (H): Primary | ICD-10-CM

## 2023-11-28 LAB
BASOPHILS # BLD AUTO: 0.1 10E3/UL (ref 0–0.2)
BASOPHILS NFR BLD AUTO: 1 %
EOSINOPHIL # BLD AUTO: 0.1 10E3/UL (ref 0–0.7)
EOSINOPHIL NFR BLD AUTO: 1 %
ERYTHROCYTE [DISTWIDTH] IN BLOOD BY AUTOMATED COUNT: 16.2 % (ref 10–15)
HCT VFR BLD AUTO: 34.6 % (ref 35–47)
HGB BLD-MCNC: 11.6 G/DL (ref 11.7–15.7)
IMM GRANULOCYTES # BLD: 0.2 10E3/UL
IMM GRANULOCYTES NFR BLD: 3 %
LYMPHOCYTES # BLD AUTO: 1.4 10E3/UL (ref 0.8–5.3)
LYMPHOCYTES NFR BLD AUTO: 25 %
MCH RBC QN AUTO: 31 PG (ref 26.5–33)
MCHC RBC AUTO-ENTMCNC: 33.5 G/DL (ref 31.5–36.5)
MCV RBC AUTO: 93 FL (ref 78–100)
MONOCYTES # BLD AUTO: 0.7 10E3/UL (ref 0–1.3)
MONOCYTES NFR BLD AUTO: 13 %
NEUTROPHILS # BLD AUTO: 3.3 10E3/UL (ref 1.6–8.3)
NEUTROPHILS NFR BLD AUTO: 57 %
NRBC # BLD AUTO: 0 10E3/UL
NRBC BLD AUTO-RTO: 0 /100
PLATELET # BLD AUTO: 261 10E3/UL (ref 150–450)
RBC # BLD AUTO: 3.74 10E6/UL (ref 3.8–5.2)
WBC # BLD AUTO: 5.8 10E3/UL (ref 4–11)

## 2023-11-28 PROCEDURE — 96367 TX/PROPH/DG ADDL SEQ IV INF: CPT

## 2023-11-28 PROCEDURE — 96375 TX/PRO/DX INJ NEW DRUG ADDON: CPT

## 2023-11-28 PROCEDURE — 85025 COMPLETE CBC W/AUTO DIFF WBC: CPT | Performed by: INTERNAL MEDICINE

## 2023-11-28 PROCEDURE — 258N000003 HC RX IP 258 OP 636: Performed by: INTERNAL MEDICINE

## 2023-11-28 PROCEDURE — 250N000011 HC RX IP 250 OP 636: Mod: JZ | Performed by: INTERNAL MEDICINE

## 2023-11-28 PROCEDURE — 250N000013 HC RX MED GY IP 250 OP 250 PS 637: Performed by: INTERNAL MEDICINE

## 2023-11-28 PROCEDURE — 36591 DRAW BLOOD OFF VENOUS DEVICE: CPT | Performed by: INTERNAL MEDICINE

## 2023-11-28 PROCEDURE — 96413 CHEMO IV INFUSION 1 HR: CPT

## 2023-11-28 RX ORDER — ACETAMINOPHEN 325 MG/1
650 TABLET ORAL ONCE
Status: COMPLETED | OUTPATIENT
Start: 2023-11-28 | End: 2023-11-28

## 2023-11-28 RX ORDER — DIPHENHYDRAMINE HCL 25 MG
25 CAPSULE ORAL ONCE
Status: COMPLETED | OUTPATIENT
Start: 2023-11-28 | End: 2023-11-28

## 2023-11-28 RX ORDER — PALONOSETRON 0.05 MG/ML
0.25 INJECTION, SOLUTION INTRAVENOUS ONCE
Status: COMPLETED | OUTPATIENT
Start: 2023-11-28 | End: 2023-11-28

## 2023-11-28 RX ORDER — HEPARIN SODIUM (PORCINE) LOCK FLUSH IV SOLN 100 UNIT/ML 100 UNIT/ML
5 SOLUTION INTRAVENOUS
Status: DISCONTINUED | OUTPATIENT
Start: 2023-11-28 | End: 2023-11-28 | Stop reason: HOSPADM

## 2023-11-28 RX ADMIN — PALONOSETRON 0.25 MG: 0.05 INJECTION, SOLUTION INTRAVENOUS at 10:06

## 2023-11-28 RX ADMIN — Medication 5 ML: at 12:40

## 2023-11-28 RX ADMIN — DIPHENHYDRAMINE HYDROCHLORIDE 25 MG: 25 CAPSULE ORAL at 10:06

## 2023-11-28 RX ADMIN — SACITUZUMAB GOVITECAN 671 MG: 180 POWDER, FOR SOLUTION INTRAVENOUS at 11:11

## 2023-11-28 RX ADMIN — SODIUM CHLORIDE 250 ML: 9 INJECTION, SOLUTION INTRAVENOUS at 11:14

## 2023-11-28 RX ADMIN — ACETAMINOPHEN 650 MG: 325 TABLET, FILM COATED ORAL at 10:06

## 2023-11-28 RX ADMIN — FAMOTIDINE 20 MG: 10 INJECTION INTRAVENOUS at 10:06

## 2023-11-28 RX ADMIN — DEXAMETHASONE SODIUM PHOSPHATE: 10 INJECTION, SOLUTION INTRAMUSCULAR; INTRAVENOUS at 10:23

## 2023-11-28 NOTE — PROGRESS NOTES
PAC labs drawn without difficulty via site protocol. Patient tolerated well.    VENKATA ALLEN RN on 11/28/2023 at 9:40 AM

## 2023-11-28 NOTE — PROGRESS NOTES
Infusion Nursing Note:  Precious VALDES Wellington presents today for Trodelvy.    Patient seen by provider today: No   present during visit today: Not Applicable.    Note: N/A.      Intravenous Access:  Implanted Port.    Treatment Conditions:  Lab Results   Component Value Date    HGB 11.6 (L) 11/28/2023    WBC 5.8 11/28/2023    ANEU 0.8 (L) 10/10/2023    ANEUTAUTO 3.3 11/28/2023     11/28/2023        Results reviewed, labs MET treatment parameters, ok to proceed with treatment.      Post Infusion Assessment:  Patient tolerated infusion without incident.  Patient observed for 30 minutes post Trodelvy per protocol.  Blood return noted pre and post infusion.  Site patent and intact, free from redness, edema or discomfort.  No evidence of extravasations.  Access discontinued per protocol.       Discharge Plan:   Copy of AVS reviewed with patient and/or family.  [place].  Patient discharged in stable condition accompanied by: self.  Departure Mode: Ambulatory.      VENKATA ALLEN RN

## 2023-12-08 ENCOUNTER — LAB (OUTPATIENT)
Dept: LAB | Facility: CLINIC | Age: 83
End: 2023-12-08
Payer: COMMERCIAL

## 2023-12-08 ENCOUNTER — OFFICE VISIT (OUTPATIENT)
Dept: FAMILY MEDICINE | Facility: CLINIC | Age: 83
End: 2023-12-08
Payer: COMMERCIAL

## 2023-12-08 VITALS
HEART RATE: 99 BPM | RESPIRATION RATE: 18 BRPM | DIASTOLIC BLOOD PRESSURE: 74 MMHG | TEMPERATURE: 99.8 F | WEIGHT: 186 LBS | BODY MASS INDEX: 30.99 KG/M2 | HEIGHT: 65 IN | SYSTOLIC BLOOD PRESSURE: 122 MMHG | OXYGEN SATURATION: 96 %

## 2023-12-08 DIAGNOSIS — C78.01 MALIGNANT NEOPLASM METASTATIC TO BOTH LUNGS (H): ICD-10-CM

## 2023-12-08 DIAGNOSIS — C78.02 MALIGNANT NEOPLASM METASTATIC TO BOTH LUNGS (H): ICD-10-CM

## 2023-12-08 DIAGNOSIS — J06.9 VIRAL URI WITH COUGH: Primary | ICD-10-CM

## 2023-12-08 DIAGNOSIS — J01.10 ACUTE NON-RECURRENT FRONTAL SINUSITIS: ICD-10-CM

## 2023-12-08 LAB
FLUAV RNA SPEC QL NAA+PROBE: NEGATIVE
FLUBV RNA RESP QL NAA+PROBE: NEGATIVE
RSV RNA SPEC NAA+PROBE: POSITIVE
SARS-COV-2 RNA RESP QL NAA+PROBE: POSITIVE

## 2023-12-08 PROCEDURE — 99214 OFFICE O/P EST MOD 30 MIN: CPT | Performed by: NURSE PRACTITIONER

## 2023-12-08 PROCEDURE — 87637 SARSCOV2&INF A&B&RSV AMP PRB: CPT | Performed by: NURSE PRACTITIONER

## 2023-12-08 RX ORDER — BENZONATATE 100 MG/1
100 CAPSULE ORAL 3 TIMES DAILY PRN
Qty: 20 CAPSULE | Refills: 0 | Status: SHIPPED | OUTPATIENT
Start: 2023-12-08 | End: 2024-07-30

## 2023-12-08 ASSESSMENT — PAIN SCALES - GENERAL: PAINLEVEL: NO PAIN (0)

## 2023-12-08 NOTE — PROGRESS NOTES
"  Assessment & Plan       ICD-10-CM    1. Viral URI with cough  J06.9 Symptomatic Influenza A/B, RSV, & SARS-CoV2 PCR (COVID-19)     benzonatate (TESSALON) 100 MG capsule     Symptomatic Influenza A/B, RSV, & SARS-CoV2 PCR (COVID-19) Nose     CANCELED: Symptomatic Influenza A/B, RSV, & SARS-CoV2 PCR (COVID-19)      2. Acute non-recurrent frontal sinusitis  J01.10 amoxicillin-clavulanate (AUGMENTIN) 875-125 MG tablet      3. Malignant neoplasm metastatic to both lungs (H)  C78.01     C78.02         Increased risk for  bacterial illness and increased illness severity with current immunocompromised state related to cancer treatment. I suspect viral etiology will evaluate for COVID, RSV, and Flu. I did send in oral antibiotic for treatment of bacterial illness if symptoms persist. Discussed timing of medication initiation. She is leaving tomorrow for [place]. Advised rest, water, and symptomatic management.          BMI:   Estimated body mass index is 31.43 kg/m  as calculated from the following:    Height as of this encounter: 1.638 m (5' 4.5\").    Weight as of this encounter: 84.4 kg (186 lb).           KONG Teague CNP  [facility]   Precious is a 83 year old, presenting for the following health issues:  Cough        12/8/2023     7:41 AM   Additional Questions   Roomed by Taisha SOLIS MA   Accompanied by Self       HPI     Acute Illness  Acute illness concerns: Having a lot of phlegm-so much that it sometimes is choking her up from draining down her throat. Runny nose, cough. Sore throat-thinks it's related to how much she's coughing to get phlegm out.   Onset/Duration: x4-5 days ago  Symptoms:  Fever: No  Chills/Sweats: No  Headache (location?): No  Sinus Pressure: No  Conjunctivitis:  No  Ear Pain: no  Rhinorrhea: YES  Congestion: YES  Sore Throat: YES  Cough: YES-productive of clear sputum  Wheeze: No  Decreased Appetite: No  Nausea: YES  Vomiting: No  Diarrhea: " "No  Dysuria/Freq.: No  Dysuria or Hematuria: No  Fatigue/Achiness: No  Sick/Strep Exposure: No  Therapies tried and outcome: Tussin cough syrup, tylenol    Symptoms started with congestion/ runny nose, symptoms worsened 3 days ago with a sore throat, cough, and painful swallowing.     She is currently being treated for metastatic lung cancer.     She is leaving for [place] tomorrow. She has an appointment with her oncologist in [place] next week.         Review of Systems   Constitutional, HEENT, cardiovascular, pulmonary, gi and gu systems are negative, except as otherwise noted.      Objective    /74   Pulse 99   Temp 99.8  F (37.7  C) (Tympanic)   Resp 18   Ht 1.638 m (5' 4.5\")   Wt 84.4 kg (186 lb)   SpO2 96%   BMI 31.43 kg/m    Body mass index is 31.43 kg/m .  Physical Exam   GENERAL: mildly ill, alert and no distress  NECK: no adenopathy, no asymmetry, masses, or scars and thyroid normal to palpation  ENT: normal ear canals, TYMPANIC MEMBRANE normal. Congested sounding. Posterior oropharynx clear.   RESP: lungs clear to auscultation - no rales, rhonchi or wheezes  CV: regular rate and rhythm, normal S1 S2, no S3 or S4, no murmur, click or rub, no peripheral edema and peripheral pulses strong  ABDOMEN: soft, nontender, no hepatosplenomegaly, no masses and bowel sounds normal  MS: no gross musculoskeletal defects noted, no edema                      "

## 2023-12-09 ENCOUNTER — TELEPHONE (OUTPATIENT)
Dept: NURSING | Facility: CLINIC | Age: 83
End: 2023-12-09
Payer: COMMERCIAL

## 2023-12-09 NOTE — TELEPHONE ENCOUNTER
Patient classified as COVID treatment eligible by Epic high risk algorithm:  No    Coronavirus (COVID-19) Notification    Reason for call  Notify of POSITIVE COVID-19 lab result, assess symptoms,  review [facility] recommendations    Lab Result   Lab test for 2019-nCoV rRt-PCR or SARS-COV-2 PCR  Oropharyngeal AND/OR nasopharyngeal swabs were POSITIVE for 2019-nCoV RNA [OR] SARS-COV-2 RNA (COVID-19) RNA     We have been unable to reach patient by phone at this time to notify of their Positive COVID-19 result.    Left voicemail message requesting a call back to [phone] [facility] for results.        A Positive COVID-19 letter will be sent via [employer] or the mail.    Malgorzata Brower

## 2023-12-14 ENCOUNTER — TRANSFERRED RECORDS (OUTPATIENT)
Dept: HEALTH INFORMATION MANAGEMENT | Facility: CLINIC | Age: 83
End: 2023-12-14
Payer: COMMERCIAL

## 2024-03-12 ENCOUNTER — TRANSFERRED RECORDS (OUTPATIENT)
Dept: HEALTH INFORMATION MANAGEMENT | Facility: CLINIC | Age: 84
End: 2024-03-12
Payer: COMMERCIAL

## 2024-03-25 ENCOUNTER — TRANSFERRED RECORDS (OUTPATIENT)
Dept: HEALTH INFORMATION MANAGEMENT | Facility: CLINIC | Age: 84
End: 2024-03-25
Payer: COMMERCIAL

## 2024-04-07 ENCOUNTER — HEALTH MAINTENANCE LETTER (OUTPATIENT)
Age: 84
End: 2024-04-07

## 2024-04-16 ENCOUNTER — ONCOLOGY VISIT (OUTPATIENT)
Dept: ONCOLOGY | Facility: CLINIC | Age: 84
End: 2024-04-16
Attending: INTERNAL MEDICINE
Payer: COMMERCIAL

## 2024-04-16 ENCOUNTER — LAB (OUTPATIENT)
Dept: INFUSION THERAPY | Facility: CLINIC | Age: 84
End: 2024-04-16
Attending: INTERNAL MEDICINE
Payer: COMMERCIAL

## 2024-04-16 VITALS
RESPIRATION RATE: 12 BRPM | HEIGHT: 65 IN | BODY MASS INDEX: 30.82 KG/M2 | OXYGEN SATURATION: 97 % | WEIGHT: 185 LBS | TEMPERATURE: 97.8 F | DIASTOLIC BLOOD PRESSURE: 42 MMHG | HEART RATE: 77 BPM | SYSTOLIC BLOOD PRESSURE: 124 MMHG

## 2024-04-16 VITALS — DIASTOLIC BLOOD PRESSURE: 81 MMHG | SYSTOLIC BLOOD PRESSURE: 131 MMHG

## 2024-04-16 DIAGNOSIS — C50.919 PRIMARY MALIGNANT NEOPLASM OF BREAST WITH METASTASIS (H): ICD-10-CM

## 2024-04-16 DIAGNOSIS — C50.411 MALIGNANT NEOPLASM OF UPPER-OUTER QUADRANT OF RIGHT BREAST IN FEMALE, ESTROGEN RECEPTOR NEGATIVE (H): ICD-10-CM

## 2024-04-16 DIAGNOSIS — D64.81 ANTINEOPLASTIC CHEMOTHERAPY INDUCED ANEMIA: ICD-10-CM

## 2024-04-16 DIAGNOSIS — Z08 ENCOUNTER FOR FOLLOW-UP EXAMINATION AFTER COMPLETED TREATMENT FOR MALIGNANT NEOPLASM: ICD-10-CM

## 2024-04-16 DIAGNOSIS — Z08 ENCOUNTER FOR FOLLOW-UP EXAMINATION AFTER COMPLETED TREATMENT FOR MALIGNANT NEOPLASM: Primary | ICD-10-CM

## 2024-04-16 DIAGNOSIS — T45.1X5A ANTINEOPLASTIC CHEMOTHERAPY INDUCED ANEMIA: ICD-10-CM

## 2024-04-16 DIAGNOSIS — Z17.1 MALIGNANT NEOPLASM OF UPPER-OUTER QUADRANT OF RIGHT BREAST IN FEMALE, ESTROGEN RECEPTOR NEGATIVE (H): ICD-10-CM

## 2024-04-16 DIAGNOSIS — C50.411 MALIGNANT NEOPLASM OF UPPER-OUTER QUADRANT OF RIGHT BREAST IN FEMALE, ESTROGEN RECEPTOR NEGATIVE (H): Primary | ICD-10-CM

## 2024-04-16 DIAGNOSIS — Z17.1 MALIGNANT NEOPLASM OF UPPER-OUTER QUADRANT OF RIGHT BREAST IN FEMALE, ESTROGEN RECEPTOR NEGATIVE (H): Primary | ICD-10-CM

## 2024-04-16 LAB
ALBUMIN SERPL BCG-MCNC: 3.8 G/DL (ref 3.5–5.2)
ALP SERPL-CCNC: 86 U/L (ref 40–150)
ALT SERPL W P-5'-P-CCNC: 17 U/L (ref 0–50)
ANION GAP SERPL CALCULATED.3IONS-SCNC: 13 MMOL/L (ref 7–15)
AST SERPL W P-5'-P-CCNC: 21 U/L (ref 0–45)
BASOPHILS # BLD AUTO: 0 10E3/UL (ref 0–0.2)
BASOPHILS NFR BLD AUTO: 1 %
BILIRUB SERPL-MCNC: 0.3 MG/DL
BUN SERPL-MCNC: 10.9 MG/DL (ref 8–23)
CALCIUM SERPL-MCNC: 9.2 MG/DL (ref 8.8–10.2)
CHLORIDE SERPL-SCNC: 101 MMOL/L (ref 98–107)
CREAT SERPL-MCNC: 0.59 MG/DL (ref 0.51–0.95)
DEPRECATED HCO3 PLAS-SCNC: 26 MMOL/L (ref 22–29)
EGFRCR SERPLBLD CKD-EPI 2021: 89 ML/MIN/1.73M2
EOSINOPHIL # BLD AUTO: 0.1 10E3/UL (ref 0–0.7)
EOSINOPHIL NFR BLD AUTO: 3 %
ERYTHROCYTE [DISTWIDTH] IN BLOOD BY AUTOMATED COUNT: 15.9 % (ref 10–15)
GLUCOSE SERPL-MCNC: 103 MG/DL (ref 70–99)
HCT VFR BLD AUTO: 33.8 % (ref 35–47)
HGB BLD-MCNC: 11.1 G/DL (ref 11.7–15.7)
IMM GRANULOCYTES # BLD: 0 10E3/UL
IMM GRANULOCYTES NFR BLD: 0 %
LYMPHOCYTES # BLD AUTO: 1.1 10E3/UL (ref 0.8–5.3)
LYMPHOCYTES NFR BLD AUTO: 25 %
MCH RBC QN AUTO: 30.4 PG (ref 26.5–33)
MCHC RBC AUTO-ENTMCNC: 32.8 G/DL (ref 31.5–36.5)
MCV RBC AUTO: 93 FL (ref 78–100)
MONOCYTES # BLD AUTO: 0.6 10E3/UL (ref 0–1.3)
MONOCYTES NFR BLD AUTO: 14 %
NEUTROPHILS # BLD AUTO: 2.5 10E3/UL (ref 1.6–8.3)
NEUTROPHILS NFR BLD AUTO: 57 %
NRBC # BLD AUTO: 0 10E3/UL
NRBC BLD AUTO-RTO: 0 /100
PLATELET # BLD AUTO: 260 10E3/UL (ref 150–450)
POTASSIUM SERPL-SCNC: 3.5 MMOL/L (ref 3.4–5.3)
PROT SERPL-MCNC: 6.7 G/DL (ref 6.4–8.3)
RBC # BLD AUTO: 3.65 10E6/UL (ref 3.8–5.2)
SODIUM SERPL-SCNC: 140 MMOL/L (ref 135–145)
WBC # BLD AUTO: 4.3 10E3/UL (ref 4–11)

## 2024-04-16 PROCEDURE — G0463 HOSPITAL OUTPT CLINIC VISIT: HCPCS | Performed by: INTERNAL MEDICINE

## 2024-04-16 PROCEDURE — 85004 AUTOMATED DIFF WBC COUNT: CPT | Performed by: INTERNAL MEDICINE

## 2024-04-16 PROCEDURE — 99214 OFFICE O/P EST MOD 30 MIN: CPT | Performed by: INTERNAL MEDICINE

## 2024-04-16 PROCEDURE — G2211 COMPLEX E/M VISIT ADD ON: HCPCS | Performed by: INTERNAL MEDICINE

## 2024-04-16 PROCEDURE — 258N000003 HC RX IP 258 OP 636: Performed by: INTERNAL MEDICINE

## 2024-04-16 PROCEDURE — 250N000013 HC RX MED GY IP 250 OP 250 PS 637: Performed by: INTERNAL MEDICINE

## 2024-04-16 PROCEDURE — 250N000011 HC RX IP 250 OP 636: Mod: JW | Performed by: INTERNAL MEDICINE

## 2024-04-16 PROCEDURE — 96413 CHEMO IV INFUSION 1 HR: CPT

## 2024-04-16 PROCEDURE — 80053 COMPREHEN METABOLIC PANEL: CPT | Performed by: INTERNAL MEDICINE

## 2024-04-16 PROCEDURE — 96367 TX/PROPH/DG ADDL SEQ IV INF: CPT

## 2024-04-16 PROCEDURE — 36591 DRAW BLOOD OFF VENOUS DEVICE: CPT | Performed by: INTERNAL MEDICINE

## 2024-04-16 PROCEDURE — 96375 TX/PRO/DX INJ NEW DRUG ADDON: CPT

## 2024-04-16 RX ORDER — EPINEPHRINE 1 MG/ML
0.3 INJECTION, SOLUTION, CONCENTRATE INTRAVENOUS EVERY 5 MIN PRN
Status: CANCELLED | OUTPATIENT
Start: 2024-04-16

## 2024-04-16 RX ORDER — ACETAMINOPHEN 325 MG/1
650 TABLET ORAL ONCE
Status: CANCELLED | OUTPATIENT
Start: 2024-04-16

## 2024-04-16 RX ORDER — DIPHENHYDRAMINE HYDROCHLORIDE 50 MG/ML
50 INJECTION INTRAMUSCULAR; INTRAVENOUS
Status: CANCELLED
Start: 2024-04-23

## 2024-04-16 RX ORDER — METHYLPREDNISOLONE SODIUM SUCCINATE 125 MG/2ML
125 INJECTION, POWDER, LYOPHILIZED, FOR SOLUTION INTRAMUSCULAR; INTRAVENOUS
Status: CANCELLED
Start: 2024-04-16

## 2024-04-16 RX ORDER — ALBUTEROL SULFATE 0.83 MG/ML
2.5 SOLUTION RESPIRATORY (INHALATION)
Status: CANCELLED | OUTPATIENT
Start: 2024-04-16

## 2024-04-16 RX ORDER — DIPHENHYDRAMINE HYDROCHLORIDE 50 MG/ML
50 INJECTION INTRAMUSCULAR; INTRAVENOUS
Status: CANCELLED
Start: 2024-04-16

## 2024-04-16 RX ORDER — DIPHENHYDRAMINE HCL 25 MG
25 CAPSULE ORAL ONCE
Status: COMPLETED | OUTPATIENT
Start: 2024-04-16 | End: 2024-04-16

## 2024-04-16 RX ORDER — ALBUTEROL SULFATE 90 UG/1
1-2 AEROSOL, METERED RESPIRATORY (INHALATION)
Status: CANCELLED
Start: 2024-04-23

## 2024-04-16 RX ORDER — ALBUTEROL SULFATE 0.83 MG/ML
2.5 SOLUTION RESPIRATORY (INHALATION)
Status: CANCELLED | OUTPATIENT
Start: 2024-04-23

## 2024-04-16 RX ORDER — HEPARIN SODIUM (PORCINE) LOCK FLUSH IV SOLN 100 UNIT/ML 100 UNIT/ML
5 SOLUTION INTRAVENOUS
Status: DISCONTINUED | OUTPATIENT
Start: 2024-04-16 | End: 2024-04-16 | Stop reason: HOSPADM

## 2024-04-16 RX ORDER — ATROPINE SULFATE 0.4 MG/ML
0.4 AMPUL (ML) INJECTION
Status: CANCELLED | OUTPATIENT
Start: 2024-04-23

## 2024-04-16 RX ORDER — LORAZEPAM 2 MG/ML
0.5 INJECTION INTRAMUSCULAR EVERY 4 HOURS PRN
Status: CANCELLED | OUTPATIENT
Start: 2024-04-16

## 2024-04-16 RX ORDER — DIPHENHYDRAMINE HCL 25 MG
25 CAPSULE ORAL ONCE
Status: CANCELLED | OUTPATIENT
Start: 2024-04-23

## 2024-04-16 RX ORDER — LORAZEPAM 2 MG/ML
0.5 INJECTION INTRAMUSCULAR EVERY 4 HOURS PRN
Status: CANCELLED | OUTPATIENT
Start: 2024-04-23

## 2024-04-16 RX ORDER — EPINEPHRINE 1 MG/ML
0.3 INJECTION, SOLUTION, CONCENTRATE INTRAVENOUS EVERY 5 MIN PRN
Status: CANCELLED | OUTPATIENT
Start: 2024-04-23

## 2024-04-16 RX ORDER — PALONOSETRON 0.05 MG/ML
0.25 INJECTION, SOLUTION INTRAVENOUS ONCE
Status: CANCELLED | OUTPATIENT
Start: 2024-04-16

## 2024-04-16 RX ORDER — METHYLPREDNISOLONE SODIUM SUCCINATE 125 MG/2ML
125 INJECTION, POWDER, LYOPHILIZED, FOR SOLUTION INTRAMUSCULAR; INTRAVENOUS
Status: CANCELLED
Start: 2024-04-23

## 2024-04-16 RX ORDER — HEPARIN SODIUM (PORCINE) LOCK FLUSH IV SOLN 100 UNIT/ML 100 UNIT/ML
5 SOLUTION INTRAVENOUS
Status: CANCELLED | OUTPATIENT
Start: 2024-04-23

## 2024-04-16 RX ORDER — ALBUTEROL SULFATE 90 UG/1
1-2 AEROSOL, METERED RESPIRATORY (INHALATION)
Status: CANCELLED
Start: 2024-04-16

## 2024-04-16 RX ORDER — PALONOSETRON 0.05 MG/ML
0.25 INJECTION, SOLUTION INTRAVENOUS ONCE
Status: CANCELLED | OUTPATIENT
Start: 2024-04-23

## 2024-04-16 RX ORDER — ATROPINE SULFATE 0.4 MG/ML
0.4 AMPUL (ML) INJECTION
Status: CANCELLED | OUTPATIENT
Start: 2024-04-16

## 2024-04-16 RX ORDER — PALONOSETRON 0.05 MG/ML
0.25 INJECTION, SOLUTION INTRAVENOUS ONCE
Status: COMPLETED | OUTPATIENT
Start: 2024-04-16 | End: 2024-04-16

## 2024-04-16 RX ORDER — ACETAMINOPHEN 325 MG/1
650 TABLET ORAL ONCE
Status: CANCELLED | OUTPATIENT
Start: 2024-04-23

## 2024-04-16 RX ORDER — MEPERIDINE HYDROCHLORIDE 25 MG/ML
25 INJECTION INTRAMUSCULAR; INTRAVENOUS; SUBCUTANEOUS EVERY 30 MIN PRN
Status: CANCELLED | OUTPATIENT
Start: 2024-04-23

## 2024-04-16 RX ORDER — DIPHENHYDRAMINE HCL 25 MG
25 CAPSULE ORAL ONCE
Status: CANCELLED | OUTPATIENT
Start: 2024-04-16

## 2024-04-16 RX ORDER — ACETAMINOPHEN 325 MG/1
650 TABLET ORAL ONCE
Status: COMPLETED | OUTPATIENT
Start: 2024-04-16 | End: 2024-04-16

## 2024-04-16 RX ORDER — MEPERIDINE HYDROCHLORIDE 25 MG/ML
25 INJECTION INTRAMUSCULAR; INTRAVENOUS; SUBCUTANEOUS EVERY 30 MIN PRN
Status: CANCELLED | OUTPATIENT
Start: 2024-04-16

## 2024-04-16 RX ORDER — HEPARIN SODIUM (PORCINE) LOCK FLUSH IV SOLN 100 UNIT/ML 100 UNIT/ML
5 SOLUTION INTRAVENOUS
Status: CANCELLED | OUTPATIENT
Start: 2024-04-16

## 2024-04-16 RX ADMIN — SODIUM CHLORIDE 250 ML: 9 INJECTION, SOLUTION INTRAVENOUS at 09:08

## 2024-04-16 RX ADMIN — ACETAMINOPHEN 650 MG: 325 TABLET, FILM COATED ORAL at 09:04

## 2024-04-16 RX ADMIN — FAMOTIDINE 20 MG: 10 INJECTION, SOLUTION INTRAVENOUS at 09:05

## 2024-04-16 RX ADMIN — Medication 5 ML: at 11:02

## 2024-04-16 RX ADMIN — FOSAPREPITANT: 150 INJECTION, POWDER, LYOPHILIZED, FOR SOLUTION INTRAVENOUS at 09:16

## 2024-04-16 RX ADMIN — SACITUZUMAB GOVITECAN 671 MG: 180 POWDER, FOR SOLUTION INTRAVENOUS at 09:57

## 2024-04-16 RX ADMIN — PALONOSETRON 0.25 MG: 0.05 INJECTION, SOLUTION INTRAVENOUS at 09:05

## 2024-04-16 RX ADMIN — DIPHENHYDRAMINE HYDROCHLORIDE 25 MG: 25 CAPSULE ORAL at 09:04

## 2024-04-16 ASSESSMENT — PAIN SCALES - GENERAL: PAINLEVEL: NO PAIN (0)

## 2024-04-16 NOTE — PROGRESS NOTES
"Oncology Rooming Note    April 16, 2024 8:22 AM   Precious Paris is a 83 year old female who presents for:    Chief Complaint   Patient presents with    Oncology Clinic Visit     Malignant neoplasm metastatic to both lungs - Labs provider and infusion     Initial Vitals: /42 (BP Location: Right arm, Patient Position: Sitting, Cuff Size: Adult Large)   Pulse 77   Temp 97.8  F (36.6  C) (Tympanic)   Resp 12   Ht 1.638 m (5' 4.5\")   Wt 83.9 kg (185 lb)   SpO2 97%   BMI 31.26 kg/m   Estimated body mass index is 31.26 kg/m  as calculated from the following:    Height as of this encounter: 1.638 m (5' 4.5\").    Weight as of this encounter: 83.9 kg (185 lb). Body surface area is 1.95 meters squared.  No Pain (0) Comment: Data Unavailable   No LMP recorded. Patient is postmenopausal.  Allergies reviewed: Yes  Medications reviewed: Yes    Medications: Medication refills not needed today.  Pharmacy name entered into [facility]: [facility] - [facility] [id number] [facility]    Frailty Screening:   Is the patient here for a new oncology consult visit in cancer care? 2. No      Clinical concerns:  None      Malgorzata Vallejo CMA              "

## 2024-04-16 NOTE — LETTER
4/16/2024         RE: Precious Paris  [address] [place] [address]        Dear Colleague,    Thank you for referring your patient, Precious Paris, to the [facility]. Please see a copy of my visit note below.        [facility] Hematology and Oncology Progress Note    Patient: Precious Paris  MRN: [id number]  Apr 16, 2024          Reason for Visit    Recurrent stage IV triple negative breast cancer (nodes, lung) (PDL1+)    Primary Oncologist: Dr. Beebe    _____________________________________________________________________________    History of Present Illness/ Interval History    Ms. Precious Paris is a 82 year old with recurrent metastatic triple negative breast cancer recurrence. She initiated 3rd line sacituzumab about in September 2023.      She received 4 cycles of sacituzumab here between September and November 2023.  She left for [place] for the winter in December 2023.  And continue treatment there at [place] onc[facility].  Looks like she has received total of 6 cycles there.  Has done really well.  No significant side effects other than some diarrhea.  No neutropenia.  She had a repeat CT scan couple weeks ago.  She is back here in [place] to continue treatment.  No new issues reported today.      ECOG PS: 1      Oncology History/Treatment  Diagnosis/Stage:   11/2019: Right breast cancer, triple negative ([id number])    BRCA negative    10/2022: Recurrent, stage IV triple negative breast cancer (bilateral axillary, supraclav, mediastinal nodes + lung)  -persistent/progressive right breast firmness with new right nipple drainage and right axillary adenopathy  -bilateral diagnostic mammogram: increased density R breast with skin thickening. R breast US: 3.2 cm hypoechoic mass at 11:00 position 1 cm from nipple and multiple other small hypoechoic solid-appearing lesions in R breast + right axillary adenopathy (at least two hypoechoic vascular  lesions measuring up to 2.5 cm)  -10/18/2022 biopsy R breast mass + R axillary node: grade 3 invasive ductal cancer with tumor necrosis, ER/[place]/HER2 negative.   -PET: + right breast mass; bilateral axillary, retropectoral, supraclavicular, mediastinal/R hilar nodes and bilateral lung/R pleural nodules suspicious for mets.  -Brain MRI: negative for mets  -11/1/2022 biopsy L axillary node: metastatic invasive ductal carcinoma  -Foundation One: no actionable mutations MS stable, TMB 1 mut/mb.   -PDL1 testing: CPS >20% and TPS 60% (considered high PDL1 expression)  -CA 27.29: 59.5 (elevated). CA 15-3: 23 (normal)  -9/10/2023 Brain MRI: 2 new foci of punctate enhancement in left frontal cortex and right lentiform nucleus, concerning for brain metastases (asymptomatic)    NGS (foundation 1)  FGF R1 amplification.  Pazopanib has been approved in other tumors but not in breast cancer.  NTRK 1 amplification.  Not actionable  She has multiple other mutations which are not actionable.      Treatment:  Initial breast cancer (2019)  -2019: Neoadjuvant taxol  -Lumpectomy  -5/2020: Adjuvant RT  -6 - 7/2020: Adjuvant Xeloda. Stopped for rash    Recurrent, stage IV breast cancer (2022)  -11/14/2022:Doxil every 4 weeks  -12/12/2022: changed to doxorubicin every 3 weeks. (Planning addition of pembrolizumab once approved by insurance)  -1/2/2023: doxorubicin + pembrolizumab every 3 weeks. Completed 1 cycle. Stopped for progression (med nodes, slight increase in lung mets and stable R breast mass + axillary nodes).    -1/23/2023 - present: Carbo AUC 5, Gemcitabine 800 mg/m2 and Pembro every 21 days  -cycles 1-2: [place] day 1 only  -Cycle 3: [place] increased to days 1 + 8 given excellent tolerance  -CT after 3 cycles showed excellent response (near CR axillary + med nodes and NC in lung mets; clinical improvement in right breast changes and tumor marker).  -PET scan after 6 cycles of carboplatin gemcitabine pembrolizumab shows complete  response    5/30 - 8/22/2023: Maintenance pembrolizumab, until progression (recurrent axillary, thoracic nodes and 2 punctate brain lesions).  Biopsy of left axillary node confirmed recurrent/metastatic breast cancer.    9/12/2023 - present:  Sacituzumab days 1, 8 every 21 days  --C2D8 deferred x 1 week for neutropenia    Dose decreased to 8 mg/kg from cycle 3 onwards.    Cycle 11: 4/16/2024      Physical Exam    GENERAL: Alert and oriented to time place and person. Seated comfortably. In no distress.   Lungs: clear bilaterally  CVS: RRR systolic murmur heard  Lymph: No significant changes in bilateral axilla adenopathy  Abd: Soft, non-distended  Extremities: No edema  Neuro: alert and oriented x 3      Lab Results  No results found for this or any previous visit (from the past 168 hour(s)).        Assessment/Plan  Recurrent, stage IV triple negative right breast cancer (nodes, lung); CPS>20% + TPS 60%  Mild intermittent peripheral neuropathy fingertips (gr 1)  Punctate brain lesions concerning for metastatic disease (asymptomatic), resolved on chemo  Grade 1 chemotherapy-induced diarrhea  Grade 3 chemotherapy-induced neutropenia    She is here to continue sacituzumab infusions.  She started this in September 2023.  Received 4 cycles here.  Left to [place] for the winter.  Looks like that she has received 6 more cycles.  Has done well.  No significant side effects.  Had a repeat CT scan couple weeks ago in [place].  I reviewed CT scan report.  No significant evidence of any disease progression.  Does have some scattered additional lymph nodes and subcentimeter pulmonary nodules.  It does mention some postsurgical changes and soft tissue density in the right breast.  Questionable for recurrent disease.  But looking back at her previous scans she always had close changes in the right breast.    Overall no significant evidence of disease progression.  She is tolerating sacituzumab labs reviewed today.  Serum chemistry is  "completely normal.  CBC shows mild anemia with hemoglobin of 1.1.  Normal platelet count and normal white count.  No contraindication to proceed with cycle 11-day 1 sacituzumab today.  Dose will be 8 mg/kg.  She will come back in 1 week for day 8 labs and chemo.  Will see her back in 3 weeks.  Plan is to repeat a PET scan after 3 or 4 more cycles.    Billing:    A total of 35 min was spent today on this visit which included face to face conversation with the patient, EMR review, counseling and co-ordination of care and medical documentation.      The longitudinal plan of care for the diagnosis(es)/condition(s) as documented were addressed during this visit. Due to the added complexity in care, I will continue to support Precious in the subsequent management and with ongoing continuity of care.      Signed by:   Maricarmen Beebe MD  Hematology and Medical Oncology  [facility]        Oncology Rooming Note    April 16, 2024 8:22 AM   Precious Paris is a 83 year old female who presents for:    Chief Complaint   Patient presents with     Oncology Clinic Visit     Malignant neoplasm metastatic to both lungs - Labs provider and infusion     Initial Vitals: /42 (BP Location: Right arm, Patient Position: Sitting, Cuff Size: Adult Large)   Pulse 77   Temp 97.8  F (36.6  C) (Tympanic)   Resp 12   Ht 1.638 m (5' 4.5\")   Wt 83.9 kg (185 lb)   SpO2 97%   BMI 31.26 kg/m   Estimated body mass index is 31.26 kg/m  as calculated from the following:    Height as of this encounter: 1.638 m (5' 4.5\").    Weight as of this encounter: 83.9 kg (185 lb). Body surface area is 1.95 meters squared.  No Pain (0) Comment: Data Unavailable   No LMP recorded. Patient is postmenopausal.  Allergies reviewed: Yes  Medications reviewed: Yes    Medications: Medication refills not needed today.  Pharmacy name entered into [employer]: [facility] - [facility] [id number] [facility]    Frailty " Screening:   Is the patient here for a new oncology consult visit in cancer care? 2. No      Clinical concerns:  None      Malgorzata Vallejo CMA                Again, thank you for allowing me to participate in the care of your patient.        Sincerely,        Maricarmen Beebe MD

## 2024-04-16 NOTE — PROGRESS NOTES
PAC labs drawn without difficulty via site protocol. Patient tolerated well.    VENKATA ALLEN RN on 4/16/2024 at 8:29 AM

## 2024-04-16 NOTE — PROGRESS NOTES
Infusion Nursing Note:  [facility]    Patient seen by provider today: Yes: Dr. Beebe therefore assessment deferred   present during visit today: Not Applicable.    Note: N/A.      Intravenous Access:  Implanted Port.    Treatment Conditions:  Lab Results   Component Value Date    HGB 11.1 (L) 04/16/2024    WBC 4.3 04/16/2024    ANEU 0.8 (L) 10/10/2023    ANEUTAUTO 2.5 04/16/2024     04/16/2024        Results reviewed, labs MET treatment parameters, ok to proceed with treatment.      Post Infusion Assessment:  Patient tolerated infusion without incident.  Blood return noted pre and post infusion.  Site patent and intact, free from redness, edema or discomfort.  No evidence of extravasations.  Access discontinued per protocol.       Discharge Plan:   Copy of AVS reviewed with patient and/or family.  Patient will return 4/23/24 for next appointment.  Patient discharged in stable condition accompanied by: self.  Departure Mode: Ambulatory.      VENKATA ALLEN RN

## 2024-04-16 NOTE — PROGRESS NOTES
[facility] Hematology and Oncology Progress Note    Patient: Precious Paris  MRN: [id number]  Apr 16, 2024          Reason for Visit    Recurrent stage IV triple negative breast cancer (nodes, lung) (PDL1+)    Primary Oncologist: Dr. Beebe    _____________________________________________________________________________    History of Present Illness/ Interval History    Ms. Precious Paris is a 82 year old with recurrent metastatic triple negative breast cancer recurrence. She initiated 3rd line sacituzumab about in September 2023.      She received 4 cycles of sacituzumab here between September and November 2023.  She left for [place] for the winter in December 2023.  And continue treatment there at [place] [facility].  Looks like she has received total of 6 cycles there.  Has done really well.  No significant side effects other than some diarrhea.  No neutropenia.  She had a repeat CT scan couple weeks ago.  She is back here in [place] to continue treatment.  No new issues reported today.      ECOG PS: 1      Oncology History/Treatment  Diagnosis/Stage:   11/2019: Right breast cancer, triple negative ([id number])    BRCA negative    10/2022: Recurrent, stage IV triple negative breast cancer (bilateral axillary, supraclav, mediastinal nodes + lung)  -persistent/progressive right breast firmness with new right nipple drainage and right axillary adenopathy  -bilateral diagnostic mammogram: increased density R breast with skin thickening. R breast US: 3.2 cm hypoechoic mass at 11:00 position 1 cm from nipple and multiple other small hypoechoic solid-appearing lesions in R breast + right axillary adenopathy (at least two hypoechoic vascular lesions measuring up to 2.5 cm)  -10/18/2022 biopsy R breast mass + R axillary node: grade 3 invasive ductal cancer with tumor necrosis, ER/NH/HER2 negative.   -PET: + right breast mass; bilateral axillary, retropectoral, supraclavicular, mediastinal/R hilar  nodes and bilateral lung/R pleural nodules suspicious for mets.  -Brain MRI: negative for mets  -11/1/2022 biopsy L axillary node: metastatic invasive ductal carcinoma  -Foundation One: no actionable mutations MS stable, TMB 1 mut/mb.   -PDL1 testing: CPS >20% and TPS 60% (considered high PDL1 expression)  -CA 27.29: 59.5 (elevated). CA 15-3: 23 (normal)  -9/10/2023 Brain MRI: 2 new foci of punctate enhancement in left frontal cortex and right lentiform nucleus, concerning for brain metastases (asymptomatic)    NGS (foundation 1)  FGF R1 amplification.  Pazopanib has been approved in other tumors but not in breast cancer.  NTRK 1 amplification.  Not actionable  She has multiple other mutations which are not actionable.      Treatment:  Initial breast cancer (2019)  -2019: Neoadjuvant taxol  -Lumpectomy  -5/2020: Adjuvant RT  -6 - 7/2020: Adjuvant Xeloda. Stopped for rash    Recurrent, stage IV breast cancer (2022)  -11/14/2022:Doxil every 4 weeks  -12/12/2022: changed to doxorubicin every 3 weeks. (Planning addition of pembrolizumab once approved by insurance)  -1/2/2023: doxorubicin + pembrolizumab every 3 weeks. Completed 1 cycle. Stopped for progression (med nodes, slight increase in lung mets and stable R breast mass + axillary nodes).    -1/23/2023 - present: Carbo AUC 5, Gemcitabine 800 mg/m2 and Pembro every 21 days  -cycles 1-2: Floyd day 1 only  -Cycle 3: Floyd increased to days 1 + 8 given excellent tolerance  -CT after 3 cycles showed excellent response (near CR axillary + med nodes and MA in lung mets; clinical improvement in right breast changes and tumor marker).  -PET scan after 6 cycles of carboplatin gemcitabine pembrolizumab shows complete response    5/30 - 8/22/2023: Maintenance pembrolizumab, until progression (recurrent axillary, thoracic nodes and 2 punctate brain lesions).  Biopsy of left axillary node confirmed recurrent/metastatic breast cancer.    9/12/2023 - present:  Sacituzumab days 1, 8  every 21 days  --C2D8 deferred x 1 week for neutropenia    Dose decreased to 8 mg/kg from cycle 3 onwards.    Cycle 11: 4/16/2024      Physical Exam    GENERAL: Alert and oriented to time place and person. Seated comfortably. In no distress.   Lungs: clear bilaterally  CVS: RRR systolic murmur heard  Lymph: No significant changes in bilateral axilla adenopathy  Abd: Soft, non-distended  Extremities: No edema  Neuro: alert and oriented x 3      Lab Results  No results found for this or any previous visit (from the past 168 hour(s)).        Assessment/Plan  Recurrent, stage IV triple negative right breast cancer (nodes, lung); CPS>20% + TPS 60%  Mild intermittent peripheral neuropathy fingertips (gr 1)  Punctate brain lesions concerning for metastatic disease (asymptomatic), resolved on chemo  Grade 1 chemotherapy-induced diarrhea  Grade 3 chemotherapy-induced neutropenia    She is here to continue sacituzumab infusions.  She started this in September 2023.  Received 4 cycles here.  Left to [place] for the winter.  Looks like that she has received 6 more cycles.  Has done well.  No significant side effects.  Had a repeat CT scan couple weeks ago in [place].  I reviewed CT scan report.  No significant evidence of any disease progression.  Does have some scattered additional lymph nodes and subcentimeter pulmonary nodules.  It does mention some postsurgical changes and soft tissue density in the right breast.  Questionable for recurrent disease.  But looking back at her previous scans she always had close changes in the right breast.    Overall no significant evidence of disease progression.  She is tolerating sacituzumab labs reviewed today.  Serum chemistry is completely normal.  CBC shows mild anemia with hemoglobin of 1.1.  Normal platelet count and normal white count.  No contraindication to proceed with cycle 11-day 1 sacituzumab today.  Dose will be 8 mg/kg.  She will come back in 1 week for day 8 labs and chemo.   Will see her back in 3 weeks.  Plan is to repeat a PET scan after 3 or 4 more cycles.    Billing:    A total of 35 min was spent today on this visit which included face to face conversation with the patient, EMR review, counseling and co-ordination of care and medical documentation.      The longitudinal plan of care for the diagnosis(es)/condition(s) as documented were addressed during this visit. Due to the added complexity in care, I will continue to support Precious in the subsequent management and with ongoing continuity of care.      Signed by:   Maricarmen Beebe MD  Hematology and Medical Oncology  [facility]

## 2024-04-23 ENCOUNTER — LAB (OUTPATIENT)
Dept: INFUSION THERAPY | Facility: CLINIC | Age: 84
End: 2024-04-23
Attending: INTERNAL MEDICINE
Payer: COMMERCIAL

## 2024-04-23 VITALS — HEART RATE: 76 BPM | RESPIRATION RATE: 16 BRPM | SYSTOLIC BLOOD PRESSURE: 122 MMHG | DIASTOLIC BLOOD PRESSURE: 72 MMHG

## 2024-04-23 DIAGNOSIS — Z08 ENCOUNTER FOR FOLLOW-UP EXAMINATION AFTER COMPLETED TREATMENT FOR MALIGNANT NEOPLASM: Primary | ICD-10-CM

## 2024-04-23 DIAGNOSIS — C50.919 PRIMARY MALIGNANT NEOPLASM OF BREAST WITH METASTASIS (H): ICD-10-CM

## 2024-04-23 DIAGNOSIS — C50.411 MALIGNANT NEOPLASM OF UPPER-OUTER QUADRANT OF RIGHT BREAST IN FEMALE, ESTROGEN RECEPTOR NEGATIVE (H): Primary | ICD-10-CM

## 2024-04-23 DIAGNOSIS — Z17.1 MALIGNANT NEOPLASM OF UPPER-OUTER QUADRANT OF RIGHT BREAST IN FEMALE, ESTROGEN RECEPTOR NEGATIVE (H): Primary | ICD-10-CM

## 2024-04-23 DIAGNOSIS — C50.411 MALIGNANT NEOPLASM OF UPPER-OUTER QUADRANT OF RIGHT BREAST IN FEMALE, ESTROGEN RECEPTOR NEGATIVE (H): ICD-10-CM

## 2024-04-23 DIAGNOSIS — Z17.1 MALIGNANT NEOPLASM OF UPPER-OUTER QUADRANT OF RIGHT BREAST IN FEMALE, ESTROGEN RECEPTOR NEGATIVE (H): ICD-10-CM

## 2024-04-23 DIAGNOSIS — Z08 ENCOUNTER FOR FOLLOW-UP EXAMINATION AFTER COMPLETED TREATMENT FOR MALIGNANT NEOPLASM: ICD-10-CM

## 2024-04-23 LAB
BASOPHILS # BLD AUTO: 0 10E3/UL (ref 0–0.2)
BASOPHILS NFR BLD AUTO: 1 %
EOSINOPHIL # BLD AUTO: 0.2 10E3/UL (ref 0–0.7)
EOSINOPHIL NFR BLD AUTO: 4 %
ERYTHROCYTE [DISTWIDTH] IN BLOOD BY AUTOMATED COUNT: 15.8 % (ref 10–15)
HCT VFR BLD AUTO: 34.9 % (ref 35–47)
HGB BLD-MCNC: 11.4 G/DL (ref 11.7–15.7)
IMM GRANULOCYTES # BLD: 0.1 10E3/UL
IMM GRANULOCYTES NFR BLD: 2 %
LYMPHOCYTES # BLD AUTO: 1.1 10E3/UL (ref 0.8–5.3)
LYMPHOCYTES NFR BLD AUTO: 29 %
MCH RBC QN AUTO: 30.3 PG (ref 26.5–33)
MCHC RBC AUTO-ENTMCNC: 32.7 G/DL (ref 31.5–36.5)
MCV RBC AUTO: 93 FL (ref 78–100)
MONOCYTES # BLD AUTO: 0.5 10E3/UL (ref 0–1.3)
MONOCYTES NFR BLD AUTO: 13 %
NEUTROPHILS # BLD AUTO: 2.1 10E3/UL (ref 1.6–8.3)
NEUTROPHILS NFR BLD AUTO: 52 %
NRBC # BLD AUTO: 0 10E3/UL
NRBC BLD AUTO-RTO: 0 /100
PLATELET # BLD AUTO: 271 10E3/UL (ref 150–450)
RBC # BLD AUTO: 3.76 10E6/UL (ref 3.8–5.2)
WBC # BLD AUTO: 4 10E3/UL (ref 4–11)

## 2024-04-23 PROCEDURE — 250N000013 HC RX MED GY IP 250 OP 250 PS 637: Performed by: INTERNAL MEDICINE

## 2024-04-23 PROCEDURE — 96413 CHEMO IV INFUSION 1 HR: CPT

## 2024-04-23 PROCEDURE — 96367 TX/PROPH/DG ADDL SEQ IV INF: CPT

## 2024-04-23 PROCEDURE — 85048 AUTOMATED LEUKOCYTE COUNT: CPT | Performed by: INTERNAL MEDICINE

## 2024-04-23 PROCEDURE — 250N000011 HC RX IP 250 OP 636: Mod: JW | Performed by: INTERNAL MEDICINE

## 2024-04-23 PROCEDURE — 96375 TX/PRO/DX INJ NEW DRUG ADDON: CPT

## 2024-04-23 PROCEDURE — 258N000003 HC RX IP 258 OP 636: Performed by: INTERNAL MEDICINE

## 2024-04-23 PROCEDURE — 36591 DRAW BLOOD OFF VENOUS DEVICE: CPT | Performed by: INTERNAL MEDICINE

## 2024-04-23 RX ORDER — ACETAMINOPHEN 325 MG/1
650 TABLET ORAL ONCE
Status: COMPLETED | OUTPATIENT
Start: 2024-04-23 | End: 2024-04-23

## 2024-04-23 RX ORDER — DIPHENHYDRAMINE HCL 25 MG
25 CAPSULE ORAL ONCE
Status: COMPLETED | OUTPATIENT
Start: 2024-04-23 | End: 2024-04-23

## 2024-04-23 RX ORDER — HEPARIN SODIUM (PORCINE) LOCK FLUSH IV SOLN 100 UNIT/ML 100 UNIT/ML
5 SOLUTION INTRAVENOUS
Status: DISCONTINUED | OUTPATIENT
Start: 2024-04-23 | End: 2024-04-23 | Stop reason: HOSPADM

## 2024-04-23 RX ORDER — PALONOSETRON 0.05 MG/ML
0.25 INJECTION, SOLUTION INTRAVENOUS ONCE
Status: COMPLETED | OUTPATIENT
Start: 2024-04-23 | End: 2024-04-23

## 2024-04-23 RX ADMIN — FOSAPREPITANT: 150 INJECTION, POWDER, LYOPHILIZED, FOR SOLUTION INTRAVENOUS at 10:49

## 2024-04-23 RX ADMIN — ACETAMINOPHEN 650 MG: 325 TABLET, FILM COATED ORAL at 10:28

## 2024-04-23 RX ADMIN — SODIUM CHLORIDE 250 ML: 9 INJECTION, SOLUTION INTRAVENOUS at 10:28

## 2024-04-23 RX ADMIN — FAMOTIDINE 20 MG: 10 INJECTION, SOLUTION INTRAVENOUS at 10:29

## 2024-04-23 RX ADMIN — SACITUZUMAB GOVITECAN 671 MG: 180 POWDER, FOR SOLUTION INTRAVENOUS at 11:11

## 2024-04-23 RX ADMIN — Medication 5 ML: at 12:20

## 2024-04-23 RX ADMIN — PALONOSETRON 0.25 MG: 0.05 INJECTION, SOLUTION INTRAVENOUS at 10:30

## 2024-04-23 RX ADMIN — DIPHENHYDRAMINE HYDROCHLORIDE 25 MG: 25 CAPSULE ORAL at 10:28

## 2024-04-23 NOTE — PROGRESS NOTES
Infusion Nursing Note:  Precious Paris presents today for C11 D8 Trodelvy.    Patient seen by provider today: No   present during visit today: Not Applicable.    Note: Pt denies any new health changes or concerns.      Intravenous Access:  Implanted Port.    Treatment Conditions:  Lab Results   Component Value Date    HGB 11.4 (L) 04/23/2024    WBC 4.0 04/23/2024    ANEU 0.8 (L) 10/10/2023    ANEUTAUTO 2.1 04/23/2024     04/23/2024        Lab Results   Component Value Date     04/16/2024    POTASSIUM 3.5 04/16/2024    CR 0.59 04/16/2024    YADIRA 9.2 04/16/2024    BILITOTAL 0.3 04/16/2024    ALBUMIN 3.8 04/16/2024    ALT 17 04/16/2024    AST 21 04/16/2024       Results reviewed, labs MET treatment parameters, ok to proceed with treatment.      Post Infusion Assessment:  Patient tolerated infusion without incident.  Patient observed for 15 minutes after infusion, declining the 30 min post infusion obs.  Blood return noted pre and post infusion.  Site patent and intact, free from redness, edema or discomfort.  No evidence of extravasations.  Access discontinued per protocol.       Discharge Plan:   Discharge instructions reviewed with: Patient.  Patient and/or family verbalized understanding of discharge instructions and all questions answered.  Patient discharged in stable condition accompanied by: self.  Departure Mode: Ambulatory.      Brittany Miller RN

## 2024-05-02 DIAGNOSIS — Z08 ENCOUNTER FOR FOLLOW-UP EXAMINATION AFTER COMPLETED TREATMENT FOR MALIGNANT NEOPLASM: ICD-10-CM

## 2024-05-02 DIAGNOSIS — C50.411 MALIGNANT NEOPLASM OF UPPER-OUTER QUADRANT OF RIGHT BREAST IN FEMALE, ESTROGEN RECEPTOR NEGATIVE (H): ICD-10-CM

## 2024-05-02 DIAGNOSIS — Z17.1 MALIGNANT NEOPLASM OF UPPER-OUTER QUADRANT OF RIGHT BREAST IN FEMALE, ESTROGEN RECEPTOR NEGATIVE (H): ICD-10-CM

## 2024-05-02 DIAGNOSIS — C50.919 PRIMARY MALIGNANT NEOPLASM OF BREAST WITH METASTASIS (H): ICD-10-CM

## 2024-05-02 RX ORDER — DEXAMETHASONE 4 MG/1
8 TABLET ORAL DAILY
Qty: 12 TABLET | Refills: 2 | Status: SHIPPED | OUTPATIENT
Start: 2024-05-02 | End: 2024-07-09

## 2024-05-06 NOTE — PROGRESS NOTES
[facility] Hematology and Oncology Progress Note    Patient: Precious Paris  MRN: [id number]  May 7, 2024          Reason for Visit    Recurrent stage IV triple negative breast cancer (nodes, lung) (PDL1+)    Primary Oncologist: Dr. Beebe    Virtual visit    _____________________________________________________________________________    History of Present Illness/ Interval History    Ms. Precious Paris is a 83 year old with recurrent metastatic triple negative breast cancer recurrence. She initiated 3rd line sacituzumab in September 2023.  She transferred her care to [place] over the Winter, continuing this regimen there and returned here last month. Has now received 11 cycles. Returns for 3-week visit ahead of cycle 12.    Tolerating well.  Good appetite.  Following day 8 of this cycle, she had acute nausea/vomiting episode very briefly. Thinks it was related to an alcoholic beverage she had. No ongoing nausea issues typically. No diarrhea. No fevers.  Baseline neuropathy stable. Rhinorrhea ongoing.    No new symptoms of concern.      ECOG PS: 1      Oncology History/Treatment  Diagnosis/Stage:   11/2019: Right breast cancer, triple negative ([id number])    BRCA negative    10/2022: Recurrent, stage IV triple negative breast cancer (bilateral axillary, supraclav, mediastinal nodes + lung)  -persistent/progressive right breast firmness with new right nipple drainage and right axillary adenopathy  -bilateral diagnostic mammogram: increased density R breast with skin thickening. R breast US: 3.2 cm hypoechoic mass at 11:00 position 1 cm from nipple and multiple other small hypoechoic solid-appearing lesions in R breast + right axillary adenopathy (at least two hypoechoic vascular lesions measuring up to 2.5 cm)  -10/18/2022 biopsy R breast mass + R axillary node: grade 3 invasive ductal cancer with tumor necrosis, ER/DE/HER2 negative.   -PET: + right breast mass; bilateral axillary,  retropectoral, supraclavicular, mediastinal/R hilar nodes and bilateral lung/R pleural nodules suspicious for mets.  -Brain MRI: negative for mets  -11/1/2022 biopsy L axillary node: metastatic invasive ductal carcinoma  -Foundation One: no actionable mutations MS stable, TMB 1 mut/mb.   -PDL1 testing: CPS >20% and TPS 60% (considered high PDL1 expression)  -CA 27.29: 59.5 (elevated). CA 15-3: 23 (normal)  -9/10/2023 Brain MRI: 2 new foci of punctate enhancement in left frontal cortex and right lentiform nucleus, concerning for brain metastases (asymptomatic)    NGS (foundation 1)  FGF R1 amplification.  Pazopanib has been approved in other tumors but not in breast cancer.  NTRK 1 amplification.  Not actionable  She has multiple other mutations which are not actionable.      Treatment:  Initial breast cancer (2019)  -2019: Neoadjuvant taxol  -Lumpectomy  -5/2020: Adjuvant RT  -6 - 7/2020: Adjuvant Xeloda. Stopped for rash    Recurrent, stage IV breast cancer (2022)  -11/14/2022:Doxil every 4 weeks  -12/12/2022: changed to doxorubicin every 3 weeks. (Planning addition of pembrolizumab once approved by insurance)  -1/2/2023: doxorubicin + pembrolizumab every 3 weeks. Completed 1 cycle. Stopped for progression (med nodes, slight increase in lung mets and stable R breast mass + axillary nodes).    -1/23/2023 - present: Carbo AUC 5, Gemcitabine 800 mg/m2 and Pembro every 21 days  -cycles 1-2: Topmost day 1 only  -Cycle 3: Topmost increased to days 1 + 8 given excellent tolerance  -CT after 3 cycles showed excellent response (near CR axillary + med nodes and GA in lung mets; clinical improvement in right breast changes and tumor marker).  -PET scan after 6 cycles of carboplatin gemcitabine pembrolizumab shows complete response    5/30 - 8/22/2023: Maintenance pembrolizumab, until progression (recurrent axillary, thoracic nodes and 2 punctate brain lesions).  Biopsy of left axillary node confirmed recurrent/metastatic breast  cancer.    9/12/2023 - present:  Sacituzumab days 1, 8 every 21 days  --C2D8 deferred x 1 week for neutropenia  --Dose decreased to 8 mg/kg from cycle 3 onwards.  --Cycles 5-10 were delivered in [place] over winter months  --Resumed Cycle 11 here upon return 4/2024      Physical Exam    GENERAL: Alert and oriented to time place and person. Seated comfortably. In no distress.   Lungs: regular respiratory effort  Neuro: alert and oriented x 3      Lab Results  Recent Results (from the past 168 hour(s))   CBC with platelets and differential    Collection Time: 05/07/24  9:55 AM   Result Value Ref Range    WBC Count 5.1 4.0 - 11.0 10e3/uL    RBC Count 3.54 (L) 3.80 - 5.20 10e6/uL    Hemoglobin 10.8 (L) 11.7 - 15.7 g/dL    Hematocrit 32.7 (L) 35.0 - 47.0 %    MCV 92 78 - 100 fL    MCH 30.5 26.5 - 33.0 pg    MCHC 33.0 31.5 - 36.5 g/dL    RDW 15.3 (H) 10.0 - 15.0 %    Platelet Count 238 150 - 450 10e3/uL    % Neutrophils 58 %    % Lymphocytes 25 %    % Monocytes 14 %    % Eosinophils 3 %    % Basophils 1 %    % Immature Granulocytes 0 %    NRBCs per 100 WBC 0 <1 /100    Absolute Neutrophils 2.9 1.6 - 8.3 10e3/uL    Absolute Lymphocytes 1.3 0.8 - 5.3 10e3/uL    Absolute Monocytes 0.7 0.0 - 1.3 10e3/uL    Absolute Eosinophils 0.1 0.0 - 0.7 10e3/uL    Absolute Basophils 0.0 0.0 - 0.2 10e3/uL    Absolute Immature Granulocytes 0.0 <=0.4 10e3/uL    Absolute NRBCs 0.0 10e3/uL         Assessment/Plan  Recurrent, stage IV triple negative right breast cancer (nodes, lung); CPS>20% + TPS 60%  Mild intermittent peripheral neuropathy fingertips (gr 1)  Punctate brain lesions concerning for metastatic disease (asymptomatic), resolved on chemo  Grade 1 chemotherapy-induced diarrhea  Grade 3 chemotherapy-induced neutropenia  Has been on dose-modified sacituzumab since last September 2023, now having completed 11 cycles.     Tolerating this well. Had one day of acute nausea/emesis after day 8 which I think was unrelated as she has not had  any nausea historically on it.     Labs: hgb 10.8, WBC and plat WNL.  CMP WNL.    Clinically, no concern for progression.     CT in March (in [place]) showed overall stable disease.     Plan:  -Proceed with C12. Same doses (8 mg/kg days 1 + 8)   -Return every 3 weeks at start of new cycle  -Repeat a PET scan after cycle 14 (in 9 weeks)  -Repeat brain MRI at time of PET to continue surveillance of previous lesions    Billing:    A total of 30 min was spent today on this visit which included face to face conversation with the patient, EMR review, counseling and co-ordination of care and medical documentation.      Signed by:   Lazara Torres NP

## 2024-05-07 ENCOUNTER — INFUSION THERAPY VISIT (OUTPATIENT)
Dept: INFUSION THERAPY | Facility: CLINIC | Age: 84
End: 2024-05-07
Attending: INTERNAL MEDICINE
Payer: COMMERCIAL

## 2024-05-07 ENCOUNTER — VIRTUAL VISIT (OUTPATIENT)
Dept: ONCOLOGY | Facility: CLINIC | Age: 84
End: 2024-05-07
Attending: NURSE PRACTITIONER
Payer: COMMERCIAL

## 2024-05-07 VITALS — DIASTOLIC BLOOD PRESSURE: 77 MMHG | HEART RATE: 76 BPM | SYSTOLIC BLOOD PRESSURE: 124 MMHG | RESPIRATION RATE: 16 BRPM

## 2024-05-07 VITALS
DIASTOLIC BLOOD PRESSURE: 67 MMHG | BODY MASS INDEX: 30.66 KG/M2 | WEIGHT: 184 LBS | RESPIRATION RATE: 12 BRPM | SYSTOLIC BLOOD PRESSURE: 136 MMHG | HEIGHT: 65 IN | HEART RATE: 83 BPM | OXYGEN SATURATION: 97 % | TEMPERATURE: 98 F

## 2024-05-07 DIAGNOSIS — Z17.1 MALIGNANT NEOPLASM OF UPPER-OUTER QUADRANT OF RIGHT BREAST IN FEMALE, ESTROGEN RECEPTOR NEGATIVE (H): ICD-10-CM

## 2024-05-07 DIAGNOSIS — C78.02 MALIGNANT NEOPLASM METASTATIC TO BOTH LUNGS (H): ICD-10-CM

## 2024-05-07 DIAGNOSIS — C50.411 MALIGNANT NEOPLASM OF UPPER-OUTER QUADRANT OF RIGHT BREAST IN FEMALE, ESTROGEN RECEPTOR NEGATIVE (H): ICD-10-CM

## 2024-05-07 DIAGNOSIS — Z08 ENCOUNTER FOR FOLLOW-UP EXAMINATION AFTER COMPLETED TREATMENT FOR MALIGNANT NEOPLASM: Primary | ICD-10-CM

## 2024-05-07 DIAGNOSIS — C50.411 MALIGNANT NEOPLASM OF UPPER-OUTER QUADRANT OF RIGHT BREAST IN FEMALE, ESTROGEN RECEPTOR NEGATIVE (H): Primary | ICD-10-CM

## 2024-05-07 DIAGNOSIS — Z08 ENCOUNTER FOR FOLLOW-UP EXAMINATION AFTER COMPLETED TREATMENT FOR MALIGNANT NEOPLASM: ICD-10-CM

## 2024-05-07 DIAGNOSIS — C50.919 PRIMARY MALIGNANT NEOPLASM OF BREAST WITH METASTASIS (H): ICD-10-CM

## 2024-05-07 DIAGNOSIS — C77.3 METASTATIC CANCER TO AXILLARY LYMPH NODES (H): ICD-10-CM

## 2024-05-07 DIAGNOSIS — Z17.1 MALIGNANT NEOPLASM OF UPPER-OUTER QUADRANT OF RIGHT BREAST IN FEMALE, ESTROGEN RECEPTOR NEGATIVE (H): Primary | ICD-10-CM

## 2024-05-07 DIAGNOSIS — C78.01 MALIGNANT NEOPLASM METASTATIC TO BOTH LUNGS (H): ICD-10-CM

## 2024-05-07 LAB
ALBUMIN SERPL BCG-MCNC: 3.7 G/DL (ref 3.5–5.2)
ALP SERPL-CCNC: 97 U/L (ref 40–150)
ALT SERPL W P-5'-P-CCNC: 23 U/L (ref 0–50)
ANION GAP SERPL CALCULATED.3IONS-SCNC: 10 MMOL/L (ref 7–15)
AST SERPL W P-5'-P-CCNC: 20 U/L (ref 0–45)
BASOPHILS # BLD AUTO: 0 10E3/UL (ref 0–0.2)
BASOPHILS NFR BLD AUTO: 1 %
BILIRUB SERPL-MCNC: 0.3 MG/DL
BUN SERPL-MCNC: 13.7 MG/DL (ref 8–23)
CALCIUM SERPL-MCNC: 9.2 MG/DL (ref 8.8–10.2)
CHLORIDE SERPL-SCNC: 100 MMOL/L (ref 98–107)
CREAT SERPL-MCNC: 0.58 MG/DL (ref 0.51–0.95)
DEPRECATED HCO3 PLAS-SCNC: 28 MMOL/L (ref 22–29)
EGFRCR SERPLBLD CKD-EPI 2021: 90 ML/MIN/1.73M2
EOSINOPHIL # BLD AUTO: 0.1 10E3/UL (ref 0–0.7)
EOSINOPHIL NFR BLD AUTO: 3 %
ERYTHROCYTE [DISTWIDTH] IN BLOOD BY AUTOMATED COUNT: 15.3 % (ref 10–15)
GLUCOSE SERPL-MCNC: 100 MG/DL (ref 70–99)
HCT VFR BLD AUTO: 32.7 % (ref 35–47)
HGB BLD-MCNC: 10.8 G/DL (ref 11.7–15.7)
IMM GRANULOCYTES # BLD: 0 10E3/UL
IMM GRANULOCYTES NFR BLD: 0 %
LYMPHOCYTES # BLD AUTO: 1.3 10E3/UL (ref 0.8–5.3)
LYMPHOCYTES NFR BLD AUTO: 25 %
MCH RBC QN AUTO: 30.5 PG (ref 26.5–33)
MCHC RBC AUTO-ENTMCNC: 33 G/DL (ref 31.5–36.5)
MCV RBC AUTO: 92 FL (ref 78–100)
MONOCYTES # BLD AUTO: 0.7 10E3/UL (ref 0–1.3)
MONOCYTES NFR BLD AUTO: 14 %
NEUTROPHILS # BLD AUTO: 2.9 10E3/UL (ref 1.6–8.3)
NEUTROPHILS NFR BLD AUTO: 58 %
NRBC # BLD AUTO: 0 10E3/UL
NRBC BLD AUTO-RTO: 0 /100
PLATELET # BLD AUTO: 238 10E3/UL (ref 150–450)
POTASSIUM SERPL-SCNC: 3.4 MMOL/L (ref 3.4–5.3)
PROT SERPL-MCNC: 6.5 G/DL (ref 6.4–8.3)
RBC # BLD AUTO: 3.54 10E6/UL (ref 3.8–5.2)
SODIUM SERPL-SCNC: 138 MMOL/L (ref 135–145)
WBC # BLD AUTO: 5.1 10E3/UL (ref 4–11)

## 2024-05-07 PROCEDURE — 99214 OFFICE O/P EST MOD 30 MIN: CPT | Mod: 95 | Performed by: NURSE PRACTITIONER

## 2024-05-07 PROCEDURE — 258N000003 HC RX IP 258 OP 636: Performed by: NURSE PRACTITIONER

## 2024-05-07 PROCEDURE — 250N000011 HC RX IP 250 OP 636: Mod: JW | Performed by: NURSE PRACTITIONER

## 2024-05-07 PROCEDURE — 250N000013 HC RX MED GY IP 250 OP 250 PS 637: Performed by: NURSE PRACTITIONER

## 2024-05-07 PROCEDURE — 82040 ASSAY OF SERUM ALBUMIN: CPT | Performed by: INTERNAL MEDICINE

## 2024-05-07 PROCEDURE — 96367 TX/PROPH/DG ADDL SEQ IV INF: CPT

## 2024-05-07 PROCEDURE — 85025 COMPLETE CBC W/AUTO DIFF WBC: CPT | Performed by: INTERNAL MEDICINE

## 2024-05-07 PROCEDURE — 96413 CHEMO IV INFUSION 1 HR: CPT

## 2024-05-07 PROCEDURE — 96375 TX/PRO/DX INJ NEW DRUG ADDON: CPT

## 2024-05-07 PROCEDURE — 36591 DRAW BLOOD OFF VENOUS DEVICE: CPT | Performed by: INTERNAL MEDICINE

## 2024-05-07 RX ORDER — PALONOSETRON 0.05 MG/ML
0.25 INJECTION, SOLUTION INTRAVENOUS ONCE
Status: CANCELLED | OUTPATIENT
Start: 2024-05-14

## 2024-05-07 RX ORDER — LORAZEPAM 2 MG/ML
0.5 INJECTION INTRAMUSCULAR EVERY 4 HOURS PRN
Status: CANCELLED | OUTPATIENT
Start: 2024-05-07

## 2024-05-07 RX ORDER — DIPHENHYDRAMINE HYDROCHLORIDE 50 MG/ML
50 INJECTION INTRAMUSCULAR; INTRAVENOUS
Status: CANCELLED
Start: 2024-05-07

## 2024-05-07 RX ORDER — METHYLPREDNISOLONE SODIUM SUCCINATE 125 MG/2ML
125 INJECTION, POWDER, LYOPHILIZED, FOR SOLUTION INTRAMUSCULAR; INTRAVENOUS
Status: CANCELLED
Start: 2024-05-07

## 2024-05-07 RX ORDER — LORAZEPAM 2 MG/ML
0.5 INJECTION INTRAMUSCULAR EVERY 4 HOURS PRN
Status: CANCELLED | OUTPATIENT
Start: 2024-05-14

## 2024-05-07 RX ORDER — EPINEPHRINE 1 MG/ML
0.3 INJECTION, SOLUTION, CONCENTRATE INTRAVENOUS EVERY 5 MIN PRN
Status: CANCELLED | OUTPATIENT
Start: 2024-05-07

## 2024-05-07 RX ORDER — EPINEPHRINE 1 MG/ML
0.3 INJECTION, SOLUTION, CONCENTRATE INTRAVENOUS EVERY 5 MIN PRN
Status: CANCELLED | OUTPATIENT
Start: 2024-05-14

## 2024-05-07 RX ORDER — ATROPINE SULFATE 0.4 MG/ML
0.4 AMPUL (ML) INJECTION
Status: CANCELLED | OUTPATIENT
Start: 2024-05-07

## 2024-05-07 RX ORDER — ALBUTEROL SULFATE 90 UG/1
1-2 AEROSOL, METERED RESPIRATORY (INHALATION)
Status: CANCELLED
Start: 2024-05-14

## 2024-05-07 RX ORDER — DIPHENHYDRAMINE HCL 25 MG
25 CAPSULE ORAL ONCE
Status: CANCELLED | OUTPATIENT
Start: 2024-05-14

## 2024-05-07 RX ORDER — MEPERIDINE HYDROCHLORIDE 25 MG/ML
25 INJECTION INTRAMUSCULAR; INTRAVENOUS; SUBCUTANEOUS EVERY 30 MIN PRN
Status: CANCELLED | OUTPATIENT
Start: 2024-05-14

## 2024-05-07 RX ORDER — ACETAMINOPHEN 325 MG/1
650 TABLET ORAL ONCE
Status: CANCELLED | OUTPATIENT
Start: 2024-05-07

## 2024-05-07 RX ORDER — PALONOSETRON 0.05 MG/ML
0.25 INJECTION, SOLUTION INTRAVENOUS ONCE
Status: CANCELLED | OUTPATIENT
Start: 2024-05-07

## 2024-05-07 RX ORDER — ACETAMINOPHEN 325 MG/1
650 TABLET ORAL ONCE
Status: COMPLETED | OUTPATIENT
Start: 2024-05-07 | End: 2024-05-07

## 2024-05-07 RX ORDER — ALBUTEROL SULFATE 0.83 MG/ML
2.5 SOLUTION RESPIRATORY (INHALATION)
Status: CANCELLED | OUTPATIENT
Start: 2024-05-14

## 2024-05-07 RX ORDER — MEPERIDINE HYDROCHLORIDE 25 MG/ML
25 INJECTION INTRAMUSCULAR; INTRAVENOUS; SUBCUTANEOUS EVERY 30 MIN PRN
Status: CANCELLED | OUTPATIENT
Start: 2024-05-07

## 2024-05-07 RX ORDER — PALONOSETRON 0.05 MG/ML
0.25 INJECTION, SOLUTION INTRAVENOUS ONCE
Status: COMPLETED | OUTPATIENT
Start: 2024-05-07 | End: 2024-05-07

## 2024-05-07 RX ORDER — DIPHENHYDRAMINE HYDROCHLORIDE 50 MG/ML
50 INJECTION INTRAMUSCULAR; INTRAVENOUS
Status: CANCELLED
Start: 2024-05-14

## 2024-05-07 RX ORDER — ALBUTEROL SULFATE 0.83 MG/ML
2.5 SOLUTION RESPIRATORY (INHALATION)
Status: CANCELLED | OUTPATIENT
Start: 2024-05-07

## 2024-05-07 RX ORDER — HEPARIN SODIUM (PORCINE) LOCK FLUSH IV SOLN 100 UNIT/ML 100 UNIT/ML
5 SOLUTION INTRAVENOUS
Status: CANCELLED | OUTPATIENT
Start: 2024-05-14

## 2024-05-07 RX ORDER — ATROPINE SULFATE 0.4 MG/ML
0.4 AMPUL (ML) INJECTION
Status: CANCELLED | OUTPATIENT
Start: 2024-05-14

## 2024-05-07 RX ORDER — DIPHENHYDRAMINE HCL 25 MG
25 CAPSULE ORAL ONCE
Status: COMPLETED | OUTPATIENT
Start: 2024-05-07 | End: 2024-05-07

## 2024-05-07 RX ORDER — ACETAMINOPHEN 325 MG/1
650 TABLET ORAL ONCE
Status: CANCELLED | OUTPATIENT
Start: 2024-05-14

## 2024-05-07 RX ORDER — METHYLPREDNISOLONE SODIUM SUCCINATE 125 MG/2ML
125 INJECTION, POWDER, LYOPHILIZED, FOR SOLUTION INTRAMUSCULAR; INTRAVENOUS
Status: CANCELLED
Start: 2024-05-14

## 2024-05-07 RX ORDER — DIPHENHYDRAMINE HCL 25 MG
25 CAPSULE ORAL ONCE
Status: CANCELLED | OUTPATIENT
Start: 2024-05-07

## 2024-05-07 RX ORDER — HEPARIN SODIUM (PORCINE) LOCK FLUSH IV SOLN 100 UNIT/ML 100 UNIT/ML
5 SOLUTION INTRAVENOUS
Status: DISCONTINUED | OUTPATIENT
Start: 2024-05-07 | End: 2024-05-07 | Stop reason: HOSPADM

## 2024-05-07 RX ORDER — ALBUTEROL SULFATE 90 UG/1
1-2 AEROSOL, METERED RESPIRATORY (INHALATION)
Status: CANCELLED
Start: 2024-05-07

## 2024-05-07 RX ORDER — HEPARIN SODIUM (PORCINE) LOCK FLUSH IV SOLN 100 UNIT/ML 100 UNIT/ML
5 SOLUTION INTRAVENOUS
Status: CANCELLED | OUTPATIENT
Start: 2024-05-07

## 2024-05-07 RX ADMIN — PALONOSETRON 0.25 MG: 0.05 INJECTION, SOLUTION INTRAVENOUS at 10:57

## 2024-05-07 RX ADMIN — Medication 5 ML: at 12:58

## 2024-05-07 RX ADMIN — SODIUM CHLORIDE 250 ML: 9 INJECTION, SOLUTION INTRAVENOUS at 10:56

## 2024-05-07 RX ADMIN — DEXAMETHASONE SODIUM PHOSPHATE: 10 INJECTION, SOLUTION INTRAMUSCULAR; INTRAVENOUS at 11:22

## 2024-05-07 RX ADMIN — DIPHENHYDRAMINE HYDROCHLORIDE 25 MG: 25 CAPSULE ORAL at 10:56

## 2024-05-07 RX ADMIN — ACETAMINOPHEN 650 MG: 325 TABLET, FILM COATED ORAL at 10:56

## 2024-05-07 RX ADMIN — SACITUZUMAB GOVITECAN 671 MG: 180 POWDER, FOR SOLUTION INTRAVENOUS at 11:48

## 2024-05-07 RX ADMIN — FAMOTIDINE 20 MG: 10 INJECTION, SOLUTION INTRAVENOUS at 10:57

## 2024-05-07 ASSESSMENT — PAIN SCALES - GENERAL: PAINLEVEL: NO PAIN (0)

## 2024-05-07 NOTE — PROGRESS NOTES
Infusion Nursing Note:  Precious Paris presents today for C12 D1 Trodelvy.    Patient seen by provider today: Yes: Lazara Torres NP   present during visit today: Not Applicable.    Note: Pt denies any new health changes or concerns.      Intravenous Access:  Implanted Port.    Treatment Conditions:  Lab Results   Component Value Date    HGB 10.8 (L) 05/07/2024    WBC 5.1 05/07/2024    ANEU 0.8 (L) 10/10/2023    ANEUTAUTO 2.9 05/07/2024     05/07/2024        Lab Results   Component Value Date     05/07/2024    POTASSIUM 3.4 05/07/2024    CR 0.58 05/07/2024    YADIRA 9.2 05/07/2024    BILITOTAL 0.3 05/07/2024    ALBUMIN 3.7 05/07/2024    ALT 23 05/07/2024    AST 20 05/07/2024       Results reviewed, labs MET treatment parameters, ok to proceed with treatment.      Post Infusion Assessment:  Patient tolerated infusion without incident.  Patient declines 30 minute observation post trodelvy per protocol.  Blood return noted pre and post infusion.  Site patent and intact, free from redness, edema or discomfort.  No evidence of extravasations.  Access discontinued per protocol.       Discharge Plan:   Discharge instructions reviewed with: Patient.  Patient and/or family verbalized understanding of discharge instructions and all questions answered.  Patient discharged in stable condition accompanied by: self.  Departure Mode: Ambulatory.      Brittany Miller RN

## 2024-05-07 NOTE — PROGRESS NOTES
[phone]     Spoke with Carline and she states that she will call Dr Parikh and have him read the echo.    Virtual Visit Details    Type of service:  Video Visit   Video Start Time:  1018  Video End Time: 1031    Originating Location (pt. Location): Other in clinic    Distant Location (provider location):  Off-site  Platform used for Video Visit: Aaron Vallejo CMA on 5/7/2024 at 10:13 AM

## 2024-05-07 NOTE — LETTER
5/7/2024         RE: Precious Paris  [address] [place] [address]        Dear Colleague,    Thank you for referring your patient, Precious Paris, to the [facility]. Please see a copy of my visit note below.        [facility] Hematology and Oncology Progress Note    Patient: Precious Paris  MRN: [id number]  May 7, 2024          Reason for Visit    Recurrent stage IV triple negative breast cancer (nodes, lung) (PDL1+)    Primary Oncologist: Dr. Beebe    Virtual visit    _____________________________________________________________________________    History of Present Illness/ Interval History    Ms. Precious Paris is a 83 year old with recurrent metastatic triple negative breast cancer recurrence. She initiated 3rd line sacituzumab in September 2023.  She transferred her care to [place] over the Winter, continuing this regimen there and returned here last month. Has now received 11 cycles. Returns for 3-week visit ahead of cycle 12.    Tolerating well.  Good appetite.  Following day 8 of this cycle, she had acute nausea/vomiting episode very briefly. Thinks it was related to an alcoholic beverage she had. No ongoing nausea issues typically. No diarrhea. No fevers.  Baseline neuropathy stable. Rhinorrhea ongoing.    No new symptoms of concern.      ECOG PS: 1      Oncology History/Treatment  Diagnosis/Stage:   11/2019: Right breast cancer, triple negative (uH1s-F2)    BRCA negative    10/2022: Recurrent, stage IV triple negative breast cancer (bilateral axillary, supraclav, mediastinal nodes + lung)  -persistent/progressive right breast firmness with new right nipple drainage and right axillary adenopathy  -bilateral diagnostic mammogram: increased density R breast with skin thickening. R breast US: 3.2 cm hypoechoic mass at 11:00 position 1 cm from nipple and multiple other small hypoechoic solid-appearing lesions in R breast + right axillary  adenopathy (at least two hypoechoic vascular lesions measuring up to 2.5 cm)  -10/18/2022 biopsy R breast mass + R axillary node: grade 3 invasive ductal cancer with tumor necrosis, ER/AZ/HER2 negative.   -PET: + right breast mass; bilateral axillary, retropectoral, supraclavicular, mediastinal/R hilar nodes and bilateral lung/R pleural nodules suspicious for mets.  -Brain MRI: negative for mets  -11/1/2022 biopsy L axillary node: metastatic invasive ductal carcinoma  -Foundation One: no actionable mutations MS stable, TMB 1 mut/mb.   -PDL1 testing: CPS >20% and TPS 60% (considered high PDL1 expression)  -CA 27.29: 59.5 (elevated). CA 15-3: 23 (normal)  -9/10/2023 Brain MRI: 2 new foci of punctate enhancement in left frontal cortex and right lentiform nucleus, concerning for brain metastases (asymptomatic)    NGS (foundation 1)  FGF R1 amplification.  Pazopanib has been approved in other tumors but not in breast cancer.  NTRK 1 amplification.  Not actionable  She has multiple other mutations which are not actionable.      Treatment:  Initial breast cancer (2019)  -2019: Neoadjuvant taxol  -Lumpectomy  -5/2020: Adjuvant RT  -6 - 7/2020: Adjuvant Xeloda. Stopped for rash    Recurrent, stage IV breast cancer (2022)  -11/14/2022:Doxil every 4 weeks  -12/12/2022: changed to doxorubicin every 3 weeks. (Planning addition of pembrolizumab once approved by insurance)  -1/2/2023: doxorubicin + pembrolizumab every 3 weeks. Completed 1 cycle. Stopped for progression (med nodes, slight increase in lung mets and stable R breast mass + axillary nodes).    -1/23/2023 - present: Carbo AUC 5, Gemcitabine 800 mg/m2 and Pembro every 21 days  -cycles 1-2: [place] day 1 only  -Cycle 3: [place] increased to days 1 + 8 given excellent tolerance  -CT after 3 cycles showed excellent response (near CR axillary + med nodes and AZ in lung mets; clinical improvement in right breast changes and tumor marker).  -PET scan after 6 cycles of carboplatin  gemcitabine pembrolizumab shows complete response    5/30 - 8/22/2023: Maintenance pembrolizumab, until progression (recurrent axillary, thoracic nodes and 2 punctate brain lesions).  Biopsy of left axillary node confirmed recurrent/metastatic breast cancer.    9/12/2023 - present:  Sacituzumab days 1, 8 every 21 days  --C2D8 deferred x 1 week for neutropenia  --Dose decreased to 8 mg/kg from cycle 3 onwards.  --Cycles 5-10 were delivered in [place] over winter months  --Resumed Cycle 11 here upon return 4/2024      Physical Exam    GENERAL: Alert and oriented to time place and person. Seated comfortably. In no distress.   Lungs: regular respiratory effort  Neuro: alert and oriented x 3      Lab Results  Recent Results (from the past 168 hour(s))   CBC with platelets and differential    Collection Time: 05/07/24  9:55 AM   Result Value Ref Range    WBC Count 5.1 4.0 - 11.0 10e3/uL    RBC Count 3.54 (L) 3.80 - 5.20 10e6/uL    Hemoglobin 10.8 (L) 11.7 - 15.7 g/dL    Hematocrit 32.7 (L) 35.0 - 47.0 %    MCV 92 78 - 100 fL    MCH 30.5 26.5 - 33.0 pg    MCHC 33.0 31.5 - 36.5 g/dL    RDW 15.3 (H) 10.0 - 15.0 %    Platelet Count 238 150 - 450 10e3/uL    % Neutrophils 58 %    % Lymphocytes 25 %    % Monocytes 14 %    % Eosinophils 3 %    % Basophils 1 %    % Immature Granulocytes 0 %    NRBCs per 100 WBC 0 <1 /100    Absolute Neutrophils 2.9 1.6 - 8.3 10e3/uL    Absolute Lymphocytes 1.3 0.8 - 5.3 10e3/uL    Absolute Monocytes 0.7 0.0 - 1.3 10e3/uL    Absolute Eosinophils 0.1 0.0 - 0.7 10e3/uL    Absolute Basophils 0.0 0.0 - 0.2 10e3/uL    Absolute Immature Granulocytes 0.0 <=0.4 10e3/uL    Absolute NRBCs 0.0 10e3/uL         Assessment/Plan  Recurrent, stage IV triple negative right breast cancer (nodes, lung); CPS>20% + TPS 60%  Mild intermittent peripheral neuropathy fingertips (gr 1)  Punctate brain lesions concerning for metastatic disease (asymptomatic), resolved on chemo  Grade 1 chemotherapy-induced diarrhea  Grade 3  chemotherapy-induced neutropenia  Has been on dose-modified sacituzumab since last September 2023, now having completed 11 cycles.     Tolerating this well. Had one day of acute nausea/emesis after day 8 which I think was unrelated as she has not had any nausea historically on it.     Labs: hgb 10.8, WBC and plat WNL.  CMP WNL.    Clinically, no concern for progression.     CT in March (in [place]) showed overall stable disease.     Plan:  -Proceed with C12. Same doses (8 mg/kg days 1 + 8)   -Return every 3 weeks at start of new cycle  -Repeat a PET scan after cycle 14 (in 9 weeks)  -Repeat brain MRI at time of PET to continue surveillance of previous lesions    Billing:    A total of 30 min was spent today on this visit which included face to face conversation with the patient, EMR review, counseling and co-ordination of care and medical documentation.      Signed by:   Lazara Torres NP        Virtual Visit Details    Type of service:  Video Visit   Video Start Time:  1018  Video End Time: 1031    Originating Location (pt. Location): Other in clinic    Distant Location (provider location):  Off-site  Platform used for Video Visit: Aaron Vallejo CMA on 5/7/2024 at 10:13 AM      Again, thank you for allowing me to participate in the care of your patient.        Sincerely,        Lazara Torres NP

## 2024-05-14 ENCOUNTER — INFUSION THERAPY VISIT (OUTPATIENT)
Dept: INFUSION THERAPY | Facility: CLINIC | Age: 84
End: 2024-05-14
Attending: INTERNAL MEDICINE
Payer: COMMERCIAL

## 2024-05-14 ENCOUNTER — LAB (OUTPATIENT)
Dept: INFUSION THERAPY | Facility: CLINIC | Age: 84
End: 2024-05-14
Attending: NURSE PRACTITIONER
Payer: COMMERCIAL

## 2024-05-14 VITALS — DIASTOLIC BLOOD PRESSURE: 75 MMHG | SYSTOLIC BLOOD PRESSURE: 128 MMHG | HEART RATE: 80 BPM

## 2024-05-14 VITALS — TEMPERATURE: 98.4 F | HEART RATE: 84 BPM | SYSTOLIC BLOOD PRESSURE: 120 MMHG | DIASTOLIC BLOOD PRESSURE: 74 MMHG

## 2024-05-14 DIAGNOSIS — Z17.1 MALIGNANT NEOPLASM OF UPPER-OUTER QUADRANT OF RIGHT BREAST IN FEMALE, ESTROGEN RECEPTOR NEGATIVE (H): ICD-10-CM

## 2024-05-14 DIAGNOSIS — C50.411 MALIGNANT NEOPLASM OF UPPER-OUTER QUADRANT OF RIGHT BREAST IN FEMALE, ESTROGEN RECEPTOR NEGATIVE (H): Primary | ICD-10-CM

## 2024-05-14 DIAGNOSIS — Z08 ENCOUNTER FOR FOLLOW-UP EXAMINATION AFTER COMPLETED TREATMENT FOR MALIGNANT NEOPLASM: Primary | ICD-10-CM

## 2024-05-14 DIAGNOSIS — C50.411 MALIGNANT NEOPLASM OF UPPER-OUTER QUADRANT OF RIGHT BREAST IN FEMALE, ESTROGEN RECEPTOR NEGATIVE (H): ICD-10-CM

## 2024-05-14 DIAGNOSIS — C50.919 PRIMARY MALIGNANT NEOPLASM OF BREAST WITH METASTASIS (H): ICD-10-CM

## 2024-05-14 DIAGNOSIS — Z17.1 MALIGNANT NEOPLASM OF UPPER-OUTER QUADRANT OF RIGHT BREAST IN FEMALE, ESTROGEN RECEPTOR NEGATIVE (H): Primary | ICD-10-CM

## 2024-05-14 DIAGNOSIS — Z08 ENCOUNTER FOR FOLLOW-UP EXAMINATION AFTER COMPLETED TREATMENT FOR MALIGNANT NEOPLASM: ICD-10-CM

## 2024-05-14 LAB
BASOPHILS # BLD AUTO: 0 10E3/UL (ref 0–0.2)
BASOPHILS NFR BLD AUTO: 1 %
EOSINOPHIL # BLD AUTO: 0.1 10E3/UL (ref 0–0.7)
EOSINOPHIL NFR BLD AUTO: 2 %
ERYTHROCYTE [DISTWIDTH] IN BLOOD BY AUTOMATED COUNT: 15.3 % (ref 10–15)
HCT VFR BLD AUTO: 33.8 % (ref 35–47)
HGB BLD-MCNC: 11.1 G/DL (ref 11.7–15.7)
IMM GRANULOCYTES # BLD: 0.1 10E3/UL
IMM GRANULOCYTES NFR BLD: 2 %
LYMPHOCYTES # BLD AUTO: 1.3 10E3/UL (ref 0.8–5.3)
LYMPHOCYTES NFR BLD AUTO: 28 %
MCH RBC QN AUTO: 30.5 PG (ref 26.5–33)
MCHC RBC AUTO-ENTMCNC: 32.8 G/DL (ref 31.5–36.5)
MCV RBC AUTO: 93 FL (ref 78–100)
MONOCYTES # BLD AUTO: 0.6 10E3/UL (ref 0–1.3)
MONOCYTES NFR BLD AUTO: 13 %
NEUTROPHILS # BLD AUTO: 2.4 10E3/UL (ref 1.6–8.3)
NEUTROPHILS NFR BLD AUTO: 54 %
NRBC # BLD AUTO: 0 10E3/UL
NRBC BLD AUTO-RTO: 0 /100
PLATELET # BLD AUTO: 262 10E3/UL (ref 150–450)
RBC # BLD AUTO: 3.64 10E6/UL (ref 3.8–5.2)
WBC # BLD AUTO: 4.5 10E3/UL (ref 4–11)

## 2024-05-14 PROCEDURE — 36591 DRAW BLOOD OFF VENOUS DEVICE: CPT | Performed by: NURSE PRACTITIONER

## 2024-05-14 PROCEDURE — 96367 TX/PROPH/DG ADDL SEQ IV INF: CPT

## 2024-05-14 PROCEDURE — 85004 AUTOMATED DIFF WBC COUNT: CPT | Performed by: NURSE PRACTITIONER

## 2024-05-14 PROCEDURE — 96413 CHEMO IV INFUSION 1 HR: CPT

## 2024-05-14 PROCEDURE — 258N000003 HC RX IP 258 OP 636: Performed by: NURSE PRACTITIONER

## 2024-05-14 PROCEDURE — 250N000011 HC RX IP 250 OP 636: Performed by: NURSE PRACTITIONER

## 2024-05-14 PROCEDURE — 96375 TX/PRO/DX INJ NEW DRUG ADDON: CPT

## 2024-05-14 PROCEDURE — 250N000013 HC RX MED GY IP 250 OP 250 PS 637: Performed by: NURSE PRACTITIONER

## 2024-05-14 RX ORDER — ACETAMINOPHEN 325 MG/1
650 TABLET ORAL ONCE
Status: COMPLETED | OUTPATIENT
Start: 2024-05-14 | End: 2024-05-14

## 2024-05-14 RX ORDER — HEPARIN SODIUM (PORCINE) LOCK FLUSH IV SOLN 100 UNIT/ML 100 UNIT/ML
5 SOLUTION INTRAVENOUS
Status: DISCONTINUED | OUTPATIENT
Start: 2024-05-14 | End: 2024-05-14 | Stop reason: HOSPADM

## 2024-05-14 RX ORDER — DIPHENHYDRAMINE HCL 25 MG
25 CAPSULE ORAL ONCE
Status: COMPLETED | OUTPATIENT
Start: 2024-05-14 | End: 2024-05-14

## 2024-05-14 RX ORDER — PALONOSETRON 0.05 MG/ML
0.25 INJECTION, SOLUTION INTRAVENOUS ONCE
Status: COMPLETED | OUTPATIENT
Start: 2024-05-14 | End: 2024-05-14

## 2024-05-14 RX ADMIN — Medication 5 ML: at 12:47

## 2024-05-14 RX ADMIN — FAMOTIDINE 20 MG: 10 INJECTION, SOLUTION INTRAVENOUS at 10:59

## 2024-05-14 RX ADMIN — FOSAPREPITANT: 150 INJECTION, POWDER, LYOPHILIZED, FOR SOLUTION INTRAVENOUS at 11:17

## 2024-05-14 RX ADMIN — DIPHENHYDRAMINE HYDROCHLORIDE 25 MG: 25 CAPSULE ORAL at 10:59

## 2024-05-14 RX ADMIN — SACITUZUMAB GOVITECAN 671 MG: 180 POWDER, FOR SOLUTION INTRAVENOUS at 11:40

## 2024-05-14 RX ADMIN — PALONOSETRON 0.25 MG: 0.05 INJECTION, SOLUTION INTRAVENOUS at 11:02

## 2024-05-14 RX ADMIN — SODIUM CHLORIDE 250 ML: 9 INJECTION, SOLUTION INTRAVENOUS at 10:58

## 2024-05-14 RX ADMIN — ACETAMINOPHEN 650 MG: 325 TABLET, FILM COATED ORAL at 10:59

## 2024-05-14 NOTE — PROGRESS NOTES
Infusion Nursing Note:  Precious Paris presents today for Trodelvy.    Patient seen by provider today: No   present during visit today: Not Applicable.    Note: N/A.    Intravenous Access:  Implanted Port.    Treatment Conditions:  Lab Results   Component Value Date    HGB 11.1 (L) 05/14/2024    WBC 4.5 05/14/2024    ANEU 0.8 (L) 10/10/2023    ANEUTAUTO 2.4 05/14/2024     05/14/2024        Results reviewed, labs MET treatment parameters, ok to proceed with treatment.    Post Infusion Assessment:  Patient tolerated infusion without incident.  Patient observed for 15 minutes post Trodelvy per patient request.  Site patent and intact, free from redness, edema or discomfort.  No evidence of extravasations.  Access discontinued per protocol.     Discharge Plan:   Patient discharged in stable condition accompanied by: self.  Departure Mode: Ambulatory.    Mario Worley RN,

## 2024-05-28 ENCOUNTER — ONCOLOGY VISIT (OUTPATIENT)
Dept: ONCOLOGY | Facility: CLINIC | Age: 84
End: 2024-05-28
Attending: NURSE PRACTITIONER
Payer: COMMERCIAL

## 2024-05-28 ENCOUNTER — LAB (OUTPATIENT)
Dept: INFUSION THERAPY | Facility: CLINIC | Age: 84
End: 2024-05-28
Attending: NURSE PRACTITIONER
Payer: COMMERCIAL

## 2024-05-28 VITALS
TEMPERATURE: 99 F | HEART RATE: 84 BPM | DIASTOLIC BLOOD PRESSURE: 64 MMHG | RESPIRATION RATE: 12 BRPM | WEIGHT: 183 LBS | OXYGEN SATURATION: 95 % | BODY MASS INDEX: 30.49 KG/M2 | SYSTOLIC BLOOD PRESSURE: 138 MMHG | HEIGHT: 65 IN

## 2024-05-28 VITALS — SYSTOLIC BLOOD PRESSURE: 107 MMHG | HEART RATE: 71 BPM | DIASTOLIC BLOOD PRESSURE: 61 MMHG

## 2024-05-28 DIAGNOSIS — Z08 ENCOUNTER FOR FOLLOW-UP EXAMINATION AFTER COMPLETED TREATMENT FOR MALIGNANT NEOPLASM: ICD-10-CM

## 2024-05-28 DIAGNOSIS — Z17.1 MALIGNANT NEOPLASM OF UPPER-OUTER QUADRANT OF RIGHT BREAST IN FEMALE, ESTROGEN RECEPTOR NEGATIVE (H): Primary | ICD-10-CM

## 2024-05-28 DIAGNOSIS — C50.919 PRIMARY MALIGNANT NEOPLASM OF BREAST WITH METASTASIS (H): ICD-10-CM

## 2024-05-28 DIAGNOSIS — T45.1X5A CHEMOTHERAPY INDUCED DIARRHEA: ICD-10-CM

## 2024-05-28 DIAGNOSIS — K52.1 CHEMOTHERAPY INDUCED DIARRHEA: ICD-10-CM

## 2024-05-28 DIAGNOSIS — C50.411 MALIGNANT NEOPLASM OF UPPER-OUTER QUADRANT OF RIGHT BREAST IN FEMALE, ESTROGEN RECEPTOR NEGATIVE (H): Primary | ICD-10-CM

## 2024-05-28 DIAGNOSIS — Z08 ENCOUNTER FOR FOLLOW-UP EXAMINATION AFTER COMPLETED TREATMENT FOR MALIGNANT NEOPLASM: Primary | ICD-10-CM

## 2024-05-28 DIAGNOSIS — C50.411 MALIGNANT NEOPLASM OF UPPER-OUTER QUADRANT OF RIGHT BREAST IN FEMALE, ESTROGEN RECEPTOR NEGATIVE (H): ICD-10-CM

## 2024-05-28 DIAGNOSIS — Z17.1 MALIGNANT NEOPLASM OF UPPER-OUTER QUADRANT OF RIGHT BREAST IN FEMALE, ESTROGEN RECEPTOR NEGATIVE (H): ICD-10-CM

## 2024-05-28 LAB
ALBUMIN SERPL BCG-MCNC: 4 G/DL (ref 3.5–5.2)
ALP SERPL-CCNC: 96 U/L (ref 40–150)
ALT SERPL W P-5'-P-CCNC: 22 U/L (ref 0–50)
ANION GAP SERPL CALCULATED.3IONS-SCNC: 13 MMOL/L (ref 7–15)
AST SERPL W P-5'-P-CCNC: 18 U/L (ref 0–45)
BASOPHILS # BLD AUTO: 0 10E3/UL (ref 0–0.2)
BASOPHILS NFR BLD AUTO: 1 %
BILIRUB SERPL-MCNC: 0.3 MG/DL
BUN SERPL-MCNC: 13.3 MG/DL (ref 8–23)
CALCIUM SERPL-MCNC: 9.5 MG/DL (ref 8.8–10.2)
CHLORIDE SERPL-SCNC: 99 MMOL/L (ref 98–107)
CREAT SERPL-MCNC: 0.63 MG/DL (ref 0.51–0.95)
DEPRECATED HCO3 PLAS-SCNC: 25 MMOL/L (ref 22–29)
EGFRCR SERPLBLD CKD-EPI 2021: 88 ML/MIN/1.73M2
EOSINOPHIL # BLD AUTO: 0.1 10E3/UL (ref 0–0.7)
EOSINOPHIL NFR BLD AUTO: 2 %
ERYTHROCYTE [DISTWIDTH] IN BLOOD BY AUTOMATED COUNT: 15.9 % (ref 10–15)
GLUCOSE SERPL-MCNC: 106 MG/DL (ref 70–99)
HCT VFR BLD AUTO: 36.1 % (ref 35–47)
HGB BLD-MCNC: 12.1 G/DL (ref 11.7–15.7)
IMM GRANULOCYTES # BLD: 0.1 10E3/UL
IMM GRANULOCYTES NFR BLD: 1 %
LYMPHOCYTES # BLD AUTO: 1.2 10E3/UL (ref 0.8–5.3)
LYMPHOCYTES NFR BLD AUTO: 20 %
MCH RBC QN AUTO: 30.6 PG (ref 26.5–33)
MCHC RBC AUTO-ENTMCNC: 33.5 G/DL (ref 31.5–36.5)
MCV RBC AUTO: 91 FL (ref 78–100)
MONOCYTES # BLD AUTO: 0.8 10E3/UL (ref 0–1.3)
MONOCYTES NFR BLD AUTO: 13 %
NEUTROPHILS # BLD AUTO: 4 10E3/UL (ref 1.6–8.3)
NEUTROPHILS NFR BLD AUTO: 64 %
NRBC # BLD AUTO: 0 10E3/UL
NRBC BLD AUTO-RTO: 0 /100
PLATELET # BLD AUTO: 238 10E3/UL (ref 150–450)
POTASSIUM SERPL-SCNC: 3.5 MMOL/L (ref 3.4–5.3)
PROT SERPL-MCNC: 7 G/DL (ref 6.4–8.3)
RBC # BLD AUTO: 3.95 10E6/UL (ref 3.8–5.2)
SODIUM SERPL-SCNC: 137 MMOL/L (ref 135–145)
WBC # BLD AUTO: 6.3 10E3/UL (ref 4–11)

## 2024-05-28 PROCEDURE — G2211 COMPLEX E/M VISIT ADD ON: HCPCS | Performed by: NURSE PRACTITIONER

## 2024-05-28 PROCEDURE — 36591 DRAW BLOOD OFF VENOUS DEVICE: CPT | Performed by: NURSE PRACTITIONER

## 2024-05-28 PROCEDURE — 258N000003 HC RX IP 258 OP 636: Performed by: NURSE PRACTITIONER

## 2024-05-28 PROCEDURE — 80053 COMPREHEN METABOLIC PANEL: CPT | Performed by: NURSE PRACTITIONER

## 2024-05-28 PROCEDURE — 250N000013 HC RX MED GY IP 250 OP 250 PS 637: Performed by: NURSE PRACTITIONER

## 2024-05-28 PROCEDURE — 96367 TX/PROPH/DG ADDL SEQ IV INF: CPT

## 2024-05-28 PROCEDURE — G0463 HOSPITAL OUTPT CLINIC VISIT: HCPCS | Performed by: NURSE PRACTITIONER

## 2024-05-28 PROCEDURE — 250N000011 HC RX IP 250 OP 636: Performed by: NURSE PRACTITIONER

## 2024-05-28 PROCEDURE — 96375 TX/PRO/DX INJ NEW DRUG ADDON: CPT

## 2024-05-28 PROCEDURE — 99214 OFFICE O/P EST MOD 30 MIN: CPT | Performed by: NURSE PRACTITIONER

## 2024-05-28 PROCEDURE — 85048 AUTOMATED LEUKOCYTE COUNT: CPT | Performed by: NURSE PRACTITIONER

## 2024-05-28 PROCEDURE — 96413 CHEMO IV INFUSION 1 HR: CPT

## 2024-05-28 RX ORDER — EPINEPHRINE 1 MG/ML
0.3 INJECTION, SOLUTION, CONCENTRATE INTRAVENOUS EVERY 5 MIN PRN
Status: CANCELLED | OUTPATIENT
Start: 2024-05-28

## 2024-05-28 RX ORDER — ATROPINE SULFATE 0.4 MG/ML
0.4 AMPUL (ML) INJECTION
Status: CANCELLED | OUTPATIENT
Start: 2024-06-04

## 2024-05-28 RX ORDER — DIPHENHYDRAMINE HCL 25 MG
25 CAPSULE ORAL ONCE
Status: CANCELLED | OUTPATIENT
Start: 2024-06-04

## 2024-05-28 RX ORDER — ALBUTEROL SULFATE 90 UG/1
1-2 AEROSOL, METERED RESPIRATORY (INHALATION)
Status: CANCELLED
Start: 2024-06-04

## 2024-05-28 RX ORDER — ACETAMINOPHEN 325 MG/1
650 TABLET ORAL ONCE
Status: CANCELLED | OUTPATIENT
Start: 2024-06-04

## 2024-05-28 RX ORDER — DIPHENHYDRAMINE HYDROCHLORIDE 50 MG/ML
50 INJECTION INTRAMUSCULAR; INTRAVENOUS
Status: CANCELLED
Start: 2024-06-04

## 2024-05-28 RX ORDER — DIPHENHYDRAMINE HCL 25 MG
25 CAPSULE ORAL ONCE
Status: COMPLETED | OUTPATIENT
Start: 2024-05-28 | End: 2024-05-28

## 2024-05-28 RX ORDER — PALONOSETRON 0.05 MG/ML
0.25 INJECTION, SOLUTION INTRAVENOUS ONCE
Status: CANCELLED | OUTPATIENT
Start: 2024-05-28

## 2024-05-28 RX ORDER — ALBUTEROL SULFATE 0.83 MG/ML
2.5 SOLUTION RESPIRATORY (INHALATION)
Status: CANCELLED | OUTPATIENT
Start: 2024-06-04

## 2024-05-28 RX ORDER — ACETAMINOPHEN 325 MG/1
650 TABLET ORAL ONCE
Status: CANCELLED | OUTPATIENT
Start: 2024-05-28

## 2024-05-28 RX ORDER — ACETAMINOPHEN 325 MG/1
650 TABLET ORAL ONCE
Status: COMPLETED | OUTPATIENT
Start: 2024-05-28 | End: 2024-05-28

## 2024-05-28 RX ORDER — HEPARIN SODIUM (PORCINE) LOCK FLUSH IV SOLN 100 UNIT/ML 100 UNIT/ML
5 SOLUTION INTRAVENOUS
Status: CANCELLED | OUTPATIENT
Start: 2024-05-28

## 2024-05-28 RX ORDER — EPINEPHRINE 1 MG/ML
0.3 INJECTION, SOLUTION, CONCENTRATE INTRAVENOUS EVERY 5 MIN PRN
Status: CANCELLED | OUTPATIENT
Start: 2024-06-04

## 2024-05-28 RX ORDER — PALONOSETRON 0.05 MG/ML
0.25 INJECTION, SOLUTION INTRAVENOUS ONCE
Status: COMPLETED | OUTPATIENT
Start: 2024-05-28 | End: 2024-05-28

## 2024-05-28 RX ORDER — DIPHENHYDRAMINE HYDROCHLORIDE 50 MG/ML
50 INJECTION INTRAMUSCULAR; INTRAVENOUS
Status: CANCELLED
Start: 2024-05-28

## 2024-05-28 RX ORDER — PALONOSETRON 0.05 MG/ML
0.25 INJECTION, SOLUTION INTRAVENOUS ONCE
Status: CANCELLED | OUTPATIENT
Start: 2024-06-04

## 2024-05-28 RX ORDER — MEPERIDINE HYDROCHLORIDE 25 MG/ML
25 INJECTION INTRAMUSCULAR; INTRAVENOUS; SUBCUTANEOUS EVERY 30 MIN PRN
Status: CANCELLED | OUTPATIENT
Start: 2024-06-04

## 2024-05-28 RX ORDER — HEPARIN SODIUM (PORCINE) LOCK FLUSH IV SOLN 100 UNIT/ML 100 UNIT/ML
5 SOLUTION INTRAVENOUS
Status: DISCONTINUED | OUTPATIENT
Start: 2024-05-28 | End: 2024-05-28 | Stop reason: HOSPADM

## 2024-05-28 RX ORDER — METHYLPREDNISOLONE SODIUM SUCCINATE 125 MG/2ML
125 INJECTION, POWDER, LYOPHILIZED, FOR SOLUTION INTRAMUSCULAR; INTRAVENOUS
Status: CANCELLED
Start: 2024-05-28

## 2024-05-28 RX ORDER — ALBUTEROL SULFATE 90 UG/1
1-2 AEROSOL, METERED RESPIRATORY (INHALATION)
Status: CANCELLED
Start: 2024-05-28

## 2024-05-28 RX ORDER — LORAZEPAM 2 MG/ML
0.5 INJECTION INTRAMUSCULAR EVERY 4 HOURS PRN
Status: CANCELLED | OUTPATIENT
Start: 2024-05-28

## 2024-05-28 RX ORDER — ATROPINE SULFATE 0.4 MG/ML
0.4 AMPUL (ML) INJECTION
Status: CANCELLED | OUTPATIENT
Start: 2024-05-28

## 2024-05-28 RX ORDER — DIPHENHYDRAMINE HCL 25 MG
25 CAPSULE ORAL ONCE
Status: CANCELLED | OUTPATIENT
Start: 2024-05-28

## 2024-05-28 RX ORDER — LORAZEPAM 2 MG/ML
0.5 INJECTION INTRAMUSCULAR EVERY 4 HOURS PRN
Status: CANCELLED | OUTPATIENT
Start: 2024-06-04

## 2024-05-28 RX ORDER — METHYLPREDNISOLONE SODIUM SUCCINATE 125 MG/2ML
125 INJECTION, POWDER, LYOPHILIZED, FOR SOLUTION INTRAMUSCULAR; INTRAVENOUS
Status: CANCELLED
Start: 2024-06-04

## 2024-05-28 RX ORDER — MEPERIDINE HYDROCHLORIDE 25 MG/ML
25 INJECTION INTRAMUSCULAR; INTRAVENOUS; SUBCUTANEOUS EVERY 30 MIN PRN
Status: CANCELLED | OUTPATIENT
Start: 2024-05-28

## 2024-05-28 RX ORDER — HEPARIN SODIUM (PORCINE) LOCK FLUSH IV SOLN 100 UNIT/ML 100 UNIT/ML
5 SOLUTION INTRAVENOUS
Status: CANCELLED | OUTPATIENT
Start: 2024-06-04

## 2024-05-28 RX ORDER — ALBUTEROL SULFATE 0.83 MG/ML
2.5 SOLUTION RESPIRATORY (INHALATION)
Status: CANCELLED | OUTPATIENT
Start: 2024-05-28

## 2024-05-28 RX ADMIN — Medication 5 ML: at 12:50

## 2024-05-28 RX ADMIN — SACITUZUMAB GOVITECAN 671 MG: 180 POWDER, FOR SOLUTION INTRAVENOUS at 11:35

## 2024-05-28 RX ADMIN — ACETAMINOPHEN 650 MG: 325 TABLET, FILM COATED ORAL at 10:50

## 2024-05-28 RX ADMIN — FAMOTIDINE 20 MG: 10 INJECTION, SOLUTION INTRAVENOUS at 10:52

## 2024-05-28 RX ADMIN — FOSAPREPITANT: 150 INJECTION, POWDER, LYOPHILIZED, FOR SOLUTION INTRAVENOUS at 10:57

## 2024-05-28 RX ADMIN — PALONOSETRON 0.25 MG: 0.05 INJECTION, SOLUTION INTRAVENOUS at 10:55

## 2024-05-28 RX ADMIN — SODIUM CHLORIDE 250 ML: 9 INJECTION, SOLUTION INTRAVENOUS at 10:50

## 2024-05-28 RX ADMIN — DIPHENHYDRAMINE HYDROCHLORIDE 25 MG: 25 CAPSULE ORAL at 10:50

## 2024-05-28 ASSESSMENT — PAIN SCALES - GENERAL: PAINLEVEL: NO PAIN (0)

## 2024-05-28 NOTE — PROGRESS NOTES
"Oncology Rooming Note    May 28, 2024 10:15 AM   Precious Paris is a 83 year old female who presents for:    Chief Complaint   Patient presents with    Oncology Clinic Visit     Malignant neoplasm of upper-outer quadrant of right breast in female, estrogen receptor negative - Labs provider and infusion     Initial Vitals: /64 (BP Location: Right arm, Patient Position: Sitting, Cuff Size: Adult Regular)   Pulse 84   Temp 99  F (37.2  C) (Tympanic)   Resp 12   Ht 1.638 m (5' 4.5\")   Wt 83 kg (183 lb)   SpO2 95%   BMI 30.93 kg/m   Estimated body mass index is 30.93 kg/m  as calculated from the following:    Height as of this encounter: 1.638 m (5' 4.5\").    Weight as of this encounter: 83 kg (183 lb). Body surface area is 1.94 meters squared.  No Pain (0) Comment: Data Unavailable   No LMP recorded. Patient is postmenopausal.  Allergies reviewed: Yes  Medications reviewed: Yes    Medications: Medication refills not needed today.  Pharmacy name entered into [facility]: [facility] - [facility] [id number] [address]    Frailty Screening:   Is the patient here for a new oncology consult visit in cancer care? 2. No      Clinical concerns:  None      Malgorzata Vallejo CMA            "

## 2024-05-28 NOTE — PROGRESS NOTES
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 Beebe    _____________________________________________________________________________    History of Present Illness/ Interval History    Ms. Precious Paris is a 83 year old with recurrent metastatic triple negative breast cancer recurrence. She initiated 3rd line sacituzumab in September 2023.  She transferred her care to [place] over the Winter, continuing this regimen there, and returned here in April. Has now received 12 cycles. Returns for 3-week visit ahead of cycle 13.    Tolerating generally well.  Good appetite. Stable weight.  More fatigued this past week.   Has had infrequent diarrhea. Had a few episodes this past weekend. The diarrhea comes on suddenly; but she has used Imodium those days.  No nausea/vomiting. No fevers.  Baseline neuropathy stable. Rhinorrhea ongoing. Chronic nocturnal urinary frequency.    No headaches nor neuro symptoms. No dyspnea. No new nodes/breast changes.      ECOG PS: 1      Oncology History/Treatment  Diagnosis/Stage:   11/2019: Right breast cancer, triple negative (oO1s-Y0)    BRCA negative    10/2022: Recurrent, stage IV triple negative breast cancer (bilateral axillary, supraclav, mediastinal nodes + lung)  -persistent/progressive right breast firmness with new right nipple drainage and right axillary adenopathy  -bilateral diagnostic mammogram: increased density R breast with skin thickening. R breast US: 3.2 cm hypoechoic mass at 11:00 position 1 cm from nipple and multiple other small hypoechoic solid-appearing lesions in R breast + right axillary adenopathy (at least two hypoechoic vascular lesions measuring up to 2.5 cm)  -10/18/2022 biopsy R breast mass + R axillary node: grade 3 invasive ductal cancer with tumor  necrosis, ER/NH/HER2 negative.   -PET: + right breast mass; bilateral axillary, retropectoral, supraclavicular, mediastinal/R hilar nodes and bilateral lung/R pleural nodules suspicious for mets.  -Brain MRI: negative for mets  -11/1/2022 biopsy L axillary node: metastatic invasive ductal carcinoma  -Foundation One: no actionable mutations MS stable, TMB 1 mut/mb.   -PDL1 testing: CPS >20% and TPS 60% (considered high PDL1 expression)  -CA 27.29: 59.5 (elevated). CA 15-3: 23 (normal)  -9/10/2023 Brain MRI: 2 new foci of punctate enhancement in left frontal cortex and right lentiform nucleus, concerning for brain metastases (asymptomatic)    NGS (foundation 1)  FGF R1 amplification.  Pazopanib has been approved in other tumors but not in breast cancer.  NTRK 1 amplification.  Not actionable  She has multiple other mutations which are not actionable.      Treatment:  Initial breast cancer (2019)  -2019: Neoadjuvant taxol  -Lumpectomy  -5/2020: Adjuvant RT  -6 - 7/2020: Adjuvant Xeloda. Stopped for rash    Recurrent, stage IV breast cancer (2022)  -11/14/2022:Doxil every 4 weeks  -12/12/2022: changed to doxorubicin every 3 weeks. (Planning addition of pembrolizumab once approved by insurance)  -1/2/2023: doxorubicin + pembrolizumab every 3 weeks. Completed 1 cycle. Stopped for progression (med nodes, slight increase in lung mets and stable R breast mass + axillary nodes).    -1/23/2023 - present: Carbo AUC 5, Gemcitabine 800 mg/m2 and Pembro every 21 days  -cycles 1-2: [place] day 1 only  -Cycle 3: [place] increased to days 1 + 8 given excellent tolerance  -CT after 3 cycles showed excellent response (near CR axillary + med nodes and NH in lung mets; clinical improvement in right breast changes and tumor marker).  -PET scan after 6 cycles of carboplatin gemcitabine pembrolizumab shows complete response    5/30 - 8/22/2023: Maintenance pembrolizumab, until progression (recurrent axillary, thoracic nodes and 2 punctate brain  lesions).  Biopsy of left axillary node confirmed recurrent/metastatic breast cancer.    9/12/2023 - present:  Sacituzumab days 1, 8 every 21 days  --C2D8 deferred x 1 week for neutropenia  --Dose decreased to 8 mg/kg from cycle 3 onwards.  --Cycles 5-10 were delivered in [place] over winter months  --Resumed Cycle 11 here upon return 4/2024      Physical Exam    GENERAL: Alert and oriented to time place and person. Seated comfortably. In no distress. Alone.   HEENT: Complete alopecia. No icterus.   Lymph: No palpable axillary nor cervical adenopathy.   Heart: RRR  Lungs: regular respiratory effort. Clear lung sounds bilaterally.   Abd: Soft, non-distended. Non-tender. Bowel sounds regular.   Extremities: No edema  Neuro: alert and oriented x 3      Lab Results  Recent Results (from the past 168 hour(s))   Comprehensive metabolic panel    Collection Time: 05/28/24  9:50 AM   Result Value Ref Range    Sodium 137 135 - 145 mmol/L    Potassium 3.5 3.4 - 5.3 mmol/L    Carbon Dioxide (CO2) 25 22 - 29 mmol/L    Anion Gap 13 7 - 15 mmol/L    Urea Nitrogen 13.3 8.0 - 23.0 mg/dL    Creatinine 0.63 0.51 - 0.95 mg/dL    GFR Estimate 88 >60 mL/min/1.73m2    Calcium 9.5 8.8 - 10.2 mg/dL    Chloride 99 98 - 107 mmol/L    Glucose 106 (H) 70 - 99 mg/dL    Alkaline Phosphatase 96 40 - 150 U/L    AST 18 0 - 45 U/L    ALT 22 0 - 50 U/L    Protein Total 7.0 6.4 - 8.3 g/dL    Albumin 4.0 3.5 - 5.2 g/dL    Bilirubin Total 0.3 <=1.2 mg/dL   CBC with platelets and differential    Collection Time: 05/28/24  9:50 AM   Result Value Ref Range    WBC Count 6.3 4.0 - 11.0 10e3/uL    RBC Count 3.95 3.80 - 5.20 10e6/uL    Hemoglobin 12.1 11.7 - 15.7 g/dL    Hematocrit 36.1 35.0 - 47.0 %    MCV 91 78 - 100 fL    MCH 30.6 26.5 - 33.0 pg    MCHC 33.5 31.5 - 36.5 g/dL    RDW 15.9 (H) 10.0 - 15.0 %    Platelet Count 238 150 - 450 10e3/uL    % Neutrophils 64 %    % Lymphocytes 20 %    % Monocytes 13 %    % Eosinophils 2 %    % Basophils 1 %    % Immature  Granulocytes 1 %    NRBCs per 100 WBC 0 <1 /100    Absolute Neutrophils 4.0 1.6 - 8.3 10e3/uL    Absolute Lymphocytes 1.2 0.8 - 5.3 10e3/uL    Absolute Monocytes 0.8 0.0 - 1.3 10e3/uL    Absolute Eosinophils 0.1 0.0 - 0.7 10e3/uL    Absolute Basophils 0.0 0.0 - 0.2 10e3/uL    Absolute Immature Granulocytes 0.1 <=0.4 10e3/uL    Absolute NRBCs 0.0 10e3/uL         Assessment/Plan  Recurrent, stage IV triple negative right breast cancer (nodes, lung); CPS>20% + TPS 60%  Mild intermittent peripheral neuropathy fingertips (gr 1)  Punctate brain lesions concerning for metastatic disease (asymptomatic), resolved on chemo  Grade 1 chemotherapy-induced diarrhea  Grade 3 chemotherapy-induced neutropenia  Has been on dose-modified sacituzumab since last September 2023, now having completed 12 cycles.     Tolerating this well aside from infrequent diarrhea that seems to be well-managed with minimal use of imodium PRN.     Labs: CBC WNL; CMP WNL.    Clinically, no concern for progression.     CT in March (in [place]) showed overall stable disease.     Her last brain MRI 10/2023 showed resolution of 2 tiny brain lesions suspicious for mets, while on this chemo. She has no neuro symptoms. Discussed consideration of routine brain surveillance (ie every 4-6 mths) to ensure no recurrence we may be able to catch/treat with RT before symptomatic. She does not like undergoing brain MRI and prefers only doing for symptoms.     Plan:  -Proceed with C13. Same doses (8 mg/kg days 1 + 8)   -Reviewed use of Imodium (2) after first loose stool, repeating 1 after each subsequent loose stool (max 8/day). Call if diarrhea not well managed.  -Return every 3 weeks at start of new cycle  -Repeat a PET scan after cycle 14 (in 6 weeks)  -Repeat brain MRI as needed, based off symptoms, per patient preference.    Billing:    Total time 35 minutes, to include face to face visit, review of EMR, ordering, documentation and coordination of care on date of  service.    complexity modifier for longitudinal care.     Signed by:   Lazara Torres, NP

## 2024-05-28 NOTE — PROGRESS NOTES
Infusion Nursing Note:  Precioussarah Paris presents today for Trodelvy.    Patient seen by provider today: Yes: Lazara Torres   present during visit today: Not Applicable.    Note: N/A.    Intravenous Access:  Implanted Port.    Treatment Conditions:  Lab Results   Component Value Date    HGB 12.1 05/28/2024    WBC 6.3 05/28/2024    ANEU 0.8 (L) 10/10/2023    ANEUTAUTO 4.0 05/28/2024     05/28/2024        Results reviewed, labs MET treatment parameters, ok to proceed with treatment.    Post Infusion Assessment:  Patient tolerated infusion without incident.  Patient observed for 15 minutes post Trodelvy per patient preference.  Site patent and intact, free from redness, edema or discomfort.  No evidence of extravasations.  Access discontinued per protocol.     Discharge Plan:   Patient discharged in stable condition accompanied by: self.  Departure Mode: Ambulatory.      Mario Worley RN

## 2024-05-28 NOTE — LETTER
"    5/28/2024         RE: Precious Paris  [address] [place] [address]        Dear Colleague,    Thank you for referring your patient, Precious Paris, to the [facility]. Please see a copy of my visit note below.    Oncology Rooming Note    May 28, 2024 10:15 AM   Precious Paris is a 83 year old female who presents for:    Chief Complaint   Patient presents with     Oncology Clinic Visit     Malignant neoplasm of upper-outer quadrant of right breast in female, estrogen receptor negative - Labs provider and infusion     Initial Vitals: /64 (BP Location: Right arm, Patient Position: Sitting, Cuff Size: Adult Regular)   Pulse 84   Temp 99  F (37.2  C) (Tympanic)   Resp 12   Ht 1.638 m (5' 4.5\")   Wt 83 kg (183 lb)   SpO2 95%   BMI 30.93 kg/m   Estimated body mass index is 30.93 kg/m  as calculated from the following:    Height as of this encounter: 1.638 m (5' 4.5\").    Weight as of this encounter: 83 kg (183 lb). Body surface area is 1.94 meters squared.  No Pain (0) Comment: Data Unavailable   No LMP recorded. Patient is postmenopausal.  Allergies reviewed: Yes  Medications reviewed: Yes    Medications: Medication refills not needed today.  Pharmacy name entered into [facility]: [facility] [place] - [address] [facility]    Frailty Screening:   Is the patient here for a new oncology consult visit in cancer care? 2. 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 Beebe    _____________________________________________________________________________    History of Present Illness/ Interval History    Ms. Precious Paris is a 83 year old with recurrent " metastatic triple negative breast cancer recurrence. She initiated 3rd line sacituzumab in September 2023.  She transferred her care to [place] over the Winter, continuing this regimen there, and returned here in April. Has now received 12 cycles. Returns for 3-week visit ahead of cycle 13.    Tolerating generally well.  Good appetite. Stable weight.  More fatigued this past week.   Has had infrequent diarrhea. Had a few episodes this past weekend. The diarrhea comes on suddenly; but she has used Imodium those days.  No nausea/vomiting. No fevers.  Baseline neuropathy stable. Rhinorrhea ongoing. Chronic nocturnal urinary frequency.    No headaches nor neuro symptoms. No dyspnea. No new nodes/breast changes.      ECOG PS: 1      Oncology History/Treatment  Diagnosis/Stage:   11/2019: Right breast cancer, triple negative ([id number])    BRCA negative    10/2022: Recurrent, stage IV triple negative breast cancer (bilateral axillary, supraclav, mediastinal nodes + lung)  -persistent/progressive right breast firmness with new right nipple drainage and right axillary adenopathy  -bilateral diagnostic mammogram: increased density R breast with skin thickening. R breast US: 3.2 cm hypoechoic mass at 11:00 position 1 cm from nipple and multiple other small hypoechoic solid-appearing lesions in R breast + right axillary adenopathy (at least two hypoechoic vascular lesions measuring up to 2.5 cm)  -10/18/2022 biopsy R breast mass + R axillary node: grade 3 invasive ductal cancer with tumor necrosis, ER/WI/HER2 negative.   -PET: + right breast mass; bilateral axillary, retropectoral, supraclavicular, mediastinal/R hilar nodes and bilateral lung/R pleural nodules suspicious for mets.  -Brain MRI: negative for mets  -11/1/2022 biopsy L axillary node: metastatic invasive ductal carcinoma  -Foundation One: no actionable mutations MS stable, TMB 1 mut/mb.   -PDL1 testing: CPS >20% and TPS 60% (considered high PDL1 expression)  -CA 27.29:  59.5 (elevated). CA 15-3: 23 (normal)  -9/10/2023 Brain MRI: 2 new foci of punctate enhancement in left frontal cortex and right lentiform nucleus, concerning for brain metastases (asymptomatic)    NGS (foundation 1)  FGF R1 amplification.  Pazopanib has been approved in other tumors but not in breast cancer.  NTRK 1 amplification.  Not actionable  She has multiple other mutations which are not actionable.      Treatment:  Initial breast cancer (2019)  -2019: Neoadjuvant taxol  -Lumpectomy  -5/2020: Adjuvant RT  -6 - 7/2020: Adjuvant Xeloda. Stopped for rash    Recurrent, stage IV breast cancer (2022)  -11/14/2022:Doxil every 4 weeks  -12/12/2022: changed to doxorubicin every 3 weeks. (Planning addition of pembrolizumab once approved by insurance)  -1/2/2023: doxorubicin + pembrolizumab every 3 weeks. Completed 1 cycle. Stopped for progression (med nodes, slight increase in lung mets and stable R breast mass + axillary nodes).    -1/23/2023 - present: Carbo AUC 5, Gemcitabine 800 mg/m2 and Pembro every 21 days  -cycles 1-2: [place] day 1 only  -Cycle 3: [place] increased to days 1 + 8 given excellent tolerance  -CT after 3 cycles showed excellent response (near CR axillary + med nodes and OH in lung mets; clinical improvement in right breast changes and tumor marker).  -PET scan after 6 cycles of carboplatin gemcitabine pembrolizumab shows complete response    5/30 - 8/22/2023: Maintenance pembrolizumab, until progression (recurrent axillary, thoracic nodes and 2 punctate brain lesions).  Biopsy of left axillary node confirmed recurrent/metastatic breast cancer.    9/12/2023 - present:  Sacituzumab days 1, 8 every 21 days  --C2D8 deferred x 1 week for neutropenia  --Dose decreased to 8 mg/kg from cycle 3 onwards.  --Cycles 5-10 were delivered in [place] over winter months  --Resumed Cycle 11 here upon return 4/2024      Physical Exam    GENERAL: Alert and oriented to time place and person. Seated comfortably. In no distress.  Alone.   HEENT: Complete alopecia. No icterus.   Lymph: No palpable axillary nor cervical adenopathy.   Heart: RRR  Lungs: regular respiratory effort. Clear lung sounds bilaterally.   Abd: Soft, non-distended. Non-tender. Bowel sounds regular.   Extremities: No edema  Neuro: alert and oriented x 3      Lab Results  Recent Results (from the past 168 hour(s))   Comprehensive metabolic panel    Collection Time: 05/28/24  9:50 AM   Result Value Ref Range    Sodium 137 135 - 145 mmol/L    Potassium 3.5 3.4 - 5.3 mmol/L    Carbon Dioxide (CO2) 25 22 - 29 mmol/L    Anion Gap 13 7 - 15 mmol/L    Urea Nitrogen 13.3 8.0 - 23.0 mg/dL    Creatinine 0.63 0.51 - 0.95 mg/dL    GFR Estimate 88 >60 mL/min/1.73m2    Calcium 9.5 8.8 - 10.2 mg/dL    Chloride 99 98 - 107 mmol/L    Glucose 106 (H) 70 - 99 mg/dL    Alkaline Phosphatase 96 40 - 150 U/L    AST 18 0 - 45 U/L    ALT 22 0 - 50 U/L    Protein Total 7.0 6.4 - 8.3 g/dL    Albumin 4.0 3.5 - 5.2 g/dL    Bilirubin Total 0.3 <=1.2 mg/dL   CBC with platelets and differential    Collection Time: 05/28/24  9:50 AM   Result Value Ref Range    WBC Count 6.3 4.0 - 11.0 10e3/uL    RBC Count 3.95 3.80 - 5.20 10e6/uL    Hemoglobin 12.1 11.7 - 15.7 g/dL    Hematocrit 36.1 35.0 - 47.0 %    MCV 91 78 - 100 fL    MCH 30.6 26.5 - 33.0 pg    MCHC 33.5 31.5 - 36.5 g/dL    RDW 15.9 (H) 10.0 - 15.0 %    Platelet Count 238 150 - 450 10e3/uL    % Neutrophils 64 %    % Lymphocytes 20 %    % Monocytes 13 %    % Eosinophils 2 %    % Basophils 1 %    % Immature Granulocytes 1 %    NRBCs per 100 WBC 0 <1 /100    Absolute Neutrophils 4.0 1.6 - 8.3 10e3/uL    Absolute Lymphocytes 1.2 0.8 - 5.3 10e3/uL    Absolute Monocytes 0.8 0.0 - 1.3 10e3/uL    Absolute Eosinophils 0.1 0.0 - 0.7 10e3/uL    Absolute Basophils 0.0 0.0 - 0.2 10e3/uL    Absolute Immature Granulocytes 0.1 <=0.4 10e3/uL    Absolute NRBCs 0.0 10e3/uL         Assessment/Plan  Recurrent, stage IV triple negative right breast cancer (nodes, lung);  CPS>20% + TPS 60%  Mild intermittent peripheral neuropathy fingertips (gr 1)  Punctate brain lesions concerning for metastatic disease (asymptomatic), resolved on chemo  Grade 1 chemotherapy-induced diarrhea  Grade 3 chemotherapy-induced neutropenia  Has been on dose-modified sacituzumab since last September 2023, now having completed 12 cycles.     Tolerating this well aside from infrequent diarrhea that seems to be well-managed with minimal use of imodium PRN.     Labs: CBC WNL; CMP WNL.    Clinically, no concern for progression.     CT in March (in [place]) showed overall stable disease.     Her last brain MRI 10/2023 showed resolution of 2 tiny brain lesions suspicious for mets, while on this chemo. She has no neuro symptoms. Discussed consideration of routine brain surveillance (ie every 4-6 mths) to ensure no recurrence we may be able to catch/treat with RT before symptomatic. She does not like undergoing brain MRI and prefers only doing for symptoms.     Plan:  -Proceed with C13. Same doses (8 mg/kg days 1 + 8)   -Reviewed use of Imodium (2) after first loose stool, repeating 1 after each subsequent loose stool (max 8/day). Call if diarrhea not well managed.  -Return every 3 weeks at start of new cycle  -Repeat a PET scan after cycle 14 (in 6 weeks)  -Repeat brain MRI as needed, based off symptoms, per patient preference.    Billing:    Total time 35 minutes, to include face to face visit, review of EMR, ordering, documentation and coordination of care on date of service.    complexity modifier for longitudinal care.     Signed by:   Lazara Torres NP        Again, thank you for allowing me to participate in the care of your patient.        Sincerely,        Lazara Torres NP

## 2024-06-03 ENCOUNTER — LAB (OUTPATIENT)
Dept: INFUSION THERAPY | Facility: CLINIC | Age: 84
End: 2024-06-03
Attending: INTERNAL MEDICINE
Payer: COMMERCIAL

## 2024-06-03 VITALS
HEART RATE: 90 BPM | SYSTOLIC BLOOD PRESSURE: 122 MMHG | TEMPERATURE: 98.2 F | DIASTOLIC BLOOD PRESSURE: 77 MMHG | OXYGEN SATURATION: 97 %

## 2024-06-03 DIAGNOSIS — C50.411 MALIGNANT NEOPLASM OF UPPER-OUTER QUADRANT OF RIGHT BREAST IN FEMALE, ESTROGEN RECEPTOR NEGATIVE (H): ICD-10-CM

## 2024-06-03 DIAGNOSIS — Z17.1 MALIGNANT NEOPLASM OF UPPER-OUTER QUADRANT OF RIGHT BREAST IN FEMALE, ESTROGEN RECEPTOR NEGATIVE (H): ICD-10-CM

## 2024-06-03 DIAGNOSIS — Z08 ENCOUNTER FOR FOLLOW-UP EXAMINATION AFTER COMPLETED TREATMENT FOR MALIGNANT NEOPLASM: Primary | ICD-10-CM

## 2024-06-03 DIAGNOSIS — C50.919 PRIMARY MALIGNANT NEOPLASM OF BREAST WITH METASTASIS (H): ICD-10-CM

## 2024-06-03 LAB
BASOPHILS # BLD AUTO: 0.1 10E3/UL (ref 0–0.2)
BASOPHILS NFR BLD AUTO: 1 %
EOSINOPHIL # BLD AUTO: 0.1 10E3/UL (ref 0–0.7)
EOSINOPHIL NFR BLD AUTO: 2 %
ERYTHROCYTE [DISTWIDTH] IN BLOOD BY AUTOMATED COUNT: 15.4 % (ref 10–15)
HCT VFR BLD AUTO: 33.8 % (ref 35–47)
HGB BLD-MCNC: 11.4 G/DL (ref 11.7–15.7)
IMM GRANULOCYTES # BLD: 0.1 10E3/UL
IMM GRANULOCYTES NFR BLD: 2 %
LYMPHOCYTES # BLD AUTO: 0.9 10E3/UL (ref 0.8–5.3)
LYMPHOCYTES NFR BLD AUTO: 18 %
MCH RBC QN AUTO: 31 PG (ref 26.5–33)
MCHC RBC AUTO-ENTMCNC: 33.7 G/DL (ref 31.5–36.5)
MCV RBC AUTO: 92 FL (ref 78–100)
MONOCYTES # BLD AUTO: 0.5 10E3/UL (ref 0–1.3)
MONOCYTES NFR BLD AUTO: 10 %
NEUTROPHILS # BLD AUTO: 3.3 10E3/UL (ref 1.6–8.3)
NEUTROPHILS NFR BLD AUTO: 68 %
NRBC # BLD AUTO: 0 10E3/UL
NRBC BLD AUTO-RTO: 0 /100
PLATELET # BLD AUTO: 236 10E3/UL (ref 150–450)
RBC # BLD AUTO: 3.68 10E6/UL (ref 3.8–5.2)
WBC # BLD AUTO: 4.9 10E3/UL (ref 4–11)

## 2024-06-03 PROCEDURE — 250N000011 HC RX IP 250 OP 636: Performed by: NURSE PRACTITIONER

## 2024-06-03 PROCEDURE — 85025 COMPLETE CBC W/AUTO DIFF WBC: CPT | Performed by: NURSE PRACTITIONER

## 2024-06-03 PROCEDURE — 36591 DRAW BLOOD OFF VENOUS DEVICE: CPT

## 2024-06-03 RX ORDER — HEPARIN SODIUM (PORCINE) LOCK FLUSH IV SOLN 100 UNIT/ML 100 UNIT/ML
5 SOLUTION INTRAVENOUS
Status: DISCONTINUED | OUTPATIENT
Start: 2024-06-03 | End: 2024-06-03 | Stop reason: HOSPADM

## 2024-06-03 RX ADMIN — Medication 5 ML: at 09:05

## 2024-06-03 NOTE — PROGRESS NOTES
Port accessed per unit protocol  Labs drawn without difficulty, port is tilted slightly  Hep locked      After labs pt reported feeling so tired all the time. She also reported that she has poly urea with not pain when voiding but so frequent she barely sleeps. Provider messaged to make him aware prior to her treatment tomorrow.

## 2024-06-04 ENCOUNTER — INFUSION THERAPY VISIT (OUTPATIENT)
Dept: INFUSION THERAPY | Facility: CLINIC | Age: 84
End: 2024-06-04
Attending: NURSE PRACTITIONER
Payer: COMMERCIAL

## 2024-06-04 VITALS
BODY MASS INDEX: 30.79 KG/M2 | DIASTOLIC BLOOD PRESSURE: 47 MMHG | RESPIRATION RATE: 14 BRPM | OXYGEN SATURATION: 97 % | SYSTOLIC BLOOD PRESSURE: 98 MMHG | WEIGHT: 182.2 LBS | TEMPERATURE: 98 F | HEART RATE: 78 BPM

## 2024-06-04 DIAGNOSIS — Z08 ENCOUNTER FOR FOLLOW-UP EXAMINATION AFTER COMPLETED TREATMENT FOR MALIGNANT NEOPLASM: Primary | ICD-10-CM

## 2024-06-04 DIAGNOSIS — Z17.1 MALIGNANT NEOPLASM OF UPPER-OUTER QUADRANT OF RIGHT BREAST IN FEMALE, ESTROGEN RECEPTOR NEGATIVE (H): ICD-10-CM

## 2024-06-04 DIAGNOSIS — C50.411 MALIGNANT NEOPLASM OF UPPER-OUTER QUADRANT OF RIGHT BREAST IN FEMALE, ESTROGEN RECEPTOR NEGATIVE (H): ICD-10-CM

## 2024-06-04 DIAGNOSIS — C50.919 PRIMARY MALIGNANT NEOPLASM OF BREAST WITH METASTASIS (H): ICD-10-CM

## 2024-06-04 PROCEDURE — 96367 TX/PROPH/DG ADDL SEQ IV INF: CPT

## 2024-06-04 PROCEDURE — 250N000011 HC RX IP 250 OP 636: Mod: JW | Performed by: NURSE PRACTITIONER

## 2024-06-04 PROCEDURE — 96413 CHEMO IV INFUSION 1 HR: CPT

## 2024-06-04 PROCEDURE — 250N000013 HC RX MED GY IP 250 OP 250 PS 637: Performed by: NURSE PRACTITIONER

## 2024-06-04 PROCEDURE — 258N000003 HC RX IP 258 OP 636: Performed by: NURSE PRACTITIONER

## 2024-06-04 PROCEDURE — 96375 TX/PRO/DX INJ NEW DRUG ADDON: CPT

## 2024-06-04 RX ORDER — ACETAMINOPHEN 325 MG/1
650 TABLET ORAL ONCE
Status: COMPLETED | OUTPATIENT
Start: 2024-06-04 | End: 2024-06-04

## 2024-06-04 RX ORDER — HEPARIN SODIUM (PORCINE) LOCK FLUSH IV SOLN 100 UNIT/ML 100 UNIT/ML
5 SOLUTION INTRAVENOUS ONCE
Status: COMPLETED | OUTPATIENT
Start: 2024-06-04 | End: 2024-06-04

## 2024-06-04 RX ORDER — DIPHENHYDRAMINE HCL 25 MG
25 CAPSULE ORAL ONCE
Status: COMPLETED | OUTPATIENT
Start: 2024-06-04 | End: 2024-06-04

## 2024-06-04 RX ORDER — PALONOSETRON 0.05 MG/ML
0.25 INJECTION, SOLUTION INTRAVENOUS ONCE
Status: COMPLETED | OUTPATIENT
Start: 2024-06-04 | End: 2024-06-04

## 2024-06-04 RX ADMIN — PALONOSETRON 0.25 MG: 0.05 INJECTION, SOLUTION INTRAVENOUS at 09:20

## 2024-06-04 RX ADMIN — SODIUM CHLORIDE 250 ML: 9 INJECTION, SOLUTION INTRAVENOUS at 09:14

## 2024-06-04 RX ADMIN — FOSAPREPITANT: 150 INJECTION, POWDER, LYOPHILIZED, FOR SOLUTION INTRAVENOUS at 09:32

## 2024-06-04 RX ADMIN — ACETAMINOPHEN 650 MG: 325 TABLET, FILM COATED ORAL at 09:20

## 2024-06-04 RX ADMIN — DIPHENHYDRAMINE HYDROCHLORIDE 25 MG: 25 CAPSULE ORAL at 09:20

## 2024-06-04 RX ADMIN — Medication 5 ML: at 11:28

## 2024-06-04 RX ADMIN — FAMOTIDINE 20 MG: 10 INJECTION, SOLUTION INTRAVENOUS at 09:20

## 2024-06-04 RX ADMIN — SACITUZUMAB GOVITECAN 671 MG: 180 POWDER, FOR SOLUTION INTRAVENOUS at 10:06

## 2024-06-04 ASSESSMENT — PAIN SCALES - GENERAL: PAINLEVEL: NO PAIN (0)

## 2024-06-04 NOTE — PROGRESS NOTES
Infusion Nursing Note:  Precious Paris presents today for Trodelvy.    Patient seen by provider today: No   present during visit today: Not Applicable.    Note: Labs drawn 6/3.      Intravenous Access:  Implanted Port.    Treatment Conditions:  Lab Results   Component Value Date    HGB 11.4 (L) 06/03/2024    WBC 4.9 06/03/2024    ANEU 0.8 (L) 10/10/2023    ANEUTAUTO 3.3 06/03/2024     06/03/2024        Results reviewed, labs MET treatment parameters, ok to proceed with treatment.      Post Infusion Assessment:  Patient tolerated infusion without incident. Precious agreed to stay for 15 minutes port Trodelvy.  Blood return noted pre and post infusion.  Site patent and intact, free from redness, edema or discomfort.  No evidence of extravasations.  Access discontinued per protocol.       Discharge Plan:   Patient discharged in stable condition accompanied by: daughter.  Departure Mode: Ambulatory.      Roula Flores RN

## 2024-06-12 ENCOUNTER — ALLIED HEALTH/NURSE VISIT (OUTPATIENT)
Dept: FAMILY MEDICINE | Facility: CLINIC | Age: 84
End: 2024-06-12
Payer: COMMERCIAL

## 2024-06-12 ENCOUNTER — TELEPHONE (OUTPATIENT)
Dept: FAMILY MEDICINE | Facility: CLINIC | Age: 84
End: 2024-06-12

## 2024-06-12 VITALS — DIASTOLIC BLOOD PRESSURE: 62 MMHG | SYSTOLIC BLOOD PRESSURE: 128 MMHG | HEART RATE: 94 BPM | OXYGEN SATURATION: 96 %

## 2024-06-12 DIAGNOSIS — Z01.30 BLOOD PRESSURE CHECK: Primary | ICD-10-CM

## 2024-06-12 PROCEDURE — 99207 PR NO CHARGE NURSE ONLY: CPT

## 2024-06-12 NOTE — TELEPHONE ENCOUNTER
Patient's call transferred to author  Relayed Dr. Jorge's message    Patient verbalized understanding  No further questions/concerns    Hasmukh Borden, Clinic RN  [facility]

## 2024-06-12 NOTE — TELEPHONE ENCOUNTER
Looks like blood pressure is good.     Can check at home at times of dizziness and let us know if blood pressure is low (<100 systolic and symptomatic).    Continue to push fluids/stay hydrated.    Patient is due for annual wellness visit with me.    Ester Jorge M.D.

## 2024-06-12 NOTE — PROGRESS NOTES
Precious Paris is a 83 year old year old patient who comes in today for a Blood Pressure check because of Low home blood pressure readings.  Vital Signs as repeated by /68 right 128/62 left HR 94 SPO2 96%  Patient is taking medication as prescribed Losartan hydrochlorothiazide 50-12.5 QD  Patient is tolerating medications well.  Patient is monitoring Blood Pressure at home.  Average readings if yes are 80-90's/60's  Current complaints: occasional dizziness, chemo  Disposition:  patient to continue with the same medication or as advised.     Phone call and [facility] pharmacy preferred if changes, okay to leave detailed message.     Patient takes meds at lunch time and checks BP's in the evening    Eneida Smith RN on 6/12/2024 at 9:28 AM

## 2024-06-13 ENCOUNTER — PATIENT OUTREACH (OUTPATIENT)
Dept: ONCOLOGY | Facility: CLINIC | Age: 84
End: 2024-06-13
Payer: COMMERCIAL

## 2024-06-13 NOTE — CONFIDENTIAL NOTE
[facility]: Cancer Care                                                                                        Patients daughter called stating patient has been dizzy lately and fell yesterday.   She feels her blood pressure has been all over the place which is not helping. Encouraged her that she should be seen and make an appt with her PCP to discuss her blood pressure medications and possible changes to see if that helps. Otherwise she should go to the urgent care to be assessed and worked up. Daughter verbalized understanding and agreement to plan.     Signature:  Linh Javier RN

## 2024-06-17 ENCOUNTER — OFFICE VISIT (OUTPATIENT)
Dept: PEDIATRICS | Facility: CLINIC | Age: 84
End: 2024-06-17
Payer: COMMERCIAL

## 2024-06-17 ENCOUNTER — TELEPHONE (OUTPATIENT)
Dept: FAMILY MEDICINE | Facility: CLINIC | Age: 84
End: 2024-06-17

## 2024-06-17 ENCOUNTER — HOSPITAL ENCOUNTER (OUTPATIENT)
Facility: CLINIC | Age: 84
End: 2024-06-17
Attending: OPHTHALMOLOGY | Admitting: OPHTHALMOLOGY
Payer: COMMERCIAL

## 2024-06-17 VITALS
WEIGHT: 183 LBS | OXYGEN SATURATION: 95 % | RESPIRATION RATE: 18 BRPM | SYSTOLIC BLOOD PRESSURE: 125 MMHG | HEART RATE: 100 BPM | BODY MASS INDEX: 30.49 KG/M2 | TEMPERATURE: 98.3 F | DIASTOLIC BLOOD PRESSURE: 62 MMHG | HEIGHT: 65 IN

## 2024-06-17 DIAGNOSIS — D64.9 ANEMIA, UNSPECIFIED TYPE: ICD-10-CM

## 2024-06-17 DIAGNOSIS — R53.83 FATIGUE, UNSPECIFIED TYPE: ICD-10-CM

## 2024-06-17 DIAGNOSIS — R53.1 WEAKNESS: ICD-10-CM

## 2024-06-17 DIAGNOSIS — R35.1 NOCTURIA: ICD-10-CM

## 2024-06-17 DIAGNOSIS — R42 DIZZINESS: Primary | ICD-10-CM

## 2024-06-17 DIAGNOSIS — R39.15 URINARY URGENCY: ICD-10-CM

## 2024-06-17 DIAGNOSIS — C50.919 PRIMARY MALIGNANT NEOPLASM OF BREAST WITH METASTASIS (H): ICD-10-CM

## 2024-06-17 LAB
ALBUMIN SERPL BCG-MCNC: 3.4 G/DL (ref 3.5–5.2)
ALBUMIN UR-MCNC: NEGATIVE MG/DL
ALP SERPL-CCNC: 85 U/L (ref 40–150)
ALT SERPL W P-5'-P-CCNC: 40 U/L (ref 0–50)
ANION GAP SERPL CALCULATED.3IONS-SCNC: 14 MMOL/L (ref 7–15)
APPEARANCE UR: CLEAR
AST SERPL W P-5'-P-CCNC: 37 U/L (ref 0–45)
BACTERIA #/AREA URNS HPF: ABNORMAL /HPF
BASOPHILS # BLD AUTO: 0 10E3/UL (ref 0–0.2)
BASOPHILS NFR BLD AUTO: 1 %
BILIRUB SERPL-MCNC: 0.3 MG/DL
BILIRUB UR QL STRIP: NEGATIVE
BUN SERPL-MCNC: 10.5 MG/DL (ref 8–23)
CALCIUM SERPL-MCNC: 8.8 MG/DL (ref 8.8–10.2)
CHLORIDE SERPL-SCNC: 101 MMOL/L (ref 98–107)
COLOR UR AUTO: YELLOW
CREAT SERPL-MCNC: 0.6 MG/DL (ref 0.51–0.95)
DEPRECATED HCO3 PLAS-SCNC: 22 MMOL/L (ref 22–29)
EGFRCR SERPLBLD CKD-EPI 2021: 89 ML/MIN/1.73M2
EOSINOPHIL # BLD AUTO: 0.1 10E3/UL (ref 0–0.7)
EOSINOPHIL NFR BLD AUTO: 1 %
ERYTHROCYTE [DISTWIDTH] IN BLOOD BY AUTOMATED COUNT: 15.2 % (ref 10–15)
FERRITIN SERPL-MCNC: 763 NG/ML (ref 11–328)
FLUAV RNA SPEC QL NAA+PROBE: NEGATIVE
FLUBV RNA RESP QL NAA+PROBE: NEGATIVE
GLUCOSE SERPL-MCNC: 133 MG/DL (ref 70–99)
GLUCOSE UR STRIP-MCNC: NEGATIVE MG/DL
HCT VFR BLD AUTO: 30 % (ref 35–47)
HGB BLD-MCNC: 9.6 G/DL (ref 11.7–15.7)
HGB UR QL STRIP: NEGATIVE
IMM GRANULOCYTES # BLD: 0 10E3/UL
IMM GRANULOCYTES NFR BLD: 1 %
IRON SERPL-MCNC: 19 UG/DL (ref 37–145)
KETONES UR STRIP-MCNC: NEGATIVE MG/DL
LEUKOCYTE ESTERASE UR QL STRIP: ABNORMAL
LYMPHOCYTES # BLD AUTO: 0.6 10E3/UL (ref 0.8–5.3)
LYMPHOCYTES NFR BLD AUTO: 13 %
MCH RBC QN AUTO: 29.1 PG (ref 26.5–33)
MCHC RBC AUTO-ENTMCNC: 32 G/DL (ref 31.5–36.5)
MCV RBC AUTO: 91 FL (ref 78–100)
MONOCYTES # BLD AUTO: 0.4 10E3/UL (ref 0–1.3)
MONOCYTES NFR BLD AUTO: 9 %
MUCOUS THREADS #/AREA URNS LPF: PRESENT /LPF
NEUTROPHILS # BLD AUTO: 3.5 10E3/UL (ref 1.6–8.3)
NEUTROPHILS NFR BLD AUTO: 76 %
NITRATE UR QL: NEGATIVE
PH UR STRIP: 6.5 [PH] (ref 5–7)
PLATELET # BLD AUTO: 236 10E3/UL (ref 150–450)
POTASSIUM SERPL-SCNC: 3.5 MMOL/L (ref 3.4–5.3)
PROT SERPL-MCNC: 6.7 G/DL (ref 6.4–8.3)
RBC # BLD AUTO: 3.3 10E6/UL (ref 3.8–5.2)
RBC #/AREA URNS AUTO: ABNORMAL /HPF
RSV RNA SPEC NAA+PROBE: NEGATIVE
SARS-COV-2 RNA RESP QL NAA+PROBE: NEGATIVE
SODIUM SERPL-SCNC: 137 MMOL/L (ref 135–145)
SP GR UR STRIP: 1.01 (ref 1–1.03)
SQUAMOUS #/AREA URNS AUTO: ABNORMAL /LPF
TSH SERPL DL<=0.005 MIU/L-ACNC: 0.96 UIU/ML (ref 0.3–4.2)
UROBILINOGEN UR STRIP-ACNC: 1 E.U./DL
WBC # BLD AUTO: 4.6 10E3/UL (ref 4–11)
WBC #/AREA URNS AUTO: ABNORMAL /HPF

## 2024-06-17 PROCEDURE — 93005 ELECTROCARDIOGRAM TRACING: CPT | Performed by: NURSE PRACTITIONER

## 2024-06-17 PROCEDURE — 82728 ASSAY OF FERRITIN: CPT | Performed by: NURSE PRACTITIONER

## 2024-06-17 PROCEDURE — 83540 ASSAY OF IRON: CPT | Performed by: NURSE PRACTITIONER

## 2024-06-17 PROCEDURE — 80053 COMPREHEN METABOLIC PANEL: CPT | Performed by: NURSE PRACTITIONER

## 2024-06-17 PROCEDURE — 36415 COLL VENOUS BLD VENIPUNCTURE: CPT | Performed by: NURSE PRACTITIONER

## 2024-06-17 PROCEDURE — 83550 IRON BINDING TEST: CPT | Performed by: NURSE PRACTITIONER

## 2024-06-17 PROCEDURE — 96360 HYDRATION IV INFUSION INIT: CPT | Performed by: NURSE PRACTITIONER

## 2024-06-17 PROCEDURE — 81001 URINALYSIS AUTO W/SCOPE: CPT | Performed by: NURSE PRACTITIONER

## 2024-06-17 PROCEDURE — 87637 SARSCOV2&INF A&B&RSV AMP PRB: CPT | Performed by: NURSE PRACTITIONER

## 2024-06-17 PROCEDURE — 99215 OFFICE O/P EST HI 40 MIN: CPT | Mod: 25 | Performed by: NURSE PRACTITIONER

## 2024-06-17 PROCEDURE — 85025 COMPLETE CBC W/AUTO DIFF WBC: CPT | Performed by: NURSE PRACTITIONER

## 2024-06-17 PROCEDURE — 84443 ASSAY THYROID STIM HORMONE: CPT | Performed by: NURSE PRACTITIONER

## 2024-06-17 PROCEDURE — 83540 ASSAY OF IRON: CPT | Mod: 59 | Performed by: NURSE PRACTITIONER

## 2024-06-17 RX ORDER — HEPARIN SODIUM (PORCINE) LOCK FLUSH IV SOLN 100 UNIT/ML 100 UNIT/ML
5 SOLUTION INTRAVENOUS ONCE
Status: COMPLETED | OUTPATIENT
Start: 2024-06-17 | End: 2024-06-17

## 2024-06-17 RX ORDER — FERROUS SULFATE 325(65) MG
325 TABLET ORAL
Qty: 90 TABLET | Refills: 1 | Status: SHIPPED | OUTPATIENT
Start: 2024-06-17

## 2024-06-17 RX ADMIN — Medication 1000 ML: at 12:15

## 2024-06-17 RX ADMIN — HEPARIN SODIUM (PORCINE) LOCK FLUSH IV SOLN 100 UNIT/ML 5 ML: 100 SOLUTION at 13:31

## 2024-06-17 ASSESSMENT — PAIN SCALES - GENERAL: PAINLEVEL: MODERATE PAIN (4)

## 2024-06-17 NOTE — PROGRESS NOTES
"Acute and Diagnostic Services Clinic Visit    Assessment & Plan     Dizziness  Weakness  Fatigue, unspecified type  Metastatic breast cancer  Progressively worsening dizziness, weakness and fatigue over the past 5 days, no other specific complaints of pain.  Nonfocal neurologic exam.  No fevers or URI type symptoms.  EKG today is without acute ischemic changes or significant arrhythmia.  CBC does show a 2 g drop in hemoglobin over the past 2 weeks.  CMP, TSH unremarkable.  I did send viral testing to rule out COVID, this is pending.  Suspect symptoms may be related to anemia.  Fortunately, patient has an appointment with her oncologist tomorrow, we will further discuss at that time.  She was given a 500 mL saline bolus in [facility] clinic today, and did report feeling significantly better following this.  - sodium chloride 0.9% BOLUS 500 mL  - CBC with platelets differential; Future  - Comprehensive metabolic panel; Future  - EKG 12-lead, tracing only  - heparin lock flush 100 unit/mL injection 5 mL  - Symptomatic Influenza A/B, RSV, & SARS-CoV2 PCR (COVID-19) Nose  - TSH with free T4 reflex; Future    Anemia, unspecified type  Hemoglobin today is 9.6, was 11.4 on Tamiko 3.  Iron is 19, ferritin pending.  Reasonable at this point to start iron supplementation, and further discuss with oncology tomorrow.  - Iron; Future  - Ferritin; Future  - ferrous sulfate (FEROSUL) 325 (65 Fe) MG tablet; Take 1 tablet (325 mg) by mouth daily (with breakfast)    Nocturia  Urinary urgency  UA negative for infection.  Further discuss with PCP at upcoming visit.  - UA with Microscopic reflex to Culture; Future  - UA with Microscopic reflex to Culture    40 minutes were spent doing chart review, history and exam, documentation and further activities per the note.      BMI  Estimated body mass index is 30.93 kg/m  as calculated from the following:    Height as of this encounter: 1.638 m (5' 4.5\").    Weight as of this encounter: 83 kg (183 lb). " "        See Patient Instructions    Return in about 1 day (around 6/18/2024).    Francine Lang is a 83 year old, presenting for the following health issues:  Dizziness, Confusion, and Neurologic Problem    HPI     Evaluation of Neurologic Symptoms  Onset/Duration: 5 days  Description:  Weakness/location: Fell Wednesday   Numbness/tingling: no  Headaches: YES- comes and goes  History of migraines: no   Other pain: no   Dizziness: YES  Visual changes: no   Speech changes: no   Memory changes: YES  Personality changes: no            Loss or change of consciousness: is not sure so is assuming she did lose consciousness  Progression of symptoms same  Accompanying signs and symptoms:  Fever: no   Stiff neck: no   Neck or upper back pain:  no   ENT or URI symptoms: no            Nausea or vomiting: no   History    Head or other trauma: no             Prior evaluation: no   Precipitating or Alleviating factors:           Things that improve symptoms:  nothing           Things that worsen symptoms: walking  Therapies tried and outcome: nothing    Above HPI reviewed. Additionally, for the past 5 days, has had increasing fatigue, occasional dizziness and generalized weakness.  She reported to her daughter yesterday that she felt so unwell \"I could die\".  No specific complaints of pain anywhere.  No recent fevers.  No nausea or vomiting.  Denies chest pain, palpitations, shortness of breath, exertional dyspnea, swelling of the extremities.  Has been lightheaded, did have a fall approximately 5 days ago due to being so lightheaded.  Denies a room spinning sensation.  Of note, patient is receiving chemotherapy for metastatic breast cancer.  Last chemo was approximately 2 weeks ago, she is due for chemo tomorrow and has an appointment with her oncologist tomorrow as well.  She does also note that for the past 6 months, she has had increased nocturia and in the past several days has had significant urinary urgency with mild " "incontinence at nighttime while trying to get to the bathroom.  No dysuria, no hematuria, no back or flank pain.  No lower abdominal pain.      Review of Systems  Constitutional, neuro, ENT, endocrine, pulmonary, cardiac, gastrointestinal, genitourinary, musculoskeletal, integument and psychiatric systems are negative, except as otherwise noted.      Objective    /62   Pulse 100   Temp 98.3  F (36.8  C) (Tympanic)   Resp 18   Ht 1.638 m (5' 4.5\")   Wt 83 kg (183 lb)   SpO2 95%   BMI 30.93 kg/m    Body mass index is 30.93 kg/m .  Physical Exam  Vitals and nursing note reviewed.   Constitutional:       Appearance: Normal appearance.   HENT:      Head: Normocephalic and atraumatic.      Right Ear: Tympanic membrane and ear canal normal.      Left Ear: Tympanic membrane and ear canal normal.      Nose: Nose normal. No rhinorrhea.      Right Sinus: No maxillary sinus tenderness or frontal sinus tenderness.      Left Sinus: No maxillary sinus tenderness or frontal sinus tenderness.      Mouth/Throat:      Lips: Pink.      Mouth: Mucous membranes are moist.      Pharynx: Oropharynx is clear.   Eyes:      General: Lids are normal.      Conjunctiva/sclera: Conjunctivae normal.      Comments: Non icteric   Cardiovascular:      Rate and Rhythm: Normal rate and regular rhythm.      Pulses: Normal pulses.      Heart sounds: Normal heart sounds, S1 normal and S2 normal.   Pulmonary:      Effort: Pulmonary effort is normal.      Breath sounds: Normal breath sounds and air entry.   Musculoskeletal:      Cervical back: Neck supple.   Lymphadenopathy:      Cervical: No cervical adenopathy.   Skin:     General: Skin is warm and dry.   Neurological:      General: No focal deficit present.      Mental Status: She is alert and oriented to person, place, and time.      Cranial Nerves: Cranial nerves 2-12 are intact. No facial asymmetry.      Sensory: Sensation is intact.      Motor: Motor function is intact.      " Coordination: Coordination is intact.   Psychiatric:         Mood and Affect: Mood normal.         Behavior: Behavior normal.         Thought Content: Thought content normal.         Judgment: Judgment normal.            EKG - Reviewed and interpreted by me appears normal, NSR, normal axis, normal intervals, no acute ST/T changes c/w ischemia, no LVH by voltage criteria, occasional PVC noted, unifocal  Results for orders placed or performed in visit on 06/17/24 (from the past 24 hour(s))   CBC with platelets differential    Narrative    The following orders were created for panel order CBC with platelets differential.  Procedure                               Abnormality         Status                     ---------                               -----------         ------                     CBC with platelets and d...[[id number]]  Abnormal            Final result                 Please view results for these tests on the individual orders.   Comprehensive metabolic panel   Result Value Ref Range    Sodium 137 135 - 145 mmol/L    Potassium 3.5 3.4 - 5.3 mmol/L    Carbon Dioxide (CO2) 22 22 - 29 mmol/L    Anion Gap 14 7 - 15 mmol/L    Urea Nitrogen 10.5 8.0 - 23.0 mg/dL    Creatinine 0.60 0.51 - 0.95 mg/dL    GFR Estimate 89 >60 mL/min/1.73m2    Calcium 8.8 8.8 - 10.2 mg/dL    Chloride 101 98 - 107 mmol/L    Glucose 133 (H) 70 - 99 mg/dL    Alkaline Phosphatase 85 40 - 150 U/L    AST 37 0 - 45 U/L    ALT 40 0 - 50 U/L    Protein Total 6.7 6.4 - 8.3 g/dL    Albumin 3.4 (L) 3.5 - 5.2 g/dL    Bilirubin Total 0.3 <=1.2 mg/dL   TSH with free T4 reflex   Result Value Ref Range    TSH 0.96 0.30 - 4.20 uIU/mL   CBC with platelets and differential   Result Value Ref Range    WBC Count 4.6 4.0 - 11.0 10e3/uL    RBC Count 3.30 (L) 3.80 - 5.20 10e6/uL    Hemoglobin 9.6 (L) 11.7 - 15.7 g/dL    Hematocrit 30.0 (L) 35.0 - 47.0 %    MCV 91 78 - 100 fL    MCH 29.1 26.5 - 33.0 pg    MCHC 32.0 31.5 - 36.5 g/dL    RDW 15.2 (H) 10.0 - 15.0  %    Platelet Count 236 150 - 450 10e3/uL    % Neutrophils 76 %    % Lymphocytes 13 %    % Monocytes 9 %    % Eosinophils 1 %    % Basophils 1 %    % Immature Granulocytes 1 %    Absolute Neutrophils 3.5 1.6 - 8.3 10e3/uL    Absolute Lymphocytes 0.6 (L) 0.8 - 5.3 10e3/uL    Absolute Monocytes 0.4 0.0 - 1.3 10e3/uL    Absolute Eosinophils 0.1 0.0 - 0.7 10e3/uL    Absolute Basophils 0.0 0.0 - 0.2 10e3/uL    Absolute Immature Granulocytes 0.0 <=0.4 10e3/uL   Iron   Result Value Ref Range    Iron 19 (L) 37 - 145 ug/dL   UA with Microscopic reflex to Culture    Specimen: Urine, Clean Catch   Result Value Ref Range    Color Urine Yellow Colorless, Straw, Light Yellow, Yellow    Appearance Urine Clear Clear    Glucose Urine Negative Negative mg/dL    Bilirubin Urine Negative Negative    Ketones Urine Negative Negative mg/dL    Specific Gravity Urine 1.010 1.003 - 1.035    Blood Urine Negative Negative    pH Urine 6.5 5.0 - 7.0    Protein Albumin Urine Negative Negative mg/dL    Urobilinogen Urine 1.0 0.2, 1.0 E.U./dL    Nitrite Urine Negative Negative    Leukocyte Esterase Urine Trace (A) Negative   UA Microscopic with Reflex to Culture   Result Value Ref Range    Bacteria Urine Few (A) None Seen /HPF    RBC Urine 0-2 0-2 /HPF /HPF    WBC Urine 0-5 0-5 /HPF /HPF    Squamous Epithelials Urine Few (A) None Seen /LPF    Mucus Urine Present (A) None Seen /LPF    Narrative    Urine Culture not indicated           Signed Electronically by: KONG Perez CNP

## 2024-06-17 NOTE — PATIENT INSTRUCTIONS
Recheck with Dr. Jorge next week.  Stay off losartan-hydrochlorothiazide for now.  Start iron supplement.  Follow up with oncology tomorrow.

## 2024-06-17 NOTE — TELEPHONE ENCOUNTER
Referral to Acute and Diagnostic Services    [phone] ([place]) [facility] - [address] [place] [id number]    Transition to Acute & Diagnostic Services Clinic has been discussed with patient, and she agrees with next level of care.   Patient understands that evaluation/treatment at [facility] typically takes significantly longer than in clinic/urgent care (>2 hours).  The [facility] Acute and Diagnostics Services Clinic has been contacted by provider/staff to confirm patient acceptance.         Special issues:  none    The following provider has assessed this patient for intervention at [facility], and directed the patient for referral:     I spoke with [facility] provider, Cassidy Pollock, who is accepting this pt for evaluation.    Leyla Hunt RN

## 2024-06-17 NOTE — TELEPHONE ENCOUNTER
Reason for Call:  Appointment Request    Patient requesting this type of appt:  patient fell, dizzy and tired - symptoms not improving     Requested provider: Ester Jorge preferably but would be willing to see someone else    Reason patient unable to be scheduled: Not within requested timeframe    When does patient want to be seen/preferred time: Same day    Comments: requesting work in    Could we send this information to you in [facility] or would you prefer to receive a phone call?:   Patient would prefer a phone call   Okay to leave a detailed message?: Yes at Other phone number:  [phone]    Call taken on 6/17/2024 at 9:50 AM by Kait Ch

## 2024-06-17 NOTE — TELEPHONE ENCOUNTER
Daughter, Estrella, [phone], called asking for pt to be seen?  Estrella explains that pt has been undergoing chemo for breast cancer and that next chemo is due tomorrow.    Over the last 5 days, pt has been weak, dizzy, confused, very tired and sleeping a lot.  Pt is coherent and able to carry on conversation.  5 days ago, pt was so weak that she fell and required assistance getting up.  No apparent injury however per dtr.  Pt reported to dtr last night that she hurts all over but denies specific pain.    Pt denies fever.    Daughter observed last night that pt continues to feel weak and balance is unstable however dtr assures that pt is strong enough to ambulate and to get onto an exam table for evaluation.    Dtr says pt is still eating and drinking but dtr is unclear how much pt.      Advised ADS.  Appt today at 11 am.    Leyla Hunt RN

## 2024-06-18 ENCOUNTER — ONCOLOGY VISIT (OUTPATIENT)
Dept: ONCOLOGY | Facility: CLINIC | Age: 84
End: 2024-06-18
Attending: INTERNAL MEDICINE
Payer: COMMERCIAL

## 2024-06-18 ENCOUNTER — INFUSION THERAPY VISIT (OUTPATIENT)
Dept: INFUSION THERAPY | Facility: CLINIC | Age: 84
End: 2024-06-18
Attending: NURSE PRACTITIONER
Payer: COMMERCIAL

## 2024-06-18 VITALS
BODY MASS INDEX: 30.64 KG/M2 | OXYGEN SATURATION: 96 % | HEIGHT: 65 IN | TEMPERATURE: 97.3 F | HEART RATE: 83 BPM | DIASTOLIC BLOOD PRESSURE: 55 MMHG | WEIGHT: 183.9 LBS | SYSTOLIC BLOOD PRESSURE: 137 MMHG

## 2024-06-18 VITALS — HEART RATE: 77 BPM | DIASTOLIC BLOOD PRESSURE: 75 MMHG | SYSTOLIC BLOOD PRESSURE: 128 MMHG

## 2024-06-18 DIAGNOSIS — C50.919 PRIMARY MALIGNANT NEOPLASM OF BREAST WITH METASTASIS (H): ICD-10-CM

## 2024-06-18 DIAGNOSIS — D64.9 ANEMIA, UNSPECIFIED TYPE: Primary | ICD-10-CM

## 2024-06-18 DIAGNOSIS — Z08 ENCOUNTER FOR FOLLOW-UP EXAMINATION AFTER COMPLETED TREATMENT FOR MALIGNANT NEOPLASM: ICD-10-CM

## 2024-06-18 DIAGNOSIS — C50.411 MALIGNANT NEOPLASM OF UPPER-OUTER QUADRANT OF RIGHT BREAST IN FEMALE, ESTROGEN RECEPTOR NEGATIVE (H): Primary | ICD-10-CM

## 2024-06-18 DIAGNOSIS — Z17.1 MALIGNANT NEOPLASM OF UPPER-OUTER QUADRANT OF RIGHT BREAST IN FEMALE, ESTROGEN RECEPTOR NEGATIVE (H): ICD-10-CM

## 2024-06-18 DIAGNOSIS — C50.411 MALIGNANT NEOPLASM OF UPPER-OUTER QUADRANT OF RIGHT BREAST IN FEMALE, ESTROGEN RECEPTOR NEGATIVE (H): ICD-10-CM

## 2024-06-18 DIAGNOSIS — Z17.1 MALIGNANT NEOPLASM OF UPPER-OUTER QUADRANT OF RIGHT BREAST IN FEMALE, ESTROGEN RECEPTOR NEGATIVE (H): Primary | ICD-10-CM

## 2024-06-18 LAB
IRON BINDING CAPACITY (ROCHE): 210 UG/DL (ref 240–430)
IRON SATN MFR SERPL: 9 % (ref 15–46)
IRON SERPL-MCNC: 19 UG/DL (ref 37–145)

## 2024-06-18 PROCEDURE — 96375 TX/PRO/DX INJ NEW DRUG ADDON: CPT

## 2024-06-18 PROCEDURE — 96413 CHEMO IV INFUSION 1 HR: CPT

## 2024-06-18 PROCEDURE — G0463 HOSPITAL OUTPT CLINIC VISIT: HCPCS | Performed by: INTERNAL MEDICINE

## 2024-06-18 PROCEDURE — G2211 COMPLEX E/M VISIT ADD ON: HCPCS | Performed by: INTERNAL MEDICINE

## 2024-06-18 PROCEDURE — 258N000003 HC RX IP 258 OP 636: Mod: JZ | Performed by: INTERNAL MEDICINE

## 2024-06-18 PROCEDURE — 250N000011 HC RX IP 250 OP 636: Performed by: INTERNAL MEDICINE

## 2024-06-18 PROCEDURE — 96367 TX/PROPH/DG ADDL SEQ IV INF: CPT

## 2024-06-18 PROCEDURE — 250N000013 HC RX MED GY IP 250 OP 250 PS 637: Performed by: INTERNAL MEDICINE

## 2024-06-18 PROCEDURE — 99215 OFFICE O/P EST HI 40 MIN: CPT | Performed by: INTERNAL MEDICINE

## 2024-06-18 RX ORDER — EPINEPHRINE 1 MG/ML
0.3 INJECTION, SOLUTION, CONCENTRATE INTRAVENOUS EVERY 5 MIN PRN
Status: CANCELLED | OUTPATIENT
Start: 2024-06-18

## 2024-06-18 RX ORDER — ATROPINE SULFATE 0.4 MG/ML
0.4 AMPUL (ML) INJECTION
Status: CANCELLED | OUTPATIENT
Start: 2024-06-18

## 2024-06-18 RX ORDER — HEPARIN SODIUM (PORCINE) LOCK FLUSH IV SOLN 100 UNIT/ML 100 UNIT/ML
5 SOLUTION INTRAVENOUS
Status: CANCELLED | OUTPATIENT
Start: 2024-06-18

## 2024-06-18 RX ORDER — MEPERIDINE HYDROCHLORIDE 25 MG/ML
25 INJECTION INTRAMUSCULAR; INTRAVENOUS; SUBCUTANEOUS EVERY 30 MIN PRN
Status: CANCELLED | OUTPATIENT
Start: 2024-06-25

## 2024-06-18 RX ORDER — DIPHENHYDRAMINE HYDROCHLORIDE 50 MG/ML
50 INJECTION INTRAMUSCULAR; INTRAVENOUS
Status: CANCELLED
Start: 2024-06-18

## 2024-06-18 RX ORDER — PALONOSETRON 0.05 MG/ML
0.25 INJECTION, SOLUTION INTRAVENOUS ONCE
Status: CANCELLED | OUTPATIENT
Start: 2024-06-18

## 2024-06-18 RX ORDER — HEPARIN SODIUM (PORCINE) LOCK FLUSH IV SOLN 100 UNIT/ML 100 UNIT/ML
5 SOLUTION INTRAVENOUS
Status: DISCONTINUED | OUTPATIENT
Start: 2024-06-18 | End: 2024-06-18 | Stop reason: HOSPADM

## 2024-06-18 RX ORDER — ALBUTEROL SULFATE 90 UG/1
1-2 AEROSOL, METERED RESPIRATORY (INHALATION)
Status: CANCELLED
Start: 2024-06-25

## 2024-06-18 RX ORDER — ACETAMINOPHEN 325 MG/1
650 TABLET ORAL ONCE
Status: CANCELLED | OUTPATIENT
Start: 2024-06-18

## 2024-06-18 RX ORDER — LORAZEPAM 2 MG/ML
0.5 INJECTION INTRAMUSCULAR EVERY 4 HOURS PRN
Status: CANCELLED | OUTPATIENT
Start: 2024-06-18

## 2024-06-18 RX ORDER — ATROPINE SULFATE 0.4 MG/ML
0.4 AMPUL (ML) INJECTION
Status: CANCELLED | OUTPATIENT
Start: 2024-06-25

## 2024-06-18 RX ORDER — MEPERIDINE HYDROCHLORIDE 25 MG/ML
25 INJECTION INTRAMUSCULAR; INTRAVENOUS; SUBCUTANEOUS EVERY 30 MIN PRN
Status: CANCELLED | OUTPATIENT
Start: 2024-06-18

## 2024-06-18 RX ORDER — DIPHENHYDRAMINE HCL 25 MG
25 CAPSULE ORAL ONCE
Status: CANCELLED | OUTPATIENT
Start: 2024-06-25

## 2024-06-18 RX ORDER — DIPHENHYDRAMINE HYDROCHLORIDE 50 MG/ML
50 INJECTION INTRAMUSCULAR; INTRAVENOUS
Status: CANCELLED
Start: 2024-06-25

## 2024-06-18 RX ORDER — LORAZEPAM 2 MG/ML
0.5 INJECTION INTRAMUSCULAR EVERY 4 HOURS PRN
Status: CANCELLED | OUTPATIENT
Start: 2024-06-25

## 2024-06-18 RX ORDER — DIPHENHYDRAMINE HCL 25 MG
25 CAPSULE ORAL ONCE
Status: CANCELLED | OUTPATIENT
Start: 2024-06-18

## 2024-06-18 RX ORDER — ACETAMINOPHEN 325 MG/1
650 TABLET ORAL ONCE
Status: COMPLETED | OUTPATIENT
Start: 2024-06-18 | End: 2024-06-18

## 2024-06-18 RX ORDER — PALONOSETRON 0.05 MG/ML
0.25 INJECTION, SOLUTION INTRAVENOUS ONCE
Status: COMPLETED | OUTPATIENT
Start: 2024-06-18 | End: 2024-06-18

## 2024-06-18 RX ORDER — HEPARIN SODIUM (PORCINE) LOCK FLUSH IV SOLN 100 UNIT/ML 100 UNIT/ML
5 SOLUTION INTRAVENOUS
Status: CANCELLED | OUTPATIENT
Start: 2024-06-25

## 2024-06-18 RX ORDER — ALBUTEROL SULFATE 0.83 MG/ML
2.5 SOLUTION RESPIRATORY (INHALATION)
Status: CANCELLED | OUTPATIENT
Start: 2024-06-25

## 2024-06-18 RX ORDER — DIPHENHYDRAMINE HCL 25 MG
25 CAPSULE ORAL ONCE
Status: COMPLETED | OUTPATIENT
Start: 2024-06-18 | End: 2024-06-18

## 2024-06-18 RX ORDER — ACETAMINOPHEN 325 MG/1
650 TABLET ORAL ONCE
Status: CANCELLED | OUTPATIENT
Start: 2024-06-25

## 2024-06-18 RX ORDER — ALBUTEROL SULFATE 90 UG/1
1-2 AEROSOL, METERED RESPIRATORY (INHALATION)
Status: CANCELLED
Start: 2024-06-18

## 2024-06-18 RX ORDER — METHYLPREDNISOLONE SODIUM SUCCINATE 125 MG/2ML
125 INJECTION, POWDER, LYOPHILIZED, FOR SOLUTION INTRAMUSCULAR; INTRAVENOUS
Status: CANCELLED
Start: 2024-06-18

## 2024-06-18 RX ORDER — ALBUTEROL SULFATE 0.83 MG/ML
2.5 SOLUTION RESPIRATORY (INHALATION)
Status: CANCELLED | OUTPATIENT
Start: 2024-06-18

## 2024-06-18 RX ORDER — EPINEPHRINE 1 MG/ML
0.3 INJECTION, SOLUTION, CONCENTRATE INTRAVENOUS EVERY 5 MIN PRN
Status: CANCELLED | OUTPATIENT
Start: 2024-06-25

## 2024-06-18 RX ORDER — METHYLPREDNISOLONE SODIUM SUCCINATE 125 MG/2ML
125 INJECTION, POWDER, LYOPHILIZED, FOR SOLUTION INTRAMUSCULAR; INTRAVENOUS
Status: CANCELLED
Start: 2024-06-25

## 2024-06-18 RX ORDER — PALONOSETRON 0.05 MG/ML
0.25 INJECTION, SOLUTION INTRAVENOUS ONCE
Status: CANCELLED | OUTPATIENT
Start: 2024-06-25

## 2024-06-18 RX ADMIN — Medication 5 ML: at 12:39

## 2024-06-18 RX ADMIN — SACITUZUMAB GOVITECAN 671 MG: 180 POWDER, FOR SOLUTION INTRAVENOUS at 11:05

## 2024-06-18 RX ADMIN — PALONOSETRON 0.25 MG: 0.05 INJECTION, SOLUTION INTRAVENOUS at 10:21

## 2024-06-18 RX ADMIN — SODIUM CHLORIDE 250 ML: 9 INJECTION, SOLUTION INTRAVENOUS at 10:16

## 2024-06-18 RX ADMIN — DIPHENHYDRAMINE HYDROCHLORIDE 25 MG: 25 CAPSULE ORAL at 10:18

## 2024-06-18 RX ADMIN — ACETAMINOPHEN 650 MG: 325 TABLET, FILM COATED ORAL at 10:18

## 2024-06-18 RX ADMIN — DEXAMETHASONE SODIUM PHOSPHATE: 10 INJECTION, SOLUTION INTRAMUSCULAR; INTRAVENOUS at 10:31

## 2024-06-18 RX ADMIN — FAMOTIDINE 20 MG: 10 INJECTION, SOLUTION INTRAVENOUS at 10:19

## 2024-06-18 ASSESSMENT — PAIN SCALES - GENERAL: PAINLEVEL: NO PAIN (0)

## 2024-06-18 NOTE — PROGRESS NOTES
Infusion Nursing Note:  Precious Paris presents today for Trodelvy.    Patient seen by provider today: Yes: Dr. Beebe   present during visit today: Not Applicable.    Note: N/A.    Intravenous Access:  Implanted Port.    Treatment Conditions:  Lab Results   Component Value Date    HGB 9.6 (L) 06/17/2024    WBC 4.6 06/17/2024    ANEU 0.8 (L) 10/10/2023    ANEUTAUTO 3.5 06/17/2024     06/17/2024        Lab Results   Component Value Date     06/17/2024    POTASSIUM 3.5 06/17/2024    CR 0.60 06/17/2024    YADIRA 8.8 06/17/2024    BILITOTAL 0.3 06/17/2024    ALBUMIN 3.4 (L) 06/17/2024    ALT 40 06/17/2024    AST 37 06/17/2024       Results reviewed, labs MET treatment parameters, ok to proceed with treatment.    Post Infusion Assessment:  Patient tolerated infusion without incident.  Patient observed for 15 minutes post Trodelvy per pt request.  Blood return noted pre and post infusion.  Site patent and intact, free from redness, edema or discomfort.  No evidence of extravasations.  Access discontinued per protocol.     Discharge Plan:   Patient discharged in stable condition accompanied by: self.  Departure Mode: Ambulatory.    Mario Worley RN

## 2024-06-18 NOTE — PROGRESS NOTES
"Oncology Rooming Note    June 18, 2024 9:09 AM   Precious Paris is a 83 year old female who presents for:    Chief Complaint   Patient presents with    Oncology Clinic Visit     Malignant neoplasm of upper-outer quadrant of right breast in female, estrogen receptor negative - provider and infusion visit     Initial Vitals: /55 (BP Location: Right arm, Patient Position: Sitting, Cuff Size: Adult Regular)   Pulse 83   Temp 97.3  F (36.3  C) (Tympanic)   Ht 1.638 m (5' 4.5\")   Wt 83.4 kg (183 lb 14.4 oz)   SpO2 96%   BMI 31.08 kg/m   Estimated body mass index is 31.08 kg/m  as calculated from the following:    Height as of this encounter: 1.638 m (5' 4.5\").    Weight as of this encounter: 83.4 kg (183 lb 14.4 oz). Body surface area is 1.95 meters squared.  No Pain (0) Comment: Data Unavailable   No LMP recorded. Patient is postmenopausal.  Allergies reviewed: Yes  Medications reviewed: Yes    Medications: Medication refills not needed today.  Pharmacy name entered into [facility]: [facility] - [facility] [id number] [facility]    Frailty Screening:   Is the patient here for a new oncology consult visit in cancer care? 2. No      Clinical concerns: Questions about ferous sulfate prescription.       Erin Thomas MA            "

## 2024-06-18 NOTE — PROGRESS NOTES
[facility] Hematology and Oncology Progress Note    Patient: Precious Paris  MRN: [id number]  Jun 18, 2024          Reason for Visit    Recurrent stage IV triple negative breast cancer (nodes, lung) (PDL1+)    Primary Oncologist: Dr. Beebe    _____________________________________________________________________________    History of Present Illness/ Interval History    Ms. Precious Paris is a 83 year old with recurrent metastatic triple negative breast cancer recurrence. She initiated 3rd line sacituzumab in September 2023.  She transferred her care to [place] over the Winter, continuing this regimen there, and returned here in April. Has now received 13 cycles.     She is here prior to cycle 14.  She has not had some issues over the last couple of weeks.  She felt progressively weak and sick over the last 2 weeks.  Weakness was associated with dizziness and lightheadedness.  She did sustain a fall while going up the stairs.  No head injury.  Her blood pressure at home was low at 80 systolic.  She stopped taking hydrochlorothiazide and losartan.  Was seen in the acute care yesterday.  Lab studies showed fallen hemoglobin to 9.6.  Total iron was low at 19.  Ferritin elevated at 763.  Serum chemistry otherwise within normal limits.  Normal creatinine and electrolytes.  She received a bolus of normal saline following which she felt a lot better.  Denies any diarrhea.  Denies any blood in stools or urine.  Today overall she feels much better.  No focal neurological symptoms.  Does have some soreness in her right chest area but nothing significant.  Here to continue systemic treatment.      ECOG PS: 1      Oncology History/Treatment  Diagnosis/Stage:   11/2019: Right breast cancer, triple negative ([id number])    BRCA negative    10/2022: Recurrent, stage IV triple negative breast cancer (bilateral axillary, supraclav, mediastinal nodes + lung)  -persistent/progressive right breast firmness with new  right nipple drainage and right axillary adenopathy  -bilateral diagnostic mammogram: increased density R breast with skin thickening. R breast US: 3.2 cm hypoechoic mass at 11:00 position 1 cm from nipple and multiple other small hypoechoic solid-appearing lesions in R breast + right axillary adenopathy (at least two hypoechoic vascular lesions measuring up to 2.5 cm)  -10/18/2022 biopsy R breast mass + R axillary node: grade 3 invasive ductal cancer with tumor necrosis, ER/NV/HER2 negative.   -PET: + right breast mass; bilateral axillary, retropectoral, supraclavicular, mediastinal/R hilar nodes and bilateral lung/R pleural nodules suspicious for mets.  -Brain MRI: negative for mets  -11/1/2022 biopsy L axillary node: metastatic invasive ductal carcinoma  -Foundation One: no actionable mutations MS stable, TMB 1 mut/mb.   -PDL1 testing: CPS >20% and TPS 60% (considered high PDL1 expression)  -CA 27.29: 59.5 (elevated). CA 15-3: 23 (normal)  -9/10/2023 Brain MRI: 2 new foci of punctate enhancement in left frontal cortex and right lentiform nucleus, concerning for brain metastases (asymptomatic)    NGS (foundation 1)  FGF R1 amplification.  Pazopanib has been approved in other tumors but not in breast cancer.  NTRK 1 amplification.  Not actionable  She has multiple other mutations which are not actionable.      Treatment:  Initial breast cancer (2019)  -2019: Neoadjuvant taxol  -Lumpectomy  -5/2020: Adjuvant RT  -6 - 7/2020: Adjuvant Xeloda. Stopped for rash    Recurrent, stage IV breast cancer (2022)  -11/14/2022:Doxil every 4 weeks  -12/12/2022: changed to doxorubicin every 3 weeks. (Planning addition of pembrolizumab once approved by insurance)  -1/2/2023: doxorubicin + pembrolizumab every 3 weeks. Completed 1 cycle. Stopped for progression (med nodes, slight increase in lung mets and stable R breast mass + axillary nodes).    -1/23/2023 - present: Carbo AUC 5, Gemcitabine 800 mg/m2 and Pembro every 21  days  -cycles 1-2: Prague day 1 only  -Cycle 3: Prague increased to days 1 + 8 given excellent tolerance  -CT after 3 cycles showed excellent response (near CR axillary + med nodes and SD in lung mets; clinical improvement in right breast changes and tumor marker).  -PET scan after 6 cycles of carboplatin gemcitabine pembrolizumab shows complete response    5/30 - 8/22/2023: Maintenance pembrolizumab, until progression (recurrent axillary, thoracic nodes and 2 punctate brain lesions).  Biopsy of left axillary node confirmed recurrent/metastatic breast cancer.    9/12/2023 - present:  Sacituzumab days 1, 8 every 21 days  --C2D8 deferred x 1 week for neutropenia  --Dose decreased to 8 mg/kg from cycle 3 onwards.  --Cycles 5-10 were delivered in [place] over winter months  --Resumed Cycle 11 here upon return 4/2024      Physical Exam    GENERAL: Alert and oriented to time place and person. Seated comfortably. In no distress.  Accompanied by her daughter  HEENT: Complete alopecia. No icterus.   Lymph: ?  Palpable left axillary lymphadenopathy  Heart: RRR  Lungs: regular respiratory effort. Clear lung sounds bilaterally.   Abd: Soft, non-distended.   Neuro: alert and oriented x 3      Lab Results  Recent Results (from the past 168 hour(s))   Symptomatic Influenza A/B, RSV, & SARS-CoV2 PCR (COVID-19) Nose    Collection Time: 06/17/24 11:55 AM    Specimen: Nose; Swab   Result Value Ref Range    Influenza A PCR Negative Negative    Influenza B PCR Negative Negative    RSV PCR Negative Negative    SARS CoV2 PCR Negative Negative   Comprehensive metabolic panel    Collection Time: 06/17/24 12:11 PM   Result Value Ref Range    Sodium 137 135 - 145 mmol/L    Potassium 3.5 3.4 - 5.3 mmol/L    Carbon Dioxide (CO2) 22 22 - 29 mmol/L    Anion Gap 14 7 - 15 mmol/L    Urea Nitrogen 10.5 8.0 - 23.0 mg/dL    Creatinine 0.60 0.51 - 0.95 mg/dL    GFR Estimate 89 >60 mL/min/1.73m2    Calcium 8.8 8.8 - 10.2 mg/dL    Chloride 101 98 - 107 mmol/L     Glucose 133 (H) 70 - 99 mg/dL    Alkaline Phosphatase 85 40 - 150 U/L    AST 37 0 - 45 U/L    ALT 40 0 - 50 U/L    Protein Total 6.7 6.4 - 8.3 g/dL    Albumin 3.4 (L) 3.5 - 5.2 g/dL    Bilirubin Total 0.3 <=1.2 mg/dL   TSH with free T4 reflex    Collection Time: 06/17/24 12:11 PM   Result Value Ref Range    TSH 0.96 0.30 - 4.20 uIU/mL   CBC with platelets and differential    Collection Time: 06/17/24 12:11 PM   Result Value Ref Range    WBC Count 4.6 4.0 - 11.0 10e3/uL    RBC Count 3.30 (L) 3.80 - 5.20 10e6/uL    Hemoglobin 9.6 (L) 11.7 - 15.7 g/dL    Hematocrit 30.0 (L) 35.0 - 47.0 %    MCV 91 78 - 100 fL    MCH 29.1 26.5 - 33.0 pg    MCHC 32.0 31.5 - 36.5 g/dL    RDW 15.2 (H) 10.0 - 15.0 %    Platelet Count 236 150 - 450 10e3/uL    % Neutrophils 76 %    % Lymphocytes 13 %    % Monocytes 9 %    % Eosinophils 1 %    % Basophils 1 %    % Immature Granulocytes 1 %    Absolute Neutrophils 3.5 1.6 - 8.3 10e3/uL    Absolute Lymphocytes 0.6 (L) 0.8 - 5.3 10e3/uL    Absolute Monocytes 0.4 0.0 - 1.3 10e3/uL    Absolute Eosinophils 0.1 0.0 - 0.7 10e3/uL    Absolute Basophils 0.0 0.0 - 0.2 10e3/uL    Absolute Immature Granulocytes 0.0 <=0.4 10e3/uL   Ferritin    Collection Time: 06/17/24 12:11 PM   Result Value Ref Range    Ferritin 763 (H) 11 - 328 ng/mL   Iron    Collection Time: 06/17/24 12:11 PM   Result Value Ref Range    Iron 19 (L) 37 - 145 ug/dL   Iron & Iron Binding Capacity    Collection Time: 06/17/24 12:11 PM   Result Value Ref Range    Iron 19 (L) 37 - 145 ug/dL    Iron Binding Capacity 210 (L) 240 - 430 ug/dL    Iron Sat Index 9 (L) 15 - 46 %   UA with Microscopic reflex to Culture    Collection Time: 06/17/24 12:58 PM    Specimen: Urine, Clean Catch   Result Value Ref Range    Color Urine Yellow Colorless, Straw, Light Yellow, Yellow    Appearance Urine Clear Clear    Glucose Urine Negative Negative mg/dL    Bilirubin Urine Negative Negative    Ketones Urine Negative Negative mg/dL    Specific Gravity Urine  1.010 1.003 - 1.035    Blood Urine Negative Negative    pH Urine 6.5 5.0 - 7.0    Protein Albumin Urine Negative Negative mg/dL    Urobilinogen Urine 1.0 0.2, 1.0 E.U./dL    Nitrite Urine Negative Negative    Leukocyte Esterase Urine Trace (A) Negative   UA Microscopic with Reflex to Culture    Collection Time: 06/17/24 12:58 PM   Result Value Ref Range    Bacteria Urine Few (A) None Seen /HPF    RBC Urine 0-2 0-2 /HPF /HPF    WBC Urine 0-5 0-5 /HPF /HPF    Squamous Epithelials Urine Few (A) None Seen /LPF    Mucus Urine Present (A) None Seen /LPF         Assessment/Plan  Recurrent, stage IV triple negative right breast cancer (nodes, lung); CPS>20% + TPS 60%  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     Overall has done fairly well on treatment so far.  Main issues have been low blood counts and diarrhea.  Currently taking Imodium for diarrhea after which her symptoms have significantly improved.  However has had significant weakness and fatigue over the last couple weeks and ended up with a fall.  Feels dizzy and lightheaded.  Low blood pressures at home.  She stopped taking her hydrochlorothiazide and losartan.  Was seen in the acute diagnostic services yesterday.  Lab studies showed drop in hemoglobin to 9.6.  Evidence of iron deficiency with low total iron.  Full iron studies were not done.  Ferritin elevated.  After receiving 500 cc of normal saline she felt a lot better.  Doing well today.  Denies any dizziness.  I think her recent episode was probably due to dehydration.  Potentially could be cumulative effect of the treatment so far the etiology of iron deficiency anemia is not clear.  No obvious signs or symptoms of any  bleeding.  I will add iron studies today.  If she is found to be having very low transferrin saturation then we will probably give her some IV iron.  But in the meantime she can take oral iron.     I do not think there is any contraindication to proceed with cycle 14 sacituzumab today.  She will come back in 1 week for day 8.  Will repeat her labs at that time.  No dose changes made.  Will repeat PET scan prior to next cycle.  She is agreeable to the plan.  In the meantime she will call us if she develops any further symptoms.  If she again becomes significantly dehydrated then we will give her some extra fluids in the clinic.    If her confusion gets worse then we will repeat an MRI of the brain.    Billing:  A total of 40 min was spent today on this visit which included face to face conversation with the patient, EMR review, counseling and co-ordination of care and medical documentation.    The longitudinal plan of care for the diagnosis(es)/condition(s) as documented were addressed during this visit. Due to the added complexity in care, I will continue to support Precious in the subsequent management and with ongoing continuity of care.      Signed by:   Lazara Torres NP

## 2024-06-18 NOTE — LETTER
"6/18/2024      Precious Paris  [address] [place] [address]      Dear Colleague,    Thank you for referring your patient, Precious Paris, to the [facility]. Please see a copy of my visit note below.    Oncology Rooming Note    June 18, 2024 9:09 AM   Precious Paris is a 83 year old female who presents for:    Chief Complaint   Patient presents with     Oncology Clinic Visit     Malignant neoplasm of upper-outer quadrant of right breast in female, estrogen receptor negative - provider and infusion visit     Initial Vitals: /55 (BP Location: Right arm, Patient Position: Sitting, Cuff Size: Adult Regular)   Pulse 83   Temp 97.3  F (36.3  C) (Tympanic)   Ht 1.638 m (5' 4.5\")   Wt 83.4 kg (183 lb 14.4 oz)   SpO2 96%   BMI 31.08 kg/m   Estimated body mass index is 31.08 kg/m  as calculated from the following:    Height as of this encounter: 1.638 m (5' 4.5\").    Weight as of this encounter: 83.4 kg (183 lb 14.4 oz). Body surface area is 1.95 meters squared.  No Pain (0) Comment: Data Unavailable   No LMP recorded. Patient is postmenopausal.  Allergies reviewed: Yes  Medications reviewed: Yes    Medications: Medication refills not needed today.  Pharmacy name entered into [facility]: [place] [facility] - [place] - [address] [address]    Frailty Screening:   Is the patient here for a new oncology consult visit in cancer care? 2. No      Clinical concerns: Questions about ferous sulfate prescription.       Erin Thomas MA                  [facility] Hematology and Oncology Progress Note    Patient: Precious Paris  MRN: [id number]  Jun 18, 2024          Reason for Visit    Recurrent stage IV triple negative breast cancer (nodes, lung) (PDL1+)    Primary Oncologist: Dr. Beebe    _____________________________________________________________________________    History of Present Illness/ Interval History    Ms. Precious VALDES" Wellington is a 83 year old with recurrent metastatic triple negative breast cancer recurrence. She initiated 3rd line sacituzumab in September 2023.  She transferred her care to [place] over the Winter, continuing this regimen there, and returned here in April. Has now received 13 cycles.     She is here prior to cycle 14.  She has not had some issues over the last couple of weeks.  She felt progressively weak and sick over the last 2 weeks.  Weakness was associated with dizziness and lightheadedness.  She did sustain a fall while going up the stairs.  No head injury.  Her blood pressure at home was low at 80 systolic.  She stopped taking hydrochlorothiazide and losartan.  Was seen in the acute care yesterday.  Lab studies showed fallen hemoglobin to 9.6.  Total iron was low at 19.  Ferritin elevated at 763.  Serum chemistry otherwise within normal limits.  Normal creatinine and electrolytes.  She received a bolus of normal saline following which she felt a lot better.  Denies any diarrhea.  Denies any blood in stools or urine.  Today overall she feels much better.  No focal neurological symptoms.  Does have some soreness in her right chest area but nothing significant.  Here to continue systemic treatment.      ECOG PS: 1      Oncology History/Treatment  Diagnosis/Stage:   11/2019: Right breast cancer, triple negative ([id number])    BRCA negative    10/2022: Recurrent, stage IV triple negative breast cancer (bilateral axillary, supraclav, mediastinal nodes + lung)  -persistent/progressive right breast firmness with new right nipple drainage and right axillary adenopathy  -bilateral diagnostic mammogram: increased density R breast with skin thickening. R breast US: 3.2 cm hypoechoic mass at 11:00 position 1 cm from nipple and multiple other small hypoechoic solid-appearing lesions in R breast + right axillary adenopathy (at least two hypoechoic vascular lesions measuring up to 2.5 cm)  -10/18/2022 biopsy R breast mass + R  axillary node: grade 3 invasive ductal cancer with tumor necrosis, ER/WI/HER2 negative.   -PET: + right breast mass; bilateral axillary, retropectoral, supraclavicular, mediastinal/R hilar nodes and bilateral lung/R pleural nodules suspicious for mets.  -Brain MRI: negative for mets  -11/1/2022 biopsy L axillary node: metastatic invasive ductal carcinoma  -Foundation One: no actionable mutations MS stable, TMB 1 mut/mb.   -PDL1 testing: CPS >20% and TPS 60% (considered high PDL1 expression)  -CA 27.29: 59.5 (elevated). CA 15-3: 23 (normal)  -9/10/2023 Brain MRI: 2 new foci of punctate enhancement in left frontal cortex and right lentiform nucleus, concerning for brain metastases (asymptomatic)    NGS (foundation 1)  FGF R1 amplification.  Pazopanib has been approved in other tumors but not in breast cancer.  NTRK 1 amplification.  Not actionable  She has multiple other mutations which are not actionable.      Treatment:  Initial breast cancer (2019)  -2019: Neoadjuvant taxol  -Lumpectomy  -5/2020: Adjuvant RT  -6 - 7/2020: Adjuvant Xeloda. Stopped for rash    Recurrent, stage IV breast cancer (2022)  -11/14/2022:Doxil every 4 weeks  -12/12/2022: changed to doxorubicin every 3 weeks. (Planning addition of pembrolizumab once approved by insurance)  -1/2/2023: doxorubicin + pembrolizumab every 3 weeks. Completed 1 cycle. Stopped for progression (med nodes, slight increase in lung mets and stable R breast mass + axillary nodes).    -1/23/2023 - present: Carbo AUC 5, Gemcitabine 800 mg/m2 and Pembro every 21 days  -cycles 1-2: Thorp day 1 only  -Cycle 3: Thorp increased to days 1 + 8 given excellent tolerance  -CT after 3 cycles showed excellent response (near CR axillary + med nodes and WI in lung mets; clinical improvement in right breast changes and tumor marker).  -PET scan after 6 cycles of carboplatin gemcitabine pembrolizumab shows complete response    5/30 - 8/22/2023: Maintenance pembrolizumab, until progression  (recurrent axillary, thoracic nodes and 2 punctate brain lesions).  Biopsy of left axillary node confirmed recurrent/metastatic breast cancer.    9/12/2023 - present:  Sacituzumab days 1, 8 every 21 days  --C2D8 deferred x 1 week for neutropenia  --Dose decreased to 8 mg/kg from cycle 3 onwards.  --Cycles 5-10 were delivered in [place] over winter months  --Resumed Cycle 11 here upon return 4/2024      Physical Exam    GENERAL: Alert and oriented to time place and person. Seated comfortably. In no distress.  Accompanied by her daughter  HEENT: Complete alopecia. No icterus.   Lymph: ?  Palpable left axillary lymphadenopathy  Heart: RRR  Lungs: regular respiratory effort. Clear lung sounds bilaterally.   Abd: Soft, non-distended.   Neuro: alert and oriented x 3      Lab Results  Recent Results (from the past 168 hour(s))   Symptomatic Influenza A/B, RSV, & SARS-CoV2 PCR (COVID-19) Nose    Collection Time: 06/17/24 11:55 AM    Specimen: Nose; Swab   Result Value Ref Range    Influenza A PCR Negative Negative    Influenza B PCR Negative Negative    RSV PCR Negative Negative    SARS CoV2 PCR Negative Negative   Comprehensive metabolic panel    Collection Time: 06/17/24 12:11 PM   Result Value Ref Range    Sodium 137 135 - 145 mmol/L    Potassium 3.5 3.4 - 5.3 mmol/L    Carbon Dioxide (CO2) 22 22 - 29 mmol/L    Anion Gap 14 7 - 15 mmol/L    Urea Nitrogen 10.5 8.0 - 23.0 mg/dL    Creatinine 0.60 0.51 - 0.95 mg/dL    GFR Estimate 89 >60 mL/min/1.73m2    Calcium 8.8 8.8 - 10.2 mg/dL    Chloride 101 98 - 107 mmol/L    Glucose 133 (H) 70 - 99 mg/dL    Alkaline Phosphatase 85 40 - 150 U/L    AST 37 0 - 45 U/L    ALT 40 0 - 50 U/L    Protein Total 6.7 6.4 - 8.3 g/dL    Albumin 3.4 (L) 3.5 - 5.2 g/dL    Bilirubin Total 0.3 <=1.2 mg/dL   TSH with free T4 reflex    Collection Time: 06/17/24 12:11 PM   Result Value Ref Range    TSH 0.96 0.30 - 4.20 uIU/mL   CBC with platelets and differential    Collection Time: 06/17/24 12:11 PM    Result Value Ref Range    WBC Count 4.6 4.0 - 11.0 10e3/uL    RBC Count 3.30 (L) 3.80 - 5.20 10e6/uL    Hemoglobin 9.6 (L) 11.7 - 15.7 g/dL    Hematocrit 30.0 (L) 35.0 - 47.0 %    MCV 91 78 - 100 fL    MCH 29.1 26.5 - 33.0 pg    MCHC 32.0 31.5 - 36.5 g/dL    RDW 15.2 (H) 10.0 - 15.0 %    Platelet Count 236 150 - 450 10e3/uL    % Neutrophils 76 %    % Lymphocytes 13 %    % Monocytes 9 %    % Eosinophils 1 %    % Basophils 1 %    % Immature Granulocytes 1 %    Absolute Neutrophils 3.5 1.6 - 8.3 10e3/uL    Absolute Lymphocytes 0.6 (L) 0.8 - 5.3 10e3/uL    Absolute Monocytes 0.4 0.0 - 1.3 10e3/uL    Absolute Eosinophils 0.1 0.0 - 0.7 10e3/uL    Absolute Basophils 0.0 0.0 - 0.2 10e3/uL    Absolute Immature Granulocytes 0.0 <=0.4 10e3/uL   Ferritin    Collection Time: 06/17/24 12:11 PM   Result Value Ref Range    Ferritin 763 (H) 11 - 328 ng/mL   Iron    Collection Time: 06/17/24 12:11 PM   Result Value Ref Range    Iron 19 (L) 37 - 145 ug/dL   Iron & Iron Binding Capacity    Collection Time: 06/17/24 12:11 PM   Result Value Ref Range    Iron 19 (L) 37 - 145 ug/dL    Iron Binding Capacity 210 (L) 240 - 430 ug/dL    Iron Sat Index 9 (L) 15 - 46 %   UA with Microscopic reflex to Culture    Collection Time: 06/17/24 12:58 PM    Specimen: Urine, Clean Catch   Result Value Ref Range    Color Urine Yellow Colorless, Straw, Light Yellow, Yellow    Appearance Urine Clear Clear    Glucose Urine Negative Negative mg/dL    Bilirubin Urine Negative Negative    Ketones Urine Negative Negative mg/dL    Specific Gravity Urine 1.010 1.003 - 1.035    Blood Urine Negative Negative    pH Urine 6.5 5.0 - 7.0    Protein Albumin Urine Negative Negative mg/dL    Urobilinogen Urine 1.0 0.2, 1.0 E.U./dL    Nitrite Urine Negative Negative    Leukocyte Esterase Urine Trace (A) Negative   UA Microscopic with Reflex to Culture    Collection Time: 06/17/24 12:58 PM   Result Value Ref Range    Bacteria Urine Few (A) None Seen /HPF    RBC Urine 0-2  0-2 /HPF /HPF    WBC Urine 0-5 0-5 /HPF /HPF    Squamous Epithelials Urine Few (A) None Seen /LPF    Mucus Urine Present (A) None Seen /LPF         Assessment/Plan  Recurrent, stage IV triple negative right breast cancer (nodes, lung); CPS>20% + TPS 60%  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     Overall has done fairly well on treatment so far.  Main issues have been low blood counts and diarrhea.  Currently taking Imodium for diarrhea after which her symptoms have significantly improved.  However has had significant weakness and fatigue over the last couple weeks and ended up with a fall.  Feels dizzy and lightheaded.  Low blood pressures at home.  She stopped taking her hydrochlorothiazide and losartan.  Was seen in the acute diagnostic services yesterday.  Lab studies showed drop in hemoglobin to 9.6.  Evidence of iron deficiency with low total iron.  Full iron studies were not done.  Ferritin elevated.  After receiving 500 cc of normal saline she felt a lot better.  Doing well today.  Denies any dizziness.  I think her recent episode was probably due to dehydration.  Potentially could be cumulative effect of the treatment so far the etiology of iron deficiency anemia is not clear.  No obvious signs or symptoms of any bleeding.  I will add iron studies today.  If she is found to be having very low transferrin saturation then we will probably give her some IV iron.  But in the meantime she can take oral iron.     I do not think there is any contraindication to proceed with cycle 14 sacituzumab today.  She will come back in 1 week for day 8.  Will repeat her labs at that time.  No dose changes made.  Will repeat PET scan prior to  next cycle.  She is agreeable to the plan.  In the meantime she will call us if she develops any further symptoms.  If she again becomes significantly dehydrated then we will give her some extra fluids in the clinic.    If her confusion gets worse then we will repeat an MRI of the brain.    Billing:  A total of 40 min was spent today on this visit which included face to face conversation with the patient, EMR review, counseling and co-ordination of care and medical documentation.    The longitudinal plan of care for the diagnosis(es)/condition(s) as documented were addressed during this visit. Due to the added complexity in care, I will continue to support Precious in the subsequent management and with ongoing continuity of care.      Signed by:   Lazara Torres NP        Again, thank you for allowing me to participate in the care of your patient.        Sincerely,        Maricarmen Beebe MD

## 2024-06-23 ENCOUNTER — HOSPITAL ENCOUNTER (EMERGENCY)
Facility: CLINIC | Age: 84
Discharge: HOME | End: 2024-06-23
Attending: EMERGENCY MEDICINE | Admitting: EMERGENCY MEDICINE
Payer: COMMERCIAL

## 2024-06-23 VITALS
BODY MASS INDEX: 29.41 KG/M2 | OXYGEN SATURATION: 95 % | WEIGHT: 183 LBS | HEART RATE: 97 BPM | SYSTOLIC BLOOD PRESSURE: 146 MMHG | HEIGHT: 66 IN | RESPIRATION RATE: 18 BRPM | TEMPERATURE: 99.2 F | DIASTOLIC BLOOD PRESSURE: 67 MMHG

## 2024-06-23 DIAGNOSIS — D50.9 IRON DEFICIENCY ANEMIA, UNSPECIFIED IRON DEFICIENCY ANEMIA TYPE: ICD-10-CM

## 2024-06-23 DIAGNOSIS — Z85.3 HISTORY OF BREAST CANCER: ICD-10-CM

## 2024-06-23 DIAGNOSIS — R53.1 GENERALIZED WEAKNESS: ICD-10-CM

## 2024-06-23 LAB
ABO/RH(D): NORMAL
ALBUMIN SERPL BCG-MCNC: 3.4 G/DL (ref 3.5–5.2)
ALBUMIN UR-MCNC: NEGATIVE MG/DL
ALP SERPL-CCNC: 80 U/L (ref 40–150)
ALT SERPL W P-5'-P-CCNC: 31 U/L (ref 0–50)
ANION GAP SERPL CALCULATED.3IONS-SCNC: 11 MMOL/L (ref 7–15)
ANTIBODY SCREEN: NEGATIVE
APPEARANCE UR: CLEAR
AST SERPL W P-5'-P-CCNC: 23 U/L (ref 0–45)
BASOPHILS # BLD AUTO: 0 10E3/UL (ref 0–0.2)
BASOPHILS NFR BLD AUTO: 1 %
BILIRUB SERPL-MCNC: 0.2 MG/DL
BILIRUB UR QL STRIP: NEGATIVE
BUN SERPL-MCNC: 14 MG/DL (ref 8–23)
CALCIUM SERPL-MCNC: 9.2 MG/DL (ref 8.8–10.2)
CHLORIDE SERPL-SCNC: 96 MMOL/L (ref 98–107)
COLOR UR AUTO: YELLOW
CREAT SERPL-MCNC: 0.74 MG/DL (ref 0.51–0.95)
CRP SERPL-MCNC: 30.8 MG/L
DEPRECATED HCO3 PLAS-SCNC: 25 MMOL/L (ref 22–29)
EGFRCR SERPLBLD CKD-EPI 2021: 80 ML/MIN/1.73M2
EOSINOPHIL # BLD AUTO: 0 10E3/UL (ref 0–0.7)
EOSINOPHIL NFR BLD AUTO: 1 %
ERYTHROCYTE [DISTWIDTH] IN BLOOD BY AUTOMATED COUNT: 16.1 % (ref 10–15)
GLUCOSE SERPL-MCNC: 113 MG/DL (ref 70–99)
GLUCOSE UR STRIP-MCNC: NEGATIVE MG/DL
HCT VFR BLD AUTO: 29.2 % (ref 35–47)
HGB BLD-MCNC: 9.8 G/DL (ref 11.7–15.7)
HGB UR QL STRIP: NEGATIVE
IMM GRANULOCYTES # BLD: 0.1 10E3/UL
IMM GRANULOCYTES NFR BLD: 4 %
KETONES UR STRIP-MCNC: NEGATIVE MG/DL
LACTATE SERPL-SCNC: 0.9 MMOL/L (ref 0.7–2)
LEUKOCYTE ESTERASE UR QL STRIP: NEGATIVE
LYMPHOCYTES # BLD AUTO: 0.7 10E3/UL (ref 0.8–5.3)
LYMPHOCYTES NFR BLD AUTO: 18 %
MCH RBC QN AUTO: 30 PG (ref 26.5–33)
MCHC RBC AUTO-ENTMCNC: 33.6 G/DL (ref 31.5–36.5)
MCV RBC AUTO: 89 FL (ref 78–100)
MONOCYTES # BLD AUTO: 0.3 10E3/UL (ref 0–1.3)
MONOCYTES NFR BLD AUTO: 9 %
NEUTROPHILS # BLD AUTO: 2.5 10E3/UL (ref 1.6–8.3)
NEUTROPHILS NFR BLD AUTO: 68 %
NITRATE UR QL: NEGATIVE
NRBC # BLD AUTO: 0 10E3/UL
NRBC BLD AUTO-RTO: 0 /100
PH UR STRIP: 7 [PH] (ref 5–7)
PLATELET # BLD AUTO: 257 10E3/UL (ref 150–450)
POTASSIUM SERPL-SCNC: 4 MMOL/L (ref 3.4–5.3)
PROCALCITONIN SERPL IA-MCNC: 0.07 NG/ML
PROT SERPL-MCNC: 6.3 G/DL (ref 6.4–8.3)
RBC # BLD AUTO: 3.27 10E6/UL (ref 3.8–5.2)
RBC URINE: 0 /HPF
SODIUM SERPL-SCNC: 132 MMOL/L (ref 135–145)
SP GR UR STRIP: 1.01 (ref 1–1.03)
SPECIMEN EXPIRATION DATE: NORMAL
UROBILINOGEN UR STRIP-MCNC: NORMAL MG/DL
WBC # BLD AUTO: 3.7 10E3/UL (ref 4–11)
WBC URINE: 0 /HPF

## 2024-06-23 PROCEDURE — 96360 HYDRATION IV INFUSION INIT: CPT | Performed by: EMERGENCY MEDICINE

## 2024-06-23 PROCEDURE — 86900 BLOOD TYPING SEROLOGIC ABO: CPT | Performed by: EMERGENCY MEDICINE

## 2024-06-23 PROCEDURE — 85025 COMPLETE CBC W/AUTO DIFF WBC: CPT | Performed by: EMERGENCY MEDICINE

## 2024-06-23 PROCEDURE — 86140 C-REACTIVE PROTEIN: CPT | Performed by: EMERGENCY MEDICINE

## 2024-06-23 PROCEDURE — 258N000003 HC RX IP 258 OP 636: Mod: JZ | Performed by: EMERGENCY MEDICINE

## 2024-06-23 PROCEDURE — 84145 PROCALCITONIN (PCT): CPT | Performed by: EMERGENCY MEDICINE

## 2024-06-23 PROCEDURE — 87040 BLOOD CULTURE FOR BACTERIA: CPT | Performed by: EMERGENCY MEDICINE

## 2024-06-23 PROCEDURE — 99283 EMERGENCY DEPT VISIT LOW MDM: CPT | Performed by: EMERGENCY MEDICINE

## 2024-06-23 PROCEDURE — 80053 COMPREHEN METABOLIC PANEL: CPT | Performed by: EMERGENCY MEDICINE

## 2024-06-23 PROCEDURE — 81001 URINALYSIS AUTO W/SCOPE: CPT | Performed by: EMERGENCY MEDICINE

## 2024-06-23 PROCEDURE — 99283 EMERGENCY DEPT VISIT LOW MDM: CPT | Mod: 25 | Performed by: EMERGENCY MEDICINE

## 2024-06-23 PROCEDURE — 96361 HYDRATE IV INFUSION ADD-ON: CPT | Performed by: EMERGENCY MEDICINE

## 2024-06-23 PROCEDURE — 83605 ASSAY OF LACTIC ACID: CPT | Performed by: EMERGENCY MEDICINE

## 2024-06-23 PROCEDURE — 250N000011 HC RX IP 250 OP 636: Mod: JZ | Performed by: EMERGENCY MEDICINE

## 2024-06-23 PROCEDURE — 36415 COLL VENOUS BLD VENIPUNCTURE: CPT | Performed by: EMERGENCY MEDICINE

## 2024-06-23 RX ORDER — HEPARIN SODIUM (PORCINE) LOCK FLUSH IV SOLN 100 UNIT/ML 100 UNIT/ML
5-10 SOLUTION INTRAVENOUS
Status: DISCONTINUED | OUTPATIENT
Start: 2024-06-23 | End: 2024-06-23 | Stop reason: HOSPADM

## 2024-06-23 RX ORDER — SODIUM CHLORIDE 9 MG/ML
1000 INJECTION, SOLUTION INTRAVENOUS CONTINUOUS
Status: DISCONTINUED | OUTPATIENT
Start: 2024-06-23 | End: 2024-06-23 | Stop reason: HOSPADM

## 2024-06-23 RX ADMIN — SODIUM CHLORIDE 500 ML: 9 INJECTION, SOLUTION INTRAVENOUS at 16:42

## 2024-06-23 RX ADMIN — Medication 5 ML: at 19:09

## 2024-06-23 ASSESSMENT — ENCOUNTER SYMPTOMS
ALLERGIC/IMMUNOLOGIC NEGATIVE: 1
WEAKNESS: 1
ENDOCRINE NEGATIVE: 1
CHILLS: 1
PSYCHIATRIC NEGATIVE: 1
CARDIOVASCULAR NEGATIVE: 1
FATIGUE: 1
HEMATOLOGIC/LYMPHATIC NEGATIVE: 1
RESPIRATORY NEGATIVE: 1
EYES NEGATIVE: 1
GASTROINTESTINAL NEGATIVE: 1

## 2024-06-23 ASSESSMENT — ACTIVITIES OF DAILY LIVING (ADL)
ADLS_ACUITY_SCORE: 33
ADLS_ACUITY_SCORE: 35
ADLS_ACUITY_SCORE: 33
ADLS_ACUITY_SCORE: 35

## 2024-06-23 ASSESSMENT — COLUMBIA-SUICIDE SEVERITY RATING SCALE - C-SSRS
1. IN THE PAST MONTH, HAVE YOU WISHED YOU WERE DEAD OR WISHED YOU COULD GO TO SLEEP AND NOT WAKE UP?: NO
2. HAVE YOU ACTUALLY HAD ANY THOUGHTS OF KILLING YOURSELF IN THE PAST MONTH?: NO
6. HAVE YOU EVER DONE ANYTHING, STARTED TO DO ANYTHING, OR PREPARED TO DO ANYTHING TO END YOUR LIFE?: NO

## 2024-06-23 NOTE — ED TRIAGE NOTES
Pt states she is weak and losing her balance.  Pt was in Wed. And had fluid bolus and it made her feel better.   She had another Chemo infusion on Thursday and now has been so weak she can barely walk.      Pt appears flushed with and has been feeling cool all day.  Pt states her home BP checks are erratic today and her bilat feet hurt.

## 2024-06-23 NOTE — DISCHARGE INSTRUCTIONS
1) Your evaluation today did not reveal an emergency condition the exact cause of your weakness since her last dose of chemotherapy 6 days ago.  We have reviewed your night sweats and that if it persisted blood cultures may be obtained to ensure you are not developing  infection in your bloodstream.  Testing today revealed that your hemoglobin is 9.8 was 9.6-6 days ago.  Urine was not concerning for infection.  You have a mildly low white count which could be related to chemotherapy.    2) We have reviewed fall risk given your weakness and need to use a walker at home. If you develop a fever at home Tm->100.4F, developed shortness of breath, have a fainting spell or chest pain, developed diarrhea or vomiting or any new concerns or symptoms you should return to be reevaluated.  Be sure to notify Dr. Beebe and your oncology team about your visit today including your symptoms before your next chemotherapy.

## 2024-06-23 NOTE — ED PROVIDER NOTES
History     Chief Complaint   Patient presents with    Generalized Weakness     HPI  Precious Paris is a 83 year old female who presents with generalized weakness and sense of losing her balance after chemotherapy-3 days prior.    Reviewed the medical record-office visit on 6/17/2024.  Patient is known history of recurring metastatic breast cancer, anemia, cervical spinal stenosis, osteoarthritis, and history of chemotherapy-induced neutropenia and diarrhea.  Patient also known to have a history of hypertension.    Patient's current present medications were reviewed with patient at bedside    On examination patient presents by car with daughter jayy. Patient followed by Dr. Beebe   And reports that she did have chemotherapy 6 days earlier.  She was initially doing well but reports in the last 3 days she has had generalized weakness with trouble walking she also reports she has had urinary frequency.  Patient reports that chemotherapy is hard on her and her routine is 2 weeks on 1 week off in 2 weeks on.  She has had some chills but no fever.  She has no abdominal pain and no back pain she also has no chest pain no cough no shortness of breath and no new lower extremity swelling.  She did confirm her visit on 6/17/2 and confirmed that she was started on iron supplementation for concern for acute anemia.  She has had no melena or hematochezia and reports no diarrhea.  Due to feeling poorly and generally weak in the last 3 days she presents for care.      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 (H) 10/31/2023     Priority: Medium    Malignant neoplasm metastatic to both lungs (H) 10/31/2023     Priority: Medium    Encounter for follow-up examination after completed treatment for malignant neoplasm 07/11/2023     Priority: Medium    Metastatic breast cancer 11/07/2022     Priority: Medium    Infiltrating ductal carcinoma of right breast (H) 10/30/2019     Priority: Medium     Added automatically from request for surgery [id number]      Malignant neoplasm of upper-outer quadrant of right breast in female, 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) 10/30/2019     Priority: Medium     Check 4.1 for f/u Ge      Hypertensive retinopathy of right eye 06/03/2019     Priority: Medium    Senile nuclear cataract 12/05/2017     Priority: Medium    Primary osteoarthritis of both hands 11/27/2017     Priority: Medium    Hypertension, goal below 150/90 11/12/2015     Priority: Medium    Hammer toe 08/14/2013     Priority: Medium    Hx of skin cancer, basal cell 05/31/2013     Priority: Medium    Healthcare maintenance 04/11/2012     Priority: Medium     DEXA 2009 WNL--next in 2014      HYPERLIPIDEMIA LDL GOAL <130 10/31/2010     Priority: Medium    Premature atrial contractions 06/17/2010     Priority: Medium     Bigeminal on auscultation--recommend echocardiogram and stress test  Echocardiogram--normal except borderline concentric left ventricular hypertrophy      OA (osteoarthritis) of knee 01/07/2010     Priority: Medium    Malabsorption of glucose 09/09/2008     Priority: Medium     (Problem list name updated by automated process. Provider to review and confirm.)      Varicose veins of lower extremities with inflammation 12/13/2006     Priority: Medium    Spinal stenosis in cervical region 06/30/2006     Priority: Medium        Past Medical History:    Past Medical History:    Diagnosis Date    Allergic rhinitis due to other allergen     Asymptomatic postmenopausal status (age-related) (natural)     Basal cell carcinoma     Breast cancer (H)     Cervical spondylosis without myelopathy     Disturbances of sensation of smell and taste     Diverticulosis of colon (without mention of hemorrhage)     Malignant neoplasm of right breast (H) 3/13/2020    Other and unspecified hyperlipidemia     Pain in joint, lower leg     Plantar fascial fibromatosis     PURE HYPERCHOLESTEROLEM 9/9/2007    PURE HYPERCHOLESTEROLEM 9/9/2007    Squamous cell carcinoma        Past Surgical History:    Past Surgical History:   Procedure Laterality Date    BIOPSY      BIOPSY BREAST      INSERT PORT VASCULAR ACCESS N/A 11/29/2019    Procedure: INSERTION OF VENOUS ACCESS PORT;  Surgeon: Hair Broderick DO;  Location: [facility]    INSERT PORT VASCULAR ACCESS Right 11/11/2022    Procedure: INSERTION, VASCULAR ACCESS PORT right subclavian;  Surgeon: Hair Broderick DO;  Location: [facility]    JOINT REPLACEMTN, KNEE RT/LT  2010    right    JOINT REPLACEMTN, KNEE RT/LT  2011    left    LIGATE VEIN VARICOSE, PHLEBECTOMY MULTIPLE STAB, COMBINED  10/17/2013    Procedure: COMBINED LIGATE VEIN VARICOSE, PHLEBECTOMY MULTIPLE STAB;  Phlebectomy of Right Knee Varicose Veins;  Surgeon: Andrea Duffy MD;  Location: [facility]    LUMPECTOMY BREAST      LUMPECTOMY BREAST WITH SENTINEL NODE, COMBINED Right 3/19/2020    Procedure: LUMPECTOMY, BREAST, WITH SENTINEL LYMPH NODE BIOPSY, Right Lumpectomy With Right [place] Lymph Node Biopsy And Luisa Cath Removal;  Surgeon: Venancio Frausto MD;  Location: [facility]    OSTEOTOMY FOOT  8/19/2013    Procedure: OSTEOTOMY FOOT;  Left 2nd metatarsal osteotomy, 2nd Metatarsophalangeal joint & 2nd toe correction;  Surgeon: Jose Phillips DPM;  Location: [facility]    PHACOEMULSIFICATION WITH STANDARD INTRAOCULAR LENS IMPLANT Left 12/7/2017    Procedure: PHACOEMULSIFICATION WITH STANDARD  INTRAOCULAR LENS IMPLANT;  Left Cataract Removal with Implant;  Surgeon: Mata Deleon MD;  Location: [facility]    SURGICAL HISTORY OF -   1979    Stripping of Veins in Left Leg    SURGICAL HISTORY OF -   1970    Bilateral Tubal Ligation    SURGICAL HISTORY OF -   2000    Carpal Tunnel Repair, right    SURGICAL HISTORY OF -   9-12-08    Rt let ablation    ZZHC COLONOSCOPY THRU STOMA, DIAGNOSTIC  04/05    diverticulosis and hemorroids, due 2015       Family History:    Family History   Problem Relation Age of Onset    Hypertension Mother     Cerebrovascular Disease Mother     Hypertension Father     Cerebrovascular Disease Father     Hypertension Brother     Cancer Brother         lymphoma    Hypertension Sister     Gastrointestinal Disease Sister     Hypertension Brother     Cancer Brother         melanoma    Eye Disorder Brother     Hypertension Brother     Hypertension Brother     Heart Defect Son        Social History:  Marital Status:   [2]  Social History     Tobacco Use    Smoking status: Former    Smokeless tobacco: Never    Tobacco comments:     quit 20 years ago   Vaping Use    Vaping status: Never Used   Substance Use Topics    Alcohol use: Yes     Comment: occ.    Drug use: No        Medications:    Acetaminophen 325 MG CAPS  amoxicillin (AMOXIL) 500 MG capsule  benzonatate (TESSALON) 100 MG capsule  CALCIUM OR  dexAMETHasone (DECADRON) 4 MG tablet  ferrous sulfate (FEROSUL) 325 (65 Fe) MG tablet  fish oil-omega-3 fatty acids 1000 MG capsule  hydrocortisone 2.5 % cream  IBUPROFEN 200 MG OR TABS  loperamide (IMODIUM) 2 MG capsule  LORazepam (ATIVAN) 0.5 MG tablet  LORazepam (ATIVAN) 0.5 MG tablet  losartan-hydrochlorothiazide (HYZAAR) 50-12.5 MG tablet  melatonin 1 MG CAPS  Melatonin 10 MG TABS tablet  ondansetron (ZOFRAN ODT) 4 MG ODT tab  ondansetron (ZOFRAN) 8 MG tablet  prochlorperazine (COMPAZINE) 10 MG tablet  vitamin B complex with vitamin C (STRESS TAB) tablet  VITAMIN C OR  VITAMIN D,  "CHOLECALCIFEROL, PO  vitamin E 400 units TABS          Review of Systems   Constitutional:  Positive for chills and fatigue.   HENT: Negative.     Eyes: Negative.    Respiratory: Negative.     Cardiovascular: Negative.    Gastrointestinal: Negative.    Endocrine: Negative.    Genitourinary: Negative.    Musculoskeletal:         Bilateral feet pain   Skin: Negative.    Allergic/Immunologic: Negative.    Neurological:  Positive for weakness.   Hematological: Negative.    Psychiatric/Behavioral: Negative.     All other systems reviewed and are negative.      Physical Exam   BP: (!) 143/65  Pulse: 108  Temp: 99.2  F (37.3  C)  Resp: 18  Height: 166.4 cm (5' 5.5\")  Weight: 83 kg (183 lb)  SpO2: 94 %      Physical Exam  Constitutional:       General: She is not in acute distress.     Appearance: Normal appearance. She is not ill-appearing, toxic-appearing or diaphoretic.   HENT:      Head: Normocephalic and atraumatic.      Nose: Nose normal.      Mouth/Throat:      Mouth: Mucous membranes are moist.   Eyes:      Extraocular Movements: Extraocular movements intact.      Pupils: Pupils are equal, round, and reactive to light.   Cardiovascular:      Rate and Rhythm: Normal rate and regular rhythm.      Pulses: Normal pulses.   Pulmonary:      Effort: Pulmonary effort is normal. No respiratory distress.      Breath sounds: Normal breath sounds. No stridor. No wheezing, rhonchi or rales.   Chest:      Chest wall: No tenderness.   Musculoskeletal:         General: No swelling, tenderness, deformity or signs of injury.      Cervical back: Normal range of motion and neck supple.      Right lower leg: No edema.      Left lower leg: No edema.   Skin:     Capillary Refill: Capillary refill takes less than 2 seconds.      Coloration: Skin is pale. Skin is not jaundiced.      Findings: No bruising, erythema, lesion or rash.   Neurological:      General: No focal deficit present.      Mental Status: She is alert and oriented to " person, place, and time.      Cranial Nerves: No cranial nerve deficit.      Sensory: No sensory deficit.      Motor: No weakness.      Coordination: Coordination normal.      Gait: Gait normal.      Deep Tendon Reflexes: Reflexes normal.   Psychiatric:         Mood and Affect: Mood normal.         Behavior: Behavior normal.         Thought Content: Thought content normal.         Judgment: Judgment normal.         ED Course        Procedures              Critical Care time:  none             ED medications:  Medications   sodium chloride 0.9 % infusion (has no administration in time range)   heparin lock flush 100 unit/mL injection 5-10 mL (has no administration in time range)   sodium chloride 0.9% BOLUS 500 mL (500 mLs Intravenous $New Bag 6/23/24 1642)     ED Vitals:  Vitals:    06/23/24 1643 06/23/24 1644 06/23/24 1700 06/23/24 1800   BP:   (!) 145/72 (!) 146/67   Pulse:   100 97   Resp:       Temp:       TempSrc:       SpO2: 94% 95%     Weight:       Height:         ED labs and imaging:  Results for orders placed or performed during the hospital encounter of 06/23/24   Lactic Acid Whole Blood w/ 1x repeat in 2 hrs when >2     Status: Normal   Result Value Ref Range    Lactic Acid, Initial 0.9 0.7 - 2.0 mmol/L   Comprehensive metabolic panel     Status: Abnormal   Result Value Ref Range    Sodium 132 (L) 135 - 145 mmol/L    Potassium 4.0 3.4 - 5.3 mmol/L    Carbon Dioxide (CO2) 25 22 - 29 mmol/L    Anion Gap 11 7 - 15 mmol/L    Urea Nitrogen 14.0 8.0 - 23.0 mg/dL    Creatinine 0.74 0.51 - 0.95 mg/dL    GFR Estimate 80 >60 mL/min/1.73m2    Calcium 9.2 8.8 - 10.2 mg/dL    Chloride 96 (L) 98 - 107 mmol/L    Glucose 113 (H) 70 - 99 mg/dL    Alkaline Phosphatase 80 40 - 150 U/L    AST 23 0 - 45 U/L    ALT 31 0 - 50 U/L    Protein Total 6.3 (L) 6.4 - 8.3 g/dL    Albumin 3.4 (L) 3.5 - 5.2 g/dL    Bilirubin Total 0.2 <=1.2 mg/dL   CRP inflammation     Status: Abnormal   Result Value Ref Range    CRP Inflammation 30.80  (H) <5.00 mg/L   Procalcitonin     Status: Normal   Result Value Ref Range    Procalcitonin 0.07 <0.50 ng/mL   UA with Microscopic reflex to Culture     Status: Normal    Specimen: Urine, Clean Catch   Result Value Ref Range    Color Urine Yellow Colorless, Straw, Light Yellow, Yellow    Appearance Urine Clear Clear    Glucose Urine Negative Negative mg/dL    Bilirubin Urine Negative Negative    Ketones Urine Negative Negative mg/dL    Specific Gravity Urine 1.015 1.003 - 1.035    Blood Urine Negative Negative    pH Urine 7.0 5.0 - 7.0    Protein Albumin Urine Negative Negative mg/dL    Urobilinogen Urine Normal Normal, 2.0 mg/dL    Nitrite Urine Negative Negative    Leukocyte Esterase Urine Negative Negative    RBC Urine 0 <=2 /HPF    WBC Urine 0 <=5 /HPF    Narrative    Urine Culture not indicated   CBC with platelets and differential     Status: Abnormal   Result Value Ref Range    WBC Count 3.7 (L) 4.0 - 11.0 10e3/uL    RBC Count 3.27 (L) 3.80 - 5.20 10e6/uL    Hemoglobin 9.8 (L) 11.7 - 15.7 g/dL    Hematocrit 29.2 (L) 35.0 - 47.0 %    MCV 89 78 - 100 fL    MCH 30.0 26.5 - 33.0 pg    MCHC 33.6 31.5 - 36.5 g/dL    RDW 16.1 (H) 10.0 - 15.0 %    Platelet Count 257 150 - 450 10e3/uL    % Neutrophils 68 %    % Lymphocytes 18 %    % Monocytes 9 %    % Eosinophils 1 %    % Basophils 1 %    % Immature Granulocytes 4 %    NRBCs per 100 WBC 0 <1 /100    Absolute Neutrophils 2.5 1.6 - 8.3 10e3/uL    Absolute Lymphocytes 0.7 (L) 0.8 - 5.3 10e3/uL    Absolute Monocytes 0.3 0.0 - 1.3 10e3/uL    Absolute Eosinophils 0.0 0.0 - 0.7 10e3/uL    Absolute Basophils 0.0 0.0 - 0.2 10e3/uL    Absolute Immature Granulocytes 0.1 <=0.4 10e3/uL    Absolute NRBCs 0.0 10e3/uL   Adult Type and Screen     Status: None (Preliminary result)   Result Value Ref Range    ABO/RH(D) O POS     SPECIMEN EXPIRATION DATE [id number]    CBC with platelets differential     Status: Abnormal    Narrative    The following orders were created for panel  order CBC with platelets differential.  Procedure                               Abnormality         Status                     ---------                               -----------         ------                     CBC with platelets and d...[[id number]]  Abnormal            Final result                 Please view results for these tests on the individual orders.   ABO/Rh type and screen     Status: None (In process)    Narrative    The following orders were created for panel order ABO/Rh type and screen.  Procedure                               Abnormality         Status                     ---------                               -----------         ------                     Adult Type and Screen[[id number]]                            Preliminary result           Please view results for these tests on the individual orders.       Assessments & Plan (with Medical Decision Making)   Assessment Summary and clinical Impression: 83-year-old female with a known history of recurrent metastatic breast cancer who presented with generalized weakness and feeling off balance. Patient has a known history of chemotherapy-induced neutropenia and diarrhea.    Pulse was 108.  Blood pressure was 143/65 she was 94% on room air.  Patient expressed concern about feeling globally weak in the last 3 days and that she has been compliant with iron supplementation initiated after she was found to have acute on chronic anemia 5 days earlier.  She reported some chills but no diarrhea no abdominal pain no cough no back pain.  Arrived by car with her daughter for further assessment after discussion with her oncology care team.     On exam she was pleasant in no obvious distress she appeared pale, and warm to touch.  Her temp on arrival was 99.2F. Patient was able to pivot in the bed and reported no new paresthesias.  Given known anemia with recent chemotherapy with prior history of chemotherapy-induced neutropenia workup was broadened to ensure  she was not presenting with an infectious process contributing to her weakness.  With urinary symptoms reported urine was obtained.  Workup revealed mild leukopenia and mildly elevated CRP.  Hemoglobin was stable.  After period of care and IV fluids she reported feeling better and wanted to go home rather be admitted for observation. Road test in the department was reassuring while walking with a walker.  Discharged with generalized weakness likely multifactorial with mild leukopenia in the context of recent chemotherapy.    ED course and plan:  Reviewed the medical record.  Reviewed visit on 6/17/24.  Workup during her evaluation 6 days earlier revealed anemia with a hemoglobin 9.6.  Was 11.4 on 6/3/24.  Negative viral respiratory panel, abnormal iron studies.  She was given fluids and workup initiated  Stable anemia- Hgb- 9.8 (9.6 on 6/17/24). Normal lactate.Urinalysis was negative for infection.  Patient CRP was 30.  Procalcitonin was normal.  Patient's electrolytes are within normal limits.  Patient reported some night sweats since her chemotherapy and reviewed additional testing such as blood cultures although suspicion for bacteremia is low given absence of neutropenia and no fever.  Patient scheduled for infusion on 6/25/24 and an appointment Dr. Beebe on 7/9/24.  I informed patient to notify her oncology care team about her night sweats and symptoms before next chemotherapy infusion in 48 hours.  After reviewing options for care with daughter at the bedside patient discharged to home. Reviewed fall risk including discussion with her oncology care team before her next chemotherapy in 48hrs.      Disclaimer: This note consists of symbols derived from keyboarding, dictation and/or voice recognition software. As a result, there may be errors in the script that have gone undetected. Please consider this when interpreting information found in this chart.   I have reviewed the nursing notes.    I have reviewed  the findings, diagnosis, plan and need for follow up with the patient.           Medical Decision Making  The patient's presentation was of high complexity (advanced age, chemotherapy induced diarrhea neutropenia, recurrent metastatic breast cancer).    The patient's evaluation involved:  ordering and/or review of 2 test(s) in this encounter (diagnostic imaging and labs)    The patient's management necessitated high risk (disposition planning, fall risk,).        New Prescriptions    No medications on file       Final diagnoses:   Generalized weakness   History of breast cancer   Iron deficiency anemia, unspecified iron deficiency anemia type - on supplementation as of 6/17/24 6/23/2024   [facility] EMERGENCY DEPT       Red Feng MD  06/23/24 2604

## 2024-06-24 ENCOUNTER — PATIENT OUTREACH (OUTPATIENT)
Dept: ONCOLOGY | Facility: CLINIC | Age: 84
End: 2024-06-24

## 2024-06-24 ENCOUNTER — TELEPHONE (OUTPATIENT)
Dept: FAMILY MEDICINE | Facility: CLINIC | Age: 84
End: 2024-06-24

## 2024-06-24 DIAGNOSIS — E78.5 HYPERLIPIDEMIA LDL GOAL <130: Primary | ICD-10-CM

## 2024-06-24 DIAGNOSIS — Z17.1 MALIGNANT NEOPLASM OF UPPER-OUTER QUADRANT OF RIGHT BREAST IN FEMALE, ESTROGEN RECEPTOR NEGATIVE (H): Primary | ICD-10-CM

## 2024-06-24 DIAGNOSIS — F40.240 CLAUSTROPHOBIA: ICD-10-CM

## 2024-06-24 DIAGNOSIS — C50.411 MALIGNANT NEOPLASM OF UPPER-OUTER QUADRANT OF RIGHT BREAST IN FEMALE, ESTROGEN RECEPTOR NEGATIVE (H): Primary | ICD-10-CM

## 2024-06-24 DIAGNOSIS — G93.9 BRAIN LESION: ICD-10-CM

## 2024-06-24 NOTE — PROGRESS NOTES
"[facility]: Cancer Care                                                                                          Pt in Er yesterday for weakness. Felt much better after fluids.     Called pt today and she report she is feeling much better. She felt like she was just \"shuffling along yesterday\".     Pt is due for labs and D8 treatment tomorrow. Advised pt to still come for labs and will review with Dr. Beebe if he will treat or give fluids.    Pt in agreement with plan.    Signature:  Patrica Perla RN  "

## 2024-06-24 NOTE — TELEPHONE ENCOUNTER
Order/Referral Request    Who is requesting: patient     Orders being requested: cholesterol     Reason service is needed/diagnosis: routine check    When are orders needed by: when available    Has this been discussed with Provider: Yes    Does patient have a preference on a Group/Provider/Facility? [facility]    Does patient have an appointment scheduled?: No    Where to send orders: Place orders within Epic    Could we send this information to you in [facility] or would you prefer to receive a phone call?:   Patient would prefer a phone call   Okay to leave a detailed message?: Yes at Cell number on file:    Telephone Information:   Mobile [phone]       
Left detailed message on pt identified voicemail that order has been placed for FASTING labs  
Lipids ordered    Ester Jorge M.D.    
None

## 2024-06-25 ENCOUNTER — LAB (OUTPATIENT)
Dept: INFUSION THERAPY | Facility: CLINIC | Age: 84
End: 2024-06-25
Attending: INTERNAL MEDICINE
Payer: COMMERCIAL

## 2024-06-25 ENCOUNTER — INFUSION THERAPY VISIT (OUTPATIENT)
Dept: INFUSION THERAPY | Facility: CLINIC | Age: 84
End: 2024-06-25
Attending: NURSE PRACTITIONER
Payer: COMMERCIAL

## 2024-06-25 VITALS — SYSTOLIC BLOOD PRESSURE: 136 MMHG | HEART RATE: 105 BPM | DIASTOLIC BLOOD PRESSURE: 81 MMHG

## 2024-06-25 DIAGNOSIS — Z08 ENCOUNTER FOR FOLLOW-UP EXAMINATION AFTER COMPLETED TREATMENT FOR MALIGNANT NEOPLASM: Primary | ICD-10-CM

## 2024-06-25 DIAGNOSIS — Z17.1 MALIGNANT NEOPLASM OF UPPER-OUTER QUADRANT OF RIGHT BREAST IN FEMALE, ESTROGEN RECEPTOR NEGATIVE (H): ICD-10-CM

## 2024-06-25 DIAGNOSIS — C50.919 PRIMARY MALIGNANT NEOPLASM OF BREAST WITH METASTASIS (H): ICD-10-CM

## 2024-06-25 DIAGNOSIS — C50.911 INFILTRATING DUCTAL CARCINOMA OF RIGHT BREAST (H): Primary | ICD-10-CM

## 2024-06-25 DIAGNOSIS — C50.411 MALIGNANT NEOPLASM OF UPPER-OUTER QUADRANT OF RIGHT BREAST IN FEMALE, ESTROGEN RECEPTOR NEGATIVE (H): ICD-10-CM

## 2024-06-25 DIAGNOSIS — Z08 ENCOUNTER FOR FOLLOW-UP EXAMINATION AFTER COMPLETED TREATMENT FOR MALIGNANT NEOPLASM: ICD-10-CM

## 2024-06-25 DIAGNOSIS — C78.02 MALIGNANT NEOPLASM METASTATIC TO BOTH LUNGS (H): ICD-10-CM

## 2024-06-25 DIAGNOSIS — C78.01 MALIGNANT NEOPLASM METASTATIC TO BOTH LUNGS (H): ICD-10-CM

## 2024-06-25 LAB
BASOPHILS # BLD AUTO: 0 10E3/UL (ref 0–0.2)
BASOPHILS NFR BLD AUTO: 1 %
EOSINOPHIL # BLD AUTO: 0.1 10E3/UL (ref 0–0.7)
EOSINOPHIL NFR BLD AUTO: 3 %
ERYTHROCYTE [DISTWIDTH] IN BLOOD BY AUTOMATED COUNT: 16.5 % (ref 10–15)
HCT VFR BLD AUTO: 29.7 % (ref 35–47)
HGB BLD-MCNC: 9.8 G/DL (ref 11.7–15.7)
IMM GRANULOCYTES # BLD: 0.1 10E3/UL
IMM GRANULOCYTES NFR BLD: 2 %
LYMPHOCYTES # BLD AUTO: 0.7 10E3/UL (ref 0.8–5.3)
LYMPHOCYTES NFR BLD AUTO: 22 %
MCH RBC QN AUTO: 29.5 PG (ref 26.5–33)
MCHC RBC AUTO-ENTMCNC: 33 G/DL (ref 31.5–36.5)
MCV RBC AUTO: 90 FL (ref 78–100)
MONOCYTES # BLD AUTO: 0.7 10E3/UL (ref 0–1.3)
MONOCYTES NFR BLD AUTO: 23 %
NEUTROPHILS # BLD AUTO: 1.5 10E3/UL (ref 1.6–8.3)
NEUTROPHILS NFR BLD AUTO: 49 %
NRBC # BLD AUTO: 0 10E3/UL
NRBC BLD AUTO-RTO: 0 /100
PLATELET # BLD AUTO: 265 10E3/UL (ref 150–450)
RBC # BLD AUTO: 3.32 10E6/UL (ref 3.8–5.2)
WBC # BLD AUTO: 3 10E3/UL (ref 4–11)

## 2024-06-25 PROCEDURE — 85048 AUTOMATED LEUKOCYTE COUNT: CPT | Performed by: INTERNAL MEDICINE

## 2024-06-25 PROCEDURE — 258N000003 HC RX IP 258 OP 636: Mod: JZ | Performed by: INTERNAL MEDICINE

## 2024-06-25 PROCEDURE — 36591 DRAW BLOOD OFF VENOUS DEVICE: CPT | Performed by: INTERNAL MEDICINE

## 2024-06-25 PROCEDURE — 96360 HYDRATION IV INFUSION INIT: CPT

## 2024-06-25 PROCEDURE — 250N000011 HC RX IP 250 OP 636: Mod: JZ | Performed by: INTERNAL MEDICINE

## 2024-06-25 RX ORDER — HEPARIN SODIUM (PORCINE) LOCK FLUSH IV SOLN 100 UNIT/ML 100 UNIT/ML
5 SOLUTION INTRAVENOUS
Status: DISCONTINUED | OUTPATIENT
Start: 2024-06-25 | End: 2024-06-25 | Stop reason: HOSPADM

## 2024-06-25 RX ORDER — HEPARIN SODIUM (PORCINE) LOCK FLUSH IV SOLN 100 UNIT/ML 100 UNIT/ML
5 SOLUTION INTRAVENOUS
Status: CANCELLED | OUTPATIENT
Start: 2024-06-25

## 2024-06-25 RX ORDER — ONDANSETRON 2 MG/ML
8 INJECTION INTRAMUSCULAR; INTRAVENOUS ONCE
Status: CANCELLED
Start: 2024-06-25 | End: 2024-06-25

## 2024-06-25 RX ORDER — SODIUM CHLORIDE, SODIUM LACTATE, POTASSIUM CHLORIDE, CALCIUM CHLORIDE 600; 310; 30; 20 MG/100ML; MG/100ML; MG/100ML; MG/100ML
INJECTION, SOLUTION INTRAVENOUS ONCE
Status: CANCELLED
Start: 2024-06-25 | End: 2024-06-25

## 2024-06-25 RX ORDER — SODIUM CHLORIDE, SODIUM LACTATE, POTASSIUM CHLORIDE, CALCIUM CHLORIDE 600; 310; 30; 20 MG/100ML; MG/100ML; MG/100ML; MG/100ML
INJECTION, SOLUTION INTRAVENOUS ONCE
Status: COMPLETED | OUTPATIENT
Start: 2024-06-25 | End: 2024-06-25

## 2024-06-25 RX ADMIN — SODIUM CHLORIDE, POTASSIUM CHLORIDE, SODIUM LACTATE AND CALCIUM CHLORIDE: 600; 310; 30; 20 INJECTION, SOLUTION INTRAVENOUS at 10:05

## 2024-06-25 RX ADMIN — Medication 5 ML: at 11:05

## 2024-06-25 NOTE — PROGRESS NOTES
PAC labs drawn as ordered for possible tx today. Pt was seen in urgent care and the ED over the last few days. Breanne Nicole RN

## 2024-06-25 NOTE — PROGRESS NOTES
Infusion Nursing Note:  Precious Paris presents today for C14D8 Trodelvy (not given).    Patient seen by provider today: No   present during visit today: Not Applicable.    Note: Pt has been having difficulty with dizziness and weakness at home. She reports that she was seen in the urgent care recently and then the ED on 6/23.   Her daughter is with her today. She has many concerns about her mom and is wondering if she needs an iron infusion or blood transfusion. Reviewed the recent blood work results with her and the pt and explained that the hgb does not meet parameters for a blood transfusion. The MD has not ordered any iron infusions at this time. Spoke with Dr. Beebe and Patrica Perla, MACKENZIECC regarding this information. MD ordered for pt to receive IVF's today and an MRI brain which will be done tomorrow. Will wait for results to make decision on getting pt rescheduled. She is aware.     Intravenous Access:  Implanted Port.    Treatment Conditions:  See notes.      Post Infusion Assessment:  Patient tolerated infusion without incident.  Blood return noted pre and post infusion.  Site patent and intact, free from redness, edema or discomfort.  No evidence of extravasations.  Access discontinued per protocol.       Discharge Plan:   Patient discharged in stable condition accompanied by: daughter.  Departure Mode: Ambulatory.      Breanne Nicole RN

## 2024-06-25 NOTE — PROGRESS NOTES
[facility]: Cancer Care                                                                                          Pt in infusion again today not feeling well (nausea, dizziness and shuffling feet). Plan to hold chemo, give IV fluids. Will wait for scans before deciding to when to reschedule chemo.     Pt has a PET scan scheduled Thursday, will add a Brain MRI as follow up.     Pt also to start  Oral Iron     Pt in agreement with plan.     Signature:  Patrica Perla RN

## 2024-06-26 ENCOUNTER — HOSPITAL ENCOUNTER (OUTPATIENT)
Dept: MRI IMAGING | Facility: CLINIC | Age: 84
Discharge: HOME | End: 2024-06-26
Attending: INTERNAL MEDICINE | Admitting: INTERNAL MEDICINE
Payer: COMMERCIAL

## 2024-06-26 DIAGNOSIS — Z17.1 MALIGNANT NEOPLASM OF UPPER-OUTER QUADRANT OF RIGHT BREAST IN FEMALE, ESTROGEN RECEPTOR NEGATIVE (H): ICD-10-CM

## 2024-06-26 DIAGNOSIS — C50.411 MALIGNANT NEOPLASM OF UPPER-OUTER QUADRANT OF RIGHT BREAST IN FEMALE, ESTROGEN RECEPTOR NEGATIVE (H): ICD-10-CM

## 2024-06-26 DIAGNOSIS — G93.9 BRAIN LESION: ICD-10-CM

## 2024-06-26 PROCEDURE — 70553 MRI BRAIN STEM W/O & W/DYE: CPT

## 2024-06-26 PROCEDURE — 255N000002 HC RX 255 OP 636: Performed by: INTERNAL MEDICINE

## 2024-06-26 PROCEDURE — A9585 GADOBUTROL INJECTION: HCPCS | Performed by: INTERNAL MEDICINE

## 2024-06-26 PROCEDURE — 250N000011 HC RX IP 250 OP 636: Mod: JZ | Performed by: INTERNAL MEDICINE

## 2024-06-26 RX ORDER — HEPARIN SODIUM (PORCINE) LOCK FLUSH IV SOLN 100 UNIT/ML 100 UNIT/ML
5 SOLUTION INTRAVENOUS ONCE
Status: COMPLETED | OUTPATIENT
Start: 2024-06-26 | End: 2024-06-26

## 2024-06-26 RX ORDER — GADOBUTROL 604.72 MG/ML
8 INJECTION INTRAVENOUS ONCE
Status: COMPLETED | OUTPATIENT
Start: 2024-06-26 | End: 2024-06-26

## 2024-06-26 RX ADMIN — Medication 5 ML: at 11:59

## 2024-06-26 RX ADMIN — GADOBUTROL 8 ML: 604.72 INJECTION INTRAVENOUS at 11:25

## 2024-06-27 ENCOUNTER — INFUSION THERAPY VISIT (OUTPATIENT)
Dept: INFUSION THERAPY | Facility: CLINIC | Age: 84
End: 2024-06-27
Attending: INTERNAL MEDICINE
Payer: COMMERCIAL

## 2024-06-27 VITALS
TEMPERATURE: 98.9 F | SYSTOLIC BLOOD PRESSURE: 123 MMHG | RESPIRATION RATE: 18 BRPM | DIASTOLIC BLOOD PRESSURE: 74 MMHG | HEART RATE: 101 BPM

## 2024-06-27 DIAGNOSIS — C50.411 MALIGNANT NEOPLASM OF UPPER-OUTER QUADRANT OF RIGHT BREAST IN FEMALE, ESTROGEN RECEPTOR NEGATIVE (H): ICD-10-CM

## 2024-06-27 DIAGNOSIS — C50.411 MALIGNANT NEOPLASM OF UPPER-OUTER QUADRANT OF RIGHT BREAST IN FEMALE, ESTROGEN RECEPTOR NEGATIVE (H): Primary | ICD-10-CM

## 2024-06-27 DIAGNOSIS — C78.02 MALIGNANT NEOPLASM METASTATIC TO BOTH LUNGS (H): ICD-10-CM

## 2024-06-27 DIAGNOSIS — Z17.1 MALIGNANT NEOPLASM OF UPPER-OUTER QUADRANT OF RIGHT BREAST IN FEMALE, ESTROGEN RECEPTOR NEGATIVE (H): Primary | ICD-10-CM

## 2024-06-27 DIAGNOSIS — C50.911 INFILTRATING DUCTAL CARCINOMA OF RIGHT BREAST (H): Primary | ICD-10-CM

## 2024-06-27 DIAGNOSIS — C78.01 MALIGNANT NEOPLASM METASTATIC TO BOTH LUNGS (H): ICD-10-CM

## 2024-06-27 DIAGNOSIS — C50.919 PRIMARY MALIGNANT NEOPLASM OF BREAST WITH METASTASIS (H): ICD-10-CM

## 2024-06-27 DIAGNOSIS — Z17.1 MALIGNANT NEOPLASM OF UPPER-OUTER QUADRANT OF RIGHT BREAST IN FEMALE, ESTROGEN RECEPTOR NEGATIVE (H): ICD-10-CM

## 2024-06-27 PROCEDURE — 258N000003 HC RX IP 258 OP 636: Performed by: INTERNAL MEDICINE

## 2024-06-27 PROCEDURE — 250N000011 HC RX IP 250 OP 636: Performed by: INTERNAL MEDICINE

## 2024-06-27 PROCEDURE — 96360 HYDRATION IV INFUSION INIT: CPT

## 2024-06-27 RX ORDER — HEPARIN SODIUM (PORCINE) LOCK FLUSH IV SOLN 100 UNIT/ML 100 UNIT/ML
5 SOLUTION INTRAVENOUS
OUTPATIENT
Start: 2024-06-27

## 2024-06-27 RX ORDER — HEPARIN SODIUM (PORCINE) LOCK FLUSH IV SOLN 100 UNIT/ML 100 UNIT/ML
5 SOLUTION INTRAVENOUS
Status: DISCONTINUED | OUTPATIENT
Start: 2024-06-27 | End: 2024-06-27 | Stop reason: HOSPADM

## 2024-06-27 RX ORDER — ONDANSETRON 2 MG/ML
8 INJECTION INTRAMUSCULAR; INTRAVENOUS ONCE
Status: COMPLETED | OUTPATIENT
Start: 2024-06-27 | End: 2024-06-27

## 2024-06-27 RX ORDER — ONDANSETRON 2 MG/ML
8 INJECTION INTRAMUSCULAR; INTRAVENOUS ONCE
Start: 2024-06-27 | End: 2024-06-27

## 2024-06-27 RX ORDER — SODIUM CHLORIDE, SODIUM LACTATE, POTASSIUM CHLORIDE, CALCIUM CHLORIDE 600; 310; 30; 20 MG/100ML; MG/100ML; MG/100ML; MG/100ML
INJECTION, SOLUTION INTRAVENOUS ONCE
Start: 2024-06-27 | End: 2024-06-27

## 2024-06-27 RX ORDER — SODIUM CHLORIDE, SODIUM LACTATE, POTASSIUM CHLORIDE, CALCIUM CHLORIDE 600; 310; 30; 20 MG/100ML; MG/100ML; MG/100ML; MG/100ML
INJECTION, SOLUTION INTRAVENOUS ONCE
Status: COMPLETED | OUTPATIENT
Start: 2024-06-27 | End: 2024-06-27

## 2024-06-27 RX ADMIN — SODIUM CHLORIDE, POTASSIUM CHLORIDE, SODIUM LACTATE AND CALCIUM CHLORIDE: 600; 310; 30; 20 INJECTION, SOLUTION INTRAVENOUS at 12:13

## 2024-06-27 RX ADMIN — Medication 5 ML: at 13:42

## 2024-06-27 NOTE — PROGRESS NOTES
Infusion Nursing Note:  Precious Paris presents today for IVF.    Patient seen by provider today: No   present during visit today: Not Applicable.    Note: Patient missed her PET scan today. She said that she talk with imaging staff yesterday, and they told her she did not have an appt today. She does not want to reschedule.    Intravenous Access:  Implanted Port.    Treatment Conditions:  Not Applicable.    Post Infusion Assessment:  Patient tolerated infusion without incident.  Blood return noted pre and post infusion.  Site patent and intact, free from redness, edema or discomfort.  No evidence of extravasations.  Access discontinued per protocol.     Discharge Plan:   Discharge instructions reviewed with: Patient.  Patient and/or family verbalized understanding of discharge instructions and all questions answered.  AVS to patient via [employer].  Patient will return 7/9/2024 for next appointment.   Patient discharged in stable condition accompanied by: self.  Departure Mode: Ambulatory.    Therese Fountain RN

## 2024-06-27 NOTE — PROGRESS NOTES
[facility]: Cancer Care                                                                                          Called pt to see how she is doing after C1D1 Doxil 11.14. She reports she is feeling quite well, just tired today. Eating and drinking normally, no nausea. Pt has been a little constipated this week so she will take a stool softener.     Pt does note her nipple wound is bothering her, she put a band aid on it so its not catching on her bra.     Will review with Dr. Beebe if there is anything else she should do further.      Pt will call if anything else arises.     Follow up on 11/28.      Signature:  Patrica Perla RN   The cultures from May and current admission show different bacteria.  This suggests his stone is not a nidus for infection.    Consider repeat cystoscopy as an outpatient.

## 2024-06-28 LAB
BACTERIA BLD CULT: NO GROWTH
BACTERIA BLD CULT: NO GROWTH

## 2024-07-01 ENCOUNTER — LAB (OUTPATIENT)
Dept: LAB | Facility: CLINIC | Age: 84
End: 2024-07-01
Payer: COMMERCIAL

## 2024-07-01 ENCOUNTER — ALLIED HEALTH/NURSE VISIT (OUTPATIENT)
Dept: FAMILY MEDICINE | Facility: CLINIC | Age: 84
End: 2024-07-01
Payer: COMMERCIAL

## 2024-07-01 VITALS — SYSTOLIC BLOOD PRESSURE: 112 MMHG | DIASTOLIC BLOOD PRESSURE: 64 MMHG

## 2024-07-01 DIAGNOSIS — Z01.30 BLOOD PRESSURE CHECK: Primary | ICD-10-CM

## 2024-07-01 DIAGNOSIS — E78.5 HYPERLIPIDEMIA LDL GOAL <130: ICD-10-CM

## 2024-07-01 LAB
CHOLEST SERPL-MCNC: 210 MG/DL
FASTING STATUS PATIENT QL REPORTED: YES
HDLC SERPL-MCNC: 50 MG/DL
LDLC SERPL CALC-MCNC: 147 MG/DL
NONHDLC SERPL-MCNC: 160 MG/DL
TRIGL SERPL-MCNC: 66 MG/DL

## 2024-07-01 PROCEDURE — 99207 PR NO CHARGE NURSE ONLY: CPT

## 2024-07-01 PROCEDURE — 36415 COLL VENOUS BLD VENIPUNCTURE: CPT

## 2024-07-01 PROCEDURE — 80061 LIPID PANEL: CPT

## 2024-07-01 NOTE — PROGRESS NOTES
Precious Paris is a 83 year old year old patient who comes in today for a Blood Pressure check because of ongoing blood pressure monitoring.  Vital Signs as repeated by /64  HR 86 sats 93%   Recheck 111/71, standing 97/61 hr 93   Pt asymptomatic.  Patient is not taking medication as prescribed  Patient is tolerating medications well.  Patient is monitoring Blood Pressure at home.  Average readings if yes are low per patient  Current complaints: weakness, pt ambulatory and steady.advised using a cane or walker as needed, increase fluids, salt, slow position changes. Return or seek care if symptomatic  Disposition:  huddled with provider (Luisito)  Tram Coleman RN on 7/1/2024 at 10:29 AM

## 2024-07-02 NOTE — RESULT ENCOUNTER NOTE
Precious,    These labs are normal or acceptable.    Please contact my office if you have questions.    Ester Jorge M.D.

## 2024-07-05 ENCOUNTER — HOSPITAL ENCOUNTER (OUTPATIENT)
Dept: PET IMAGING | Facility: HOSPITAL | Age: 84
Discharge: HOME | End: 2024-07-05
Attending: INTERNAL MEDICINE | Admitting: INTERNAL MEDICINE
Payer: COMMERCIAL

## 2024-07-05 DIAGNOSIS — C50.411 MALIGNANT NEOPLASM OF UPPER-OUTER QUADRANT OF RIGHT BREAST IN FEMALE, ESTROGEN RECEPTOR NEGATIVE (H): ICD-10-CM

## 2024-07-05 DIAGNOSIS — Z17.1 MALIGNANT NEOPLASM OF UPPER-OUTER QUADRANT OF RIGHT BREAST IN FEMALE, ESTROGEN RECEPTOR NEGATIVE (H): ICD-10-CM

## 2024-07-05 LAB — GLUCOSE BLDC GLUCOMTR-MCNC: 104 MG/DL (ref 70–99)

## 2024-07-05 PROCEDURE — 78815 PET IMAGE W/CT SKULL-THIGH: CPT | Mod: PS

## 2024-07-05 PROCEDURE — 343N000001 HC RX 343: Performed by: INTERNAL MEDICINE

## 2024-07-05 PROCEDURE — 82962 GLUCOSE BLOOD TEST: CPT

## 2024-07-05 PROCEDURE — A9552 F18 FDG: HCPCS | Performed by: INTERNAL MEDICINE

## 2024-07-05 RX ORDER — FLUDEOXYGLUCOSE F 18 200 MCI/ML
7-18 INJECTION, SOLUTION INTRAVENOUS ONCE
Status: COMPLETED | OUTPATIENT
Start: 2024-07-05 | End: 2024-07-05

## 2024-07-05 RX ADMIN — FLUDEOXYGLUCOSE F 18 10.97 MILLICURIE: 200 INJECTION, SOLUTION INTRAVENOUS at 10:58

## 2024-07-09 ENCOUNTER — LAB (OUTPATIENT)
Dept: INFUSION THERAPY | Facility: CLINIC | Age: 84
End: 2024-07-09
Attending: INTERNAL MEDICINE
Payer: COMMERCIAL

## 2024-07-09 ENCOUNTER — ONCOLOGY VISIT (OUTPATIENT)
Dept: ONCOLOGY | Facility: CLINIC | Age: 84
End: 2024-07-09
Attending: INTERNAL MEDICINE
Payer: COMMERCIAL

## 2024-07-09 VITALS
BODY MASS INDEX: 29.82 KG/M2 | RESPIRATION RATE: 12 BRPM | WEIGHT: 179 LBS | TEMPERATURE: 98.3 F | SYSTOLIC BLOOD PRESSURE: 152 MMHG | HEIGHT: 65 IN | HEART RATE: 72 BPM | OXYGEN SATURATION: 92 % | DIASTOLIC BLOOD PRESSURE: 71 MMHG

## 2024-07-09 DIAGNOSIS — C50.411 MALIGNANT NEOPLASM OF UPPER-OUTER QUADRANT OF RIGHT BREAST IN FEMALE, ESTROGEN RECEPTOR NEGATIVE (H): ICD-10-CM

## 2024-07-09 DIAGNOSIS — Z08 ENCOUNTER FOR FOLLOW-UP EXAMINATION AFTER COMPLETED TREATMENT FOR MALIGNANT NEOPLASM: ICD-10-CM

## 2024-07-09 DIAGNOSIS — J98.4 PNEUMONITIS: Primary | ICD-10-CM

## 2024-07-09 DIAGNOSIS — C50.919 PRIMARY MALIGNANT NEOPLASM OF BREAST WITH METASTASIS (H): Primary | ICD-10-CM

## 2024-07-09 DIAGNOSIS — C50.919 PRIMARY MALIGNANT NEOPLASM OF BREAST WITH METASTASIS (H): ICD-10-CM

## 2024-07-09 DIAGNOSIS — D64.9 ANEMIA, UNSPECIFIED TYPE: ICD-10-CM

## 2024-07-09 DIAGNOSIS — Z17.1 MALIGNANT NEOPLASM OF UPPER-OUTER QUADRANT OF RIGHT BREAST IN FEMALE, ESTROGEN RECEPTOR NEGATIVE (H): ICD-10-CM

## 2024-07-09 DIAGNOSIS — K21.00 GASTROESOPHAGEAL REFLUX DISEASE WITH ESOPHAGITIS WITHOUT HEMORRHAGE: ICD-10-CM

## 2024-07-09 LAB
BASOPHILS # BLD AUTO: 0.1 10E3/UL (ref 0–0.2)
BASOPHILS NFR BLD AUTO: 1 %
EOSINOPHIL # BLD AUTO: 0.3 10E3/UL (ref 0–0.7)
EOSINOPHIL NFR BLD AUTO: 4 %
ERYTHROCYTE [DISTWIDTH] IN BLOOD BY AUTOMATED COUNT: 17.6 % (ref 10–15)
HCT VFR BLD AUTO: 31.3 % (ref 35–47)
HGB BLD-MCNC: 10.1 G/DL (ref 11.7–15.7)
IMM GRANULOCYTES # BLD: 0.1 10E3/UL
IMM GRANULOCYTES NFR BLD: 1 %
LYMPHOCYTES # BLD AUTO: 1.4 10E3/UL (ref 0.8–5.3)
LYMPHOCYTES NFR BLD AUTO: 18 %
MCH RBC QN AUTO: 28.7 PG (ref 26.5–33)
MCHC RBC AUTO-ENTMCNC: 32.3 G/DL (ref 31.5–36.5)
MCV RBC AUTO: 89 FL (ref 78–100)
MONOCYTES # BLD AUTO: 1.1 10E3/UL (ref 0–1.3)
MONOCYTES NFR BLD AUTO: 14 %
NEUTROPHILS # BLD AUTO: 4.9 10E3/UL (ref 1.6–8.3)
NEUTROPHILS NFR BLD AUTO: 63 %
NRBC # BLD AUTO: 0 10E3/UL
NRBC BLD AUTO-RTO: 0 /100
PLATELET # BLD AUTO: 415 10E3/UL (ref 150–450)
RBC # BLD AUTO: 3.52 10E6/UL (ref 3.8–5.2)
WBC # BLD AUTO: 7.9 10E3/UL (ref 4–11)

## 2024-07-09 PROCEDURE — G0463 HOSPITAL OUTPT CLINIC VISIT: HCPCS | Performed by: INTERNAL MEDICINE

## 2024-07-09 PROCEDURE — 86300 IMMUNOASSAY TUMOR CA 15-3: CPT | Performed by: INTERNAL MEDICINE

## 2024-07-09 PROCEDURE — 99215 OFFICE O/P EST HI 40 MIN: CPT | Performed by: INTERNAL MEDICINE

## 2024-07-09 PROCEDURE — G2211 COMPLEX E/M VISIT ADD ON: HCPCS | Performed by: INTERNAL MEDICINE

## 2024-07-09 PROCEDURE — 250N000011 HC RX IP 250 OP 636: Performed by: INTERNAL MEDICINE

## 2024-07-09 PROCEDURE — 36591 DRAW BLOOD OFF VENOUS DEVICE: CPT

## 2024-07-09 PROCEDURE — 85025 COMPLETE CBC W/AUTO DIFF WBC: CPT | Performed by: INTERNAL MEDICINE

## 2024-07-09 RX ORDER — PROCHLORPERAZINE MALEATE 10 MG
5 TABLET ORAL EVERY 6 HOURS PRN
Qty: 30 TABLET | Refills: 2 | Status: SHIPPED | OUTPATIENT
Start: 2024-07-22

## 2024-07-09 RX ORDER — HEPARIN SODIUM (PORCINE) LOCK FLUSH IV SOLN 100 UNIT/ML 100 UNIT/ML
5 SOLUTION INTRAVENOUS ONCE
Status: COMPLETED | OUTPATIENT
Start: 2024-07-09 | End: 2024-07-09

## 2024-07-09 RX ORDER — PREDNISONE 20 MG/1
40 TABLET ORAL DAILY
Qty: 28 TABLET | Refills: 0 | Status: SHIPPED | OUTPATIENT
Start: 2024-07-09 | End: 2024-07-30

## 2024-07-09 RX ORDER — DEXAMETHASONE 4 MG/1
8 TABLET ORAL 2 TIMES DAILY WITH MEALS
Qty: 6 TABLET | Refills: 2 | Status: SHIPPED | OUTPATIENT
Start: 2024-07-22 | End: 2024-07-23

## 2024-07-09 RX ORDER — FAMOTIDINE 20 MG/1
20 TABLET, FILM COATED ORAL 2 TIMES DAILY
Qty: 60 TABLET | Refills: 1 | Status: SHIPPED | OUTPATIENT
Start: 2024-07-09

## 2024-07-09 RX ORDER — HEPARIN SODIUM (PORCINE) LOCK FLUSH IV SOLN 100 UNIT/ML 100 UNIT/ML
SOLUTION INTRAVENOUS
Status: COMPLETED
Start: 2024-07-09 | End: 2024-07-09

## 2024-07-09 RX ADMIN — Medication 5 ML: at 10:38

## 2024-07-09 RX ADMIN — HEPARIN SODIUM (PORCINE) LOCK FLUSH IV SOLN 100 UNIT/ML 5 ML: 100 SOLUTION at 10:38

## 2024-07-09 ASSESSMENT — PAIN SCALES - GENERAL: PAINLEVEL: NO PAIN (0)

## 2024-07-09 NOTE — LETTER
7/9/2024      Precious Paris  [address] [place] [address]      Dear Colleague,    Thank you for referring your patient, Precious Paris, to the [facility]. Please see a copy of my visit note below.        [facility] Hematology and Oncology Progress Note    Patient: Precious Paris  MRN: [id number]  Jul 9, 2024          Reason for Visit    Recurrent stage IV triple negative breast cancer (nodes, lung) (PDL1+)    Primary Oncologist: Dr. Beebe    _____________________________________________________________________________    History of Present Illness/ Interval History    Ms. Precious Paris is a 83 year old with recurrent metastatic triple negative breast cancer recurrence. She initiated 3rd line sacituzumab in September 2023.  She transferred her care to [place] over the Winter, continuing this regimen there, and returned here in April. Has now received 14 cycles.     She is here with repeat PET scan.  Over the last few weeks she has felt more more fatigued and tired.  Has actually gotten better in the last few days.  Has actually gotten better in the last few days.  Also had significant dizziness.  We obtained an MRI of the brain which was fortunately negative for any intracranial metastasis.  Denies any significant diarrhea although mostly has loose stools.  Denies any fevers or chills.  No new bone pain reported.      ECOG PS: 1      Oncology History/Treatment  Diagnosis/Stage:   11/2019: Right breast cancer, triple negative (pG7x-C8)    BRCA negative    10/2022: Recurrent, stage IV triple negative breast cancer (bilateral axillary, supraclav, mediastinal nodes + lung)  -persistent/progressive right breast firmness with new right nipple drainage and right axillary adenopathy  -bilateral diagnostic mammogram: increased density R breast with skin thickening. R breast US: 3.2 cm hypoechoic mass at 11:00 position 1 cm from nipple and multiple other  small hypoechoic solid-appearing lesions in R breast + right axillary adenopathy (at least two hypoechoic vascular lesions measuring up to 2.5 cm)  -10/18/2022 biopsy R breast mass + R axillary node: grade 3 invasive ductal cancer with tumor necrosis, ER/NM/HER2 negative.   -PET: + right breast mass; bilateral axillary, retropectoral, supraclavicular, mediastinal/R hilar nodes and bilateral lung/R pleural nodules suspicious for mets.  -Brain MRI: negative for mets  -11/1/2022 biopsy L axillary node: metastatic invasive ductal carcinoma  -Foundation One: no actionable mutations MS stable, TMB 1 mut/mb.   -PDL1 testing: CPS >20% and TPS 60% (considered high PDL1 expression)  -CA 27.29: 59.5 (elevated). CA 15-3: 23 (normal)  -9/10/2023 Brain MRI: 2 new foci of punctate enhancement in left frontal cortex and right lentiform nucleus, concerning for brain metastases (asymptomatic)    NGS (foundation 1)  FGF R1 amplification.  Pazopanib has been approved in other tumors but not in breast cancer.  NTRK 1 amplification.  Not actionable  She has multiple other mutations which are not actionable.      Treatment:  Initial breast cancer (2019)  -2019: Neoadjuvant taxol  -Lumpectomy  -5/2020: Adjuvant RT  -6 - 7/2020: Adjuvant Xeloda. Stopped for rash    Recurrent, stage IV breast cancer (2022)  -11/14/2022:Doxil every 4 weeks  -12/12/2022: changed to doxorubicin every 3 weeks. (Planning addition of pembrolizumab once approved by insurance)  -1/2/2023: doxorubicin + pembrolizumab every 3 weeks. Completed 1 cycle. Stopped for progression (med nodes, slight increase in lung mets and stable R breast mass + axillary nodes).    -1/23/2023 - present: Carbo AUC 5, Gemcitabine 800 mg/m2 and Pembro every 21 days  -cycles 1-2: [place] day 1 only  -Cycle 3: [place] increased to days 1 + 8 given excellent tolerance  -CT after 3 cycles showed excellent response (near CR axillary + med nodes and NM in lung mets; clinical improvement in right breast  changes and tumor marker).  -PET scan after 6 cycles of carboplatin gemcitabine pembrolizumab shows complete response    5/30 - 8/22/2023: Maintenance pembrolizumab, until progression (recurrent axillary, thoracic nodes and 2 punctate brain lesions).  Biopsy of left axillary node confirmed recurrent/metastatic breast cancer.    9/12/2023 - present:  Sacituzumab days 1, 8 every 21 days  --C2D8 deferred x 1 week for neutropenia  --Dose decreased to 8 mg/kg from cycle 3 onwards.  --Cycles 5-10 were delivered in [place] over winter months  --Resumed Cycle 11 here upon return 4/2024      Physical Exam    GENERAL: Alert and oriented to time place and person. Seated comfortably. In no distress.  Accompanied by her   HEENT: Complete alopecia. No icterus.   Lymph: ?  Palpable left axillary lymphadenopathy  Heart: RRR  Lungs: regular respiratory effort. Clear lung sounds bilaterally.   Abd: Soft, non-distended.   Neuro: alert and oriented x 3      Lab Results  No results found for this or any previous visit (from the past 168 hour(s)).        Assessment/Plan  Recurrent, stage IV triple negative right breast cancer (nodes, lung); CPS>20% + TPS 60%  Mild intermittent peripheral neuropathy fingertips (gr 1)  Punctate brain lesions concerning for metastatic disease (asymptomatic), resolved on chemo  Grade 1 chemotherapy-induced diarrhea  Grade 3 chemotherapy-induced neutropenia  Has been on dose-modified sacituzumab since last September 2023, now having completed 14 cycles.     Over the last few weeks she has had significant weakness and fatigue. Unsteady in her gait.  Has required IV fluid support.  More confusion of late.  Clinical status has significant decline over the last couple of weeks.  Had an MRI of the brain on 6/26/2024.  Possibly showed no evidence of intracranial metastatic disease.  She is here with repeat PET scan.    Unfortunately current PET scan is showing progressive disease with new FDG avid  lymphadenopathy in the neck chest and upper abdomen.  Reviewed the findings with her.  Unfortunately she has progressed on sacituzumab.  Does not have really good options going forward except for conventional chemotherapy.  Options include single agent taxane (docetaxel),  eribulin, vinorelbine etc.  Previously she had Doxil and carboplatin with gemcitabine.  And progressed through them.    Reviewed the pertinent side effects and complications associated with chemotherapy agents.  She had significant fatigue issues with Taxol in the past. But can probably tolerate docetaxel which is slightly different and has a different side effect profile.  It is not a guarantee that she should have the same reactions to it.  Eribulin is also fairly well-tolerated and has been shown to improve survival in breast cancer patients in elderly.  Significant toxicity include peripheral neuropathy, cytopenias and fatigue.    Father made decision to proceed with docetaxel.  Will probably do dose reduction and start at 60 mg/m .  Plan to carefully watch for peripheral neuropathy issues.  She is agreeable to the plan.  She understands the risks and benefits of doing a docetaxel chemotherapy and verbalized understanding.  She has consented to proceeding with the docetaxel chemo.  Will start in the next couple of weeks.    PET scan also showed evidence of pneumonitis with FDG avid groundglass opacities in both lungs.  Sacituzumab is generally not the most common drug that causes pneumonitis.  Question viral infection versus lymphangitic spread versus drug-induced pneumonitis.  Will give a trial of prednisone 40 mg daily for the next couple of weeks.  Asked her to call us back if she is noticing worsening shortness of breath or fevers or cough.  She is agreeable.    I have personally reviewed the PET scan images and report and independently interpreted the results to my ability.      She does have evidence of iron deficiency anemia.  Recommend  "starting oral iron supplementation.    Billing:  A total of 40 min was spent today on this visit which included face to face conversation with the patient, EMR review, counseling and co-ordination of care and medical documentation.    The longitudinal plan of care for the diagnosis(es)/condition(s) as documented were addressed during this visit. Due to the added complexity in care, I will continue to support Precious in the subsequent management and with ongoing continuity of care.      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    Weight as of this encounter: 81.2 kg (179 lb). Body surface area is 1.92 meters squared.  No Pain (0) Comment: Data Unavailable   No LMP recorded. Patient is postmenopausal.  Allergies reviewed: Yes  Medications reviewed: Yes    Medications: Medication refills not needed today.  Pharmacy name entered into [facility]: [facility] - [facility] [id number] [facility]    Frailty Screening:   Is the patient here for a new oncology consult visit in cancer care? 2. No      Clinical concerns:  None      Malgorzata Vallejo CMA                Again, thank you for allowing me to participate in the care of your patient.        Sincerely,        Maricarmen Beebe MD  "

## 2024-07-09 NOTE — PATIENT INSTRUCTIONS
Start taking prednisone 40mg daily from today.    Stopping sacituzumab (Trodelvy) due to progression of cancer seen on PET scan.    PET scan is showing some inflammation in the lungs which could be due to the chemo drug causing inflammation. Hence the reason for steroids (prednisone) for 2 weeks.    Plan is to start docetaxel (taxotere) chemo in 2 weeks. You had a similar drug in the beginning in 2019 called taxol, this is its cousin drug.

## 2024-07-09 NOTE — PROGRESS NOTES
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 Beebe    _____________________________________________________________________________    History of Present Illness/ Interval History    Ms. Precious Paris is a 83 year old with recurrent metastatic triple negative breast cancer recurrence. She initiated 3rd line sacituzumab in September 2023.  She transferred her care to [place] over the Winter, continuing this regimen there, and returned here in April. Has now received 14 cycles.     She is here with repeat PET scan.  Over the last few weeks she has felt more more fatigued and tired.  Has actually gotten better in the last few days.  Has actually gotten better in the last few days.  Also had significant dizziness.  We obtained an MRI of the brain which was fortunately negative for any intracranial metastasis.  Denies any significant diarrhea although mostly has loose stools.  Denies any fevers or chills.  No new bone pain reported.      ECOG PS: 1      Oncology History/Treatment  Diagnosis/Stage:   11/2019: Right breast cancer, triple negative (tP4v-C2)    BRCA negative    10/2022: Recurrent, stage IV triple negative breast cancer (bilateral axillary, supraclav, mediastinal nodes + lung)  -persistent/progressive right breast firmness with new right nipple drainage and right axillary adenopathy  -bilateral diagnostic mammogram: increased density R breast with skin thickening. R breast US: 3.2 cm hypoechoic mass at 11:00 position 1 cm from nipple and multiple other small hypoechoic solid-appearing lesions in R breast + right axillary adenopathy (at least two hypoechoic vascular lesions measuring up to 2.5 cm)  -10/18/2022 biopsy R breast mass + R axillary node: grade 3 invasive ductal cancer with tumor necrosis,  ER/UT/HER2 negative.   -PET: + right breast mass; bilateral axillary, retropectoral, supraclavicular, mediastinal/R hilar nodes and bilateral lung/R pleural nodules suspicious for mets.  -Brain MRI: negative for mets  -11/1/2022 biopsy L axillary node: metastatic invasive ductal carcinoma  -Foundation One: no actionable mutations MS stable, TMB 1 mut/mb.   -PDL1 testing: CPS >20% and TPS 60% (considered high PDL1 expression)  -CA 27.29: 59.5 (elevated). CA 15-3: 23 (normal)  -9/10/2023 Brain MRI: 2 new foci of punctate enhancement in left frontal cortex and right lentiform nucleus, concerning for brain metastases (asymptomatic)    NGS (foundation 1)  FGF R1 amplification.  Pazopanib has been approved in other tumors but not in breast cancer.  NTRK 1 amplification.  Not actionable  She has multiple other mutations which are not actionable.      Treatment:  Initial breast cancer (2019)  -2019: Neoadjuvant taxol  -Lumpectomy  -5/2020: Adjuvant RT  -6 - 7/2020: Adjuvant Xeloda. Stopped for rash    Recurrent, stage IV breast cancer (2022)  -11/14/2022:Doxil every 4 weeks  -12/12/2022: changed to doxorubicin every 3 weeks. (Planning addition of pembrolizumab once approved by insurance)  -1/2/2023: doxorubicin + pembrolizumab every 3 weeks. Completed 1 cycle. Stopped for progression (med nodes, slight increase in lung mets and stable R breast mass + axillary nodes).    -1/23/2023 - present: Carbo AUC 5, Gemcitabine 800 mg/m2 and Pembro every 21 days  -cycles 1-2: [place] day 1 only  -Cycle 3: [place] increased to days 1 + 8 given excellent tolerance  -CT after 3 cycles showed excellent response (near CR axillary + med nodes and UT in lung mets; clinical improvement in right breast changes and tumor marker).  -PET scan after 6 cycles of carboplatin gemcitabine pembrolizumab shows complete response    5/30 - 8/22/2023: Maintenance pembrolizumab, until progression (recurrent axillary, thoracic nodes and 2 punctate brain lesions).   Biopsy of left axillary node confirmed recurrent/metastatic breast cancer.    9/12/2023 - present:  Sacituzumab days 1, 8 every 21 days  --C2D8 deferred x 1 week for neutropenia  --Dose decreased to 8 mg/kg from cycle 3 onwards.  --Cycles 5-10 were delivered in [place] over winter months  --Resumed Cycle 11 here upon return 4/2024      Physical Exam    GENERAL: Alert and oriented to time place and person. Seated comfortably. In no distress.  Accompanied by her   HEENT: Complete alopecia. No icterus.   Lymph: ?  Palpable left axillary lymphadenopathy  Heart: RRR  Lungs: regular respiratory effort. Clear lung sounds bilaterally.   Abd: Soft, non-distended.   Neuro: alert and oriented x 3      Lab Results  No results found for this or any previous visit (from the past 168 hour(s)).        Assessment/Plan  Recurrent, stage IV triple negative right breast cancer (nodes, lung); CPS>20% + TPS 60%  Mild intermittent peripheral neuropathy fingertips (gr 1)  Punctate brain lesions concerning for metastatic disease (asymptomatic), resolved on chemo  Grade 1 chemotherapy-induced diarrhea  Grade 3 chemotherapy-induced neutropenia  Has been on dose-modified sacituzumab since last September 2023, now having completed 14 cycles.     Over the last few weeks she has had significant weakness and fatigue. Unsteady in her gait.  Has required IV fluid support.  More confusion of late.  Clinical status has significant decline over the last couple of weeks.  Had an MRI of the brain on 6/26/2024.  Possibly showed no evidence of intracranial metastatic disease.  She is here with repeat PET scan.    Unfortunately current PET scan is showing progressive disease with new FDG avid lymphadenopathy in the neck chest and upper abdomen.  Reviewed the findings with her.  Unfortunately she has progressed on sacituzumab.  Does not have really good options going forward except for conventional chemotherapy.  Options include single agent taxane  (docetaxel),  eribulin, vinorelbine etc.  Previously she had Doxil and carboplatin with gemcitabine.  And progressed through them.    Reviewed the pertinent side effects and complications associated with chemotherapy agents.  She had significant fatigue issues with Taxol in the past. But can probably tolerate docetaxel which is slightly different and has a different side effect profile.  It is not a guarantee that she should have the same reactions to it.  Eribulin is also fairly well-tolerated and has been shown to improve survival in breast cancer patients in elderly.  Significant toxicity include peripheral neuropathy, cytopenias and fatigue.    After much discussion she made a decision to proceed with docetaxel.  Will probably do dose reduction and start at 60 mg/m .  Plan to carefully watch for peripheral neuropathy issues.  She is agreeable to the plan.  She understands the risks and benefits of doing a docetaxel chemotherapy and verbalized understanding.  She has consented to proceeding with the docetaxel chemo.  Will start in the next couple of weeks.    PET scan also showed evidence of pneumonitis with FDG avid groundglass opacities in both lungs.  Sacituzumab is generally not the most common drug that causes pneumonitis.  Question viral infection versus lymphangitic spread versus drug-induced pneumonitis.  Will give a trial of prednisone 40 mg daily for the next couple of weeks.  Asked her to call us back if she is noticing worsening shortness of breath or fevers or cough.  She is agreeable.    I have personally reviewed the PET scan images and report and independently interpreted the results to my ability.      She does have evidence of iron deficiency anemia.  Recommend starting oral iron supplementation.    Billing:  A total of 40 min was spent today on this visit which included face to face conversation with the patient, EMR review, counseling and co-ordination of care and medical documentation.    The  longitudinal plan of care for the diagnosis(es)/condition(s) as documented were addressed during this visit. Due to the added complexity in care, I will continue to support Precious in the subsequent management and with ongoing continuity of care.      Signed by:   Maricarmen Beebe MD  Hematology and Medical Oncology  [facility]

## 2024-07-09 NOTE — PROGRESS NOTES
Patient did not receive treatment today. Treatment plan changing from Trodelvy to Taxotere in 2 weeks per Dr. Beebe. Port de-accessed prior to discharge.    VENKATA ALLEN RN on 7/9/2024 at 10:40 AM

## 2024-07-09 NOTE — PROGRESS NOTES
PAC labs drawn without difficulty via site protocol. Patient tolerated well.    VENKATA ALLEN RN on 7/9/2024 at 10:10 AM

## 2024-07-09 NOTE — PROGRESS NOTES
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    Weight as of this encounter: 81.2 kg (179 lb). Body surface area is 1.92 meters squared.  No Pain (0) Comment: Data Unavailable   No LMP recorded. Patient is postmenopausal.  Allergies reviewed: Yes  Medications reviewed: Yes    Medications: Medication refills not needed today.  Pharmacy name entered into [facility]: [facility] - [facility] [id number] [facility]    Frailty Screening:   Is the patient here for a new oncology consult visit in cancer care? 2. No      Clinical concerns:  None      Malgorzata Vallejo CMA              "

## 2024-07-10 LAB — CANCER AG27-29 SERPL-ACNC: 51.6 U/ML

## 2024-07-23 ENCOUNTER — LAB (OUTPATIENT)
Dept: INFUSION THERAPY | Facility: CLINIC | Age: 84
End: 2024-07-23
Attending: INTERNAL MEDICINE
Payer: COMMERCIAL

## 2024-07-23 ENCOUNTER — ONCOLOGY VISIT (OUTPATIENT)
Dept: ONCOLOGY | Facility: CLINIC | Age: 84
End: 2024-07-23
Attending: INTERNAL MEDICINE
Payer: COMMERCIAL

## 2024-07-23 VITALS
BODY MASS INDEX: 30.67 KG/M2 | WEIGHT: 184.1 LBS | DIASTOLIC BLOOD PRESSURE: 66 MMHG | HEART RATE: 70 BPM | TEMPERATURE: 98.4 F | SYSTOLIC BLOOD PRESSURE: 176 MMHG | HEIGHT: 65 IN | OXYGEN SATURATION: 97 %

## 2024-07-23 VITALS — SYSTOLIC BLOOD PRESSURE: 159 MMHG | DIASTOLIC BLOOD PRESSURE: 77 MMHG | HEART RATE: 68 BPM

## 2024-07-23 DIAGNOSIS — Z08 ENCOUNTER FOR FOLLOW-UP EXAMINATION AFTER COMPLETED TREATMENT FOR MALIGNANT NEOPLASM: Primary | ICD-10-CM

## 2024-07-23 DIAGNOSIS — C50.919 PRIMARY MALIGNANT NEOPLASM OF BREAST WITH METASTASIS (H): Primary | ICD-10-CM

## 2024-07-23 DIAGNOSIS — Z17.1 MALIGNANT NEOPLASM OF UPPER-OUTER QUADRANT OF RIGHT BREAST IN FEMALE, ESTROGEN RECEPTOR NEGATIVE (H): ICD-10-CM

## 2024-07-23 DIAGNOSIS — C50.411 MALIGNANT NEOPLASM OF UPPER-OUTER QUADRANT OF RIGHT BREAST IN FEMALE, ESTROGEN RECEPTOR NEGATIVE (H): Primary | ICD-10-CM

## 2024-07-23 DIAGNOSIS — Z08 ENCOUNTER FOR FOLLOW-UP EXAMINATION AFTER COMPLETED TREATMENT FOR MALIGNANT NEOPLASM: ICD-10-CM

## 2024-07-23 DIAGNOSIS — C50.919 PRIMARY MALIGNANT NEOPLASM OF BREAST WITH METASTASIS (H): ICD-10-CM

## 2024-07-23 DIAGNOSIS — Z17.1 MALIGNANT NEOPLASM OF UPPER-OUTER QUADRANT OF RIGHT BREAST IN FEMALE, ESTROGEN RECEPTOR NEGATIVE (H): Primary | ICD-10-CM

## 2024-07-23 DIAGNOSIS — C50.411 MALIGNANT NEOPLASM OF UPPER-OUTER QUADRANT OF RIGHT BREAST IN FEMALE, ESTROGEN RECEPTOR NEGATIVE (H): ICD-10-CM

## 2024-07-23 LAB
ALBUMIN SERPL BCG-MCNC: 3.7 G/DL (ref 3.5–5.2)
ALP SERPL-CCNC: 72 U/L (ref 40–150)
ALT SERPL W P-5'-P-CCNC: 15 U/L (ref 0–50)
ANION GAP SERPL CALCULATED.3IONS-SCNC: 12 MMOL/L (ref 7–15)
AST SERPL W P-5'-P-CCNC: 18 U/L (ref 0–45)
BASOPHILS # BLD AUTO: 0.1 10E3/UL (ref 0–0.2)
BASOPHILS NFR BLD AUTO: 0 %
BILIRUB SERPL-MCNC: 0.4 MG/DL
BUN SERPL-MCNC: 17.4 MG/DL (ref 8–23)
CALCIUM SERPL-MCNC: 9.1 MG/DL (ref 8.8–10.4)
CEA SERPL-MCNC: 5.8 NG/ML
CHLORIDE SERPL-SCNC: 101 MMOL/L (ref 98–107)
CREAT SERPL-MCNC: 0.6 MG/DL (ref 0.51–0.95)
EGFRCR SERPLBLD CKD-EPI 2021: 89 ML/MIN/1.73M2
EOSINOPHIL # BLD AUTO: 0.2 10E3/UL (ref 0–0.7)
EOSINOPHIL NFR BLD AUTO: 2 %
ERYTHROCYTE [DISTWIDTH] IN BLOOD BY AUTOMATED COUNT: 18.3 % (ref 10–15)
GLUCOSE SERPL-MCNC: 127 MG/DL (ref 70–99)
HCO3 SERPL-SCNC: 26 MMOL/L (ref 22–29)
HCT VFR BLD AUTO: 37.5 % (ref 35–47)
HGB BLD-MCNC: 11.8 G/DL (ref 11.7–15.7)
IMM GRANULOCYTES # BLD: 0.1 10E3/UL
IMM GRANULOCYTES NFR BLD: 1 %
LYMPHOCYTES # BLD AUTO: 2.7 10E3/UL (ref 0.8–5.3)
LYMPHOCYTES NFR BLD AUTO: 23 %
MCH RBC QN AUTO: 28.9 PG (ref 26.5–33)
MCHC RBC AUTO-ENTMCNC: 31.5 G/DL (ref 31.5–36.5)
MCV RBC AUTO: 92 FL (ref 78–100)
MONOCYTES # BLD AUTO: 0.9 10E3/UL (ref 0–1.3)
MONOCYTES NFR BLD AUTO: 8 %
NEUTROPHILS # BLD AUTO: 7.8 10E3/UL (ref 1.6–8.3)
NEUTROPHILS NFR BLD AUTO: 66 %
NRBC # BLD AUTO: 0 10E3/UL
NRBC BLD AUTO-RTO: 0 /100
PLATELET # BLD AUTO: 268 10E3/UL (ref 150–450)
POTASSIUM SERPL-SCNC: 3.4 MMOL/L (ref 3.4–5.3)
PROT SERPL-MCNC: 6.7 G/DL (ref 6.4–8.3)
RBC # BLD AUTO: 4.08 10E6/UL (ref 3.8–5.2)
SODIUM SERPL-SCNC: 139 MMOL/L (ref 135–145)
WBC # BLD AUTO: 11.8 10E3/UL (ref 4–11)

## 2024-07-23 PROCEDURE — 250N000011 HC RX IP 250 OP 636: Performed by: INTERNAL MEDICINE

## 2024-07-23 PROCEDURE — 86300 IMMUNOASSAY TUMOR CA 15-3: CPT | Performed by: INTERNAL MEDICINE

## 2024-07-23 PROCEDURE — G0463 HOSPITAL OUTPT CLINIC VISIT: HCPCS | Mod: 25 | Performed by: INTERNAL MEDICINE

## 2024-07-23 PROCEDURE — 85041 AUTOMATED RBC COUNT: CPT | Performed by: INTERNAL MEDICINE

## 2024-07-23 PROCEDURE — 99215 OFFICE O/P EST HI 40 MIN: CPT | Performed by: INTERNAL MEDICINE

## 2024-07-23 PROCEDURE — 80053 COMPREHEN METABOLIC PANEL: CPT | Performed by: INTERNAL MEDICINE

## 2024-07-23 PROCEDURE — 82378 CARCINOEMBRYONIC ANTIGEN: CPT | Performed by: INTERNAL MEDICINE

## 2024-07-23 PROCEDURE — G2211 COMPLEX E/M VISIT ADD ON: HCPCS | Performed by: INTERNAL MEDICINE

## 2024-07-23 PROCEDURE — 96375 TX/PRO/DX INJ NEW DRUG ADDON: CPT

## 2024-07-23 PROCEDURE — 96413 CHEMO IV INFUSION 1 HR: CPT

## 2024-07-23 PROCEDURE — 36591 DRAW BLOOD OFF VENOUS DEVICE: CPT | Performed by: INTERNAL MEDICINE

## 2024-07-23 PROCEDURE — 258N000003 HC RX IP 258 OP 636: Performed by: INTERNAL MEDICINE

## 2024-07-23 PROCEDURE — 96367 TX/PROPH/DG ADDL SEQ IV INF: CPT

## 2024-07-23 RX ORDER — HEPARIN SODIUM (PORCINE) LOCK FLUSH IV SOLN 100 UNIT/ML 100 UNIT/ML
5 SOLUTION INTRAVENOUS
Status: CANCELLED | OUTPATIENT
Start: 2024-07-23

## 2024-07-23 RX ORDER — HEPARIN SODIUM (PORCINE) LOCK FLUSH IV SOLN 100 UNIT/ML 100 UNIT/ML
5 SOLUTION INTRAVENOUS
Status: DISCONTINUED | OUTPATIENT
Start: 2024-07-23 | End: 2024-07-23 | Stop reason: HOSPADM

## 2024-07-23 RX ORDER — MEPERIDINE HYDROCHLORIDE 25 MG/ML
25 INJECTION INTRAMUSCULAR; INTRAVENOUS; SUBCUTANEOUS EVERY 30 MIN PRN
Status: CANCELLED | OUTPATIENT
Start: 2024-07-23

## 2024-07-23 RX ORDER — ALBUTEROL SULFATE 0.83 MG/ML
2.5 SOLUTION RESPIRATORY (INHALATION)
Status: CANCELLED | OUTPATIENT
Start: 2024-07-23

## 2024-07-23 RX ORDER — EPINEPHRINE 1 MG/ML
0.3 INJECTION, SOLUTION, CONCENTRATE INTRAVENOUS EVERY 5 MIN PRN
Status: CANCELLED | OUTPATIENT
Start: 2024-07-23

## 2024-07-23 RX ORDER — HEPARIN SODIUM,PORCINE 10 UNIT/ML
5-20 VIAL (ML) INTRAVENOUS DAILY PRN
Status: CANCELLED | OUTPATIENT
Start: 2024-07-23

## 2024-07-23 RX ORDER — METHYLPREDNISOLONE SODIUM SUCCINATE 125 MG/2ML
125 INJECTION, POWDER, LYOPHILIZED, FOR SOLUTION INTRAMUSCULAR; INTRAVENOUS
Status: CANCELLED
Start: 2024-07-23

## 2024-07-23 RX ORDER — ALBUTEROL SULFATE 90 UG/1
1-2 AEROSOL, METERED RESPIRATORY (INHALATION)
Status: CANCELLED
Start: 2024-07-23

## 2024-07-23 RX ORDER — LORAZEPAM 2 MG/ML
0.5 INJECTION INTRAMUSCULAR EVERY 4 HOURS PRN
Status: CANCELLED | OUTPATIENT
Start: 2024-07-23

## 2024-07-23 RX ORDER — DEXAMETHASONE 4 MG/1
8 TABLET ORAL 2 TIMES DAILY WITH MEALS
Qty: 6 TABLET | Refills: 2 | Status: SHIPPED | OUTPATIENT
Start: 2024-07-23 | End: 2024-09-04

## 2024-07-23 RX ORDER — DIPHENHYDRAMINE HYDROCHLORIDE 50 MG/ML
50 INJECTION INTRAMUSCULAR; INTRAVENOUS
Status: CANCELLED
Start: 2024-07-23

## 2024-07-23 RX ADMIN — FAMOTIDINE 20 MG: 10 INJECTION, SOLUTION INTRAVENOUS at 08:47

## 2024-07-23 RX ADMIN — DOCETAXEL ANHYDROUS 120 MG: 20 INJECTION, SOLUTION INTRAVENOUS at 09:41

## 2024-07-23 RX ADMIN — DEXAMETHASONE SODIUM PHOSPHATE: 10 INJECTION, SOLUTION INTRAMUSCULAR; INTRAVENOUS at 08:50

## 2024-07-23 RX ADMIN — Medication 5 ML: at 10:46

## 2024-07-23 RX ADMIN — SODIUM CHLORIDE 250 ML: 9 INJECTION, SOLUTION INTRAVENOUS at 08:46

## 2024-07-23 ASSESSMENT — PAIN SCALES - GENERAL: PAINLEVEL: NO PAIN (0)

## 2024-07-23 NOTE — PROGRESS NOTES
"Oncology Rooming Note    July 23, 2024 7:53 AM   Precious Paris is a 83 year old female who presents for:    Chief Complaint   Patient presents with    Oncology Clinic Visit     Primary malignant neoplasm of breast with metastasis - labs, provider, infusion     Initial Vitals: BP (!) 176/66 (BP Location: Left arm, Patient Position: Sitting, Cuff Size: Adult Large)   Pulse 70   Temp 98.4  F (36.9  C) (Tympanic)   Ht 1.638 m (5' 4.5\")   Wt 83.5 kg (184 lb 1.6 oz)   SpO2 97%   BMI 31.11 kg/m   Estimated body mass index is 31.11 kg/m  as calculated from the following:    Height as of this encounter: 1.638 m (5' 4.5\").    Weight as of this encounter: 83.5 kg (184 lb 1.6 oz). Body surface area is 1.95 meters squared.  No Pain (0) Comment: Data Unavailable   No LMP recorded. Patient is postmenopausal.  Allergies reviewed: Yes  Medications reviewed: Yes    Medications: Medication refills not needed today.  Pharmacy name entered into [facility]: [facility] - [facility] [id number] [facility]    Frailty Screening:   Is the patient here for a new oncology consult visit in cancer care? 2. No      Clinical concerns: None today.       Erin Thomas MA              "

## 2024-07-23 NOTE — LETTER
7/23/2024      Precious Paris  [address] [place] [address]      Dear Colleague,    Thank you for referring your patient, Precious Paris, to the [facility]. Please see a copy of my visit note below.        [facility] Hematology and Oncology Progress Note    Patient: Precious Paris  MRN: [id number]  Jul 23, 2024          Reason for Visit    Recurrent stage IV triple negative breast cancer (nodes, lung) (PDL1+)    Primary Oncologist: Dr. Beebe    _____________________________________________________________________________    History of Present Illness/ Interval History    Ms. Precious Paris is a 83 year old with recurrent metastatic triple negative breast cancer recurrence. She initiated 3rd line sacituzumab in September 2023.     Recently had evidence of disease progression.  Now has switched to docetaxel.  Here to start cycle 1.  Previous PET scan also showed evidence of pneumonitis with FDG avid and progressed opacities in both lungs.  We were not sure whether this represented pneumonitis from sacituzumab or some other process.  I gave her a trial of prednisone 40 mg daily for 2 weeks.  Looks like she has done well on this.  Denies any significant shortness of breath today.  Here with repeat labs.    She has felt a lot better while on prednisone.  Denies any new issues today.      ECOG PS: 1      Oncology History/Treatment  Diagnosis/Stage:   11/2019: Right breast cancer, triple negative ([id number])    BRCA negative    10/2022: Recurrent, stage IV triple negative breast cancer (bilateral axillary, supraclav, mediastinal nodes + lung)  -persistent/progressive right breast firmness with new right nipple drainage and right axillary adenopathy  -bilateral diagnostic mammogram: increased density R breast with skin thickening. R breast US: 3.2 cm hypoechoic mass at 11:00 position 1 cm from nipple and multiple other small hypoechoic solid-appearing  lesions in R breast + right axillary adenopathy (at least two hypoechoic vascular lesions measuring up to 2.5 cm)  -10/18/2022 biopsy R breast mass + R axillary node: grade 3 invasive ductal cancer with tumor necrosis, ER/IN/HER2 negative.   -PET: + right breast mass; bilateral axillary, retropectoral, supraclavicular, mediastinal/R hilar nodes and bilateral lung/R pleural nodules suspicious for mets.  -Brain MRI: negative for mets  -11/1/2022 biopsy L axillary node: metastatic invasive ductal carcinoma  -Foundation One: no actionable mutations MS stable, TMB 1 mut/mb.   -PDL1 testing: CPS >20% and TPS 60% (considered high PDL1 expression)  -CA 27.29: 59.5 (elevated). CA 15-3: 23 (normal)  -9/10/2023 Brain MRI: 2 new foci of punctate enhancement in left frontal cortex and right lentiform nucleus, concerning for brain metastases (asymptomatic)    NGS (foundation 1)  FGF R1 amplification.  Pazopanib has been approved in other tumors but not in breast cancer.  NTRK 1 amplification.  Not actionable  She has multiple other mutations which are not actionable.      Treatment:  Initial breast cancer (2019)  -2019: Neoadjuvant taxol  -Lumpectomy  -5/2020: Adjuvant RT  -6 - 7/2020: Adjuvant Xeloda. Stopped for rash    Recurrent, stage IV breast cancer (2022)  -11/14/2022:Doxil every 4 weeks  -12/12/2022: changed to doxorubicin every 3 weeks. (Planning addition of pembrolizumab once approved by insurance)  -1/2/2023: doxorubicin + pembrolizumab every 3 weeks. Completed 1 cycle. Stopped for progression (med nodes, slight increase in lung mets and stable R breast mass + axillary nodes).    -1/23/2023 - present: Carbo AUC 5, Gemcitabine 800 mg/m2 and Pembro every 21 days  -cycles 1-2: [place] day 1 only  -Cycle 3: [place] increased to days 1 + 8 given excellent tolerance  -CT after 3 cycles showed excellent response (near CR axillary + med nodes and IN in lung mets; clinical improvement in right breast changes and tumor marker).  -PET  scan after 6 cycles of carboplatin gemcitabine pembrolizumab shows complete response    5/30 - 8/22/2023: Maintenance pembrolizumab, until progression (recurrent axillary, thoracic nodes and 2 punctate brain lesions).  Biopsy of left axillary node confirmed recurrent/metastatic breast cancer.    9/12/2023 - present:  Sacituzumab days 1, 8 every 21 days  --C2D8 deferred x 1 week for neutropenia  --Dose decreased to 8 mg/kg from cycle 3 onwards.  --Cycles 5-10 were delivered in [place] over winter months  --Resumed Cycle 11 here upon return 4/2024 7/9/2024-after 14 cycles of sacituzumab to be taken, PET scan showed progressive disease.  Treatment was stopped.  Switched to single agent docetaxel.      Physical Exam    GENERAL: Alert and oriented to time place and person. Seated comfortably. In no distress.  Accompanied by her   HEENT: Complete alopecia. No icterus.   Lymph: ?  Palpable left axillary lymphadenopathy  Heart: RRR  Lungs: regular respiratory effort. Clear lung sounds bilaterally.   Abd: Soft, non-distended.   Neuro: alert and oriented x 3      Lab Results  Recent Results (from the past 168 hour(s))   Comprehensive metabolic panel    Collection Time: 07/23/24  7:42 AM   Result Value Ref Range    Sodium 139 135 - 145 mmol/L    Potassium 3.4 3.4 - 5.3 mmol/L    Carbon Dioxide (CO2) 26 22 - 29 mmol/L    Anion Gap 12 7 - 15 mmol/L    Urea Nitrogen 17.4 8.0 - 23.0 mg/dL    Creatinine 0.60 0.51 - 0.95 mg/dL    GFR Estimate 89 >60 mL/min/1.73m2    Calcium 9.1 8.8 - 10.4 mg/dL    Chloride 101 98 - 107 mmol/L    Glucose 127 (H) 70 - 99 mg/dL    Alkaline Phosphatase 72 40 - 150 U/L    AST 18 0 - 45 U/L    ALT 15 0 - 50 U/L    Protein Total 6.7 6.4 - 8.3 g/dL    Albumin 3.7 3.5 - 5.2 g/dL    Bilirubin Total 0.4 <=1.2 mg/dL   CBC with platelets and differential    Collection Time: 07/23/24  7:42 AM   Result Value Ref Range    WBC Count 11.8 (H) 4.0 - 11.0 10e3/uL    RBC Count 4.08 3.80 - 5.20 10e6/uL     Hemoglobin 11.8 11.7 - 15.7 g/dL    Hematocrit 37.5 35.0 - 47.0 %    MCV 92 78 - 100 fL    MCH 28.9 26.5 - 33.0 pg    MCHC 31.5 31.5 - 36.5 g/dL    RDW 18.3 (H) 10.0 - 15.0 %    Platelet Count 268 150 - 450 10e3/uL    % Neutrophils 66 %    % Lymphocytes 23 %    % Monocytes 8 %    % Eosinophils 2 %    % Basophils 0 %    % Immature Granulocytes 1 %    NRBCs per 100 WBC 0 <1 /100    Absolute Neutrophils 7.8 1.6 - 8.3 10e3/uL    Absolute Lymphocytes 2.7 0.8 - 5.3 10e3/uL    Absolute Monocytes 0.9 0.0 - 1.3 10e3/uL    Absolute Eosinophils 0.2 0.0 - 0.7 10e3/uL    Absolute Basophils 0.1 0.0 - 0.2 10e3/uL    Absolute Immature Granulocytes 0.1 <=0.4 10e3/uL    Absolute NRBCs 0.0 10e3/uL           Assessment/Plan  Recurrent, stage IV triple negative right breast cancer (nodes, lung); CPS>20% + TPS 60%  Mild intermittent peripheral neuropathy fingertips (gr 1)  Punctate brain lesions concerning for metastatic disease (asymptomatic), resolved on chemo  Grade 1 chemotherapy-induced diarrhea  Grade 3 chemotherapy-induced neutropenia    Now has progressed on third line sacituzumab to be taken.  Here to start docetaxel for metastatic triple negative breast cancer.  She mainly has disease in the chest and neck area.  Developed diffuse pneumonitis in both lungs which was thought to be secondary to sacituzumab.  I gave her 2 weeks of prednisone 40 mg daily.  Looks like she has done really well on this.  Her fatigue has significantly improved.  Denies any shortness of breath or cough.  Here with repeat labs and to start docetaxel chemo.  I again reviewed the rationale behind using docetaxel and its potential side effects and complications in detail.  She has consented to proceed with docetaxel.  She understands the risks.  She has mild peripheral neuropathy which is probably grade 1.  Continue to watch this.She will need dexamethasone 8 mg twice daily starting tonight for 3 doses.  She will stop prednisone today.    Will see her back  "in 1 week for toxicity check with repeat labs.  Her CBC today shows Improvement in hemoglobin is currently at 11.8.  Normal platelet count.  Total white count is elevated which is probably secondary to steroids.  No contraindications to proceed with docetaxel today.  Serum chemistry is also within normal limits.  I have already reduced the dose of docetaxel to 60 mg/m .  Will see how she does with it.  Plan is to repeat a PET scan after 3 cycles.    Hypertension  Her blood pressure has been on the higher side.   176 systolic today.  She stopped taking losartan and hydrochlorothiazide.  I asked her to restart it and watch her blood pressures closely at home.  Avoid excessive salt intake.    Billing:  A total of 40 min was spent today on this visit which included face to face conversation with the patient, EMR review, counseling and co-ordination of care and medical documentation.    The longitudinal plan of care for the diagnosis(es)/condition(s) as documented were addressed during this visit. Due to the added complexity in care, I will continue to support Precious in the subsequent management and with ongoing continuity of care.    Signed by:   Maricarmen Beebe MD  Hematology and Medical Oncology  [facility]          Oncology Rooming Note    July 23, 2024 7:53 AM   Precious Paris is a 83 year old female who presents for:    Chief Complaint   Patient presents with     Oncology Clinic Visit     Primary malignant neoplasm of breast with metastasis - labs, provider, infusion     Initial Vitals: BP (!) 176/66 (BP Location: Left arm, Patient Position: Sitting, Cuff Size: Adult Large)   Pulse 70   Temp 98.4  F (36.9  C) (Tympanic)   Ht 1.638 m (5' 4.5\")   Wt 83.5 kg (184 lb 1.6 oz)   SpO2 97%   BMI 31.11 kg/m   Estimated body mass index is 31.11 kg/m  as calculated from the following:    Height as of this encounter: 1.638 m (5' 4.5\").    Weight as of this encounter: 83.5 kg (184 lb 1.6 oz). " Body surface area is 1.95 meters squared.  No Pain (0) Comment: Data Unavailable   No LMP recorded. Patient is postmenopausal.  Allergies reviewed: Yes  Medications reviewed: Yes    Medications: Medication refills not needed today.  [facility]arm[facility] entered into [facility] [place] [place] [facility] [id number] [facility]    Frailty Screening:   Is the patient here for a new oncology consult visit in cancer care? 2. No      Clinical concerns: None today.       Erin Thomas MA                Again, thank you for allowing me to participate in the care of your patient.        Sincerely,        Maricarmen Beebe MD

## 2024-07-23 NOTE — PROGRESS NOTES
Infusion Nursing Note:  Precious Paris presents today for Taxotere C1D1.    Patient seen by provider today: Yes: Dr. Beebe   present during visit today: Not Applicable.    Note: N/A.    Intravenous Access:  Implanted Port.    Treatment Conditions:  Lab Results   Component Value Date    HGB 11.8 07/23/2024    WBC 11.8 (H) 07/23/2024    ANEU 0.8 (L) 10/10/2023    ANEUTAUTO 7.8 07/23/2024     07/23/2024        Results reviewed, labs MET treatment parameters, ok to proceed with treatment.    Post Infusion Assessment:  Patient tolerated infusion without incident.  Blood return noted pre and post infusion.  Site patent and intact, free from redness, edema or discomfort.  No evidence of extravasations.  Access discontinued per protocol.     Discharge Plan:   Patient discharged in stable condition accompanied by: self.  Departure Mode: Ambulatory.    Mario Worley RN

## 2024-07-23 NOTE — PROGRESS NOTES
[facility] Hematology and Oncology Progress Note    Patient: Precious Paris  MRN: [id number]  Jul 23, 2024          Reason for Visit    Recurrent stage IV triple negative breast cancer (nodes, lung) (PDL1+)    Primary Oncologist: Dr. Beebe    _____________________________________________________________________________    History of Present Illness/ Interval History    Ms. Precious Paris is a 83 year old with recurrent metastatic triple negative breast cancer recurrence. She initiated 3rd line sacituzumab in September 2023.     Recently had evidence of disease progression.  Now has switched to docetaxel.  Here to start cycle 1.  Previous PET scan also showed evidence of pneumonitis with FDG avid and progressed opacities in both lungs.  We were not sure whether this represented pneumonitis from sacituzumab or some other process.  I gave her a trial of prednisone 40 mg daily for 2 weeks.  Looks like she has done well on this.  Denies any significant shortness of breath today.  Here with repeat labs.    She has felt a lot better while on prednisone.  Denies any new issues today.      ECOG PS: 1      Oncology History/Treatment  Diagnosis/Stage:   11/2019: Right breast cancer, triple negative ([id number])    BRCA negative    10/2022: Recurrent, stage IV triple negative breast cancer (bilateral axillary, supraclav, mediastinal nodes + lung)  -persistent/progressive right breast firmness with new right nipple drainage and right axillary adenopathy  -bilateral diagnostic mammogram: increased density R breast with skin thickening. R breast US: 3.2 cm hypoechoic mass at 11:00 position 1 cm from nipple and multiple other small hypoechoic solid-appearing lesions in R breast + right axillary adenopathy (at least two hypoechoic vascular lesions measuring up to 2.5 cm)  -10/18/2022 biopsy R breast mass + R axillary node: grade 3 invasive ductal cancer with tumor necrosis, ER/OR/HER2 negative.   -PET: +  right breast mass; bilateral axillary, retropectoral, supraclavicular, mediastinal/R hilar nodes and bilateral lung/R pleural nodules suspicious for mets.  -Brain MRI: negative for mets  -11/1/2022 biopsy L axillary node: metastatic invasive ductal carcinoma  -Foundation One: no actionable mutations MS stable, TMB 1 mut/mb.   -PDL1 testing: CPS >20% and TPS 60% (considered high PDL1 expression)  -CA 27.29: 59.5 (elevated). CA 15-3: 23 (normal)  -9/10/2023 Brain MRI: 2 new foci of punctate enhancement in left frontal cortex and right lentiform nucleus, concerning for brain metastases (asymptomatic)    NGS (foundation 1)  FGF R1 amplification.  Pazopanib has been approved in other tumors but not in breast cancer.  NTRK 1 amplification.  Not actionable  She has multiple other mutations which are not actionable.      Treatment:  Initial breast cancer (2019)  -2019: Neoadjuvant taxol  -Lumpectomy  -5/2020: Adjuvant RT  -6 - 7/2020: Adjuvant Xeloda. Stopped for rash    Recurrent, stage IV breast cancer (2022)  -11/14/2022:Doxil every 4 weeks  -12/12/2022: changed to doxorubicin every 3 weeks. (Planning addition of pembrolizumab once approved by insurance)  -1/2/2023: doxorubicin + pembrolizumab every 3 weeks. Completed 1 cycle. Stopped for progression (med nodes, slight increase in lung mets and stable R breast mass + axillary nodes).    -1/23/2023 - present: Carbo AUC 5, Gemcitabine 800 mg/m2 and Pembro every 21 days  -cycles 1-2: [place] day 1 only  -Cycle 3: [place] increased to days 1 + 8 given excellent tolerance  -CT after 3 cycles showed excellent response (near CR axillary + med nodes and HI in lung mets; clinical improvement in right breast changes and tumor marker).  -PET scan after 6 cycles of carboplatin gemcitabine pembrolizumab shows complete response    5/30 - 8/22/2023: Maintenance pembrolizumab, until progression (recurrent axillary, thoracic nodes and 2 punctate brain lesions).  Biopsy of left axillary node  confirmed recurrent/metastatic breast cancer.    9/12/2023 - present:  Sacituzumab days 1, 8 every 21 days  --C2D8 deferred x 1 week for neutropenia  --Dose decreased to 8 mg/kg from cycle 3 onwards.  --Cycles 5-10 were delivered in [place] over winter months  --Resumed Cycle 11 here upon return 4/2024 7/9/2024-after 14 cycles of sacituzumab to be taken, PET scan showed progressive disease.  Treatment was stopped.  Switched to single agent docetaxel.      Physical Exam    GENERAL: Alert and oriented to time place and person. Seated comfortably. In no distress.  Accompanied by her   HEENT: Complete alopecia. No icterus.   Lymph: ?  Palpable left axillary lymphadenopathy  Heart: RRR  Lungs: regular respiratory effort. Clear lung sounds bilaterally.   Abd: Soft, non-distended.   Neuro: alert and oriented x 3      Lab Results  Recent Results (from the past 168 hour(s))   Comprehensive metabolic panel    Collection Time: 07/23/24  7:42 AM   Result Value Ref Range    Sodium 139 135 - 145 mmol/L    Potassium 3.4 3.4 - 5.3 mmol/L    Carbon Dioxide (CO2) 26 22 - 29 mmol/L    Anion Gap 12 7 - 15 mmol/L    Urea Nitrogen 17.4 8.0 - 23.0 mg/dL    Creatinine 0.60 0.51 - 0.95 mg/dL    GFR Estimate 89 >60 mL/min/1.73m2    Calcium 9.1 8.8 - 10.4 mg/dL    Chloride 101 98 - 107 mmol/L    Glucose 127 (H) 70 - 99 mg/dL    Alkaline Phosphatase 72 40 - 150 U/L    AST 18 0 - 45 U/L    ALT 15 0 - 50 U/L    Protein Total 6.7 6.4 - 8.3 g/dL    Albumin 3.7 3.5 - 5.2 g/dL    Bilirubin Total 0.4 <=1.2 mg/dL   CBC with platelets and differential    Collection Time: 07/23/24  7:42 AM   Result Value Ref Range    WBC Count 11.8 (H) 4.0 - 11.0 10e3/uL    RBC Count 4.08 3.80 - 5.20 10e6/uL    Hemoglobin 11.8 11.7 - 15.7 g/dL    Hematocrit 37.5 35.0 - 47.0 %    MCV 92 78 - 100 fL    MCH 28.9 26.5 - 33.0 pg    MCHC 31.5 31.5 - 36.5 g/dL    RDW 18.3 (H) 10.0 - 15.0 %    Platelet Count 268 150 - 450 10e3/uL    % Neutrophils 66 %    % Lymphocytes  23 %    % Monocytes 8 %    % Eosinophils 2 %    % Basophils 0 %    % Immature Granulocytes 1 %    NRBCs per 100 WBC 0 <1 /100    Absolute Neutrophils 7.8 1.6 - 8.3 10e3/uL    Absolute Lymphocytes 2.7 0.8 - 5.3 10e3/uL    Absolute Monocytes 0.9 0.0 - 1.3 10e3/uL    Absolute Eosinophils 0.2 0.0 - 0.7 10e3/uL    Absolute Basophils 0.1 0.0 - 0.2 10e3/uL    Absolute Immature Granulocytes 0.1 <=0.4 10e3/uL    Absolute NRBCs 0.0 10e3/uL           Assessment/Plan  Recurrent, stage IV triple negative right breast cancer (nodes, lung); CPS>20% + TPS 60%  Mild intermittent peripheral neuropathy fingertips (gr 1)  Punctate brain lesions concerning for metastatic disease (asymptomatic), resolved on chemo  Grade 1 chemotherapy-induced diarrhea  Grade 3 chemotherapy-induced neutropenia    Now has progressed on third line sacituzumab to be taken.  Here to start docetaxel for metastatic triple negative breast cancer.  She mainly has disease in the chest and neck area.  Developed diffuse pneumonitis in both lungs which was thought to be secondary to sacituzumab.  I gave her 2 weeks of prednisone 40 mg daily.  Looks like she has done really well on this.  Her fatigue has significantly improved.  Denies any shortness of breath or cough.  Here with repeat labs and to start docetaxel chemo.  I again reviewed the rationale behind using docetaxel and its potential side effects and complications in detail.  She has consented to proceed with docetaxel.  She understands the risks.  She has mild peripheral neuropathy which is probably grade 1.  Continue to watch this.She will need dexamethasone 8 mg twice daily starting tonight for 3 doses.  She will stop prednisone today.    Will see her back in 1 week for toxicity check with repeat labs.  Her CBC today shows Improvement in hemoglobin is currently at 11.8.  Normal platelet count.  Total white count is elevated which is probably secondary to steroids.  No contraindications to proceed with  docetaxel today.  Serum chemistry is also within normal limits.  I have already reduced the dose of docetaxel to 60 mg/m .  Will see how she does with it.  Plan is to repeat a PET scan after 3 cycles.    Hypertension  Her blood pressure has been on the higher side.   176 systolic today.  She stopped taking losartan and hydrochlorothiazide.  I asked her to restart it and watch her blood pressures closely at home.  Avoid excessive salt intake.    Billing:  A total of 40 min was spent today on this visit which included face to face conversation with the patient, EMR review, counseling and co-ordination of care and medical documentation.    The longitudinal plan of care for the diagnosis(es)/condition(s) as documented were addressed during this visit. Due to the added complexity in care, I will continue to support Precious in the subsequent management and with ongoing continuity of care.    Signed by:   Maricarmen Beebe MD  Hematology and Medical Oncology  [facility]

## 2024-07-24 ENCOUNTER — PATIENT OUTREACH (OUTPATIENT)
Dept: ONCOLOGY | Facility: CLINIC | Age: 84
End: 2024-07-24
Payer: COMMERCIAL

## 2024-07-24 LAB — CANCER AG27-29 SERPL-ACNC: 53.8 U/ML

## 2024-07-25 ENCOUNTER — PATIENT OUTREACH (OUTPATIENT)
Dept: ONCOLOGY | Facility: CLINIC | Age: 84
End: 2024-07-25
Payer: COMMERCIAL

## 2024-07-25 NOTE — PROGRESS NOTES
[facility]: Cancer Care                                                                                          Called pt today to see how she was doing after starting Docetaxel on 07.23. Pt reports minimal issues thus far. She denies any nausea, bowel or bladder issues. She is eating and drinking normally.     Pt did have some shoulder pain last night that was relieved by tylenol and no pain today.    She will call if anything comes up and is scheduled to see Lazara on tuesday.    Signature:  Patrica Perla RN

## 2024-07-30 ENCOUNTER — LAB (OUTPATIENT)
Dept: INFUSION THERAPY | Facility: CLINIC | Age: 84
End: 2024-07-30
Attending: NURSE PRACTITIONER
Payer: COMMERCIAL

## 2024-07-30 ENCOUNTER — ONCOLOGY VISIT (OUTPATIENT)
Dept: ONCOLOGY | Facility: CLINIC | Age: 84
End: 2024-07-30
Attending: NURSE PRACTITIONER
Payer: COMMERCIAL

## 2024-07-30 VITALS
OXYGEN SATURATION: 97 % | BODY MASS INDEX: 30.16 KG/M2 | WEIGHT: 181 LBS | HEART RATE: 79 BPM | HEIGHT: 65 IN | SYSTOLIC BLOOD PRESSURE: 134 MMHG | DIASTOLIC BLOOD PRESSURE: 64 MMHG | RESPIRATION RATE: 12 BRPM | TEMPERATURE: 98.8 F

## 2024-07-30 DIAGNOSIS — T45.1X5A CHEMOTHERAPY-INDUCED NEUTROPENIA (H): ICD-10-CM

## 2024-07-30 DIAGNOSIS — C50.411 MALIGNANT NEOPLASM OF UPPER-OUTER QUADRANT OF RIGHT BREAST IN FEMALE, ESTROGEN RECEPTOR NEGATIVE (H): Primary | ICD-10-CM

## 2024-07-30 DIAGNOSIS — R35.0 URINARY FREQUENCY: ICD-10-CM

## 2024-07-30 DIAGNOSIS — C50.911 INFILTRATING DUCTAL CARCINOMA OF RIGHT BREAST (H): Primary | ICD-10-CM

## 2024-07-30 DIAGNOSIS — Z17.1 MALIGNANT NEOPLASM OF UPPER-OUTER QUADRANT OF RIGHT BREAST IN FEMALE, ESTROGEN RECEPTOR NEGATIVE (H): ICD-10-CM

## 2024-07-30 DIAGNOSIS — Z17.1 MALIGNANT NEOPLASM OF UPPER-OUTER QUADRANT OF RIGHT BREAST IN FEMALE, ESTROGEN RECEPTOR NEGATIVE (H): Primary | ICD-10-CM

## 2024-07-30 DIAGNOSIS — D70.1 CHEMOTHERAPY-INDUCED NEUTROPENIA (H): ICD-10-CM

## 2024-07-30 DIAGNOSIS — C50.411 MALIGNANT NEOPLASM OF UPPER-OUTER QUADRANT OF RIGHT BREAST IN FEMALE, ESTROGEN RECEPTOR NEGATIVE (H): ICD-10-CM

## 2024-07-30 LAB
ALBUMIN SERPL BCG-MCNC: 3.5 G/DL (ref 3.5–5.2)
ALBUMIN UR-MCNC: NEGATIVE MG/DL
ALP SERPL-CCNC: 73 U/L (ref 40–150)
ALT SERPL W P-5'-P-CCNC: 18 U/L (ref 0–50)
ANION GAP SERPL CALCULATED.3IONS-SCNC: 12 MMOL/L (ref 7–15)
APPEARANCE UR: ABNORMAL
AST SERPL W P-5'-P-CCNC: 24 U/L (ref 0–45)
BASOPHILS # BLD AUTO: 0.1 10E3/UL (ref 0–0.2)
BASOPHILS NFR BLD AUTO: 2 %
BILIRUB SERPL-MCNC: 0.9 MG/DL
BILIRUB UR QL STRIP: NEGATIVE
BUN SERPL-MCNC: 10.5 MG/DL (ref 8–23)
CALCIUM SERPL-MCNC: 8.8 MG/DL (ref 8.8–10.4)
CHLORIDE SERPL-SCNC: 98 MMOL/L (ref 98–107)
COLOR UR AUTO: YELLOW
CREAT SERPL-MCNC: 0.59 MG/DL (ref 0.51–0.95)
EGFRCR SERPLBLD CKD-EPI 2021: 89 ML/MIN/1.73M2
EOSINOPHIL # BLD AUTO: 0 10E3/UL (ref 0–0.7)
EOSINOPHIL NFR BLD AUTO: 1 %
ERYTHROCYTE [DISTWIDTH] IN BLOOD BY AUTOMATED COUNT: 18.2 % (ref 10–15)
GLUCOSE SERPL-MCNC: 125 MG/DL (ref 70–99)
GLUCOSE UR STRIP-MCNC: NEGATIVE MG/DL
HCO3 SERPL-SCNC: 24 MMOL/L (ref 22–29)
HCT VFR BLD AUTO: 32.3 % (ref 35–47)
HGB BLD-MCNC: 10.4 G/DL (ref 11.7–15.7)
HGB UR QL STRIP: NEGATIVE
IMM GRANULOCYTES # BLD: 0.1 10E3/UL
IMM GRANULOCYTES NFR BLD: 2 %
KETONES UR STRIP-MCNC: NEGATIVE MG/DL
LEUKOCYTE ESTERASE UR QL STRIP: ABNORMAL
LYMPHOCYTES # BLD AUTO: 0.8 10E3/UL (ref 0.8–5.3)
LYMPHOCYTES NFR BLD AUTO: 27 %
MCH RBC QN AUTO: 28.5 PG (ref 26.5–33)
MCHC RBC AUTO-ENTMCNC: 32.2 G/DL (ref 31.5–36.5)
MCV RBC AUTO: 89 FL (ref 78–100)
MONOCYTES # BLD AUTO: 0.6 10E3/UL (ref 0–1.3)
MONOCYTES NFR BLD AUTO: 20 %
MUCOUS THREADS #/AREA URNS LPF: PRESENT /LPF
NEUTROPHILS # BLD AUTO: 1.4 10E3/UL (ref 1.6–8.3)
NEUTROPHILS NFR BLD AUTO: 48 %
NITRATE UR QL: NEGATIVE
NRBC # BLD AUTO: 0 10E3/UL
NRBC BLD AUTO-RTO: 1 /100
PH UR STRIP: 6 [PH] (ref 5–7)
PLATELET # BLD AUTO: 179 10E3/UL (ref 150–450)
POTASSIUM SERPL-SCNC: 3.8 MMOL/L (ref 3.4–5.3)
PROT SERPL-MCNC: 6.7 G/DL (ref 6.4–8.3)
RBC # BLD AUTO: 3.65 10E6/UL (ref 3.8–5.2)
RBC URINE: 1 /HPF
SODIUM SERPL-SCNC: 134 MMOL/L (ref 135–145)
SP GR UR STRIP: 1.01 (ref 1–1.03)
SQUAMOUS EPITHELIAL: 3 /HPF
UROBILINOGEN UR STRIP-MCNC: NORMAL MG/DL
WBC # BLD AUTO: 2.9 10E3/UL (ref 4–11)
WBC URINE: 1 /HPF

## 2024-07-30 PROCEDURE — 81003 URINALYSIS AUTO W/O SCOPE: CPT | Performed by: NURSE PRACTITIONER

## 2024-07-30 PROCEDURE — 36591 DRAW BLOOD OFF VENOUS DEVICE: CPT

## 2024-07-30 PROCEDURE — 99214 OFFICE O/P EST MOD 30 MIN: CPT | Performed by: NURSE PRACTITIONER

## 2024-07-30 PROCEDURE — 85025 COMPLETE CBC W/AUTO DIFF WBC: CPT | Performed by: INTERNAL MEDICINE

## 2024-07-30 PROCEDURE — 80053 COMPREHEN METABOLIC PANEL: CPT | Performed by: INTERNAL MEDICINE

## 2024-07-30 PROCEDURE — G0463 HOSPITAL OUTPT CLINIC VISIT: HCPCS | Performed by: NURSE PRACTITIONER

## 2024-07-30 PROCEDURE — 250N000011 HC RX IP 250 OP 636: Performed by: NURSE PRACTITIONER

## 2024-07-30 PROCEDURE — G2211 COMPLEX E/M VISIT ADD ON: HCPCS | Performed by: NURSE PRACTITIONER

## 2024-07-30 RX ORDER — HEPARIN SODIUM (PORCINE) LOCK FLUSH IV SOLN 100 UNIT/ML 100 UNIT/ML
SOLUTION INTRAVENOUS
Status: DISCONTINUED
Start: 2024-07-30 | End: 2024-07-30 | Stop reason: HOSPADM

## 2024-07-30 RX ORDER — ONDANSETRON 2 MG/ML
8 INJECTION INTRAMUSCULAR; INTRAVENOUS ONCE
Start: 2024-07-30 | End: 2024-07-30

## 2024-07-30 RX ORDER — HEPARIN SODIUM (PORCINE) LOCK FLUSH IV SOLN 100 UNIT/ML 100 UNIT/ML
5 SOLUTION INTRAVENOUS
Status: DISCONTINUED | OUTPATIENT
Start: 2024-07-30 | End: 2024-07-30 | Stop reason: HOSPADM

## 2024-07-30 RX ORDER — HEPARIN SODIUM (PORCINE) LOCK FLUSH IV SOLN 100 UNIT/ML 100 UNIT/ML
5 SOLUTION INTRAVENOUS
OUTPATIENT
Start: 2024-07-30

## 2024-07-30 RX ORDER — HEPARIN SODIUM,PORCINE 10 UNIT/ML
5-20 VIAL (ML) INTRAVENOUS DAILY PRN
OUTPATIENT
Start: 2024-07-30

## 2024-07-30 RX ORDER — SODIUM CHLORIDE, SODIUM LACTATE, POTASSIUM CHLORIDE, CALCIUM CHLORIDE 600; 310; 30; 20 MG/100ML; MG/100ML; MG/100ML; MG/100ML
INJECTION, SOLUTION INTRAVENOUS ONCE
Start: 2024-07-30 | End: 2024-07-30

## 2024-07-30 RX ADMIN — Medication 5 ML: at 08:31

## 2024-07-30 ASSESSMENT — PAIN SCALES - GENERAL: PAINLEVEL: NO PAIN (0)

## 2024-07-30 NOTE — LETTER
7/30/2024      Precious Paris  [address] [place] [address]      Dear Colleague,    Thank you for referring your patient, Precious Paris, to the [facility]. Please see a copy of my visit note below.        [facility] Hematology and Oncology Progress Note    Patient: Precious Paris  MRN: [id number]  Jul 30, 2024          Reason for Visit    Recurrent stage IV triple negative breast cancer (nodes, lung) (PDL1+)    Primary Oncologist: Dr. Beebe    _____________________________________________________________________________    History of Present Illness/ Interval History    Ms. Precious Paris is a 83 year old with recurrent metastatic triple negative breast cancer recurrence. She started 4th line docetaxel last week. Returns for mid-cycle 1 toxicity eval.     Tolerating docetaxel generally well.   Notes stable fatigue. A few days after starting chemo, has intermittent/occasional pains in arm and neck, usually in left arm. Night sweats, no fevers. Some nausea last night for the first time, took Zofran with benefit. No diarrhea. Mild neuropathy in fingers stable, none in feet.    She had pneumonitis (bilateral lung opacities) on her last 2 PET scans. Treated with prednisone x 2 weeks, completed in mid-July. Improved symptoms. No new dyspnea, cough, fever.     1-mth hx urinary frequency and urgency overnight. No dysuria, hematuria.       ECOG PS: 1      Oncology History/Treatment  Diagnosis/Stage:   11/2019: Right breast cancer, triple negative ([id number])    BRCA negative    10/2022: Recurrent, stage IV triple negative breast cancer (bilateral axillary, supraclav, mediastinal nodes + lung)  -persistent/progressive right breast firmness with new right nipple drainage and right axillary adenopathy  -bilateral diagnostic mammogram: increased density R breast with skin thickening. R breast US: 3.2 cm hypoechoic mass at 11:00 position 1 cm from nipple and  multiple other small hypoechoic solid-appearing lesions in R breast + right axillary adenopathy (at least two hypoechoic vascular lesions measuring up to 2.5 cm)  -10/18/2022 biopsy R breast mass + R axillary node: grade 3 invasive ductal cancer with tumor necrosis, ER/NJ/HER2 negative.   -PET: + right breast mass; bilateral axillary, retropectoral, supraclavicular, mediastinal/R hilar nodes and bilateral lung/R pleural nodules suspicious for mets.  -Brain MRI: negative for mets  -11/1/2022 biopsy L axillary node: metastatic invasive ductal carcinoma  -Foundation One: no actionable mutations MS stable, TMB 1 mut/mb.   -PDL1 testing: CPS >20% and TPS 60% (considered high PDL1 expression)  -CA 27.29: 59.5 (elevated). CA 15-3: 23 (normal)  -9/10/2023 Brain MRI: 2 new foci of punctate enhancement in left frontal cortex and right lentiform nucleus, concerning for brain metastases (asymptomatic)    NGS (foundation 1)  FGF R1 amplification.  Pazopanib has been approved in other tumors but not in breast cancer.  NTRK 1 amplification.  Not actionable  She has multiple other mutations which are not actionable.      Treatment:  Initial breast cancer (2019)  -2019: Neoadjuvant taxol  -Lumpectomy  -5/2020: Adjuvant RT  -6 - 7/2020: Adjuvant Xeloda. Stopped for rash    Recurrent, stage IV breast cancer (2022)  -11/14/2022:Doxil every 4 weeks  -12/12/2022: changed to doxorubicin every 3 weeks. (Planning addition of pembrolizumab once approved by insurance)  -1/2/2023: doxorubicin + pembrolizumab every 3 weeks. Completed 1 cycle. Stopped for progression (med nodes, slight increase in lung mets and stable R breast mass + axillary nodes).    -1/23/2023 - present: Carbo AUC 5, Gemcitabine 800 mg/m2 and Pembro every 21 days  -cycles 1-2: Rutherford day 1 only  -Cycle 3: Rutherford increased to days 1 + 8 given excellent tolerance  -CT after 3 cycles showed excellent response (near CR axillary + med nodes and NJ in lung mets; clinical improvement in  right breast changes and tumor marker).  -PET scan after 6 cycles of carboplatin gemcitabine pembrolizumab shows complete response    5/30 - 8/22/2023: Maintenance pembrolizumab, until progression (recurrent axillary, thoracic nodes and 2 punctate brain lesions).  Biopsy of left axillary node confirmed recurrent/metastatic breast cancer.    9/12/2023 - 6/18/2024:  Sacituzumab days 1, 8 every 21 days (x 14 cycles). Stopped for progression.  --C2D8 deferred x 1 week for neutropenia  --Dose decreased to 8 mg/kg from cycle 3 onwards.  --Cycles 5-10 were delivered in [place] over winter months  --Resumed Cycle 11 here upon return 4/2024  --After cycle 14, PET showed progressive disease.     7/23/2024 - present: docetaxel 60 mg/m2      Physical Exam    GENERAL: Alert and oriented to time place and person. Seated comfortably. In no distress.  Alone.   HEENT: Complete alopecia. No icterus.   Lymph: Not assessed today.   Lungs: regular respiratory effort.   Abd: Soft, non-distended.   Neuro: alert and oriented x 3      Lab Results  Recent Results (from the past 168 hour(s))   Comprehensive metabolic panel (BMP + Alb, Alk Phos, ALT, AST, Total. Bili, TP)    Collection Time: 07/30/24  8:25 AM   Result Value Ref Range    Sodium 134 (L) 135 - 145 mmol/L    Potassium 3.8 3.4 - 5.3 mmol/L    Carbon Dioxide (CO2) 24 22 - 29 mmol/L    Anion Gap 12 7 - 15 mmol/L    Urea Nitrogen 10.5 8.0 - 23.0 mg/dL    Creatinine 0.59 0.51 - 0.95 mg/dL    GFR Estimate 89 >60 mL/min/1.73m2    Calcium 8.8 8.8 - 10.4 mg/dL    Chloride 98 98 - 107 mmol/L    Glucose 125 (H) 70 - 99 mg/dL    Alkaline Phosphatase 73 40 - 150 U/L    AST 24 0 - 45 U/L    ALT 18 0 - 50 U/L    Protein Total 6.7 6.4 - 8.3 g/dL    Albumin 3.5 3.5 - 5.2 g/dL    Bilirubin Total 0.9 <=1.2 mg/dL   CBC with platelets and differential    Collection Time: 07/30/24  8:25 AM   Result Value Ref Range    WBC Count 2.9 (L) 4.0 - 11.0 10e3/uL    RBC Count 3.65 (L) 3.80 - 5.20 10e6/uL     Hemoglobin 10.4 (L) 11.7 - 15.7 g/dL    Hematocrit 32.3 (L) 35.0 - 47.0 %    MCV 89 78 - 100 fL    MCH 28.5 26.5 - 33.0 pg    MCHC 32.2 31.5 - 36.5 g/dL    RDW 18.2 (H) 10.0 - 15.0 %    Platelet Count 179 150 - 450 10e3/uL    % Neutrophils 48 %    % Lymphocytes 27 %    % Monocytes 20 %    % Eosinophils 1 %    % Basophils 2 %    % Immature Granulocytes 2 %    NRBCs per 100 WBC 1 (H) <1 /100    Absolute Neutrophils 1.4 (L) 1.6 - 8.3 10e3/uL    Absolute Lymphocytes 0.8 0.8 - 5.3 10e3/uL    Absolute Monocytes 0.6 0.0 - 1.3 10e3/uL    Absolute Eosinophils 0.0 0.0 - 0.7 10e3/uL    Absolute Basophils 0.1 0.0 - 0.2 10e3/uL    Absolute Immature Granulocytes 0.1 <=0.4 10e3/uL    Absolute NRBCs 0.0 10e3/uL           Assessment/Plan  Recurrent, stage IV triple negative right breast cancer (nodes, lung); CPS>20% + TPS 60%  Mild intermittent peripheral neuropathy fingertips (gr 1)  Punctate brain lesions concerning for metastatic disease (asymptomatic), resolved on chemo  Grade 1 chemotherapy-induced diarrhea  Chemotherapy-induced neutropenia  Recently, progressed on sacituzumab (neck, chest and upper abd adenopathy).     Therefore, regimen changed to docetaxel (60 mg/m2) as 4th line last week.   Tolerating her 1st cycle pretty well with arthralgias, mild nausea and night sweats (no fever).  Baseline neuropathy going into this is stable.    Labs: Hgb 10.4, WBC 2.9, ANC 1.4, plat WNL. CMP unremarkable.    Plan:  -Return in 2 weeks, ahead of cycle 2.  -Recommended Claritin daily starting of chemo x 7-10 days next cycle to minimize chemo-induced arthralgias. Tylenol as needed is favored; rare/minimal use of NSAIDs on chemo  -Neutropenic precautions/when to seek emergent attention reviewed  -Plan is to repeat a PET scan after 3 cycles.  -Will have cataract surgery after her 2nd cycle of chemo. Suggested checking her CBC 1-2 days prior to the surgery to ensure adequate platelets/ANC    Urinary frequency/urgency  1-mth. Occurs  "primarily overnight.    Plan:  -UA/UC today. Call pt with results, treat if indicated.  -If UA negative, discuss with PCP if persistent.    Pneumonitis  Bilateral opacities noted in both lungs on last 2 PET scans. Indeterminate etiology (?drug-induced, other). Treated with 2 weeks prednisone in early July. Had improved fatigue. No recurrent symptoms.    HTN  BP was elevated after she had stopped taking her losartan-hydrochlorothiazide. She has not yet resumed this. BP has been running a bit high on occasion at appts.     Plan:  -Reminded her to resume losartan. Monitor BP at home     Billing:  Total time 35 minutes, to include face to face visit, review of EMR, ordering, documentation and coordination of care on date of service.    complexity modifier for longitudinal care.       Signed by:   Lazara Torres NP          Oncology Rooming Note    July 30, 2024 8:45 AM   Precious Paris is a 83 year old female who presents for:    Chief Complaint   Patient presents with     Oncology Clinic Visit     Malignant neoplasm metastatic to both lungs - Labs and provider      Initial Vitals: /64 (BP Location: Right arm, Patient Position: Sitting, Cuff Size: Adult Regular)   Pulse 79   Temp 98.8  F (37.1  C) (Tympanic)   Resp 12   Ht 1.638 m (5' 4.5\")   Wt 82.1 kg (181 lb)   SpO2 97%   BMI 30.59 kg/m   Estimated body mass index is 30.59 kg/m  as calculated from the following:    Height as of this encounter: 1.638 m (5' 4.5\").    Weight as of this encounter: 82.1 kg (181 lb). Body surface area is 1.93 meters squared.  No Pain (0) Comment: Data Unavailable   No LMP recorded. Patient is postmenopausal.  Allergies reviewed: Yes  Medications reviewed: Yes    Medications: Medication refills not needed today.  Pharmacy name entered into [employer]: [facility] [place] [id number] [facility]    Frailty Screening:   Is the patient here for a new oncology consult visit in cancer care? 2. " No      Clinical concerns:  None      Malgorzata Vallejo, AXEL                Again, thank you for allowing me to participate in the care of your patient.        Sincerely,        Lazara Torres, NP

## 2024-07-30 NOTE — PROGRESS NOTES
[facility] Hematology and Oncology Progress Note    Patient: Precious Paris  MRN: [id number]  Jul 30, 2024          Reason for Visit    Recurrent stage IV triple negative breast cancer (nodes, lung) (PDL1+)    Primary Oncologist: Dr. Beebe    _____________________________________________________________________________    History of Present Illness/ Interval History    Ms. Precious Paris is a 83 year old with recurrent metastatic triple negative breast cancer recurrence. She started 4th line docetaxel last week. Returns for mid-cycle 1 toxicity eval.     Tolerating docetaxel generally well.   Notes stable fatigue. A few days after starting chemo, has intermittent/occasional pains in arm and neck, usually in left arm. Night sweats, no fevers. Some nausea last night for the first time, took Zofran with benefit. No diarrhea. Mild neuropathy in fingers stable, none in feet.    She had pneumonitis (bilateral lung opacities) on her last 2 PET scans. Treated with prednisone x 2 weeks, completed in mid-July. Improved symptoms. No new dyspnea, cough, fever.     1-mth hx urinary frequency and urgency overnight. No dysuria, hematuria.       ECOG PS: 1      Oncology History/Treatment  Diagnosis/Stage:   11/2019: Right breast cancer, triple negative ([id number])    BRCA negative    10/2022: Recurrent, stage IV triple negative breast cancer (bilateral axillary, supraclav, mediastinal nodes + lung)  -persistent/progressive right breast firmness with new right nipple drainage and right axillary adenopathy  -bilateral diagnostic mammogram: increased density R breast with skin thickening. R breast US: 3.2 cm hypoechoic mass at 11:00 position 1 cm from nipple and multiple other small hypoechoic solid-appearing lesions in R breast + right axillary adenopathy (at least two hypoechoic vascular lesions measuring up to 2.5 cm)  -10/18/2022 biopsy R breast mass + R axillary node: grade 3 invasive ductal cancer with  tumor necrosis, ER/MS/HER2 negative.   -PET: + right breast mass; bilateral axillary, retropectoral, supraclavicular, mediastinal/R hilar nodes and bilateral lung/R pleural nodules suspicious for mets.  -Brain MRI: negative for mets  -11/1/2022 biopsy L axillary node: metastatic invasive ductal carcinoma  -Foundation One: no actionable mutations MS stable, TMB 1 mut/mb.   -PDL1 testing: CPS >20% and TPS 60% (considered high PDL1 expression)  -CA 27.29: 59.5 (elevated). CA 15-3: 23 (normal)  -9/10/2023 Brain MRI: 2 new foci of punctate enhancement in left frontal cortex and right lentiform nucleus, concerning for brain metastases (asymptomatic)    NGS (foundation 1)  FGF R1 amplification.  Pazopanib has been approved in other tumors but not in breast cancer.  NTRK 1 amplification.  Not actionable  She has multiple other mutations which are not actionable.      Treatment:  Initial breast cancer (2019)  -2019: Neoadjuvant taxol  -Lumpectomy  -5/2020: Adjuvant RT  -6 - 7/2020: Adjuvant Xeloda. Stopped for rash    Recurrent, stage IV breast cancer (2022)  -11/14/2022:Doxil every 4 weeks  -12/12/2022: changed to doxorubicin every 3 weeks. (Planning addition of pembrolizumab once approved by insurance)  -1/2/2023: doxorubicin + pembrolizumab every 3 weeks. Completed 1 cycle. Stopped for progression (med nodes, slight increase in lung mets and stable R breast mass + axillary nodes).    -1/23/2023 - present: Carbo AUC 5, Gemcitabine 800 mg/m2 and Pembro every 21 days  -cycles 1-2: Jackson day 1 only  -Cycle 3: Jackson increased to days 1 + 8 given excellent tolerance  -CT after 3 cycles showed excellent response (near CR axillary + med nodes and MS in lung mets; clinical improvement in right breast changes and tumor marker).  -PET scan after 6 cycles of carboplatin gemcitabine pembrolizumab shows complete response    5/30 - 8/22/2023: Maintenance pembrolizumab, until progression (recurrent axillary, thoracic nodes and 2 punctate  brain lesions).  Biopsy of left axillary node confirmed recurrent/metastatic breast cancer.    9/12/2023 - 6/18/2024:  Sacituzumab days 1, 8 every 21 days (x 14 cycles). Stopped for progression.  --C2D8 deferred x 1 week for neutropenia  --Dose decreased to 8 mg/kg from cycle 3 onwards.  --Cycles 5-10 were delivered in [place] over winter months  --Resumed Cycle 11 here upon return 4/2024  --After cycle 14, PET showed progressive disease.     7/23/2024 - present: docetaxel 60 mg/m2      Physical Exam    GENERAL: Alert and oriented to time place and person. Seated comfortably. In no distress.  Alone.   HEENT: Complete alopecia. No icterus.   Lymph: Not assessed today.   Lungs: regular respiratory effort.   Abd: Soft, non-distended.   Neuro: alert and oriented x 3      Lab Results  Recent Results (from the past 168 hour(s))   Comprehensive metabolic panel (BMP + Alb, Alk Phos, ALT, AST, Total. Bili, TP)    Collection Time: 07/30/24  8:25 AM   Result Value Ref Range    Sodium 134 (L) 135 - 145 mmol/L    Potassium 3.8 3.4 - 5.3 mmol/L    Carbon Dioxide (CO2) 24 22 - 29 mmol/L    Anion Gap 12 7 - 15 mmol/L    Urea Nitrogen 10.5 8.0 - 23.0 mg/dL    Creatinine 0.59 0.51 - 0.95 mg/dL    GFR Estimate 89 >60 mL/min/1.73m2    Calcium 8.8 8.8 - 10.4 mg/dL    Chloride 98 98 - 107 mmol/L    Glucose 125 (H) 70 - 99 mg/dL    Alkaline Phosphatase 73 40 - 150 U/L    AST 24 0 - 45 U/L    ALT 18 0 - 50 U/L    Protein Total 6.7 6.4 - 8.3 g/dL    Albumin 3.5 3.5 - 5.2 g/dL    Bilirubin Total 0.9 <=1.2 mg/dL   CBC with platelets and differential    Collection Time: 07/30/24  8:25 AM   Result Value Ref Range    WBC Count 2.9 (L) 4.0 - 11.0 10e3/uL    RBC Count 3.65 (L) 3.80 - 5.20 10e6/uL    Hemoglobin 10.4 (L) 11.7 - 15.7 g/dL    Hematocrit 32.3 (L) 35.0 - 47.0 %    MCV 89 78 - 100 fL    MCH 28.5 26.5 - 33.0 pg    MCHC 32.2 31.5 - 36.5 g/dL    RDW 18.2 (H) 10.0 - 15.0 %    Platelet Count 179 150 - 450 10e3/uL    % Neutrophils 48 %    %  Lymphocytes 27 %    % Monocytes 20 %    % Eosinophils 1 %    % Basophils 2 %    % Immature Granulocytes 2 %    NRBCs per 100 WBC 1 (H) <1 /100    Absolute Neutrophils 1.4 (L) 1.6 - 8.3 10e3/uL    Absolute Lymphocytes 0.8 0.8 - 5.3 10e3/uL    Absolute Monocytes 0.6 0.0 - 1.3 10e3/uL    Absolute Eosinophils 0.0 0.0 - 0.7 10e3/uL    Absolute Basophils 0.1 0.0 - 0.2 10e3/uL    Absolute Immature Granulocytes 0.1 <=0.4 10e3/uL    Absolute NRBCs 0.0 10e3/uL           Assessment/Plan  Recurrent, stage IV triple negative right breast cancer (nodes, lung); CPS>20% + TPS 60%  Mild intermittent peripheral neuropathy fingertips (gr 1)  Punctate brain lesions concerning for metastatic disease (asymptomatic), resolved on chemo  Grade 1 chemotherapy-induced diarrhea  Chemotherapy-induced neutropenia  Recently, progressed on sacituzumab (neck, chest and upper abd adenopathy).     Therefore, regimen changed to docetaxel (60 mg/m2) as 4th line last week.   Tolerating her 1st cycle pretty well with arthralgias, mild nausea and night sweats (no fever).  Baseline neuropathy going into this is stable.    Labs: Hgb 10.4, WBC 2.9, ANC 1.4, plat WNL. CMP unremarkable.    Plan:  -Return in 2 weeks, ahead of cycle 2.  -Recommended Claritin daily starting of chemo x 7-10 days next cycle to minimize chemo-induced arthralgias. Tylenol as needed is favored; rare/minimal use of NSAIDs on chemo  -Neutropenic precautions/when to seek emergent attention reviewed  -Plan is to repeat a PET scan after 3 cycles.  -Will have cataract surgery after her 2nd cycle of chemo. Suggested checking her CBC 1-2 days prior to the surgery to ensure adequate platelets/ANC    Urinary frequency/urgency  1-mth. Occurs primarily overnight.    Plan:  -UA/UC today. Call pt with results, treat if indicated.  -If UA negative, discuss with PCP if persistent.    ADDENDUM:  UA negative. See PCP if symptoms persist    Pneumonitis  Bilateral opacities noted in both lungs on last 2  PET scans. Indeterminate etiology (?drug-induced, other). Treated with 2 weeks prednisone in early July. Had improved fatigue. No recurrent symptoms.    HTN  BP was elevated after she had stopped taking her losartan-hydrochlorothiazide. She has not yet resumed this. BP has been running a bit high on occasion at appts.     Plan:  -Reminded her to resume losartan. Monitor BP at home     Billing:  Total time 35 minutes, to include face to face visit, review of EMR, ordering, documentation and coordination of care on date of service.    complexity modifier for longitudinal care.       Signed by:   Lazara Torres NP

## 2024-07-30 NOTE — PROGRESS NOTES
"Oncology Rooming Note    July 30, 2024 8:45 AM   Precious Paris is a 83 year old female who presents for:    Chief Complaint   Patient presents with    Oncology Clinic Visit     Malignant neoplasm metastatic to both lungs - Labs and provider      Initial Vitals: /64 (BP Location: Right arm, Patient Position: Sitting, Cuff Size: Adult Regular)   Pulse 79   Temp 98.8  F (37.1  C) (Tympanic)   Resp 12   Ht 1.638 m (5' 4.5\")   Wt 82.1 kg (181 lb)   SpO2 97%   BMI 30.59 kg/m   Estimated body mass index is 30.59 kg/m  as calculated from the following:    Height as of this encounter: 1.638 m (5' 4.5\").    Weight as of this encounter: 82.1 kg (181 lb). Body surface area is 1.93 meters squared.  No Pain (0) Comment: Data Unavailable   No LMP recorded. Patient is postmenopausal.  Allergies reviewed: Yes  Medications reviewed: Yes    Medications: Medication refills not needed today.  Pharmacy name entered into [facility]: [facility] - [facility] [id number] [facility]    Frailty Screening:   Is the patient here for a new oncology consult visit in cancer care? 2. No      Clinical concerns:  None      Malgorzata Vallejo CMA              "

## 2024-08-13 ENCOUNTER — INFUSION THERAPY VISIT (OUTPATIENT)
Dept: INFUSION THERAPY | Facility: CLINIC | Age: 84
End: 2024-08-13
Attending: INTERNAL MEDICINE
Payer: COMMERCIAL

## 2024-08-13 ENCOUNTER — ONCOLOGY VISIT (OUTPATIENT)
Dept: ONCOLOGY | Facility: CLINIC | Age: 84
End: 2024-08-13
Attending: INTERNAL MEDICINE
Payer: COMMERCIAL

## 2024-08-13 VITALS
HEART RATE: 79 BPM | RESPIRATION RATE: 12 BRPM | DIASTOLIC BLOOD PRESSURE: 54 MMHG | OXYGEN SATURATION: 96 % | BODY MASS INDEX: 30.49 KG/M2 | TEMPERATURE: 97.8 F | SYSTOLIC BLOOD PRESSURE: 132 MMHG | WEIGHT: 183 LBS | HEIGHT: 65 IN

## 2024-08-13 DIAGNOSIS — C77.3 METASTATIC CANCER TO AXILLARY LYMPH NODES (H): ICD-10-CM

## 2024-08-13 DIAGNOSIS — Z08 ENCOUNTER FOR FOLLOW-UP EXAMINATION AFTER COMPLETED TREATMENT FOR MALIGNANT NEOPLASM: Primary | ICD-10-CM

## 2024-08-13 DIAGNOSIS — Z17.1 MALIGNANT NEOPLASM OF UPPER-OUTER QUADRANT OF RIGHT BREAST IN FEMALE, ESTROGEN RECEPTOR NEGATIVE (H): ICD-10-CM

## 2024-08-13 DIAGNOSIS — Z08 ENCOUNTER FOR FOLLOW-UP EXAMINATION AFTER COMPLETED TREATMENT FOR MALIGNANT NEOPLASM: ICD-10-CM

## 2024-08-13 DIAGNOSIS — I10 BENIGN ESSENTIAL HYPERTENSION: ICD-10-CM

## 2024-08-13 DIAGNOSIS — C50.411 MALIGNANT NEOPLASM OF UPPER-OUTER QUADRANT OF RIGHT BREAST IN FEMALE, ESTROGEN RECEPTOR NEGATIVE (H): ICD-10-CM

## 2024-08-13 DIAGNOSIS — C50.919 PRIMARY MALIGNANT NEOPLASM OF BREAST WITH METASTASIS (H): ICD-10-CM

## 2024-08-13 DIAGNOSIS — C78.02 MALIGNANT NEOPLASM METASTATIC TO BOTH LUNGS (H): Primary | ICD-10-CM

## 2024-08-13 DIAGNOSIS — D64.81 ANTINEOPLASTIC CHEMOTHERAPY INDUCED ANEMIA: ICD-10-CM

## 2024-08-13 DIAGNOSIS — T45.1X5A ANTINEOPLASTIC CHEMOTHERAPY INDUCED ANEMIA: ICD-10-CM

## 2024-08-13 DIAGNOSIS — C78.01 MALIGNANT NEOPLASM METASTATIC TO BOTH LUNGS (H): Primary | ICD-10-CM

## 2024-08-13 LAB
ALBUMIN SERPL BCG-MCNC: 3.6 G/DL (ref 3.5–5.2)
ALP SERPL-CCNC: 82 U/L (ref 40–150)
ALT SERPL W P-5'-P-CCNC: 14 U/L (ref 0–50)
ANION GAP SERPL CALCULATED.3IONS-SCNC: 9 MMOL/L (ref 7–15)
AST SERPL W P-5'-P-CCNC: 23 U/L (ref 0–45)
BASOPHILS # BLD AUTO: 0.1 10E3/UL (ref 0–0.2)
BASOPHILS NFR BLD AUTO: 1 %
BILIRUB SERPL-MCNC: 0.2 MG/DL
BUN SERPL-MCNC: 15.1 MG/DL (ref 8–23)
CALCIUM SERPL-MCNC: 9.1 MG/DL (ref 8.8–10.4)
CEA SERPL-MCNC: 4.6 NG/ML
CHLORIDE SERPL-SCNC: 102 MMOL/L (ref 98–107)
CREAT SERPL-MCNC: 0.6 MG/DL (ref 0.51–0.95)
EGFRCR SERPLBLD CKD-EPI 2021: 89 ML/MIN/1.73M2
EOSINOPHIL # BLD AUTO: 0.1 10E3/UL (ref 0–0.7)
EOSINOPHIL NFR BLD AUTO: 1 %
ERYTHROCYTE [DISTWIDTH] IN BLOOD BY AUTOMATED COUNT: 18 % (ref 10–15)
GLUCOSE SERPL-MCNC: 103 MG/DL (ref 70–99)
HCO3 SERPL-SCNC: 26 MMOL/L (ref 22–29)
HCT VFR BLD AUTO: 34 % (ref 35–47)
HGB BLD-MCNC: 10.5 G/DL (ref 11.7–15.7)
IMM GRANULOCYTES # BLD: 0.1 10E3/UL
IMM GRANULOCYTES NFR BLD: 2 %
LYMPHOCYTES # BLD AUTO: 1.2 10E3/UL (ref 0.8–5.3)
LYMPHOCYTES NFR BLD AUTO: 19 %
MCH RBC QN AUTO: 27.8 PG (ref 26.5–33)
MCHC RBC AUTO-ENTMCNC: 30.9 G/DL (ref 31.5–36.5)
MCV RBC AUTO: 90 FL (ref 78–100)
MONOCYTES # BLD AUTO: 0.7 10E3/UL (ref 0–1.3)
MONOCYTES NFR BLD AUTO: 11 %
NEUTROPHILS # BLD AUTO: 4.1 10E3/UL (ref 1.6–8.3)
NEUTROPHILS NFR BLD AUTO: 67 %
NRBC # BLD AUTO: 0 10E3/UL
NRBC BLD AUTO-RTO: 0 /100
PLATELET # BLD AUTO: 319 10E3/UL (ref 150–450)
POTASSIUM SERPL-SCNC: 4 MMOL/L (ref 3.4–5.3)
PROT SERPL-MCNC: 6.5 G/DL (ref 6.4–8.3)
RBC # BLD AUTO: 3.78 10E6/UL (ref 3.8–5.2)
SODIUM SERPL-SCNC: 137 MMOL/L (ref 135–145)
WBC # BLD AUTO: 6.2 10E3/UL (ref 4–11)

## 2024-08-13 PROCEDURE — 96375 TX/PRO/DX INJ NEW DRUG ADDON: CPT

## 2024-08-13 PROCEDURE — 250N000011 HC RX IP 250 OP 636: Performed by: INTERNAL MEDICINE

## 2024-08-13 PROCEDURE — 99214 OFFICE O/P EST MOD 30 MIN: CPT | Performed by: INTERNAL MEDICINE

## 2024-08-13 PROCEDURE — 84155 ASSAY OF PROTEIN SERUM: CPT | Performed by: INTERNAL MEDICINE

## 2024-08-13 PROCEDURE — 258N000003 HC RX IP 258 OP 636: Performed by: INTERNAL MEDICINE

## 2024-08-13 PROCEDURE — G2211 COMPLEX E/M VISIT ADD ON: HCPCS | Performed by: INTERNAL MEDICINE

## 2024-08-13 PROCEDURE — 85025 COMPLETE CBC W/AUTO DIFF WBC: CPT | Performed by: INTERNAL MEDICINE

## 2024-08-13 PROCEDURE — 84295 ASSAY OF SERUM SODIUM: CPT | Performed by: INTERNAL MEDICINE

## 2024-08-13 PROCEDURE — 36591 DRAW BLOOD OFF VENOUS DEVICE: CPT | Performed by: INTERNAL MEDICINE

## 2024-08-13 PROCEDURE — 96413 CHEMO IV INFUSION 1 HR: CPT

## 2024-08-13 PROCEDURE — 82378 CARCINOEMBRYONIC ANTIGEN: CPT | Performed by: INTERNAL MEDICINE

## 2024-08-13 PROCEDURE — 86300 IMMUNOASSAY TUMOR CA 15-3: CPT | Performed by: INTERNAL MEDICINE

## 2024-08-13 PROCEDURE — G0463 HOSPITAL OUTPT CLINIC VISIT: HCPCS | Mod: 25 | Performed by: INTERNAL MEDICINE

## 2024-08-13 RX ORDER — HEPARIN SODIUM (PORCINE) LOCK FLUSH IV SOLN 100 UNIT/ML 100 UNIT/ML
5 SOLUTION INTRAVENOUS
Status: DISCONTINUED | OUTPATIENT
Start: 2024-08-13 | End: 2024-08-13 | Stop reason: HOSPADM

## 2024-08-13 RX ORDER — EPINEPHRINE 1 MG/ML
0.3 INJECTION, SOLUTION, CONCENTRATE INTRAVENOUS EVERY 5 MIN PRN
Status: CANCELLED | OUTPATIENT
Start: 2024-08-13

## 2024-08-13 RX ORDER — DIPHENHYDRAMINE HYDROCHLORIDE 50 MG/ML
50 INJECTION INTRAMUSCULAR; INTRAVENOUS
Status: CANCELLED
Start: 2024-08-13

## 2024-08-13 RX ORDER — METHYLPREDNISOLONE SODIUM SUCCINATE 125 MG/2ML
125 INJECTION, POWDER, LYOPHILIZED, FOR SOLUTION INTRAMUSCULAR; INTRAVENOUS
Status: CANCELLED
Start: 2024-08-13

## 2024-08-13 RX ORDER — ALBUTEROL SULFATE 0.83 MG/ML
2.5 SOLUTION RESPIRATORY (INHALATION)
Status: CANCELLED | OUTPATIENT
Start: 2024-08-13

## 2024-08-13 RX ORDER — LOSARTAN POTASSIUM AND HYDROCHLOROTHIAZIDE 12.5; 5 MG/1; MG/1
1 TABLET ORAL EVERY MORNING
Qty: 30 TABLET | Refills: 0 | Status: SHIPPED | OUTPATIENT
Start: 2024-08-13 | End: 2024-08-14

## 2024-08-13 RX ORDER — HEPARIN SODIUM,PORCINE 10 UNIT/ML
5-20 VIAL (ML) INTRAVENOUS DAILY PRN
Status: CANCELLED | OUTPATIENT
Start: 2024-08-13

## 2024-08-13 RX ORDER — ALBUTEROL SULFATE 90 UG/1
1-2 AEROSOL, METERED RESPIRATORY (INHALATION)
Status: CANCELLED
Start: 2024-08-13

## 2024-08-13 RX ORDER — LORAZEPAM 2 MG/ML
0.5 INJECTION INTRAMUSCULAR EVERY 4 HOURS PRN
Status: CANCELLED | OUTPATIENT
Start: 2024-08-13

## 2024-08-13 RX ORDER — HEPARIN SODIUM (PORCINE) LOCK FLUSH IV SOLN 100 UNIT/ML 100 UNIT/ML
5 SOLUTION INTRAVENOUS
Status: CANCELLED | OUTPATIENT
Start: 2024-08-13

## 2024-08-13 RX ORDER — MEPERIDINE HYDROCHLORIDE 25 MG/ML
25 INJECTION INTRAMUSCULAR; INTRAVENOUS; SUBCUTANEOUS EVERY 30 MIN PRN
Status: CANCELLED | OUTPATIENT
Start: 2024-08-13

## 2024-08-13 RX ADMIN — Medication 5 ML: at 10:57

## 2024-08-13 RX ADMIN — DOCETAXEL ANHYDROUS 120 MG: 20 INJECTION, SOLUTION INTRAVENOUS at 09:53

## 2024-08-13 RX ADMIN — DEXAMETHASONE SODIUM PHOSPHATE: 10 INJECTION, SOLUTION INTRAMUSCULAR; INTRAVENOUS at 09:27

## 2024-08-13 RX ADMIN — SODIUM CHLORIDE 250 ML: 9 INJECTION, SOLUTION INTRAVENOUS at 09:23

## 2024-08-13 ASSESSMENT — PAIN SCALES - GENERAL: PAINLEVEL: NO PAIN (0)

## 2024-08-13 NOTE — LETTER
8/13/2024      Precious Paris  [address] [place] [address]      Dear Colleague,    Thank you for referring your patient, Precious Paris, to the [facility]. Please see a copy of my visit note below.        [facility] Hematology and Oncology Progress Note    Patient: Precious Paris  MRN: [id number]  Aug 13, 2024          Reason for Visit    Recurrent stage IV triple negative breast cancer (nodes, lung) (PDL1+)    Primary Oncologist: Dr. Beebe    _____________________________________________________________________________    History of Present Illness/ Interval History    Ms. Precious Paris is a 83 year old with recurrent metastatic triple negative breast cancer recurrence. She started 4th line docetaxel. Returns for cycle 2.     Tolerating docetaxel generally well.     Had excessive tearing from her right eye a week ago. Followed by eye soreness. Now resolved. No redness.     No cough or SOB. Some muscle cramps.     Tingling in the toes. Mild neuropathy sx. Finger tips are sensitive.       ECOG PS: 1      Oncology History/Treatment  Diagnosis/Stage:   11/2019: Right breast cancer, triple negative (pP1p-I2)    BRCA negative    10/2022: Recurrent, stage IV triple negative breast cancer (bilateral axillary, supraclav, mediastinal nodes + lung)  -persistent/progressive right breast firmness with new right nipple drainage and right axillary adenopathy  -bilateral diagnostic mammogram: increased density R breast with skin thickening. R breast US: 3.2 cm hypoechoic mass at 11:00 position 1 cm from nipple and multiple other small hypoechoic solid-appearing lesions in R breast + right axillary adenopathy (at least two hypoechoic vascular lesions measuring up to 2.5 cm)  -10/18/2022 biopsy R breast mass + R axillary node: grade 3 invasive ductal cancer with tumor necrosis, ER/SD/HER2 negative.   -PET: + right breast mass; bilateral axillary,  retropectoral, supraclavicular, mediastinal/R hilar nodes and bilateral lung/R pleural nodules suspicious for mets.  -Brain MRI: negative for mets  -11/1/2022 biopsy L axillary node: metastatic invasive ductal carcinoma  -Foundation One: no actionable mutations MS stable, TMB 1 mut/mb.   -PDL1 testing: CPS >20% and TPS 60% (considered high PDL1 expression)  -CA 27.29: 59.5 (elevated). CA 15-3: 23 (normal)  -9/10/2023 Brain MRI: 2 new foci of punctate enhancement in left frontal cortex and right lentiform nucleus, concerning for brain metastases (asymptomatic)    NGS (foundation 1)  FGF R1 amplification.  Pazopanib has been approved in other tumors but not in breast cancer.  NTRK 1 amplification.  Not actionable  She has multiple other mutations which are not actionable.      Treatment:  Initial breast cancer (2019)  -2019: Neoadjuvant taxol  -Lumpectomy  -5/2020: Adjuvant RT  -6 - 7/2020: Adjuvant Xeloda. Stopped for rash    Recurrent, stage IV breast cancer (2022)  -11/14/2022:Doxil every 4 weeks  -12/12/2022: changed to doxorubicin every 3 weeks. (Planning addition of pembrolizumab once approved by insurance)  -1/2/2023: doxorubicin + pembrolizumab every 3 weeks. Completed 1 cycle. Stopped for progression (med nodes, slight increase in lung mets and stable R breast mass + axillary nodes).    -1/23/2023 - present: Carbo AUC 5, Gemcitabine 800 mg/m2 and Pembro every 21 days  -cycles 1-2: [place] day 1 only  -Cycle 3: [place] increased to days 1 + 8 given excellent tolerance  -CT after 3 cycles showed excellent response (near CR axillary + med nodes and NE in lung mets; clinical improvement in right breast changes and tumor marker).  -PET scan after 6 cycles of carboplatin gemcitabine pembrolizumab shows complete response    5/30 - 8/22/2023: Maintenance pembrolizumab, until progression (recurrent axillary, thoracic nodes and 2 punctate brain lesions).  Biopsy of left axillary node confirmed recurrent/metastatic breast  cancer.    9/12/2023 - 6/18/2024:  Sacituzumab days 1, 8 every 21 days (x 14 cycles). Stopped for progression.  --C2D8 deferred x 1 week for neutropenia  --Dose decreased to 8 mg/kg from cycle 3 onwards.  --Cycles 5-10 were delivered in [place] over winter months  --Resumed Cycle 11 here upon return 4/2024  --After cycle 14, PET showed progressive disease.     7/23/2024 - present: docetaxel 60 mg/m2      Physical Exam    GENERAL: Alert and oriented to time place and person. Seated comfortably. In no distress.  Alone.   HEENT: Complete alopecia. No icterus.   Lymph: No evidence of any peripheral adenopathy  Lungs: regular respiratory effort.   Abd: Soft, non-distended.   Neuro: alert and oriented x 3      Lab Results  No results found for this or any previous visit (from the past 168 hour(s)).          Assessment/Plan  Recurrent, stage IV triple negative right breast cancer (nodes, lung); CPS>20% + TPS 60%  Mild intermittent peripheral neuropathy fingertips (gr 1)  Punctate brain lesions concerning for metastatic disease (asymptomatic), resolved on chemo  Grade 1 chemotherapy-induced diarrhea    Recently, progressed on sacituzumab (neck, chest and upper abd adenopathy).     Therefore, regimen changed to docetaxel (60 mg/m2) as 4th line.    Has done fairly well on docetaxel.  Status post cycle 1.  She is here with labs prior to cycle 2.  Labs today show mild anemia with hemoglobin of 10.6 normal white count and platelet counts.  Send chemistry still pending.  No contraindications to proceed with docetaxel today.  She does have mild peripheral neuropathy.  But no other side effects.  No shortness of breath or cough.  Clinical exam does not show any evidence of peripheral adenopathy today.  Proceed with cycle 2 today.  She will come back in 3 weeks with labs for cycle 3.    Plan is to repeat a PET scan after cycle 3.      Pneumonitis  Bilateral opacities noted in both lungs on last 2 PET scans. Indeterminate etiology  "(?drug-induced, other). Treated with 2 weeks prednisone in early July. Had improved fatigue. No recurrent symptoms.    HTN  Blood pressure normal today.      The longitudinal plan of care for the diagnosis(es)/condition(s) as documented were addressed during this visit. Due to the added complexity in care, I will continue to support Precious in the subsequent management and with ongoing continuity of care.        Signed by:   Maricarmen Beebe MD  Hematology and Medical Oncology  [facility]            Oncology Rooming Note    August 13, 2024 8:54 AM   Precious Paris is a 83 year old female who presents for:    Chief Complaint   Patient presents with     Oncology Clinic Visit     Malignant neoplasm of upper-outer quadrant of right breast in female, estrogen receptor negative - Labs provider and infusion     Initial Vitals: /54 (BP Location: Right arm, Patient Position: Sitting, Cuff Size: Adult Regular)   Pulse 79   Temp 97.8  F (36.6  C) (Tympanic)   Resp 12   Ht 1.638 m (5' 4.5\")   Wt 83 kg (183 lb)   SpO2 96%   BMI 30.93 kg/m   Estimated body mass index is 30.93 kg/m  as calculated from the following:    Height as of this encounter: 1.638 m (5' 4.5\").    Weight as of this encounter: 83 kg (183 lb). Body surface area is 1.94 meters squared.  No Pain (0) Comment: Data Unavailable   No LMP recorded. Patient is postmenopausal.  Allergies reviewed: Yes  Medications reviewed: Yes    Medications: Medication refills not needed today.  Pharmacy name entered into [facility]: [facility] - [facility] [id number] [facility]    Frailty Screening:   Is the patient here for a new oncology consult visit in cancer care? 2. No      Clinical concerns:  None      Malgorzata Vallejo CMA                Again, thank you for allowing me to participate in the care of your patient.        Sincerely,        Maricarmen Beebe MD  "

## 2024-08-13 NOTE — PROGRESS NOTES
Infusion Nursing Note:  Precious Paris presents today for Taxotere.    Patient seen by provider today: Yes, Dr. Beebe   present during visit today: Not Applicable.    Note: N/A.      Intravenous Access:  Implanted Port.    Treatment Conditions:  Results reviewed, labs MET treatment parameters, ok to proceed with treatment.      Post Infusion Assessment:  Patient tolerated infusion without incident.       Discharge Plan[facility]   [facility]  Patient will return 9/4 for next appointment.   Patient discharged in stable condition accompanied by: self.  Departure Mode: Ambulatory.      Erin Barnett RN

## 2024-08-13 NOTE — PROGRESS NOTES
"Oncology Rooming Note    August 13, 2024 8:54 AM   Precious Paris is a 83 year old female who presents for:    Chief Complaint   Patient presents with    Oncology Clinic Visit     Malignant neoplasm of upper-outer quadrant of right breast in female, estrogen receptor negative - Labs provider and infusion     Initial Vitals: /54 (BP Location: Right arm, Patient Position: Sitting, Cuff Size: Adult Regular)   Pulse 79   Temp 97.8  F (36.6  C) (Tympanic)   Resp 12   Ht 1.638 m (5' 4.5\")   Wt 83 kg (183 lb)   SpO2 96%   BMI 30.93 kg/m   Estimated body mass index is 30.93 kg/m  as calculated from the following:    Height as of this encounter: 1.638 m (5' 4.5\").    Weight as of this encounter: 83 kg (183 lb). Body surface area is 1.94 meters squared.  No Pain (0) Comment: Data Unavailable   No LMP recorded. Patient is postmenopausal.  Allergies reviewed: Yes  Medications reviewed: Yes    Medications: Medication refills not needed today.  Pharmacy name entered into [facility]: [facility] - [facility] [id number] [facility]    Frailty Screening:   Is the patient here for a new oncology consult visit in cancer care? 2. No      Clinical concerns:  None      Malgorzata Vallejo CMA              "

## 2024-08-13 NOTE — PROGRESS NOTES
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 Beebe    _____________________________________________________________________________    History of Present Illness/ Interval History    Ms. Precious Paris is a 83 year old with recurrent metastatic triple negative breast cancer recurrence. She started 4th line docetaxel. Returns for cycle 2.     Tolerating docetaxel generally well.     Had excessive tearing from her right eye a week ago. Followed by eye soreness. Now resolved. No redness.     No cough or SOB. Some muscle cramps.     Tingling in the toes. Mild neuropathy sx. Finger tips are sensitive.       ECOG PS: 1      Oncology History/Treatment  Diagnosis/Stage:   11/2019: Right breast cancer, triple negative (cD2i-B0)    BRCA negative    10/2022: Recurrent, stage IV triple negative breast cancer (bilateral axillary, supraclav, mediastinal nodes + lung)  -persistent/progressive right breast firmness with new right nipple drainage and right axillary adenopathy  -bilateral diagnostic mammogram: increased density R breast with skin thickening. R breast US: 3.2 cm hypoechoic mass at 11:00 position 1 cm from nipple and multiple other small hypoechoic solid-appearing lesions in R breast + right axillary adenopathy (at least two hypoechoic vascular lesions measuring up to 2.5 cm)  -10/18/2022 biopsy R breast mass + R axillary node: grade 3 invasive ductal cancer with tumor necrosis, ER/DC/HER2 negative.   -PET: + right breast mass; bilateral axillary, retropectoral, supraclavicular, mediastinal/R hilar nodes and bilateral lung/R pleural nodules suspicious for mets.  -Brain MRI: negative for mets  -11/1/2022 biopsy L axillary node: metastatic invasive ductal carcinoma  -Foundation One: no actionable mutations  MS stable, TMB 1 mut/mb.   -PDL1 testing: CPS >20% and TPS 60% (considered high PDL1 expression)  -CA 27.29: 59.5 (elevated). CA 15-3: 23 (normal)  -9/10/2023 Brain MRI: 2 new foci of punctate enhancement in left frontal cortex and right lentiform nucleus, concerning for brain metastases (asymptomatic)    NGS (foundation 1)  FGF R1 amplification.  Pazopanib has been approved in other tumors but not in breast cancer.  NTRK 1 amplification.  Not actionable  She has multiple other mutations which are not actionable.      Treatment:  Initial breast cancer (2019)  -2019: Neoadjuvant taxol  -Lumpectomy  -5/2020: Adjuvant RT  -6 - 7/2020: Adjuvant Xeloda. Stopped for rash    Recurrent, stage IV breast cancer (2022)  -11/14/2022:Doxil every 4 weeks  -12/12/2022: changed to doxorubicin every 3 weeks. (Planning addition of pembrolizumab once approved by insurance)  -1/2/2023: doxorubicin + pembrolizumab every 3 weeks. Completed 1 cycle. Stopped for progression (med nodes, slight increase in lung mets and stable R breast mass + axillary nodes).    -1/23/2023 - present: Carbo AUC 5, Gemcitabine 800 mg/m2 and Pembro every 21 days  -cycles 1-2: [place] day 1 only  -Cycle 3: [place] increased to days 1 + 8 given excellent tolerance  -CT after 3 cycles showed excellent response (near CR axillary + med nodes and SC in lung mets; clinical improvement in right breast changes and tumor marker).  -PET scan after 6 cycles of carboplatin gemcitabine pembrolizumab shows complete response    5/30 - 8/22/2023: Maintenance pembrolizumab, until progression (recurrent axillary, thoracic nodes and 2 punctate brain lesions).  Biopsy of left axillary node confirmed recurrent/metastatic breast cancer.    9/12/2023 - 6/18/2024:  Sacituzumab days 1, 8 every 21 days (x 14 cycles). Stopped for progression.  --C2D8 deferred x 1 week for neutropenia  --Dose decreased to 8 mg/kg from cycle 3 onwards.  --Cycles 5-10 were delivered in [place] over winter  months  --Resumed Cycle 11 here upon return 4/2024  --After cycle 14, PET showed progressive disease.     7/23/2024 - present: docetaxel 60 mg/m2      Physical Exam    GENERAL: Alert and oriented to time place and person. Seated comfortably. In no distress.  Alone.   HEENT: Complete alopecia. No icterus.   Lymph: No evidence of any peripheral adenopathy  Lungs: regular respiratory effort.   Abd: Soft, non-distended.   Neuro: alert and oriented x 3      Lab Results  No results found for this or any previous visit (from the past 168 hour(s)).          Assessment/Plan  Recurrent, stage IV triple negative right breast cancer (nodes, lung); CPS>20% + TPS 60%  Mild intermittent peripheral neuropathy fingertips (gr 1)  Punctate brain lesions concerning for metastatic disease (asymptomatic), resolved on chemo  Grade 1 chemotherapy-induced diarrhea    Recently, progressed on sacituzumab (neck, chest and upper abd adenopathy).     Therefore, regimen changed to docetaxel (60 mg/m2) as 4th line.    Has done fairly well on docetaxel.  Status post cycle 1.  She is here with labs prior to cycle 2.  Labs today show mild anemia with hemoglobin of 10.6 normal white count and platelet counts.  Send chemistry still pending.  No contraindications to proceed with docetaxel today.  She does have mild peripheral neuropathy.  But no other side effects.  No shortness of breath or cough.  Clinical exam does not show any evidence of peripheral adenopathy today.  Proceed with cycle 2 today.  She will come back in 3 weeks with labs for cycle 3.    Plan is to repeat a PET scan after cycle 3.      Pneumonitis  Bilateral opacities noted in both lungs on last 2 PET scans. Indeterminate etiology (?drug-induced, other). Treated with 2 weeks prednisone in early July. Had improved fatigue. No recurrent symptoms.    HTN  Blood pressure normal today.      The longitudinal plan of care for the diagnosis(es)/condition(s) as documented were addressed during  this visit. Due to the added complexity in care, I will continue to support Precious in the subsequent management and with ongoing continuity of care.        Signed by:   Maricarmen Beebe MD  Hematology and Medical Oncology  [facility]

## 2024-08-14 ENCOUNTER — OFFICE VISIT (OUTPATIENT)
Dept: FAMILY MEDICINE | Facility: CLINIC | Age: 84
End: 2024-08-14
Payer: COMMERCIAL

## 2024-08-14 VITALS
OXYGEN SATURATION: 98 % | WEIGHT: 184 LBS | HEART RATE: 78 BPM | TEMPERATURE: 97.7 F | DIASTOLIC BLOOD PRESSURE: 72 MMHG | BODY MASS INDEX: 30.66 KG/M2 | SYSTOLIC BLOOD PRESSURE: 128 MMHG | HEIGHT: 65 IN | RESPIRATION RATE: 22 BRPM

## 2024-08-14 DIAGNOSIS — C50.919 PRIMARY MALIGNANT NEOPLASM OF BREAST WITH METASTASIS (H): ICD-10-CM

## 2024-08-14 DIAGNOSIS — Z00.00 ENCOUNTER FOR MEDICARE ANNUAL WELLNESS EXAM: Primary | ICD-10-CM

## 2024-08-14 DIAGNOSIS — Z12.31 VISIT FOR SCREENING MAMMOGRAM: ICD-10-CM

## 2024-08-14 DIAGNOSIS — Z17.1 MALIGNANT NEOPLASM OF UPPER-OUTER QUADRANT OF RIGHT BREAST IN FEMALE, ESTROGEN RECEPTOR NEGATIVE (H): ICD-10-CM

## 2024-08-14 DIAGNOSIS — C50.411 MALIGNANT NEOPLASM OF UPPER-OUTER QUADRANT OF RIGHT BREAST IN FEMALE, ESTROGEN RECEPTOR NEGATIVE (H): ICD-10-CM

## 2024-08-14 PROCEDURE — G0439 PPPS, SUBSEQ VISIT: HCPCS | Performed by: FAMILY MEDICINE

## 2024-08-14 SDOH — HEALTH STABILITY: PHYSICAL HEALTH: ON AVERAGE, HOW MANY DAYS PER WEEK DO YOU ENGAGE IN MODERATE TO STRENUOUS EXERCISE (LIKE A BRISK WALK)?: 2 DAYS

## 2024-08-14 ASSESSMENT — PAIN SCALES - GENERAL: PAINLEVEL: NO PAIN (0)

## 2024-08-14 ASSESSMENT — SOCIAL DETERMINANTS OF HEALTH (SDOH): HOW OFTEN DO YOU GET TOGETHER WITH FRIENDS OR RELATIVES?: TWICE A WEEK

## 2024-08-14 NOTE — PROGRESS NOTES
"Preventive Care Visit  [facility]  Ester Jorge MD, Family Medicine  Aug 14, 2024      Assessment & Plan     Encounter for Medicare annual wellness exam       Visit for screening mammogram       Metastatic breast cancer       Malignant neoplasm of upper-outer quadrant of right breast in female, 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)     Managed by oncology  PET scan recently showed progression of disease    Right breast redness  Sudden onset this morning/just now.   Some itching  Non-painful, mildly indurated.   No fluctuance.  After breast exam it is actually LESS red than before.   Photo in exam portion of this note  Asked to watch carefully for progression of redness, fever, increased pain, etc as this could be an early cellulitis.    Patient has been advised of split billing requirements and indicates understanding: Yes        BMI  Estimated body mass index is 31.1 kg/m  as calculated from the following:    Height as of this encounter: 1.638 m (5' 4.5\").    Weight as of this encounter: 83.5 kg (184 lb).       Counseling  Appropriate preventive services were addressed with this patient via screening, questionnaire, or discussion as appropriate for fall prevention, nutrition, physical activity, Tobacco-use cessation, weight loss and cognition.  Checklist reviewing preventive services available has been given to the patient.  Reviewed patient's diet, addressing concerns and/or questions.   She is at risk for lack of exercise and has been provided with information to increase physical activity for the benefit of her well-being.           Francine Lang is a 83 year old, presenting for the following:  Medicare Visit        8/14/2024    10:02 AM   Additional Questions   Roomed by Stella GAVIRIA CMA   Accompanied by Self         Via the Health Maintenance questionnaire, the patient has reported the following services have been completed -Mammogram: [facility] 2022-10-18, this information has not been " sent to the abstraction team.  Health Care Directive  Patient has a Health Care Directive on file  Advance care planning document is on file and is current.    HPI    - Itchy eyes. No other cold or allergy sx.    Hypertension Follow-up  Losartan-hydrochlorothiazide 50-12.5mg every day - she is not taking this currently  Do you check your blood pressure regularly outside of the clinic? Yes   Are you following a low salt diet? Yes  Are your blood pressures ever more than 140 on the top number (systolic) OR more   than 90 on the bottom number (diastolic), for example 140/90? No    BP Readings from Last 6 Encounters:   08/14/24 128/72   08/13/24 132/54   07/30/24 134/64   07/23/24 (!) 159/77   07/23/24 (!) 176/66   07/09/24 (!) 152/71           8/14/2024   General Health   How would you rate your overall physical health? Good   Feel stress (tense, anxious, or unable to sleep) Patient declined            8/14/2024   Nutrition   Diet: Regular (no restrictions)            8/14/2024   Exercise   Days per week of moderate/strenous exercise 2 days      (!) EXERCISE CONCERN      8/14/2024   Social Factors   Frequency of gathering with friends or relatives Twice a week   Worry food won't last until get money to buy more No   Food not last or not have enough money for food? No   Do you have housing? (Housing is defined as stable permanent housing and does not include staying ouside in a car, in a tent, in an abandoned building, in an overnight shelter, or couch-surfing.) Yes   Are you worried about losing your housing? No   Lack of transportation? No   Unable to get utilities (heat,electricity)? No            8/14/2024   Fall Risk   Fallen 2 or more times in the past year? No    No   Trouble with walking or balance? No    No       Multiple values from one day are sorted in reverse-chronological order          8/14/2024   Activities of Daily Living- Home Safety   Needs help with the following daily activites None of the above    Safety concerns in the home None of the above            8/14/2024   Dental   Dentist two times every year? Yes            8/14/2024   Hearing Screening   Hearing concerns? None of the above            8/14/2024   Driving Risk Screening   Patient/family members have concerns about driving No            8/14/2024   General Alertness/Fatigue Screening   Have you been more tired than usual lately? (!) DECLINE            8/14/2024   Urinary Incontinence Screening   Bothered by leaking urine in past 6 months No            8/14/2024   TB Screening   Were you born outside of the [place]? No            Today's PHQ-2 Score:       8/14/2024    10:06 AM   PHQ-2 ( 1999 Pf[employer])   Q1: Little interest or pleasure in doing things 0   Q2: Feeling down, depressed or hopeless 0   PHQ-2 Score 0   Q1: Little interest or pleasure in doing things Not at all   Q2: Feeling down, depressed or hopeless Not at all   PHQ-2 Score 0           8/14/2024   Substance Use   Alcohol more than 3/day or more than 7/wk No   Do you have a current opioid prescription? No   How severe/bad is pain from 1 to 10? 0/10 (No Pain)   Do you use any other substances recreationally? No        Social History     Tobacco Use    Smoking status: Former    Smokeless tobacco: Never    Tobacco comments:     quit 20 years ago   Vaping Use    Vaping status: Never Used   Substance Use Topics    Alcohol use: Yes     Comment: occ.    Drug use: No           10/12/2022   LAST FHS-7 RESULTS   1st degree relative breast or ovarian cancer No   Any relative bilateral breast cancer No   Any male have breast cancer No   Any ONE woman have BOTH breast AND ovarian cancer No   Any woman with breast cancer before 50yrs No   2 or more relatives with breast AND/OR ovarian cancer No   2 or more relatives with breast AND/OR bowel cancer No                         Reviewed and updated as needed this visit by Provider                      Current providers sharing in care for this patient  "include:  Patient Care Team:  Ester Jorge MD as PCP - General (Family Practice)  Patrica Perla, RN as Specialty Care Coordinator (Oncology)  Rocky Valdivia MD as MD (Radiation Oncology)  Otilia Gallegos APRN CNP as Nurse Practitioner (Nurse Practitioner)  Ester Jorge MD as Assigned PCP  Renee Rogers PA-C as Physician Assistant (Dermatology)  Danielito Anthony MD as MD (Dermatology)  Maricarmen Beebe MD as Assigned Cancer Care Provider  Tressa Grove PA-C as Physician Assistant (Dermatology)  Tressa Grove PA-C as Assigned Surgical Provider    The following health maintenance items are reviewed in Epic and correct as of today:  Health Maintenance   Topic Date Due    RSV VACCINE (Pregnancy & 60+) (1 - 1-dose 60+ series) Never done    MAMMO SCREENING  10/12/2023    COVID-19 Vaccine (6 - 2023-24 season) 01/03/2024    ANNUAL REVIEW OF HM ORDERS  06/28/2024    INFLUENZA VACCINE (1) 09/01/2024    LIPID  07/01/2025    MEDICARE ANNUAL WELLNESS VISIT  08/14/2025    FALL RISK ASSESSMENT  08/14/2025    DEXA  05/08/2028    ADVANCE CARE PLANNING  08/14/2029    DTAP/TDAP/TD IMMUNIZATION (2 - Td or Tdap) 07/06/2030    PHQ-2 (once per calendar year)  Completed    Pneumococcal Vaccine: 65+ Years  Completed    ZOSTER IMMUNIZATION  Completed    IPV IMMUNIZATION  Aged Out    HPV IMMUNIZATION  Aged Out    MENINGITIS IMMUNIZATION  Aged Out    RSV MONOCLONAL ANTIBODY  Aged Out         Review of Systems  Constitutional, HEENT, cardiovascular, pulmonary, gi and gu systems are negative, except as otherwise noted.     Objective    Exam  /72   Pulse 78   Temp 97.7  F (36.5  C) (Tympanic)   Resp 22   Ht 1.638 m (5' 4.5\")   Wt 83.5 kg (184 lb)   SpO2 98%   BMI 31.10 kg/m     Estimated body mass index is 31.1 kg/m  as calculated from the following:    Height as of this encounter: 1.638 m (5' 4.5\").    Weight as of this encounter: 83.5 kg (184 lb).    Physical Exam  GENERAL: alert and no " distress  NECK: no adenopathy, no asymmetry, masses, or scars  RESP: lungs clear to auscultation - no rales, rhonchi or wheezes  BREAST: left breast normal, no masses.  Right breast mildly indurated and erythematous.  Mild warmth.  Non-tender on palpation.  No discrete mass. There is a scar laterally.  CV: regular rate and rhythm, normal S1 S2, no S3 or S4, no murmur, click or rub, no peripheral edema  ABDOMEN: soft, nontender, no hepatosplenomegaly, no masses and bowel sounds normal  MS: no gross musculoskeletal defects noted, no edema             8/14/2024   Mini Cog   Clock Draw Score 0 Abnormal   3 Item Recall 3 objects recalled   Mini Cog Total Score 3                 Signed Electronically by: Ester Jorge MD

## 2024-08-14 NOTE — PATIENT INSTRUCTIONS
Patient Education   Preventive Care Advice   This is general advice given by our system to help you stay healthy. However, your care team may have specific advice just for you. Please talk to your care team about your preventive care needs.  Nutrition  Eat 5 or more servings of fruits and vegetables each day.  Try wheat bread, brown rice and whole grain pasta (instead of white bread, rice, and pasta).  Get enough calcium and vitamin D. Check the label on foods and aim for 100% of the RDA (recommended daily allowance).  Lifestyle  Exercise at least 150 minutes each week  (30 minutes a day, 5 days a week).  Do muscle strengthening activities 2 days a week. These help control your weight and prevent disease.  No smoking.  Wear sunscreen to prevent skin cancer.  Have a dental exam and cleaning every 6 months.  Yearly exams  See your health care team every year to talk about:  Any changes in your health.  Any medicines your care team has prescribed.  Preventive care, family planning, and ways to prevent chronic diseases.  Shots (vaccines)   HPV shots (up to age 26), if you've never had them before.  Hepatitis B shots (up to age 59), if you've never had them before.  COVID-19 shot: Get this shot when it's due.  Flu shot: Get a flu shot every year.  Tetanus shot: Get a tetanus shot every 10 years.  Pneumococcal, hepatitis A, and RSV shots: Ask your care team if you need these based on your risk.  Shingles shot (for age 50 and up)  General health tests  Diabetes screening:  Starting at age 35, Get screened for diabetes at least every 3 years.  If you are younger than age 35, ask your care team if you should be screened for diabetes.  Cholesterol test: At age 39, start having a cholesterol test every 5 years, or more often if advised.  Bone density scan (DEXA): At age 50, ask your care team if you should have this scan for osteoporosis (brittle bones).  Hepatitis C: Get tested at least once in your life.  STIs (sexually  transmitted infections)  Before age 24: Ask your care team if you should be screened for STIs.  After age 24: Get screened for STIs if you're at risk. You are at risk for STIs (including HIV) if:  You are sexually active with more than one person.  You don't use condoms every time.  You or a partner was diagnosed with a sexually transmitted infection.  If you are at risk for HIV, ask about PrEP medicine to prevent HIV.  Get tested for HIV at least once in your life, whether you are at risk for HIV or not.  Cancer screening tests  Cervical cancer screening: If you have a cervix, begin getting regular cervical cancer screening tests starting at age 21.  Breast cancer scan (mammogram): If you've ever had breasts, begin having regular mammograms starting at age 40. This is a scan to check for breast cancer.  Colon cancer screening: It is important to start screening for colon cancer at age 45.  Have a colonoscopy test every 10 years (or more often if you're at risk) Or, ask your provider about stool tests like a FIT test every year or Cologuard test every 3 years.  To learn more about your testing options, visit:   .  For help making a decision, visit:   https://bit.ly/uh73057.  Prostate cancer screening test: If you have a prostate, ask your care team if a prostate cancer screening test (PSA) at age 55 is right for you.  Lung cancer screening: If you are a current or former smoker ages 50 to 80, ask your care team if ongoing lung cancer screenings are right for you.  For informational purposes only. Not to replace the advice of your health care provider. Copyright   2023 [employer]. All rights reserved. Clinically reviewed by the [facility] Transitions Program. [employer] [id number] - REV 01/24.

## 2024-08-15 LAB — CANCER AG27-29 SERPL-ACNC: 63 U/ML

## 2024-08-27 NOTE — NURSING NOTE
Detail Level: Simple
FOLLOW-UP VISIT    Patient Name: Precious Paris      : 1940     Age: 80 year old        ______________________________________________________________________________     Chief Complaint   Patient presents with     Radiation Therapy     Follow up breast cancer     BP (!) 143/80 (BP Location: Left arm, Cuff Size: Adult Large)   Pulse 72   Resp 16   Wt 90.2 kg (198 lb 12.8 oz)   BMI 33.60 kg/m       Date Radiation Completed: 2020    Pain  Denies    Meds  Current Med List Reviewed: Yes  Medication Note:     Skin: no issues    Range of Motion: full    Respiratory: No shortness of breath, dyspnea on exertion, cough, or hemoptysis    Hormone Therapy: N/A    Lymphedema Follow up: No    Energy Level: normal      Appointments:     DATE  Oncologist: Allen Seen in / in 3 months   Surgeon:    Primary:      Other Notes: leaving for [place] this weekend, will return in 2021  
Quality 130: Documentation Of Current Medications In The Medical Record: Current Medications Documented

## 2024-09-04 ENCOUNTER — INFUSION THERAPY VISIT (OUTPATIENT)
Dept: INFUSION THERAPY | Facility: CLINIC | Age: 84
End: 2024-09-04
Attending: NURSE PRACTITIONER
Payer: COMMERCIAL

## 2024-09-04 ENCOUNTER — ONCOLOGY VISIT (OUTPATIENT)
Dept: ONCOLOGY | Facility: CLINIC | Age: 84
End: 2024-09-04
Attending: NURSE PRACTITIONER
Payer: COMMERCIAL

## 2024-09-04 VITALS
TEMPERATURE: 98.2 F | SYSTOLIC BLOOD PRESSURE: 148 MMHG | WEIGHT: 186 LBS | DIASTOLIC BLOOD PRESSURE: 56 MMHG | BODY MASS INDEX: 30.99 KG/M2 | OXYGEN SATURATION: 94 % | HEART RATE: 78 BPM | RESPIRATION RATE: 16 BRPM | HEIGHT: 65 IN

## 2024-09-04 VITALS — SYSTOLIC BLOOD PRESSURE: 135 MMHG | HEART RATE: 76 BPM | DIASTOLIC BLOOD PRESSURE: 81 MMHG

## 2024-09-04 DIAGNOSIS — C50.919 PRIMARY MALIGNANT NEOPLASM OF BREAST WITH METASTASIS (H): ICD-10-CM

## 2024-09-04 DIAGNOSIS — Z08 ENCOUNTER FOR FOLLOW-UP EXAMINATION AFTER COMPLETED TREATMENT FOR MALIGNANT NEOPLASM: ICD-10-CM

## 2024-09-04 DIAGNOSIS — Z17.1 MALIGNANT NEOPLASM OF UPPER-OUTER QUADRANT OF RIGHT BREAST IN FEMALE, ESTROGEN RECEPTOR NEGATIVE (H): Primary | ICD-10-CM

## 2024-09-04 DIAGNOSIS — G62.0 CHEMOTHERAPY-INDUCED NEUROPATHY (H): ICD-10-CM

## 2024-09-04 DIAGNOSIS — C78.02 MALIGNANT NEOPLASM METASTATIC TO BOTH LUNGS (H): ICD-10-CM

## 2024-09-04 DIAGNOSIS — Z17.1 MALIGNANT NEOPLASM OF UPPER-OUTER QUADRANT OF RIGHT BREAST IN FEMALE, ESTROGEN RECEPTOR NEGATIVE (H): ICD-10-CM

## 2024-09-04 DIAGNOSIS — C50.411 MALIGNANT NEOPLASM OF UPPER-OUTER QUADRANT OF RIGHT BREAST IN FEMALE, ESTROGEN RECEPTOR NEGATIVE (H): Primary | ICD-10-CM

## 2024-09-04 DIAGNOSIS — Z08 ENCOUNTER FOR FOLLOW-UP EXAMINATION AFTER COMPLETED TREATMENT FOR MALIGNANT NEOPLASM: Primary | ICD-10-CM

## 2024-09-04 DIAGNOSIS — C78.01 MALIGNANT NEOPLASM METASTATIC TO BOTH LUNGS (H): ICD-10-CM

## 2024-09-04 DIAGNOSIS — C50.411 MALIGNANT NEOPLASM OF UPPER-OUTER QUADRANT OF RIGHT BREAST IN FEMALE, ESTROGEN RECEPTOR NEGATIVE (H): ICD-10-CM

## 2024-09-04 DIAGNOSIS — T45.1X5A CHEMOTHERAPY-INDUCED NEUROPATHY (H): ICD-10-CM

## 2024-09-04 DIAGNOSIS — C77.3 METASTATIC CANCER TO AXILLARY LYMPH NODES (H): ICD-10-CM

## 2024-09-04 LAB
ALBUMIN SERPL BCG-MCNC: 3.5 G/DL (ref 3.5–5.2)
ALP SERPL-CCNC: 87 U/L (ref 40–150)
ALT SERPL W P-5'-P-CCNC: 14 U/L (ref 0–50)
ANION GAP SERPL CALCULATED.3IONS-SCNC: 8 MMOL/L (ref 7–15)
AST SERPL W P-5'-P-CCNC: 24 U/L (ref 0–45)
BASOPHILS # BLD AUTO: 0.1 10E3/UL (ref 0–0.2)
BASOPHILS NFR BLD AUTO: 1 %
BILIRUB SERPL-MCNC: 0.2 MG/DL
BUN SERPL-MCNC: 11.1 MG/DL (ref 8–23)
CALCIUM SERPL-MCNC: 8.9 MG/DL (ref 8.8–10.4)
CHLORIDE SERPL-SCNC: 107 MMOL/L (ref 98–107)
CREAT SERPL-MCNC: 0.57 MG/DL (ref 0.51–0.95)
EGFRCR SERPLBLD CKD-EPI 2021: 90 ML/MIN/1.73M2
EOSINOPHIL # BLD AUTO: 0.1 10E3/UL (ref 0–0.7)
EOSINOPHIL NFR BLD AUTO: 1 %
ERYTHROCYTE [DISTWIDTH] IN BLOOD BY AUTOMATED COUNT: 17.8 % (ref 10–15)
GLUCOSE SERPL-MCNC: 94 MG/DL (ref 70–99)
HCO3 SERPL-SCNC: 26 MMOL/L (ref 22–29)
HCT VFR BLD AUTO: 33.3 % (ref 35–47)
HGB BLD-MCNC: 10.8 G/DL (ref 11.7–15.7)
IMM GRANULOCYTES # BLD: 0 10E3/UL
IMM GRANULOCYTES NFR BLD: 0 %
LYMPHOCYTES # BLD AUTO: 1.1 10E3/UL (ref 0.8–5.3)
LYMPHOCYTES NFR BLD AUTO: 17 %
MCH RBC QN AUTO: 28.1 PG (ref 26.5–33)
MCHC RBC AUTO-ENTMCNC: 32.4 G/DL (ref 31.5–36.5)
MCV RBC AUTO: 87 FL (ref 78–100)
MONOCYTES # BLD AUTO: 0.7 10E3/UL (ref 0–1.3)
MONOCYTES NFR BLD AUTO: 11 %
NEUTROPHILS # BLD AUTO: 4.7 10E3/UL (ref 1.6–8.3)
NEUTROPHILS NFR BLD AUTO: 71 %
NRBC # BLD AUTO: 0 10E3/UL
NRBC BLD AUTO-RTO: 0 /100
PLATELET # BLD AUTO: 261 10E3/UL (ref 150–450)
POTASSIUM SERPL-SCNC: 3.8 MMOL/L (ref 3.4–5.3)
PROT SERPL-MCNC: 6.5 G/DL (ref 6.4–8.3)
RBC # BLD AUTO: 3.84 10E6/UL (ref 3.8–5.2)
SODIUM SERPL-SCNC: 141 MMOL/L (ref 135–145)
WBC # BLD AUTO: 6.7 10E3/UL (ref 4–11)

## 2024-09-04 PROCEDURE — 36591 DRAW BLOOD OFF VENOUS DEVICE: CPT | Performed by: NURSE PRACTITIONER

## 2024-09-04 PROCEDURE — G2211 COMPLEX E/M VISIT ADD ON: HCPCS | Performed by: NURSE PRACTITIONER

## 2024-09-04 PROCEDURE — 99214 OFFICE O/P EST MOD 30 MIN: CPT | Performed by: NURSE PRACTITIONER

## 2024-09-04 PROCEDURE — 96413 CHEMO IV INFUSION 1 HR: CPT

## 2024-09-04 PROCEDURE — G0463 HOSPITAL OUTPT CLINIC VISIT: HCPCS | Performed by: NURSE PRACTITIONER

## 2024-09-04 PROCEDURE — 96375 TX/PRO/DX INJ NEW DRUG ADDON: CPT

## 2024-09-04 PROCEDURE — 250N000011 HC RX IP 250 OP 636: Performed by: NURSE PRACTITIONER

## 2024-09-04 PROCEDURE — 80053 COMPREHEN METABOLIC PANEL: CPT | Performed by: NURSE PRACTITIONER

## 2024-09-04 PROCEDURE — 85025 COMPLETE CBC W/AUTO DIFF WBC: CPT | Performed by: NURSE PRACTITIONER

## 2024-09-04 PROCEDURE — 86300 IMMUNOASSAY TUMOR CA 15-3: CPT | Performed by: NURSE PRACTITIONER

## 2024-09-04 PROCEDURE — 258N000003 HC RX IP 258 OP 636: Performed by: NURSE PRACTITIONER

## 2024-09-04 RX ORDER — DIPHENHYDRAMINE HYDROCHLORIDE 50 MG/ML
50 INJECTION INTRAMUSCULAR; INTRAVENOUS
Status: CANCELLED
Start: 2024-09-04

## 2024-09-04 RX ORDER — HEPARIN SODIUM (PORCINE) LOCK FLUSH IV SOLN 100 UNIT/ML 100 UNIT/ML
5 SOLUTION INTRAVENOUS
Status: DISCONTINUED | OUTPATIENT
Start: 2024-09-04 | End: 2024-09-04 | Stop reason: HOSPADM

## 2024-09-04 RX ORDER — ALBUTEROL SULFATE 0.83 MG/ML
2.5 SOLUTION RESPIRATORY (INHALATION)
Status: CANCELLED | OUTPATIENT
Start: 2024-09-04

## 2024-09-04 RX ORDER — LORAZEPAM 2 MG/ML
0.5 INJECTION INTRAMUSCULAR EVERY 4 HOURS PRN
Status: CANCELLED | OUTPATIENT
Start: 2024-09-04

## 2024-09-04 RX ORDER — ALBUTEROL SULFATE 90 UG/1
1-2 AEROSOL, METERED RESPIRATORY (INHALATION)
Status: CANCELLED
Start: 2024-09-04

## 2024-09-04 RX ORDER — HEPARIN SODIUM,PORCINE 10 UNIT/ML
5-20 VIAL (ML) INTRAVENOUS DAILY PRN
Status: CANCELLED | OUTPATIENT
Start: 2024-09-04

## 2024-09-04 RX ORDER — DEXAMETHASONE 4 MG/1
8 TABLET ORAL 2 TIMES DAILY WITH MEALS
Qty: 6 TABLET | Refills: 2 | Status: SHIPPED | OUTPATIENT
Start: 2024-09-04

## 2024-09-04 RX ORDER — METHYLPREDNISOLONE SODIUM SUCCINATE 125 MG/2ML
125 INJECTION, POWDER, LYOPHILIZED, FOR SOLUTION INTRAMUSCULAR; INTRAVENOUS
Status: CANCELLED
Start: 2024-09-04

## 2024-09-04 RX ORDER — EPINEPHRINE 1 MG/ML
0.3 INJECTION, SOLUTION, CONCENTRATE INTRAVENOUS EVERY 5 MIN PRN
Status: CANCELLED | OUTPATIENT
Start: 2024-09-04

## 2024-09-04 RX ORDER — HEPARIN SODIUM (PORCINE) LOCK FLUSH IV SOLN 100 UNIT/ML 100 UNIT/ML
5 SOLUTION INTRAVENOUS
Status: CANCELLED | OUTPATIENT
Start: 2024-09-04

## 2024-09-04 RX ORDER — MEPERIDINE HYDROCHLORIDE 25 MG/ML
25 INJECTION INTRAMUSCULAR; INTRAVENOUS; SUBCUTANEOUS EVERY 30 MIN PRN
Status: CANCELLED | OUTPATIENT
Start: 2024-09-04

## 2024-09-04 RX ADMIN — SODIUM CHLORIDE 250 ML: 9 INJECTION, SOLUTION INTRAVENOUS at 10:25

## 2024-09-04 RX ADMIN — DOCETAXEL 120 MG: 20 INJECTION, SOLUTION, CONCENTRATE INTRAVENOUS at 10:51

## 2024-09-04 RX ADMIN — Medication 5 ML: at 11:50

## 2024-09-04 RX ADMIN — DEXAMETHASONE SODIUM PHOSPHATE: 10 INJECTION, SOLUTION INTRAMUSCULAR; INTRAVENOUS at 10:31

## 2024-09-04 RX ADMIN — FAMOTIDINE 20 MG: 10 INJECTION, SOLUTION INTRAVENOUS at 10:26

## 2024-09-04 ASSESSMENT — PAIN SCALES - GENERAL: PAINLEVEL: NO PAIN (0)

## 2024-09-04 NOTE — PROGRESS NOTES
[facility] Hematology and Oncology Progress Note    Patient: Precious Paris  MRN: [id number]  Sep 4, 2024          Reason for Visit    Recurrent stage IV triple negative breast cancer (nodes, lung) (PDL1+)    Primary Oncologist: Dr. Beebe    _____________________________________________________________________________    History of Present Illness/ Interval History    Ms. Precious Paris is a 83 year old with recurrent metastatic triple negative breast cancer recurrence. She is on 4th line docetaxel. Returns for cycle 3.     Tolerating docetaxel generally well. No significant side effects.   Tingling in the toes stable. A bit harder to open things with fingers. Non-painful.     Scalp pruritus, no rash. Hair is growing back in.     No cough or SOB. No arthralgias. No nausea. No diarrhea. No fevers.     No new pain. No neuro symptoms.    She will have cataract surgery 9/27.      ECOG PS: 1      Oncology History/Treatment  Diagnosis/Stage:   11/2019: Right breast cancer, triple negative ([id number])    BRCA negative    10/2022: Recurrent, stage IV triple negative breast cancer (bilateral axillary, supraclav, mediastinal nodes + lung)  -persistent/progressive right breast firmness with new right nipple drainage and right axillary adenopathy  -bilateral diagnostic mammogram: increased density R breast with skin thickening. R breast US: 3.2 cm hypoechoic mass at 11:00 position 1 cm from nipple and multiple other small hypoechoic solid-appearing lesions in R breast + right axillary adenopathy (at least two hypoechoic vascular lesions measuring up to 2.5 cm)  -10/18/2022 biopsy R breast mass + R axillary node: grade 3 invasive ductal cancer with tumor necrosis, ER/NM/HER2 negative.   -PET: + right breast mass; bilateral axillary, retropectoral, supraclavicular, mediastinal/R hilar nodes and bilateral lung/R pleural nodules suspicious for mets.  -Brain MRI: negative for mets  -11/1/2022 biopsy L  axillary node: metastatic invasive ductal carcinoma  -Foundation One: no actionable mutations MS stable, TMB 1 mut/mb.   -PDL1 testing: CPS >20% and TPS 60% (considered high PDL1 expression)  -CA 27.29: 59.5 (elevated). CA 15-3: 23 (normal)  -9/10/2023 Brain MRI: 2 new foci of punctate enhancement in left frontal cortex and right lentiform nucleus, concerning for brain metastases (asymptomatic)    NGS (foundation 1)  FGF R1 amplification.  Pazopanib has been approved in other tumors but not in breast cancer.  NTRK 1 amplification.  Not actionable  She has multiple other mutations which are not actionable.      Treatment:  Initial breast cancer (2019)  -2019: Neoadjuvant taxol  -Lumpectomy  -5/2020: Adjuvant RT  -6 - 7/2020: Adjuvant Xeloda. Stopped for rash    Recurrent, stage IV breast cancer (2022)  -11/14/2022:Doxil every 4 weeks  -12/12/2022: changed to doxorubicin every 3 weeks. (Planning addition of pembrolizumab once approved by insurance)  -1/2/2023: doxorubicin + pembrolizumab every 3 weeks. Completed 1 cycle. Stopped for progression (med nodes, slight increase in lung mets and stable R breast mass + axillary nodes).    -1/23/2023 - present: Carbo AUC 5, Gemcitabine 800 mg/m2 and Pembro every 21 days  -cycles 1-2: [place] day 1 only  -Cycle 3: [place] increased to days 1 + 8 given excellent tolerance  -CT after 3 cycles showed excellent response (near CR axillary + med nodes and ME in lung mets; clinical improvement in right breast changes and tumor marker).  -PET scan after 6 cycles of carboplatin gemcitabine pembrolizumab shows complete response    5/30 - 8/22/2023: Maintenance pembrolizumab, until progression (recurrent axillary, thoracic nodes and 2 punctate brain lesions).  Biopsy of left axillary node confirmed recurrent/metastatic breast cancer.    9/12/2023 - 6/18/2024:  Sacituzumab days 1, 8 every 21 days (x 14 cycles). Stopped for progression.  --C2D8 deferred x 1 week for neutropenia  --Dose decreased to  8 mg/kg from cycle 3 onwards.  --Cycles 5-10 were delivered in [place] over winter months  --Resumed Cycle 11 here upon return 4/2024  --After cycle 14, PET showed progressive disease.     7/23/2024 - present: docetaxel 60 mg/m2      Physical Exam    GENERAL: Alert and oriented to time place and person. Seated comfortably. In no distress.  Alone.   HEENT: Resolving alopecia. No scalp rash. No icterus.   Lymph: No evidence of any peripheral adenopathy  Lungs: regular respiratory effort.   Abd: Soft, non-distended.   Neuro: alert and oriented x 3      Lab Results  Recent Results (from the past 168 hour(s))   Comprehensive metabolic panel    Collection Time: 09/04/24  9:22 AM   Result Value Ref Range    Sodium 141 135 - 145 mmol/L    Potassium 3.8 3.4 - 5.3 mmol/L    Carbon Dioxide (CO2) 26 22 - 29 mmol/L    Anion Gap 8 7 - 15 mmol/L    Urea Nitrogen 11.1 8.0 - 23.0 mg/dL    Creatinine 0.57 0.51 - 0.95 mg/dL    GFR Estimate 90 >60 mL/min/1.73m2    Calcium 8.9 8.8 - 10.4 mg/dL    Chloride 107 98 - 107 mmol/L    Glucose 94 70 - 99 mg/dL    Alkaline Phosphatase 87 40 - 150 U/L    AST 24 0 - 45 U/L    ALT 14 0 - 50 U/L    Protein Total 6.5 6.4 - 8.3 g/dL    Albumin 3.5 3.5 - 5.2 g/dL    Bilirubin Total 0.2 <=1.2 mg/dL   CBC with platelets and differential    Collection Time: 09/04/24  9:22 AM   Result Value Ref Range    WBC Count 6.7 4.0 - 11.0 10e3/uL    RBC Count 3.84 3.80 - 5.20 10e6/uL    Hemoglobin 10.8 (L) 11.7 - 15.7 g/dL    Hematocrit 33.3 (L) 35.0 - 47.0 %    MCV 87 78 - 100 fL    MCH 28.1 26.5 - 33.0 pg    MCHC 32.4 31.5 - 36.5 g/dL    RDW 17.8 (H) 10.0 - 15.0 %    Platelet Count 261 150 - 450 10e3/uL    % Neutrophils 71 %    % Lymphocytes 17 %    % Monocytes 11 %    % Eosinophils 1 %    % Basophils 1 %    % Immature Granulocytes 0 %    NRBCs per 100 WBC 0 <1 /100    Absolute Neutrophils 4.7 1.6 - 8.3 10e3/uL    Absolute Lymphocytes 1.1 0.8 - 5.3 10e3/uL    Absolute Monocytes 0.7 0.0 - 1.3 10e3/uL    Absolute  Eosinophils 0.1 0.0 - 0.7 10e3/uL    Absolute Basophils 0.1 0.0 - 0.2 10e3/uL    Absolute Immature Granulocytes 0.0 <=0.4 10e3/uL    Absolute NRBCs 0.0 10e3/uL         Assessment/Plan  Recurrent, stage IV triple negative right breast cancer (nodes, lung); CPS>20% + TPS 60%  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     Therefore, regimen changed to docetaxel (60 mg/m2) as 4th line.  She's completed 2 cycles.     Tolerating fairly well. Neuropathy is slightly more pronounced, especially in fingers.    Labs: Hgb 10.8 (stable), rest CBC WNL. CMP WNL.     CA 27.29 pending (stable/slightly elevated after 2 cycles, 3 weeks ago). No symptoms of progression.    Plan:  -Proceed with cycle 3 at same dose.  -Monitor neuropathy closely and will need dose-modification if progressive.   -Return in 4 weeks with PET scan ahead of cycle 4 (delaying next cycle x 1 week for cataracts surgery)    ADDENDUM:  Insurance will not cover additional PET scan since she's had 4 total since her stage IV cancer diagnosis. Since we can follow the adenopathy response with CT, will change to CT for most surveillance and repeat PET as needed (may need to conduct LMN/appeal as needed for this).     Scalp pruritus  No rash/folliculitis. May be related to hair growing back in +/- irritation from wig.    Plan:  -Claritin/benadryl as needed  -Frequent breaks from wig    Hx pneumonitis  Bilateral opacities noted in both lungs on last 2 PET scans. Indeterminate etiology (?drug-induced, other). Treated with 2 weeks prednisone in early July with improved fatigue. No recurrent symptoms.    Hx HTN      Total time 35 minutes, to include face to face visit, review of EMR, ordering, documentation and coordination of care on date of service.    complexity modifier  for longitudinal care.       Signed by:   Lazara Torres, NP

## 2024-09-04 NOTE — LETTER
9/4/2024      Precious Paris  [address] [place] [address]      Dear Colleague,    Thank you for referring your patient, Precious Paris, to the [facility]. Please see a copy of my visit note below.        [facility] Hematology and Oncology Progress Note    Patient: Precious Paris  MRN: [id number]  Sep 4, 2024          Reason for Visit    Recurrent stage IV triple negative breast cancer (nodes, lung) (PDL1+)    Primary Oncologist: Dr. Beebe    _____________________________________________________________________________    History of Present Illness/ Interval History    Ms. Precious Paris is a 83 year old with recurrent metastatic triple negative breast cancer recurrence. She is on 4th line docetaxel. Returns for cycle 3.     Tolerating docetaxel generally well. No significant side effects.   Tingling in the toes stable. A bit harder to open things with fingers. Non-painful.     Scalp pruritus, no rash. Hair is growing back in.     No cough or SOB. No arthralgias. No nausea. No diarrhea. No fevers.     No new pain. No neuro symptoms.    She will have cataract surgery 9/27.      ECOG PS: 1      Oncology History/Treatment  Diagnosis/Stage:   11/2019: Right breast cancer, triple negative ([id number])    BRCA negative    10/2022: Recurrent, stage IV triple negative breast cancer (bilateral axillary, supraclav, mediastinal nodes + lung)  -persistent/progressive right breast firmness with new right nipple drainage and right axillary adenopathy  -bilateral diagnostic mammogram: increased density R breast with skin thickening. R breast US: 3.2 cm hypoechoic mass at 11:00 position 1 cm from nipple and multiple other small hypoechoic solid-appearing lesions in R breast + right axillary adenopathy (at least two hypoechoic vascular lesions measuring up to 2.5 cm)  -10/18/2022 biopsy R breast mass + R axillary node: grade 3 invasive ductal cancer with tumor  necrosis, ER/NE/HER2 negative.   -PET: + right breast mass; bilateral axillary, retropectoral, supraclavicular, mediastinal/R hilar nodes and bilateral lung/R pleural nodules suspicious for mets.  -Brain MRI: negative for mets  -11/1/2022 biopsy L axillary node: metastatic invasive ductal carcinoma  -Foundation One: no actionable mutations MS stable, TMB 1 mut/mb.   -PDL1 testing: CPS >20% and TPS 60% (considered high PDL1 expression)  -CA 27.29: 59.5 (elevated). CA 15-3: 23 (normal)  -9/10/2023 Brain MRI: 2 new foci of punctate enhancement in left frontal cortex and right lentiform nucleus, concerning for brain metastases (asymptomatic)    NGS (foundation 1)  FGF R1 amplification.  Pazopanib has been approved in other tumors but not in breast cancer.  NTRK 1 amplification.  Not actionable  She has multiple other mutations which are not actionable.      Treatment:  Initial breast cancer (2019)  -2019: Neoadjuvant taxol  -Lumpectomy  -5/2020: Adjuvant RT  -6 - 7/2020: Adjuvant Xeloda. Stopped for rash    Recurrent, stage IV breast cancer (2022)  -11/14/2022:Doxil every 4 weeks  -12/12/2022: changed to doxorubicin every 3 weeks. (Planning addition of pembrolizumab once approved by insurance)  -1/2/2023: doxorubicin + pembrolizumab every 3 weeks. Completed 1 cycle. Stopped for progression (med nodes, slight increase in lung mets and stable R breast mass + axillary nodes).    -1/23/2023 - present: Carbo AUC 5, Gemcitabine 800 mg/m2 and Pembro every 21 days  -cycles 1-2: Juncos day 1 only  -Cycle 3: Juncos increased to days 1 + 8 given excellent tolerance  -CT after 3 cycles showed excellent response (near CR axillary + med nodes and NE in lung mets; clinical improvement in right breast changes and tumor marker).  -PET scan after 6 cycles of carboplatin gemcitabine pembrolizumab shows complete response    5/30 - 8/22/2023: Maintenance pembrolizumab, until progression (recurrent axillary, thoracic nodes and 2 punctate brain  lesions).  Biopsy of left axillary node confirmed recurrent/metastatic breast cancer.    9/12/2023 - 6/18/2024:  Sacituzumab days 1, 8 every 21 days (x 14 cycles). Stopped for progression.  --C2D8 deferred x 1 week for neutropenia  --Dose decreased to 8 mg/kg from cycle 3 onwards.  --Cycles 5-10 were delivered in [place] over winter months  --Resumed Cycle 11 here upon return 4/2024  --After cycle 14, PET showed progressive disease.     7/23/2024 - present: docetaxel 60 mg/m2      Physical Exam    GENERAL: Alert and oriented to time place and person. Seated comfortably. In no distress.  Alone.   HEENT: Resolving alopecia. No scalp rash. No icterus.   Lymph: No evidence of any peripheral adenopathy  Lungs: regular respiratory effort.   Abd: Soft, non-distended.   Neuro: alert and oriented x 3      Lab Results  Recent Results (from the past 168 hour(s))   Comprehensive metabolic panel    Collection Time: 09/04/24  9:22 AM   Result Value Ref Range    Sodium 141 135 - 145 mmol/L    Potassium 3.8 3.4 - 5.3 mmol/L    Carbon Dioxide (CO2) 26 22 - 29 mmol/L    Anion Gap 8 7 - 15 mmol/L    Urea Nitrogen 11.1 8.0 - 23.0 mg/dL    Creatinine 0.57 0.51 - 0.95 mg/dL    GFR Estimate 90 >60 mL/min/1.73m2    Calcium 8.9 8.8 - 10.4 mg/dL    Chloride 107 98 - 107 mmol/L    Glucose 94 70 - 99 mg/dL    Alkaline Phosphatase 87 40 - 150 U/L    AST 24 0 - 45 U/L    ALT 14 0 - 50 U/L    Protein Total 6.5 6.4 - 8.3 g/dL    Albumin 3.5 3.5 - 5.2 g/dL    Bilirubin Total 0.2 <=1.2 mg/dL   CBC with platelets and differential    Collection Time: 09/04/24  9:22 AM   Result Value Ref Range    WBC Count 6.7 4.0 - 11.0 10e3/uL    RBC Count 3.84 3.80 - 5.20 10e6/uL    Hemoglobin 10.8 (L) 11.7 - 15.7 g/dL    Hematocrit 33.3 (L) 35.0 - 47.0 %    MCV 87 78 - 100 fL    MCH 28.1 26.5 - 33.0 pg    MCHC 32.4 31.5 - 36.5 g/dL    RDW 17.8 (H) 10.0 - 15.0 %    Platelet Count 261 150 - 450 10e3/uL    % Neutrophils 71 %    % Lymphocytes 17 %    % Monocytes 11 %     % Eosinophils 1 %    % Basophils 1 %    % Immature Granulocytes 0 %    NRBCs per 100 WBC 0 <1 /100    Absolute Neutrophils 4.7 1.6 - 8.3 10e3/uL    Absolute Lymphocytes 1.1 0.8 - 5.3 10e3/uL    Absolute Monocytes 0.7 0.0 - 1.3 10e3/uL    Absolute Eosinophils 0.1 0.0 - 0.7 10e3/uL    Absolute Basophils 0.1 0.0 - 0.2 10e3/uL    Absolute Immature Granulocytes 0.0 <=0.4 10e3/uL    Absolute NRBCs 0.0 10e3/uL         Assessment/Plan  Recurrent, stage IV triple negative right breast cancer (nodes, lung); CPS>20% + TPS 60%  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     Therefore, regimen changed to docetaxel (60 mg/m2) as 4th line.  She's completed 2 cycles.     Tolerating fairly well. Neuropathy is slightly more pronounced, especially in fingers.    Labs: Hgb 10.8 (stable), rest CBC WNL. CMP WNL.     CA 27.29 pending (stable/slightly elevated after 2 cycles, 3 weeks ago). No symptoms of progression.    Plan:  -Proceed with cycle 3 at same dose.  -Monitor neuropathy closely and will need dose-modification if progressive.   -Return in 4 weeks with PET scan ahead of cycle 4 (delaying next cycle x 1 week for cataracts surgery)    Scalp pruritus  No rash/folliculitis. May be related to hair growing back in +/- irritation from wig.    Plan:  -Claritin/benadryl as needed  -Frequent breaks from wig    Hx pneumonitis  Bilateral opacities noted in both lungs on last 2 PET scans. Indeterminate etiology (?drug-induced, other). Treated with 2 weeks prednisone in early July with improved fatigue. No recurrent symptoms.    Hx HTN      Total time 35 minutes, to include face to face visit, review of EMR, ordering, documentation and coordination of care on date of service.    complexity modifier for longitudinal care.       Signed by:  "  Lazara Torres NP        Oncology Rooming Note    September 4, 2024 9:43 AM   Precious Paris is a 83 year old female who presents for:    Chief Complaint   Patient presents with     Oncology Clinic Visit     Metastatic cancer to axillary lymph nodes - Labs provider and infusion     Initial Vitals: BP (!) 148/56 (BP Location: Right arm, Patient Position: Sitting, Cuff Size: Adult Large)   Pulse 78   Temp 98.2  F (36.8  C) (Tympanic)   Resp 16   Ht 1.638 m (5' 4.5\")   Wt 84.4 kg (186 lb)   SpO2 94%   BMI 31.43 kg/m   Estimated body mass index is 31.43 kg/m  as calculated from the following:    Height as of this encounter: 1.638 m (5' 4.5\").    Weight as of this encounter: 84.4 kg (186 lb). Body surface area is 1.96 meters squared.  No Pain (0) Comment: Data Unavailable   No LMP recorded. Patient is postmenopausal.  Allergies reviewed: Yes  Medications reviewed: Yes    Medications: Medication refills not needed today.  Pharmacy name entered into [facility]: [facility] - [facility] [id number] [facility]    Frailty Screening:   Is the patient here for a new oncology consult visit in cancer care? 2. No      Clinical concerns:  None      Malgorzata Vallejo CMA                Again, thank you for allowing me to participate in the care of your patient.        Sincerely,        Lazara Torres NP  "

## 2024-09-04 NOTE — PROGRESS NOTES
"Oncology Rooming Note    September 4, 2024 9:43 AM   Precious Paris is a 83 year old female who presents for:    Chief Complaint   Patient presents with    Oncology Clinic Visit     Metastatic cancer to axillary lymph nodes - Labs provider and infusion     Initial Vitals: BP (!) 148/56 (BP Location: Right arm, Patient Position: Sitting, Cuff Size: Adult Large)   Pulse 78   Temp 98.2  F (36.8  C) (Tympanic)   Resp 16   Ht 1.638 m (5' 4.5\")   Wt 84.4 kg (186 lb)   SpO2 94%   BMI 31.43 kg/m   Estimated body mass index is 31.43 kg/m  as calculated from the following:    Height as of this encounter: 1.638 m (5' 4.5\").    Weight as of this encounter: 84.4 kg (186 lb). Body surface area is 1.96 meters squared.  No Pain (0) Comment: Data Unavailable   No LMP recorded. Patient is postmenopausal.  Allergies reviewed: Yes  Medications reviewed: Yes    Medications: Medication refills not needed today.  Pharmacy name entered into [facility]: [facility] - [facility] [id number] [facility]    Frailty Screening:   Is the patient here for a new oncology consult visit in cancer care? 2. No      Clinical concerns:  None      Malgorzata Vallejo CMA              "

## 2024-09-04 NOTE — PROGRESS NOTES
Infusion Nursing Note:  Precious VALDES Wellington presents today for C3D1 Taxotere.    Patient seen by provider today: Yes: Lazara Torres NP   present during visit today: Not Applicable.    Note: N/A.      Intravenous Access:  Labs drawn without difficulty.  Implanted Port.    Treatment Conditions:  Lab Results   Component Value Date    HGB 10.8 (L) 09/04/2024    WBC 6.7 09/04/2024    ANEU 0.8 (L) 10/10/2023    ANEUTAUTO 4.7 09/04/2024     09/04/2024        Lab Results   Component Value Date     09/04/2024    POTASSIUM 3.8 09/04/2024    CR 0.57 09/04/2024    YADIRA 8.9 09/04/2024    BILITOTAL 0.2 09/04/2024    ALBUMIN 3.5 09/04/2024    ALT 14 09/04/2024    AST 24 09/04/2024       Results reviewed, labs MET treatment parameters, ok to proceed with treatment.      Post Infusion Assessment:  Patient tolerated infusion without incident.  Blood return noted pre and post infusion.  Site patent and intact, free from redness, edema or discomfort.  No evidence of extravasations.  Access discontinued per protocol.       Discharge Plan:   Patient discharged in stable condition accompanied by: self.  Departure Mode: Ambulatory.  Pt to return on 10/1/24 at 1:00 pm for pac labs followed by clinic visit with Dr. Beebe and then C4D1 Taxotere.      Laura Love RN

## 2024-09-05 LAB — CANCER AG27-29 SERPL-ACNC: 64.7 U/ML

## 2024-09-20 DIAGNOSIS — C78.02 MALIGNANT NEOPLASM METASTATIC TO BOTH LUNGS (H): Primary | ICD-10-CM

## 2024-09-20 DIAGNOSIS — C50.919 PRIMARY MALIGNANT NEOPLASM OF BREAST WITH METASTASIS (H): ICD-10-CM

## 2024-09-20 DIAGNOSIS — C77.3 METASTATIC CANCER TO AXILLARY LYMPH NODES (H): ICD-10-CM

## 2024-09-20 DIAGNOSIS — C78.01 MALIGNANT NEOPLASM METASTATIC TO BOTH LUNGS (H): Primary | ICD-10-CM

## 2024-09-26 ENCOUNTER — HOSPITAL ENCOUNTER (OUTPATIENT)
Dept: CT IMAGING | Facility: CLINIC | Age: 84
Discharge: HOME | End: 2024-09-26
Attending: NURSE PRACTITIONER | Admitting: NURSE PRACTITIONER
Payer: COMMERCIAL

## 2024-09-26 ENCOUNTER — ANESTHESIA EVENT (OUTPATIENT)
Dept: SURGERY | Facility: CLINIC | Age: 84
End: 2024-09-26
Payer: COMMERCIAL

## 2024-09-26 DIAGNOSIS — C50.919 PRIMARY MALIGNANT NEOPLASM OF BREAST WITH METASTASIS (H): ICD-10-CM

## 2024-09-26 DIAGNOSIS — C77.3 METASTATIC CANCER TO AXILLARY LYMPH NODES (H): ICD-10-CM

## 2024-09-26 DIAGNOSIS — C78.02 MALIGNANT NEOPLASM METASTATIC TO BOTH LUNGS (H): ICD-10-CM

## 2024-09-26 DIAGNOSIS — C78.01 MALIGNANT NEOPLASM METASTATIC TO BOTH LUNGS (H): ICD-10-CM

## 2024-09-26 PROCEDURE — 250N000009 HC RX 250: Performed by: NURSE PRACTITIONER

## 2024-09-26 PROCEDURE — 250N000011 HC RX IP 250 OP 636: Performed by: NURSE PRACTITIONER

## 2024-09-26 PROCEDURE — 71260 CT THORAX DX C+: CPT

## 2024-09-26 RX ORDER — IOPAMIDOL 755 MG/ML
92 INJECTION, SOLUTION INTRAVASCULAR ONCE
Status: COMPLETED | OUTPATIENT
Start: 2024-09-26 | End: 2024-09-26

## 2024-09-26 RX ADMIN — SODIUM CHLORIDE 65 ML: 9 INJECTION, SOLUTION INTRAVENOUS at 11:14

## 2024-09-26 RX ADMIN — IOPAMIDOL 92 ML: 755 INJECTION, SOLUTION INTRAVENOUS at 11:14

## 2024-09-26 NOTE — ANESTHESIA PREPROCEDURE EVALUATION
Anesthesia Pre-Procedure Evaluation    Patient: Precious Paris   MRN: [id number] : 1940        Procedure : Procedure(s):  Cataract extraction with intraocular lens placement          Past Medical History:   Diagnosis Date    Allergic rhinitis due to other allergen     Asymptomatic postmenopausal status (age-related) (natural)     Basal cell carcinoma     Breast cancer (H)     Cervical spondylosis without myelopathy     cervical DJD    Disturbances of sensation of smell and taste     anosmia    Diverticulosis of colon (without mention of hemorrhage)     Malignant neoplasm of right breast (H) 3/13/2020    Added automatically from request for surgery [id number]    Other and unspecified hyperlipidemia     Pain in joint, lower leg     Plantar fascial fibromatosis     PURE HYPERCHOLESTEROLEM 2007    PURE HYPERCHOLESTEROLEM 2007    Squamous cell carcinoma       Past Surgical History:   Procedure Laterality Date    BIOPSY      BIOPSY BREAST      INSERT PORT VASCULAR ACCESS N/A 2019    Procedure: INSERTION OF VENOUS ACCESS PORT;  Surgeon: Hair Broderick DO;  Location: [facility] OR    INSERT PORT VASCULAR ACCESS Right 2022    Procedure: INSERTION, VASCULAR ACCESS PORT right subclavian;  Surgeon: Hair Broderick DO;  Location: [facility] OR    JOINT REPLACEMTN, KNEE RT/LT      right    JOINT REPLACEMTN, KNEE RT/LT      left    LIGATE VEIN VARICOSE, PHLEBECTOMY MULTIPLE STAB, COMBINED  10/17/2013    Procedure: COMBINED LIGATE VEIN VARICOSE, PHLEBECTOMY MULTIPLE STAB;  Phlebectomy of Right Knee Varicose Veins;  Surgeon: Andrea Duffy MD;  Location: [facility] OR    LUMPECTOMY BREAST      LUMPECTOMY BREAST WITH SENTINEL NODE, COMBINED Right 3/19/2020    Procedure: LUMPECTOMY, BREAST, WITH SENTINEL LYMPH NODE BIOPSY, Right Lumpectomy With Right Norman Lymph Node Biopsy And Luisa Cath Removal;  Surgeon: Venancio Frausto MD;  Location: [facility] OR    OSTEOTOMY FOOT  2013    Procedure:  OSTEOTOMY FOOT;  Left 2nd metatarsal osteotomy, 2nd Metatarsophalangeal joint & 2nd toe correction;  Surgeon: Jose Phillips DPM;  Location: [facility] OR    PHACOEMULSIFICATION WITH STANDARD INTRAOCULAR LENS IMPLANT Left 12/7/2017    Procedure: PHACOEMULSIFICATION WITH STANDARD INTRAOCULAR LENS IMPLANT;  Left Cataract Removal with Implant;  Surgeon: Mata Deleon MD;  Location: [facility] OR    SURGICAL HISTORY OF -   1979    Stripping of Veins in Left Leg    SURGICAL HISTORY OF -   1970    Bilateral Tubal Ligation    SURGICAL HISTORY OF -   2000    Carpal Tunnel Repair, right    SURGICAL HISTORY OF -   9-12-08    Rt let ablation    ZZHC COLONOSCOPY THRU STOMA, DIAGNOSTIC  04/05    diverticulosis and hemorroids, due 2015      Allergies   Allergen Reactions    Conjugated Estrogens Anaphylaxis and Other (See Comments)     Tightness in throat    Medroxyprogesterone Anaphylaxis and Other (See Comments)     Tightness in throat    Ace Inhibitors Cough    Estrogens Conjugated      Tightness in throat    Provera [Medroxyprogesterone Acetate]      Tightness in throat      Social History     Tobacco Use    Smoking status: Former    Smokeless tobacco: Never    Tobacco comments:     quit 20 years ago   Substance Use Topics    Alcohol use: Yes     Comment: occ.      Wt Readings from Last 1 Encounters:   09/04/24 84.4 kg (186 lb)        Anesthesia Evaluation   Pt has had prior anesthetic.         ROS/MED HX  ENT/Pulmonary:     (+)           allergic rhinitis,                             Neurologic:  - neg neurologic ROS     Cardiovascular:     (+) Dyslipidemia hypertension- -   -  - -                                      METS/Exercise Tolerance:     Hematologic:  - neg hematologic  ROS     Musculoskeletal:   (+)  arthritis,             GI/Hepatic:  - neg GI/hepatic ROS     Renal/Genitourinary:       Endo:  - neg endo ROS     Psychiatric/Substance Use:  - neg psychiatric ROS     Infectious Disease:  - neg infectious disease ROS    "  Malignancy:   (+) Malignancy, History of Breast and Lung.  Lung CA Active status post.      Other:  - neg other ROS          Physical Exam    Airway  airway exam normal      Mallampati: II   TM distance: > 3 FB   Neck ROM: full   Mouth opening: > 3 cm    Respiratory Devices and Support         Dental           Cardiovascular   cardiovascular exam normal       Rhythm and rate: regular and normal     Pulmonary   pulmonary exam normal        breath sounds clear to auscultation           OUTSIDE LABS:  CBC:   Lab Results   Component Value Date    WBC 6.7 09/04/2024    WBC 6.2 08/13/2024    HGB 10.8 (L) 09/04/2024    HGB 10.5 (L) 08/13/2024    HCT 33.3 (L) 09/04/2024    HCT 34.0 (L) 08/13/2024     09/04/2024     08/13/2024     BMP:   Lab Results   Component Value Date     09/04/2024     08/13/2024    POTASSIUM 3.8 09/04/2024    POTASSIUM 4.0 08/13/2024    CHLORIDE 107 09/04/2024    CHLORIDE 102 08/13/2024    CO2 26 09/04/2024    CO2 26 08/13/2024    BUN 11.1 09/04/2024    BUN 15.1 08/13/2024    CR 0.57 09/04/2024    CR 0.60 08/13/2024    GLC 94 09/04/2024     (H) 08/13/2024     COAGS:   Lab Results   Component Value Date    INR 1.2 (A) 09/16/2011     POC: No results found for: \"BGM\", \"HCG\", \"HCGS\"  HEPATIC:   Lab Results   Component Value Date    ALBUMIN 3.5 09/04/2024    PROTTOTAL 6.5 09/04/2024    ALT 14 09/04/2024    AST 24 09/04/2024    ALKPHOS 87 09/04/2024    BILITOTAL 0.2 09/04/2024     OTHER:   Lab Results   Component Value Date    LACT 0.9 06/23/2024    A1C 5.6 06/30/2022    YADIRA 8.9 09/04/2024    TSH 0.96 06/17/2024    T4 1.14 07/18/2007    SED 16 06/14/2006       Anesthesia Plan    ASA Status:  2                     Consents    Anesthesia Plan(s) and associated risks, benefits, and realistic alternatives discussed. Questions answered and patient/representative(s) expressed understanding.     - Discussed: Risks, Benefits and Alternatives for BOTH SEDATION and the PROCEDURE were " "discussed     - Discussed with:  Patient       - Patient is DNR/DNI Status: No     Use of blood products discussed: No .     Postoperative Care            Comments:               KONG Mittal CRNA    I have reviewed the pertinent notes and labs in the chart from the past 30 days and (re)examined the patient.  Any updates or changes from those notes are reflected in this note.              # Obesity: Estimated body mass index is 31.43 kg/m  as calculated from the following:    Height as of 9/4/24: 1.638 m (5' 4.5\").    Weight as of 9/4/24: 84.4 kg (186 lb).      "

## 2024-09-27 ENCOUNTER — ANESTHESIA (OUTPATIENT)
Dept: SURGERY | Facility: CLINIC | Age: 84
End: 2024-09-27
Payer: COMMERCIAL

## 2024-09-27 ENCOUNTER — HOSPITAL ENCOUNTER (OUTPATIENT)
Facility: CLINIC | Age: 84
Discharge: HOME | End: 2024-09-27
Attending: OPHTHALMOLOGY | Admitting: OPHTHALMOLOGY
Payer: COMMERCIAL

## 2024-09-27 VITALS
SYSTOLIC BLOOD PRESSURE: 120 MMHG | OXYGEN SATURATION: 97 % | HEIGHT: 64 IN | RESPIRATION RATE: 16 BRPM | WEIGHT: 186 LBS | DIASTOLIC BLOOD PRESSURE: 73 MMHG | HEART RATE: 104 BPM | TEMPERATURE: 97.8 F | BODY MASS INDEX: 31.76 KG/M2

## 2024-09-27 PROCEDURE — V2632 POST CHMBR INTRAOCULAR LENS: HCPCS | Performed by: OPHTHALMOLOGY

## 2024-09-27 PROCEDURE — 761N000008 HC RECOVERY CATRACT PACKAGE: Performed by: OPHTHALMOLOGY

## 2024-09-27 PROCEDURE — 250N000011 HC RX IP 250 OP 636: Performed by: OPHTHALMOLOGY

## 2024-09-27 PROCEDURE — 360N000007 HC CATARACT SURGICAL PACKAGE: Performed by: OPHTHALMOLOGY

## 2024-09-27 PROCEDURE — 250N000009 HC RX 250: Performed by: OPHTHALMOLOGY

## 2024-09-27 PROCEDURE — 272N000001 HC OR GENERAL SUPPLY STERILE: Performed by: OPHTHALMOLOGY

## 2024-09-27 DEVICE — EYE IMP IOL AMO PCL TECNIS ZCB00 22.0: Type: IMPLANTABLE DEVICE | Site: EYE | Status: FUNCTIONAL

## 2024-09-27 RX ORDER — OXYCODONE HYDROCHLORIDE 5 MG/1
5 TABLET ORAL
Status: DISCONTINUED | OUTPATIENT
Start: 2024-09-27 | End: 2024-09-27 | Stop reason: HOSPADM

## 2024-09-27 RX ORDER — OXYCODONE HYDROCHLORIDE 5 MG/1
10 TABLET ORAL
Status: DISCONTINUED | OUTPATIENT
Start: 2024-09-27 | End: 2024-09-27 | Stop reason: HOSPADM

## 2024-09-27 RX ORDER — NALOXONE HYDROCHLORIDE 0.4 MG/ML
0.1 INJECTION, SOLUTION INTRAMUSCULAR; INTRAVENOUS; SUBCUTANEOUS
Status: DISCONTINUED | OUTPATIENT
Start: 2024-09-27 | End: 2024-09-27 | Stop reason: HOSPADM

## 2024-09-27 RX ORDER — LIDOCAINE HYDROCHLORIDE 20 MG/ML
JELLY TOPICAL PRN
Status: DISCONTINUED | OUTPATIENT
Start: 2024-09-27 | End: 2024-09-27 | Stop reason: HOSPADM

## 2024-09-27 RX ORDER — DEXAMETHASONE SODIUM PHOSPHATE 4 MG/ML
4 INJECTION, SOLUTION INTRA-ARTICULAR; INTRALESIONAL; INTRAMUSCULAR; INTRAVENOUS; SOFT TISSUE
Status: DISCONTINUED | OUTPATIENT
Start: 2024-09-27 | End: 2024-09-27 | Stop reason: HOSPADM

## 2024-09-27 RX ORDER — PROPARACAINE HYDROCHLORIDE 5 MG/ML
SOLUTION/ DROPS OPHTHALMIC PRN
Status: DISCONTINUED | OUTPATIENT
Start: 2024-09-27 | End: 2024-09-27 | Stop reason: HOSPADM

## 2024-09-27 RX ORDER — TROPICAMIDE 10 MG/ML
1 SOLUTION/ DROPS OPHTHALMIC
Status: COMPLETED | OUTPATIENT
Start: 2024-09-27 | End: 2024-09-27

## 2024-09-27 RX ORDER — BALANCED SALT SOLUTION 6.4; .75; .48; .3; 3.9; 1.7 MG/ML; MG/ML; MG/ML; MG/ML; MG/ML; MG/ML
SOLUTION OPHTHALMIC PRN
Status: DISCONTINUED | OUTPATIENT
Start: 2024-09-27 | End: 2024-09-27 | Stop reason: HOSPADM

## 2024-09-27 RX ORDER — ONDANSETRON 2 MG/ML
4 INJECTION INTRAMUSCULAR; INTRAVENOUS EVERY 30 MIN PRN
Status: DISCONTINUED | OUTPATIENT
Start: 2024-09-27 | End: 2024-09-27 | Stop reason: HOSPADM

## 2024-09-27 RX ORDER — SODIUM CHLORIDE, SODIUM LACTATE, POTASSIUM CHLORIDE, CALCIUM CHLORIDE 600; 310; 30; 20 MG/100ML; MG/100ML; MG/100ML; MG/100ML
INJECTION, SOLUTION INTRAVENOUS CONTINUOUS
Status: DISCONTINUED | OUTPATIENT
Start: 2024-09-27 | End: 2024-09-27 | Stop reason: HOSPADM

## 2024-09-27 RX ORDER — LIDOCAINE 40 MG/G
CREAM TOPICAL
Status: DISCONTINUED | OUTPATIENT
Start: 2024-09-27 | End: 2024-09-27 | Stop reason: HOSPADM

## 2024-09-27 RX ORDER — ONDANSETRON 4 MG/1
4 TABLET, ORALLY DISINTEGRATING ORAL EVERY 30 MIN PRN
Status: DISCONTINUED | OUTPATIENT
Start: 2024-09-27 | End: 2024-09-27 | Stop reason: HOSPADM

## 2024-09-27 RX ADMIN — TROPICAMIDE 1 DROP: 10 SOLUTION/ DROPS OPHTHALMIC at 07:54

## 2024-09-27 RX ADMIN — CYCLOPENTOLATE HYDROCHLORIDE AND PHENYLEPHRINE HYDROCHLORIDE 1 DROP: 2; 10 SOLUTION/ DROPS OPHTHALMIC at 07:44

## 2024-09-27 RX ADMIN — CYCLOPENTOLATE HYDROCHLORIDE AND PHENYLEPHRINE HYDROCHLORIDE 1 DROP: 2; 10 SOLUTION/ DROPS OPHTHALMIC at 07:51

## 2024-09-27 RX ADMIN — TROPICAMIDE 1 DROP: 10 SOLUTION/ DROPS OPHTHALMIC at 07:51

## 2024-09-27 RX ADMIN — TROPICAMIDE 1 DROP: 10 SOLUTION/ DROPS OPHTHALMIC at 07:44

## 2024-09-27 RX ADMIN — CYCLOPENTOLATE HYDROCHLORIDE AND PHENYLEPHRINE HYDROCHLORIDE 1 DROP: 2; 10 SOLUTION/ DROPS OPHTHALMIC at 07:54

## 2024-09-27 ASSESSMENT — ACTIVITIES OF DAILY LIVING (ADL)
ADLS_ACUITY_SCORE: 31
ADLS_ACUITY_SCORE: 31

## 2024-09-27 NOTE — OP NOTE
OPHTHALMOLOGY OPERATIVE NOTE    PATIENT: Precious Paris  DATE OF SURGERY: 9/27/2024  PREOPERATIVE DIAGNOSIS:  Senile Nuclear Cataract, Right eye  POSTOPERATIVE DIAGNOSIS:  Senile Nuclear Cataract, Right eye  OPERATIVE PROCEDURE:  Phacoemulsification with placement of intraocular lens  SURGEON:  Danielito Curry MD  ANESTHESIA:  Topical  EBL:  None  SPECIMENS:  None  COMPLICATIONS:  None    PROCEDURE:  The patient was brought to [facility] operating room at [facility].  The right eye was prepped and draped in the usual fashion for cataract surgery.  A wire lid speculum was inserted.  A super sharp blade was used to make a paracentesis at the 11 O'clock position.  The super sharp blade was used to make a partial thickness temporal groove, which was 3 mm in length.  0.8 mL of non-preserved epi-Shugarcaine was injected into the anterior chamber.  Viscoelastic was used to inflate the anterior chamber through a cannula.  A 2.5 mm microkeratome was used to make a temporal clear corneal incision in a two-plane fashion.  A cystotome needle and forceps were used to make a capsulorrhexis.  Hydrodissection and hydrodelineation were performed with Balance Salt Solution.  The lens was then phacoemulsified and removed without complications.  The cortical material was removed with bimanual irrigation and aspiration.  The capsular bag was filled with viscoelastic.  A posterior chamber intraocular lens, preselected and recorded, was folded and inserted into the capsular bag.  The viscoelastic was removed with the irrigation and aspiration tip.  Balanced Salt Solution with Vigamox, 150mg/0.1mL, was used to refill the anterior chamber.  The wounds were checked for water tightness and required no suture.  The wire lid speculum was removed.  The patient's right eye was cleaned and a drop of each post-operative drop was placed, followed by a solomon shield.  The patient tolerated the procedure well, and there were  no complications.      Danielito Curry MD

## 2024-10-01 ENCOUNTER — LAB (OUTPATIENT)
Dept: INFUSION THERAPY | Facility: CLINIC | Age: 84
End: 2024-10-01
Attending: INTERNAL MEDICINE
Payer: COMMERCIAL

## 2024-10-01 ENCOUNTER — ONCOLOGY VISIT (OUTPATIENT)
Dept: ONCOLOGY | Facility: CLINIC | Age: 84
End: 2024-10-01
Attending: INTERNAL MEDICINE
Payer: COMMERCIAL

## 2024-10-01 ENCOUNTER — INFUSION THERAPY VISIT (OUTPATIENT)
Dept: INFUSION THERAPY | Facility: CLINIC | Age: 84
End: 2024-10-01
Attending: NURSE PRACTITIONER
Payer: COMMERCIAL

## 2024-10-01 VITALS — HEART RATE: 99 BPM | SYSTOLIC BLOOD PRESSURE: 142 MMHG | DIASTOLIC BLOOD PRESSURE: 80 MMHG

## 2024-10-01 VITALS
RESPIRATION RATE: 16 BRPM | WEIGHT: 188 LBS | HEIGHT: 65 IN | SYSTOLIC BLOOD PRESSURE: 135 MMHG | HEART RATE: 115 BPM | TEMPERATURE: 98.8 F | OXYGEN SATURATION: 94 % | BODY MASS INDEX: 31.32 KG/M2 | DIASTOLIC BLOOD PRESSURE: 61 MMHG

## 2024-10-01 DIAGNOSIS — C50.411 MALIGNANT NEOPLASM OF UPPER-OUTER QUADRANT OF RIGHT BREAST IN FEMALE, ESTROGEN RECEPTOR NEGATIVE (H): ICD-10-CM

## 2024-10-01 DIAGNOSIS — Z17.1 MALIGNANT NEOPLASM OF UPPER-OUTER QUADRANT OF RIGHT BREAST IN FEMALE, ESTROGEN RECEPTOR NEGATIVE (H): ICD-10-CM

## 2024-10-01 DIAGNOSIS — Z08 ENCOUNTER FOR FOLLOW-UP EXAMINATION AFTER COMPLETED TREATMENT FOR MALIGNANT NEOPLASM: Primary | ICD-10-CM

## 2024-10-01 DIAGNOSIS — C50.919 PRIMARY MALIGNANT NEOPLASM OF BREAST WITH METASTASIS (H): ICD-10-CM

## 2024-10-01 DIAGNOSIS — C78.02 MALIGNANT NEOPLASM METASTATIC TO BOTH LUNGS (H): ICD-10-CM

## 2024-10-01 DIAGNOSIS — T45.1X5A CHEMOTHERAPY-INDUCED NEUROPATHY (H): ICD-10-CM

## 2024-10-01 DIAGNOSIS — Z08 ENCOUNTER FOR FOLLOW-UP EXAMINATION AFTER COMPLETED TREATMENT FOR MALIGNANT NEOPLASM: ICD-10-CM

## 2024-10-01 DIAGNOSIS — G62.0 CHEMOTHERAPY-INDUCED NEUROPATHY (H): ICD-10-CM

## 2024-10-01 DIAGNOSIS — C50.411 MALIGNANT NEOPLASM OF UPPER-OUTER QUADRANT OF RIGHT BREAST IN FEMALE, ESTROGEN RECEPTOR NEGATIVE (H): Primary | ICD-10-CM

## 2024-10-01 DIAGNOSIS — Z17.1 MALIGNANT NEOPLASM OF UPPER-OUTER QUADRANT OF RIGHT BREAST IN FEMALE, ESTROGEN RECEPTOR NEGATIVE (H): Primary | ICD-10-CM

## 2024-10-01 DIAGNOSIS — C78.01 MALIGNANT NEOPLASM METASTATIC TO BOTH LUNGS (H): ICD-10-CM

## 2024-10-01 LAB
ALBUMIN SERPL BCG-MCNC: 3.6 G/DL (ref 3.5–5.2)
ALP SERPL-CCNC: 79 U/L (ref 40–150)
ALT SERPL W P-5'-P-CCNC: 13 U/L (ref 0–50)
ANION GAP SERPL CALCULATED.3IONS-SCNC: 11 MMOL/L (ref 7–15)
AST SERPL W P-5'-P-CCNC: 23 U/L (ref 0–45)
BASOPHILS # BLD AUTO: 0.1 10E3/UL (ref 0–0.2)
BASOPHILS NFR BLD AUTO: 1 %
BILIRUB SERPL-MCNC: 0.2 MG/DL
BUN SERPL-MCNC: 11.3 MG/DL (ref 8–23)
CALCIUM SERPL-MCNC: 9.3 MG/DL (ref 8.8–10.4)
CHLORIDE SERPL-SCNC: 105 MMOL/L (ref 98–107)
CREAT SERPL-MCNC: 0.73 MG/DL (ref 0.51–0.95)
EGFRCR SERPLBLD CKD-EPI 2021: 82 ML/MIN/1.73M2
EOSINOPHIL # BLD AUTO: 0.2 10E3/UL (ref 0–0.7)
EOSINOPHIL NFR BLD AUTO: 2 %
ERYTHROCYTE [DISTWIDTH] IN BLOOD BY AUTOMATED COUNT: 18.1 % (ref 10–15)
GLUCOSE SERPL-MCNC: 147 MG/DL (ref 70–99)
HCO3 SERPL-SCNC: 27 MMOL/L (ref 22–29)
HCT VFR BLD AUTO: 34.8 % (ref 35–47)
HGB BLD-MCNC: 11.1 G/DL (ref 11.7–15.7)
IMM GRANULOCYTES # BLD: 0 10E3/UL
IMM GRANULOCYTES NFR BLD: 0 %
LYMPHOCYTES # BLD AUTO: 1.3 10E3/UL (ref 0.8–5.3)
LYMPHOCYTES NFR BLD AUTO: 19 %
MCH RBC QN AUTO: 27.7 PG (ref 26.5–33)
MCHC RBC AUTO-ENTMCNC: 31.9 G/DL (ref 31.5–36.5)
MCV RBC AUTO: 87 FL (ref 78–100)
MONOCYTES # BLD AUTO: 0.8 10E3/UL (ref 0–1.3)
MONOCYTES NFR BLD AUTO: 12 %
NEUTROPHILS # BLD AUTO: 4.6 10E3/UL (ref 1.6–8.3)
NEUTROPHILS NFR BLD AUTO: 66 %
NRBC # BLD AUTO: 0 10E3/UL
NRBC BLD AUTO-RTO: 0 /100
PLATELET # BLD AUTO: 297 10E3/UL (ref 150–450)
POTASSIUM SERPL-SCNC: 3.9 MMOL/L (ref 3.4–5.3)
PROT SERPL-MCNC: 6.9 G/DL (ref 6.4–8.3)
RBC # BLD AUTO: 4.01 10E6/UL (ref 3.8–5.2)
SODIUM SERPL-SCNC: 143 MMOL/L (ref 135–145)
WBC # BLD AUTO: 7 10E3/UL (ref 4–11)

## 2024-10-01 PROCEDURE — 85004 AUTOMATED DIFF WBC COUNT: CPT | Performed by: NURSE PRACTITIONER

## 2024-10-01 PROCEDURE — 99214 OFFICE O/P EST MOD 30 MIN: CPT | Performed by: INTERNAL MEDICINE

## 2024-10-01 PROCEDURE — G2211 COMPLEX E/M VISIT ADD ON: HCPCS | Performed by: INTERNAL MEDICINE

## 2024-10-01 PROCEDURE — 80053 COMPREHEN METABOLIC PANEL: CPT | Performed by: NURSE PRACTITIONER

## 2024-10-01 PROCEDURE — 96375 TX/PRO/DX INJ NEW DRUG ADDON: CPT

## 2024-10-01 PROCEDURE — 96413 CHEMO IV INFUSION 1 HR: CPT

## 2024-10-01 PROCEDURE — 250N000011 HC RX IP 250 OP 636: Performed by: INTERNAL MEDICINE

## 2024-10-01 PROCEDURE — 36591 DRAW BLOOD OFF VENOUS DEVICE: CPT | Performed by: NURSE PRACTITIONER

## 2024-10-01 PROCEDURE — 258N000003 HC RX IP 258 OP 636: Performed by: INTERNAL MEDICINE

## 2024-10-01 PROCEDURE — G0463 HOSPITAL OUTPT CLINIC VISIT: HCPCS | Performed by: INTERNAL MEDICINE

## 2024-10-01 PROCEDURE — 86300 IMMUNOASSAY TUMOR CA 15-3: CPT | Performed by: NURSE PRACTITIONER

## 2024-10-01 RX ORDER — ALBUTEROL SULFATE 90 UG/1
1-2 AEROSOL, METERED RESPIRATORY (INHALATION)
Status: CANCELLED
Start: 2024-10-01

## 2024-10-01 RX ORDER — MEPERIDINE HYDROCHLORIDE 25 MG/ML
25 INJECTION INTRAMUSCULAR; INTRAVENOUS; SUBCUTANEOUS EVERY 30 MIN PRN
Status: CANCELLED | OUTPATIENT
Start: 2024-10-01

## 2024-10-01 RX ORDER — LORAZEPAM 2 MG/ML
0.5 INJECTION INTRAMUSCULAR EVERY 4 HOURS PRN
Status: CANCELLED | OUTPATIENT
Start: 2024-10-01

## 2024-10-01 RX ORDER — ALBUTEROL SULFATE 0.83 MG/ML
2.5 SOLUTION RESPIRATORY (INHALATION)
Status: CANCELLED | OUTPATIENT
Start: 2024-10-01

## 2024-10-01 RX ORDER — HEPARIN SODIUM (PORCINE) LOCK FLUSH IV SOLN 100 UNIT/ML 100 UNIT/ML
5 SOLUTION INTRAVENOUS
Status: CANCELLED | OUTPATIENT
Start: 2024-10-01

## 2024-10-01 RX ORDER — HEPARIN SODIUM,PORCINE 10 UNIT/ML
5-20 VIAL (ML) INTRAVENOUS DAILY PRN
Status: CANCELLED | OUTPATIENT
Start: 2024-10-01

## 2024-10-01 RX ORDER — DIPHENHYDRAMINE HYDROCHLORIDE 50 MG/ML
50 INJECTION INTRAMUSCULAR; INTRAVENOUS
Status: CANCELLED
Start: 2024-10-01

## 2024-10-01 RX ORDER — HEPARIN SODIUM (PORCINE) LOCK FLUSH IV SOLN 100 UNIT/ML 100 UNIT/ML
5 SOLUTION INTRAVENOUS
Status: DISCONTINUED | OUTPATIENT
Start: 2024-10-01 | End: 2024-10-01 | Stop reason: HOSPADM

## 2024-10-01 RX ORDER — METHYLPREDNISOLONE SODIUM SUCCINATE 125 MG/2ML
125 INJECTION, POWDER, LYOPHILIZED, FOR SOLUTION INTRAMUSCULAR; INTRAVENOUS
Status: CANCELLED
Start: 2024-10-01

## 2024-10-01 RX ORDER — EPINEPHRINE 1 MG/ML
0.3 INJECTION, SOLUTION, CONCENTRATE INTRAVENOUS EVERY 5 MIN PRN
Status: CANCELLED | OUTPATIENT
Start: 2024-10-01

## 2024-10-01 RX ADMIN — DEXAMETHASONE SODIUM PHOSPHATE: 10 INJECTION, SOLUTION INTRAMUSCULAR; INTRAVENOUS at 14:21

## 2024-10-01 RX ADMIN — DOCETAXEL 120 MG: 20 INJECTION, SOLUTION, CONCENTRATE INTRAVENOUS at 14:38

## 2024-10-01 RX ADMIN — FAMOTIDINE 20 MG: 10 INJECTION, SOLUTION INTRAVENOUS at 14:20

## 2024-10-01 RX ADMIN — Medication 5 ML: at 15:42

## 2024-10-01 RX ADMIN — SODIUM CHLORIDE 250 ML: 9 INJECTION, SOLUTION INTRAVENOUS at 14:20

## 2024-10-01 ASSESSMENT — PAIN SCALES - GENERAL: PAINLEVEL: NO PAIN (0)

## 2024-10-01 NOTE — PROGRESS NOTES
"Oncology Rooming Note    October 1, 2024 1:23 PM   Precious Paris is a 83 year old female who presents for:    Chief Complaint   Patient presents with    Oncology Clinic Visit     Metastatic breast cancer - Labs provider and infusion      Initial Vitals: /61 (BP Location: Left arm, Patient Position: Sitting, Cuff Size: Adult Large)   Pulse 115   Temp 98.8  F (37.1  C) (Tympanic)   Resp 16   Ht 1.661 m (5' 5.4\")   Wt 85.3 kg (188 lb)   SpO2 94%   BMI 30.90 kg/m   Estimated body mass index is 30.9 kg/m  as calculated from the following:    Height as of this encounter: 1.661 m (5' 5.4\").    Weight as of this encounter: 85.3 kg (188 lb). Body surface area is 1.98 meters squared.  No Pain (0) Comment: Data Unavailable   No LMP recorded. Patient is postmenopausal.  Allergies reviewed: Yes  Medications reviewed: Yes    Medications: Medication refills not needed today.  [facility] into [facility] [id number] [facility]    Frailty Screening:   Is the patient here for a new oncology consult visit in cancer care? 2. No      Clinical concerns:  Patient states she breaks out in little rashes occasionally. She thinks she is developing lymphedema in her right arm again.       Malgorzata Vallejo, AXEL            "

## 2024-10-01 NOTE — PROGRESS NOTES
Infusion Nursing Note:  Precious Paris presents today for C4D1 Taxotere.    Patient seen by provider today: Yes   present during visit today: Not Applicable.    Note: N/A.      Intravenous Access:  Implanted Port.    Treatment Conditions:  Lab Results   Component Value Date    HGB 11.1 (L) 10/01/2024    WBC 7.0 10/01/2024    ANEU 0.8 (L) 10/10/2023    ANEUTAUTO 4.6 10/01/2024     10/01/2024        Lab Results   Component Value Date     10/01/2024    POTASSIUM 3.9 10/01/2024    CR 0.73 10/01/2024    YADIRA 9.3 10/01/2024    BILITOTAL 0.2 10/01/2024    ALBUMIN 3.6 10/01/2024    ALT 13 10/01/2024    AST 23 10/01/2024       Results reviewed, labs MET treatment parameters, ok to proceed with treatment.      Post Infusion Assessment:  Patient tolerated infusion without incident.  Blood return noted pre and post infusion.  Site patent and intact, free from redness, edema or discomfort.  No evidence of extravasations.  Access discontinued per protocol.       Discharge Plan:   Patient discharged in stable condition accompanied by: daughter.  Departure Mode: Ambulatory.      Roula Flores RN

## 2024-10-01 NOTE — PROGRESS NOTES
[facility] Hematology and Oncology Progress Note    Patient: Precious Paris  MRN: [id number]  Oct 1, 2024          Reason for Visit    Recurrent stage IV triple negative breast cancer (nodes, lung) (PDL1+)    Primary Oncologist: Dr. Beebe    _____________________________________________________________________________    History of Present Illness/ Interval History    Ms. Precious Paris is a 83 year old with recurrent metastatic triple negative breast cancer recurrence. She is on 4th line docetaxel. Returns for cycle 4.     Tolerating docetaxel generally well. No significant side effects.   Mild peripheral neuropathy.  Has mild skin rash which comes and goes. Grade 1.    Here with repeat CT.      ECOG PS: 1      Oncology History/Treatment  Diagnosis/Stage:   11/2019: Right breast cancer, triple negative ([id number])    BRCA negative    10/2022: Recurrent, stage IV triple negative breast cancer (bilateral axillary, supraclav, mediastinal nodes + lung)  -persistent/progressive right breast firmness with new right nipple drainage and right axillary adenopathy  -bilateral diagnostic mammogram: increased density R breast with skin thickening. R breast US: 3.2 cm hypoechoic mass at 11:00 position 1 cm from nipple and multiple other small hypoechoic solid-appearing lesions in R breast + right axillary adenopathy (at least two hypoechoic vascular lesions measuring up to 2.5 cm)  -10/18/2022 biopsy R breast mass + R axillary node: grade 3 invasive ductal cancer with tumor necrosis, ER/IA/HER2 negative.   -PET: + right breast mass; bilateral axillary, retropectoral, supraclavicular, mediastinal/R hilar nodes and bilateral lung/R pleural nodules suspicious for mets.  -Brain MRI: negative for mets  -11/1/2022 biopsy L axillary node: metastatic invasive ductal carcinoma  -Foundation One: no actionable mutations MS stable, TMB 1 mut/mb.   -PDL1 testing: CPS >20% and TPS 60% (considered high PDL1  expression)  -CA 27.29: 59.5 (elevated). CA 15-3: 23 (normal)  -9/10/2023 Brain MRI: 2 new foci of punctate enhancement in left frontal cortex and right lentiform nucleus, concerning for brain metastases (asymptomatic)    NGS (foundation 1)  FGF R1 amplification.  Pazopanib has been approved in other tumors but not in breast cancer.  NTRK 1 amplification.  Not actionable  She has multiple other mutations which are not actionable.      Treatment:  Initial breast cancer (2019)  -2019: Neoadjuvant taxol  -Lumpectomy  -5/2020: Adjuvant RT  -6 - 7/2020: Adjuvant Xeloda. Stopped for rash    Recurrent, stage IV breast cancer (2022)  -11/14/2022:Doxil every 4 weeks  -12/12/2022: changed to doxorubicin every 3 weeks. (Planning addition of pembrolizumab once approved by insurance)  -1/2/2023: doxorubicin + pembrolizumab every 3 weeks. Completed 1 cycle. Stopped for progression (med nodes, slight increase in lung mets and stable R breast mass + axillary nodes).    -1/23/2023 - present: Carbo AUC 5, Gemcitabine 800 mg/m2 and Pembro every 21 days  -cycles 1-2: Turner day 1 only  -Cycle 3: Turner increased to days 1 + 8 given excellent tolerance  -CT after 3 cycles showed excellent response (near CR axillary + med nodes and [place] in lung mets; clinical improvement in right breast changes and tumor marker).  -PET scan after 6 cycles of carboplatin gemcitabine pembrolizumab shows complete response    5/30 - 8/22/2023: Maintenance pembrolizumab, until progression (recurrent axillary, thoracic nodes and 2 punctate brain lesions).  Biopsy of left axillary node confirmed recurrent/metastatic breast cancer.    9/12/2023 - 6/18/2024:  Sacituzumab days 1, 8 every 21 days (x 14 cycles). Stopped for progression.  --C2D8 deferred x 1 week for neutropenia  --Dose decreased to 8 mg/kg from cycle 3 onwards.  --Cycles 5-10 were delivered in [place] over winter months  --Resumed Cycle 11 here upon return 4/2024  --After cycle 14, PET showed progressive  disease.     7/23/2024 - present: docetaxel 60 mg/m2      Physical Exam    GENERAL: Alert and oriented to time place and person. Seated comfortably. In no distress.  Alone.   HEENT: Resolving alopecia. No scalp rash. No icterus.   Lymph: left axillary adenopathy noted  Lungs: regular respiratory effort.   Abd: Soft, non-distended.   Neuro: alert and oriented x 3      Lab Results  Recent Results (from the past 168 hour(s))   CBC with platelets and differential    Collection Time: 10/01/24  1:08 PM   Result Value Ref Range    WBC Count 7.0 4.0 - 11.0 10e3/uL    RBC Count 4.01 3.80 - 5.20 10e6/uL    Hemoglobin 11.1 (L) 11.7 - 15.7 g/dL    Hematocrit 34.8 (L) 35.0 - 47.0 %    MCV 87 78 - 100 fL    MCH 27.7 26.5 - 33.0 pg    MCHC 31.9 31.5 - 36.5 g/dL    RDW 18.1 (H) 10.0 - 15.0 %    Platelet Count 297 150 - 450 10e3/uL    % Neutrophils 66 %    % Lymphocytes 19 %    % Monocytes 12 %    % Eosinophils 2 %    % Basophils 1 %    % Immature Granulocytes 0 %    NRBCs per 100 WBC 0 <1 /100    Absolute Neutrophils 4.6 1.6 - 8.3 10e3/uL    Absolute Lymphocytes 1.3 0.8 - 5.3 10e3/uL    Absolute Monocytes 0.8 0.0 - 1.3 10e3/uL    Absolute Eosinophils 0.2 0.0 - 0.7 10e3/uL    Absolute Basophils 0.1 0.0 - 0.2 10e3/uL    Absolute Immature Granulocytes 0.0 <=0.4 10e3/uL    Absolute NRBCs 0.0 10e3/uL         Assessment/Plan  Recurrent, stage IV triple negative right breast cancer (nodes, lung); CPS>20% + TPS 60%  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     Therefore, regimen changed to docetaxel (60 mg/m2) as 4th line.  She's completed 3 cycles.     Really well on docetaxel.  No significant side effects except for some neuropathy.  This is not debilitating.  Probably grade 1.  Here with repeat CT scan.  I personally reviewed CT scan  images and report and independently interpreted the results.  Compared to the PET scan done in July, current CT scan shows mostly stable disease but some of the lymph nodes in the chest have slightly increased in size.  The left axilla adenopathy has slightly increased and is currently measuring 1.2 cm compared to 0.8 cm in the past.  Similarly adenopathy in the mediastinum has slightly increased.  No evidence of any new disease.  So overall I will probably call this as stable disease.  Will continue current management.  Plan is to repeat CT or PET after 2 more cycles.  If there is any progressive disease then we will have to switch treatment to a different chemo.    Has some pedal edema and right arm lymphedema. Recommended compression sleeve. No extra salt.    Hx pneumonitis  Bilateral opacities noted in both lungs on last 2 PET scans. Indeterminate etiology (?drug-induced, other). Treated with 2 weeks prednisone in early July with improved fatigue. No recurrent symptoms.      The longitudinal plan of care for the diagnosis(es)/condition(s) as documented were addressed during this visit. Due to the added complexity in care, I will continue to support Precious in the subsequent management and with ongoing continuity of care.      Signed by:   Maricarmen Beebe MD

## 2024-10-01 NOTE — LETTER
"10/1/2024      Precious Paris  [address] [place] [address]      Dear Colleague,    Thank you for referring your patient, Precious Paris, to the [facility]. Please see a copy of my visit note below.    Oncology Rooming Note    October 1, 2024 1:23 PM   Precious Paris is a 83 year old female who presents for:    Chief Complaint   Patient presents with     Oncology Clinic Visit     Metastatic breast cancer - Labs provider and infusion      Initial Vitals: /61 (BP Location: Left arm, Patient Position: Sitting, Cuff Size: Adult Large)   Pulse 115   Temp 98.8  F (37.1  C) (Tympanic)   Resp 16   Ht 1.661 m (5' 5.4\")   Wt 85.3 kg (188 lb)   SpO2 94%   BMI 30.90 kg/m   Estimated body mass index is 30.9 kg/m  as calculated from the following:    Height as of this encounter: 1.661 m (5' 5.4\").    Weight as of this encounter: 85.3 kg (188 lb). Body surface area is 1.98 meters squared.  No Pain (0) Comment: Data Unavailable   No LMP recorded. Patient is postmenopausal.  Allergies reviewed: Yes  Medications reviewed: Yes    Medications: Medication refills not needed today.  Pharmacy name entered into [employer]: [facility] - [place] - [address] [facility]    Frailty Screening:   Is the patient here for a new oncology consult visit in cancer care? 2. No      Clinical concerns:  Patient states she breaks out in little rashes occasionally. She thinks she is developing lymphedema in her right arm again.       Malgorzata Vallejo, [facility] Hematology and Oncology Progress Note    Patient: Precious Paris  MRN: [id number]  Oct 1, 2024          Reason for Visit    Recurrent stage IV triple negative breast cancer (nodes, lung) (PDL1+)    Primary Oncologist: Dr. Beebe    _____________________________________________________________________________    History of Present Illness/ Interval History    Ms. Precious VALDES" Wellington is a 83 year old with recurrent metastatic triple negative breast cancer recurrence. She is on 4th line docetaxel. Returns for cycle 4.     Tolerating docetaxel generally well. No significant side effects.   Mild peripheral neuropathy.  Has mild skin rash which comes and goes. Grade 1.    Here with repeat CT.      ECOG PS: 1      Oncology History/Treatment  Diagnosis/Stage:   11/2019: Right breast cancer, triple negative ([id number])    BRCA negative    10/2022: Recurrent, stage IV triple negative breast cancer (bilateral axillary, supraclav, mediastinal nodes + lung)  -persistent/progressive right breast firmness with new right nipple drainage and right axillary adenopathy  -bilateral diagnostic mammogram: increased density R breast with skin thickening. R breast US: 3.2 cm hypoechoic mass at 11:00 position 1 cm from nipple and multiple other small hypoechoic solid-appearing lesions in R breast + right axillary adenopathy (at least two hypoechoic vascular lesions measuring up to 2.5 cm)  -10/18/2022 biopsy R breast mass + R axillary node: grade 3 invasive ductal cancer with tumor necrosis, ER/IL/HER2 negative.   -PET: + right breast mass; bilateral axillary, retropectoral, supraclavicular, mediastinal/R hilar nodes and bilateral lung/R pleural nodules suspicious for mets.  -Brain MRI: negative for mets  -11/1/2022 biopsy L axillary node: metastatic invasive ductal carcinoma  -Foundation One: no actionable mutations MS stable, TMB 1 mut/mb.   -PDL1 testing: CPS >20% and TPS 60% (considered high PDL1 expression)  -CA 27.29: 59.5 (elevated). CA 15-3: 23 (normal)  -9/10/2023 Brain MRI: 2 new foci of punctate enhancement in left frontal cortex and right lentiform nucleus, concerning for brain metastases (asymptomatic)    NGS (foundation 1)  FGF R1 amplification.  Pazopanib has been approved in other tumors but not in breast cancer.  NTRK 1 amplification.  Not actionable  She has multiple other mutations which are  not actionable.      Treatment:  Initial breast cancer (2019)  -2019: Neoadjuvant taxol  -Lumpectomy  -5/2020: Adjuvant RT  -6 - 7/2020: Adjuvant Xeloda. Stopped for rash    Recurrent, stage IV breast cancer (2022)  -11/14/2022:Doxil every 4 weeks  -12/12/2022: changed to doxorubicin every 3 weeks. (Planning addition of pembrolizumab once approved by insurance)  -1/2/2023: doxorubicin + pembrolizumab every 3 weeks. Completed 1 cycle. Stopped for progression (med nodes, slight increase in lung mets and stable R breast mass + axillary nodes).    -1/23/2023 - present: Carbo AUC 5, Gemcitabine 800 mg/m2 and Pembro every 21 days  -cycles 1-2: Shoshone day 1 only  -Cycle 3: Shoshone increased to days 1 + 8 given excellent tolerance  -CT after 3 cycles showed excellent response (near CR axillary + med nodes and [place] in lung mets; clinical improvement in right breast changes and tumor marker).  -PET scan after 6 cycles of carboplatin gemcitabine pembrolizumab shows complete response    5/30 - 8/22/2023: Maintenance pembrolizumab, until progression (recurrent axillary, thoracic nodes and 2 punctate brain lesions).  Biopsy of left axillary node confirmed recurrent/metastatic breast cancer.    9/12/2023 - 6/18/2024:  Sacituzumab days 1, 8 every 21 days (x 14 cycles). Stopped for progression.  --C2D8 deferred x 1 week for neutropenia  --Dose decreased to 8 mg/kg from cycle 3 onwards.  --Cycles 5-10 were delivered in [place] over winter months  --Resumed Cycle 11 here upon return 4/2024  --After cycle 14, PET showed progressive disease.     7/23/2024 - present: docetaxel 60 mg/m2      Physical Exam    GENERAL: Alert and oriented to time place and person. Seated comfortably. In no distress.  Alone.   HEENT: Resolving alopecia. No scalp rash. No icterus.   Lymph: left axillary adenopathy noted  Lungs: regular respiratory effort.   Abd: Soft, non-distended.   Neuro: alert and oriented x 3      Lab Results  Recent Results (from the past 168  hour(s))   CBC with platelets and differential    Collection Time: 10/01/24  1:08 PM   Result Value Ref Range    WBC Count 7.0 4.0 - 11.0 10e3/uL    RBC Count 4.01 3.80 - 5.20 10e6/uL    Hemoglobin 11.1 (L) 11.7 - 15.7 g/dL    Hematocrit 34.8 (L) 35.0 - 47.0 %    MCV 87 78 - 100 fL    MCH 27.7 26.5 - 33.0 pg    MCHC 31.9 31.5 - 36.5 g/dL    RDW 18.1 (H) 10.0 - 15.0 %    Platelet Count 297 150 - 450 10e3/uL    % Neutrophils 66 %    % Lymphocytes 19 %    % Monocytes 12 %    % Eosinophils 2 %    % Basophils 1 %    % Immature Granulocytes 0 %    NRBCs per 100 WBC 0 <1 /100    Absolute Neutrophils 4.6 1.6 - 8.3 10e3/uL    Absolute Lymphocytes 1.3 0.8 - 5.3 10e3/uL    Absolute Monocytes 0.8 0.0 - 1.3 10e3/uL    Absolute Eosinophils 0.2 0.0 - 0.7 10e3/uL    Absolute Basophils 0.1 0.0 - 0.2 10e3/uL    Absolute Immature Granulocytes 0.0 <=0.4 10e3/uL    Absolute NRBCs 0.0 10e3/uL         Assessment/Plan  Recurrent, stage IV triple negative right breast cancer (nodes, lung); CPS>20% + TPS 60%  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     Therefore, regimen changed to docetaxel (60 mg/m2) as 4th line.  She's completed 3 cycles.     Really well on docetaxel.  No significant side effects except for some neuropathy.  This is not debilitating.  Probably grade 1.  Here with repeat CT scan.  I personally reviewed CT scan images and report and independently interpreted the results.  Compared to the PET scan done in July, current CT scan shows mostly stable disease but some of the lymph nodes in the chest have slightly increased in size.  The left axilla adenopathy has slightly increased and is currently measuring 1.2 cm compared to 0.8 cm in the past.  Similarly adenopathy in the mediastinum has slightly increased.  No evidence of any  new disease.  So overall I will probably call this as stable disease.  Will continue current management.  Plan is to repeat CT or PET after 2 more cycles.  If there is any progressive disease then we will have to switch treatment to a different chemo.    Has some pedal edema and right arm lymphedema. Recommended compression sleeve. No extra salt.    Hx pneumonitis  Bilateral opacities noted in both lungs on last 2 PET scans. Indeterminate etiology (?drug-induced, other). Treated with 2 weeks prednisone in early July with improved fatigue. No recurrent symptoms.      The longitudinal plan of care for the diagnosis(es)/condition(s) as documented were addressed during this visit. Due to the added complexity in care, I will continue to support Precious in the subsequent management and with ongoing continuity of care.      Signed by:   Maricarmen Beebe MD           Again, thank you for allowing me to participate in the care of your patient.        Sincerely,        Maricarmen Beebe MD

## 2024-10-03 LAB — CANCER AG27-29 SERPL-ACNC: 62.9 U/ML

## 2024-10-15 ENCOUNTER — TELEPHONE (OUTPATIENT)
Dept: FAMILY MEDICINE | Facility: CLINIC | Age: 84
End: 2024-10-15
Payer: COMMERCIAL

## 2024-10-15 NOTE — TELEPHONE ENCOUNTER
Dedra called to let us know she spoke with patient and patient decided to stop her losartan- HCTZ. Wanted to make sure we knew.   RN notified her that she told her provider at the visit in August and it was discontinued off of the medication list.   Patrica Betts RN on 10/15/2024 at 12:15 PM

## 2024-10-21 ENCOUNTER — PATIENT OUTREACH (OUTPATIENT)
Dept: CARE COORDINATION | Facility: CLINIC | Age: 84
End: 2024-10-21
Payer: COMMERCIAL

## 2024-10-22 ENCOUNTER — ONCOLOGY VISIT (OUTPATIENT)
Dept: ONCOLOGY | Facility: CLINIC | Age: 84
End: 2024-10-22
Attending: INTERNAL MEDICINE
Payer: COMMERCIAL

## 2024-10-22 ENCOUNTER — LAB (OUTPATIENT)
Dept: INFUSION THERAPY | Facility: CLINIC | Age: 84
End: 2024-10-22
Attending: INTERNAL MEDICINE
Payer: COMMERCIAL

## 2024-10-22 VITALS — SYSTOLIC BLOOD PRESSURE: 128 MMHG | DIASTOLIC BLOOD PRESSURE: 65 MMHG | HEART RATE: 97 BPM

## 2024-10-22 VITALS
DIASTOLIC BLOOD PRESSURE: 70 MMHG | TEMPERATURE: 98 F | BODY MASS INDEX: 31.67 KG/M2 | OXYGEN SATURATION: 94 % | SYSTOLIC BLOOD PRESSURE: 117 MMHG | WEIGHT: 190.1 LBS | HEIGHT: 65 IN | HEART RATE: 104 BPM | RESPIRATION RATE: 12 BRPM

## 2024-10-22 DIAGNOSIS — T45.1X5A CHEMOTHERAPY-INDUCED NEUROPATHY (H): ICD-10-CM

## 2024-10-22 DIAGNOSIS — G62.0 CHEMOTHERAPY-INDUCED NEUROPATHY (H): ICD-10-CM

## 2024-10-22 DIAGNOSIS — Z08 ENCOUNTER FOR FOLLOW-UP EXAMINATION AFTER COMPLETED TREATMENT FOR MALIGNANT NEOPLASM: ICD-10-CM

## 2024-10-22 DIAGNOSIS — C78.02 MALIGNANT NEOPLASM METASTATIC TO BOTH LUNGS (H): ICD-10-CM

## 2024-10-22 DIAGNOSIS — C50.919 PRIMARY MALIGNANT NEOPLASM OF BREAST WITH METASTASIS (H): ICD-10-CM

## 2024-10-22 DIAGNOSIS — C50.411 MALIGNANT NEOPLASM OF UPPER-OUTER QUADRANT OF RIGHT BREAST IN FEMALE, ESTROGEN RECEPTOR NEGATIVE (H): Primary | ICD-10-CM

## 2024-10-22 DIAGNOSIS — C78.01 MALIGNANT NEOPLASM METASTATIC TO BOTH LUNGS (H): ICD-10-CM

## 2024-10-22 DIAGNOSIS — Z17.1 MALIGNANT NEOPLASM OF UPPER-OUTER QUADRANT OF RIGHT BREAST IN FEMALE, ESTROGEN RECEPTOR NEGATIVE (H): Primary | ICD-10-CM

## 2024-10-22 LAB
ALBUMIN SERPL BCG-MCNC: 3.4 G/DL (ref 3.5–5.2)
ALP SERPL-CCNC: 78 U/L (ref 40–150)
ALT SERPL W P-5'-P-CCNC: 11 U/L (ref 0–50)
ANION GAP SERPL CALCULATED.3IONS-SCNC: 11 MMOL/L (ref 7–15)
AST SERPL W P-5'-P-CCNC: 24 U/L (ref 0–45)
BASOPHILS # BLD AUTO: 0.1 10E3/UL (ref 0–0.2)
BASOPHILS NFR BLD AUTO: 1 %
BILIRUB SERPL-MCNC: 0.2 MG/DL
BUN SERPL-MCNC: 13.7 MG/DL (ref 8–23)
CALCIUM SERPL-MCNC: 9.1 MG/DL (ref 8.8–10.4)
CHLORIDE SERPL-SCNC: 106 MMOL/L (ref 98–107)
CREAT SERPL-MCNC: 0.65 MG/DL (ref 0.51–0.95)
EGFRCR SERPLBLD CKD-EPI 2021: 87 ML/MIN/1.73M2
EOSINOPHIL # BLD AUTO: 0 10E3/UL (ref 0–0.7)
EOSINOPHIL NFR BLD AUTO: 0 %
ERYTHROCYTE [DISTWIDTH] IN BLOOD BY AUTOMATED COUNT: 17.8 % (ref 10–15)
GLUCOSE SERPL-MCNC: 112 MG/DL (ref 70–99)
HCO3 SERPL-SCNC: 26 MMOL/L (ref 22–29)
HCT VFR BLD AUTO: 33.2 % (ref 35–47)
HGB BLD-MCNC: 10.8 G/DL (ref 11.7–15.7)
IMM GRANULOCYTES # BLD: 0 10E3/UL
IMM GRANULOCYTES NFR BLD: 0 %
LYMPHOCYTES # BLD AUTO: 1 10E3/UL (ref 0.8–5.3)
LYMPHOCYTES NFR BLD AUTO: 19 %
MCH RBC QN AUTO: 28.1 PG (ref 26.5–33)
MCHC RBC AUTO-ENTMCNC: 32.5 G/DL (ref 31.5–36.5)
MCV RBC AUTO: 86 FL (ref 78–100)
MONOCYTES # BLD AUTO: 0.8 10E3/UL (ref 0–1.3)
MONOCYTES NFR BLD AUTO: 14 %
NEUTROPHILS # BLD AUTO: 3.7 10E3/UL (ref 1.6–8.3)
NEUTROPHILS NFR BLD AUTO: 66 %
NRBC # BLD AUTO: 0 10E3/UL
NRBC BLD AUTO-RTO: 0 /100
PLATELET # BLD AUTO: 299 10E3/UL (ref 150–450)
POTASSIUM SERPL-SCNC: 3.5 MMOL/L (ref 3.4–5.3)
PROT SERPL-MCNC: 6.3 G/DL (ref 6.4–8.3)
RBC # BLD AUTO: 3.85 10E6/UL (ref 3.8–5.2)
SODIUM SERPL-SCNC: 143 MMOL/L (ref 135–145)
WBC # BLD AUTO: 5.6 10E3/UL (ref 4–11)

## 2024-10-22 PROCEDURE — 82040 ASSAY OF SERUM ALBUMIN: CPT | Performed by: INTERNAL MEDICINE

## 2024-10-22 PROCEDURE — 36591 DRAW BLOOD OFF VENOUS DEVICE: CPT | Performed by: INTERNAL MEDICINE

## 2024-10-22 PROCEDURE — 250N000011 HC RX IP 250 OP 636: Performed by: INTERNAL MEDICINE

## 2024-10-22 PROCEDURE — G2211 COMPLEX E/M VISIT ADD ON: HCPCS | Performed by: INTERNAL MEDICINE

## 2024-10-22 PROCEDURE — 85004 AUTOMATED DIFF WBC COUNT: CPT | Performed by: INTERNAL MEDICINE

## 2024-10-22 PROCEDURE — 96367 TX/PROPH/DG ADDL SEQ IV INF: CPT

## 2024-10-22 PROCEDURE — 96413 CHEMO IV INFUSION 1 HR: CPT

## 2024-10-22 PROCEDURE — G0463 HOSPITAL OUTPT CLINIC VISIT: HCPCS | Performed by: INTERNAL MEDICINE

## 2024-10-22 PROCEDURE — 258N000003 HC RX IP 258 OP 636: Performed by: INTERNAL MEDICINE

## 2024-10-22 PROCEDURE — 99215 OFFICE O/P EST HI 40 MIN: CPT | Performed by: INTERNAL MEDICINE

## 2024-10-22 PROCEDURE — 96375 TX/PRO/DX INJ NEW DRUG ADDON: CPT

## 2024-10-22 PROCEDURE — 86300 IMMUNOASSAY TUMOR CA 15-3: CPT | Performed by: INTERNAL MEDICINE

## 2024-10-22 RX ORDER — HEPARIN SODIUM (PORCINE) LOCK FLUSH IV SOLN 100 UNIT/ML 100 UNIT/ML
5 SOLUTION INTRAVENOUS
Status: CANCELLED | OUTPATIENT
Start: 2024-10-22

## 2024-10-22 RX ORDER — MEPERIDINE HYDROCHLORIDE 25 MG/ML
25 INJECTION INTRAMUSCULAR; INTRAVENOUS; SUBCUTANEOUS EVERY 30 MIN PRN
Status: CANCELLED | OUTPATIENT
Start: 2024-10-22

## 2024-10-22 RX ORDER — HEPARIN SODIUM (PORCINE) LOCK FLUSH IV SOLN 100 UNIT/ML 100 UNIT/ML
SOLUTION INTRAVENOUS
Status: DISCONTINUED
Start: 2024-10-22 | End: 2024-10-22 | Stop reason: HOSPADM

## 2024-10-22 RX ORDER — METHYLPREDNISOLONE SODIUM SUCCINATE 125 MG/2ML
125 INJECTION INTRAMUSCULAR; INTRAVENOUS
Status: CANCELLED
Start: 2024-10-22

## 2024-10-22 RX ORDER — HEPARIN SODIUM,PORCINE 10 UNIT/ML
5-20 VIAL (ML) INTRAVENOUS DAILY PRN
Status: CANCELLED | OUTPATIENT
Start: 2024-10-22

## 2024-10-22 RX ORDER — HEPARIN SODIUM (PORCINE) LOCK FLUSH IV SOLN 100 UNIT/ML 100 UNIT/ML
5 SOLUTION INTRAVENOUS
Status: DISCONTINUED | OUTPATIENT
Start: 2024-10-22 | End: 2024-10-22 | Stop reason: HOSPADM

## 2024-10-22 RX ORDER — ALBUTEROL SULFATE 0.83 MG/ML
2.5 SOLUTION RESPIRATORY (INHALATION)
Status: CANCELLED | OUTPATIENT
Start: 2024-10-22

## 2024-10-22 RX ORDER — LORAZEPAM 2 MG/ML
0.5 INJECTION INTRAMUSCULAR EVERY 4 HOURS PRN
Status: CANCELLED | OUTPATIENT
Start: 2024-10-22

## 2024-10-22 RX ORDER — EPINEPHRINE 1 MG/ML
0.3 INJECTION, SOLUTION, CONCENTRATE INTRAVENOUS EVERY 5 MIN PRN
Status: CANCELLED | OUTPATIENT
Start: 2024-10-22

## 2024-10-22 RX ORDER — ALBUTEROL SULFATE 90 UG/1
1-2 INHALANT RESPIRATORY (INHALATION)
Status: CANCELLED
Start: 2024-10-22

## 2024-10-22 RX ORDER — DIPHENHYDRAMINE HYDROCHLORIDE 50 MG/ML
50 INJECTION INTRAMUSCULAR; INTRAVENOUS
Status: CANCELLED
Start: 2024-10-22

## 2024-10-22 RX ORDER — DEXAMETHASONE 4 MG/1
8 TABLET ORAL 2 TIMES DAILY WITH MEALS
Qty: 6 TABLET | Refills: 2 | Status: SHIPPED | OUTPATIENT
Start: 2024-10-22

## 2024-10-22 RX ADMIN — DEXAMETHASONE SODIUM PHOSPHATE: 10 INJECTION, SOLUTION INTRAMUSCULAR; INTRAVENOUS at 09:37

## 2024-10-22 RX ADMIN — SODIUM CHLORIDE 250 ML: 9 INJECTION, SOLUTION INTRAVENOUS at 09:37

## 2024-10-22 RX ADMIN — Medication 5 ML: at 11:01

## 2024-10-22 RX ADMIN — DOCETAXEL 120 MG: 20 INJECTION, SOLUTION, CONCENTRATE INTRAVENOUS at 09:57

## 2024-10-22 RX ADMIN — FAMOTIDINE 20 MG: 10 INJECTION, SOLUTION INTRAVENOUS at 09:56

## 2024-10-22 ASSESSMENT — PAIN SCALES - GENERAL: PAINLEVEL: NO PAIN (0)

## 2024-10-22 NOTE — PROGRESS NOTES
[facility] Hematology and Oncology Progress Note    Patient: Precious Paris  MRN: [id number]  Oct 22, 2024          Reason for Visit    Recurrent stage IV triple negative breast cancer (nodes, lung) (PDL1+)    Primary Oncologist: Dr. Beebe    _____________________________________________________________________________    History of Present Illness/ Interval History    Ms. Precious Paris is a 83 year old with recurrent metastatic triple negative breast cancer recurrence. She is on 4th line docetaxel.     Tolerating docetaxel generally well. No significant side effects.   Neuropathy is slightly worse in the feet. Still grade 1-2. Also has grade 1 skin rash which is also pretty stable.  CT scan done after 3 cycles showed overall stable disease on docetaxel.  Here with labs prior to cycle 5.    Recently had cataract surgery.  Doing well.  No other major issues reported today.    ECOG PS: 1      Oncology History/Treatment  Diagnosis/Stage:   11/2019: Right breast cancer, triple negative ([id number])    BRCA negative    10/2022: Recurrent, stage IV triple negative breast cancer (bilateral axillary, supraclav, mediastinal nodes + lung)  -persistent/progressive right breast firmness with new right nipple drainage and right axillary adenopathy  -bilateral diagnostic mammogram: increased density R breast with skin thickening. R breast US: 3.2 cm hypoechoic mass at 11:00 position 1 cm from nipple and multiple other small hypoechoic solid-appearing lesions in R breast + right axillary adenopathy (at least two hypoechoic vascular lesions measuring up to 2.5 cm)  -10/18/2022 biopsy R breast mass + R axillary node: grade 3 invasive ductal cancer with tumor necrosis, ER/UT/HER2 negative.   -PET: + right breast mass; bilateral axillary, retropectoral, supraclavicular, mediastinal/R hilar nodes and bilateral lung/R pleural nodules suspicious for mets.  -Brain MRI: negative for mets  -11/1/2022 biopsy L  axillary node: metastatic invasive ductal carcinoma  -Foundation One: no actionable mutations MS stable, TMB 1 mut/mb.   -PDL1 testing: CPS >20% and TPS 60% (considered high PDL1 expression)  -CA 27.29: 59.5 (elevated). CA 15-3: 23 (normal)  -9/10/2023 Brain MRI: 2 new foci of punctate enhancement in left frontal cortex and right lentiform nucleus, concerning for brain metastases (asymptomatic)    NGS (foundation 1)  FGF R1 amplification.  Pazopanib has been approved in other tumors but not in breast cancer.  NTRK 1 amplification.  Not actionable  She has multiple other mutations which are not actionable.      Treatment:  Initial breast cancer (2019)  -2019: Neoadjuvant taxol  -Lumpectomy  -5/2020: Adjuvant RT  -6 - 7/2020: Adjuvant Xeloda. Stopped for rash    Recurrent, stage IV breast cancer (2022)  -11/14/2022:Doxil every 4 weeks  -12/12/2022: changed to doxorubicin every 3 weeks. (Planning addition of pembrolizumab once approved by insurance)  -1/2/2023: doxorubicin + pembrolizumab every 3 weeks. Completed 1 cycle. Stopped for progression (med nodes, slight increase in lung mets and stable R breast mass + axillary nodes).    -1/23/2023 - present: Carbo AUC 5, Gemcitabine 800 mg/m2 and Pembro every 21 days  -cycles 1-2: [place] day 1 only  -Cycle 3: [place] increased to days 1 + 8 given excellent tolerance  -CT after 3 cycles showed excellent response (near CR axillary + med nodes and NM in lung mets; clinical improvement in right breast changes and tumor marker).  -PET scan after 6 cycles of carboplatin gemcitabine pembrolizumab shows complete response    5/30 - 8/22/2023: Maintenance pembrolizumab, until progression (recurrent axillary, thoracic nodes and 2 punctate brain lesions).  Biopsy of left axillary node confirmed recurrent/metastatic breast cancer.    9/12/2023 - 6/18/2024:  Sacituzumab days 1, 8 every 21 days (x 14 cycles). Stopped for progression.  --C2D8 deferred x 1 week for neutropenia  --Dose decreased to  8 mg/kg from cycle 3 onwards.  --Cycles 5-10 were delivered in [place] over winter months  --Resumed Cycle 11 here upon return 4/2024  --After cycle 14, PET showed progressive disease.     7/23/2024 - present: docetaxel 60 mg/m2      Physical Exam    GENERAL: Alert and oriented to time place and person. Seated comfortably. In no distress.  Alone.   HEENT: Resolving alopecia. No scalp rash. No icterus.   Lymph: left axillary adenopathy noted, slightly larger than last time  Lungs: regular respiratory effort.   Abd: Soft, non-distended.   Neuro: alert and oriented x 3      Lab Results  Recent Results (from the past 168 hour(s))   Comprehensive metabolic panel    Collection Time: 10/22/24  8:12 AM   Result Value Ref Range    Sodium 143 135 - 145 mmol/L    Potassium 3.5 3.4 - 5.3 mmol/L    Carbon Dioxide (CO2) 26 22 - 29 mmol/L    Anion Gap 11 7 - 15 mmol/L    Urea Nitrogen 13.7 8.0 - 23.0 mg/dL    Creatinine 0.65 0.51 - 0.95 mg/dL    GFR Estimate 87 >60 mL/min/1.73m2    Calcium 9.1 8.8 - 10.4 mg/dL    Chloride 106 98 - 107 mmol/L    Glucose 112 (H) 70 - 99 mg/dL    Alkaline Phosphatase 78 40 - 150 U/L    AST 24 0 - 45 U/L    ALT 11 0 - 50 U/L    Protein Total 6.3 (L) 6.4 - 8.3 g/dL    Albumin 3.4 (L) 3.5 - 5.2 g/dL    Bilirubin Total 0.2 <=1.2 mg/dL   CBC with platelets and differential    Collection Time: 10/22/24  8:12 AM   Result Value Ref Range    WBC Count 5.6 4.0 - 11.0 10e3/uL    RBC Count 3.85 3.80 - 5.20 10e6/uL    Hemoglobin 10.8 (L) 11.7 - 15.7 g/dL    Hematocrit 33.2 (L) 35.0 - 47.0 %    MCV 86 78 - 100 fL    MCH 28.1 26.5 - 33.0 pg    MCHC 32.5 31.5 - 36.5 g/dL    RDW 17.8 (H) 10.0 - 15.0 %    Platelet Count 299 150 - 450 10e3/uL    % Neutrophils 66 %    % Lymphocytes 19 %    % Monocytes 14 %    % Eosinophils 0 %    % Basophils 1 %    % Immature Granulocytes 0 %    NRBCs per 100 WBC 0 <1 /100    Absolute Neutrophils 3.7 1.6 - 8.3 10e3/uL    Absolute Lymphocytes 1.0 0.8 - 5.3 10e3/uL    Absolute Monocytes 0.8  0.0 - 1.3 10e3/uL    Absolute Eosinophils 0.0 0.0 - 0.7 10e3/uL    Absolute Basophils 0.1 0.0 - 0.2 10e3/uL    Absolute Immature Granulocytes 0.0 <=0.4 10e3/uL    Absolute NRBCs 0.0 10e3/uL           Assessment/Plan  Recurrent, stage IV triple negative right breast cancer (nodes, lung); CPS>20% + TPS 60%  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     Therefore, regimen changed to docetaxel (60 mg/m2) as 4th line.  She's completed 4 cycles.     Has done fairly well on docetaxel without any major side effects.  Interim CT scan done after 3 cycles showed stable disease versus slight increase in size of the lymph nodes.  But overall it was not consistent with overt disease progression.  So I continued docetaxel.  Now she has done 4 cycles and here with labs prior to cycle 5.  Plan was to repeat imaging after 5 cycles.    Peripheral neuropathy is slightly worse in her feet.  But otherwise no other major issues with docetaxel.  On clinical exam today she does have slightly enlarged left axilla adenopathy which seems to be larger than last time.  Will obtain a PET scan prior to next cycle and decide on further treatment.  If she has stable disease and will continue docetaxel.  If not then we will have to switch to a different chemo.  Options include vinorelbine, eribulin, capecitabine or ixabepilone.  Previously she had capecitabine in the adjuvant setting which was stopped due to rash.  She did well with carboplatin and gemcitabine in the past in the metastatic setting and treatment was discontinued after she had an excellent response and while put on Keytruda maintenance.  So technically we could potentially treat her with the same regimen.  But if the disease progression is not significant then favor oral Cape.    Pedal  edema is pretty stable.  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  Recommended compression sleeves and compression stockings.    Hx pneumonitis  Bilateral opacities noted in both lungs on last 2 PET scans. Indeterminate etiology (?drug-induced, other). Treated with 2 weeks prednisone in early July with improved fatigue. No recurrent symptoms.      The longitudinal plan of care for the diagnosis(es)/condition(s) as documented were addressed during this visit. Due to the added complexity in care, I will continue to support Precious in the subsequent management and with ongoing continuity of care.    I independently reviewed and interpreted lab studies , reviewed pertinent notes from physicians and care providers from other specialties and ordered lab tests.    A total of 40 min was spent today on this visit which included face to face conversation with the patient, EMR review, extensive literature reviewcounseling and co-ordination of care and medical documentation.        Signed by:   Maricarmen Beebe MD

## 2024-10-22 NOTE — PROGRESS NOTES
"Oncology Rooming Note    October 22, 2024 8:18 AM   Precious Paris is a 83 year old female who presents for:    Chief Complaint   Patient presents with    Oncology Clinic Visit     Primary malignant neoplasm of breast with metastasis  - labs, provider, infusion     Initial Vitals: /70 (BP Location: Right arm, Patient Position: Sitting, Cuff Size: Adult Regular)   Pulse 104   Temp 98  F (36.7  C) (Tympanic)   Resp 12   Ht 1.661 m (5' 5.4\")   Wt 86.2 kg (190 lb 1.6 oz)   SpO2 94%   BMI 31.25 kg/m   Estimated body mass index is 31.25 kg/m  as calculated from the following:    Height as of this encounter: 1.661 m (5' 5.4\").    Weight as of this encounter: 86.2 kg (190 lb 1.6 oz). Body surface area is 1.99 meters squared.  No Pain (0) Comment: Data Unavailable   No LMP recorded. Patient is postmenopausal.  Allergies reviewed: Yes  Medications reviewed: Yes    Medications: MEDICATION REFILLS NEEDED TODAY. Provider was notified.  Pharmacy name entered into [facility]: [facility] [id number] [facility]    Frailty Screening:   Is the patient here for a new oncology consult visit in cancer care? 2. No      Clinical concerns: Refill dexamethasone.        Erin Thomas MA              "

## 2024-10-22 NOTE — LETTER
10/22/2024      Precious Paris  [address] [place] [address]      Dear Colleague,    Thank you for referring your patient, Precious Paris, to the [facility]. Please see a copy of my visit note below.        [facility] Hematology and Oncology Progress Note    Patient: Precious Paris  MRN: [id number]  Oct 22, 2024          Reason for Visit    Recurrent stage IV triple negative breast cancer (nodes, lung) (PDL1+)    Primary Oncologist: Dr. Beebe    _____________________________________________________________________________    History of Present Illness/ Interval History    Ms. Precious Paris is a 83 year old with recurrent metastatic triple negative breast cancer recurrence. She is on 4th line docetaxel.     Tolerating docetaxel generally well. No significant side effects.   Neuropathy is slightly worse in the feet. Still grade 1-2. Also has grade 1 skin rash which is also pretty stable.  CT scan done after 3 cycles showed overall stable disease on docetaxel.  Here with labs prior to cycle 5.    Recently had cataract surgery.  Doing well.  No other major issues reported today.    ECOG PS: 1      Oncology History/Treatment  Diagnosis/Stage:   11/2019: Right breast cancer, triple negative ([id number])    BRCA negative    10/2022: Recurrent, stage IV triple negative breast cancer (bilateral axillary, supraclav, mediastinal nodes + lung)  -persistent/progressive right breast firmness with new right nipple drainage and right axillary adenopathy  -bilateral diagnostic mammogram: increased density R breast with skin thickening. R breast US: 3.2 cm hypoechoic mass at 11:00 position 1 cm from nipple and multiple other small hypoechoic solid-appearing lesions in R breast + right axillary adenopathy (at least two hypoechoic vascular lesions measuring up to 2.5 cm)  -10/18/2022 biopsy R breast mass + R axillary node: grade 3 invasive ductal cancer with tumor  necrosis, ER/AZ/HER2 negative.   -PET: + right breast mass; bilateral axillary, retropectoral, supraclavicular, mediastinal/R hilar nodes and bilateral lung/R pleural nodules suspicious for mets.  -Brain MRI: negative for mets  -11/1/2022 biopsy L axillary node: metastatic invasive ductal carcinoma  -Foundation One: no actionable mutations MS stable, TMB 1 mut/mb.   -PDL1 testing: CPS >20% and TPS 60% (considered high PDL1 expression)  -CA 27.29: 59.5 (elevated). CA 15-3: 23 (normal)  -9/10/2023 Brain MRI: 2 new foci of punctate enhancement in left frontal cortex and right lentiform nucleus, concerning for brain metastases (asymptomatic)    NGS (foundation 1)  FGF R1 amplification.  Pazopanib has been approved in other tumors but not in breast cancer.  NTRK 1 amplification.  Not actionable  She has multiple other mutations which are not actionable.      Treatment:  Initial breast cancer (2019)  -2019: Neoadjuvant taxol  -Lumpectomy  -5/2020: Adjuvant RT  -6 - 7/2020: Adjuvant Xeloda. Stopped for rash    Recurrent, stage IV breast cancer (2022)  -11/14/2022:Doxil every 4 weeks  -12/12/2022: changed to doxorubicin every 3 weeks. (Planning addition of pembrolizumab once approved by insurance)  -1/2/2023: doxorubicin + pembrolizumab every 3 weeks. Completed 1 cycle. Stopped for progression (med nodes, slight increase in lung mets and stable R breast mass + axillary nodes).    -1/23/2023 - present: Carbo AUC 5, Gemcitabine 800 mg/m2 and Pembro every 21 days  -cycles 1-2: [place] day 1 only  -Cycle 3: [place] increased to days 1 + 8 given excellent tolerance  -CT after 3 cycles showed excellent response (near CR axillary + med nodes and AZ in lung mets; clinical improvement in right breast changes and tumor marker).  -PET scan after 6 cycles of carboplatin gemcitabine pembrolizumab shows complete response    5/30 - 8/22/2023: Maintenance pembrolizumab, until progression (recurrent axillary, thoracic nodes and 2 punctate brain  lesions).  Biopsy of left axillary node confirmed recurrent/metastatic breast cancer.    9/12/2023 - 6/18/2024:  Sacituzumab days 1, 8 every 21 days (x 14 cycles). Stopped for progression.  --C2D8 deferred x 1 week for neutropenia  --Dose decreased to 8 mg/kg from cycle 3 onwards.  --Cycles 5-10 were delivered in [place] over winter months  --Resumed Cycle 11 here upon return 4/2024  --After cycle 14, PET showed progressive disease.     7/23/2024 - present: docetaxel 60 mg/m2      Physical Exam    GENERAL: Alert and oriented to time place and person. Seated comfortably. In no distress.  Alone.   HEENT: Resolving alopecia. No scalp rash. No icterus.   Lymph: left axillary adenopathy noted, slightly larger than last time  Lungs: regular respiratory effort.   Abd: Soft, non-distended.   Neuro: alert and oriented x 3      Lab Results  Recent Results (from the past 168 hour(s))   Comprehensive metabolic panel    Collection Time: 10/22/24  8:12 AM   Result Value Ref Range    Sodium 143 135 - 145 mmol/L    Potassium 3.5 3.4 - 5.3 mmol/L    Carbon Dioxide (CO2) 26 22 - 29 mmol/L    Anion Gap 11 7 - 15 mmol/L    Urea Nitrogen 13.7 8.0 - 23.0 mg/dL    Creatinine 0.65 0.51 - 0.95 mg/dL    GFR Estimate 87 >60 mL/min/1.73m2    Calcium 9.1 8.8 - 10.4 mg/dL    Chloride 106 98 - 107 mmol/L    Glucose 112 (H) 70 - 99 mg/dL    Alkaline Phosphatase 78 40 - 150 U/L    AST 24 0 - 45 U/L    ALT 11 0 - 50 U/L    Protein Total 6.3 (L) 6.4 - 8.3 g/dL    Albumin 3.4 (L) 3.5 - 5.2 g/dL    Bilirubin Total 0.2 <=1.2 mg/dL   CBC with platelets and differential    Collection Time: 10/22/24  8:12 AM   Result Value Ref Range    WBC Count 5.6 4.0 - 11.0 10e3/uL    RBC Count 3.85 3.80 - 5.20 10e6/uL    Hemoglobin 10.8 (L) 11.7 - 15.7 g/dL    Hematocrit 33.2 (L) 35.0 - 47.0 %    MCV 86 78 - 100 fL    MCH 28.1 26.5 - 33.0 pg    MCHC 32.5 31.5 - 36.5 g/dL    RDW 17.8 (H) 10.0 - 15.0 %    Platelet Count 299 150 - 450 10e3/uL    % Neutrophils 66 %    %  Lymphocytes 19 %    % Monocytes 14 %    % Eosinophils 0 %    % Basophils 1 %    % Immature Granulocytes 0 %    NRBCs per 100 WBC 0 <1 /100    Absolute Neutrophils 3.7 1.6 - 8.3 10e3/uL    Absolute Lymphocytes 1.0 0.8 - 5.3 10e3/uL    Absolute Monocytes 0.8 0.0 - 1.3 10e3/uL    Absolute Eosinophils 0.0 0.0 - 0.7 10e3/uL    Absolute Basophils 0.1 0.0 - 0.2 10e3/uL    Absolute Immature Granulocytes 0.0 <=0.4 10e3/uL    Absolute NRBCs 0.0 10e3/uL           Assessment/Plan  Recurrent, stage IV triple negative right breast cancer (nodes, lung); CPS>20% + TPS 60%  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     Therefore, regimen changed to docetaxel (60 mg/m2) as 4th line.  She's completed 4 cycles.     Has done fairly well on docetaxel without any major side effects.  Interim CT scan done after 3 cycles showed stable disease versus slight increase in size of the lymph nodes.  But overall it was not consistent with overt disease progression.  So I continued docetaxel.  Now she has done 4 cycles and here with labs prior to cycle 5.  Plan was to repeat imaging after 5 cycles.    Peripheral neuropathy is slightly worse in her feet.  But otherwise no other major issues with docetaxel.  On clinical exam today she does have slightly enlarged left axilla adenopathy which seems to be larger than last time.  Will obtain a PET scan prior to next cycle and decide on further treatment.  If she has stable disease and will continue docetaxel.  If not then we will have to switch to a different chemo.  Options include vinorelbine, eribulin, capecitabine or ixabepilone.  Previously she had capecitabine in the adjuvant setting which was stopped due to rash.  She did well with carboplatin and gemcitabine in the past in the metastatic setting and  "treatment was discontinued after she had an excellent response and while put on Keytruda maintenance.  So technically we could potentially treat her with the same regimen.  But if the disease progression is not significant then favor oral Cape.    Pedal edema is pretty stable.  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  Recommended compression sleeves and compression stockings.    Hx pneumonitis  Bilateral opacities noted in both lungs on last 2 PET scans. Indeterminate etiology (?drug-induced, other). Treated with 2 weeks prednisone in early July with improved fatigue. No recurrent symptoms.      The longitudinal plan of care for the diagnosis(es)/condition(s) as documented were addressed during this visit. Due to the added complexity in care, I will continue to support Precious in the subsequent management and with ongoing continuity of care.    I independently reviewed and interpreted lab studies , reviewed pertinent notes from physicians and care providers from other specialties and ordered lab tests.    A total of 40 min was spent today on this visit which included face to face conversation with the patient, EMR review, extensive literature reviewcounseling and co-ordination of care and medical documentation.        Signed by:   Maricarmen Beebe MD           Oncology Rooming Note    October 22, 2024 8:18 AM   Precious Paris is a 83 year old female who presents for:    Chief Complaint   Patient presents with     Oncology Clinic Visit     Primary malignant neoplasm of breast with metastasis  - labs, provider, infusion     Initial Vitals: /70 (BP Location: Right arm, Patient Position: Sitting, Cuff Size: Adult Regular)   Pulse 104   Temp 98  F (36.7  C) (Tympanic)   Resp 12   Ht 1.661 m (5' 5.4\")   Wt 86.2 kg (190 lb 1.6 oz)   SpO2 94%   BMI 31.25 kg/m   Estimated body mass index is 31.25 kg/m  as calculated from the following:    Height as of this encounter: 1.661 m (5' 5.4\").    " Weight as of this encounter: 86.2 kg (190 lb 1.6 oz). Body surface area is 1.99 meters squared.  No Pain (0) Comment: Data Unavailable   No LMP recorded. Patient is postmenopausal.  Allergies reviewed: Yes  Medications reviewed: Yes    Medications: MEDICATION REFILLS NEEDED TODAY. Provider was notified.  Pharmacy name entered into [employer]: [facility] - [facility] [id number] [facility]    Frailty Screening:   Is the patient here for a new oncology consult visit in cancer care? 2. No      Clinical concerns: Refill dexamethasone.        Erin Thomas MA                Again, thank you for allowing me to participate in the care of your patient.        Sincerely,        Maricarmen Beebe MD

## 2024-10-23 DIAGNOSIS — Z17.1 MALIGNANT NEOPLASM OF UPPER-OUTER QUADRANT OF RIGHT BREAST IN FEMALE, ESTROGEN RECEPTOR NEGATIVE (H): ICD-10-CM

## 2024-10-23 DIAGNOSIS — C50.919 PRIMARY MALIGNANT NEOPLASM OF BREAST WITH METASTASIS (H): ICD-10-CM

## 2024-10-23 DIAGNOSIS — Z08 ENCOUNTER FOR FOLLOW-UP EXAMINATION AFTER COMPLETED TREATMENT FOR MALIGNANT NEOPLASM: Primary | ICD-10-CM

## 2024-10-23 DIAGNOSIS — C50.411 MALIGNANT NEOPLASM OF UPPER-OUTER QUADRANT OF RIGHT BREAST IN FEMALE, ESTROGEN RECEPTOR NEGATIVE (H): ICD-10-CM

## 2024-10-23 LAB — CANCER AG27-29 SERPL-ACNC: 56.6 U/ML

## 2024-11-07 ENCOUNTER — HOSPITAL ENCOUNTER (OUTPATIENT)
Dept: PET IMAGING | Facility: CLINIC | Age: 84
Discharge: HOME | End: 2024-11-07
Attending: INTERNAL MEDICINE | Admitting: INTERNAL MEDICINE
Payer: COMMERCIAL

## 2024-11-07 DIAGNOSIS — C78.01 MALIGNANT NEOPLASM METASTATIC TO BOTH LUNGS (H): ICD-10-CM

## 2024-11-07 DIAGNOSIS — Z17.1 MALIGNANT NEOPLASM OF UPPER-OUTER QUADRANT OF RIGHT BREAST IN FEMALE, ESTROGEN RECEPTOR NEGATIVE (H): ICD-10-CM

## 2024-11-07 DIAGNOSIS — C50.411 MALIGNANT NEOPLASM OF UPPER-OUTER QUADRANT OF RIGHT BREAST IN FEMALE, ESTROGEN RECEPTOR NEGATIVE (H): ICD-10-CM

## 2024-11-07 DIAGNOSIS — C78.02 MALIGNANT NEOPLASM METASTATIC TO BOTH LUNGS (H): ICD-10-CM

## 2024-11-07 PROCEDURE — 343N000001 HC RX 343 MED OP 636: Performed by: INTERNAL MEDICINE

## 2024-11-07 PROCEDURE — 78815 PET IMAGE W/CT SKULL-THIGH: CPT | Mod: PS

## 2024-11-07 PROCEDURE — A9552 F18 FDG: HCPCS | Performed by: INTERNAL MEDICINE

## 2024-11-07 RX ORDER — FLUDEOXYGLUCOSE F 18 200 MCI/ML
10-18 INJECTION, SOLUTION INTRAVENOUS ONCE
Status: COMPLETED | OUTPATIENT
Start: 2024-11-07 | End: 2024-11-07

## 2024-11-07 RX ORDER — HEPARIN SODIUM (PORCINE) LOCK FLUSH IV SOLN 100 UNIT/ML 100 UNIT/ML
500 SOLUTION INTRAVENOUS ONCE
Status: COMPLETED | OUTPATIENT
Start: 2024-11-07 | End: 2024-11-07

## 2024-11-07 RX ADMIN — HEPARIN SODIUM (PORCINE) LOCK FLUSH IV SOLN 100 UNIT/ML 500 UNITS: 100 SOLUTION at 13:08

## 2024-11-07 RX ADMIN — FLUDEOXYGLUCOSE F 18 11.79 MILLICURIE: 200 INJECTION, SOLUTION INTRAVENOUS at 13:09

## 2024-11-11 DIAGNOSIS — L30.9 DERMATITIS: ICD-10-CM

## 2024-11-11 NOTE — TELEPHONE ENCOUNTER
Requested Prescriptions   Pending Prescriptions Disp Refills    hydrocortisone 2.5 % cream 30 g 3     Sig: Apply to AA BID x 1-2 weeks then PRN only       There is no refill protocol information for this order        Last office visit: 9/11/2023 ; last virtual visit: Visit date not found with prescribing provider:  Tressa Lindo     Future Office Visit:          Kathe Pate   Clinic Station    [facility]  [phone]

## 2024-11-11 NOTE — LETTER
[facility]  [address] [place] [address]  [phone]    2024    Precious Salmeron  [address] [place] [address]    Dear Precious,     We recently provided you with a medication refill. Prescription medications require routine follow-up appointments with your Dermatology Provider.      Per [facility] medication refill protocol, you do need to be seen at least annually to renew a prescription while on prescribed medication(s). A prescription is valid for one year then it expires.      At this time we ask that: You schedule a routine follow up Dermatology office visit to renew your now  Hydroxyzine prescription.    Your prescription: Has  as it is over 1 year old. You have been given a one time nery refill of medication.     We are currently booking Dermatology appointments into late Spring 2025, as we do book Dermatology appointments 6 months or more in advance, so please schedule follow up Dermatology appointment as soon as possible and please plan accordingly in the future to avoid going without medication.    [phone] to schedule a Dermatology appointment-all locations. You may be seen for follow up with any of our 3 Dermatology Providers via telephone or virtual video if you prefer-as long as you have been seen in person once in the last 3 years per federal guidelines.     Sincerely,     Tressa Grove PA-C

## 2024-11-12 ENCOUNTER — TELEPHONE (OUTPATIENT)
Dept: ONCOLOGY | Facility: CLINIC | Age: 84
End: 2024-11-12

## 2024-11-12 ENCOUNTER — INFUSION THERAPY VISIT (OUTPATIENT)
Dept: INFUSION THERAPY | Facility: CLINIC | Age: 84
End: 2024-11-12
Attending: INTERNAL MEDICINE
Payer: COMMERCIAL

## 2024-11-12 ENCOUNTER — ONCOLOGY VISIT (OUTPATIENT)
Dept: ONCOLOGY | Facility: CLINIC | Age: 84
End: 2024-11-12
Attending: INTERNAL MEDICINE
Payer: COMMERCIAL

## 2024-11-12 VITALS
SYSTOLIC BLOOD PRESSURE: 133 MMHG | HEART RATE: 101 BPM | RESPIRATION RATE: 12 BRPM | BODY MASS INDEX: 31.32 KG/M2 | HEIGHT: 65 IN | TEMPERATURE: 99 F | OXYGEN SATURATION: 95 % | DIASTOLIC BLOOD PRESSURE: 76 MMHG | WEIGHT: 188 LBS

## 2024-11-12 DIAGNOSIS — C50.919 PRIMARY MALIGNANT NEOPLASM OF BREAST WITH METASTASIS (H): ICD-10-CM

## 2024-11-12 DIAGNOSIS — C50.411 MALIGNANT NEOPLASM OF UPPER-OUTER QUADRANT OF RIGHT BREAST IN FEMALE, ESTROGEN RECEPTOR NEGATIVE (H): ICD-10-CM

## 2024-11-12 DIAGNOSIS — Z17.1 MALIGNANT NEOPLASM OF UPPER-OUTER QUADRANT OF RIGHT BREAST IN FEMALE, ESTROGEN RECEPTOR NEGATIVE (H): ICD-10-CM

## 2024-11-12 DIAGNOSIS — C50.411 MALIGNANT NEOPLASM OF UPPER-OUTER QUADRANT OF RIGHT BREAST IN FEMALE, ESTROGEN RECEPTOR NEGATIVE (H): Primary | ICD-10-CM

## 2024-11-12 DIAGNOSIS — Z08 ENCOUNTER FOR FOLLOW-UP EXAMINATION AFTER COMPLETED TREATMENT FOR MALIGNANT NEOPLASM: Primary | ICD-10-CM

## 2024-11-12 DIAGNOSIS — Z17.1 MALIGNANT NEOPLASM OF UPPER-OUTER QUADRANT OF RIGHT BREAST IN FEMALE, ESTROGEN RECEPTOR NEGATIVE (H): Primary | ICD-10-CM

## 2024-11-12 DIAGNOSIS — Z08 ENCOUNTER FOR FOLLOW-UP EXAMINATION AFTER COMPLETED TREATMENT FOR MALIGNANT NEOPLASM: ICD-10-CM

## 2024-11-12 DIAGNOSIS — C50.911 INFILTRATING DUCTAL CARCINOMA OF RIGHT BREAST (H): Primary | ICD-10-CM

## 2024-11-12 LAB
ALBUMIN SERPL BCG-MCNC: 3.5 G/DL (ref 3.5–5.2)
ALP SERPL-CCNC: 88 U/L (ref 40–150)
ALT SERPL W P-5'-P-CCNC: 12 U/L (ref 0–50)
ANION GAP SERPL CALCULATED.3IONS-SCNC: 8 MMOL/L (ref 7–15)
AST SERPL W P-5'-P-CCNC: 23 U/L (ref 0–45)
BASOPHILS # BLD AUTO: 0.1 10E3/UL (ref 0–0.2)
BASOPHILS NFR BLD AUTO: 1 %
BILIRUB SERPL-MCNC: 0.3 MG/DL
BUN SERPL-MCNC: 8.4 MG/DL (ref 8–23)
CALCIUM SERPL-MCNC: 9 MG/DL (ref 8.8–10.4)
CHLORIDE SERPL-SCNC: 103 MMOL/L (ref 98–107)
CREAT SERPL-MCNC: 0.64 MG/DL (ref 0.51–0.95)
EGFRCR SERPLBLD CKD-EPI 2021: 87 ML/MIN/1.73M2
EOSINOPHIL # BLD AUTO: 0.1 10E3/UL (ref 0–0.7)
EOSINOPHIL NFR BLD AUTO: 1 %
ERYTHROCYTE [DISTWIDTH] IN BLOOD BY AUTOMATED COUNT: 17.8 % (ref 10–15)
GLUCOSE SERPL-MCNC: 108 MG/DL (ref 70–99)
HCO3 SERPL-SCNC: 29 MMOL/L (ref 22–29)
HCT VFR BLD AUTO: 34.1 % (ref 35–47)
HGB BLD-MCNC: 11 G/DL (ref 11.7–15.7)
IMM GRANULOCYTES # BLD: 0.1 10E3/UL
IMM GRANULOCYTES NFR BLD: 1 %
LYMPHOCYTES # BLD AUTO: 1.4 10E3/UL (ref 0.8–5.3)
LYMPHOCYTES NFR BLD AUTO: 17 %
MCH RBC QN AUTO: 28.2 PG (ref 26.5–33)
MCHC RBC AUTO-ENTMCNC: 32.3 G/DL (ref 31.5–36.5)
MCV RBC AUTO: 87 FL (ref 78–100)
MONOCYTES # BLD AUTO: 1.1 10E3/UL (ref 0–1.3)
MONOCYTES NFR BLD AUTO: 13 %
NEUTROPHILS # BLD AUTO: 5.8 10E3/UL (ref 1.6–8.3)
NEUTROPHILS NFR BLD AUTO: 68 %
NRBC # BLD AUTO: 0 10E3/UL
NRBC BLD AUTO-RTO: 0 /100
PLATELET # BLD AUTO: 314 10E3/UL (ref 150–450)
POTASSIUM SERPL-SCNC: 3.8 MMOL/L (ref 3.4–5.3)
PROT SERPL-MCNC: 6.8 G/DL (ref 6.4–8.3)
RBC # BLD AUTO: 3.9 10E6/UL (ref 3.8–5.2)
SODIUM SERPL-SCNC: 140 MMOL/L (ref 135–145)
WBC # BLD AUTO: 8.5 10E3/UL (ref 4–11)

## 2024-11-12 PROCEDURE — 86300 IMMUNOASSAY TUMOR CA 15-3: CPT | Performed by: INTERNAL MEDICINE

## 2024-11-12 PROCEDURE — G2211 COMPLEX E/M VISIT ADD ON: HCPCS | Performed by: INTERNAL MEDICINE

## 2024-11-12 PROCEDURE — 36591 DRAW BLOOD OFF VENOUS DEVICE: CPT | Performed by: INTERNAL MEDICINE

## 2024-11-12 PROCEDURE — 250N000011 HC RX IP 250 OP 636: Performed by: NURSE PRACTITIONER

## 2024-11-12 PROCEDURE — 84075 ASSAY ALKALINE PHOSPHATASE: CPT | Performed by: INTERNAL MEDICINE

## 2024-11-12 PROCEDURE — 84155 ASSAY OF PROTEIN SERUM: CPT | Performed by: INTERNAL MEDICINE

## 2024-11-12 PROCEDURE — G0463 HOSPITAL OUTPT CLINIC VISIT: HCPCS | Performed by: INTERNAL MEDICINE

## 2024-11-12 PROCEDURE — 85025 COMPLETE CBC W/AUTO DIFF WBC: CPT | Performed by: INTERNAL MEDICINE

## 2024-11-12 PROCEDURE — 82435 ASSAY OF BLOOD CHLORIDE: CPT | Performed by: INTERNAL MEDICINE

## 2024-11-12 PROCEDURE — 99215 OFFICE O/P EST HI 40 MIN: CPT | Performed by: INTERNAL MEDICINE

## 2024-11-12 RX ORDER — HEPARIN SODIUM,PORCINE 10 UNIT/ML
5-20 VIAL (ML) INTRAVENOUS DAILY PRN
OUTPATIENT
Start: 2024-11-12

## 2024-11-12 RX ORDER — HYDROCORTISONE 25 MG/G
CREAM TOPICAL
Qty: 30 G | Refills: 0 | Status: SHIPPED | OUTPATIENT
Start: 2024-11-12

## 2024-11-12 RX ORDER — HEPARIN SODIUM (PORCINE) LOCK FLUSH IV SOLN 100 UNIT/ML 100 UNIT/ML
5 SOLUTION INTRAVENOUS
Status: DISCONTINUED | OUTPATIENT
Start: 2024-11-12 | End: 2024-11-12 | Stop reason: HOSPADM

## 2024-11-12 RX ORDER — HEPARIN SODIUM (PORCINE) LOCK FLUSH IV SOLN 100 UNIT/ML 100 UNIT/ML
5 SOLUTION INTRAVENOUS
OUTPATIENT
Start: 2024-11-12

## 2024-11-12 RX ADMIN — Medication 5 ML: at 09:15

## 2024-11-12 ASSESSMENT — PAIN SCALES - GENERAL: PAINLEVEL_OUTOF10: NO PAIN (0)

## 2024-11-12 NOTE — LETTER
11/12/2024      Precious Paris  [address] [place] [address]      Dear Colleague,    Thank you for referring your patient, Precious Paris, to the [facility]. Please see a copy of my visit note below.        [facility] Hematology and Oncology Progress Note    Patient: Precious Paris  MRN: [id number]  Nov 12, 2024          Reason for Visit    Recurrent stage IV triple negative breast cancer (nodes, lung) (PDL1+)    Primary Oncologist: Dr. Beebe    _____________________________________________________________________________    History of Present Illness/ Interval History    Ms. Precious Paris is a 83 year old with recurrent metastatic triple negative breast cancer recurrence. She is on 4th line docetaxel.     She had stable disease to minimal progression after 3 cycles.  So I recommended continuing docetaxel and repeating scans after 2 more cycles.  Here with repeat scan.  Denies any new issues today.  No significant peripheral neuropathy symptoms.    ECOG PS: 1      Oncology History/Treatment  Diagnosis/Stage:   11/2019: Right breast cancer, triple negative (sT4m-T7)    BRCA negative    10/2022: Recurrent, stage IV triple negative breast cancer (bilateral axillary, supraclav, mediastinal nodes + lung)  -persistent/progressive right breast firmness with new right nipple drainage and right axillary adenopathy  -bilateral diagnostic mammogram: increased density R breast with skin thickening. R breast US: 3.2 cm hypoechoic mass at 11:00 position 1 cm from nipple and multiple other small hypoechoic solid-appearing lesions in R breast + right axillary adenopathy (at least two hypoechoic vascular lesions measuring up to 2.5 cm)  -10/18/2022 biopsy R breast mass + R axillary node: grade 3 invasive ductal cancer with tumor necrosis, ER/SD/HER2 negative.   -PET: + right breast mass; bilateral axillary, retropectoral, supraclavicular, mediastinal/R hilar  nodes and bilateral lung/R pleural nodules suspicious for mets.  -Brain MRI: negative for mets  -11/1/2022 biopsy L axillary node: metastatic invasive ductal carcinoma  -Foundation One: no actionable mutations MS stable, TMB 1 mut/mb.   -PDL1 testing: CPS >20% and TPS 60% (considered high PDL1 expression)  -CA 27.29: 59.5 (elevated). CA 15-3: 23 (normal)  -9/10/2023 Brain MRI: 2 new foci of punctate enhancement in left frontal cortex and right lentiform nucleus, concerning for brain metastases (asymptomatic)    NGS (foundation 1)  FGF R1 amplification.  Pazopanib has been approved in other tumors but not in breast cancer.  NTRK 1 amplification.  Not actionable  She has multiple other mutations which are not actionable.      Treatment:  Initial breast cancer (2019)  -2019: Neoadjuvant taxol  -Lumpectomy  -5/2020: Adjuvant RT  -6 - 7/2020: Adjuvant Xeloda. Stopped for rash    Recurrent, stage IV breast cancer (2022)  -11/14/2022:Doxil every 4 weeks  -12/12/2022: changed to doxorubicin every 3 weeks. (Planning addition of pembrolizumab once approved by insurance)  -1/2/2023: doxorubicin + pembrolizumab every 3 weeks. Completed 1 cycle. Stopped for progression (med nodes, slight increase in lung mets and stable R breast mass + axillary nodes).    -1/23/2023 - present: Carbo AUC 5, Gemcitabine 800 mg/m2 and Pembro every 21 days  -cycles 1-2: Faxon day 1 only  -Cycle 3: Faxon increased to days 1 + 8 given excellent tolerance  -CT after 3 cycles showed excellent response (near CR axillary + med nodes and MT in lung mets; clinical improvement in right breast changes and tumor marker).  -PET scan after 6 cycles of carboplatin gemcitabine pembrolizumab shows complete response    5/30 - 8/22/2023: Maintenance pembrolizumab, until progression (recurrent axillary, thoracic nodes and 2 punctate brain lesions).  Biopsy of left axillary node confirmed recurrent/metastatic breast cancer.    9/12/2023 - 6/18/2024:  Sacituzumab days 1, 8  every 21 days (x 14 cycles). Stopped for progression.  --C2D8 deferred x 1 week for neutropenia  --Dose decreased to 8 mg/kg from cycle 3 onwards.  --Cycles 5-10 were delivered in [place] over winter months  --Resumed Cycle 11 here upon return 4/2024  --After cycle 14, PET showed progressive disease.     7/23/2024 - present: docetaxel 60 mg/m2      Physical Exam    GENERAL: Alert and oriented to time place and person. Seated comfortably. In no distress.  Alone.   HEENT: Resolving alopecia. No scalp rash. No icterus.   Lymph: left axillary adenopathy noted, slightly larger than last time  Lungs: regular respiratory effort.   Abd: Soft, non-distended.   Neuro: alert and oriented x 3      Lab Results  Recent Results (from the past week)   Comprehensive metabolic panel    Collection Time: 11/12/24  8:00 AM   Result Value Ref Range    Sodium 140 135 - 145 mmol/L    Potassium 3.8 3.4 - 5.3 mmol/L    Carbon Dioxide (CO2) 29 22 - 29 mmol/L    Anion Gap 8 7 - 15 mmol/L    Urea Nitrogen 8.4 8.0 - 23.0 mg/dL    Creatinine 0.64 0.51 - 0.95 mg/dL    GFR Estimate 87 >60 mL/min/1.73m2    Calcium 9.0 8.8 - 10.4 mg/dL    Chloride 103 98 - 107 mmol/L    Glucose 108 (H) 70 - 99 mg/dL    Alkaline Phosphatase 88 40 - 150 U/L    AST 23 0 - 45 U/L    ALT 12 0 - 50 U/L    Protein Total 6.8 6.4 - 8.3 g/dL    Albumin 3.5 3.5 - 5.2 g/dL    Bilirubin Total 0.3 <=1.2 mg/dL   CBC with platelets and differential    Collection Time: 11/12/24  8:00 AM   Result Value Ref Range    WBC Count 8.5 4.0 - 11.0 10e3/uL    RBC Count 3.90 3.80 - 5.20 10e6/uL    Hemoglobin 11.0 (L) 11.7 - 15.7 g/dL    Hematocrit 34.1 (L) 35.0 - 47.0 %    MCV 87 78 - 100 fL    MCH 28.2 26.5 - 33.0 pg    MCHC 32.3 31.5 - 36.5 g/dL    RDW 17.8 (H) 10.0 - 15.0 %    Platelet Count 314 150 - 450 10e3/uL    % Neutrophils 68 %    % Lymphocytes 17 %    % Monocytes 13 %    % Eosinophils 1 %    % Basophils 1 %    % Immature Granulocytes 1 %    NRBCs per 100 WBC 0 <1 /100    Absolute  Neutrophils 5.8 1.6 - 8.3 10e3/uL    Absolute Lymphocytes 1.4 0.8 - 5.3 10e3/uL    Absolute Monocytes 1.1 0.0 - 1.3 10e3/uL    Absolute Eosinophils 0.1 0.0 - 0.7 10e3/uL    Absolute Basophils 0.1 0.0 - 0.2 10e3/uL    Absolute Immature Granulocytes 0.1 <=0.4 10e3/uL    Absolute NRBCs 0.0 10e3/uL   CA27.29  BREAST TUMOR MARKER    Collection Time: 11/12/24  8:00 AM   Result Value Ref Range    CA 27.29 65.9 (H) <=39.0 U/mL             Assessment/Plan  Recurrent, stage IV triple negative right breast cancer (nodes, lung); CPS>20% + TPS 60%  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     Therefore, regimen changed to docetaxel (60 mg/m2) as 4th line.    She had stable disease after 3 cycles.  Now has completed 5 cycles and here with repeat CT scan.    I reviewed her PET scan images and report personally and independently interpreted the results.  Unfortunately she has evidence of significant disease progression with increased metabolic activity of the left axillary supraclavicular lymph nodes along with thoracic, retrocrural lymph nodes along with development of new left subpectoral and right level 1 axillary lymph nodes along with a soft tissue nodule in the left medial pleura or suspicious for progression of the disease.  No evidence of any visceral disease.  I will discontinue docetaxel.  I discussed further chemotherapy options.    Options include vinorelbine, eribulin, capecitabine or ixabepilone.  Previously she had capecitabine in the adjuvant setting which was stopped due to rash.  She did well with carboplatin and gemcitabine in the past in the metastatic setting and treatment was discontinued after she had an excellent response and while put on Keytruda maintenance.  So technically we could potentially treat her with  the same regimen.      She is planning to go to [place] for the winter.  In that case I would rather treated with something that can be easily managed like oral Xeloda.  Also on the current PET scan she does not have any evidence of significant visceral disease so we can afford to choose between the chemotherapy options.  I reviewed the potential side effects and complications associated with Xeloda.  She is agreeable to the plan.  I will put in the plan and send a prescription.    I will see her a week after she started Xeloda with repeat labs for toxicity check.  They are planning to leave to [place] in early December.    Hx pneumonitis  Bilateral opacities noted in both lungs on last 2 PET scans. Indeterminate etiology (?drug-induced, other). Treated with 2 weeks prednisone in early July with improved fatigue. No recurrent symptoms.      A total of 45 min was spent today on this visit which included face to face conversation with the patient, EMR review, counseling and co-ordination of care and medical documentation.    The longitudinal plan of care for the diagnosis(es)/condition(s) as documented were addressed during this visit. Due to the added complexity in care, I will continue to support Precious in the subsequent management and with ongoing continuity of care.    I independently reviewed and interpreted lab studies and imaging, reviewed pertinent notes from physicians and care providers from other specialties and ordered lab tests.    I have personally reviewed the CT scan/PET scan images and report and independently interpreted the results to my ability.          Signed by:   Maricarmen Beebe MD           Oncology Rooming Note    November 12, 2024 8:22 AM   Precious Paris is a 84 year old female who presents for:    Chief Complaint   Patient presents with     Oncology Clinic Visit     Malignant neoplasm of upper-outer quadrant of right breast in female, estrogen receptor negative - Labs provider and  "infusion     Initial Vitals: /76 (BP Location: Left arm, Patient Position: Sitting, Cuff Size: Adult Small)   Pulse 101   Temp 99  F (37.2  C) (Tympanic)   Resp 12   Ht 1.661 m (5' 5.4\")   Wt 85.3 kg (188 lb)   SpO2 95%   BMI 30.90 kg/m   Estimated body mass index is 30.9 kg/m  as calculated from the following:    Height as of this encounter: 1.661 m (5' 5.4\").    Weight as of this encounter: 85.3 kg (188 lb). Body surface area is 1.98 meters squared.  No Pain (0) Comment: Data Unavailable   No LMP recorded. Patient is postmenopausal.  Allergies reviewed: Yes  Medications reviewed: Yes    Medications: Medication refills not needed today.  Pharmacy name entered into [facility]: [facility] - [facility] [id number] [facility]    Frailty Screening:   Is the patient here for a new oncology consult visit in cancer care? 2. No      Clinical concerns:  None today.       Malgorzata Vallejo CMA                Again, thank you for allowing me to participate in the care of your patient.        Sincerely,        Maricarmen Beebe MD  "

## 2024-11-12 NOTE — PROGRESS NOTES
Infusion Nursing Note:  Precious Paris presents today for Taxotere HELD.    Patient seen by provider today: Yes: Dr. Beebe   present during visit today: Not Applicable.    Note: Due to progression change of treatment.    Intravenous Access:  Implanted Port.    Treatment Conditions:  Not Applicable. HELD per Dr. Beebe    Post Infusion Assessment:  Blood return noted.  Site patent and intact, free from redness, edema or discomfort.  Access discontinued per protocol.     Discharge Plan:   Discharge instructions reviewed with: Patient.  Patient discharged in stable condition accompanied by: self.  Departure Mode: Ambulatory.    Mario Worley RN

## 2024-11-12 NOTE — PROGRESS NOTES
[facility] Hematology and Oncology Progress Note    Patient: Precious Paris  MRN: [id number]  Nov 12, 2024          Reason for Visit    Recurrent stage IV triple negative breast cancer (nodes, lung) (PDL1+)    Primary Oncologist: Dr. Beebe    _____________________________________________________________________________    History of Present Illness/ Interval History    Ms. Precious Paris is a 83 year old with recurrent metastatic triple negative breast cancer recurrence. She is on 4th line docetaxel.     She had stable disease to minimal progression after 3 cycles.  So I recommended continuing docetaxel and repeating scans after 2 more cycles.  Here with repeat scan.  Denies any new issues today.  No significant peripheral neuropathy symptoms.    ECOG PS: 1      Oncology History/Treatment  Diagnosis/Stage:   11/2019: Right breast cancer, triple negative (bD1d-O1)    BRCA negative    10/2022: Recurrent, stage IV triple negative breast cancer (bilateral axillary, supraclav, mediastinal nodes + lung)  -persistent/progressive right breast firmness with new right nipple drainage and right axillary adenopathy  -bilateral diagnostic mammogram: increased density R breast with skin thickening. R breast US: 3.2 cm hypoechoic mass at 11:00 position 1 cm from nipple and multiple other small hypoechoic solid-appearing lesions in R breast + right axillary adenopathy (at least two hypoechoic vascular lesions measuring up to 2.5 cm)  -10/18/2022 biopsy R breast mass + R axillary node: grade 3 invasive ductal cancer with tumor necrosis, ER/NH/HER2 negative.   -PET: + right breast mass; bilateral axillary, retropectoral, supraclavicular, mediastinal/R hilar nodes and bilateral lung/R pleural nodules suspicious for mets.  -Brain MRI: negative for mets  -11/1/2022 biopsy L axillary node: metastatic invasive ductal carcinoma  -Foundation One: no actionable mutations MS stable, TMB 1 mut/mb.   -PDL1 testing:  CPS >20% and TPS 60% (considered high PDL1 expression)  -CA 27.29: 59.5 (elevated). CA 15-3: 23 (normal)  -9/10/2023 Brain MRI: 2 new foci of punctate enhancement in left frontal cortex and right lentiform nucleus, concerning for brain metastases (asymptomatic)    NGS (foundation 1)  FGF R1 amplification.  Pazopanib has been approved in other tumors but not in breast cancer.  NTRK 1 amplification.  Not actionable  She has multiple other mutations which are not actionable.      Treatment:  Initial breast cancer (2019)  -2019: Neoadjuvant taxol  -Lumpectomy  -5/2020: Adjuvant RT  -6 - 7/2020: Adjuvant Xeloda. Stopped for rash    Recurrent, stage IV breast cancer (2022)  -11/14/2022:Doxil every 4 weeks  -12/12/2022: changed to doxorubicin every 3 weeks. (Planning addition of pembrolizumab once approved by insurance)  -1/2/2023: doxorubicin + pembrolizumab every 3 weeks. Completed 1 cycle. Stopped for progression (med nodes, slight increase in lung mets and stable R breast mass + axillary nodes).    -1/23/2023 - present: Carbo AUC 5, Gemcitabine 800 mg/m2 and Pembro every 21 days  -cycles 1-2: Boone day 1 only  -Cycle 3: Boone increased to days 1 + 8 given excellent tolerance  -CT after 3 cycles showed excellent response (near CR axillary + med nodes and MI in lung mets; clinical improvement in right breast changes and tumor marker).  -PET scan after 6 cycles of carboplatin gemcitabine pembrolizumab shows complete response    5/30 - 8/22/2023: Maintenance pembrolizumab, until progression (recurrent axillary, thoracic nodes and 2 punctate brain lesions).  Biopsy of left axillary node confirmed recurrent/metastatic breast cancer.    9/12/2023 - 6/18/2024:  Sacituzumab days 1, 8 every 21 days (x 14 cycles). Stopped for progression.  --C2D8 deferred x 1 week for neutropenia  --Dose decreased to 8 mg/kg from cycle 3 onwards.  --Cycles 5-10 were delivered in [place] over winter months  --Resumed Cycle 11 here upon return  4/2024  --After cycle 14, PET showed progressive disease.     7/23/2024 - present: docetaxel 60 mg/m2      Physical Exam    GENERAL: Alert and oriented to time place and person. Seated comfortably. In no distress.  Alone.   HEENT: Resolving alopecia. No scalp rash. No icterus.   Lymph: left axillary adenopathy noted, slightly larger than last time  Lungs: regular respiratory effort.   Abd: Soft, non-distended.   Neuro: alert and oriented x 3      Lab Results  Recent Results (from the past week)   Comprehensive metabolic panel    Collection Time: 11/12/24  8:00 AM   Result Value Ref Range    Sodium 140 135 - 145 mmol/L    Potassium 3.8 3.4 - 5.3 mmol/L    Carbon Dioxide (CO2) 29 22 - 29 mmol/L    Anion Gap 8 7 - 15 mmol/L    Urea Nitrogen 8.4 8.0 - 23.0 mg/dL    Creatinine 0.64 0.51 - 0.95 mg/dL    GFR Estimate 87 >60 mL/min/1.73m2    Calcium 9.0 8.8 - 10.4 mg/dL    Chloride 103 98 - 107 mmol/L    Glucose 108 (H) 70 - 99 mg/dL    Alkaline Phosphatase 88 40 - 150 U/L    AST 23 0 - 45 U/L    ALT 12 0 - 50 U/L    Protein Total 6.8 6.4 - 8.3 g/dL    Albumin 3.5 3.5 - 5.2 g/dL    Bilirubin Total 0.3 <=1.2 mg/dL   CBC with platelets and differential    Collection Time: 11/12/24  8:00 AM   Result Value Ref Range    WBC Count 8.5 4.0 - 11.0 10e3/uL    RBC Count 3.90 3.80 - 5.20 10e6/uL    Hemoglobin 11.0 (L) 11.7 - 15.7 g/dL    Hematocrit 34.1 (L) 35.0 - 47.0 %    MCV 87 78 - 100 fL    MCH 28.2 26.5 - 33.0 pg    MCHC 32.3 31.5 - 36.5 g/dL    RDW 17.8 (H) 10.0 - 15.0 %    Platelet Count 314 150 - 450 10e3/uL    % Neutrophils 68 %    % Lymphocytes 17 %    % Monocytes 13 %    % Eosinophils 1 %    % Basophils 1 %    % Immature Granulocytes 1 %    NRBCs per 100 WBC 0 <1 /100    Absolute Neutrophils 5.8 1.6 - 8.3 10e3/uL    Absolute Lymphocytes 1.4 0.8 - 5.3 10e3/uL    Absolute Monocytes 1.1 0.0 - 1.3 10e3/uL    Absolute Eosinophils 0.1 0.0 - 0.7 10e3/uL    Absolute Basophils 0.1 0.0 - 0.2 10e3/uL    Absolute Immature Granulocytes  0.1 <=0.4 10e3/uL    Absolute NRBCs 0.0 10e3/uL   CA27.29  BREAST TUMOR MARKER    Collection Time: 11/12/24  8:00 AM   Result Value Ref Range    CA 27.29 65.9 (H) <=39.0 U/mL             Assessment/Plan  Recurrent, stage IV triple negative right breast cancer (nodes, lung); CPS>20% + TPS 60%  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     Therefore, regimen changed to docetaxel (60 mg/m2) as 4th line.    She had stable disease after 3 cycles.  Now has completed 5 cycles and here with repeat CT scan.    I reviewed her PET scan images and report personally and independently interpreted the results.  Unfortunately she has evidence of significant disease progression with increased metabolic activity of the left axillary supraclavicular lymph nodes along with thoracic, retrocrural lymph nodes along with development of new left subpectoral and right level 1 axillary lymph nodes along with a soft tissue nodule in the left medial pleura or suspicious for progression of the disease.  No evidence of any visceral disease.  I will discontinue docetaxel.  I discussed further chemotherapy options.    Options include vinorelbine, eribulin, capecitabine or ixabepilone.  Previously she had capecitabine in the adjuvant setting which was stopped due to rash.  She did well with carboplatin and gemcitabine in the past in the metastatic setting and treatment was discontinued after she had an excellent response and while put on Keytruda maintenance.  So technically we could potentially treat her with the same regimen.      She is planning to go to [place] for the winter.  In that case I would rather treated with something that can be easily managed like oral Xeloda.  Also on the current PET scan she does not have any evidence of significant  visceral disease so we can afford to choose between the chemotherapy options.  I reviewed the potential side effects and complications associated with Xeloda.  She is agreeable to the plan.  I will put in the plan and send a prescription.    I will see her a week after she started Xeloda with repeat labs for toxicity check.  They are planning to leave to [place] in early December.    Hx pneumonitis  Bilateral opacities noted in both lungs on last 2 PET scans. Indeterminate etiology (?drug-induced, other). Treated with 2 weeks prednisone in early July with improved fatigue. No recurrent symptoms.      A total of 45 min was spent today on this visit which included face to face conversation with the patient, EMR review, counseling and co-ordination of care and medical documentation.    The longitudinal plan of care for the diagnosis(es)/condition(s) as documented were addressed during this visit. Due to the added complexity in care, I will continue to support Precious in the subsequent management and with ongoing continuity of care.    I independently reviewed and interpreted lab studies and imaging, reviewed pertinent notes from physicians and care providers from other specialties and ordered lab tests.    I have personally reviewed the CT scan/PET scan images and report and independently interpreted the results to my ability.          Signed by:   Maricarmen Beebe MD

## 2024-11-12 NOTE — TELEPHONE ENCOUNTER
> 1 year since seen. Needs appointment. Letter sent and note sent to pharmacy as well. Ricarda Chu RN

## 2024-11-12 NOTE — PROGRESS NOTES
"Oncology Rooming Note    November 12, 2024 8:22 AM   Precious Paris is a 84 year old female who presents for:    Chief Complaint   Patient presents with    Oncology Clinic Visit     Malignant neoplasm of upper-outer quadrant of right breast in female, estrogen receptor negative - Labs provider and infusion     Initial Vitals: /76 (BP Location: Left arm, Patient Position: Sitting, Cuff Size: Adult Small)   Pulse 101   Temp 99  F (37.2  C) (Tympanic)   Resp 12   Ht 1.661 m (5' 5.4\")   Wt 85.3 kg (188 lb)   SpO2 95%   BMI 30.90 kg/m   Estimated body mass index is 30.9 kg/m  as calculated from the following:    Height as of this encounter: 1.661 m (5' 5.4\").    Weight as of this encounter: 85.3 kg (188 lb). Body surface area is 1.98 meters squared.  No Pain (0) Comment: Data Unavailable   No LMP recorded. Patient is postmenopausal.  Allergies reviewed: Yes  Medications reviewed: Yes    Medications: Medication refills not needed today.  Pharmacy name entered into [facility]: [facility] - [facility] [id number] [facility]    Frailty Screening:   Is the patient here for a new oncology consult visit in cancer care? 2. No      Clinical concerns:  None today.       Malgorzata Vallejo CMA              "

## 2024-11-13 ENCOUNTER — TELEPHONE (OUTPATIENT)
Dept: ONCOLOGY | Facility: CLINIC | Age: 84
End: 2024-11-13
Payer: COMMERCIAL

## 2024-11-13 DIAGNOSIS — C50.411 MALIGNANT NEOPLASM OF UPPER-OUTER QUADRANT OF RIGHT BREAST IN FEMALE, ESTROGEN RECEPTOR NEGATIVE (H): Primary | ICD-10-CM

## 2024-11-13 DIAGNOSIS — C50.919 PRIMARY MALIGNANT NEOPLASM OF BREAST WITH METASTASIS (H): ICD-10-CM

## 2024-11-13 DIAGNOSIS — Z17.1 MALIGNANT NEOPLASM OF UPPER-OUTER QUADRANT OF RIGHT BREAST IN FEMALE, ESTROGEN RECEPTOR NEGATIVE (H): Primary | ICD-10-CM

## 2024-11-13 LAB — CANCER AG27-29 SERPL-ACNC: 65.9 U/ML

## 2024-11-13 RX ORDER — CAPECITABINE 500 MG/1
850 TABLET, FILM COATED ORAL 2 TIMES DAILY
Qty: 84 TABLET | Refills: 0 | Status: SHIPPED | OUTPATIENT
Start: 2024-11-18 | End: 2024-12-02

## 2024-11-13 NOTE — ORAL ONC MGMT
"Oral Chemotherapy Monitoring Program    Primary Oncologist: Dr. Beebe  Primary Oncology Clinic: [place]  Cancer Diagnosis: Breast Cancer    Drug: capecitabine  Start Date: TBD  Expected duration of therapy: Until disease progression or unacceptable toxicity    Drug Interaction Assessment: There were no significant drug interactions identified upon review of medication list with chemotherapy agents.    Lab Monitoring Plan      Subjective/Objective:  Precious Paris is a 84 year old female contacted by phone for an initial visit for oral chemotherapy education. Precious already has both prochlorperazine and ondansetron at home to use as needed for nausea. She will let us know if she needs any refills.        11/12/2024    10:00 AM 11/13/2024     9:00 AM   ORAL CHEMOTHERAPY   Assessment Type Initial Work up New Teach   Diagnosis Code Breast Cancer Breast Cancer   Providers Abiel Beebe   Clinic Name/Location [facility]   Drug Name Xeloda (capecitabine) Xeloda (capecitabine)   Dose 1,500 mg 1,500 mg   Current Schedule BID BID   Cycle Details 2 weeks on, 1 week off 2 weeks on, 1 week off   Any new drug interactions? No    Is the dose as ordered appropriate for the patient? Yes        Vitals:  BP:   BP Readings from Last 1 Encounters:   11/12/24 133/76     Wt Readings from Last 1 Encounters:   11/12/24 85.3 kg (188 lb)     Estimated body surface area is 1.98 meters squared as calculated from the following:    Height as of 11/12/24: 1.661 m (5' 5.4\").    Weight as of 11/12/24: 85.3 kg (188 lb).    Labs:  _  Result Component Current Result Ref Range   Sodium 140 (11/12/2024) 135 - 145 mmol/L     _  Result Component Current Result Ref Range   Potassium 3.8 (11/12/2024) 3.4 - 5.3 mmol/L     _  Result Component Current Result Ref Range   Calcium 9.0 (11/12/2024) 8.8 - 10.4 mg/dL     No results found for Mag within last 30 days.     No results found for Phos within last 30 days.     _  Result Component Current " Result Ref Range   Albumin 3.5 (11/12/2024) 3.5 - 5.2 g/dL     _  Result Component Current Result Ref Range   Urea Nitrogen 8.4 (11/12/2024) 8.0 - 23.0 mg/dL     _  Result Component Current Result Ref Range   Creatinine 0.64 (11/12/2024) 0.51 - 0.95 mg/dL       _  Result Component Current Result Ref Range   AST 23 (11/12/2024) 0 - 45 U/L     _  Result Component Current Result Ref Range   ALT 12 (11/12/2024) 0 - 50 U/L     _  Result Component Current Result Ref Range   Bilirubin Total 0.3 (11/12/2024) <=1.2 mg/dL       _  Result Component Current Result Ref Range   WBC Count 8.5 (11/12/2024) 4.0 - 11.0 10e3/uL     _  Result Component Current Result Ref Range   Hemoglobin 11.0 (L) (11/12/2024) 11.7 - 15.7 g/dL     _  Result Component Current Result Ref Range   Platelet Count 314 (11/12/2024) 150 - 450 10e3/uL     No results found for ANC within last 30 days.         Assessment:  Patient is appropriate to start therapy.    Plan:  Basic chemotherapy teaching was reviewed with the patient including indication, start date of therapy, dose, administration, adverse effects, missed doses, food and drug interactions, monitoring, side effect management, office contact information, and safe handling. Written materials were provided and all questions answered.    Follow-Up:  We will call Precious about one week after she starts treatment for an initial follow-up.     Khadar Billy, PharmD  Oral Chemotherapy Pharmacist  [phone]

## 2024-11-18 DIAGNOSIS — Z17.1 MALIGNANT NEOPLASM OF UPPER-OUTER QUADRANT OF RIGHT BREAST IN FEMALE, ESTROGEN RECEPTOR NEGATIVE (H): Primary | ICD-10-CM

## 2024-11-18 DIAGNOSIS — C50.411 MALIGNANT NEOPLASM OF UPPER-OUTER QUADRANT OF RIGHT BREAST IN FEMALE, ESTROGEN RECEPTOR NEGATIVE (H): Primary | ICD-10-CM

## 2024-11-22 PROCEDURE — 82435 ASSAY OF BLOOD CHLORIDE: CPT | Performed by: INTERNAL MEDICINE

## 2024-11-22 PROCEDURE — 85004 AUTOMATED DIFF WBC COUNT: CPT | Performed by: INTERNAL MEDICINE

## 2024-11-22 PROCEDURE — 86300 IMMUNOASSAY TUMOR CA 15-3: CPT | Performed by: INTERNAL MEDICINE

## 2024-11-22 PROCEDURE — 85018 HEMOGLOBIN: CPT | Performed by: INTERNAL MEDICINE

## 2024-11-22 PROCEDURE — 82947 ASSAY GLUCOSE BLOOD QUANT: CPT | Performed by: INTERNAL MEDICINE

## 2024-11-22 PROCEDURE — 82565 ASSAY OF CREATININE: CPT | Performed by: INTERNAL MEDICINE

## 2024-11-22 PROCEDURE — 85048 AUTOMATED LEUKOCYTE COUNT: CPT | Performed by: INTERNAL MEDICINE

## 2024-11-25 ENCOUNTER — TELEPHONE (OUTPATIENT)
Dept: ONCOLOGY | Facility: CLINIC | Age: 84
End: 2024-11-25
Payer: COMMERCIAL

## 2024-11-25 NOTE — ORAL ONC MGMT
"Oral Chemotherapy Monitoring Program    Primary Oncologist: Dr. Beebe  Drug: capecitabine  Start Date: 11/16/24    Subjective/Objective:  Precious Paris is a 84 year old female contacted by phone for a follow-up visit for oral chemotherapy. Precious confirms that she has been taking 3 tablets, by mouth, twice daily. She will start her off week this Saturday. She notes some mild arthralgias, specifically in her legs. She did talk about this with Lazara Torres and we will continue to monitor.         11/12/2024    10:00 AM 11/13/2024     9:00 AM 11/25/2024    12:00 PM   ORAL CHEMOTHERAPY   Assessment Type Initial Work up New Teach Initial Follow up   Diagnosis Code Breast Cancer Breast Cancer Breast Cancer   Providers Abiel Beebe   Clinic Name/Location [facility]   Drug Name Xeloda (capecitabine) Xeloda (capecitabine) Xeloda (capecitabine)   Dose 1,500 mg 1,500 mg 1,500 mg   Current Schedule BID BID BID   Cycle Details 2 weeks on, 1 week off 2 weeks on, 1 week off 2 weeks on, 1 week off   Start Date of Last Cycle   11/16/2024   Planned next cycle start date   12/7/2024   Doses missed in last 2 weeks   0   Adherence Assessment   Adherent   Adverse Effects   No AE identified during assessment   Any new drug interactions? No     Is the dose as ordered appropriate for the patient? Yes         Vitals:  BP:   BP Readings from Last 1 Encounters:   11/22/24 (!) 148/78     Wt Readings from Last 1 Encounters:   11/22/24 84.8 kg (187 lb)     Estimated body surface area is 1.98 meters squared as calculated from the following:    Height as of 11/22/24: 1.661 m (5' 5.4\").    Weight as of 11/22/24: 84.8 kg (187 lb).    Labs:  _  Result Component Current Result Ref Range   Sodium 139 (11/22/2024) 135 - 145 mmol/L     _  Result Component Current Result Ref Range   Potassium 3.9 (11/22/2024) 3.4 - 5.3 mmol/L     _  Result Component Current Result Ref Range   Calcium 9.4 (11/22/2024) 8.8 - 10.4 mg/dL "     No results found for Mag within last 30 days.     No results found for Phos within last 30 days.     _  Result Component Current Result Ref Range   Albumin 3.7 (11/22/2024) 3.5 - 5.2 g/dL     _  Result Component Current Result Ref Range   Urea Nitrogen 14.0 (11/22/2024) 8.0 - 23.0 mg/dL     _  Result Component Current Result Ref Range   Creatinine 0.61 (11/22/2024) 0.51 - 0.95 mg/dL       _  Result Component Current Result Ref Range   AST 22 (11/22/2024) 0 - 45 U/L     _  Result Component Current Result Ref Range   ALT 12 (11/22/2024) 0 - 50 U/L     _  Result Component Current Result Ref Range   Bilirubin Total 0.3 (11/22/2024) <=1.2 mg/dL       _  Result Component Current Result Ref Range   WBC Count 6.5 (11/22/2024) 4.0 - 11.0 10e3/uL     _  Result Component Current Result Ref Range   Hemoglobin 11.1 (L) (11/22/2024) 11.7 - 15.7 g/dL     _  Result Component Current Result Ref Range   Platelet Count 349 (11/22/2024) 150 - 450 10e3/uL     No results found for ANC within last 30 days.         Assessment/Plan:  Precious is tolerating the initiation of therapy well. Continue with current treatment plan.    Follow-Up:  12/2 - labs  12/3 - appt      Khadar Billy, PharmD  Oral Chemotherapy Pharmacist  [phone]

## 2024-11-26 ENCOUNTER — PATIENT OUTREACH (OUTPATIENT)
Dept: ONCOLOGY | Facility: CLINIC | Age: 84
End: 2024-11-26
Payer: COMMERCIAL

## 2024-11-26 NOTE — PROGRESS NOTES
[facility]: Cancer Care                                                                                          Enrollment: enrolled  Follow up scheduled: 12/2/24    Signature:  VENKATA ALLEN RN

## 2024-11-27 DIAGNOSIS — C50.411 MALIGNANT NEOPLASM OF UPPER-OUTER QUADRANT OF RIGHT BREAST IN FEMALE, ESTROGEN RECEPTOR NEGATIVE (H): Primary | ICD-10-CM

## 2024-11-27 DIAGNOSIS — C50.919 PRIMARY MALIGNANT NEOPLASM OF BREAST WITH METASTASIS (H): ICD-10-CM

## 2024-11-27 DIAGNOSIS — Z17.1 MALIGNANT NEOPLASM OF UPPER-OUTER QUADRANT OF RIGHT BREAST IN FEMALE, ESTROGEN RECEPTOR NEGATIVE (H): Primary | ICD-10-CM

## 2024-11-27 RX ORDER — CAPECITABINE 500 MG/1
850 TABLET, FILM COATED ORAL 2 TIMES DAILY
Qty: 84 TABLET | Refills: 0 | OUTPATIENT
Start: 2024-12-07 | End: 2024-12-21

## 2024-12-02 ENCOUNTER — LAB (OUTPATIENT)
Dept: LAB | Facility: CLINIC | Age: 84
End: 2024-12-02
Payer: COMMERCIAL

## 2024-12-02 DIAGNOSIS — C50.919 PRIMARY MALIGNANT NEOPLASM OF BREAST WITH METASTASIS (H): ICD-10-CM

## 2024-12-02 DIAGNOSIS — Z17.1 MALIGNANT NEOPLASM OF UPPER-OUTER QUADRANT OF RIGHT BREAST IN FEMALE, ESTROGEN RECEPTOR NEGATIVE (H): ICD-10-CM

## 2024-12-02 DIAGNOSIS — C50.411 MALIGNANT NEOPLASM OF UPPER-OUTER QUADRANT OF RIGHT BREAST IN FEMALE, ESTROGEN RECEPTOR NEGATIVE (H): ICD-10-CM

## 2024-12-02 LAB
ALBUMIN SERPL BCG-MCNC: 3.7 G/DL (ref 3.5–5.2)
ALP SERPL-CCNC: 89 U/L (ref 40–150)
ALT SERPL W P-5'-P-CCNC: 14 U/L (ref 0–50)
ANION GAP SERPL CALCULATED.3IONS-SCNC: 12 MMOL/L (ref 7–15)
AST SERPL W P-5'-P-CCNC: 28 U/L (ref 0–45)
BASOPHILS # BLD AUTO: 0 10E3/UL (ref 0–0.2)
BASOPHILS NFR BLD AUTO: 1 %
BILIRUB SERPL-MCNC: 0.3 MG/DL
BUN SERPL-MCNC: 13.4 MG/DL (ref 8–23)
CALCIUM SERPL-MCNC: 9.2 MG/DL (ref 8.8–10.4)
CHLORIDE SERPL-SCNC: 103 MMOL/L (ref 98–107)
CREAT SERPL-MCNC: 0.65 MG/DL (ref 0.51–0.95)
EGFRCR SERPLBLD CKD-EPI 2021: 87 ML/MIN/1.73M2
EOSINOPHIL # BLD AUTO: 0.2 10E3/UL (ref 0–0.7)
EOSINOPHIL NFR BLD AUTO: 3 %
ERYTHROCYTE [DISTWIDTH] IN BLOOD BY AUTOMATED COUNT: 19.3 % (ref 10–15)
GLUCOSE SERPL-MCNC: 119 MG/DL (ref 70–99)
HCO3 SERPL-SCNC: 25 MMOL/L (ref 22–29)
HCT VFR BLD AUTO: 34.4 % (ref 35–47)
HGB BLD-MCNC: 10.9 G/DL (ref 11.7–15.7)
IMM GRANULOCYTES # BLD: 0 10E3/UL
IMM GRANULOCYTES NFR BLD: 0 %
LYMPHOCYTES # BLD AUTO: 1.3 10E3/UL (ref 0.8–5.3)
LYMPHOCYTES NFR BLD AUTO: 21 %
MCH RBC QN AUTO: 28.2 PG (ref 26.5–33)
MCHC RBC AUTO-ENTMCNC: 31.7 G/DL (ref 31.5–36.5)
MCV RBC AUTO: 89 FL (ref 78–100)
MONOCYTES # BLD AUTO: 0.6 10E3/UL (ref 0–1.3)
MONOCYTES NFR BLD AUTO: 9 %
NEUTROPHILS # BLD AUTO: 4.3 10E3/UL (ref 1.6–8.3)
NEUTROPHILS NFR BLD AUTO: 67 %
PLATELET # BLD AUTO: 283 10E3/UL (ref 150–450)
POTASSIUM SERPL-SCNC: 3.7 MMOL/L (ref 3.4–5.3)
PROT SERPL-MCNC: 7 G/DL (ref 6.4–8.3)
RBC # BLD AUTO: 3.86 10E6/UL (ref 3.8–5.2)
SODIUM SERPL-SCNC: 140 MMOL/L (ref 135–145)
WBC # BLD AUTO: 6.4 10E3/UL (ref 4–11)

## 2024-12-02 PROCEDURE — 85025 COMPLETE CBC W/AUTO DIFF WBC: CPT

## 2024-12-02 PROCEDURE — 99000 SPECIMEN HANDLING OFFICE-LAB: CPT

## 2024-12-02 PROCEDURE — 80053 COMPREHEN METABOLIC PANEL: CPT

## 2024-12-02 PROCEDURE — 36415 COLL VENOUS BLD VENIPUNCTURE: CPT

## 2024-12-02 PROCEDURE — 86300 IMMUNOASSAY TUMOR CA 15-3: CPT | Mod: 90

## 2024-12-03 ENCOUNTER — ONCOLOGY VISIT (OUTPATIENT)
Dept: ONCOLOGY | Facility: CLINIC | Age: 84
End: 2024-12-03
Attending: NURSE PRACTITIONER
Payer: COMMERCIAL

## 2024-12-03 ENCOUNTER — INFUSION THERAPY VISIT (OUTPATIENT)
Dept: INFUSION THERAPY | Facility: CLINIC | Age: 84
End: 2024-12-03
Attending: NURSE PRACTITIONER
Payer: COMMERCIAL

## 2024-12-03 VITALS
RESPIRATION RATE: 18 BRPM | TEMPERATURE: 98.2 F | HEIGHT: 65 IN | SYSTOLIC BLOOD PRESSURE: 144 MMHG | OXYGEN SATURATION: 95 % | DIASTOLIC BLOOD PRESSURE: 62 MMHG | HEART RATE: 115 BPM | BODY MASS INDEX: 31.49 KG/M2 | WEIGHT: 189 LBS

## 2024-12-03 DIAGNOSIS — C50.911 INFILTRATING DUCTAL CARCINOMA OF RIGHT BREAST (H): Primary | ICD-10-CM

## 2024-12-03 DIAGNOSIS — G62.9 NEUROPATHY: ICD-10-CM

## 2024-12-03 DIAGNOSIS — M79.661 PAIN IN BOTH LOWER LEGS: ICD-10-CM

## 2024-12-03 DIAGNOSIS — C50.919 PRIMARY MALIGNANT NEOPLASM OF BREAST WITH METASTASIS (H): ICD-10-CM

## 2024-12-03 DIAGNOSIS — M79.662 PAIN IN BOTH LOWER LEGS: ICD-10-CM

## 2024-12-03 LAB — CANCER AG27-29 SERPL-ACNC: 72.3 U/ML

## 2024-12-03 PROCEDURE — 99214 OFFICE O/P EST MOD 30 MIN: CPT | Performed by: NURSE PRACTITIONER

## 2024-12-03 PROCEDURE — 250N000011 HC RX IP 250 OP 636: Performed by: NURSE PRACTITIONER

## 2024-12-03 PROCEDURE — G2211 COMPLEX E/M VISIT ADD ON: HCPCS | Performed by: NURSE PRACTITIONER

## 2024-12-03 PROCEDURE — G0463 HOSPITAL OUTPT CLINIC VISIT: HCPCS | Performed by: NURSE PRACTITIONER

## 2024-12-03 RX ORDER — HEPARIN SODIUM,PORCINE 10 UNIT/ML
5-20 VIAL (ML) INTRAVENOUS DAILY PRN
OUTPATIENT
Start: 2024-12-03

## 2024-12-03 RX ORDER — DULOXETIN HYDROCHLORIDE 20 MG/1
20 CAPSULE, DELAYED RELEASE ORAL 2 TIMES DAILY
Qty: 60 CAPSULE | Refills: 1 | Status: SHIPPED | OUTPATIENT
Start: 2024-12-03

## 2024-12-03 RX ORDER — HEPARIN SODIUM (PORCINE) LOCK FLUSH IV SOLN 100 UNIT/ML 100 UNIT/ML
5 SOLUTION INTRAVENOUS
OUTPATIENT
Start: 2024-12-03

## 2024-12-03 RX ORDER — HEPARIN SODIUM (PORCINE) LOCK FLUSH IV SOLN 100 UNIT/ML 100 UNIT/ML
5 SOLUTION INTRAVENOUS
Status: DISCONTINUED | OUTPATIENT
Start: 2024-12-03 | End: 2024-12-03 | Stop reason: HOSPADM

## 2024-12-03 RX ADMIN — HEPARIN 5 ML: 100 SYRINGE at 10:58

## 2024-12-03 ASSESSMENT — PAIN SCALES - GENERAL: PAINLEVEL_OUTOF10: NO PAIN (0)

## 2024-12-03 NOTE — LETTER
"12/3/2024      Precious Paris  [address] [place] [address]      Dear Colleague,    Thank you for referring your patient, Precious Parsi, to the [facility]. Please see a copy of my visit note below.        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 Beebe    _____________________________________________________________________________    History of Present Illness/ Interval History    Ms. Precious Paris is a 84 year old with recurrent metastatic triple negative breast cancer recurrence. She just started 5th line Xeloda 3 weeks ago.  Returns ahead of starting cycle 2.     She's in her her off-week and due to start next cycle this Sat, 12/7.    Tolerating fairly well, so far. Notes a bit more mild arthralgias and leg \"heaviness\". Wonders if it may be related to her neuropathy. Minimal nausea and taking antiemetics as needed. No diarrhea. Mild lower appetite, minimal weight loss. Mild scattered asymptomatic rash on forearms/upper chest this week. No hand/foot syndrome. No fevers.     Some left axillary tenderness, stable.     She will go to [place] later this week for remainder of Winter. She has an appt with her Oncologist there arranged in a few weeks.    ECOG PS: 1      Oncology History/Treatment  Diagnosis/Stage:   11/2019: Right breast cancer, triple negative ([id number])    BRCA negative    10/2022: Recurrent, stage IV triple negative breast cancer (bilateral axillary, supraclav, mediastinal nodes + lung)  -persistent/progressive right breast firmness with new right nipple drainage and right axillary adenopathy  -bilateral diagnostic mammogram: increased density R breast with skin thickening. R breast US: 3.2 cm hypoechoic mass at 11:00 position 1 cm from nipple " and multiple other small hypoechoic solid-appearing lesions in R breast + right axillary adenopathy (at least two hypoechoic vascular lesions measuring up to 2.5 cm)  -10/18/2022 biopsy R breast mass + R axillary node: grade 3 invasive ductal cancer with tumor necrosis, ER/OH/HER2 negative.   -PET: + right breast mass; bilateral axillary, retropectoral, supraclavicular, mediastinal/R hilar nodes and bilateral lung/R pleural nodules suspicious for mets.  -Brain MRI: negative for mets  -11/1/2022 biopsy L axillary node: metastatic invasive ductal carcinoma  -Foundation One: no actionable mutations MS stable, TMB 1 mut/mb.   -PDL1 testing: CPS >20% and TPS 60% (considered high PDL1 expression)  -CA 27.29: 59.5 (elevated). CA 15-3: 23 (normal)  -9/10/2023 Brain MRI: 2 new foci of punctate enhancement in left frontal cortex and right lentiform nucleus, concerning for brain metastases (asymptomatic)    NGS (foundation 1)  FGF R1 amplification.  Pazopanib has been approved in other tumors but not in breast cancer.  NTRK 1 amplification.  Not actionable  She has multiple other mutations which are not actionable.      Treatment:  Initial breast cancer (2019)  -2019: Neoadjuvant taxol  -Lumpectomy  -5/2020: Adjuvant RT  -6 - 7/2020: Adjuvant Xeloda. Stopped for rash    Recurrent, stage IV breast cancer (2022)  -11/14/2022:Doxil every 4 weeks  -12/12/2022: changed to doxorubicin every 3 weeks. (Planning addition of pembrolizumab once approved by insurance)  -1/2/2023: doxorubicin + pembrolizumab every 3 weeks. Completed 1 cycle. Stopped for progression (med nodes, slight increase in lung mets and stable R breast mass + axillary nodes).    -1/23/2023 - present: Carbo AUC 5, Gemcitabine 800 mg/m2 and Pembro every 21 days  -cycles 1-2: [place] day 1 only  -Cycle 3: [place] increased to days 1 + 8 given excellent tolerance  -CT after 3 cycles showed excellent response (near CR axillary + med nodes and OH in lung mets; clinical improvement  in right breast changes and tumor marker).  -PET scan after 6 cycles of carboplatin gemcitabine pembrolizumab shows complete response    5/30 - 8/22/2023: Maintenance pembrolizumab, until progression (recurrent axillary, thoracic nodes and 2 punctate brain lesions).  Biopsy of left axillary node confirmed recurrent/metastatic breast cancer.    9/12/2023 - 6/18/2024:  Sacituzumab days 1, 8 every 21 days (x 14 cycles). Stopped for progression.  --C2D8 deferred x 1 week for neutropenia  --Dose decreased to 8 mg/kg from cycle 3 onwards.  --Cycles 5-10 were delivered in [place] over winter months  --Resumed Cycle 11 here upon return 4/2024  --After cycle 14, PET showed progressive disease.     7/23/2024 - 10/22/2024: docetaxel 60 mg/m2 x 5 cycles. Stopped for progression.    11/12/2024 - present: Xeloda 850 mg/m2 (1500 mg) BID 14 on/7 off       Physical Exam    GENERAL: Alert and oriented to time place and person. Seated comfortably. In no distress.  Alone.   HEENT: Resolving alopecia. No icterus.   Lymph: left axillary adenopathy - particularly deep 2.5 cm mass along chest wall, tender.   Lungs: regular respiratory effort.   Abd: Soft, non-distended.   Neuro: alert and oriented x 3  Skin: Mild scattered scabbed lesions on forearm and upper chest.       Lab Results  Recent Results (from the past week)   Comprehensive metabolic panel    Collection Time: 12/02/24 10:15 AM   Result Value Ref Range    Sodium 140 135 - 145 mmol/L    Potassium 3.7 3.4 - 5.3 mmol/L    Carbon Dioxide (CO2) 25 22 - 29 mmol/L    Anion Gap 12 7 - 15 mmol/L    Urea Nitrogen 13.4 8.0 - 23.0 mg/dL    Creatinine 0.65 0.51 - 0.95 mg/dL    GFR Estimate 87 >60 mL/min/1.73m2    Calcium 9.2 8.8 - 10.4 mg/dL    Chloride 103 98 - 107 mmol/L    Glucose 119 (H) 70 - 99 mg/dL    Alkaline Phosphatase 89 40 - 150 U/L    AST 28 0 - 45 U/L    ALT 14 0 - 50 U/L    Protein Total 7.0 6.4 - 8.3 g/dL    Albumin 3.7 3.5 - 5.2 g/dL    Bilirubin Total 0.3 <=1.2 mg/dL   CBC  "with platelets and differential    Collection Time: 12/02/24 10:15 AM   Result Value Ref Range    WBC Count 6.4 4.0 - 11.0 10e3/uL    RBC Count 3.86 3.80 - 5.20 10e6/uL    Hemoglobin 10.9 (L) 11.7 - 15.7 g/dL    Hematocrit 34.4 (L) 35.0 - 47.0 %    MCV 89 78 - 100 fL    MCH 28.2 26.5 - 33.0 pg    MCHC 31.7 31.5 - 36.5 g/dL    RDW 19.3 (H) 10.0 - 15.0 %    Platelet Count 283 150 - 450 10e3/uL    % Neutrophils 67 %    % Lymphocytes 21 %    % Monocytes 9 %    % Eosinophils 3 %    % Basophils 1 %    % Immature Granulocytes 0 %    Absolute Neutrophils 4.3 1.6 - 8.3 10e3/uL    Absolute Lymphocytes 1.3 0.8 - 5.3 10e3/uL    Absolute Monocytes 0.6 0.0 - 1.3 10e3/uL    Absolute Eosinophils 0.2 0.0 - 0.7 10e3/uL    Absolute Basophils 0.0 0.0 - 0.2 10e3/uL    Absolute Immature Granulocytes 0.0 <=0.4 10e3/uL           Assessment/Plan  Recurrent, stage IV triple negative right breast cancer (nodes, lung); CPS>20% + TPS 60%  Punctate brain lesions concerning for metastatic disease (asymptomatic), resolved on chemo  Peripheral neuropathy fingertips +/- lower extremities (gr 1-2)  Rash, (gr 1)  Arthralgias  Iron def anemia, stable    In July, was progressing on sacituzumab (neck, chest and upper abd adenopathy).   In Oct, was progressing on docetaxel (4th line) after 5 cycles. . She comple    Various options were considered. Since she will be traveling to [place] for the Winter, it was decided oral Xeloda would be the easiest regimen to manage. She previously had Xeloda in the adjuvant setting and had a rash that will need to be monitored for.     Initiated cycle 1 Xeloda 3 weeks ago. She is in her off week after cycle 1.  Tolerating pretty well; notes some muscle/bone aching discomfort in legs on it. Her legs feel \"heavy\" and she wonders if component of her baseline neuropathy.  No GI side effects. No hand/foot symptoms. Mild forearm rash this week, could be related.     Labs: Hgb 10.9, WBC/ANC, plat WNL. CMP WNL    No clinical evidence " "of progression.  CA 27.29 pending.     Plan:  -Initiate cycle 2 on schedule 12/7. Continue same dosing.  -Duloxetine 20 mg at bedtime x 2 weeks, can increase to BID for arthralgias and neuropathy  -She will be going to [place] later this week. She has Oncologist there to manage her treatment. I recommended she schedule a visit there the week of 12/23 (off week between cycle 2-3) for labs, visit and getting her 3rd cycle prescribed there.   -Would recommend repeat imaging (CT/PET and brain MRI) after 4 cycles of Xeloda (late January), if clinically stable in interim.   -Next line options at time of progression/intolerance that Dr. Beebe has previously considered include:    vinorelbine, eribulin, or ixabepilone.  She did well with carboplatin and gemcitabine in the past in the metastatic setting, so technically we could potentially treat her with the same regimen.      Hx pneumonitis  Bilateral opacities noted in both lungs on last 2 PET scans. Indeterminate etiology (?drug-induced, other). Treated with 2 weeks prednisone in early July with improved fatigue. No recurrent symptoms.      Total time 30 minutes, to include face to face visit, review of EMR, ordering, documentation and coordination of care on date of service.    complexity modifier for longitudinal care.       Signed by:   Lazara Torres NP           Oncology Rooming Note    December 3, 2024 10:23 AM   Precoius Paris is a 84 year old female who presents for:    Chief Complaint   Patient presents with     Oncology Clinic Visit     Malignant neoplasm of upper-outer quadrant of right breast in female, estrogen receptor negative - provider visit only     Initial Vitals: BP (!) 144/62 (BP Location: Right arm, Patient Position: Sitting, Cuff Size: Adult Large)   Pulse 115   Temp 98.2  F (36.8  C) (Tympanic)   Resp 18   Ht 1.661 m (5' 5.4\")   Wt 85.7 kg (189 lb)   SpO2 95%   BMI 31.07 kg/m   Estimated body mass index is 31.07 kg/m  as calculated from " "the following:    Height as of this encounter: 1.661 m (5' 5.4\").    Weight as of this encounter: 85.7 kg (189 lb). Body surface area is 1.99 meters squared.  No Pain (0) Comment: Data Unavailable   No LMP recorded. Patient is postmenopausal.  Allergies reviewed: Yes  Medications reviewed: Yes    Medications: Medication refills not needed today.  Pharmacy name entered into [facility]:    [facility] - [place], [place] - [address] [facility] [facility] - [place], [place] - [address]    Frailty Screening:   Is the patient here for a new oncology consult visit in cancer care? 2. No      Clinical concerns: New rash on arms, with some bruising.  Has been having \"heaviness\" in legs in the mornings. Needs a port flush today.       Erin Thomas MA                Again, thank you for allowing me to participate in the care of your patient.        Sincerely,        Lazara Torres NP  "

## 2024-12-03 NOTE — PROGRESS NOTES
"Oncology Rooming Note    December 3, 2024 10:23 AM   Precious Paris is a 84 year old female who presents for:    Chief Complaint   Patient presents with    Oncology Clinic Visit     Malignant neoplasm of upper-outer quadrant of right breast in female, estrogen receptor negative - provider visit only     Initial Vitals: BP (!) 144/62 (BP Location: Right arm, Patient Position: Sitting, Cuff Size: Adult Large)   Pulse 115   Temp 98.2  F (36.8  C) (Tympanic)   Resp 18   Ht 1.661 m (5' 5.4\")   Wt 85.7 kg (189 lb)   SpO2 95%   BMI 31.07 kg/m   Estimated body mass index is 31.07 kg/m  as calculated from the following:    Height as of this encounter: 1.661 m (5' 5.4\").    Weight as of this encounter: 85.7 kg (189 lb). Body surface area is 1.99 meters squared.  No Pain (0) Comment: Data Unavailable   No LMP recorded. Patient is postmenopausal.  Allergies reviewed: Yes  Medications reviewed: Yes    Medications: Medication refills not needed today.  Pharmacy name entered into [employer]:    [facility] - [place], [place] - [address] [facility] - [place], [place] - [address]    Frailty Screening:   Is the patient here for a new oncology consult visit in cancer care? 2. No      Clinical concerns: New rash on arms, with some bruising.  Has been having \"heaviness\" in legs in the mornings. Needs a port flush today.       Erin Thomas MA              "

## 2024-12-03 NOTE — PROGRESS NOTES
"    [facility] Hematology and Oncology Progress Note    Patient: Precious Paris  MRN: [id number]  Dec 3, 2024          Reason for Visit    Recurrent stage IV triple negative breast cancer (nodes, lung) (PDL1+)    Primary Oncologist: Dr. Beebe    _____________________________________________________________________________    History of Present Illness/ Interval History    Ms. Precious Paris is a 84 year old with recurrent metastatic triple negative breast cancer recurrence. She just started 5th line Xeloda 3 weeks ago.  Returns ahead of starting cycle 2.     She's in her her off-week and due to start next cycle this Sat, 12/7.    Tolerating fairly well, so far. Notes a bit more mild arthralgias and leg \"heaviness\". Wonders if it may be related to her neuropathy. Minimal nausea and taking antiemetics as needed. No diarrhea. Mild lower appetite, minimal weight loss. Mild scattered asymptomatic rash on forearms/upper chest this week. No hand/foot syndrome. No fevers.     Some left axillary tenderness, stable.     She will go to [place] later this week for remainder of Winter. She has an appt with her Oncologist there arranged in a few weeks.    ECOG PS: 1      Oncology History/Treatment  Diagnosis/Stage:   11/2019: Right breast cancer, triple negative ([id number])    BRCA negative    10/2022: Recurrent, stage IV triple negative breast cancer (bilateral axillary, supraclav, mediastinal nodes + lung)  -persistent/progressive right breast firmness with new right nipple drainage and right axillary adenopathy  -bilateral diagnostic mammogram: increased density R breast with skin thickening. R breast US: 3.2 cm hypoechoic mass at 11:00 position 1 cm from nipple and multiple other small hypoechoic solid-appearing lesions in R breast + right axillary adenopathy (at least two hypoechoic vascular lesions measuring up to 2.5 cm)  -10/18/2022 biopsy R breast mass + R axillary node: grade 3 invasive ductal cancer " with tumor necrosis, ER/CO/HER2 negative.   -PET: + right breast mass; bilateral axillary, retropectoral, supraclavicular, mediastinal/R hilar nodes and bilateral lung/R pleural nodules suspicious for mets.  -Brain MRI: negative for mets  -11/1/2022 biopsy L axillary node: metastatic invasive ductal carcinoma  -Foundation One: no actionable mutations MS stable, TMB 1 mut/mb.   -PDL1 testing: CPS >20% and TPS 60% (considered high PDL1 expression)  -CA 27.29: 59.5 (elevated). CA 15-3: 23 (normal)  -9/10/2023 Brain MRI: 2 new foci of punctate enhancement in left frontal cortex and right lentiform nucleus, concerning for brain metastases (asymptomatic)    NGS (foundation 1)  FGF R1 amplification.  Pazopanib has been approved in other tumors but not in breast cancer.  NTRK 1 amplification.  Not actionable  She has multiple other mutations which are not actionable.      Treatment:  Initial breast cancer (2019)  -2019: Neoadjuvant taxol  -Lumpectomy  -5/2020: Adjuvant RT  -6 - 7/2020: Adjuvant Xeloda. Stopped for rash    Recurrent, stage IV breast cancer (2022)  -11/14/2022:Doxil every 4 weeks  -12/12/2022: changed to doxorubicin every 3 weeks. (Planning addition of pembrolizumab once approved by insurance)  -1/2/2023: doxorubicin + pembrolizumab every 3 weeks. Completed 1 cycle. Stopped for progression (med nodes, slight increase in lung mets and stable R breast mass + axillary nodes).    -1/23/2023 - present: Carbo AUC 5, Gemcitabine 800 mg/m2 and Pembro every 21 days  -cycles 1-2: [place] day 1 only  -Cycle 3: [place] increased to days 1 + 8 given excellent tolerance  -CT after 3 cycles showed excellent response (near CR axillary + med nodes and CO in lung mets; clinical improvement in right breast changes and tumor marker).  -PET scan after 6 cycles of carboplatin gemcitabine pembrolizumab shows complete response    5/30 - 8/22/2023: Maintenance pembrolizumab, until progression (recurrent axillary, thoracic nodes and 2  punctate brain lesions).  Biopsy of left axillary node confirmed recurrent/metastatic breast cancer.    9/12/2023 - 6/18/2024:  Sacituzumab days 1, 8 every 21 days (x 14 cycles). Stopped for progression.  --C2D8 deferred x 1 week for neutropenia  --Dose decreased to 8 mg/kg from cycle 3 onwards.  --Cycles 5-10 were delivered in [place] over winter months  --Resumed Cycle 11 here upon return 4/2024  --After cycle 14, PET showed progressive disease.     7/23/2024 - 10/22/2024: docetaxel 60 mg/m2 x 5 cycles. Stopped for progression.    11/12/2024 - present: Xeloda 850 mg/m2 (1500 mg) BID 14 on/7 off       Physical Exam    GENERAL: Alert and oriented to time place and person. Seated comfortably. In no distress.  Alone.   HEENT: Resolving alopecia. No icterus.   Lymph: left axillary adenopathy - particularly deep 2.5 cm mass along chest wall, tender.   Lungs: regular respiratory effort.   Abd: Soft, non-distended.   Neuro: alert and oriented x 3  Skin: Mild scattered scabbed lesions on forearm and upper chest.       Lab Results  Recent Results (from the past week)   Comprehensive metabolic panel    Collection Time: 12/02/24 10:15 AM   Result Value Ref Range    Sodium 140 135 - 145 mmol/L    Potassium 3.7 3.4 - 5.3 mmol/L    Carbon Dioxide (CO2) 25 22 - 29 mmol/L    Anion Gap 12 7 - 15 mmol/L    Urea Nitrogen 13.4 8.0 - 23.0 mg/dL    Creatinine 0.65 0.51 - 0.95 mg/dL    GFR Estimate 87 >60 mL/min/1.73m2    Calcium 9.2 8.8 - 10.4 mg/dL    Chloride 103 98 - 107 mmol/L    Glucose 119 (H) 70 - 99 mg/dL    Alkaline Phosphatase 89 40 - 150 U/L    AST 28 0 - 45 U/L    ALT 14 0 - 50 U/L    Protein Total 7.0 6.4 - 8.3 g/dL    Albumin 3.7 3.5 - 5.2 g/dL    Bilirubin Total 0.3 <=1.2 mg/dL   CBC with platelets and differential    Collection Time: 12/02/24 10:15 AM   Result Value Ref Range    WBC Count 6.4 4.0 - 11.0 10e3/uL    RBC Count 3.86 3.80 - 5.20 10e6/uL    Hemoglobin 10.9 (L) 11.7 - 15.7 g/dL    Hematocrit 34.4 (L) 35.0 - 47.0  "%    MCV 89 78 - 100 fL    MCH 28.2 26.5 - 33.0 pg    MCHC 31.7 31.5 - 36.5 g/dL    RDW 19.3 (H) 10.0 - 15.0 %    Platelet Count 283 150 - 450 10e3/uL    % Neutrophils 67 %    % Lymphocytes 21 %    % Monocytes 9 %    % Eosinophils 3 %    % Basophils 1 %    % Immature Granulocytes 0 %    Absolute Neutrophils 4.3 1.6 - 8.3 10e3/uL    Absolute Lymphocytes 1.3 0.8 - 5.3 10e3/uL    Absolute Monocytes 0.6 0.0 - 1.3 10e3/uL    Absolute Eosinophils 0.2 0.0 - 0.7 10e3/uL    Absolute Basophils 0.0 0.0 - 0.2 10e3/uL    Absolute Immature Granulocytes 0.0 <=0.4 10e3/uL           Assessment/Plan  Recurrent, stage IV triple negative right breast cancer (nodes, lung); CPS>20% + TPS 60%  Punctate brain lesions concerning for metastatic disease (asymptomatic), resolved on chemo  Peripheral neuropathy fingertips +/- lower extremities (gr 1-2)  Rash, (gr 1)  Arthralgias  Iron def anemia, stable    In July, was progressing on sacituzumab (neck, chest and upper abd adenopathy).   In Oct, was progressing on docetaxel (4th line) after 5 cycles. . She comple    Various options were considered. Since she will be traveling to [place] for the Winter, it was decided oral Xeloda would be the easiest regimen to manage. She previously had Xeloda in the adjuvant setting and had a rash that will need to be monitored for.     Initiated cycle 1 Xeloda 3 weeks ago. She is in her off week after cycle 1.  Tolerating pretty well; notes some muscle/bone aching discomfort in legs on it. Her legs feel \"heavy\" and she wonders if component of her baseline neuropathy.  No GI side effects. No hand/foot symptoms. Mild forearm rash this week, could be related.     Labs: Hgb 10.9, WBC/ANC, plat WNL. CMP WNL    No clinical evidence of progression.  CA 27.29 pending.     Plan:  -Initiate cycle 2 on schedule 12/7. Continue same dosing.  -Duloxetine 20 mg at bedtime x 2 weeks, can increase to BID for arthralgias and neuropathy  -She will be going to [place] later this week. She " has Oncologist there to manage her treatment. I recommended she schedule a visit there the week of 12/23 (off week between cycle 2-3) for labs, visit and getting her 3rd cycle prescribed there.   -Would recommend repeat imaging (CT/PET and brain MRI) after 4 cycles of Xeloda (late January), if clinically stable in interim.   -Next line options at time of progression/intolerance that Dr. Beebe has previously considered include:    vinorelbine, eribulin, or ixabepilone.  She did well with carboplatin and gemcitabine in the past in the metastatic setting, so technically we could potentially treat her with the same regimen.      Hx pneumonitis  Bilateral opacities noted in both lungs on last 2 PET scans. Indeterminate etiology (?drug-induced, other). Treated with 2 weeks prednisone in early July with improved fatigue. No recurrent symptoms.      Total time 30 minutes, to include face to face visit, review of EMR, ordering, documentation and coordination of care on date of service.    complexity modifier for longitudinal care.       Signed by:   Lazara Torres NP

## 2024-12-10 ENCOUNTER — TRANSFERRED RECORDS (OUTPATIENT)
Dept: HEALTH INFORMATION MANAGEMENT | Facility: CLINIC | Age: 84
End: 2024-12-10
Payer: COMMERCIAL

## 2025-03-06 ENCOUNTER — TRANSFERRED RECORDS (OUTPATIENT)
Dept: HEALTH INFORMATION MANAGEMENT | Facility: CLINIC | Age: 85
End: 2025-03-06
Payer: COMMERCIAL

## 2025-03-18 ENCOUNTER — TRANSFERRED RECORDS (OUTPATIENT)
Dept: HEALTH INFORMATION MANAGEMENT | Facility: CLINIC | Age: 85
End: 2025-03-18
Payer: COMMERCIAL

## 2025-03-20 DIAGNOSIS — C78.02 MALIGNANT NEOPLASM METASTATIC TO BOTH LUNGS (H): ICD-10-CM

## 2025-03-20 DIAGNOSIS — C78.01 MALIGNANT NEOPLASM METASTATIC TO BOTH LUNGS (H): ICD-10-CM

## 2025-03-20 DIAGNOSIS — Z17.1 MALIGNANT NEOPLASM OF UPPER-OUTER QUADRANT OF RIGHT BREAST IN FEMALE, ESTROGEN RECEPTOR NEGATIVE (H): ICD-10-CM

## 2025-03-20 DIAGNOSIS — C77.3 METASTATIC CANCER TO AXILLARY LYMPH NODES (H): ICD-10-CM

## 2025-03-20 DIAGNOSIS — C50.911 INFILTRATING DUCTAL CARCINOMA OF RIGHT BREAST (H): ICD-10-CM

## 2025-03-20 DIAGNOSIS — C50.919 PRIMARY MALIGNANT NEOPLASM OF BREAST WITH METASTASIS (H): Primary | ICD-10-CM

## 2025-03-20 DIAGNOSIS — C50.411 MALIGNANT NEOPLASM OF UPPER-OUTER QUADRANT OF RIGHT BREAST IN FEMALE, ESTROGEN RECEPTOR NEGATIVE (H): ICD-10-CM

## 2025-03-24 DIAGNOSIS — C50.911 INFILTRATING DUCTAL CARCINOMA OF RIGHT BREAST (H): Primary | ICD-10-CM

## 2025-03-26 DIAGNOSIS — F40.240 CLAUSTROPHOBIA: Primary | ICD-10-CM

## 2025-03-26 DIAGNOSIS — H57.89 REDNESS AND DISCHARGE OF EYE: Primary | ICD-10-CM

## 2025-03-26 RX ORDER — POLYMYXIN B SULFATE AND TRIMETHOPRIM 1; 10000 MG/ML; [USP'U]/ML
1-2 SOLUTION OPHTHALMIC EVERY 4 HOURS
Qty: 10 ML | Refills: 0 | Status: SHIPPED | OUTPATIENT
Start: 2025-03-26 | End: 2025-03-31

## 2025-03-26 RX ORDER — LORAZEPAM 0.5 MG/1
0.5 TABLET ORAL ONCE
Qty: 1 TABLET | Refills: 0 | Status: SHIPPED | OUTPATIENT
Start: 2025-03-26 | End: 2025-03-26

## 2025-03-27 ENCOUNTER — HOSPITAL ENCOUNTER (OUTPATIENT)
Dept: PET IMAGING | Facility: HOSPITAL | Age: 85
Discharge: HOME | End: 2025-03-27
Attending: INTERNAL MEDICINE
Payer: COMMERCIAL

## 2025-03-27 ENCOUNTER — VIRTUAL VISIT (OUTPATIENT)
Dept: ONCOLOGY | Facility: CLINIC | Age: 85
End: 2025-03-27
Attending: INTERNAL MEDICINE
Payer: COMMERCIAL

## 2025-03-27 VITALS — HEIGHT: 65 IN | BODY MASS INDEX: 31.45 KG/M2

## 2025-03-27 DIAGNOSIS — C50.911 INFILTRATING DUCTAL CARCINOMA OF RIGHT BREAST (H): ICD-10-CM

## 2025-03-27 DIAGNOSIS — C78.02 MALIGNANT NEOPLASM METASTATIC TO BOTH LUNGS (H): ICD-10-CM

## 2025-03-27 DIAGNOSIS — C50.919 PRIMARY MALIGNANT NEOPLASM OF BREAST WITH METASTASIS (H): Primary | ICD-10-CM

## 2025-03-27 DIAGNOSIS — C78.01 MALIGNANT NEOPLASM METASTATIC TO BOTH LUNGS (H): ICD-10-CM

## 2025-03-27 DIAGNOSIS — C77.3 METASTATIC CANCER TO AXILLARY LYMPH NODES (H): ICD-10-CM

## 2025-03-27 DIAGNOSIS — C50.411 MALIGNANT NEOPLASM OF UPPER-OUTER QUADRANT OF RIGHT BREAST IN FEMALE, ESTROGEN RECEPTOR NEGATIVE (H): ICD-10-CM

## 2025-03-27 DIAGNOSIS — Z17.1 MALIGNANT NEOPLASM OF UPPER-OUTER QUADRANT OF RIGHT BREAST IN FEMALE, ESTROGEN RECEPTOR NEGATIVE (H): ICD-10-CM

## 2025-03-27 DIAGNOSIS — C50.919 PRIMARY MALIGNANT NEOPLASM OF BREAST WITH METASTASIS (H): ICD-10-CM

## 2025-03-27 LAB — GLUCOSE BLDC GLUCOMTR-MCNC: 114 MG/DL (ref 70–99)

## 2025-03-27 PROCEDURE — 343N000001 HC RX 343 MED OP 636: Performed by: INTERNAL MEDICINE

## 2025-03-27 PROCEDURE — 78815 PET IMAGE W/CT SKULL-THIGH: CPT | Mod: PS

## 2025-03-27 PROCEDURE — A9552 F18 FDG: HCPCS | Performed by: INTERNAL MEDICINE

## 2025-03-27 PROCEDURE — 82962 GLUCOSE BLOOD TEST: CPT

## 2025-03-27 RX ORDER — FLUDEOXYGLUCOSE F 18 200 MCI/ML
7-16 INJECTION, SOLUTION INTRAVENOUS ONCE
Status: COMPLETED | OUTPATIENT
Start: 2025-03-27 | End: 2025-03-27

## 2025-03-27 RX ADMIN — FLUDEOXYGLUCOSE F 18 11.38 MILLICURIE: 200 INJECTION, SOLUTION INTRAVENOUS at 08:52

## 2025-03-27 ASSESSMENT — PAIN SCALES - GENERAL: PAINLEVEL_OUTOF10: NO PAIN (0)

## 2025-03-27 NOTE — PROGRESS NOTES
Virtual Visit Details    Type of service:  Video Visit   Video Start Time:  11:54 AM  Video End Time: 12:17 PM    Originating Location (pt. Location): Home    Distant Location (provider location):  On-site  Platform used for Video Visit: [facility] Hematology and Oncology Progress Note    Patient: Precious Paris  MRN: [id number]  Mar 27, 2025          Reason for Visit    Recurrent stage IV triple negative breast cancer (nodes, lung) (PDL1+)    Primary Oncologist: Dr. Beebe    _____________________________________________________________________________    History of Present Illness/ Interval History    Ms. Precious Paris is a 84 year old with recurrent metastatic triple negative breast cancer.    Until recently she was on Xeloda.  She is here with repeat PET scan to discuss further management.  This was a video visit.  She sterling in [place] and recently came back to [place].    While back to [place] she was noted to have significant worsening of shortness of breath.  Was hospitalized at [facility].  Had a CT scan done there which showed extensive lung metastasis along with pleural effusion.  Underwent thoracentesis.  After that her daughter drove her and her  back to [place] to discuss further management.    She reported feeling tired and experiencing breathing difficulties. She continues to experience shortness of breath, particularly when lying flat, which forces her to sleep in a recliner. She has no fever and is coughing frequently, though not producing much phlegm. Her appetite and ability to eat and drink have remained unaffected throughout her illness. Precious underwent a PET scan earlier in the day, following a previous scan in November, which led to a switch in her chemotherapy regimen to Capecitabine due to disease progression. S    he has been on oxygen 24/7, but still experiences episodes of breathlessness, particularly around 4:00 AM, which resemble  panic attacks. She has been using an inhaler, which she feels provides some symptomatic relief, although its effectiveness is uncertain. Precious has been battling cancer for a significant period, with notable disease progression over the past two years.     ECOG PS: 1      Oncology History/Treatment  Diagnosis/Stage:   11/2019: Right breast cancer, triple negative ([id number])    BRCA negative    10/2022: Recurrent, stage IV triple negative breast cancer (bilateral axillary, supraclav, mediastinal nodes + lung)  -persistent/progressive right breast firmness with new right nipple drainage and right axillary adenopathy  -bilateral diagnostic mammogram: increased density R breast with skin thickening. R breast US: 3.2 cm hypoechoic mass at 11:00 position 1 cm from nipple and multiple other small hypoechoic solid-appearing lesions in R breast + right axillary adenopathy (at least two hypoechoic vascular lesions measuring up to 2.5 cm)  -10/18/2022 biopsy R breast mass + R axillary node: grade 3 invasive ductal cancer with tumor necrosis, ER/VA/HER2 negative.   -PET: + right breast mass; bilateral axillary, retropectoral, supraclavicular, mediastinal/R hilar nodes and bilateral lung/R pleural nodules suspicious for mets.  -Brain MRI: negative for mets  -11/1/2022 biopsy L axillary node: metastatic invasive ductal carcinoma  -Foundation One: no actionable mutations MS stable, TMB 1 mut/mb.   -PDL1 testing: CPS >20% and TPS 60% (considered high PDL1 expression)  -CA 27.29: 59.5 (elevated). CA 15-3: 23 (normal)  -9/10/2023 Brain MRI: 2 new foci of punctate enhancement in left frontal cortex and right lentiform nucleus, concerning for brain metastases (asymptomatic)    NGS (foundation 1)  FGF R1 amplification.  Pazopanib has been approved in other tumors but not in breast cancer.  NTRK 1 amplification.  Not actionable  She has multiple other mutations which are not actionable.      Treatment:  Initial breast cancer (2019)  -2019:  Neoadjuvant taxol  -Lumpectomy  -5/2020: Adjuvant RT  -6 - 7/2020: Adjuvant Xeloda. Stopped for rash    Recurrent, stage IV breast cancer (2022)  -11/14/2022:Doxil every 4 weeks  -12/12/2022: changed to doxorubicin every 3 weeks. (Planning addition of pembrolizumab once approved by insurance)  -1/2/2023: doxorubicin + pembrolizumab every 3 weeks. Completed 1 cycle. Stopped for progression (med nodes, slight increase in lung mets and stable R breast mass + axillary nodes).    -1/23/2023 - present: Carbo AUC 5, Gemcitabine 800 mg/m2 and Pembro every 21 days  -cycles 1-2: [place] day 1 only  -Cycle 3: [place] increased to days 1 + 8 given excellent tolerance  -CT after 3 cycles showed excellent response (near CR axillary + med nodes and IA in lung mets; clinical improvement in right breast changes and tumor marker).  -PET scan after 6 cycles of carboplatin gemcitabine pembrolizumab shows complete response    5/30 - 8/22/2023: Maintenance pembrolizumab, until progression (recurrent axillary, thoracic nodes and 2 punctate brain lesions).  Biopsy of left axillary node confirmed recurrent/metastatic breast cancer.    9/12/2023 - 6/18/2024:  Sacituzumab days 1, 8 every 21 days (x 14 cycles). Stopped for progression.  --C2D8 deferred x 1 week for neutropenia  --Dose decreased to 8 mg/kg from cycle 3 onwards.  --Cycles 5-10 were delivered in [place] over winter months  --Resumed Cycle 11 here upon return 4/2024  --After cycle 14, PET showed progressive disease.     7/23/2024 - 10/22/2024: docetaxel 60 mg/m2 x 5 cycles. Stopped for progression.    11/12/2024 - present: Xeloda 850 mg/m2 (1500 mg) BID 14 on/7 off       Physical Exam    GENERAL: Alert and oriented to time place and person.  Mild to moderate respiratory distress      Lab Results  Recent Results (from the past week)   Glucose by meter    Collection Time: 03/27/25  8:47 AM   Result Value Ref Range    GLUCOSE BY METER POCT 114 (H) 70 - 99 mg/dL            Assessment/Plan  Recurrent, stage IV triple negative right breast cancer (nodes, lung); CPS>20% + TPS 60%  Malignant pleural effusion  Right lower lobe lung collapse    In July 2024, was progressing on sacituzumab (neck, chest and upper abd adenopathy).   In Oct 2024, was progressing on docetaxel (4th line) after 5 cycles.     He was switched to oral capecitabine.  Has been on it for the last 3 months.  Now with evidence of disease progression.  Worsening shortness of breath.  Hospitalized at [place].  Noted to have significant disease progression in the lungs.  Currently on oxygen.  Here with repeat PET scan.    Results  - PET scan (morning of March 27, 2025): Showed disease progression with increased size of lymph nodes in the neck, armpit, and chest. Entire right lower lung collapsed with new cancer appearance in the left adrenal gland and new lymph nodes in the abdomen. Spots in front of the liver and in the bone. Significant worsening in the lungs.  - CT scan (compared to November 2024): Indicated progression with increased lymph nodes in the neck and armpit, worsening lymph nodes in the chest, and new cancer presence in the left adrenal gland and abdomen. Right lower lung collapsed with possible inflammation and infection.  - I have personally reviewed the CT scan/PET scan images and report and independently interpreted the results to my ability.    Plan  Triple negative breast cancer, metastatic  - Significant disease progression noted, with increased lymph nodes in the neck, armpit, chest, and new cancer appearance in the left adrenal gland and abdomen. The right lower lung is collapsed, likely due to cancer and possibly inflammation or infection.  I reviewed further options.  She most single agent chemotherapy have response rates measured in 30 to 40% range.  Has done multiple lines of systemic chemotherapy.  Unfortunately I do not have really good options going forward without putting her through  significant side effects and complications from the chemo and without much meaningful clinical benefit.  But have significant complications including cytopenias putting her at risk for infection and other major complications.  I do not think she can handle any chemotherapy.  Her performance status is at best 3.  - Focus on comfort rather than aggressive treatment. Referral to hospice for palliative care to manage symptoms and ensure comfort.  She and her family are in agreement with the plan.  Life expectancy is measured in weeks rather than months.     Hospice referral placed.    A total of 40 min was spent today on this visit which included face to face conversation with the patient, EMR review, counseling and co-ordination of care and medical documentation.      Signed by:   Maricarmen Beebe MD

## 2025-03-27 NOTE — LETTER
3/27/2025      Precious Paris  [address] [place] [address]      Dear Colleague,    Thank you for referring your patient, Precious Paris, to the [facility]. Please see a copy of my visit note below.    Virtual Visit Details    Type of service:  Video Visit   Video Start Time:  11:54 AM  Video End Time: 12:17 PM    Originating Location (pt. Location): Home    Distant Location (provider location):  On-site  Platform used for Video Visit: [facility] Hematology and Oncology Progress Note    Patient: Precious Paris  MRN: [id number]  Mar 27, 2025          Reason for Visit    Recurrent stage IV triple negative breast cancer (nodes, lung) (PDL1+)    Primary Oncologist: Dr. Beebe    _____________________________________________________________________________    History of Present Illness/ Interval History    Ms. Precious Paris is a 84 year old with recurrent metastatic triple negative breast cancer.    Until recently she was on Xeloda.  She is here with repeat PET scan to discuss further management.  This was a video visit.  She sterling in [place] and recently came back to [place].    While back to [place] she was noted to have significant worsening of shortness of breath.  Was hospitalized at [facility].  Had a CT scan done there which showed extensive lung metastasis along with pleural effusion.  Underwent thoracentesis.  After that her daughter drove her and her  back to [place] to discuss further management.    She reported feeling tired and experiencing breathing difficulties. She continues to experience shortness of breath, particularly when lying flat, which forces her to sleep in a recliner. She has no fever and is coughing frequently, though not producing much phlegm. Her appetite and ability to eat and drink have remained unaffected throughout her illness. Precious underwent a PET scan earlier in the day, following a  previous scan in November, which led to a switch in her chemotherapy regimen to Capecitabine due to disease progression. S    he has been on oxygen 24/7, but still experiences episodes of breathlessness, particularly around 4:00 AM, which resemble panic attacks. She has been using an inhaler, which she feels provides some symptomatic relief, although its effectiveness is uncertain. Precious has been battling cancer for a significant period, with notable disease progression over the past two years.     ECOG PS: 1      Oncology History/Treatment  Diagnosis/Stage:   11/2019: Right breast cancer, triple negative (oM3d-O5)    BRCA negative    10/2022: Recurrent, stage IV triple negative breast cancer (bilateral axillary, supraclav, mediastinal nodes + lung)  -persistent/progressive right breast firmness with new right nipple drainage and right axillary adenopathy  -bilateral diagnostic mammogram: increased density R breast with skin thickening. R breast US: 3.2 cm hypoechoic mass at 11:00 position 1 cm from nipple and multiple other small hypoechoic solid-appearing lesions in R breast + right axillary adenopathy (at least two hypoechoic vascular lesions measuring up to 2.5 cm)  -10/18/2022 biopsy R breast mass + R axillary node: grade 3 invasive ductal cancer with tumor necrosis, ER/ME/HER2 negative.   -PET: + right breast mass; bilateral axillary, retropectoral, supraclavicular, mediastinal/R hilar nodes and bilateral lung/R pleural nodules suspicious for mets.  -Brain MRI: negative for mets  -11/1/2022 biopsy L axillary node: metastatic invasive ductal carcinoma  -Foundation One: no actionable mutations MS stable, TMB 1 mut/mb.   -PDL1 testing: CPS >20% and TPS 60% (considered high PDL1 expression)  -CA 27.29: 59.5 (elevated). CA 15-3: 23 (normal)  -9/10/2023 Brain MRI: 2 new foci of punctate enhancement in left frontal cortex and right lentiform nucleus, concerning for brain metastases (asymptomatic)    NGS (foundation  1)  FGF R1 amplification.  Pazopanib has been approved in other tumors but not in breast cancer.  NTRK 1 amplification.  Not actionable  She has multiple other mutations which are not actionable.      Treatment:  Initial breast cancer (2019)  -2019: Neoadjuvant taxol  -Lumpectomy  -5/2020: Adjuvant RT  -6 - 7/2020: Adjuvant Xeloda. Stopped for rash    Recurrent, stage IV breast cancer (2022)  -11/14/2022:Doxil every 4 weeks  -12/12/2022: changed to doxorubicin every 3 weeks. (Planning addition of pembrolizumab once approved by insurance)  -1/2/2023: doxorubicin + pembrolizumab every 3 weeks. Completed 1 cycle. Stopped for progression (med nodes, slight increase in lung mets and stable R breast mass + axillary nodes).    -1/23/2023 - present: Carbo AUC 5, Gemcitabine 800 mg/m2 and Pembro every 21 days  -cycles 1-2: [place] day 1 only  -Cycle 3: [place] increased to days 1 + 8 given excellent tolerance  -CT after 3 cycles showed excellent response (near CR axillary + med nodes and [place] in lung mets; clinical improvement in right breast changes and tumor marker).  -PET scan after 6 cycles of carboplatin gemcitabine pembrolizumab shows complete response    5/30 - 8/22/2023: Maintenance pembrolizumab, until progression (recurrent axillary, thoracic nodes and 2 punctate brain lesions).  Biopsy of left axillary node confirmed recurrent/metastatic breast cancer.    9/12/2023 - 6/18/2024:  Sacituzumab days 1, 8 every 21 days (x 14 cycles). Stopped for progression.  --C2D8 deferred x 1 week for neutropenia  --Dose decreased to 8 mg/kg from cycle 3 onwards.  --Cycles 5-10 were delivered in [place] over winter months  --Resumed Cycle 11 here upon return 4/2024  --After cycle 14, PET showed progressive disease.     7/23/2024 - 10/22/2024: docetaxel 60 mg/m2 x 5 cycles. Stopped for progression.    11/12/2024 - present: Xeloda 850 mg/m2 (1500 mg) BID 14 on/7 off       Physical Exam    GENERAL: Alert and oriented to time place and person.   Mild to moderate respiratory distress      Lab Results  Recent Results (from the past week)   Glucose by meter    Collection Time: 03/27/25  8:47 AM   Result Value Ref Range    GLUCOSE BY METER POCT 114 (H) 70 - 99 mg/dL           Assessment/Plan  Recurrent, stage IV triple negative right breast cancer (nodes, lung); CPS>20% + TPS 60%  Malignant pleural effusion  Right lower lobe lung collapse    In July 2024, was progressing on sacituzumab (neck, chest and upper abd adenopathy).   In Oct 2024, was progressing on docetaxel (4th line) after 5 cycles.     He was switched to oral capecitabine.  Has been on it for the last 3 months.  Now with evidence of disease progression.  Worsening shortness of breath.  Hospitalized at [place].  Noted to have significant disease progression in the lungs.  Currently on oxygen.  Here with repeat PET scan.    Results  - PET scan (morning of March 27, 2025): Showed disease progression with increased size of lymph nodes in the neck, armpit, and chest. Entire right lower lung collapsed with new cancer appearance in the left adrenal gland and new lymph nodes in the abdomen. Spots in front of the liver and in the bone. Significant worsening in the lungs.  - CT scan (compared to November 2024): Indicated progression with increased lymph nodes in the neck and armpit, worsening lymph nodes in the chest, and new cancer presence in the left adrenal gland and abdomen. Right lower lung collapsed with possible inflammation and infection.  - I have personally reviewed the CT scan/PET scan images and report and independently interpreted the results to my ability.    Plan  Triple negative breast cancer, metastatic  - Significant disease progression noted, with increased lymph nodes in the neck, armpit, chest, and new cancer appearance in the left adrenal gland and abdomen. The right lower lung is collapsed, likely due to cancer and possibly inflammation or infection.  I reviewed further options.  She  most single agent chemotherapy have response rates measured in 30 to 40% range.  Has done multiple lines of systemic chemotherapy.  Unfortunately I do not have really good options going forward without putting her through significant side effects and complications from the chemo and without much meaningful clinical benefit.  But have significant complications including cytopenias putting her at risk for infection and other major complications.  I do not think she can handle any chemotherapy.  Her performance status is at best 3.  - Focus on comfort rather than aggressive treatment. Referral to hospice for palliative care to manage symptoms and ensure comfort.  She and her family are in agreement with the plan.  Life expectancy is measured in weeks rather than months.     Hospice referral placed.    A total of 40 min was spent today on this visit which included face to face conversation with the patient, EMR review, counseling and co-ordination of care and medical documentation.      Signed by:   Maricarmen Beebe MD           Again, thank you for allowing me to participate in the care of your patient.        Sincerely,        Maricarmen Beebe MD    Electronically signed

## 2025-03-27 NOTE — NURSING NOTE
Current patient location: [facility] [place] [address]    Is the patient currently in the state of [place]? YES    Visit mode: VIDEO    If the visit is dropped, the patient can be reconnected by:VIDEO VISIT: Text to cell phone:   Telephone Information:   Mobile [phone]       Will anyone else be joining the visit? NO  (If patient encounters technical issues they should call [phone]    Are changes needed to the allergy or medication list? No, Pt stated no changes to allergies, and Pt stated no med changes    Are refills needed on medications prescribed by this physician? NO    Rooming Documentation:  Questionnaire(s) completed  Please review distress screening. Pt would like to connect with a  and Spiritual Care Providers.     Reason for visit: RECHECK (Return CCSL)    Alyce ANDREA

## 2025-03-27 NOTE — LETTER
3/27/2025      Precious Paris  [address] [place] [address]      Dear Colleague,    Thank you for referring your patient, Precious Paris, to the [facility]. Please see a copy of my visit note below.    Virtual Visit Details    Type of service:  Video Visit   Video Start Time:  11:54 AM  Video End Time: 12:17 PM    Originating Location (pt. Location): Home    Distant Location (provider location):  On-site  Platform used for Video Visit: [facility] Hematology and Oncology Progress Note    Patient: Precious Paris  MRN: [id number]  Mar 27, 2025          Reason for Visit    Recurrent stage IV triple negative breast cancer (nodes, lung) (PDL1+)    Primary Oncologist: Dr. Beebe    _____________________________________________________________________________    History of Present Illness/ Interval History    Ms. Precious Paris is a 84 year old with recurrent metastatic triple negative breast cancer.    Until recently she was on Xeloda.  She is here with repeat PET scan to discuss further management.  This was a video visit.  She sterling in [place] and recently came back to [place].    While back to [place] she was noted to have significant worsening of shortness of breath.  Was hospitalized at [facility].  Had a CT scan done there which showed extensive lung metastasis along with pleural effusion.  Underwent thoracentesis.  After that her daughter drove her and her  back to [place] to discuss further management.    She reported feeling tired and experiencing breathing difficulties. She continues to experience shortness of breath, particularly when lying flat, which forces her to sleep in a recliner. She has no fever and is coughing frequently, though not producing much phlegm. Her appetite and ability to eat and drink have remained unaffected throughout her illness. Precious underwent a PET scan earlier in the day, following a  previous scan in November, which led to a switch in her chemotherapy regimen to Capecitabine due to disease progression. S    he has been on oxygen 24/7, but still experiences episodes of breathlessness, particularly around 4:00 AM, which resemble panic attacks. She has been using an inhaler, which she feels provides some symptomatic relief, although its effectiveness is uncertain. Precious has been battling cancer for a significant period, with notable disease progression over the past two years.     ECOG PS: 1      Oncology History/Treatment  Diagnosis/Stage:   11/2019: Right breast cancer, triple negative (fC2e-B2)    BRCA negative    10/2022: Recurrent, stage IV triple negative breast cancer (bilateral axillary, supraclav, mediastinal nodes + lung)  -persistent/progressive right breast firmness with new right nipple drainage and right axillary adenopathy  -bilateral diagnostic mammogram: increased density R breast with skin thickening. R breast US: 3.2 cm hypoechoic mass at 11:00 position 1 cm from nipple and multiple other small hypoechoic solid-appearing lesions in R breast + right axillary adenopathy (at least two hypoechoic vascular lesions measuring up to 2.5 cm)  -10/18/2022 biopsy R breast mass + R axillary node: grade 3 invasive ductal cancer with tumor necrosis, ER/CT/HER2 negative.   -PET: + right breast mass; bilateral axillary, retropectoral, supraclavicular, mediastinal/R hilar nodes and bilateral lung/R pleural nodules suspicious for mets.  -Brain MRI: negative for mets  -11/1/2022 biopsy L axillary node: metastatic invasive ductal carcinoma  -Foundation One: no actionable mutations MS stable, TMB 1 mut/mb.   -PDL1 testing: CPS >20% and TPS 60% (considered high PDL1 expression)  -CA 27.29: 59.5 (elevated). CA 15-3: 23 (normal)  -9/10/2023 Brain MRI: 2 new foci of punctate enhancement in left frontal cortex and right lentiform nucleus, concerning for brain metastases (asymptomatic)    NGS (foundation  1)  FGF R1 amplification.  Pazopanib has been approved in other tumors but not in breast cancer.  NTRK 1 amplification.  Not actionable  She has multiple other mutations which are not actionable.      Treatment:  Initial breast cancer (2019)  -2019: Neoadjuvant taxol  -Lumpectomy  -5/2020: Adjuvant RT  -6 - 7/2020: Adjuvant Xeloda. Stopped for rash    Recurrent, stage IV breast cancer (2022)  -11/14/2022:Doxil every 4 weeks  -12/12/2022: changed to doxorubicin every 3 weeks. (Planning addition of pembrolizumab once approved by insurance)  -1/2/2023: doxorubicin + pembrolizumab every 3 weeks. Completed 1 cycle. Stopped for progression (med nodes, slight increase in lung mets and stable R breast mass + axillary nodes).    -1/23/2023 - present: Carbo AUC 5, Gemcitabine 800 mg/m2 and Pembro every 21 days  -cycles 1-2: [place] day 1 only  -Cycle 3: [place] increased to days 1 + 8 given excellent tolerance  -CT after 3 cycles showed excellent response (near CR axillary + med nodes and [place] in lung mets; clinical improvement in right breast changes and tumor marker).  -PET scan after 6 cycles of carboplatin gemcitabine pembrolizumab shows complete response    5/30 - 8/22/2023: Maintenance pembrolizumab, until progression (recurrent axillary, thoracic nodes and 2 punctate brain lesions).  Biopsy of left axillary node confirmed recurrent/metastatic breast cancer.    9/12/2023 - 6/18/2024:  Sacituzumab days 1, 8 every 21 days (x 14 cycles). Stopped for progression.  --C2D8 deferred x 1 week for neutropenia  --Dose decreased to 8 mg/kg from cycle 3 onwards.  --Cycles 5-10 were delivered in [place] over winter months  --Resumed Cycle 11 here upon return 4/2024  --After cycle 14, PET showed progressive disease.     7/23/2024 - 10/22/2024: docetaxel 60 mg/m2 x 5 cycles. Stopped for progression.    11/12/2024 - present: Xeloda 850 mg/m2 (1500 mg) BID 14 on/7 off       Physical Exam    GENERAL: Alert and oriented to time place and person.   Mild to moderate respiratory distress      Lab Results  Recent Results (from the past week)   Glucose by meter    Collection Time: 03/27/25  8:47 AM   Result Value Ref Range    GLUCOSE BY METER POCT 114 (H) 70 - 99 mg/dL           Assessment/Plan  Recurrent, stage IV triple negative right breast cancer (nodes, lung); CPS>20% + TPS 60%  Malignant pleural effusion  Right lower lobe lung collapse    In July 2024, was progressing on sacituzumab (neck, chest and upper abd adenopathy).   In Oct 2024, was progressing on docetaxel (4th line) after 5 cycles.     He was switched to oral capecitabine.  Has been on it for the last 3 months.  Now with evidence of disease progression.  Worsening shortness of breath.  Hospitalized at [place].  Noted to have significant disease progression in the lungs.  Currently on oxygen.  Here with repeat PET scan.    Results  - PET scan (morning of March 27, 2025): Showed disease progression with increased size of lymph nodes in the neck, armpit, and chest. Entire right lower lung collapsed with new cancer appearance in the left adrenal gland and new lymph nodes in the abdomen. Spots in front of the liver and in the bone. Significant worsening in the lungs.  - CT scan (compared to November 2024): Indicated progression with increased lymph nodes in the neck and armpit, worsening lymph nodes in the chest, and new cancer presence in the left adrenal gland and abdomen. Right lower lung collapsed with possible inflammation and infection.  - I have personally reviewed the CT scan/PET scan images and report and independently interpreted the results to my ability.    Plan  Triple negative breast cancer, metastatic  - Significant disease progression noted, with increased lymph nodes in the neck, armpit, chest, and new cancer appearance in the left adrenal gland and abdomen. The right lower lung is collapsed, likely due to cancer and possibly inflammation or infection.  I reviewed further options.  She  most single agent chemotherapy have response rates measured in 30 to 40% range.  Has done multiple lines of systemic chemotherapy.  Unfortunately I do not have really good options going forward without putting her through significant side effects and complications from the chemo and without much meaningful clinical benefit.  But have significant complications including cytopenias putting her at risk for infection and other major complications.  I do not think she can handle any chemotherapy.  Her performance status is at best 3.  - Focus on comfort rather than aggressive treatment. Referral to hospice for palliative care to manage symptoms and ensure comfort.  She and her family are in agreement with the plan.  Life expectancy is measured in weeks rather than months.     Hospice referral placed.    A total of 40 min was spent today on this visit which included face to face conversation with the patient, EMR review, counseling and co-ordination of care and medical documentation.      Signed by:   Maricarmen Beebe MD           Again, thank you for allowing me to participate in the care of your patient.        Sincerely,        Maricarmen Beebe MD    Electronically signed

## 2025-03-31 ENCOUNTER — TELEPHONE (OUTPATIENT)
Dept: ONCOLOGY | Facility: HOSPITAL | Age: 85
End: 2025-03-31
Payer: COMMERCIAL

## 2025-03-31 ENCOUNTER — DOCUMENTATION ONLY (OUTPATIENT)
Dept: ONCOLOGY | Facility: CLINIC | Age: 85
End: 2025-03-31
Payer: COMMERCIAL

## 2025-03-31 NOTE — TELEPHONE ENCOUNTER
Received call from Cassie at [facility] this morning to let staff know that Precious passed away yesterday, 03/30/2025.       Belen Ramos RN on 3/31/2025 at 11:52 AM

## 2025-03-31 NOTE — PROGRESS NOTES
Oral Chemotherapy Program    Thank you for the opportunity to be a part in the care of this patient's oral chemotherapy. The oncology pharmacy will no longer be following this patient for oral chemotherapy. If there are any questions or the plan changes, feel free to contact us.    Claudy Roa, PharmD, [facility]  Hematology/Oncology Clinical Pharmacist  [facility]  [phone]

## 2025-04-01 ENCOUNTER — PATIENT OUTREACH (OUTPATIENT)
Dept: CARE COORDINATION | Facility: CLINIC | Age: 85
End: 2025-04-01
Payer: COMMERCIAL

## 2025-04-01 NOTE — PROGRESS NOTES
Social Work - Distress Screen Intervention  [facility]    Identified Concern and Score from Distress Screenin. How concerned are you about your ability to eat? 0     2. How concerned are you about unintended weight loss or your current weight? 0     3. How concerned are you about feeling depressed or very sad?  3     4. How concerned are you about feeling anxious or very scared?  (!) 10     5. Do you struggle with the loss of meaning and chris in your life?  Not at all     6. How concerned are you about work and home life issues that may be affected by your cancer?  4     7. How concerned are you about knowing what resources are available to help you?  4     8. Do you currently have what you would describe as Mandaeism or spiritual struggles? Not at all     9. If you want to be contacted by one of our professionals, I can send a message to them right now.  Oncology Social Worker; Oncology Spiritual Care       Oncology SW conducted chart review prior to outreach to patient re: request to be contacted by Oncology social worker. Chart review indicates see  note by RN Belen LUCERO documenting notification of patient passing on 3/30/2025. RIP.    SW will not outreach.     JOHAN Wise, LGCINDY  Clinical , Adult Oncology  [facility]   [address] [place] [id number]  [email or web address]  Office Phone: [phone]  Support Groups at [facility]: Social Work Services for Cancer Patients [email or web address])

## 2025-04-22 ENCOUNTER — PATIENT OUTREACH (OUTPATIENT)
Dept: ONCOLOGY | Facility: CLINIC | Age: 85
End: 2025-04-22
Payer: COMMERCIAL

## 2025-04-22 NOTE — PROGRESS NOTES
[facility]: Cancer Care                                                                                          Enrollment status updated to graduated. Pt passed away.    Signature:  VENKATA ALLEN RN

## (undated) DEVICE — DRAPE C-ARM 60X42" 1013

## (undated) DEVICE — DECANTER VIAL 2006S

## (undated) DEVICE — NDL 22GA 1.5"

## (undated) DEVICE — SOL NACL 0.9% IRRIG 1000ML BOTTLE 07138-09

## (undated) DEVICE — SYR 10ML FINGER CONTROL W/O NDL 309695

## (undated) DEVICE — STOCKING SLEEVE COMPRESSION CALF MED

## (undated) DEVICE — SYR 10ML LL W/O NDL

## (undated) DEVICE — NDL COUNTER 20CT 31142493

## (undated) DEVICE — PACK LAP TRANSVERSE STD

## (undated) DEVICE — SU ETHIBOND 2-0 MO-7 CR 8X18" CX42D

## (undated) DEVICE — SPONGE RAY-TEC 4X8" 7318

## (undated) DEVICE — SU MONOCRYL 4-0 PS-2 18" UND Y496G

## (undated) DEVICE — CLIP APPLIER 9" PREMIUM II 134046

## (undated) DEVICE — GLOVE PROTEXIS BLUE W/NEU-THERA 8.0  2D73EB80

## (undated) DEVICE — LABEL MEDICATION SYSTEM  3304

## (undated) DEVICE — EYE PREP BETADINE 5% SOLUTION 30ML 0065-0411-30

## (undated) DEVICE — PREP CHLORAPREP 26ML TINTED ORANGE  260815

## (undated) DEVICE — DECANTER BAG 2002S

## (undated) DEVICE — SUCTION MANIFOLD NEPTUNE 2 SYS 1 PORT 702-025-000

## (undated) DEVICE — SU VICRYL 3-0 SH 27" UND J416H

## (undated) DEVICE — BASIN SET MINOR DISP

## (undated) DEVICE — LIGHT HANDLE X2

## (undated) DEVICE — NDL 18GA 1.5" 305196

## (undated) DEVICE — ADH SKIN CLOSURE PREMIERPRO EXOFIN 1.0ML 3470

## (undated) DEVICE — GOWN XLG DISP 9545

## (undated) DEVICE — STOCKING SLEEVE COMPRESSION CALF LG

## (undated) DEVICE — GLOVE PROTEXIS W/NEU-THERA 7.5  2D73TE75

## (undated) DEVICE — DRAPE SHEET REV FOLD 3/4 9349

## (undated) DEVICE — MARKER MARGIN MARKER STD 6 COLOR SGL USE MMS6

## (undated) DEVICE — BLADE KNIFE SURG 11 371111

## (undated) DEVICE — NDL 25GA 1.5" 305127

## (undated) DEVICE — GLOVE PROTEXIS BLUE W/NEU-THERA 6.5  2D73EB65

## (undated) DEVICE — GLOVE PROTEXIS W/NEU-THERA 8.0  2D73TE80

## (undated) DEVICE — ESU PENCIL W/COATED BLADE E2450H

## (undated) DEVICE — GLOVE PROTEXIS W/NEU-THERA 6.5  2D73TE65

## (undated) DEVICE — TUBING SUCTION 12"X1/4" N612

## (undated) DEVICE — BLADE KNIFE SURG 15 371115

## (undated) DEVICE — SU VICRYL 3-0 SH 27" J316H

## (undated) DEVICE — GOWN LG DISP 9515

## (undated) DEVICE — PROBE COVER ULTRASOUND 6X96"

## (undated) DEVICE — SUCTION TIP YANKAUER STR K87

## (undated) DEVICE — SU SILK 3-0 SH 30" K832H

## (undated) DEVICE — ESU ELEC BLADE 2.75" COATED/INSULATED E1455

## (undated) DEVICE — SPONGE LAP 18X18" 1515

## (undated) DEVICE — SOL WATER IRRIG 1000ML BOTTLE 07139-09

## (undated) DEVICE — EYE SOL BSS 500ML

## (undated) DEVICE — PREP PAD ALCOHOL 6818

## (undated) RX ORDER — ONDANSETRON 2 MG/ML
INJECTION INTRAMUSCULAR; INTRAVENOUS
Status: DISPENSED
Start: 2020-03-19

## (undated) RX ORDER — ONDANSETRON 2 MG/ML
INJECTION INTRAMUSCULAR; INTRAVENOUS
Status: DISPENSED
Start: 2022-11-11

## (undated) RX ORDER — LIDOCAINE HYDROCHLORIDE AND EPINEPHRINE 10; 10 MG/ML; UG/ML
INJECTION, SOLUTION INFILTRATION; PERINEURAL
Status: DISPENSED
Start: 2022-11-11

## (undated) RX ORDER — LIDOCAINE HYDROCHLORIDE 10 MG/ML
INJECTION, SOLUTION EPIDURAL; INFILTRATION; INTRACAUDAL; PERINEURAL
Status: DISPENSED
Start: 2019-11-29

## (undated) RX ORDER — ACETAMINOPHEN 325 MG/1
TABLET ORAL
Status: DISPENSED
Start: 2019-11-29

## (undated) RX ORDER — FENTANYL CITRATE 50 UG/ML
INJECTION, SOLUTION INTRAMUSCULAR; INTRAVENOUS
Status: DISPENSED
Start: 2022-11-11

## (undated) RX ORDER — CEFAZOLIN SODIUM 2 G/100ML
INJECTION, SOLUTION INTRAVENOUS
Status: DISPENSED
Start: 2020-03-19

## (undated) RX ORDER — FENTANYL CITRATE 50 UG/ML
INJECTION, SOLUTION INTRAMUSCULAR; INTRAVENOUS
Status: DISPENSED
Start: 2019-11-29

## (undated) RX ORDER — CEFAZOLIN SODIUM 2 G/100ML
INJECTION, SOLUTION INTRAVENOUS
Status: DISPENSED
Start: 2019-11-29

## (undated) RX ORDER — IBUPROFEN 400 MG/1
TABLET, FILM COATED ORAL
Status: DISPENSED
Start: 2017-12-07

## (undated) RX ORDER — PROPOFOL 10 MG/ML
INJECTION, EMULSION INTRAVENOUS
Status: DISPENSED
Start: 2019-11-29

## (undated) RX ORDER — DEXAMETHASONE SODIUM PHOSPHATE 4 MG/ML
INJECTION, SOLUTION INTRA-ARTICULAR; INTRALESIONAL; INTRAMUSCULAR; INTRAVENOUS; SOFT TISSUE
Status: DISPENSED
Start: 2020-03-19

## (undated) RX ORDER — GLYCOPYRROLATE 0.2 MG/ML
INJECTION, SOLUTION INTRAMUSCULAR; INTRAVENOUS
Status: DISPENSED
Start: 2020-03-19

## (undated) RX ORDER — NEOSTIGMINE METHYLSULFATE 1 MG/ML
VIAL (ML) INJECTION
Status: DISPENSED
Start: 2020-03-19

## (undated) RX ORDER — BUPIVACAINE HYDROCHLORIDE 5 MG/ML
INJECTION, SOLUTION PERINEURAL
Status: DISPENSED
Start: 2019-11-29

## (undated) RX ORDER — PROPOFOL 10 MG/ML
INJECTION, EMULSION INTRAVENOUS
Status: DISPENSED
Start: 2017-12-07

## (undated) RX ORDER — FENTANYL CITRATE 50 UG/ML
INJECTION, SOLUTION INTRAMUSCULAR; INTRAVENOUS
Status: DISPENSED
Start: 2020-03-19

## (undated) RX ORDER — OXYCODONE HYDROCHLORIDE 5 MG/1
TABLET ORAL
Status: DISPENSED
Start: 2020-03-19

## (undated) RX ORDER — HEPARIN SODIUM (PORCINE) LOCK FLUSH IV SOLN 100 UNIT/ML 100 UNIT/ML
SOLUTION INTRAVENOUS
Status: DISPENSED
Start: 2023-03-23

## (undated) RX ORDER — ONDANSETRON 2 MG/ML
INJECTION INTRAMUSCULAR; INTRAVENOUS
Status: DISPENSED
Start: 2019-11-29

## (undated) RX ORDER — HEPARIN SODIUM (PORCINE) LOCK FLUSH IV SOLN 100 UNIT/ML 100 UNIT/ML
SOLUTION INTRAVENOUS
Status: DISPENSED
Start: 2023-10-19

## (undated) RX ORDER — GABAPENTIN 300 MG/1
CAPSULE ORAL
Status: DISPENSED
Start: 2019-11-29

## (undated) RX ORDER — DEXAMETHASONE SODIUM PHOSPHATE 4 MG/ML
INJECTION, SOLUTION INTRA-ARTICULAR; INTRALESIONAL; INTRAMUSCULAR; INTRAVENOUS; SOFT TISSUE
Status: DISPENSED
Start: 2022-11-11

## (undated) RX ORDER — PHENYLEPHRINE HCL IN 0.9% NACL 1 MG/10 ML
SYRINGE (ML) INTRAVENOUS
Status: DISPENSED
Start: 2019-11-29

## (undated) RX ORDER — DEXAMETHASONE SODIUM PHOSPHATE 4 MG/ML
INJECTION, SOLUTION INTRA-ARTICULAR; INTRALESIONAL; INTRAMUSCULAR; INTRAVENOUS; SOFT TISSUE
Status: DISPENSED
Start: 2019-11-29

## (undated) RX ORDER — HYDROMORPHONE HYDROCHLORIDE 1 MG/ML
INJECTION, SOLUTION INTRAMUSCULAR; INTRAVENOUS; SUBCUTANEOUS
Status: DISPENSED
Start: 2020-03-19

## (undated) RX ORDER — BUPIVACAINE HYDROCHLORIDE 5 MG/ML
INJECTION, SOLUTION EPIDURAL; INTRACAUDAL
Status: DISPENSED
Start: 2022-11-11

## (undated) RX ORDER — CEFAZOLIN SODIUM 1 G/3ML
INJECTION, POWDER, FOR SOLUTION INTRAMUSCULAR; INTRAVENOUS
Status: DISPENSED
Start: 2022-11-11

## (undated) RX ORDER — ACETAMINOPHEN 325 MG/1
TABLET ORAL
Status: DISPENSED
Start: 2022-11-11

## (undated) RX ORDER — LIDOCAINE HYDROCHLORIDE 10 MG/ML
INJECTION, SOLUTION EPIDURAL; INFILTRATION; INTRACAUDAL; PERINEURAL
Status: DISPENSED
Start: 2020-03-19

## (undated) RX ORDER — HEPARIN SODIUM (PORCINE) LOCK FLUSH IV SOLN 100 UNIT/ML 100 UNIT/ML
SOLUTION INTRAVENOUS
Status: DISPENSED
Start: 2024-06-26

## (undated) RX ORDER — PROPOFOL 10 MG/ML
INJECTION, EMULSION INTRAVENOUS
Status: DISPENSED
Start: 2020-03-19

## (undated) RX ORDER — LIDOCAINE HYDROCHLORIDE AND EPINEPHRINE 10; 10 MG/ML; UG/ML
INJECTION, SOLUTION INFILTRATION; PERINEURAL
Status: DISPENSED
Start: 2019-11-29

## (undated) RX ORDER — FLURBIPROFEN SODIUM 0.3 MG/ML
SOLUTION/ DROPS OPHTHALMIC
Status: DISPENSED
Start: 2017-12-07

## (undated) RX ORDER — LIDOCAINE HYDROCHLORIDE AND EPINEPHRINE 10; 10 MG/ML; UG/ML
INJECTION, SOLUTION INFILTRATION; PERINEURAL
Status: DISPENSED
Start: 2020-03-19

## (undated) RX ORDER — BUPIVACAINE HYDROCHLORIDE AND EPINEPHRINE 5; 5 MG/ML; UG/ML
INJECTION, SOLUTION EPIDURAL; INTRACAUDAL; PERINEURAL
Status: DISPENSED
Start: 2020-03-19

## (undated) RX ORDER — GABAPENTIN 100 MG/1
CAPSULE ORAL
Status: DISPENSED
Start: 2022-11-11